# Patient Record
Sex: FEMALE | Race: WHITE | URBAN - METROPOLITAN AREA
[De-identification: names, ages, dates, MRNs, and addresses within clinical notes are randomized per-mention and may not be internally consistent; named-entity substitution may affect disease eponyms.]

---

## 2018-07-23 ENCOUNTER — OUTPATIENT (OUTPATIENT)
Dept: OUTPATIENT SERVICES | Facility: HOSPITAL | Age: 58
LOS: 1 days | Discharge: HOME | End: 2018-07-23

## 2018-07-23 VITALS
SYSTOLIC BLOOD PRESSURE: 140 MMHG | HEIGHT: 66 IN | DIASTOLIC BLOOD PRESSURE: 74 MMHG | RESPIRATION RATE: 17 BRPM | OXYGEN SATURATION: 97 % | TEMPERATURE: 97 F | HEART RATE: 64 BPM | WEIGHT: 242.07 LBS

## 2018-07-23 DIAGNOSIS — Z98.84 BARIATRIC SURGERY STATUS: Chronic | ICD-10-CM

## 2018-07-23 DIAGNOSIS — K43.2 INCISIONAL HERNIA WITHOUT OBSTRUCTION OR GANGRENE: ICD-10-CM

## 2018-07-23 DIAGNOSIS — Z01.818 ENCOUNTER FOR OTHER PREPROCEDURAL EXAMINATION: ICD-10-CM

## 2018-07-23 DIAGNOSIS — L02.212 CUTANEOUS ABSCESS OF BACK [ANY PART, EXCEPT BUTTOCK]: Chronic | ICD-10-CM

## 2018-07-23 DIAGNOSIS — Z90.49 ACQUIRED ABSENCE OF OTHER SPECIFIED PARTS OF DIGESTIVE TRACT: Chronic | ICD-10-CM

## 2018-07-23 DIAGNOSIS — Z90.89 ACQUIRED ABSENCE OF OTHER ORGANS: Chronic | ICD-10-CM

## 2018-07-23 LAB
ALBUMIN SERPL ELPH-MCNC: 4.4 G/DL — SIGNIFICANT CHANGE UP (ref 3.5–5.2)
ALP SERPL-CCNC: 115 U/L — SIGNIFICANT CHANGE UP (ref 30–115)
ALT FLD-CCNC: 11 U/L — SIGNIFICANT CHANGE UP (ref 0–41)
ANION GAP SERPL CALC-SCNC: 15 MMOL/L — HIGH (ref 7–14)
APPEARANCE UR: CLEAR — SIGNIFICANT CHANGE UP
APTT BLD: 33.5 SEC — SIGNIFICANT CHANGE UP (ref 27–39.2)
AST SERPL-CCNC: 11 U/L — SIGNIFICANT CHANGE UP (ref 0–41)
BASOPHILS # BLD AUTO: 0.07 K/UL — SIGNIFICANT CHANGE UP (ref 0–0.2)
BASOPHILS NFR BLD AUTO: 0.9 % — SIGNIFICANT CHANGE UP (ref 0–1)
BILIRUB SERPL-MCNC: <0.2 MG/DL — SIGNIFICANT CHANGE UP (ref 0.2–1.2)
BILIRUB UR-MCNC: NEGATIVE — SIGNIFICANT CHANGE UP
BUN SERPL-MCNC: 16 MG/DL — SIGNIFICANT CHANGE UP (ref 10–20)
CALCIUM SERPL-MCNC: 9.3 MG/DL — SIGNIFICANT CHANGE UP (ref 8.5–10.1)
CHLORIDE SERPL-SCNC: 103 MMOL/L — SIGNIFICANT CHANGE UP (ref 98–110)
CO2 SERPL-SCNC: 24 MMOL/L — SIGNIFICANT CHANGE UP (ref 17–32)
COLOR SPEC: YELLOW — SIGNIFICANT CHANGE UP
CREAT SERPL-MCNC: 0.7 MG/DL — SIGNIFICANT CHANGE UP (ref 0.7–1.5)
DIFF PNL FLD: NEGATIVE — SIGNIFICANT CHANGE UP
EOSINOPHIL # BLD AUTO: 0.48 K/UL — SIGNIFICANT CHANGE UP (ref 0–0.7)
EOSINOPHIL NFR BLD AUTO: 6.3 % — SIGNIFICANT CHANGE UP (ref 0–8)
GLUCOSE SERPL-MCNC: 100 MG/DL — HIGH (ref 70–99)
GLUCOSE UR QL: NEGATIVE — SIGNIFICANT CHANGE UP
HCT VFR BLD CALC: 38.9 % — SIGNIFICANT CHANGE UP (ref 37–47)
HGB BLD-MCNC: 12.5 G/DL — SIGNIFICANT CHANGE UP (ref 12–16)
IMM GRANULOCYTES NFR BLD AUTO: 1.6 % — HIGH (ref 0.1–0.3)
INR BLD: 1 RATIO — SIGNIFICANT CHANGE UP (ref 0.65–1.3)
KETONES UR-MCNC: NEGATIVE — SIGNIFICANT CHANGE UP
LEUKOCYTE ESTERASE UR-ACNC: NEGATIVE — SIGNIFICANT CHANGE UP
LYMPHOCYTES # BLD AUTO: 1.77 K/UL — SIGNIFICANT CHANGE UP (ref 1.2–3.4)
LYMPHOCYTES # BLD AUTO: 23.2 % — SIGNIFICANT CHANGE UP (ref 20.5–51.1)
MCHC RBC-ENTMCNC: 29.8 PG — SIGNIFICANT CHANGE UP (ref 27–31)
MCHC RBC-ENTMCNC: 32.1 G/DL — SIGNIFICANT CHANGE UP (ref 32–37)
MCV RBC AUTO: 92.8 FL — SIGNIFICANT CHANGE UP (ref 81–99)
MONOCYTES # BLD AUTO: 0.61 K/UL — HIGH (ref 0.1–0.6)
MONOCYTES NFR BLD AUTO: 8 % — SIGNIFICANT CHANGE UP (ref 1.7–9.3)
NEUTROPHILS # BLD AUTO: 4.58 K/UL — SIGNIFICANT CHANGE UP (ref 1.4–6.5)
NEUTROPHILS NFR BLD AUTO: 60 % — SIGNIFICANT CHANGE UP (ref 42.2–75.2)
NITRITE UR-MCNC: NEGATIVE — SIGNIFICANT CHANGE UP
NRBC # BLD: 0 /100 WBCS — SIGNIFICANT CHANGE UP (ref 0–0)
PH UR: 6.5 — SIGNIFICANT CHANGE UP (ref 5–8)
PLATELET # BLD AUTO: 211 K/UL — SIGNIFICANT CHANGE UP (ref 130–400)
POTASSIUM SERPL-MCNC: 4.8 MMOL/L — SIGNIFICANT CHANGE UP (ref 3.5–5)
POTASSIUM SERPL-SCNC: 4.8 MMOL/L — SIGNIFICANT CHANGE UP (ref 3.5–5)
PROT SERPL-MCNC: 6.9 G/DL — SIGNIFICANT CHANGE UP (ref 6–8)
PROT UR-MCNC: NEGATIVE — SIGNIFICANT CHANGE UP
PROTHROM AB SERPL-ACNC: 10.8 SEC — SIGNIFICANT CHANGE UP (ref 9.95–12.87)
RBC # BLD: 4.19 M/UL — LOW (ref 4.2–5.4)
RBC # FLD: 12.7 % — SIGNIFICANT CHANGE UP (ref 11.5–14.5)
SODIUM SERPL-SCNC: 142 MMOL/L — SIGNIFICANT CHANGE UP (ref 135–146)
SP GR SPEC: 1.01 — SIGNIFICANT CHANGE UP (ref 1.01–1.03)
UROBILINOGEN FLD QL: 0.2 — SIGNIFICANT CHANGE UP (ref 0.2–0.2)
WBC # BLD: 7.63 K/UL — SIGNIFICANT CHANGE UP (ref 4.8–10.8)
WBC # FLD AUTO: 7.63 K/UL — SIGNIFICANT CHANGE UP (ref 4.8–10.8)

## 2018-07-23 NOTE — H&P PST ADULT - PMH
Anxiety    Asthma  LAST ATTACK MANY YRS AGO  Back pain  LOW  Constipation    Depression    Gastroesophageal reflux disease without esophagitis    Heart murmur    Hypothyroidism    Mood disorder    Morbid obesity    Psoriasis

## 2018-07-23 NOTE — H&P PST ADULT - HISTORY OF PRESENT ILLNESS
PATIENT DENIES CHEST PAIN, SHORTNESS OF BREATH, PALPITATIONS, COUGHING, FEVER, DYSURIA.  CAN WALK UP 1 FLIGHTS OF STEPS WITHOUT SOB, BUR REPORTS  HORTON.

## 2018-07-23 NOTE — H&P PST ADULT - REASON FOR ADMISSION
57 Y/O FEMALE HERE FOR PRE-ADMISSION SURGICAL TESTING. PATIENT REPORTS SHE HAS HAD A UMBILICAL HERNIA FOR MANY YEARS, WHICH IS PAINFUL NOW.  NOW FOR SCHEDULED PROCEDURE.

## 2018-07-23 NOTE — H&P PST ADULT - NS PRO FEM REPRO HEALTH SCREEN
Urinary Tract Infection         What is a urinary tract infection?   A urinary tract infection (UTI) is an infection in the urinary tract. The urinary tract includes the:   kidneys   ureters (the tubes draining urine from the kidneys to the bladder)   bladder   urethra (the tube that drains urine from the bladder).   Any or all of these parts of the urinary tract can get infected.   How does it occur?   Urinary tract infection is usually caused by bacteria. Normally the urinary tract does not have any bacteria or other organisms in it. Bacteria that cause UTI often spread from the rectum or vagina to the urethra and then to the bladder or kidneys. Urinary tract infection is more common in women than men because the urethra is shorter in women. This makes it easier for bacteria to move up to the bladder. Sometimes bacteria spread from another part of the body through the bloodstream to the urinary tract.   Some of the things that can lead to an infection are:   a blockage in the urinary tract, such as a kidney stone   sexual activity   getting older, when it may get harder to empty and flush out the bladder completely.   Women are more likely to have an infection if they:   are newly sexually active or have a new sex partner   are past menopause   are pregnant   have a history of diabetes, a problem with the immune system, sickle-cell anemia, stroke, kidney stones, or any illness that makes it hard to empty the bladder completely.   What are the symptoms?   The symptoms of UTI may include:   urinating more often   feeling an urgent need to urinate   pain or discomfort (burning) when you urinate   urine that smells bad   pain in the lower pelvis, stomach, lower back, or side   urine that looks cloudy or reddish   fever or chills   sweats   nausea and vomiting   leaking of urine   change in amount of urine, either more or less   pain during sex.   How is it diagnosed?   Your healthcare provider will ask  about your symptoms and medical history. Your provider will examine you. The exam may include a pelvic exam. Your provider will check for tenderness of the bladder or kidney. A sample of your urine may be tested for bacteria and pus. If you are having fever and are feeling very ill, you may have a blood test to look for signs of more serious infection.   If you keep having infections or symptoms after treatment, your provider may suggest these tests:   An intravenous pyelogram (IVP). An IVP is a special type of X-ray of the kidneys, ureters, and bladder.   An ultrasound scan to look at the urinary tract.   A cystoscopy. This is an exam of the inside of the urethra and bladder with a small lighted instrument. It is usually done by a specialist called a urologist.   How is it treated?   UTIs are usually treated with antibiotics. Your provider can also prescribe a medicine called Pyridium to relieve burning and discomfort. (Pyridium turns your urine a dark orange color.)   If the infection is causing fever, pain, or vomiting or you have a severe kidney infection, you may need to stay at the hospital for treatment.   How long will the effects last?   With antibiotic treatment, the symptoms of a bacterial infection stop in 1 to 3 days. Take all of the antibiotic your healthcare provider prescribes, even after the symptoms go away. If you stop taking your medicine before the scheduled end of treatment, the infection may come back   Without treatment, the infection can last a long time. If it is not treated, the infection can permanently damage the bladder and kidneys, or it may spread to the blood. If the infection spreads to the blood, it can be fatal.   How can I take care of myself?   Follow your healthcare provider's treatment. Take all of the antibiotic that your healthcare provider prescribes, even when you feel better. Do not take medicine left over from previous prescriptions.   Drink more fluids, especially  water, to help flush bacteria from your system.   If you have a fever:   Take aspirin or acetaminophen to control the fever. Check with your healthcare provider before you give any medicine that contains aspirin or salicylates to a child or teen. This includes medicines like baby aspirin, some cold medicines, and Pepto Bismol. Children and teens who take aspirin are at risk for a serious illness called Reye's syndrome.   Keep a daily record of your temperature.   A hot water bottle or an electric heating pad on a low setting can help relieve cramps or lower abdominal or back pain. Keep a cloth between your skin and the hot water bottle or heating pad so that you don't burn your skin.   Soaking in a tub for 20 to 30 minutes may help relieve any back or abdominal pain.   Follow your healthcare provider's directions for a follow-up urine test. Your provider may want to test your urine soon after you finish taking the antibiotic.   Call your healthcare provider right away if:   You keep having symptoms after taking an antibiotic for 2 days.   Your symptoms get worse.   You have a fever of 101.5? F (38.6? C) or higher.   You have new vomiting.   You have new pain in your side, back, or belly.   You have any symptoms that worry you.   How can I help prevent urinary tract infection?   You can help prevent UTIs if you:   Drink lots of fluids every day.   Don't wait to go to the bathroom when you feel the need to urinate.   Empty your bladder completely when you urinate.   Use good hygiene when you use the toilet. For example, wipe from front to back to keep rectal bacteria from getting into the vagina and urethra.   Avoid using irritating cosmetics or chemicals in the area of the vagina and urethra (such as strong soaps, feminine hygiene sprays or douches, or scented napkins or panty liners).   Practice safe sex. Always use latex or polyurethane condoms.   Urinate soon after sex.   Keep your genital area clean.   Wear  underwear that is all cotton or has a cotton crotch. Pantyhose should also have a cotton crotch. Cotton allows better air circulation than nylon. Change underwear and pantyhose every day.     Published by [employer].  This content is reviewed periodically and is subject to change as new health information becomes available. The information is intended to inform and educate and is not a replacement for medical evaluation, advice, diagnosis or treatment by a healthcare professional.   Developed by Asuncion Kumar RN, [place], and [facility].   ? 2010 [facility] and/or its affiliates. All Rights Reserved.   Copyright   [employer] 2011           mammogram

## 2018-07-23 NOTE — H&P PST ADULT - FAMILY HISTORY
Father  Still living? Unknown  CAD (coronary artery disease), Age at diagnosis: Age Unknown     Mother  Still living? No  CAD (coronary artery disease), Age at diagnosis: Age Unknown

## 2018-07-23 NOTE — H&P PST ADULT - MEDICATION HERBAL REMEDIES, PROFILE
LIMITED ROM/PAIN ON MOVEMENT/SPASMS/B/L SCM spasms trachea midline/PAIN ON MOVEMENT/SPASMS/B/L SCM and trapezius spasms; no VPT. No step offs, no deformities./LIMITED ROM no

## 2018-07-23 NOTE — H&P PST ADULT - PSH
Abscess of back  EVACUATION OF ABSCESS @ L5-S1 1997  History of appendectomy  1974  History of Jaclyn-en-Y gastric bypass  WITH CHOLECYSTECTOMY 2006  S/P tonsillectomy  1967

## 2018-07-30 ENCOUNTER — OUTPATIENT (OUTPATIENT)
Dept: OUTPATIENT SERVICES | Facility: HOSPITAL | Age: 58
LOS: 1 days | Discharge: HOME | End: 2018-07-30

## 2018-07-30 ENCOUNTER — RESULT REVIEW (OUTPATIENT)
Age: 58
End: 2018-07-30

## 2018-07-30 VITALS — DIASTOLIC BLOOD PRESSURE: 70 MMHG | RESPIRATION RATE: 18 BRPM | HEART RATE: 66 BPM | SYSTOLIC BLOOD PRESSURE: 167 MMHG

## 2018-07-30 VITALS
RESPIRATION RATE: 18 BRPM | OXYGEN SATURATION: 97 % | SYSTOLIC BLOOD PRESSURE: 143 MMHG | TEMPERATURE: 97 F | DIASTOLIC BLOOD PRESSURE: 73 MMHG | HEIGHT: 66 IN | WEIGHT: 229.94 LBS | HEART RATE: 56 BPM

## 2018-07-30 DIAGNOSIS — Z90.49 ACQUIRED ABSENCE OF OTHER SPECIFIED PARTS OF DIGESTIVE TRACT: Chronic | ICD-10-CM

## 2018-07-30 DIAGNOSIS — Z98.84 BARIATRIC SURGERY STATUS: Chronic | ICD-10-CM

## 2018-07-30 DIAGNOSIS — L02.212 CUTANEOUS ABSCESS OF BACK [ANY PART, EXCEPT BUTTOCK]: Chronic | ICD-10-CM

## 2018-07-30 DIAGNOSIS — Z90.89 ACQUIRED ABSENCE OF OTHER ORGANS: Chronic | ICD-10-CM

## 2018-07-30 RX ORDER — LAMOTRIGINE 25 MG/1
25 TABLET, ORALLY DISINTEGRATING ORAL
Qty: 0 | Refills: 0 | COMMUNITY

## 2018-07-30 RX ORDER — KETOROLAC TROMETHAMINE 30 MG/ML
30 SYRINGE (ML) INJECTION ONCE
Qty: 0 | Refills: 0 | Status: DISCONTINUED | OUTPATIENT
Start: 2018-07-30 | End: 2018-07-30

## 2018-07-30 RX ORDER — HYDROMORPHONE HYDROCHLORIDE 2 MG/ML
1 INJECTION INTRAMUSCULAR; INTRAVENOUS; SUBCUTANEOUS
Qty: 0 | Refills: 0 | Status: DISCONTINUED | OUTPATIENT
Start: 2018-07-30 | End: 2018-07-30

## 2018-07-30 RX ORDER — ONDANSETRON 8 MG/1
4 TABLET, FILM COATED ORAL ONCE
Qty: 0 | Refills: 0 | Status: DISCONTINUED | OUTPATIENT
Start: 2018-07-30 | End: 2018-08-14

## 2018-07-30 RX ORDER — SERTRALINE 25 MG/1
200 TABLET, FILM COATED ORAL
Qty: 0 | Refills: 0 | COMMUNITY

## 2018-07-30 RX ORDER — FOLIC ACID 0.8 MG
1 TABLET ORAL
Qty: 0 | Refills: 0 | COMMUNITY

## 2018-07-30 RX ORDER — SODIUM CHLORIDE 9 MG/ML
1000 INJECTION, SOLUTION INTRAVENOUS
Qty: 0 | Refills: 0 | Status: DISCONTINUED | OUTPATIENT
Start: 2018-07-30 | End: 2018-08-14

## 2018-07-30 RX ORDER — CARISOPRODOL 250 MG
1 TABLET ORAL
Qty: 0 | Refills: 0 | COMMUNITY

## 2018-07-30 RX ORDER — MONTELUKAST 4 MG/1
1 TABLET, CHEWABLE ORAL
Qty: 0 | Refills: 0 | COMMUNITY

## 2018-07-30 RX ORDER — OXYCODONE AND ACETAMINOPHEN 5; 325 MG/1; MG/1
1 TABLET ORAL
Qty: 25 | Refills: 0
Start: 2018-07-30 | End: 2018-08-03

## 2018-07-30 RX ORDER — DOCUSATE SODIUM 100 MG
1 CAPSULE ORAL
Qty: 30 | Refills: 0
Start: 2018-07-30 | End: 2018-08-13

## 2018-07-30 RX ORDER — FLUTICASONE PROPIONATE AND SALMETEROL 50; 250 UG/1; UG/1
1 POWDER ORAL; RESPIRATORY (INHALATION)
Qty: 0 | Refills: 0 | COMMUNITY

## 2018-07-30 RX ORDER — MORPHINE SULFATE 50 MG/1
4 CAPSULE, EXTENDED RELEASE ORAL ONCE
Qty: 0 | Refills: 0 | Status: DISCONTINUED | OUTPATIENT
Start: 2018-07-30 | End: 2018-07-30

## 2018-07-30 RX ORDER — OXYCODONE AND ACETAMINOPHEN 5; 325 MG/1; MG/1
1 TABLET ORAL EVERY 4 HOURS
Qty: 0 | Refills: 0 | Status: DISCONTINUED | OUTPATIENT
Start: 2018-07-30 | End: 2018-07-30

## 2018-07-30 RX ORDER — ALBUTEROL 90 UG/1
2 AEROSOL, METERED ORAL
Qty: 0 | Refills: 0 | COMMUNITY

## 2018-07-30 RX ORDER — OXYCODONE AND ACETAMINOPHEN 5; 325 MG/1; MG/1
2 TABLET ORAL EVERY 6 HOURS
Qty: 0 | Refills: 0 | Status: DISCONTINUED | OUTPATIENT
Start: 2018-07-30 | End: 2018-07-30

## 2018-07-30 RX ORDER — GLUCOSAMINE/CHONDROITIN/C/MANG 500-400 MG
3 CAPSULE ORAL
Qty: 0 | Refills: 0 | COMMUNITY

## 2018-07-30 RX ORDER — LEVOTHYROXINE SODIUM 125 MCG
1 TABLET ORAL
Qty: 0 | Refills: 0 | COMMUNITY

## 2018-07-30 RX ORDER — METHADONE HYDROCHLORIDE 40 MG/1
3 TABLET ORAL
Qty: 0 | Refills: 0 | COMMUNITY

## 2018-07-30 RX ADMIN — Medication 30 MILLIGRAM(S): at 16:30

## 2018-07-30 RX ADMIN — HYDROMORPHONE HYDROCHLORIDE 1 MILLIGRAM(S): 2 INJECTION INTRAMUSCULAR; INTRAVENOUS; SUBCUTANEOUS at 16:30

## 2018-07-30 RX ADMIN — HYDROMORPHONE HYDROCHLORIDE 1 MILLIGRAM(S): 2 INJECTION INTRAMUSCULAR; INTRAVENOUS; SUBCUTANEOUS at 16:41

## 2018-07-30 RX ADMIN — Medication 30 MILLIGRAM(S): at 16:41

## 2018-07-30 RX ADMIN — HYDROMORPHONE HYDROCHLORIDE 1 MILLIGRAM(S): 2 INJECTION INTRAMUSCULAR; INTRAVENOUS; SUBCUTANEOUS at 16:50

## 2018-07-30 NOTE — BRIEF OPERATIVE NOTE - PROCEDURE
<<-----Click on this checkbox to enter Procedure Open repair of ventral hernia with mesh  07/30/2018    Active  JONA

## 2018-07-30 NOTE — ASU DISCHARGE PLAN (ADULT/PEDIATRIC). - SPECIAL INSTRUCTIONS
must wear abdominal binder at all times except when showering. must wear abdominal binder at all times except when showering. Please record SHERRY drain output when emptying.

## 2018-08-01 LAB — SURGICAL PATHOLOGY STUDY: SIGNIFICANT CHANGE UP

## 2018-08-06 DIAGNOSIS — F41.8 OTHER SPECIFIED ANXIETY DISORDERS: ICD-10-CM

## 2018-08-06 DIAGNOSIS — F17.210 NICOTINE DEPENDENCE, CIGARETTES, UNCOMPLICATED: ICD-10-CM

## 2018-08-06 DIAGNOSIS — J45.909 UNSPECIFIED ASTHMA, UNCOMPLICATED: ICD-10-CM

## 2018-08-06 DIAGNOSIS — E03.9 HYPOTHYROIDISM, UNSPECIFIED: ICD-10-CM

## 2018-08-06 DIAGNOSIS — E66.01 MORBID (SEVERE) OBESITY DUE TO EXCESS CALORIES: ICD-10-CM

## 2018-08-06 DIAGNOSIS — K43.0 INCISIONAL HERNIA WITH OBSTRUCTION, WITHOUT GANGRENE: ICD-10-CM

## 2019-10-30 ENCOUNTER — OUTPATIENT (OUTPATIENT)
Dept: OUTPATIENT SERVICES | Facility: HOSPITAL | Age: 59
LOS: 1 days | Discharge: HOME | End: 2019-10-30

## 2019-10-30 DIAGNOSIS — L02.212 CUTANEOUS ABSCESS OF BACK [ANY PART, EXCEPT BUTTOCK]: Chronic | ICD-10-CM

## 2019-10-30 DIAGNOSIS — Z90.89 ACQUIRED ABSENCE OF OTHER ORGANS: Chronic | ICD-10-CM

## 2019-10-30 DIAGNOSIS — Z90.49 ACQUIRED ABSENCE OF OTHER SPECIFIED PARTS OF DIGESTIVE TRACT: Chronic | ICD-10-CM

## 2019-10-30 DIAGNOSIS — Z98.84 BARIATRIC SURGERY STATUS: Chronic | ICD-10-CM

## 2019-10-31 DIAGNOSIS — E87.5 HYPERKALEMIA: ICD-10-CM

## 2019-10-31 PROBLEM — K59.00 CONSTIPATION, UNSPECIFIED: Chronic | Status: ACTIVE | Noted: 2018-07-23

## 2019-10-31 PROBLEM — E03.9 HYPOTHYROIDISM, UNSPECIFIED: Chronic | Status: ACTIVE | Noted: 2018-07-23

## 2019-10-31 PROBLEM — F39 UNSPECIFIED MOOD [AFFECTIVE] DISORDER: Chronic | Status: ACTIVE | Noted: 2018-07-23

## 2019-10-31 PROBLEM — F32.9 MAJOR DEPRESSIVE DISORDER, SINGLE EPISODE, UNSPECIFIED: Chronic | Status: ACTIVE | Noted: 2018-07-23

## 2019-10-31 PROBLEM — L40.9 PSORIASIS, UNSPECIFIED: Chronic | Status: ACTIVE | Noted: 2018-07-23

## 2019-10-31 PROBLEM — J45.909 UNSPECIFIED ASTHMA, UNCOMPLICATED: Chronic | Status: ACTIVE | Noted: 2018-07-23

## 2019-10-31 PROBLEM — K21.9 GASTRO-ESOPHAGEAL REFLUX DISEASE WITHOUT ESOPHAGITIS: Chronic | Status: ACTIVE | Noted: 2018-07-23

## 2019-10-31 PROBLEM — M54.9 DORSALGIA, UNSPECIFIED: Chronic | Status: ACTIVE | Noted: 2018-07-23

## 2019-10-31 PROBLEM — E66.01 MORBID (SEVERE) OBESITY DUE TO EXCESS CALORIES: Chronic | Status: ACTIVE | Noted: 2018-07-23

## 2019-10-31 PROBLEM — F41.9 ANXIETY DISORDER, UNSPECIFIED: Chronic | Status: ACTIVE | Noted: 2018-07-23

## 2019-10-31 PROBLEM — R01.1 CARDIAC MURMUR, UNSPECIFIED: Chronic | Status: ACTIVE | Noted: 2018-07-23

## 2019-11-08 ENCOUNTER — OUTPATIENT (OUTPATIENT)
Dept: OUTPATIENT SERVICES | Facility: HOSPITAL | Age: 59
LOS: 1 days | Discharge: HOME | End: 2019-11-08

## 2019-11-08 DIAGNOSIS — L02.212 CUTANEOUS ABSCESS OF BACK [ANY PART, EXCEPT BUTTOCK]: Chronic | ICD-10-CM

## 2019-11-08 DIAGNOSIS — I10 ESSENTIAL (PRIMARY) HYPERTENSION: ICD-10-CM

## 2019-11-08 DIAGNOSIS — Z98.84 BARIATRIC SURGERY STATUS: Chronic | ICD-10-CM

## 2019-11-08 DIAGNOSIS — Z90.49 ACQUIRED ABSENCE OF OTHER SPECIFIED PARTS OF DIGESTIVE TRACT: Chronic | ICD-10-CM

## 2019-11-08 DIAGNOSIS — Z90.89 ACQUIRED ABSENCE OF OTHER ORGANS: Chronic | ICD-10-CM

## 2019-12-11 ENCOUNTER — OUTPATIENT (OUTPATIENT)
Dept: OUTPATIENT SERVICES | Facility: HOSPITAL | Age: 59
LOS: 1 days | Discharge: HOME | End: 2019-12-11

## 2019-12-11 DIAGNOSIS — Z90.89 ACQUIRED ABSENCE OF OTHER ORGANS: Chronic | ICD-10-CM

## 2019-12-11 DIAGNOSIS — L02.212 CUTANEOUS ABSCESS OF BACK [ANY PART, EXCEPT BUTTOCK]: Chronic | ICD-10-CM

## 2019-12-11 DIAGNOSIS — Z90.49 ACQUIRED ABSENCE OF OTHER SPECIFIED PARTS OF DIGESTIVE TRACT: Chronic | ICD-10-CM

## 2019-12-11 DIAGNOSIS — Z98.84 BARIATRIC SURGERY STATUS: Chronic | ICD-10-CM

## 2019-12-13 DIAGNOSIS — E87.5 HYPERKALEMIA: ICD-10-CM

## 2020-09-24 NOTE — ASU DISCHARGE PLAN (ADULT/PEDIATRIC). - DIET
other (specify diet and fluid)
  [facility]  Neurology  [address] [place] [address], [facility] [address]  [place] [address]  Phone: [phone]  Fax:   Follow Up Time: 1-3 Days

## 2020-11-02 PROBLEM — Z00.00 ENCOUNTER FOR PREVENTIVE HEALTH EXAMINATION: Status: ACTIVE | Noted: 2020-11-02

## 2020-11-23 ENCOUNTER — TRANSCRIPTION ENCOUNTER (OUTPATIENT)
Age: 60
End: 2020-11-23

## 2020-11-23 ENCOUNTER — OUTPATIENT (OUTPATIENT)
Dept: OUTPATIENT SERVICES | Facility: HOSPITAL | Age: 60
LOS: 1 days | Discharge: HOME | End: 2020-11-23
Payer: COMMERCIAL

## 2020-11-23 DIAGNOSIS — R06.02 SHORTNESS OF BREATH: ICD-10-CM

## 2020-11-23 DIAGNOSIS — Z90.89 ACQUIRED ABSENCE OF OTHER ORGANS: Chronic | ICD-10-CM

## 2020-11-23 DIAGNOSIS — Z90.49 ACQUIRED ABSENCE OF OTHER SPECIFIED PARTS OF DIGESTIVE TRACT: Chronic | ICD-10-CM

## 2020-11-23 DIAGNOSIS — Z98.84 BARIATRIC SURGERY STATUS: Chronic | ICD-10-CM

## 2020-11-23 DIAGNOSIS — L02.212 CUTANEOUS ABSCESS OF BACK [ANY PART, EXCEPT BUTTOCK]: Chronic | ICD-10-CM

## 2020-11-23 PROCEDURE — 75574 CT ANGIO HRT W/3D IMAGE: CPT | Mod: 26

## 2021-01-04 ENCOUNTER — APPOINTMENT (OUTPATIENT)
Dept: VASCULAR SURGERY | Facility: CLINIC | Age: 61
End: 2021-01-04

## 2021-01-09 ENCOUNTER — OUTPATIENT (OUTPATIENT)
Dept: OUTPATIENT SERVICES | Facility: HOSPITAL | Age: 61
LOS: 1 days | Discharge: HOME | End: 2021-01-09

## 2021-01-09 DIAGNOSIS — L02.212 CUTANEOUS ABSCESS OF BACK [ANY PART, EXCEPT BUTTOCK]: Chronic | ICD-10-CM

## 2021-01-09 DIAGNOSIS — Z90.49 ACQUIRED ABSENCE OF OTHER SPECIFIED PARTS OF DIGESTIVE TRACT: Chronic | ICD-10-CM

## 2021-01-09 DIAGNOSIS — Z90.89 ACQUIRED ABSENCE OF OTHER ORGANS: Chronic | ICD-10-CM

## 2021-01-09 DIAGNOSIS — Z11.59 ENCOUNTER FOR SCREENING FOR OTHER VIRAL DISEASES: ICD-10-CM

## 2021-01-09 DIAGNOSIS — Z98.84 BARIATRIC SURGERY STATUS: Chronic | ICD-10-CM

## 2021-01-17 ENCOUNTER — OUTPATIENT (OUTPATIENT)
Dept: OUTPATIENT SERVICES | Facility: HOSPITAL | Age: 61
LOS: 1 days | Discharge: HOME | End: 2021-01-17

## 2021-01-17 DIAGNOSIS — Z90.49 ACQUIRED ABSENCE OF OTHER SPECIFIED PARTS OF DIGESTIVE TRACT: Chronic | ICD-10-CM

## 2021-01-17 DIAGNOSIS — Z90.89 ACQUIRED ABSENCE OF OTHER ORGANS: Chronic | ICD-10-CM

## 2021-01-17 DIAGNOSIS — Z98.84 BARIATRIC SURGERY STATUS: Chronic | ICD-10-CM

## 2021-01-17 DIAGNOSIS — Z11.59 ENCOUNTER FOR SCREENING FOR OTHER VIRAL DISEASES: ICD-10-CM

## 2021-01-17 DIAGNOSIS — L02.212 CUTANEOUS ABSCESS OF BACK [ANY PART, EXCEPT BUTTOCK]: Chronic | ICD-10-CM

## 2021-02-11 ENCOUNTER — APPOINTMENT (OUTPATIENT)
Dept: VASCULAR SURGERY | Facility: CLINIC | Age: 61
End: 2021-02-11

## 2021-03-09 DIAGNOSIS — F32.9 MAJOR DEPRESSIVE DISORDER, SINGLE EPISODE, UNSPECIFIED: ICD-10-CM

## 2021-03-09 DIAGNOSIS — Z87.891 PERSONAL HISTORY OF NICOTINE DEPENDENCE: ICD-10-CM

## 2021-03-09 DIAGNOSIS — Z87.39 PERSONAL HISTORY OF OTHER DISEASES OF THE MUSCULOSKELETAL SYSTEM AND CONNECTIVE TISSUE: ICD-10-CM

## 2021-03-10 ENCOUNTER — APPOINTMENT (OUTPATIENT)
Dept: PULMONOLOGY | Facility: CLINIC | Age: 61
End: 2021-03-10

## 2021-03-24 ENCOUNTER — APPOINTMENT (OUTPATIENT)
Dept: CARDIOLOGY | Facility: CLINIC | Age: 61
End: 2021-03-24
Payer: COMMERCIAL

## 2021-03-24 VITALS
WEIGHT: 229 LBS | TEMPERATURE: 98 F | HEIGHT: 66 IN | SYSTOLIC BLOOD PRESSURE: 130 MMHG | HEART RATE: 74 BPM | OXYGEN SATURATION: 91 % | BODY MASS INDEX: 36.8 KG/M2 | DIASTOLIC BLOOD PRESSURE: 80 MMHG

## 2021-03-24 DIAGNOSIS — I87.2 VENOUS INSUFFICIENCY (CHRONIC) (PERIPHERAL): ICD-10-CM

## 2021-03-24 PROCEDURE — 99204 OFFICE O/P NEW MOD 45 MIN: CPT

## 2021-03-24 PROCEDURE — 99072 ADDL SUPL MATRL&STAF TM PHE: CPT

## 2021-03-24 PROCEDURE — 93000 ELECTROCARDIOGRAM COMPLETE: CPT

## 2021-03-24 RX ORDER — BUMETANIDE 2 MG/1
TABLET ORAL
Refills: 0 | Status: DISCONTINUED | COMMUNITY
End: 2021-03-24

## 2021-03-24 RX ORDER — CARIPRAZINE 6 MG/1
CAPSULE, GELATIN COATED ORAL
Refills: 0 | Status: DISCONTINUED | COMMUNITY
End: 2021-03-24

## 2021-03-24 RX ORDER — CHROMIUM 200 MCG
TABLET ORAL
Refills: 0 | Status: DISCONTINUED | COMMUNITY
End: 2021-03-24

## 2021-03-24 RX ORDER — CARISOPRODOL 250 MG/1
250 TABLET ORAL
Refills: 0 | Status: DISCONTINUED | COMMUNITY
End: 2021-03-24

## 2021-03-24 RX ORDER — LAMOTRIGINE 25 MG/1
TABLET ORAL
Refills: 0 | Status: DISCONTINUED | COMMUNITY
End: 2021-03-24

## 2021-03-24 RX ORDER — ISOSORBIDE DINITRATE 30 MG/1
30 TABLET ORAL
Refills: 0 | Status: DISCONTINUED | COMMUNITY
End: 2021-03-24

## 2021-03-24 RX ORDER — SERTRALINE 25 MG/1
TABLET, FILM COATED ORAL
Refills: 0 | Status: DISCONTINUED | COMMUNITY
End: 2021-03-24

## 2021-03-24 RX ORDER — FLUTICASONE PROPIONATE AND SALMETEROL 50; 250 UG/1; UG/1
250-50 POWDER RESPIRATORY (INHALATION)
Refills: 0 | Status: DISCONTINUED | COMMUNITY
End: 2021-03-24

## 2021-03-24 RX ORDER — METHADONE HYDROCHLORIDE 10 MG/ML
CONCENTRATE ORAL
Refills: 0 | Status: DISCONTINUED | COMMUNITY
End: 2021-03-24

## 2021-03-24 RX ORDER — MONTELUKAST 10 MG/1
10 TABLET, FILM COATED ORAL
Refills: 0 | Status: DISCONTINUED | COMMUNITY
End: 2021-03-24

## 2021-03-24 RX ORDER — ALBUTEROL SULFATE 90 UG/1
AEROSOL, METERED RESPIRATORY (INHALATION)
Refills: 0 | Status: DISCONTINUED | COMMUNITY
End: 2021-03-24

## 2021-03-24 RX ORDER — LEVOTHYROXINE SODIUM 0.17 MG/1
TABLET ORAL
Refills: 0 | Status: DISCONTINUED | COMMUNITY
End: 2021-03-24

## 2021-04-06 ENCOUNTER — APPOINTMENT (OUTPATIENT)
Dept: OBGYN | Facility: CLINIC | Age: 61
End: 2021-04-06

## 2021-04-07 ENCOUNTER — APPOINTMENT (OUTPATIENT)
Dept: PULMONOLOGY | Facility: CLINIC | Age: 61
End: 2021-04-07
Payer: SELF-PAY

## 2021-04-07 VITALS
BODY MASS INDEX: 37.28 KG/M2 | OXYGEN SATURATION: 98 % | WEIGHT: 232 LBS | DIASTOLIC BLOOD PRESSURE: 76 MMHG | HEIGHT: 66 IN | RESPIRATION RATE: 14 BRPM | SYSTOLIC BLOOD PRESSURE: 126 MMHG | HEART RATE: 104 BPM

## 2021-04-07 PROCEDURE — 99072 ADDL SUPL MATRL&STAF TM PHE: CPT

## 2021-04-07 PROCEDURE — 94010 BREATHING CAPACITY TEST: CPT

## 2021-04-07 PROCEDURE — 94727 GAS DIL/WSHOT DETER LNG VOL: CPT

## 2021-04-07 PROCEDURE — 94729 DIFFUSING CAPACITY: CPT

## 2021-04-07 PROCEDURE — 99213 OFFICE O/P EST LOW 20 MIN: CPT | Mod: 25

## 2021-04-07 NOTE — HISTORY OF PRESENT ILLNESS
[TextBox_4] : s/p PFTs.  Mild reduction in diffusion capacity.  Chest CT with contrast from a few months ago reviewed.  Ex tobacco quit 3 years ago.  Compliant with inhaler.  Reports typical symptoms of KASSI.  No sleep study,

## 2021-04-07 NOTE — DISCUSSION/SUMMARY
[FreeTextEntry1] : SOB on exertion s/p chest CT with contrast and PFTs.  Evaluated by cardio.  Ex heavy tobacco use.  Keep inhaler.  Weight loss.  Home sleep study.

## 2021-04-09 PROBLEM — I87.2 CHRONIC VENOUS INSUFFICIENCY: Status: ACTIVE | Noted: 2021-03-24

## 2021-04-09 NOTE — REASON FOR VISIT
[Consultation] : a consultation regarding [FreeTextEntry2] : edema [FreeTextEntry1] : 61 y/o F with h/o Asthma, h/o tobacco abuse, depression KASSI presents for evaluation of chronic edema

## 2021-04-09 NOTE — ASSESSMENT
[FreeTextEntry1] : 61 y/o F with h/o Asthma, h/o tobacco abuse, depression KASSI presents for evaluation of chronic edema  \par h/o rheumatological w/u in the past \par \par Plan:\par - Venous duplex with reflux eval \par - Compression stockings \par - f/u in few weeks if no improvement with compression and elevation

## 2021-04-09 NOTE — PHYSICAL EXAM
[General Appearance - Well Developed] : well developed [Normal Conjunctiva] : the conjunctiva exhibited no abnormalities [Normal Oral Mucosa] : normal oral mucosa [Heart Sounds] : normal S1 and S2 [2+] : left 2+ [1+] : left 1+ [Bowel Sounds] : normal bowel sounds [Abnormal Walk] : normal gait [Nail Clubbing] : no clubbing of the fingernails [FreeTextEntry1] : venous stesis  [Oriented To Time, Place, And Person] : oriented to person, place, and time

## 2021-04-14 ENCOUNTER — APPOINTMENT (OUTPATIENT)
Dept: CARDIOLOGY | Facility: CLINIC | Age: 61
End: 2021-04-14

## 2021-05-27 ENCOUNTER — APPOINTMENT (OUTPATIENT)
Dept: CARDIOLOGY | Facility: CLINIC | Age: 61
End: 2021-05-27
Payer: COMMERCIAL

## 2021-05-27 PROCEDURE — 99072 ADDL SUPL MATRL&STAF TM PHE: CPT

## 2021-05-27 PROCEDURE — 93970 EXTREMITY STUDY: CPT

## 2021-08-04 ENCOUNTER — APPOINTMENT (OUTPATIENT)
Dept: OBGYN | Facility: CLINIC | Age: 61
End: 2021-08-04

## 2021-08-05 ENCOUNTER — OUTPATIENT (OUTPATIENT)
Dept: OUTPATIENT SERVICES | Facility: HOSPITAL | Age: 61
LOS: 1 days | Discharge: HOME | End: 2021-08-05
Payer: COMMERCIAL

## 2021-08-05 DIAGNOSIS — Z90.49 ACQUIRED ABSENCE OF OTHER SPECIFIED PARTS OF DIGESTIVE TRACT: Chronic | ICD-10-CM

## 2021-08-05 DIAGNOSIS — L02.212 CUTANEOUS ABSCESS OF BACK [ANY PART, EXCEPT BUTTOCK]: Chronic | ICD-10-CM

## 2021-08-05 DIAGNOSIS — Z90.89 ACQUIRED ABSENCE OF OTHER ORGANS: Chronic | ICD-10-CM

## 2021-08-05 DIAGNOSIS — Z98.84 BARIATRIC SURGERY STATUS: Chronic | ICD-10-CM

## 2021-08-05 PROCEDURE — 95806 SLEEP STUDY UNATT&RESP EFFT: CPT | Mod: 26

## 2021-08-06 DIAGNOSIS — G47.33 OBSTRUCTIVE SLEEP APNEA (ADULT) (PEDIATRIC): ICD-10-CM

## 2021-09-03 ENCOUNTER — APPOINTMENT (OUTPATIENT)
Dept: CARDIOLOGY | Facility: CLINIC | Age: 61
End: 2021-09-03

## 2021-09-08 ENCOUNTER — APPOINTMENT (OUTPATIENT)
Age: 61
End: 2021-09-08
Payer: COMMERCIAL

## 2021-09-08 VITALS
WEIGHT: 208 LBS | BODY MASS INDEX: 33.43 KG/M2 | HEIGHT: 66 IN | SYSTOLIC BLOOD PRESSURE: 124 MMHG | RESPIRATION RATE: 14 BRPM | HEART RATE: 76 BPM | OXYGEN SATURATION: 97 % | DIASTOLIC BLOOD PRESSURE: 78 MMHG

## 2021-09-08 DIAGNOSIS — J45.909 UNSPECIFIED ASTHMA, UNCOMPLICATED: ICD-10-CM

## 2021-09-08 DIAGNOSIS — R06.02 SHORTNESS OF BREATH: ICD-10-CM

## 2021-09-08 DIAGNOSIS — G47.33 OBSTRUCTIVE SLEEP APNEA (ADULT) (PEDIATRIC): ICD-10-CM

## 2021-09-08 PROCEDURE — 99213 OFFICE O/P EST LOW 20 MIN: CPT

## 2021-09-23 ENCOUNTER — EMERGENCY (EMERGENCY)
Facility: HOSPITAL | Age: 61
LOS: 0 days | Discharge: HOME | End: 2021-09-23
Attending: STUDENT IN AN ORGANIZED HEALTH CARE EDUCATION/TRAINING PROGRAM | Admitting: STUDENT IN AN ORGANIZED HEALTH CARE EDUCATION/TRAINING PROGRAM
Payer: COMMERCIAL

## 2021-09-23 VITALS
WEIGHT: 164.91 LBS | TEMPERATURE: 98 F | RESPIRATION RATE: 18 BRPM | DIASTOLIC BLOOD PRESSURE: 79 MMHG | HEART RATE: 55 BPM | OXYGEN SATURATION: 98 % | HEIGHT: 66 IN | SYSTOLIC BLOOD PRESSURE: 123 MMHG

## 2021-09-23 DIAGNOSIS — S81.812A LACERATION WITHOUT FOREIGN BODY, LEFT LOWER LEG, INITIAL ENCOUNTER: ICD-10-CM

## 2021-09-23 DIAGNOSIS — Z88.0 ALLERGY STATUS TO PENICILLIN: ICD-10-CM

## 2021-09-23 DIAGNOSIS — Z90.89 ACQUIRED ABSENCE OF OTHER ORGANS: Chronic | ICD-10-CM

## 2021-09-23 DIAGNOSIS — Z87.09 PERSONAL HISTORY OF OTHER DISEASES OF THE RESPIRATORY SYSTEM: ICD-10-CM
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
## 2021-09-23 DIAGNOSIS — L02.212 CUTANEOUS ABSCESS OF BACK [ANY PART, EXCEPT BUTTOCK]: Chronic | ICD-10-CM

## 2021-09-23 DIAGNOSIS — Z90.49 ACQUIRED ABSENCE OF OTHER SPECIFIED PARTS OF DIGESTIVE TRACT: Chronic | ICD-10-CM

## 2021-09-23 DIAGNOSIS — Z79.82 LONG TERM (CURRENT) USE OF ASPIRIN: ICD-10-CM

## 2021-09-23 DIAGNOSIS — Z98.84 BARIATRIC SURGERY STATUS: Chronic | ICD-10-CM

## 2021-09-23 PROCEDURE — 12002 RPR S/N/AX/GEN/TRNK2.6-7.5CM: CPT

## 2021-09-23 PROCEDURE — 99284 EMERGENCY DEPT VISIT MOD MDM: CPT | Mod: 25

## 2021-09-23 PROCEDURE — 73590 X-RAY EXAM OF LOWER LEG: CPT | Mod: 26,LT

## 2021-09-23 PROCEDURE — 72170 X-RAY EXAM OF PELVIS: CPT | Mod: 26

## 2021-09-23 RX ORDER — IBUPROFEN 200 MG
600 TABLET ORAL ONCE
Refills: 0 | Status: COMPLETED | OUTPATIENT
Start: 2021-09-23 | End: 2021-09-23

## 2021-09-23 RX ADMIN — Medication 600 MILLIGRAM(S): at 16:27

## 2021-09-23 NOTE — ED PROVIDER NOTE - PHYSICAL EXAMINATION
VITALS: Reviewed  CONSTITUTIONAL: well developed, well nourished, in no acute distress, speaking in full sentences, nontoxic appearing  SKIN: warm, dry, no rash  HEAD: normocephalic, atraumatic  EYES: PERRL, EOMI, no conjunctival erythema, sclera clear  ENT: patent airway, moist mucous membranes  NECK: supple, no masses  CV:  regular rate, regular rhythm, 2+ radial pulses bilaterally  RESP: no wheezes, no rales, no rhonchi, normal work of breathing  ABD: soft, nontender, nondistended, no rebound, no guarding  MSK: normal ROM, no cyanosis, no edema--L anterior 5 cm U shaped laceration, not contaminated, no active bleeding  NEURO: alert, oriented x3  PSYCH: cooperative, appropriate

## 2021-09-23 NOTE — ED PROVIDER NOTE - NS ED ROS FT
Review of Systems:  CONSTITUTIONAL - No fever  SKIN - No rash  RESPIRATORY - No shortness of breath, No cough  CARDIAC -No chest pain, No palpitations  GI - No abdominal pain, No nausea, No vomiting  All other systems negative, unless specified in HPI

## 2021-09-23 NOTE — ED ADULT NURSE NOTE - NSICDXPASTSURGICALHX_GEN_ALL_CORE_FT
PAST SURGICAL HISTORY:  Abscess of back EVACUATION OF ABSCESS @ L5-S1 1997    History of appendectomy 1974    History of Jaclyn-en-Y gastric bypass WITH CHOLECYSTECTOMY 2006    S/P tonsillectomy 1967

## 2021-09-23 NOTE — ED PROVIDER NOTE - PATIENT PORTAL LINK FT
You can access the FollowMyHealth Patient Portal offered by [facility] by registering at the following website: [email or web address]. By joining [employer]’s FollowMyHealth portal, you will also be able to view your health information using other applications (apps) compatible with our system.

## 2021-09-23 NOTE — ED PROVIDER NOTE - NSFOLLOWUPINSTRUCTIONS_ED_ALL_ED_FT
Suture removal in 14 days.    Fall Prevention    WHAT YOU NEED TO KNOW:    What is fall prevention? Fall prevention includes ways to make your home and other areas safer. It also includes ways you can move more carefully to prevent a fall.    What increases my risk for falls?     Lack of vitamin D    Not getting enough sleep each night    Trouble walking or keeping your balance, or foot problems    Health conditions that cause changes in your blood pressure, vision, or muscle strength and coordination    Medicines that make you dizzy, weak, or sleepy    Problems seeing clearly    Shoes that have high heels or are not supportive    Tripping hazards, such as items left on the floor, no handrails on the stairs, or broken steps    How can I help protect myself from falls?     Stand or sit up slowly. This may help you keep your balance and prevent falls. If you need to get up during the night, sit up first. Be sure you are fully awake before you stand. Turn on the light before you start walking. Go slowly in case you are still sleepy. Make sure you will not trip over any pets sleeping in the bedroom.    Use assistive devices as directed. Your healthcare provider may suggest that you use a cane or walker to help you keep your balance. You may need to have grab bars put in your bathroom near the toilet or in the shower.    Wear shoes that fit well and have soles that . Wear shoes both inside and outside. Use slippers with good . Do not wear shoes with high heels.    Wear a personal alarm. This is a device that allows you to call 911 if you fall and need help. Ask your healthcare provider for more information.    Stay active. Exercise can help strengthen your muscles and improve your balance. Your healthcare provider may recommend water aerobics or walking. He or she may also recommend physical therapy to improve your coordination. Never start an exercise program without talking to your healthcare provider first.     Manage medical conditions. Keep all appointments with your healthcare providers. Visit your eye doctor as directed.    How can I make my home safer?     Add items to prevent falls in the bathroom. Put nonslip strips on your bath or shower floor to prevent you from slipping. Use a bath mat if you do not have carpet in the bathroom. This will prevent you from falling when you step out of the bath or shower. Use a shower seat so you do not need to stand while you shower. Sit on the toilet or a chair in your bathroom to dry yourself and put on clothing. This will prevent you from losing your balance from drying or dressing yourself while you are standing.     Keep paths clear. Remove books, shoes, and other objects from walkways and stairs. Place cords for telephones and lamps out of the way so that you do not need to walk over them. Tape them down if you cannot move them. Remove small rugs. If you cannot remove a rug, secure it with double-sided tape. This will prevent you from tripping.     Install bright lights in your home. Use night lights to help light paths to the bathroom or kitchen. Always turn on the light before you start walking.    Keep items you use often on shelves within reach. Do not use a step stool to help you reach an item.    Paint or place reflective tape on the edges of your stairs. This will help you see the stairs better.    Call 911 or have someone else call if:     You have fallen and are unconscious.    You have fallen and cannot move part of your body.    Contact your healthcare provider if:     You have fallen and have pain or a headache.    You have questions or concerns about your condition or care.    CARE AGREEMENT:    You have the right to help plan your care. Learn about your health condition and how it may be treated. Discuss treatment options with your healthcare providers to decide what care you want to receive. You always have the right to refuse treatment.       © Copyright [employer] 2019 All illustrations and images included in CareNotes are the copyrighted property of [employer] or [employer].    Laceration    A laceration is a cut that goes through all of the layers of the skin and into the tissue that is right under the skin. Some lacerations heal on their own. Others need to be closed with skin adhesive strips, skin glue, stitches (sutures), or staples. Proper laceration care minimizes the risk of infection and helps the laceration to heal better.  If non-absorbable stitches or staples have been placed, they must be taken out within the time frame instructed by your healthcare provider.    SEEK IMMEDIATE MEDICAL CARE IF YOU HAVE ANY OF THE FOLLOWING SYMPTOMS: swelling around the wound, worsening pain, drainage from the wound, red streaking going away from your wound, inability to move finger or toe near the laceration, or discoloration of skin near the laceration.

## 2021-09-23 NOTE — ED ADULT TRIAGE NOTE - CHIEF COMPLAINT QUOTE
Patient had mechanical fall from standing denies head trauma LOC and blood thinners but presents with laceration left thigh

## 2021-09-23 NOTE — ED PROVIDER NOTE - OBJECTIVE STATEMENT
61Y F with PMH of sarcoidosis presents with CC of fall. Patient reports that she was sitting on the cough, fell off it because was at the edge, hit the L anterior shin on a table causing a laceration. Patient reports that she is UTD with tetanus. No head trauma, LOC, or neck injury. Denies chest pain, SOB, abdominal pain. No other extremity injuries.

## 2021-09-23 NOTE — ED PROVIDER NOTE - ATTENDING CONTRIBUTION TO CARE
60 yo f hx sarcoidosis  pt was sitting on couch and fell off the cough. pt hit L anterior shin on a table causing a laceration. tdap utd.  no head/neck injury. no cp nor sob. no pain to pelvis.      vss  gen- NAD, aaox3  card-rrr  lungs-ctab, no wheezing or rhonchi  abd-sntnd, no guarding or rebound  neuro- full str/sensation, cn ii-xii grossly intact, normal coordination and gait  L shin- 5 cm U shaped laceration, not grossly contaminated, no bleeding  LLE- no bony tenderness, no knee pain/swelling, no ankle pain/swelling    will repair lac, pelvis film, tibfib  will provide supportive care, serial exam and ED observation period

## 2021-09-23 NOTE — ED ADULT NURSE NOTE - NSICDXPASTMEDICALHX_GEN_ALL_CORE_FT
PAST MEDICAL HISTORY:  Anxiety     Asthma LAST ATTACK MANY YRS AGO    Back pain LOW    Constipation     Depression     Gastroesophageal reflux disease without esophagitis     Heart murmur     Hypothyroidism     Mood disorder     Morbid obesity     Psoriasis

## 2022-02-15 ENCOUNTER — APPOINTMENT (OUTPATIENT)
Age: 62
End: 2022-02-15

## 2022-02-22 NOTE — PRE-ANESTHESIA EVALUATION ADULT - NSANTHOSAYNRD_GEN_A_CORE
Discussed with patient that Dr English reviewed his LAB for recommendation: In view of his known coronary calcification, I would like to see his LDL below 70.  I would recommend atorvastatin 20 mg/day if he is agreeable.  We have discussed this before.  He may not be ready to start a statin but that is the recommendation/SMR. Reinforced that Coronary artery calcium score 2/26/18 -total score 380, , left circumflex 19, RCA 61. Advised to consider statin. Advised to call back to office with any questions-direct contact number provided.      ----- Message from Sina English MD sent at 2/21/2022  7:31 PM CST -----  Recommendation: In view of his known coronary calcification, I would like to see his LDL below 70.  I would recommend atorvastatin 20 mg/day if he is agreeable.  We have discussed this before.  He may not be ready to start a statin but that is the recommendation/SMR      
No. KASSI screening performed.  STOP BANG Legend: 0-2 = LOW Risk; 3-4 = INTERMEDIATE Risk; 5-8 = HIGH Risk

## 2023-04-27 ENCOUNTER — INPATIENT (INPATIENT)
Facility: HOSPITAL | Age: 63
LOS: 5 days | Discharge: HOME | DRG: 249 | End: 2023-05-03
Attending: STUDENT IN AN ORGANIZED HEALTH CARE EDUCATION/TRAINING PROGRAM | Admitting: INTERNAL MEDICINE
Payer: COMMERCIAL

## 2023-04-27 VITALS
HEART RATE: 98 BPM | OXYGEN SATURATION: 100 % | TEMPERATURE: 98 F | HEIGHT: 66 IN | WEIGHT: 190.04 LBS | SYSTOLIC BLOOD PRESSURE: 140 MMHG | RESPIRATION RATE: 18 BRPM | DIASTOLIC BLOOD PRESSURE: 80 MMHG

## 2023-04-27 DIAGNOSIS — Z98.84 BARIATRIC SURGERY STATUS: Chronic | ICD-10-CM

## 2023-04-27 DIAGNOSIS — K92.0 HEMATEMESIS: ICD-10-CM

## 2023-04-27 DIAGNOSIS — L02.212 CUTANEOUS ABSCESS OF BACK [ANY PART, EXCEPT BUTTOCK]: Chronic | ICD-10-CM

## 2023-04-27 DIAGNOSIS — Z90.49 ACQUIRED ABSENCE OF OTHER SPECIFIED PARTS OF DIGESTIVE TRACT: Chronic | ICD-10-CM

## 2023-04-27 DIAGNOSIS — Z90.89 ACQUIRED ABSENCE OF OTHER ORGANS: Chronic | ICD-10-CM

## 2023-04-27 LAB
ALBUMIN SERPL ELPH-MCNC: 4.1 G/DL — SIGNIFICANT CHANGE UP (ref 3.5–5.2)
ALP SERPL-CCNC: 123 U/L — HIGH (ref 30–115)
ALT FLD-CCNC: 11 U/L — SIGNIFICANT CHANGE UP (ref 0–41)
ANION GAP SERPL CALC-SCNC: 17 MMOL/L — HIGH (ref 7–14)
APPEARANCE UR: CLEAR — SIGNIFICANT CHANGE UP
AST SERPL-CCNC: 15 U/L — SIGNIFICANT CHANGE UP (ref 0–41)
BASOPHILS # BLD AUTO: 0.11 K/UL — SIGNIFICANT CHANGE UP (ref 0–0.2)
BASOPHILS NFR BLD AUTO: 1.3 % — HIGH (ref 0–1)
BILIRUB SERPL-MCNC: <0.2 MG/DL — SIGNIFICANT CHANGE UP (ref 0.2–1.2)
BILIRUB UR-MCNC: NEGATIVE — SIGNIFICANT CHANGE UP
BUN SERPL-MCNC: 9 MG/DL — LOW (ref 10–20)
CALCIUM SERPL-MCNC: 9 MG/DL — SIGNIFICANT CHANGE UP (ref 8.4–10.5)
CHLORIDE SERPL-SCNC: 104 MMOL/L — SIGNIFICANT CHANGE UP (ref 98–110)
CO2 SERPL-SCNC: 23 MMOL/L — SIGNIFICANT CHANGE UP (ref 17–32)
COLOR SPEC: YELLOW — SIGNIFICANT CHANGE UP
CREAT SERPL-MCNC: 0.5 MG/DL — LOW (ref 0.7–1.5)
DIFF PNL FLD: NEGATIVE — SIGNIFICANT CHANGE UP
EGFR: 107 ML/MIN/1.73M2 — SIGNIFICANT CHANGE UP
EOSINOPHIL # BLD AUTO: 0.52 K/UL — SIGNIFICANT CHANGE UP (ref 0–0.7)
EOSINOPHIL NFR BLD AUTO: 6.2 % — SIGNIFICANT CHANGE UP (ref 0–8)
GLUCOSE SERPL-MCNC: 66 MG/DL — LOW (ref 70–99)
GLUCOSE UR QL: NEGATIVE — SIGNIFICANT CHANGE UP
HCT VFR BLD CALC: 40.4 % — SIGNIFICANT CHANGE UP (ref 37–47)
HCT VFR BLD CALC: 44.2 % — SIGNIFICANT CHANGE UP (ref 37–47)
HGB BLD-MCNC: 13.2 G/DL — SIGNIFICANT CHANGE UP (ref 12–16)
HGB BLD-MCNC: 14.7 G/DL — SIGNIFICANT CHANGE UP (ref 12–16)
IMM GRANULOCYTES NFR BLD AUTO: 0.5 % — HIGH (ref 0.1–0.3)
KETONES UR-MCNC: NEGATIVE — SIGNIFICANT CHANGE UP
LACTATE SERPL-SCNC: 3 MMOL/L — HIGH (ref 0.7–2)
LEUKOCYTE ESTERASE UR-ACNC: NEGATIVE — SIGNIFICANT CHANGE UP
LIDOCAIN IGE QN: 37 U/L — SIGNIFICANT CHANGE UP (ref 7–60)
LYMPHOCYTES # BLD AUTO: 2.15 K/UL — SIGNIFICANT CHANGE UP (ref 1.2–3.4)
LYMPHOCYTES # BLD AUTO: 25.4 % — SIGNIFICANT CHANGE UP (ref 20.5–51.1)
MCHC RBC-ENTMCNC: 30.3 PG — SIGNIFICANT CHANGE UP (ref 27–31)
MCHC RBC-ENTMCNC: 31 PG — SIGNIFICANT CHANGE UP (ref 27–31)
MCHC RBC-ENTMCNC: 32.7 G/DL — SIGNIFICANT CHANGE UP (ref 32–37)
MCHC RBC-ENTMCNC: 33.3 G/DL — SIGNIFICANT CHANGE UP (ref 32–37)
MCV RBC AUTO: 92.7 FL — SIGNIFICANT CHANGE UP (ref 81–99)
MCV RBC AUTO: 93.2 FL — SIGNIFICANT CHANGE UP (ref 81–99)
MONOCYTES # BLD AUTO: 0.6 K/UL — SIGNIFICANT CHANGE UP (ref 0.1–0.6)
MONOCYTES NFR BLD AUTO: 7.1 % — SIGNIFICANT CHANGE UP (ref 1.7–9.3)
NEUTROPHILS # BLD AUTO: 5.03 K/UL — SIGNIFICANT CHANGE UP (ref 1.4–6.5)
NEUTROPHILS NFR BLD AUTO: 59.5 % — SIGNIFICANT CHANGE UP (ref 42.2–75.2)
NITRITE UR-MCNC: NEGATIVE — SIGNIFICANT CHANGE UP
NRBC # BLD: 0 /100 WBCS — SIGNIFICANT CHANGE UP (ref 0–0)
NRBC # BLD: 0 /100 WBCS — SIGNIFICANT CHANGE UP (ref 0–0)
PH UR: 7 — SIGNIFICANT CHANGE UP (ref 5–8)
PLATELET # BLD AUTO: 182 K/UL — SIGNIFICANT CHANGE UP (ref 130–400)
PLATELET # BLD AUTO: 225 K/UL — SIGNIFICANT CHANGE UP (ref 130–400)
PMV BLD: 10.4 FL — SIGNIFICANT CHANGE UP (ref 7.4–10.4)
PMV BLD: 9.8 FL — SIGNIFICANT CHANGE UP (ref 7.4–10.4)
POTASSIUM SERPL-MCNC: 3.3 MMOL/L — LOW (ref 3.5–5)
POTASSIUM SERPL-SCNC: 3.3 MMOL/L — LOW (ref 3.5–5)
PROT SERPL-MCNC: 6.4 G/DL — SIGNIFICANT CHANGE UP (ref 6–8)
PROT UR-MCNC: NEGATIVE — SIGNIFICANT CHANGE UP
RBC # BLD: 4.36 M/UL — SIGNIFICANT CHANGE UP (ref 4.2–5.4)
RBC # BLD: 4.74 M/UL — SIGNIFICANT CHANGE UP (ref 4.2–5.4)
RBC # FLD: 12.5 % — SIGNIFICANT CHANGE UP (ref 11.5–14.5)
RBC # FLD: 12.6 % — SIGNIFICANT CHANGE UP (ref 11.5–14.5)
SARS-COV-2 RNA SPEC QL NAA+PROBE: SIGNIFICANT CHANGE UP
SODIUM SERPL-SCNC: 144 MMOL/L — SIGNIFICANT CHANGE UP (ref 135–146)
SP GR SPEC: 1.01 — SIGNIFICANT CHANGE UP (ref 1.01–1.03)
TROPONIN T SERPL-MCNC: <0.01 NG/ML — SIGNIFICANT CHANGE UP
UROBILINOGEN FLD QL: SIGNIFICANT CHANGE UP
WBC # BLD: 7.35 K/UL — SIGNIFICANT CHANGE UP (ref 4.8–10.8)
WBC # BLD: 8.45 K/UL — SIGNIFICANT CHANGE UP (ref 4.8–10.8)
WBC # FLD AUTO: 7.35 K/UL — SIGNIFICANT CHANGE UP (ref 4.8–10.8)
WBC # FLD AUTO: 8.45 K/UL — SIGNIFICANT CHANGE UP (ref 4.8–10.8)

## 2023-04-27 PROCEDURE — 82525 ASSAY OF COPPER: CPT

## 2023-04-27 PROCEDURE — 84425 ASSAY OF VITAMIN B-1: CPT

## 2023-04-27 PROCEDURE — 84630 ASSAY OF ZINC: CPT

## 2023-04-27 PROCEDURE — 87046 STOOL CULTR AEROBIC BACT EA: CPT

## 2023-04-27 PROCEDURE — 80053 COMPREHEN METABOLIC PANEL: CPT

## 2023-04-27 PROCEDURE — 88305 TISSUE EXAM BY PATHOLOGIST: CPT

## 2023-04-27 PROCEDURE — 84439 ASSAY OF FREE THYROXINE: CPT

## 2023-04-27 PROCEDURE — 84999 UNLISTED CHEMISTRY PROCEDURE: CPT

## 2023-04-27 PROCEDURE — 85025 COMPLETE CBC W/AUTO DIFF WBC: CPT

## 2023-04-27 PROCEDURE — 84443 ASSAY THYROID STIM HORMONE: CPT

## 2023-04-27 PROCEDURE — 82607 VITAMIN B-12: CPT

## 2023-04-27 PROCEDURE — 83735 ASSAY OF MAGNESIUM: CPT

## 2023-04-27 PROCEDURE — 84597 ASSAY OF VITAMIN K: CPT

## 2023-04-27 PROCEDURE — 82962 GLUCOSE BLOOD TEST: CPT

## 2023-04-27 PROCEDURE — 88312 SPECIAL STAINS GROUP 1: CPT

## 2023-04-27 PROCEDURE — 74177 CT ABD & PELVIS W/CONTRAST: CPT | Mod: 26,MA

## 2023-04-27 PROCEDURE — 84302 ASSAY OF SWEAT SODIUM: CPT

## 2023-04-27 PROCEDURE — 83993 ASSAY FOR CALPROTECTIN FECAL: CPT

## 2023-04-27 PROCEDURE — 86901 BLOOD TYPING SEROLOGIC RH(D): CPT

## 2023-04-27 PROCEDURE — 87045 FECES CULTURE AEROBIC BACT: CPT

## 2023-04-27 PROCEDURE — 99223 1ST HOSP IP/OBS HIGH 75: CPT

## 2023-04-27 PROCEDURE — 86803 HEPATITIS C AB TEST: CPT

## 2023-04-27 PROCEDURE — 87507 IADNA-DNA/RNA PROBE TQ 12-25: CPT

## 2023-04-27 PROCEDURE — 85730 THROMBOPLASTIN TIME PARTIAL: CPT

## 2023-04-27 PROCEDURE — 99285 EMERGENCY DEPT VISIT HI MDM: CPT

## 2023-04-27 PROCEDURE — 86900 BLOOD TYPING SEROLOGIC ABO: CPT

## 2023-04-27 PROCEDURE — 86850 RBC ANTIBODY SCREEN: CPT

## 2023-04-27 PROCEDURE — 85027 COMPLETE CBC AUTOMATED: CPT

## 2023-04-27 PROCEDURE — 84590 ASSAY OF VITAMIN A: CPT

## 2023-04-27 PROCEDURE — 82180 ASSAY OF ASCORBIC ACID: CPT

## 2023-04-27 PROCEDURE — 84446 ASSAY OF VITAMIN E: CPT

## 2023-04-27 PROCEDURE — 94640 AIRWAY INHALATION TREATMENT: CPT

## 2023-04-27 PROCEDURE — 82746 ASSAY OF FOLIC ACID SERUM: CPT

## 2023-04-27 PROCEDURE — C9113: CPT

## 2023-04-27 PROCEDURE — 83986 ASSAY PH BODY FLUID NOS: CPT

## 2023-04-27 PROCEDURE — 36415 COLL VENOUS BLD VENIPUNCTURE: CPT

## 2023-04-27 PROCEDURE — 84255 ASSAY OF SELENIUM: CPT

## 2023-04-27 PROCEDURE — 85610 PROTHROMBIN TIME: CPT

## 2023-04-27 PROCEDURE — 87077 CULTURE AEROBIC IDENTIFY: CPT

## 2023-04-27 PROCEDURE — 83605 ASSAY OF LACTIC ACID: CPT

## 2023-04-27 PROCEDURE — 87493 C DIFF AMPLIFIED PROBE: CPT

## 2023-04-27 RX ORDER — SODIUM CHLORIDE 9 MG/ML
1000 INJECTION, SOLUTION INTRAVENOUS ONCE
Refills: 0 | Status: COMPLETED | OUTPATIENT
Start: 2023-04-27 | End: 2023-04-27

## 2023-04-27 RX ORDER — FLUTICASONE PROPIONATE 0.5 MG/G
1 CREAM TOPICAL
Qty: 0 | Refills: 0 | DISCHARGE

## 2023-04-27 RX ORDER — ALBUTEROL 90 UG/1
2 AEROSOL, METERED ORAL EVERY 6 HOURS
Refills: 0 | Status: DISCONTINUED | OUTPATIENT
Start: 2023-04-27 | End: 2023-05-03

## 2023-04-27 RX ORDER — METHADONE HYDROCHLORIDE 40 MG/1
10 TABLET ORAL ONCE
Refills: 0 | Status: DISCONTINUED | OUTPATIENT
Start: 2023-04-27 | End: 2023-04-27

## 2023-04-27 RX ORDER — PANTOPRAZOLE SODIUM 20 MG/1
8 TABLET, DELAYED RELEASE ORAL
Qty: 80 | Refills: 0 | Status: DISCONTINUED | OUTPATIENT
Start: 2023-04-27 | End: 2023-04-28

## 2023-04-27 RX ORDER — DIATRIZOATE MEGLUMINE 180 MG/ML
30 INJECTION, SOLUTION INTRAVESICAL ONCE
Refills: 0 | Status: COMPLETED | OUTPATIENT
Start: 2023-04-27 | End: 2023-04-27

## 2023-04-27 RX ORDER — POTASSIUM CHLORIDE 20 MEQ
20 PACKET (EA) ORAL EVERY 4 HOURS
Refills: 0 | Status: COMPLETED | OUTPATIENT
Start: 2023-04-27 | End: 2023-04-27

## 2023-04-27 RX ORDER — L.ACIDOPH/B.ANIMALIS/B.LONGUM 15B CELL
1 CAPSULE ORAL
Qty: 0 | Refills: 0 | DISCHARGE

## 2023-04-27 RX ORDER — MORPHINE SULFATE 50 MG/1
1 CAPSULE, EXTENDED RELEASE ORAL
Qty: 0 | Refills: 0 | DISCHARGE

## 2023-04-27 RX ORDER — ASPIRIN/CALCIUM CARB/MAGNESIUM 324 MG
1 TABLET ORAL
Qty: 0 | Refills: 0 | DISCHARGE

## 2023-04-27 RX ORDER — FOLIC ACID 0.8 MG
1 TABLET ORAL DAILY
Refills: 0 | Status: DISCONTINUED | OUTPATIENT
Start: 2023-04-27 | End: 2023-05-03

## 2023-04-27 RX ORDER — PANTOPRAZOLE SODIUM 20 MG/1
80 TABLET, DELAYED RELEASE ORAL ONCE
Refills: 0 | Status: COMPLETED | OUTPATIENT
Start: 2023-04-27 | End: 2023-04-27

## 2023-04-27 RX ORDER — METHOCARBAMOL 500 MG/1
200 TABLET, FILM COATED ORAL EVERY 8 HOURS
Refills: 0 | Status: DISCONTINUED | OUTPATIENT
Start: 2023-04-27 | End: 2023-04-27

## 2023-04-27 RX ORDER — GABAPENTIN 400 MG/1
1 CAPSULE ORAL
Refills: 0 | DISCHARGE

## 2023-04-27 RX ORDER — DOCUSATE SODIUM 100 MG
1 CAPSULE ORAL
Qty: 0 | Refills: 0 | DISCHARGE

## 2023-04-27 RX ORDER — SODIUM CHLORIDE 9 MG/ML
1000 INJECTION, SOLUTION INTRAVENOUS
Refills: 0 | Status: DISCONTINUED | OUTPATIENT
Start: 2023-04-27 | End: 2023-04-28

## 2023-04-27 RX ORDER — LEVOTHYROXINE SODIUM 125 MCG
88 TABLET ORAL DAILY
Refills: 0 | Status: DISCONTINUED | OUTPATIENT
Start: 2023-04-27 | End: 2023-05-03

## 2023-04-27 RX ORDER — MONTELUKAST 4 MG/1
10 TABLET, CHEWABLE ORAL DAILY
Refills: 0 | Status: DISCONTINUED | OUTPATIENT
Start: 2023-04-27 | End: 2023-05-03

## 2023-04-27 RX ORDER — SERTRALINE 25 MG/1
200 TABLET, FILM COATED ORAL DAILY
Refills: 0 | Status: DISCONTINUED | OUTPATIENT
Start: 2023-04-27 | End: 2023-04-28

## 2023-04-27 RX ORDER — CARIPRAZINE 1.5 MG/1
1 CAPSULE, GELATIN COATED ORAL
Refills: 0 | DISCHARGE

## 2023-04-27 RX ORDER — METHOCARBAMOL 500 MG/1
250 TABLET, FILM COATED ORAL EVERY 8 HOURS
Refills: 0 | Status: DISCONTINUED | OUTPATIENT
Start: 2023-04-27 | End: 2023-05-01

## 2023-04-27 RX ORDER — IPRATROPIUM BROMIDE 0.2 MG/ML
2 SOLUTION, NON-ORAL INHALATION
Qty: 0 | Refills: 0 | DISCHARGE

## 2023-04-27 RX ORDER — LAMOTRIGINE 25 MG/1
25 TABLET, ORALLY DISINTEGRATING ORAL EVERY 12 HOURS
Refills: 0 | Status: DISCONTINUED | OUTPATIENT
Start: 2023-04-27 | End: 2023-05-03

## 2023-04-27 RX ORDER — GABAPENTIN 400 MG/1
300 CAPSULE ORAL EVERY 8 HOURS
Refills: 0 | Status: DISCONTINUED | OUTPATIENT
Start: 2023-04-27 | End: 2023-05-03

## 2023-04-27 RX ORDER — ONDANSETRON 8 MG/1
4 TABLET, FILM COATED ORAL ONCE
Refills: 0 | Status: COMPLETED | OUTPATIENT
Start: 2023-04-27 | End: 2023-04-27

## 2023-04-27 RX ORDER — OXYCODONE HYDROCHLORIDE 5 MG/1
1 TABLET ORAL
Qty: 0 | Refills: 0 | DISCHARGE

## 2023-04-27 RX ORDER — METHADONE HYDROCHLORIDE 40 MG/1
20 TABLET ORAL ONCE
Refills: 0 | Status: DISCONTINUED | OUTPATIENT
Start: 2023-04-27 | End: 2023-04-27

## 2023-04-27 RX ADMIN — GABAPENTIN 300 MILLIGRAM(S): 400 CAPSULE ORAL at 22:17

## 2023-04-27 RX ADMIN — ONDANSETRON 4 MILLIGRAM(S): 8 TABLET, FILM COATED ORAL at 13:21

## 2023-04-27 RX ADMIN — PANTOPRAZOLE SODIUM 80 MILLIGRAM(S): 20 TABLET, DELAYED RELEASE ORAL at 13:21

## 2023-04-27 RX ADMIN — SODIUM CHLORIDE 1000 MILLILITER(S): 9 INJECTION, SOLUTION INTRAVENOUS at 13:21

## 2023-04-27 RX ADMIN — METHADONE HYDROCHLORIDE 20 MILLIGRAM(S): 40 TABLET ORAL at 23:42

## 2023-04-27 RX ADMIN — METHOCARBAMOL 250 MILLIGRAM(S): 500 TABLET, FILM COATED ORAL at 23:42

## 2023-04-27 RX ADMIN — METHADONE HYDROCHLORIDE 10 MILLIGRAM(S): 40 TABLET ORAL at 22:17

## 2023-04-27 RX ADMIN — DIATRIZOATE MEGLUMINE 30 MILLILITER(S): 180 INJECTION, SOLUTION INTRAVESICAL at 13:21

## 2023-04-27 RX ADMIN — PANTOPRAZOLE SODIUM 10 MG/HR: 20 TABLET, DELAYED RELEASE ORAL at 19:02

## 2023-04-27 RX ADMIN — Medication 50 MILLIEQUIVALENT(S): at 22:17

## 2023-04-27 RX ADMIN — SODIUM CHLORIDE 1000 MILLILITER(S): 9 INJECTION, SOLUTION INTRAVENOUS at 17:29

## 2023-04-27 RX ADMIN — Medication 50 MILLIEQUIVALENT(S): at 17:29

## 2023-04-27 NOTE — ED PROVIDER NOTE - CONSIDERATION OF ADMISSION OBSERVATION
Consideration of Admission/Observation Patient with abdominal pain, vomiting, difficulty tolerating p.o., will admit for further work-up

## 2023-04-27 NOTE — ED PROVIDER NOTE - CARE PLAN
Principal Discharge DX:	Unable to eat  Secondary Diagnosis:	Hematemesis  Secondary Diagnosis:	Excessive weight loss  Secondary Diagnosis:	History of Jaclyn-en-Y gastric bypass   1

## 2023-04-27 NOTE — PATIENT PROFILE ADULT - FUNCTIONAL ASSESSMENT - DAILY ACTIVITY 6.
Safer Sex: Care Instructions  Your Care Instructions  Safer sex is a way to reduce your risk of getting an infection spread through sex. It can also help prevent pregnancy. Most infections that are spread through sex, also called sexually transmitted infections or STIs, can be cured. But some can decrease your chances of getting pregnant if they are not treated early. Others, such as herpes, have no cure. And some, such as HIV, can be deadly. Several products can help you practice safer sex and reduce your chance of STIs. One of the best is a condom. There are condoms for men and for women. The female condom is a tube of soft plastic with a closed end that is placed deep into the vagina. You can use a special rubber sheet (dental dam) for protection during oral sex. Latex gloves can keep your hands from touching blood, semen, or other body fluids that can carry infections. Remember that birth control methods such as diaphragms, IUDs, foams, and birth control pills do not stop you from getting STIs. Follow-up care is a key part of your treatment and safety. Be sure to make and go to all appointments, and call your doctor if you are having problems. It's also a good idea to know your test results and keep a list of the medicines you take. How can you care for yourself at home? · Think about getting shots to prevent hepatitis A and hepatitis B. These two diseases can be spread through sex. You also can get hepatitis A if you eat infected food. · Use condoms or female condoms each time and every time you have sex. · Learn the right way to use a male condom:  ? Condoms come in several sizes. Make sure you use the right size. A condom that is too small can break easily. A condom that is too big can slip off during sex. Use a new condom each time you have sex. ? Be careful not to poke a hole in the condom when you open the wrapper. ? Squeeze the tip of the condom to keep out air.   ? Pull down the loose skin (foreskin) from the head of an uncircumcised penis. ? While squeezing the tip of the condom, unroll it all the way down to the base of the firm penis. ? Never use petroleum jelly (such as Vaseline), grease, hand lotion, baby oil, or anything with oil in it. These products can make holes in the condom. ? After sex, hold the condom on your penis as you remove your penis from your partner. This will keep semen from spilling out of the condom. · Learn to use a female condom:  ? You can put in a female condom up to 8 hours before sex. ? Squeeze the smaller ring at the closed end and insert it deep into the vagina. The larger ring at the open end should stay outside the vagina. ? During sex, make sure the penis goes into the condom. ? After the penis is removed, close the open end of the condom by twisting it. Remove the condom. · Do not use a female condom and male condom at the same time. · Do not have sex with anyone who has symptoms of an STI, such as sores on the genitals or mouth. The herpes virus that causes cold sores can spread to and from the penis and vagina. · Do not drink a lot of alcohol or use drugs before sex. This can cause you to let down your guard and not practice safer sex. · Having one sex partner (who does not have STIs and does not have sex with anyone else) is a sure way to avoid STIs. · Talk to your partner before you have sex. Find out if he or she has or is at risk for any STI. Keep in mind that a person may be able to spread an STI even if he or she does not have symptoms. You and your partner may want to get an HIV test. You should get tested again 6 months later. Where can you learn more? Go to http://raymond-milla.info/. Enter S135 in the search box to learn more about \"Safer Sex: Care Instructions. \"  Current as of: November 27, 2017  Content Version: 11.8  © 8893-8700 [employer].  Care instructions adapted under license by [employer] (which disclaims liability or warranty for this information). If you have questions about a medical condition or this instruction, always ask your healthcare professional. [address] any warranty or liability for your use of this information. 4 = No assist / stand by assistance

## 2023-04-27 NOTE — H&P ADULT - ASSESSMENT
62-year-old, F with the aforementioned history, presents to the ED for abdominal pain.     #Abdominal Pain   #Unintentional weight loss:   - She reports that her pain is 6-8/10, intermittent, usually lasts 2-3 hours.   - The pain is not related to any food specifically but can be aggravated by liquid or solid food intake. Mildly relieved by PPIs.   - The patient reports having associated N/V/D (Today she reports being constipated, but she usually has 2-3 loose BM/day) along with 2 episodes of fevers in the past month and 20lbs of unintentional weight loss in 2-3 weeks.    - She also reports 3 episodes of hematemesis ( approx 30cc of bright blood). She reports dark stools but she is also on supplemental iron.   - No joint pain, no skin manifestations (despite psoriatic lesions on the right leg)  - History of Appendectomy, cholecystectomy and gastric bypass ( in 2006)  - LFTs within normal limit  - CT scan on 4/27/2023:   1.  No CT evidence of an acute abdominopelvic pathology.  2.  Post gastric bypass with oral contrast within the excluded stomach and the proximal duodenum, likely related to retrograde flow. However, a gastric fistula/staple dehiscence cannot be excluded. An outpatient upper GI series can be obtained if clinically warranted.  - Trend Hb, keep an active TS, Start Protonix 8mg/hr ( given hematemesis and hx of gastric bypass --> Ulcer at anastomosis site is always a possibility)  - GI consult, may probably need EGD given Epigastric abdominal pain with red flags including: Unintentional weight loss and Age.   - Obtain stool studies to r/o Infectious vs Inflammatory causes vs others    #Asthma:     #HTN:     #Bipolar disorder    #Chronic back pain:   - 2/2 Herniated discs s/p intrathecal pump insertion complicated by abscess s/p drainage without laminectomy ?   - C/w Methadone 30mg po q8hrs ( The patient receives her methadone from a pain doctor on [place])    #Folate deficiency:   #B12 deficiency:     #Misc:   - Diet: NPO except meds   - DVT proph: Bilateral SCDs, switch to Lovenox if Hb stable and no signs of bleeding  - GI proph: Pantoprazole gtt for now   62-year-old, F with the aforementioned history, presents to the ED for abdominal pain.     #Abdominal Pain   #Unintentional weight loss:   - She reports that her pain is 6-8/10, intermittent, usually lasts 2-3 hours.   - The pain is not related to any food specifically but can be aggravated by liquid or solid food intake. Mildly relieved by PPIs.   - The patient reports having associated N/V/D (Today she reports being constipated, but she usually has 2-3 loose BM/day) along with 2 episodes of fevers in the past month and 20lbs of unintentional weight loss in 2-3 weeks.    - She also reports 3 episodes of hematemesis ( approx 30cc of bright blood). She reports dark stools but she is also on supplemental iron.   - No joint pain, no skin manifestations (despite psoriatic lesions on the right leg)  - History of Appendectomy, cholecystectomy and gastric bypass ( in 2006)  - LFTs within normal limit  - CT scan on 4/27/2023:   1.  No CT evidence of an acute abdominopelvic pathology.  2.  Post gastric bypass with oral contrast within the excluded stomach and the proximal duodenum, likely related to retrograde flow. However, a gastric fistula/staple dehiscence cannot be excluded. An outpatient upper GI series can be obtained if clinically warranted.  - Trend Hb, keep an active TS, Start Protonix 8mg/hr ( given hematemesis and hx of gastric bypass --> Ulcer at anastomosis site is always a possibility)  - GI consult, may probably need EGD given Epigastric abdominal pain with red flags including: Unintentional weight loss and Age.   - Obtain stool studies to r/o Infectious vs Inflammatory causes vs others    #Asthma:   - C/w Albuterol prn  - C/w Advair diskus 50/250mcg 1 puff q12hrs  - C/w Montelukast 10mg po once daily    #HTN:   - On bumex 0.5mg po 2 tabs q12hrs, unknown why, prescribed by cardiologist, no HF but only JOLENE. Hold for now    #Bipolar disorder:   - C/w Lamotrigine 25mg po q12hrs  - C/w Zolofot 200mg po once daily    #Hypothyroidism:   - C/w Levothyroxine 88mcg po once daily   - Follow up TSH    #Chronic back pain:   - 2/2 Herniated discs s/p intrathecal pump insertion complicated by abscess s/p drainage without laminectomy ?   - C/w Methadone 10mg po q8hrs ( The patient receives her methadone from a pain doctor on [place])    #Folate deficiency:   #B12 deficiency:   - C/w Folic Acid 1mg po once daily  - C/w Vitamin B12 IM once weekly    #Misc:   - Diet: NPO except meds   - DVT proph: Bilateral SCDs, switch to Lovenox if Hb stable and no signs of bleeding  - GI proph: Pantoprazole gtt for now   62-year-old, F with the aforementioned history, presents to the ED for abdominal pain.     #Abdominal Pain   #Unintentional weight loss:   - She reports that her pain is 6-8/10, intermittent, usually lasts 2-3 hours.   - The pain is not related to any food specifically but can be aggravated by liquid or solid food intake. Mildly relieved by PPIs.   - The patient reports having associated N/V/D (Today she reports being constipated, but she usually has 2-3 loose BM/day) along with 2 episodes of fevers in the past month and 20lbs of unintentional weight loss in 2-3 weeks.    - She also reports 3 episodes of hematemesis ( approx 30cc of bright blood). She reports dark stools but she is also on supplemental iron.   - No joint pain, no skin manifestations (despite psoriatic lesions on the right leg)  - History of Appendectomy, cholecystectomy and gastric bypass ( in 2006)  - LFTs within normal limit  - CT scan on 4/27/2023:   1.  No CT evidence of an acute abdominopelvic pathology.  2.  Post gastric bypass with oral contrast within the excluded stomach and the proximal duodenum, likely related to retrograde flow. However, a gastric fistula/staple dehiscence cannot be excluded. An outpatient upper GI series can be obtained if clinically warranted.  - Trend Hb, keep an active TS, Start Protonix 8mg/hr ( given hematemesis and hx of gastric bypass --> Ulcer at anastomosis site is always a possibility)  - GI consult, may probably need EGD given Epigastric abdominal pain with red flags including: Unintentional weight loss and Age.   - Obtain stool studies to r/o Infectious vs Inflammatory causes vs others    #Asthma:   - C/w Albuterol prn  - C/w Advair diskus 50/250mcg 1 puff q12hrs  - C/w Montelukast 10mg po once daily    #HTN:   - On bumex 0.5mg po 2 tabs q12hrs, unknown why, prescribed by cardiologist, no HF but only JOLENE. Hold for now    #Bipolar disorder:   - C/w Lamotrigine 25mg po q12hrs  - C/w Zolofot 200mg po once daily  - C/w Vraylar 1.5mg po once daily    #Hypothyroidism:   - C/w Levothyroxine 88mcg po once daily   - Follow up TSH    #Chronic back pain:   - 2/2 Herniated discs s/p intrathecal pump insertion complicated by abscess s/p drainage without laminectomy ?   - C/w Methadone 10mg po q8hrs ( The patient receives her methadone from a pain doctor on [place])  - Gabapentin 300mg po q8hrs    #Folate deficiency:   #B12 deficiency:   - C/w Folic Acid 1mg po once daily  - C/w Vitamin B12 IM once weekly    #Misc:   - Diet: NPO except meds   - DVT proph: Bilateral SCDs, switch to Lovenox if Hb stable and no signs of bleeding  - GI proph: Pantoprazole gtt for now

## 2023-04-27 NOTE — ED PROVIDER NOTE - NS ED ROS FT
Constitutional: Reports fever this morning  Cardiac:  No chest pain, SOB  Respiratory:  No cough, or hemoptysis.  GI:  Reports nausea, vomiting, diarrhea, and abdominal pain.  :  No dysuria, frequency, or urgency.  Neuro:  No LOC, dizziness, paralysis, or N/T.

## 2023-04-27 NOTE — ED ADULT NURSE NOTE - OBJECTIVE STATEMENT
pt c/o lower mid abdominal pain, n/v/d x 1 month. reports 20lb weight loss over last two weeks. was referred to gi by pmd but hasn't has appt yet.

## 2023-04-27 NOTE — H&P ADULT - TIME BILLING
62-year-old, F with PMHx: HTN, Asthma, Bipolar disorder, Psoriasis, B12 deficiency, Folic Acid deficiency, Chronic back pain (2/2 multiple herniated discs with Intrathecal pump complicated by an abscess, on chronic methadone), presents to the ED for abdominal pain. History goes back to 1 month ago when the patient started having epigastric and per-umbilical pain associated with N/V/D. She saw her PMD,  Dr. Frank Graf who diagnosed her with Gastroenteritis and prescribed her doxycycline for 10 days ( which moderately improved her sx but did not reverse it completely). She reports that her pain is 6-8/10, intermittent, usually lasts 2-3 hours. The pain is not related to any food specifically but can be aggravated by liquid or solid food intake. Mildly relieved by PPIs.      Agree  with assessment  except for changes below.     IMPRESSION  Suspected GI  Associated with Bleed Abdominal Pain   Non Septic   Hx of Gastritis   Patient reports 3 episodes of hematemesis ( approx. 30cc of bright blood). She reports dark stools but C/w  supplemental Iron          Hypokalemia    Note incomplete 62-year-old, F with PMHx: HTN, Asthma, Bipolar disorder, Psoriasis, B12 deficiency, Folic Acid deficiency, Chronic back pain (2/2 multiple herniated discs with Intrathecal pump complicated by an abscess, on chronic methadone), presents to the ED for abdominal pain. History goes back to 1 month ago when the patient started having epigastric and per-umbilical pain associated with N/V/D. She saw her PMD,  Dr. Frank Graf who diagnosed her with Gastroenteritis and prescribed her doxycycline for 10 days ( which moderately improved her sx but did not reverse it completely). She reports that her pain is 6-8/10, intermittent, usually lasts 2-3 hours. The pain is not related to any food specifically but can be aggravated by liquid or solid food intake. Mildly relieved by PPIs.      Agree  with assessment  except for changes below.     IMPRESSION  Suspected GI  Associated with Bleed Abdominal Pain N/VD  Hemodynamically Stable Hgb 14   Non Septic   Hx of Gastritis   Patient reports 3 episodes of hematemesis ( approx. 30cc of bright blood). She reports dark stools but t she is also on supplemental iron.   Hold  Supplemental Iron   Check cbc,bmp, Maintain active t/s ,IV  PPI,    Clear liquid diet for tonight   Monitor CBC q6   Transfuse to keep hemoglobin > 7  F/u GI for possible EGD/colonoscopy  Correct electrolytes (Target Na = 135-145 | Mg = 1.7-2.2 | K = 3.5-5)  Hypokalemia    Hx Asthma:   - C/w Albuterol prn  - C/w Advair diskus 50/250mcg 1 puff q12hrs  - C/w Montelukast 10mg po once daily    Hx HTN  Denies Hx HF : Appears Euvolemic Clinically   - On bumex 0.5mg po 2 tabs q12hrs, unknown why, prescribed by cardiologist, no HF but only JOLENE.   Hold for now given  Hypokalemia     Hx Bipolar disorder:   - C/w Lamotrigine 25mg po q12hrs  - C/w Zolofot 200mg po once daily  - C/w Vraylar 1.5mg po once daily    Hx Hypothyroidism:   - C/w Levothyroxine 88mcg po once daily   - Follow up TSH    Hx Chronic back pain:   - 2/2 Herniated discs s/p intrathecal pump insertion complicated by abscess s/p drainage without laminectomy ?   - C/w Methadone 10mg po q8hrs ( The patient receives her methadone from a pain doctor on [place])  - Gabapentin 300mg po q8hrs    Hx Folate deficiency:   Hx B12 deficiency:   - C/w Folic Acid 1mg po once daily  - C/w Vitamin B12 IM once weekly 62-year-old, F with PMHx: HTN, Asthma, Bipolar disorder, Psoriasis, B12 deficiency, Folic Acid deficiency, Chronic back pain (2/2 multiple herniated discs with Intrathecal pump complicated by an abscess, on chronic methadone), presents to the ED for abdominal pain. History goes back to 1 month ago when the patient started having epigastric and per-umbilical pain associated with N/V/D. She saw her PMD,  Dr. Frank Graf who diagnosed her with Gastroenteritis and prescribed her doxycycline for 10 days ( which moderately improved her sx but did not reverse it completely). She reports that her pain is 6-8/10, intermittent, usually lasts 2-3 hours. The pain is not related to any food specifically but can be aggravated by liquid or solid food intake. Mildly relieved by PPIs.      Agree  with assessment  except for changes below.     VITAL SIGNS: AFebrile, vital signs stable  CONSTITUTIONAL: Well-developed; well-nourished; in no acute distress.  SKIN: Skin exam is warm and dry, no acute rash.  HEAD: Normocephalic; atraumatic.  EYES: Pupils  reactive to light, Extraocular movements intact; conjunctiva and sclera clear.  ENT: No nasal discharge; airway clear. Moist mucus membranes.  NECK: Supple; non tender. No rigidity  CARD: Rregular rate and rhythm. Normal S1, S2; no murmurs, gallops, or rubs.  RESP: CT  auscultation bilaterally. No wheezes, rales or rhonchi.  ABD: Abdomen soft; non-tender; non-distended  EXT: Normal ROM. No clubbing, cyanosis or edema.  Varicose Veins   NEURO: Alert and oriented x 3. No focal deficits.  PSYCH: cooperative, appropriate.     IMPRESSION  Suspected GI  Associated with Bleed Abdominal Pain N/VD  Hemodynamically Stable Hgb 14   Non Septic   Hx of Gastritis   Patient reports 3 episodes of hematemesis ( approx. 30cc of bright blood). She reports dark stools but t she is also on supplemental iron.   Hold  Supplemental Iron   Check cbc,bmp, Maintain active t/s ,IV  PPI,    Clear liquid diet for tonight   Monitor CBC q6   Transfuse to keep hemoglobin > 7  F/u GI for possible EGD/colonoscopy  Correct electrolytes (Target Na = 135-145 | Mg = 1.7-2.2 | K = 3.5-5)  Hypokalemia    Hx Asthma:   - C/w Albuterol prn  - C/w Advair diskus 50/250mcg 1 puff q12hrs  - C/w Montelukast 10mg po once daily    Hx HTN  Denies Hx HF : Appears Euvolemic Clinically   Hx Chronic Venous Stasis   - On bumex 0.5mg po 2 tabs q12hrs, unknown why, prescribed by cardiologist, no HF but only JOLENE.   Hold for now given  Hypokalemia     Hx Bipolar disorder:   - C/w Lamotrigine 25mg po q12hrs  - C/w Zolofot 200mg po once daily  - C/w Vraylar 1.5mg po once daily    Hx Hypothyroidism:   - C/w Levothyroxine 88mcg po once daily   - Follow up TSH    Hx Chronic back pain:   - 2/2 Herniated discs s/p intrathecal pump insertion complicated by abscess s/p drainage without laminectomy ?   - C/w Methadone 10mg po q8hrs ( The patient receives her methadone from a pain doctor on [place])  - Gabapentin 300mg po q8hrs    Hx Folate deficiency:   Hx B12 deficiency:   - C/w Folic Acid 1mg po once daily  - C/w Vitamin B12 IM once weekly    Seen on 04/27/23

## 2023-04-27 NOTE — ED PROVIDER NOTE - OBJECTIVE STATEMENT
61 yo female w/ PMH of sarcoidosis, hypothyroidism, anxiety, GERD, asthma, s/p gastric bypass, s/p appendectomy and cholecystectomy presents for abdominal pain x 3 weeks. No inciting event or trauma, no alleviating/provoking factors, epigastric pain.  Associated N/V and diarrhea, has been unable to eat and "lost 20 lbs in 2 weeks." Yesterday, had bloody bright red emesis.  States normally has darker stools since takes iron tablets.  Had temp of 101 this morning.  No URI sxs, chest pain, SOB, light-headedness, urinary sxs.

## 2023-04-27 NOTE — ED PROVIDER NOTE - PHYSICAL EXAMINATION
GENERAL: NAD   SKIN: warm, dry  CARD: S1, S2 normal; no murmurs, gallops, or rubs. Regular rate and rhythm.   RESP: LCTAB; No wheezes, rales, rhonchi, or stridor.  ABD: Epigastric TTP. Soft and nondistended  Rectal exam: Chaperoned by CARLY Wihte. Brown stool. No blood noted on JOSUE.   BACK: no CVA tenderness  NEURO: Alert, oriented, grossly unremarkable  PSYCH: Cooperative, appropriate.

## 2023-04-27 NOTE — ED PROVIDER NOTE - ATTENDING CONTRIBUTION TO CARE
62-year-old female PMH chronic back pain, constipation, hypothyroidism, GERD, asthma, history of gastric bypass  years ago presenting for evaluation of upper abdominal pain associated with multiple episodes of nausea, vomiting and diarrhea.  Symptoms present for the past 3 weeks.  Patient states that yesterday she had a few episodes of bloody emesis..  Denies any dizziness or lightheadedness, no chest pain or shortness of breath, no fever or chills, no change in exercise tolerance.  She is unable to make an appointment in a timely matter with a gastroenterologist; bloody emesis prompted ED visit.  Well-appearing female resting in stretcher, does not appears to be in any acute distress, PERRL, pink conjunctiva, MMM, speaking full sentences, lungs clear to auscultation bilaterally, speaking full sentences, RRR, well-perfused extremities, abdomen is soft, mostly epigastric TTP without rebound or guarding, no pitting leg edema or unilateral calf swelling, patient is awake and alert, pleasant and cooperative.  Plan: Labs, hydration, meds for symptomatic relief, CT abdomen pelvis, likely admission for further work-up.

## 2023-04-27 NOTE — ED PROVIDER NOTE - CLINICAL SUMMARY MEDICAL DECISION MAKING FREE TEXT BOX
Patient signed to me follow-up CT scan and reevaluation.  Patient is 8-year-old female with history of gastric bypass surgery presented with abdominal pain for 3 weeks associated with nausea and diarrhea inability keep p.o. down and associated 20 pound unintentional weight loss last 2 weeks.  Here labs demonstrated mild hypokalemia with a anion gap 17 with normal bicarb.  CT demonstrates oral contrast within the excluded stomach and proximal duodenum which could be retrograde flow versus Gastric Fistula\Staple Dehiscence Cannot Be Excluded.  Patient Still Symptomatic Admitted for further Evaluation.

## 2023-04-27 NOTE — H&P ADULT - HISTORY OF PRESENT ILLNESS
62-year-old, F with PMHx: HTN, Asthma, Bipolar disorder, Psoriasis, B12 deficiency, Folic Acid deficiency, Chronic back pain (2/2 multiple herniated discs with Intrathecal pump complicated by an abscess, on chronic methadone), presents to the ED for abdominal pain. History goes back to 1 month ago when the patient started having epigastric and per-umbilical pain associated with N/V/D. She saw her PMD,  Dr. Frank Graf who diagnosed her with Gastroenteritis and prescribed her doxycycline for 10 days ( which moderately improved her sx but did not reverse it completely). She reports that her pain is 6-8/10, intermittent, usually lasts 2-3 hours. The pain is not related to any food specifically but can be aggravated by liquid or solid food intake. Mildly relieved by PPIs. The patient reports having associated N/V/D (Today she reports being constipated, but she usually has 2-3 loose BM/day) along with 2 episodes of fevers in the past month and 20lbs of unintentional weight loss in 2-3 weeks.  She also reports 3 episodes of hematemesis ( approx 30cc of bright blood). She reports dark stools but she is also on supplemental iron. No joint pain, no skin manifestations (despite psoriatic lesions on the right leg)

## 2023-04-27 NOTE — H&P ADULT - NSHPLABSRESULTS_GEN_ALL_CORE
WBC Count: 8.45 K/uL  RBC Count: 4.74 M/uL  Hemoglobin: 14.7 g/dL  Hematocrit: 44.2 %  Mean Cell Volume: 93.2 fL  Mean Cell Hemoglobin: 31.0 pg  Mean Cell Hemoglobin Conc: 33.3 g/dL  Red Cell Distrib Width: 12.6 %  Platelet Count - Automated: 225 K/uL  MPV: 10.4 fL  Auto Neutrophil #: 5.03 K/uL  Auto Lymphocyte #: 2.15 K/uL  Auto Monocyte #: 0.60 K/uL  Auto Eosinophil #: 0.52 K/uL  Auto Basophil #: 0.11 K/uL  Auto Neutrophil %: 59.5: Differential percentages must be correlated with absolute numbers for   clinical significance. %  Auto Lymphocyte %: 25.4 %  Auto Monocyte %: 7.1 %  Auto Eosinophil %: 6.2 %  Auto Basophil %: 1.3 %  Auto Immature Granulocyte %: 0.5: (Includes meta, myelo and promyelocytes). Mild elevations in immature   granulocytes may be seen with many inflammatory processes and pregnancy;   clinical correlation suggested. %  Nucleated RBC: 0 /100 WBCsSodium, Serum: 144 mmol/L  Potassium, Serum: 3.3 mmol/L  Chloride, Serum: 104 mmol/L  Carbon Dioxide, Serum: 23 mmol/L  Anion Gap, Serum: 17 mmol/L  Blood Urea Nitrogen, Serum: 9 mg/dL  Creatinine, Serum: 0.5 mg/dL  Glucose, Serum: 66 mg/dL  Calcium, Total Serum: 9.0 mg/dL  Protein Total, Serum: 6.4 g/dL  Albumin, Serum: 4.1 g/dL  Bilirubin Total, Serum: <0.2 mg/dL  Alkaline Phosphatase, Serum: 123 U/L  Aspartate Aminotransferase (AST/SGOT): 15 U/L  Alanine Aminotransferase (ALT/SGPT): 11 U/L  Lactate, Blood: 3.0: Troponin T, Serum: <0.01 ng/mL< from: CT Abdomen and Pelvis w/ Oral Cont and w/ IV Cont (04.27.23 @ 16:07) >    IMPRESSION:  1.  No CT evidence of an acute abdominopelvic pathology.  2.  Post gastric bypass with oral contrast within the excluded stomach   and the proximal duodenum, likely related to retrograde flow. However, a   gastric gastric fistula/staple dehiscence cannot be excluded. An   outpatient upper GI series can be obtained if clinically warranted.    < end of copied text > WBC Count: 8.45 K/uL  RBC Count: 4.74 M/uL  Hemoglobin: 14.7 g/dL  Hematocrit: 44.2 %  Mean Cell Volume: 93.2 fL  Mean Cell Hemoglobin: 31.0 pg  Mean Cell Hemoglobin Conc: 33.3 g/dL  Red Cell Distrib Width: 12.6 %  Platelet Count - Automated: 225 K/uL  MPV: 10.4 fL  Auto Neutrophil #: 5.03 K/uL  Auto Lymphocyte #: 2.15 K/uL  Auto Monocyte #: 0.60 K/uL  Auto Eosinophil #: 0.52 K/uL  Auto Basophil #: 0.11 K/uL  Auto Neutrophil %: 59.5: Differential percentages must be correlated with absolute numbers for   clinical significance. %  Auto Lymphocyte %: 25.4 %  Auto Monocyte %: 7.1 %  Auto Eosinophil %: 6.2 %  Auto Basophil %: 1.3 %  Auto Immature Granulocyte %: 0.5: (Includes meta, myelo and promyelocytes). Mild elevations in immature   granulocytes may be seen with many inflammatory processes and pregnancy;   clinical correlation suggested. %  Nucleated RBC: 0 /100 WBCsSodium, Serum: 144 mmol/L  Potassium, Serum: 3.3 mmol/L  Chloride, Serum: 104 mmol/L  Carbon Dioxide, Serum: 23 mmol/L  Anion Gap, Serum: 17 mmol/L  Blood Urea Nitrogen, Serum: 9 mg/dL  Creatinine, Serum: 0.5 mg/dL  Glucose, Serum: 66 mg/dL  Calcium, Total Serum: 9.0 mg/dL  Protein Total, Serum: 6.4 g/dL  Albumin, Serum: 4.1 g/dL  Bilirubin Total, Serum: <0.2 mg/dL  Alkaline Phosphatase, Serum: 123 U/L  Aspartate Aminotransferase (AST/SGOT): 15 U/L  Alanine Aminotransferase (ALT/SGPT): 11 U/L    Lactate, Blood: 3.0:     Troponin T, Serum: <0.01 ng/mL    < from: CT Abdomen and Pelvis w/ Oral Cont and w/ IV Cont (04.27.23 @ 16:07) >    IMPRESSION:  1.  No CT evidence of an acute abdominopelvic pathology.  2.  Post gastric bypass with oral contrast within the excluded stomach and the proximal duodenum, likely related to retrograde flow. However, a gastric fistula/staple dehiscence cannot be excluded. An outpatient upper GI series can be obtained if clinically warranted.    < end of copied text >

## 2023-04-27 NOTE — H&P ADULT - NSHPPHYSICALEXAM_GEN_ALL_CORE
General:  Lungs:   Cardiac:   Abdomen:   LE: General: NAD  Lungs: GBAE  Cardiac: RRR, normal s1s2  Abdomen: Hyperactive BS, soft, nontender ( mild epigastric discomfort)  LE: Bilateral JOLENE +1 pitting with Right leg psoriatic lesion.

## 2023-04-28 LAB
ALBUMIN SERPL ELPH-MCNC: 3.6 G/DL — SIGNIFICANT CHANGE UP (ref 3.5–5.2)
ALP SERPL-CCNC: 114 U/L — SIGNIFICANT CHANGE UP (ref 30–115)
ALT FLD-CCNC: 12 U/L — SIGNIFICANT CHANGE UP (ref 0–41)
ANION GAP SERPL CALC-SCNC: 9 MMOL/L — SIGNIFICANT CHANGE UP (ref 7–14)
APTT BLD: 31.4 SEC — SIGNIFICANT CHANGE UP (ref 27–39.2)
AST SERPL-CCNC: 15 U/L — SIGNIFICANT CHANGE UP (ref 0–41)
BASOPHILS # BLD AUTO: 0.07 K/UL — SIGNIFICANT CHANGE UP (ref 0–0.2)
BASOPHILS NFR BLD AUTO: 1 % — SIGNIFICANT CHANGE UP (ref 0–1)
BILIRUB SERPL-MCNC: 0.4 MG/DL — SIGNIFICANT CHANGE UP (ref 0.2–1.2)
BLD GP AB SCN SERPL QL: SIGNIFICANT CHANGE UP
BLD GP AB SCN SERPL QL: SIGNIFICANT CHANGE UP
BUN SERPL-MCNC: 8 MG/DL — LOW (ref 10–20)
CALCIUM SERPL-MCNC: 8.6 MG/DL — SIGNIFICANT CHANGE UP (ref 8.4–10.5)
CHLORIDE SERPL-SCNC: 98 MMOL/L — SIGNIFICANT CHANGE UP (ref 98–110)
CO2 SERPL-SCNC: 30 MMOL/L — SIGNIFICANT CHANGE UP (ref 17–32)
CREAT SERPL-MCNC: 0.8 MG/DL — SIGNIFICANT CHANGE UP (ref 0.7–1.5)
EGFR: 84 ML/MIN/1.73M2 — SIGNIFICANT CHANGE UP
EOSINOPHIL # BLD AUTO: 0.59 K/UL — SIGNIFICANT CHANGE UP (ref 0–0.7)
EOSINOPHIL NFR BLD AUTO: 8.7 % — HIGH (ref 0–8)
GLUCOSE SERPL-MCNC: 75 MG/DL — SIGNIFICANT CHANGE UP (ref 70–99)
HCT VFR BLD CALC: 40.8 % — SIGNIFICANT CHANGE UP (ref 37–47)
HCV AB S/CO SERPL IA: 0.03 COI — SIGNIFICANT CHANGE UP
HCV AB SERPL-IMP: SIGNIFICANT CHANGE UP
HGB BLD-MCNC: 13.4 G/DL — SIGNIFICANT CHANGE UP (ref 12–16)
IMM GRANULOCYTES NFR BLD AUTO: 0.6 % — HIGH (ref 0.1–0.3)
INR BLD: 0.99 RATIO — SIGNIFICANT CHANGE UP (ref 0.65–1.3)
LACTATE SERPL-SCNC: 1.6 MMOL/L — SIGNIFICANT CHANGE UP (ref 0.7–2)
LACTATE SERPL-SCNC: 1.8 MMOL/L — SIGNIFICANT CHANGE UP (ref 0.7–2)
LYMPHOCYTES # BLD AUTO: 2.6 K/UL — SIGNIFICANT CHANGE UP (ref 1.2–3.4)
LYMPHOCYTES # BLD AUTO: 38.2 % — SIGNIFICANT CHANGE UP (ref 20.5–51.1)
MAGNESIUM SERPL-MCNC: 1.8 MG/DL — SIGNIFICANT CHANGE UP (ref 1.8–2.4)
MCHC RBC-ENTMCNC: 30.9 PG — SIGNIFICANT CHANGE UP (ref 27–31)
MCHC RBC-ENTMCNC: 32.8 G/DL — SIGNIFICANT CHANGE UP (ref 32–37)
MCV RBC AUTO: 94 FL — SIGNIFICANT CHANGE UP (ref 81–99)
MONOCYTES # BLD AUTO: 0.47 K/UL — SIGNIFICANT CHANGE UP (ref 0.1–0.6)
MONOCYTES NFR BLD AUTO: 6.9 % — SIGNIFICANT CHANGE UP (ref 1.7–9.3)
NEUTROPHILS # BLD AUTO: 3.04 K/UL — SIGNIFICANT CHANGE UP (ref 1.4–6.5)
NEUTROPHILS NFR BLD AUTO: 44.6 % — SIGNIFICANT CHANGE UP (ref 42.2–75.2)
NRBC # BLD: 0 /100 WBCS — SIGNIFICANT CHANGE UP (ref 0–0)
PLATELET # BLD AUTO: 173 K/UL — SIGNIFICANT CHANGE UP (ref 130–400)
PMV BLD: 9.4 FL — SIGNIFICANT CHANGE UP (ref 7.4–10.4)
POTASSIUM SERPL-MCNC: 4.4 MMOL/L — SIGNIFICANT CHANGE UP (ref 3.5–5)
POTASSIUM SERPL-SCNC: 4.4 MMOL/L — SIGNIFICANT CHANGE UP (ref 3.5–5)
PROT SERPL-MCNC: 5.6 G/DL — LOW (ref 6–8)
PROTHROM AB SERPL-ACNC: 11.3 SEC — SIGNIFICANT CHANGE UP (ref 9.95–12.87)
RBC # BLD: 4.34 M/UL — SIGNIFICANT CHANGE UP (ref 4.2–5.4)
RBC # FLD: 12.7 % — SIGNIFICANT CHANGE UP (ref 11.5–14.5)
SODIUM SERPL-SCNC: 137 MMOL/L — SIGNIFICANT CHANGE UP (ref 135–146)
TSH SERPL-MCNC: 4.25 UIU/ML — HIGH (ref 0.27–4.2)
WBC # BLD: 6.81 K/UL — SIGNIFICANT CHANGE UP (ref 4.8–10.8)
WBC # FLD AUTO: 6.81 K/UL — SIGNIFICANT CHANGE UP (ref 4.8–10.8)

## 2023-04-28 PROCEDURE — 99233 SBSQ HOSP IP/OBS HIGH 50: CPT

## 2023-04-28 PROCEDURE — 99222 1ST HOSP IP/OBS MODERATE 55: CPT

## 2023-04-28 RX ORDER — METHADONE HYDROCHLORIDE 40 MG/1
10 TABLET ORAL EVERY 8 HOURS
Refills: 0 | Status: DISCONTINUED | OUTPATIENT
Start: 2023-04-28 | End: 2023-04-28

## 2023-04-28 RX ORDER — SUCRALFATE 1 G
1 TABLET ORAL EVERY 6 HOURS
Refills: 0 | Status: DISCONTINUED | OUTPATIENT
Start: 2023-04-28 | End: 2023-05-01

## 2023-04-28 RX ORDER — ALPRAZOLAM 0.25 MG
0.5 TABLET ORAL ONCE
Refills: 0 | Status: DISCONTINUED | OUTPATIENT
Start: 2023-04-28 | End: 2023-04-28

## 2023-04-28 RX ORDER — METHADONE HYDROCHLORIDE 40 MG/1
30 TABLET ORAL EVERY 8 HOURS
Refills: 0 | Status: DISCONTINUED | OUTPATIENT
Start: 2023-04-28 | End: 2023-05-03

## 2023-04-28 RX ORDER — SERTRALINE 25 MG/1
200 TABLET, FILM COATED ORAL DAILY
Refills: 0 | Status: DISCONTINUED | OUTPATIENT
Start: 2023-04-28 | End: 2023-05-03

## 2023-04-28 RX ORDER — PANTOPRAZOLE SODIUM 20 MG/1
40 TABLET, DELAYED RELEASE ORAL
Refills: 0 | Status: DISCONTINUED | OUTPATIENT
Start: 2023-04-28 | End: 2023-05-01

## 2023-04-28 RX ORDER — SUCRALFATE 1 G
1 TABLET ORAL
Refills: 0 | Status: DISCONTINUED | OUTPATIENT
Start: 2023-04-28 | End: 2023-04-28

## 2023-04-28 RX ORDER — BUMETANIDE 0.25 MG/ML
0.5 INJECTION INTRAMUSCULAR; INTRAVENOUS EVERY 12 HOURS
Refills: 0 | Status: DISCONTINUED | OUTPATIENT
Start: 2023-04-28 | End: 2023-05-01

## 2023-04-28 RX ADMIN — GABAPENTIN 300 MILLIGRAM(S): 400 CAPSULE ORAL at 05:17

## 2023-04-28 RX ADMIN — GABAPENTIN 300 MILLIGRAM(S): 400 CAPSULE ORAL at 22:05

## 2023-04-28 RX ADMIN — MONTELUKAST 10 MILLIGRAM(S): 4 TABLET, CHEWABLE ORAL at 12:14

## 2023-04-28 RX ADMIN — LAMOTRIGINE 25 MILLIGRAM(S): 25 TABLET, ORALLY DISINTEGRATING ORAL at 05:16

## 2023-04-28 RX ADMIN — LAMOTRIGINE 25 MILLIGRAM(S): 25 TABLET, ORALLY DISINTEGRATING ORAL at 17:29

## 2023-04-28 RX ADMIN — METHADONE HYDROCHLORIDE 30 MILLIGRAM(S): 40 TABLET ORAL at 22:04

## 2023-04-28 RX ADMIN — Medication 1 GRAM(S): at 15:10

## 2023-04-28 RX ADMIN — METHOCARBAMOL 250 MILLIGRAM(S): 500 TABLET, FILM COATED ORAL at 14:09

## 2023-04-28 RX ADMIN — METHADONE HYDROCHLORIDE 30 MILLIGRAM(S): 40 TABLET ORAL at 15:09

## 2023-04-28 RX ADMIN — Medication 1 GRAM(S): at 17:29

## 2023-04-28 RX ADMIN — SODIUM CHLORIDE 75 MILLILITER(S): 9 INJECTION, SOLUTION INTRAVENOUS at 05:14

## 2023-04-28 RX ADMIN — METHOCARBAMOL 250 MILLIGRAM(S): 500 TABLET, FILM COATED ORAL at 23:02

## 2023-04-28 RX ADMIN — Medication 1 GRAM(S): at 23:02

## 2023-04-28 RX ADMIN — ALBUTEROL 2 PUFF(S): 90 AEROSOL, METERED ORAL at 17:27

## 2023-04-28 RX ADMIN — SERTRALINE 200 MILLIGRAM(S): 25 TABLET, FILM COATED ORAL at 14:29

## 2023-04-28 RX ADMIN — PANTOPRAZOLE SODIUM 40 MILLIGRAM(S): 20 TABLET, DELAYED RELEASE ORAL at 17:29

## 2023-04-28 RX ADMIN — Medication 88 MICROGRAM(S): at 05:17

## 2023-04-28 RX ADMIN — GABAPENTIN 300 MILLIGRAM(S): 400 CAPSULE ORAL at 14:07

## 2023-04-28 RX ADMIN — Medication 1 MILLIGRAM(S): at 12:14

## 2023-04-28 RX ADMIN — BUMETANIDE 0.5 MILLIGRAM(S): 0.25 INJECTION INTRAMUSCULAR; INTRAVENOUS at 22:40

## 2023-04-28 RX ADMIN — Medication 0.5 MILLIGRAM(S): at 23:40

## 2023-04-28 RX ADMIN — PANTOPRAZOLE SODIUM 10 MG/HR: 20 TABLET, DELAYED RELEASE ORAL at 05:15

## 2023-04-28 RX ADMIN — Medication 12.5 MILLIGRAM(S): at 12:14

## 2023-04-28 NOTE — CONSULT NOTE ADULT - ASSESSMENT
62-year-old Female with a pmhx of RYGB in 2006, HTN, Asthma, Bipolar disorder, Psoriasis, B12 and Folic Acid deficiency, Chronic back pain 2/2 multiple herniated discs currently managed with methadone and soma presented with progressive non-radiating epigastric abdominal pain 8/10, nausea, with NBNB vomiting and hemoptysis x 3 on prior to admission on 4/26. She states she produces 20-30 cc of blood each time with stable Hb.      #Gastro-gastric fistula of the RYGB   #Solid food dysphagia - Intermittent episodes  - Pt's last episodes of hemoptysis were on 4/26   - Hb stable at 13.4-14 g/dL; unknown baseline   - takes ASA 81mg   - CTAP: Oral contrast seen within the excluded stomach and also the proximal duodenum indicating a gastro-gastric fistula   - Lactate 3     Recommendations   - give IVF until lactate normalizes   - monitor cbc bid | Target Hb >8  - transfuse as needed to keep Hb    - continue IV PPI 40mg bid   - continue sucralfate qid   - obtain fat and water soluble vitamin and calcium, zinc, selenium, copper levels as pt has been non-compliant with annual nutritional labs required of RYGB pts   - if pt's abd pain resolves and able to tolerate po, can advance diet and recommend an op EGD   - if Hb drops again or if pt's symptoms continue to persist, can consider an EGD IP   - needs a colonoscopy as OP for CRC screening

## 2023-04-28 NOTE — PROGRESS NOTE ADULT - SUBJECTIVE AND OBJECTIVE BOX
Patient is a 62y old  Female who presents with a chief complaint of Abdominal pain (2023 17:08)    INTERVAL HPI/OVERNIGHT EVENTS: Patient was examined and seen at bedside. This morning pt is resting comfortably in bed and reports no new issues or overnight events. Some nausea but no vomiting today. Last time vomited yesterday (bile, no blood). Tolerating clears. Mild epigastric discomfort.  ROS: Denies CP, SOB, AP, new weakness  All other systems reviewed and are within normal limits.  InitialHPI:  62-year-old, F with PMHx: HTN, Asthma, Bipolar disorder, Psoriasis, B12 deficiency, Folic Acid deficiency, Chronic back pain (2/2 multiple herniated discs with Intrathecal pump complicated by an abscess, on chronic methadone), presents to the ED for abdominal pain. History goes back to 1 month ago when the patient started having epigastric and cammy-umbilical pain associated with N/V/D. She saw her PMD,  Dr. Frank Graf who diagnosed her with Gastroenteritis and prescribed her doxycycline for 10 days ( which moderately improved her sx but did not reverse it completely). She reports that her pain is 6-8/10, intermittent, usually lasts 2-3 hours. The pain is not related to any food specifically but can be aggravated by liquid or solid food intake. Mildly relieved by PPIs. The patient reports having associated N/V/D (Today she reports being constipated, but she usually has 2-3 loose BM/day) along with 2 episodes of fevers in the past month and 20lbs of unintentional weight loss in 2-3 weeks.  She also reports 3 episodes of hematemesis ( approx 30cc of bright blood). She reports dark stools but she is also on supplemental iron. No joint pain, no skin manifestations (despite psoriatic lesions on the right leg) (2023 17:08) No h/o c-scopy or EGD.    PAST MEDICAL & SURGICAL HISTORY:  Asthma  LAST ATTACK MANY YRS AGO      Gastroesophageal reflux disease without esophagitis      Hypothyroidism      Heart murmur      Anxiety      Depression      Mood disorder      Psoriasis      Constipation      Back pain  LOW      Morbid obesity      S/P tonsillectomy  1967      History of appendectomy  1974      Abscess of back  EVACUATION OF ABSCESS @ L5-S1       History of Jaclyn-en-Y gastric bypass  WITH CHOLECYSTECTOMY           General: NAD, AAO3  HEENT:  EOMI, no LAD  CV: S1 S2  Resp: decreased breath sounds at bases  GI: ND/S +BS, +epigastric discomfort  MS: no clubbing/cyanosis/edema, + pulses b/l  Neuro: nonfocal, +reflexes thruout    MEDICATIONS  (STANDING):  folic acid 1 milliGRAM(s) Oral daily  gabapentin 300 milliGRAM(s) Oral every 8 hours  lactated ringers. 1000 milliLiter(s) (75 mL/Hr) IV Continuous <Continuous>  lamoTRIgine 25 milliGRAM(s) Oral every 12 hours  levothyroxine 88 MICROGram(s) Oral daily  methadone    Tablet 30 milliGRAM(s) Oral every 8 hours  montelukast 10 milliGRAM(s) Oral daily  pantoprazole  Injectable 40 milliGRAM(s) IV Push two times a day  sertraline 200 milliGRAM(s) Oral daily  sucralfate 1 Gram(s) Oral every 6 hours    MEDICATIONS  (PRN):  albuterol    90 MICROgram(s) HFA Inhaler 2 Puff(s) Inhalation every 6 hours PRN Shortness of Breath and/or Wheezing  methocarbamol 250 milliGRAM(s) Oral every 8 hours PRN back pain  promethazine 12.5 milliGRAM(s) Oral every 6 hours PRN nausea    Vital Signs Last 24 Hrs  T(C): 36.2 (2023 04:20), Max: 36.5 (2023 17:00)  T(F): 97.2 (2023 04:20), Max: 97.7 (2023 17:00)  HR: 64 (2023 04:20) (61 - 65)  BP: 128/66 (2023 04:20) (99/59 - 146/88)  BP(mean): 85 (2023 20:37) (74 - 111)  RR: 18 (2023 04:20) (17 - 18)  SpO2: 98% (2023 04:20) (96% - 98%)    Parameters below as of 2023 04:20  Patient On (Oxygen Delivery Method): room air      CAPILLARY BLOOD GLUCOSE      POCT Blood Glucose.: 83 mg/dL (2023 21:28)                          13.4   6.81  )-----------( 173      ( 2023 05:43 )             40.8         137  |  98  |  8<L>  ----------------------------<  75  4.4   |  30  |  0.8    Ca    8.6      2023 05:43  Mg     1.8         TPro  5.6<L>  /  Alb  3.6  /  TBili  0.4  /  DBili  x   /  AST  15  /  ALT  12  /  AlkPhos  114      LIVER FUNCTIONS - ( 2023 05:43 )  Alb: 3.6 g/dL / Pro: 5.6 g/dL / ALK PHOS: 114 U/L / ALT: 12 U/L / AST: 15 U/L / GGT: x           CARDIAC MARKERS ( 2023 12:50 )  x     / <0.01 ng/mL / x     / x     / x          PT/INR - ( 2023 20:00 )   PT: 11.30 sec;   INR: 0.99 ratio         PTT - ( 2023 20:00 )  PTT:31.4 sec  Urinalysis Basic - ( 2023 15:00 )    Color: Yellow / Appearance: Clear / S.013 / pH: x  Gluc: x / Ketone: Negative  / Bili: Negative / Urobili: <2 mg/dL   Blood: x / Protein: Negative / Nitrite: Negative   Leuk Esterase: Negative / RBC: x / WBC x   Sq Epi: x / Non Sq Epi: x / Bacteria: x              Chart, Consultant(s) Notes Reviewed:  [x ] YES  [ ] NO  Care Discussed with Consultants/Other Providers/ Housestaff [ x] YES  [ ] NO  Radiology, labs, old available records personally reviewed.

## 2023-04-28 NOTE — CONSULT NOTE ADULT - ATTENDING COMMENTS
Patient with small amounts of hematemesis. No Melena. Outpatient EGD. Inpatient work up if has recurrrent bleeding.

## 2023-04-28 NOTE — PROGRESS NOTE ADULT - ASSESSMENT
62-year-old, F with the aforementioned history, presents to the ED for abdominal pain.     #Epigastric Pain   #?Unintentional weight loss:   #Hematemesis  - She reports that her pain is 6-8/10, intermittent, usually lasts 2-3 hours.   - The pain is not related to any food specifically but can be aggravated by liquid or solid food intake. Mildly relieved by PPIs.   - The patient reports having associated N/V/D (Today she reports being constipated, but she usually has 2-3 loose BM/day) along with 2 episodes of fevers in the past month and 20lbs of unintentional weight loss in 2-3 weeks.    - She also reports 3 episodes of hematemesis ( approx 30cc of bright blood). She reports dark stools but she is also on supplemental iron.   - No joint pain, no skin manifestations (despite psoriatic lesions on the right leg)  - History of Appendectomy, cholecystectomy and gastric bypass ( in 2006)  - LFTs within normal limit  - CT scan on 4/27/2023:   1.  No CT evidence of an acute abdominopelvic pathology.  2.  Post gastric bypass with oral contrast within the excluded stomach and the proximal duodenum, likely related to retrograde flow. However, a gastric fistula/staple dehiscence cannot be excluded. An outpatient upper GI series can be obtained if clinically warranted.  - Trend Hb, keep an active TS,   - Start Protonix 40mg IV Q12 and Carafate Q6 ( given hematemesis and hx of gastric bypass --> Ulcer at anastomosis site is always a possibility)  - GI eval pending  - if no procedures planned, advance diet at tolerated  - would benefit from outpt c-scopy (father w/ colon ca in his 50s)    #Asthma:   - C/w Albuterol prn  - C/w Advair diskus 50/250mcg 1 puff q12hrs  - C/w Montelukast 10mg po once daily    #HTN:   - On bumex 0.5mg po 2 tabs q12hrs for LE edema (hold for now and may give PRN)    #Bipolar disorder:   - C/w Lamotrigine 25mg po q12hrs  - C/w Zolofot 200mg po once daily  - C/w Vraylar 1.5mg po once daily    #Hypothyroidism:   - C/w Levothyroxine 88mcg po once daily   - Follow up TSH    #Chronic back pain:   - 2/2 Herniated discs s/p intrathecal pump insertion complicated by abscess s/p drainage without laminectomy ?   - C/w Methadone 10mg po q8hrs ( The patient receives her methadone from a pain doctor on [address])  - Gabapentin 300mg po q8hrs    #Folate deficiency:   #B12 deficiency:   - C/w Folic Acid 1mg po once daily  - C/w Vitamin B12 IM once weekly    #Nutrition/Fluids/Electrolytes   - replete K<4 and Mg <2  - ensure regular BMs  - consider Miralax BID, Senna    #DVT Px  SCDs  Heparin or Lovenox if H/H stable    #Progress Note Handoff  Pending: Clinical improvement and stability__x__ GI_  Pt/Family discussion: Pt informed and agrees with the current plan  Disposition: Home___    My note supersedes the residents note should a discrepancy arise.    Chart and notes personally reviewed.  Care Discussed with Consultants/Other Providers/ Housestaff [ x] YES [ ] NO   Radiology, labs, old records personally reviewed.    discussed w/ housestaff, nursing, case management    Attestation Statements:    Attestation Statements:  Risk Statement (NON-critical care).     On this date of service, level of risk to patient is considered: High.     Due to: hematemesis, wt loss, abdominal pain    Time-based billing (NON-critical care).     50 minutes spent on total encounter. The necessity of the time spent during the encounter on this date of service was due to:     time spent on review of labs, imaging studies, old records, obtaining history, personally examining patient, counselling and communicating with patient/ family, entering orders for medications/tests/etc, discussions with other health care providers, documentation in electronic health records, independent interpretation of labs, imaging/procedure results and care coordination.

## 2023-04-28 NOTE — CONSULT NOTE ADULT - SUBJECTIVE AND OBJECTIVE BOX
Gastroenterology Consultation:    Patient is a 62y old  Female who presents with a chief complaint of Abdominal pain (28 Apr 2023 13:32)        Admitted on: 04-27-23      HPI:    62-year-old Female with a pmhx of RYGB in 2006, HTN, Asthma, Bipolar disorder, Psoriasis, B12 and Folic Acid deficiency, Chronic back pain 2/2 multiple herniated discs currently managed with methadone and soma presented with progressive non-radiating epigastric abdominal pain 8/10, nausea, with NBNB vomiting and hemoptysis x 3 on prior to admission on 4/26. She states she produces 20-30 cc of blood each time with stable Hb. She was prescribed doxycycline by her PMD for possible gastroenteritis which she states has improved her symptoms moderately, but still unable to tolerate po due to the pain and nausea and subsequently has lost 20 lbs in the last 3 weeks. She states she had 2 fevers of 100.5/100.8 in the last 1 month. She has dark stools regularly due to iron use, but otherwise no other overt episodes of bleeding. She had her RYGB in 2006 in [place] and has not followed up since. She still has nausea and unable to tolerate po. She has been having progressive dysphagia as well with solids > liquids for some time now and she feels like the food gets stuck substernally. She has never had an EGD even prior to her RYGB and only had a flex sig about 35 years ago for unclear reasons that were unremarkable. Her father passed from CRC.        Prior EGD: none    Prior Colonoscopy: flex sig about 35 years ago for unclear reasons that were unremarkable.       PAST MEDICAL & SURGICAL HISTORY:  Asthma    LAST ATTACK MANY YRS AGO      Gastroesophageal reflux disease without esophagitis      Hypothyroidism      Heart murmur      Anxiety      Depression      Mood disorder      Psoriasis      Constipation      Back pain  LOW      Morbid obesity      S/P tonsillectomy  1967      History of appendectomy  1974      Abscess of back  EVACUATION OF ABSCESS @ L5-S1 1997      History of Jaclyn-en-Y gastric bypass  WITH CHOLECYSTECTOMY 2006            FAMILY HISTORY:  CAD (coronary artery disease) (Father, Mother)        Social History:  Tobacco: quit smoking 6 years ago was on 1 ppd; now smokes an occasional cig q 1 wk   Alcohol: drinks bourbon 2 glasses 3 x a week   Drugs: none   Worked as an RN      Home Medications:  Advair Diskus 250 mcg-50 mcg inhalation powder: 1 puff(s) inhaled 2 times a day (30 Jul 2018 12:47)  Bumex 0.5 mg oral tablet: 2 orally 2 times a day (27 Apr 2023 19:21)  Fiber Tabs 625 mg oral tablet: 1 tab(s) orally once a day (30 Jul 2018 12:47)  folic acid 1 mg oral tablet: 1 tab(s) orally once a day (30 Jul 2018 12:47)  gabapentin 300 mg oral capsule: 1 orally 3 times a day (27 Apr 2023 19:31)  Glucosamine Chondroitin oral capsule: 3 cap(s) orally once a day (30 Jul 2018 12:47)  lamoTRIgine: 25 milligram(s) orally 2 times a day (30 Jul 2018 12:47)  levothyroxine 88 mcg (0.088 mg) oral tablet: 1 tab(s) orally once a day (30 Jul 2018 12:47)  methadone 10 mg oral tablet: 3 tab(s) orally every 8 hours (30 Jul 2018 12:47)  montelukast 10 mg oral tablet: 1 tab(s) orally once a day (30 Jul 2018 12:47)  NexIUM 40 mg oral delayed release capsule: 1 cap(s) orally once a day (30 Jul 2018 12:47)  sertraline: 200 milligram(s) orally once a day (30 Jul 2018 12:47)  Soma 350 mg oral tablet: 1 tab(s) orally 3 times a day (30 Jul 2018 12:47)  Ventolin HFA 90 mcg/inh inhalation aerosol: 2 puff(s) inhaled 4 times a day, As Needed (30 Jul 2018 12:47)  Vitamin B-12 1000 mcg/mL injectable solution: 1000 unit(s) injectable once a week (30 Jul 2018 12:47)  Vitamin C 1000 mg oral tablet: 2 tab(s) orally once a day (30 Jul 2018 12:47)  Vitamin D3 2000 intl units oral tablet: 3 tab(s) orally once a day (30 Jul 2018 12:47)  Vraylar 1.5 mg oral capsule: 1 orally once a day (27 Apr 2023 19:32)        MEDICATIONS  (STANDING):  folic acid 1 milliGRAM(s) Oral daily  gabapentin 300 milliGRAM(s) Oral every 8 hours  lamoTRIgine 25 milliGRAM(s) Oral every 12 hours  levothyroxine 88 MICROGram(s) Oral daily  methadone    Tablet 30 milliGRAM(s) Oral every 8 hours  montelukast 10 milliGRAM(s) Oral daily  pantoprazole  Injectable 40 milliGRAM(s) IV Push two times a day  sertraline 200 milliGRAM(s) Oral daily  sucralfate 1 Gram(s) Oral every 6 hours    MEDICATIONS  (PRN):  albuterol    90 MICROgram(s) HFA Inhaler 2 Puff(s) Inhalation every 6 hours PRN Shortness of Breath and/or Wheezing  methocarbamol 250 milliGRAM(s) Oral every 8 hours PRN back pain  promethazine 12.5 milliGRAM(s) Oral every 6 hours PRN nausea      Allergies  penicillin (Anaphylaxis)      Review of Systems:   Constitutional:  No Fever, No Chills  ENT/Mouth:  No Hearing Changes,  No Difficulty Swallowing  Eyes:  No Eye Pain, No Vision Changes  Cardiovascular:  No Chest Pain, No Palpitations  Respiratory:  No Cough, No Dyspnea  Gastrointestinal:  As described in HPI          Physical Examination:  T(C): 36.4 (04-28-23 @ 14:40), Max: 36.5 (04-27-23 @ 17:00)  HR: 59 (04-28-23 @ 14:40) (59 - 65)  BP: 139/63 (04-28-23 @ 14:40) (99/59 - 146/88)  RR: 18 (04-28-23 @ 14:40) (17 - 18)  SpO2: 98% (04-28-23 @ 14:40) (96% - 98%)  Height (cm): 167.6 (04-28-23 @ 13:32)  Weight (kg): 86.2 (04-28-23 @ 13:32)        GENERAL: AAOx3, no acute distress.  HEAD:  Atraumatic, Normocephalic  EYES: conjunctiva and sclera clear  CHEST/LUNG: Clear to auscultation bilaterally; No wheeze, rhonchi, or rales  HEART: Regular rate and rhythm; normal S1, S2, No murmurs.  ABDOMEN: Soft, nontender, nondistended; Bowel sounds present  SKIN: Intact, no jaundice        Data:                        13.4   6.81  )-----------( 173      ( 28 Apr 2023 05:43 )             40.8     Hgb Trend:  13.4  04-28-23 @ 05:43  13.2  04-27-23 @ 20:00  14.7  04-27-23 @ 12:50      04-28    137  |  98  |  8<L>  ----------------------------<  75  4.4   |  30  |  0.8    Ca    8.6      28 Apr 2023 05:43  Mg     1.8     04-28    TPro  5.6<L>  /  Alb  3.6  /  TBili  0.4  /  DBili  x   /  AST  15  /  ALT  12  /  AlkPhos  114  04-28    Liver panel trend:  TBili 0.4   /   AST 15   /   ALT 12   /   AlkP 114   /   Tptn 5.6   /   Alb 3.6    /   DBili --      04-28  TBili <0.2   /   AST 15   /   ALT 11   /   AlkP 123   /   Tptn 6.4   /   Alb 4.1    /   DBili --      04-27      PT/INR - ( 27 Apr 2023 20:00 )   PT: 11.30 sec;   INR: 0.99 ratio         PTT - ( 27 Apr 2023 20:00 )  PTT:31.4 sec        Radiology:  CT Abdomen and Pelvis w/ Oral Cont and w/ IV Cont:   ACC: [id number] EXAM:  CT ABDOMEN AND PELVIS OC IC   ORDERED BY: VALENTINA DUONG     PROCEDURE DATE:  04/27/2023          INTERPRETATION:  CLINICAL STATEMENT: Epigastric pain    TECHNIQUE: Contiguous axial CT images were obtained from the lower chest  to the pubic symphysis following administration of per cc Omnipaque 350   intravenous contrast.  Oral contrast was administered.  Reformatted   images in the coronal and sagittal planes were acquired.    COMPARISON CT: None.    OTHER STUDIES USED FOR CORRELATION: None.      FINDINGS:    LOWER CHEST: Coronary artery calcifications.    HEPATOBILIARY: Post cholecystectomy..    SPLEEN: Unremarkable.    PANCREAS: Unremarkable.    ADRENAL GLANDS: Unremarkable.    KIDNEYS: Symmetric renal enhancement bilaterally. No hydronephrosis.    ABDOMINOPELVIC NODES: No lymphadenopathy.    PELVIC ORGANS: Unremarkable.    PERITONEUM/MESENTERY/BOWEL: No bowel obstruction, ascites or   pneumoperitoneum. Post gastric bypass. Oral contrast seen within the   excluded stomach and also the proximal duodenum. Moderate proximal   colonic stool burden.    BONES/SOFT TISSUES: Degenerative changes of the spine, hips and pubic   symphysis noted.    OTHER: Scattered atherosclerotic vascular calcifications.      IMPRESSION:  1.  No CT evidence of an acute abdominopelvic pathology.  2.  Post gastric bypass with oral contrast within the excluded stomach   and the proximal duodenum, likely related to retrograde flow. However, a   gastric gastric fistula/staple dehiscence cannot be excluded. An   outpatient upper GI series can be obtained if clinically warranted.    --- End of Report ---            TROY KAY MD; Attending Radiologist  This document has been electronically signed. Apr 27 2023  4:23PM (04-27-23 @ 16:07)

## 2023-04-29 LAB
CULTURE RESULTS: SIGNIFICANT CHANGE UP
GI PCR PANEL: SIGNIFICANT CHANGE UP
SPECIMEN SOURCE: SIGNIFICANT CHANGE UP

## 2023-04-29 PROCEDURE — 99232 SBSQ HOSP IP/OBS MODERATE 35: CPT

## 2023-04-29 RX ORDER — CELECOXIB 200 MG/1
200 CAPSULE ORAL DAILY
Refills: 0 | Status: DISCONTINUED | OUTPATIENT
Start: 2023-04-29 | End: 2023-05-03

## 2023-04-29 RX ORDER — LIDOCAINE 4 G/100G
1 CREAM TOPICAL DAILY
Refills: 0 | Status: DISCONTINUED | OUTPATIENT
Start: 2023-04-29 | End: 2023-05-03

## 2023-04-29 RX ORDER — TRAMADOL HYDROCHLORIDE 50 MG/1
25 TABLET ORAL ONCE
Refills: 0 | Status: DISCONTINUED | OUTPATIENT
Start: 2023-04-29 | End: 2023-04-29

## 2023-04-29 RX ORDER — BUDESONIDE AND FORMOTEROL FUMARATE DIHYDRATE 160; 4.5 UG/1; UG/1
2 AEROSOL RESPIRATORY (INHALATION)
Refills: 0 | Status: DISCONTINUED | OUTPATIENT
Start: 2023-04-29 | End: 2023-05-03

## 2023-04-29 RX ORDER — LOPERAMIDE HCL 2 MG
2 TABLET ORAL ONCE
Refills: 0 | Status: COMPLETED | OUTPATIENT
Start: 2023-04-29 | End: 2023-04-29

## 2023-04-29 RX ORDER — LIDOCAINE 4 G/100G
1 CREAM TOPICAL ONCE
Refills: 0 | Status: COMPLETED | OUTPATIENT
Start: 2023-04-29 | End: 2023-04-29

## 2023-04-29 RX ORDER — CARIPRAZINE 1.5 MG/1
1.5 CAPSULE, GELATIN COATED ORAL DAILY
Refills: 0 | Status: COMPLETED | OUTPATIENT
Start: 2023-04-29 | End: 2023-04-30

## 2023-04-29 RX ORDER — SODIUM CHLORIDE 9 MG/ML
500 INJECTION, SOLUTION INTRAVENOUS ONCE
Refills: 0 | Status: DISCONTINUED | OUTPATIENT
Start: 2023-04-29 | End: 2023-04-29

## 2023-04-29 RX ADMIN — CARIPRAZINE 1.5 MILLIGRAM(S): 1.5 CAPSULE, GELATIN COATED ORAL at 17:08

## 2023-04-29 RX ADMIN — Medication 1 GRAM(S): at 11:07

## 2023-04-29 RX ADMIN — METHADONE HYDROCHLORIDE 30 MILLIGRAM(S): 40 TABLET ORAL at 13:20

## 2023-04-29 RX ADMIN — LIDOCAINE 1 PATCH: 4 CREAM TOPICAL at 15:29

## 2023-04-29 RX ADMIN — Medication 1 GRAM(S): at 05:32

## 2023-04-29 RX ADMIN — BUMETANIDE 0.5 MILLIGRAM(S): 0.25 INJECTION INTRAMUSCULAR; INTRAVENOUS at 05:33

## 2023-04-29 RX ADMIN — LIDOCAINE 1 PATCH: 4 CREAM TOPICAL at 20:16

## 2023-04-29 RX ADMIN — SERTRALINE 200 MILLIGRAM(S): 25 TABLET, FILM COATED ORAL at 11:07

## 2023-04-29 RX ADMIN — GABAPENTIN 300 MILLIGRAM(S): 400 CAPSULE ORAL at 05:32

## 2023-04-29 RX ADMIN — Medication 1 GRAM(S): at 17:10

## 2023-04-29 RX ADMIN — PANTOPRAZOLE SODIUM 40 MILLIGRAM(S): 20 TABLET, DELAYED RELEASE ORAL at 05:32

## 2023-04-29 RX ADMIN — ALBUTEROL 2 PUFF(S): 90 AEROSOL, METERED ORAL at 05:37

## 2023-04-29 RX ADMIN — CELECOXIB 200 MILLIGRAM(S): 200 CAPSULE ORAL at 15:28

## 2023-04-29 RX ADMIN — GABAPENTIN 300 MILLIGRAM(S): 400 CAPSULE ORAL at 21:11

## 2023-04-29 RX ADMIN — METHADONE HYDROCHLORIDE 30 MILLIGRAM(S): 40 TABLET ORAL at 05:37

## 2023-04-29 RX ADMIN — PANTOPRAZOLE SODIUM 40 MILLIGRAM(S): 20 TABLET, DELAYED RELEASE ORAL at 17:12

## 2023-04-29 RX ADMIN — Medication 2 MILLIGRAM(S): at 11:08

## 2023-04-29 RX ADMIN — Medication 1 GRAM(S): at 23:25

## 2023-04-29 RX ADMIN — Medication 88 MICROGRAM(S): at 05:32

## 2023-04-29 RX ADMIN — LIDOCAINE 1 PATCH: 4 CREAM TOPICAL at 16:46

## 2023-04-29 RX ADMIN — GABAPENTIN 300 MILLIGRAM(S): 400 CAPSULE ORAL at 13:20

## 2023-04-29 RX ADMIN — LAMOTRIGINE 25 MILLIGRAM(S): 25 TABLET, ORALLY DISINTEGRATING ORAL at 05:32

## 2023-04-29 RX ADMIN — LAMOTRIGINE 25 MILLIGRAM(S): 25 TABLET, ORALLY DISINTEGRATING ORAL at 17:10

## 2023-04-29 RX ADMIN — BUDESONIDE AND FORMOTEROL FUMARATE DIHYDRATE 2 PUFF(S): 160; 4.5 AEROSOL RESPIRATORY (INHALATION) at 21:11

## 2023-04-29 RX ADMIN — BUMETANIDE 0.5 MILLIGRAM(S): 0.25 INJECTION INTRAMUSCULAR; INTRAVENOUS at 17:10

## 2023-04-29 RX ADMIN — METHADONE HYDROCHLORIDE 30 MILLIGRAM(S): 40 TABLET ORAL at 21:11

## 2023-04-29 RX ADMIN — Medication 1 MILLIGRAM(S): at 11:07

## 2023-04-29 RX ADMIN — MONTELUKAST 10 MILLIGRAM(S): 4 TABLET, CHEWABLE ORAL at 11:07

## 2023-04-29 NOTE — PROGRESS NOTE ADULT - SUBJECTIVE AND OBJECTIVE BOX
JULIET NAVARRETE 62y Female  MRN#: [id number]     Hospital Day: 2d    CC:  Hematemesis    HOSPITAL COURSE:   62F. PMHx: HTN, Asthma, Bipolar disorder, Psoriasis, B12 deficiency, Folic Acid deficiency, Chronic back pain (2/2 multiple herniated discs with Intrathecal pump complicated by an abscess, on chronic methadone),   Presented  c/o intermittent 2-3 hour episodes of epigastric/ cammy-umbilical abdominal pain x1m; associated with 2 episodes of fevers, N/V/D w/ 3 episodes of hematemasis (~30ccs of blood) and 20lbs of unintentional weight loss in 2-3 wks, dark stools iso iron supplements. She saw her PMD, Dr. Frank Graf who diagnosed her with Gastroenteritis and prescribed her doxycycline for 10 days (which moderately improved her sx but did not reverse it completely). The pain can be aggravated by liquid or solid food intake - no specific foods. No joint pain, no skin manifestations (despite psoriatic lesions on the right leg).     SUBJECTIVE     Overnight events  None    Subjective complaints                                               ----------------------------------------------------------  OBJECTIVE  PAST MEDICAL & SURGICAL HISTORY  Asthma  LAST ATTACK MANY YRS AGO    Gastroesophageal reflux disease without esophagitis    Hypothyroidism    Heart murmur    Anxiety    Depression    Mood disorder    Psoriasis    Constipation    Back pain  LOW    Morbid obesity    S/P tonsillectomy  1967    History of appendectomy  1974    Abscess of back  EVACUATION OF ABSCESS @ L5-S1     History of Jaclyn-en-Y gastric bypass  WITH CHOLECYSTECTOMY 2006----------------------------------------------------------  ALLERGIES:  penicillin (Anaphylaxis)                                            ------------------------------------------------------------    HOME MEDICATIONS  Home Medications:  Advair Diskus 250 mcg-50 mcg inhalation powder: 1 puff(s) inhaled 2 times a day (2018 12:47)  Bumex 0.5 mg oral tablet: 2 orally 2 times a day (2023 19:21)  Fiber Tabs 625 mg oral tablet: 1 tab(s) orally once a day (2018 12:47)  folic acid 1 mg oral tablet: 1 tab(s) orally once a day (2018 12:47)  gabapentin 300 mg oral capsule: 1 orally 3 times a day (2023 19:31)  Glucosamine Chondroitin oral capsule: 3 cap(s) orally once a day (2018 12:47)  lamoTRIgine: 25 milligram(s) orally 2 times a day (2018 12:47)  levothyroxine 88 mcg (0.088 mg) oral tablet: 1 tab(s) orally once a day (2018 12:47)  methadone 10 mg oral tablet: 3 tab(s) orally every 8 hours (2018 12:47)  montelukast 10 mg oral tablet: 1 tab(s) orally once a day (2018 12:47)  NexIUM 40 mg oral delayed release capsule: 1 cap(s) orally once a day (2018 12:47)  sertraline: 200 milligram(s) orally once a day (2018 12:47)  Soma 350 mg oral tablet: 1 tab(s) orally 3 times a day (2018 12:47)  Ventolin HFA 90 mcg/inh inhalation aerosol: 2 puff(s) inhaled 4 times a day, As Needed (2018 12:47)  Vitamin B-12 1000 mcg/mL injectable solution: 1000 unit(s) injectable once a week (2018 12:47)  Vitamin C 1000 mg oral tablet: 2 tab(s) orally once a day (2018 12:47)  Vitamin D3 2000 intl units oral tablet: 3 tab(s) orally once a day (2018 12:47)  Vraylar 1.5 mg oral capsule: 1 orally once a day (2023 19:32)                           MEDICATIONS:  STANDING MEDICATIONS  buMETAnide 0.5 milliGRAM(s) Oral every 12 hours  folic acid 1 milliGRAM(s) Oral daily  gabapentin 300 milliGRAM(s) Oral every 8 hours  lamoTRIgine 25 milliGRAM(s) Oral every 12 hours  levothyroxine 88 MICROGram(s) Oral daily  methadone    Tablet 30 milliGRAM(s) Oral every 8 hours  montelukast 10 milliGRAM(s) Oral daily  pantoprazole  Injectable 40 milliGRAM(s) IV Push two times a day  sertraline 200 milliGRAM(s) Oral daily  sucralfate 1 Gram(s) Oral every 6 hours    PRN MEDICATIONS  albuterol    90 MICROgram(s) HFA Inhaler 2 Puff(s) Inhalation every 6 hours PRN  methocarbamol 250 milliGRAM(s) Oral every 8 hours PRN  promethazine 12.5 milliGRAM(s) Oral every 6 hours PRN                                            ------------------------------------------------------------  VITAL SIGNS: Last 24 Hours  T(C): 36.1 (2023 04:50), Max: 36.4 (2023 14:40)  T(F): 97 (2023 04:50), Max: 97.5 (2023 14:40)  HR: 60 (2023 04:50) (54 - 60)  BP: 128/67 (2023 04:50) (128/67 - 139/63)  BP(mean): 94 (2023 21:39) (90 - 94)  RR: 18 (2023 04:50) (18 - 18)  SpO2: 98% (2023 04:50) (98% - 98%)                                             --------------------------------------------------------------  LABS:                        13.4   6.81  )-----------( 173      ( 2023 05:43 )             40.8     04-28    137  |  98  |  8<L>  ----------------------------<  75  4.4   |  30  |  0.8    Ca    8.6      2023 05:43  Mg     1.8         TPro  5.6<L>  /  Alb  3.6  /  TBili  0.4  /  DBili  x   /  AST  15  /  ALT  12  /  AlkPhos  114      PT/INR - ( 2023 20:00 )   PT: 11.30 sec;   INR: 0.99 ratio         PTT - ( 2023 20:00 )  PTT:31.4 sec  Urinalysis Basic - ( 2023 15:00 )    Color: Yellow / Appearance: Clear / S.013 / pH: x  Gluc: x / Ketone: Negative  / Bili: Negative / Urobili: <2 mg/dL   Blood: x / Protein: Negative / Nitrite: Negative   Leuk Esterase: Negative / RBC: x / WBC x   Sq Epi: x / Non Sq Epi: x / Bacteria: x                CARDIAC MARKERS ( 2023 12:50 )  x     / <0.01 ng/mL / x     / x     / x                                                  -------------------------------------------------------------  RADIOLOGY:                                            --------------------------------------------------------------    PHYSICAL EXAM:                                             --------------------------------------------------------------                 JULIET NAVARRETE 62y Female  MRN#: [id number]     Hospital Day: 2d    CC:  Hematemesis    HOSPITAL COURSE:   62F. PMHx: HTN, Asthma, Bipolar disorder, Psoriasis, B12 deficiency, Folic Acid deficiency, Chronic back pain (2/2 multiple herniated discs with Intrathecal pump complicated by an abscess, on chronic methadone),   Presented  c/o intermittent 2-3 hour episodes of epigastric/ cammy-umbilical abdominal pain x1m; associated with 2 episodes of fevers, N/V/D w/ 3 episodes of hematemasis (~30ccs of blood) and 20lbs of unintentional weight loss in 2-3 wks, dark stools iso iron supplements. She saw her PMD, Dr. Frank Graf who diagnosed her with Gastroenteritis and prescribed her doxycycline for 10 days (which moderately improved her sx but did not reverse it completely). The pain can be aggravated by liquid or solid food intake - no specific foods. No joint pain, no skin manifestations (despite psoriatic lesions on the right leg).     SUBJECTIVE     Overnight events  None    Subjective complaints   Pt evaluated at bedside. Still burping/hiccuping which causes some dry heaves, but denies pain or further episodes of hematemesis. Also having small episodes of diarrhea- watery ~6 overnight. Also wet the bed but endorsed receiving xanax instead of home vraylar and thought may be related to increased lethargy. Endorsed trying to eat solid food brought in by her  and hiccups/dry heaving was worse after that.                                             ----------------------------------------------------------  OBJECTIVE  PAST MEDICAL & SURGICAL HISTORY  Asthma  LAST ATTACK MANY YRS AGO    Gastroesophageal reflux disease without esophagitis    Hypothyroidism    Heart murmur    Anxiety    Depression    Mood disorder    Psoriasis    Constipation    Back pain  LOW    Morbid obesity    S/P tonsillectomy  1967    History of appendectomy  1974    Abscess of back  EVACUATION OF ABSCESS @ L5-S1     History of Jaclyn-en-Y gastric bypass  WITH CHOLECYSTECTOMY                                               -----------------------------------------------------------  ALLERGIES:  penicillin (Anaphylaxis)                                            ------------------------------------------------------------    HOME MEDICATIONS  Home Medications:  Advair Diskus 250 mcg-50 mcg inhalation powder: 1 puff(s) inhaled 2 times a day (2018 12:47)  Bumex 0.5 mg oral tablet: 2 orally 2 times a day (2023 19:21)  Fiber Tabs 625 mg oral tablet: 1 tab(s) orally once a day (2018 12:47)  folic acid 1 mg oral tablet: 1 tab(s) orally once a day (:47)  gabapentin 300 mg oral capsule: 1 orally 3 times a day (2023 19:31)  Glucosamine Chondroitin oral capsule: 3 cap(s) orally once a day (2018 12:47)  lamoTRIgine: 25 milligram(s) orally 2 times a day (2018 12:47)  levothyroxine 88 mcg (0.088 mg) oral tablet: 1 tab(s) orally once a day (2018 12:47)  methadone 10 mg oral tablet: 3 tab(s) orally every 8 hours (2018 12:47)  montelukast 10 mg oral tablet: 1 tab(s) orally once a day (2018 12:47)  NexIUM 40 mg oral delayed release capsule: 1 cap(s) orally once a day (2018 12:47)  sertraline: 200 milligram(s) orally once a day (2018 12:47)  Soma 350 mg oral tablet: 1 tab(s) orally 3 times a day (2018 12:47)  Ventolin HFA 90 mcg/inh inhalation aerosol: 2 puff(s) inhaled 4 times a day, As Needed (2018 12:47)  Vitamin B-12 1000 mcg/mL injectable solution: 1000 unit(s) injectable once a week (2018 12:47)  Vitamin C 1000 mg oral tablet: 2 tab(s) orally once a day (2018 12:47)  Vitamin D3 2000 intl units oral tablet: 3 tab(s) orally once a day (2018 12:47)  Vraylar 1.5 mg oral capsule: 1 orally once a day (2023 19:32)                           MEDICATIONS:  STANDING MEDICATIONS  buMETAnide 0.5 milliGRAM(s) Oral every 12 hours  folic acid 1 milliGRAM(s) Oral daily  gabapentin 300 milliGRAM(s) Oral every 8 hours  lamoTRIgine 25 milliGRAM(s) Oral every 12 hours  levothyroxine 88 MICROGram(s) Oral daily  methadone    Tablet 30 milliGRAM(s) Oral every 8 hours  montelukast 10 milliGRAM(s) Oral daily  pantoprazole  Injectable 40 milliGRAM(s) IV Push two times a day  sertraline 200 milliGRAM(s) Oral daily  sucralfate 1 Gram(s) Oral every 6 hours    PRN MEDICATIONS  albuterol    90 MICROgram(s) HFA Inhaler 2 Puff(s) Inhalation every 6 hours PRN  methocarbamol 250 milliGRAM(s) Oral every 8 hours PRN  promethazine 12.5 milliGRAM(s) Oral every 6 hours PRN                                            ------------------------------------------------------------  VITAL SIGNS: Last 24 Hours  T(C): 36.1 (2023 04:50), Max: 36.4 (2023 14:40)  T(F): 97 (2023 04:50), Max: 97.5 (2023 14:40)  HR: 60 (2023 04:50) (54 - 60)  BP: 128/67 (2023 04:50) (128/67 - 139/63)  BP(mean): 94 (2023 21:39) (90 - 94)  RR: 18 (2023 04:50) (18 - 18)  SpO2: 98% (2023 04:50) (98% - 98%)                                             --------------------------------------------------------------  LABS:                        13.4   6.81  )-----------( 173      ( 2023 05:43 )             40.8         137  |  98  |  8<L>  ----------------------------<  75  4.4   |  30  |  0.8    Ca    8.6      2023 05:43  Mg     1.8         TPro  5.6<L>  /  Alb  3.6  /  TBili  0.4  /  DBili  x   /  AST  15  /  ALT  12  /  AlkPhos  114      PT/INR - ( 2023 20:00 )   PT: 11.30 sec;   INR: 0.99 ratio         PTT - ( 2023 20:00 )  PTT:31.4 sec  Urinalysis Basic - ( 2023 15:00 )    Color: Yellow / Appearance: Clear / S.013 / pH: x  Gluc: x / Ketone: Negative  / Bili: Negative / Urobili: <2 mg/dL   Blood: x / Protein: Negative / Nitrite: Negative   Leuk Esterase: Negative / RBC: x / WBC x   Sq Epi: x / Non Sq Epi: x / Bacteria: x                CARDIAC MARKERS ( 2023 12:50 )  x     / <0.01 ng/mL / x     / x     / x                                                  -------------------------------------------------------------  RADIOLOGY:                                            --------------------------------------------------------------    PHYSICAL EXAM:  GEN: NAD  NEURO: Alert & Orientedx4, no gross focal deficit  HEENT: nontraumatic, normocephalic, neck supple  CARD: S1, S2 audible, no S3, regular rate and rhythm, no murmur  PULM: B/L breath sounds, no wheezing, crackles, or rales  ABD: Soft, nondistended, mild epigastric tenderness without guarding  EXTR: No clubbing, cyanosis, edema.   SKIN: Warm and intact                                           --------------------------------------------------------------

## 2023-04-29 NOTE — PROGRESS NOTE ADULT - ATTENDING COMMENTS
62-year-old, F with the aforementioned history, presents to the ED for abdominal pain.     ROS: loose stools, dizziness upon standing, epigastric pain with dry heaves. No vomiting.     Vital Signs Last 24 Hrs  T(C): 36.1 (29 Apr 2023 04:50), Max: 36.4 (28 Apr 2023 14:40)  T(F): 97 (29 Apr 2023 04:50), Max: 97.5 (28 Apr 2023 14:40)  HR: 60 (29 Apr 2023 04:50) (54 - 60)  BP: 128/67 (29 Apr 2023 04:50) (128/67 - 139/63)  BP(mean): 94 (28 Apr 2023 21:39) (90 - 94)  RR: 18 (29 Apr 2023 04:50) (18 - 18)  SpO2: 98% (29 Apr 2023 04:50) (98% - 98%)    Parameters below as of 29 Apr 2023 04:50  Patient On (Oxygen Delivery Method): room air    A/P    #Epigastric Pain   #?Unintentional weight loss:   #Hematemesis  #Diarrhea/loose stools (6 episodes overnight and 3 today)  - She reports that her pain is 6-8/10, intermittent, usually lasts 2-3 hours.   - The pain is not related to any food specifically but can be aggravated by liquid or solid food intake. Mildly relieved by PPIs.   - The patient reports having associated N/V/D (Today she reports being constipated, but she usually has 2-3 loose BM/day) along with 2 episodes of fevers in the past month and 20lbs of unintentional weight loss in 2-3 weeks.    - She also reports 3 episodes of hematemesis ( approx 30cc of bright blood). She reports dark stools but she is also on supplemental iron.   - No joint pain, no skin manifestations (despite psoriatic lesions on the right leg)  - History of Appendectomy, cholecystectomy and gastric bypass ( in 2006)  - LFTs within normal limit  - CT scan on 4/27/2023:   1.  No CT evidence of an acute abdominopelvic pathology.  2.  Post gastric bypass with oral contrast within the excluded stomach and the proximal duodenum, likely related to retrograde flow. However, a gastric fistula/staple dehiscence cannot be excluded. An outpatient upper GI series can be obtained if clinically warranted.  - Trend Hb, keep an active TS,   - C/w Protonix 40mg IV Q12 and Carafate Q6 ( given hematemesis and hx of gastric bypass --> Ulcer at anastomosis site is always a possibility)  - Send stool sample for GI PCR; DC laxatives if any. Low suspicion for C diff, will consider testing if no other source found. Can start Imodium prn if infectious etiology ruled out.   - GI follow up as pt has persistent symptoms. May need EGD as inpatient.    - Advance diet at tolerated: Pt would like to try some home cooked food today and does not want to try hospital food. IF she able to tolerate, then advance diet to soft later today or tomorrow.   - would benefit from outpt c-scopy (father w/ colon ca in his 50s)    #Asthma:   - C/w Albuterol prn  - C/w Advair diskus 50/250mcg 1 puff q12hrs  - C/w Montelukast 10mg po once daily    #HTN:   - On bumex 0.5mg po 2 tabs q12hrs for LE edema (hold for now and may give PRN)    #Bipolar disorder:   - C/w Lamotrigine 25mg po q12hrs  - C/w Zolofot 200mg po once daily  - C/w Vraylar 1.5mg po once daily    #Hypothyroidism:   - C/w Levothyroxine 88mcg po once daily   -  TSH 4.25; will check FT4    #Chronic back pain:   - 2/2 Herniated discs s/p intrathecal pump insertion complicated by abscess s/p drainage without laminectomy ?   - C/w Methadone 10mg po q8hrs ( The patient receives her methadone from a pain doctor on [place])  - Gabapentin 300mg po q8hrs    #Folate deficiency:   #B12 deficiency:   - C/w Folic Acid 1mg po once daily  - C/w Vitamin B12 IM once weekly    #Nutrition/Fluids/Electrolytes   - replete K<4 and Mg <2  - consider Miralax BID, Senna    #DVT Px  SCDs  Heparin or Lovenox if H/H stable    #Progress Note Handoff  Pending: Clinical improvement and stability__x__ GI follow up; Anticipate dc for tomorrow if symptoms better.   Pt/Family discussion: Pt informed and agrees with the current plan  Disposition: Home___

## 2023-04-29 NOTE — PROGRESS NOTE ADULT - ASSESSMENT
62F. PMHx: HTN, Asthma, Bipolar disorder, Psoriasis, B12 deficiency, Folic Acid deficiency, Chronic back pain (2/2 multiple herniated discs with Intrathecal pump complicated by an abscess, on chronic methadone),   Presented 4/27 c/o intermittent 2-3 hour episodes of epigastric/ cammy-umbilical abdominal pain x1m; associated with 2 episodes of fevers, N/V/D w/ 3 episodes of hematemasis (~30ccs of blood) and 20lbs of unintentional weight loss in 2-3 wks, dark stools iso iron supplements. She saw her PMD, Dr. Frank Graf who diagnosed her with Gastroenteritis and prescribed her doxycycline for 10 days (which moderately improved her sx but did not reverse it completely).     #Gastro-gastric fistula of the RYGB   #Solid food dysphagia - Intermittent episodes    *History of Appendectomy, cholecystectomy and gastric bypass (in 2006)  - LFTs within normal limit; Lactate 3 -> (4/27) WNL  - CT scan on 4/27/2023: (-) acute path; Post gastric bypass; evidence of retrograde flow. However, a gastric fistula/staple dehiscence cannot be excluded. An outpatient upper GI series can be obtained if clinically warranted.  - S/p Protonix 8mg/hr ( given hematemesis and hx of gastric bypass --> Ulcer at anastomosis site is always a possibility)  *****  - GI consult, OP vs InPt EGD pending Hgb stability; if Hb drops again or if pt's symptoms continue to persist, can consider an EGD IP   - Obtain stool studies to r/o Infectious vs Inflammatory causes vs others  - obtain fat and water soluble vitamin and calcium, zinc, selenium, copper levels as pt has been non-compliant with annual nutritional labs required of RYGB pts   - monitor cbc bid | Target Hb >8  - takes ASA 81mg    - Protonix 80mg IV BID  - continue sucralfate qid   *****  - if pt's abd pain resolves and able to tolerate po, can advance diet and recommend an op EGD   - needs a colonoscopy as OP for CRC screening     #Asthma:   - C/w Albuterol prn  - C/w Advair diskus 50/250mcg 1 puff q12hrs  - C/w Montelukast 10mg po once daily    #HTN:   - On bumex 0.5mg po 2 tabs q12hrs, unknown why, prescribed by cardiologist, no HF but only JOLENE. Hold for now    #Bipolar disorder:   - C/w Lamotrigine 25mg po q12hrs  - C/w Zolofot 200mg po once daily  - C/w Vraylar 1.5mg po once daily    #Hypothyroidism:   - C/w Levothyroxine 88mcg po once daily   - Follow up TSH    #Chronic back pain:   - 2/2 Herniated discs s/p intrathecal pump insertion complicated by abscess s/p drainage without laminectomy ?   - C/w Methadone 10mg po q8hrs ( The patient receives her methadone from a pain doctor on [place])  - Gabapentin 300mg po q8hrs    #Folate deficiency:   #B12 deficiency:   - C/w Folic Acid 1mg po once daily  - C/w Vitamin B12 IM once weekly    #Misc:   - Diet: Clears  - DVT proph: Bilateral SCDs, switch to Lovenox if Hb stable and no signs of bleeding  - GI proph: Pantoprazole gtt for now   62F. PMHx: HTN, Asthma, Bipolar disorder, Psoriasis, B12 deficiency, Folic Acid deficiency, Chronic back pain (2/2 multiple herniated discs with Intrathecal pump complicated by an abscess, on chronic methadone),   Presented 4/27 c/o intermittent 2-3 hour episodes of epigastric/ cammy-umbilical abdominal pain x1m; associated with 2 episodes of fevers, N/V/D w/ 3 episodes of hematemasis (~30ccs of blood) and 20lbs of unintentional weight loss in 2-3 wks, dark stools iso iron supplements. She saw her PMD, Dr. Frank Graf who diagnosed her with Gastroenteritis and prescribed her doxycycline for 10 days (which moderately improved her sx but did not reverse it completely).     #Abdominal pain possibly 2/ Gastro-gastric fistula of the RYGB   #Solid food dysphagia - Intermittent episodes    *History of gastric bypass (in 2006), Appendectomy, cholecystectomy  - LFTs within normal limit; Lactate 3 -> (4/27) WNL  - CTAP (4/27): (-) acute path; Post gastric bypass; evidence of retrograde flow. However, a gastric fistula/staple dehiscence cannot be excluded. An outpatient upper GI series can be obtained if clinically warranted.  - S/p Protonix 8mg/hr ( given hematemesis and hx of gastric bypass --> Ulcer at anastomosis site is always a possibility)  *****  - GI consult, OP vs InPt EGD pending Hgb stability; if Hb drops again or if pt's symptoms continue to persist, can consider an EGD IP   - F/u stool studies (pending collection)  - F/u Vit A, B1, B12, K, Alpha tocopherol, C; zinc, selenium, copper levels   - monitor cbc bid | Target Hb >8  - takes ASA 81mg    - C/w Protonix 80mg IV BID  - C/w sucralfate qid   *****  - if pt's abd pain resolves and able to tolerate po, can advance diet and recommend an op EGD   - needs a colonoscopy as OP for CRC screening     #Asthma:   - C/w Albuterol prn  - C/w Advair diskus 50/250mcg 1 puff q12hrs  - C/w Montelukast 10mg po once daily    #HTN:   - On bumex 0.5mg po 2 tabs q12hrs, unknown why, prescribed by cardiologist, no HF but only JOLENE. Hold for now    #Bipolar disorder:   - C/w Lamotrigine 25mg po q12hrs  - C/w Zolofot 200mg po once daily  - C/w Vraylar 1.5mg po once daily    #Hypothyroidism:   - C/w Levothyroxine 88mcg po once daily   - Follow up TSH    #Chronic back pain:   - 2/2 Herniated discs s/p intrathecal pump insertion complicated by abscess s/p drainage without laminectomy ?   - C/w Methadone 10mg po q8hrs ( The patient receives her methadone from a pain doctor on [place])  - Gabapentin 300mg po q8hrs    #Folate deficiency:   #B12 deficiency:   - C/w Folic Acid 1mg po once daily  - C/w Vitamin B12 IM once weekly    #Misc:   - Diet: Clears  - DVT proph: Bilateral SCDs, switch to Lovenox if Hb stable and no signs of bleeding  - GI proph: Pantoprazole gtt for now

## 2023-04-30 LAB
ALBUMIN SERPL ELPH-MCNC: 3.9 G/DL — SIGNIFICANT CHANGE UP (ref 3.5–5.2)
ALP SERPL-CCNC: 120 U/L — HIGH (ref 30–115)
ALT FLD-CCNC: 10 U/L — SIGNIFICANT CHANGE UP (ref 0–41)
ANION GAP SERPL CALC-SCNC: 11 MMOL/L — SIGNIFICANT CHANGE UP (ref 7–14)
AST SERPL-CCNC: 14 U/L — SIGNIFICANT CHANGE UP (ref 0–41)
BASOPHILS # BLD AUTO: 0.06 K/UL — SIGNIFICANT CHANGE UP (ref 0–0.2)
BASOPHILS NFR BLD AUTO: 0.9 % — SIGNIFICANT CHANGE UP (ref 0–1)
BILIRUB SERPL-MCNC: 0.4 MG/DL — SIGNIFICANT CHANGE UP (ref 0.2–1.2)
BUN SERPL-MCNC: 11 MG/DL — SIGNIFICANT CHANGE UP (ref 10–20)
C DIFF BY PCR RESULT: SIGNIFICANT CHANGE UP
CALCIUM SERPL-MCNC: 9.1 MG/DL — SIGNIFICANT CHANGE UP (ref 8.4–10.5)
CHLORIDE SERPL-SCNC: 100 MMOL/L — SIGNIFICANT CHANGE UP (ref 98–110)
CO2 SERPL-SCNC: 29 MMOL/L — SIGNIFICANT CHANGE UP (ref 17–32)
CREAT SERPL-MCNC: 0.7 MG/DL — SIGNIFICANT CHANGE UP (ref 0.7–1.5)
EGFR: 99 ML/MIN/1.73M2 — SIGNIFICANT CHANGE UP
EOSINOPHIL # BLD AUTO: 0.49 K/UL — SIGNIFICANT CHANGE UP (ref 0–0.7)
EOSINOPHIL NFR BLD AUTO: 7.7 % — SIGNIFICANT CHANGE UP (ref 0–8)
FOLATE SERPL-MCNC: >20 NG/ML — SIGNIFICANT CHANGE UP
GLUCOSE SERPL-MCNC: 96 MG/DL — SIGNIFICANT CHANGE UP (ref 70–99)
HCT VFR BLD CALC: 41.3 % — SIGNIFICANT CHANGE UP (ref 37–47)
HGB BLD-MCNC: 13.5 G/DL — SIGNIFICANT CHANGE UP (ref 12–16)
IMM GRANULOCYTES NFR BLD AUTO: 0.6 % — HIGH (ref 0.1–0.3)
LYMPHOCYTES # BLD AUTO: 1.86 K/UL — SIGNIFICANT CHANGE UP (ref 1.2–3.4)
LYMPHOCYTES # BLD AUTO: 29.2 % — SIGNIFICANT CHANGE UP (ref 20.5–51.1)
MAGNESIUM SERPL-MCNC: 2 MG/DL — SIGNIFICANT CHANGE UP (ref 1.8–2.4)
MCHC RBC-ENTMCNC: 30.8 PG — SIGNIFICANT CHANGE UP (ref 27–31)
MCHC RBC-ENTMCNC: 32.7 G/DL — SIGNIFICANT CHANGE UP (ref 32–37)
MCV RBC AUTO: 94.1 FL — SIGNIFICANT CHANGE UP (ref 81–99)
MONOCYTES # BLD AUTO: 0.54 K/UL — SIGNIFICANT CHANGE UP (ref 0.1–0.6)
MONOCYTES NFR BLD AUTO: 8.5 % — SIGNIFICANT CHANGE UP (ref 1.7–9.3)
NEUTROPHILS # BLD AUTO: 3.37 K/UL — SIGNIFICANT CHANGE UP (ref 1.4–6.5)
NEUTROPHILS NFR BLD AUTO: 53.1 % — SIGNIFICANT CHANGE UP (ref 42.2–75.2)
NRBC # BLD: 0 /100 WBCS — SIGNIFICANT CHANGE UP (ref 0–0)
OSMOLALITY STL: 299 MOSM/KG — SIGNIFICANT CHANGE UP
PH STL: 7.2 — SIGNIFICANT CHANGE UP (ref 5.6–14)
PLATELET # BLD AUTO: 166 K/UL — SIGNIFICANT CHANGE UP (ref 130–400)
PMV BLD: 9.7 FL — SIGNIFICANT CHANGE UP (ref 7.4–10.4)
POTASSIUM SERPL-MCNC: 4.1 MMOL/L — SIGNIFICANT CHANGE UP (ref 3.5–5)
POTASSIUM SERPL-SCNC: 4.1 MMOL/L — SIGNIFICANT CHANGE UP (ref 3.5–5)
PROT SERPL-MCNC: 6.1 G/DL — SIGNIFICANT CHANGE UP (ref 6–8)
RBC # BLD: 4.39 M/UL — SIGNIFICANT CHANGE UP (ref 4.2–5.4)
RBC # FLD: 12 % — SIGNIFICANT CHANGE UP (ref 11.5–14.5)
SODIUM SERPL-SCNC: 140 MMOL/L — SIGNIFICANT CHANGE UP (ref 135–146)
SODIUM STL-SCNC: 118 MMOL/L — SIGNIFICANT CHANGE UP
VIT B12 SERPL-MCNC: 1365 PG/ML — HIGH (ref 232–1245)
WBC # BLD: 6.36 K/UL — SIGNIFICANT CHANGE UP (ref 4.8–10.8)
WBC # FLD AUTO: 6.36 K/UL — SIGNIFICANT CHANGE UP (ref 4.8–10.8)

## 2023-04-30 PROCEDURE — 99232 SBSQ HOSP IP/OBS MODERATE 35: CPT

## 2023-04-30 RX ORDER — LIDOCAINE 4 G/100G
1 CREAM TOPICAL DAILY
Refills: 0 | Status: DISCONTINUED | OUTPATIENT
Start: 2023-04-30 | End: 2023-05-03

## 2023-04-30 RX ORDER — ACETAMINOPHEN 500 MG
650 TABLET ORAL ONCE
Refills: 0 | Status: COMPLETED | OUTPATIENT
Start: 2023-04-30 | End: 2023-04-30

## 2023-04-30 RX ADMIN — LIDOCAINE 1 PATCH: 4 CREAM TOPICAL at 22:17

## 2023-04-30 RX ADMIN — Medication 12.5 MILLIGRAM(S): at 21:39

## 2023-04-30 RX ADMIN — PANTOPRAZOLE SODIUM 40 MILLIGRAM(S): 20 TABLET, DELAYED RELEASE ORAL at 05:06

## 2023-04-30 RX ADMIN — LIDOCAINE 1 PATCH: 4 CREAM TOPICAL at 04:37

## 2023-04-30 RX ADMIN — GABAPENTIN 300 MILLIGRAM(S): 400 CAPSULE ORAL at 05:05

## 2023-04-30 RX ADMIN — LAMOTRIGINE 25 MILLIGRAM(S): 25 TABLET, ORALLY DISINTEGRATING ORAL at 17:15

## 2023-04-30 RX ADMIN — GABAPENTIN 300 MILLIGRAM(S): 400 CAPSULE ORAL at 13:19

## 2023-04-30 RX ADMIN — Medication 1 GRAM(S): at 23:20

## 2023-04-30 RX ADMIN — Medication 650 MILLIGRAM(S): at 05:06

## 2023-04-30 RX ADMIN — Medication 1 GRAM(S): at 05:05

## 2023-04-30 RX ADMIN — CELECOXIB 200 MILLIGRAM(S): 200 CAPSULE ORAL at 00:57

## 2023-04-30 RX ADMIN — Medication 88 MICROGRAM(S): at 05:05

## 2023-04-30 RX ADMIN — LIDOCAINE 1 PATCH: 4 CREAM TOPICAL at 17:27

## 2023-04-30 RX ADMIN — Medication 1 MILLIGRAM(S): at 11:12

## 2023-04-30 RX ADMIN — MONTELUKAST 10 MILLIGRAM(S): 4 TABLET, CHEWABLE ORAL at 11:13

## 2023-04-30 RX ADMIN — Medication 1 GRAM(S): at 11:13

## 2023-04-30 RX ADMIN — METHADONE HYDROCHLORIDE 30 MILLIGRAM(S): 40 TABLET ORAL at 13:19

## 2023-04-30 RX ADMIN — SERTRALINE 200 MILLIGRAM(S): 25 TABLET, FILM COATED ORAL at 11:13

## 2023-04-30 RX ADMIN — METHADONE HYDROCHLORIDE 30 MILLIGRAM(S): 40 TABLET ORAL at 05:06

## 2023-04-30 RX ADMIN — CARIPRAZINE 1.5 MILLIGRAM(S): 1.5 CAPSULE, GELATIN COATED ORAL at 17:18

## 2023-04-30 RX ADMIN — BUMETANIDE 0.5 MILLIGRAM(S): 0.25 INJECTION INTRAMUSCULAR; INTRAVENOUS at 17:18

## 2023-04-30 RX ADMIN — LIDOCAINE 1 PATCH: 4 CREAM TOPICAL at 11:13

## 2023-04-30 RX ADMIN — Medication 1 GRAM(S): at 17:14

## 2023-04-30 RX ADMIN — LAMOTRIGINE 25 MILLIGRAM(S): 25 TABLET, ORALLY DISINTEGRATING ORAL at 05:05

## 2023-04-30 RX ADMIN — METHADONE HYDROCHLORIDE 30 MILLIGRAM(S): 40 TABLET ORAL at 21:14

## 2023-04-30 RX ADMIN — PANTOPRAZOLE SODIUM 40 MILLIGRAM(S): 20 TABLET, DELAYED RELEASE ORAL at 17:17

## 2023-04-30 RX ADMIN — LIDOCAINE 1 PATCH: 4 CREAM TOPICAL at 13:19

## 2023-04-30 RX ADMIN — BUDESONIDE AND FORMOTEROL FUMARATE DIHYDRATE 2 PUFF(S): 160; 4.5 AEROSOL RESPIRATORY (INHALATION) at 11:20

## 2023-04-30 RX ADMIN — Medication 12.5 MILLIGRAM(S): at 06:47

## 2023-04-30 RX ADMIN — BUMETANIDE 0.5 MILLIGRAM(S): 0.25 INJECTION INTRAMUSCULAR; INTRAVENOUS at 05:05

## 2023-04-30 RX ADMIN — GABAPENTIN 300 MILLIGRAM(S): 400 CAPSULE ORAL at 21:14

## 2023-04-30 NOTE — DIETITIAN INITIAL EVALUATION ADULT - PERTINENT LABORATORY DATA
04-30    140  |  100  |  11  ----------------------------<  96  4.1   |  29  |  0.7    Ca    9.1      30 Apr 2023 06:38  Mg     2.0     04-30    TPro  6.1  /  Alb  3.9  /  TBili  0.4  /  DBili  x   /  AST  14  /  ALT  10  /  AlkPhos  120<H>  04-30  POCT Blood Glucose.: 120 mg/dL (04-30-23 @ 18:44)

## 2023-04-30 NOTE — DIETITIAN INITIAL EVALUATION ADULT - ORAL INTAKE PTA/DIET HISTORY
The patient reports following a regular diet at home; for the past three weeks prior to admission the patient consumed two meals followed by episodes of emesis. Took Vitamin B12, Vitamin C and Vitamin D supplements The patient reports following a regular diet at home; for the past three weeks prior to admission the patient consumed two meals followed by episodes of emesis. Therefore, they were not able to keep food down. Took Vitamin B12, Vitamin C and Vitamin D supplements

## 2023-04-30 NOTE — DIETITIAN INITIAL EVALUATION ADULT - NSFNSGIIOFT_GEN_A_CORE
Dx: 61y/o female with h/o HTN, asthma, bipolar disorder, psoriasis, hypothyroidism, gastric bypass, B12 deficiency, folic acid deficiency, chronic back pain (2/2 multiple herniated discs with intrathecal pump complicated by an abscess, on chronic methadone), presented with intermittent 2-3 hour episodes of epigastric/ cammy-umbilical abdominal pain x1m; associated with 2 episodes of fevers, N/V/D with 3 episodes of hematemasis (~30mLs of blood) and 20lbs of unintentional weight loss in 2-3 wks, dark stools iso iron supplements. Hospital course is complicated by abdominal pain possibly secondary to gastro-gastric fistula of the RYGB and  solid food dysphagia (intermittent episodes).    Dx: 63y/o female with h/o HTN, asthma, bipolar disorder, psoriasis, hypothyroidism, gastric bypass, B12 deficiency, folic acid deficiency, chronic back pain (2/2 multiple herniated discs with intrathecal pump complicated by an abscess, on chronic methadone), presented with intermittent 2-3 hour episodes of epigastric/ cammy-umbilical abdominal pain x1m; associated with 2 episodes of fevers, N/V/D with 3 episodes of hematemasis (~30mLs of blood) and 20lbs of unintentional weight loss in 2-3 wks, dark stools iso iron supplements. Hospital course is complicated by abdominal pain possibly secondary to gastro-gastric fistula of the RYGB and  solid food dysphagia (intermittent episodes). C-Diff negative.

## 2023-04-30 NOTE — DIETITIAN NUTRITION RISK NOTIFICATION - TREATMENT: THE FOLLOWING DIET HAS BEEN RECOMMENDED
Diet, Clear Liquid:   Prosource Gelatein Plus     Qty per Day:  3  Supplement Feeding Modality:  Oral  Ensure Clear Cans or Servings Per Day:  1       Frequency:  Three Times a day (04-30-23 @ 20:14) [Pending Verification By Attending]  Diet, Clear Liquid (04-30-23 @ 11:26) [Active]  Diet, NPO after Midnight:      NPO Start Date: 30-Apr-2023,   NPO Start Time: 23:59  Except Medications (04-30-23 @ 11:26) [Active]

## 2023-04-30 NOTE — PROGRESS NOTE ADULT - SUBJECTIVE AND OBJECTIVE BOX
JULIET NAVARRETE  62y  Female  Patient is a 62y old  Female who presents with a chief complaint of Abdominal pain (29 Apr 2023 07:11)      INTERVAL HPI/OVERNIGHT EVENTS:      Vital Signs Last 24 Hrs  T(C): 35.8 (30 Apr 2023 04:58), Max: 35.9 (29 Apr 2023 13:03)  T(F): 96.4 (30 Apr 2023 04:58), Max: 96.6 (29 Apr 2023 13:03)  HR: 56 (30 Apr 2023 04:58) (52 - 56)  BP: 122/63 (30 Apr 2023 04:58) (118/64 - 130/72)  RR: 18 (30 Apr 2023 04:58) (18 - 18)    Parameters below as of 29 Apr 2023 13:03  Patient On (Oxygen Delivery Method): room air      PHYSICAL EXAM:  GEN: NAD  NEURO: Alert & Orientedx4, no gross focal deficit  HEENT: nontraumatic, normocephalic, neck supple  CARD: S1, S2 audible, no S3, regular rate and rhythm, no murmur  PULM: B/L breath sounds, no wheezing, crackles, or rales  ABD: Soft, nondistended, mild epigastric tenderness without guarding  EXTR: No clubbing, cyanosis, edema.   SKIN: Warm and intact    Consultant(s) Notes Reviewed:  [x ] YES  [ ] NO    Discussed with Consultants/Other Providers [ x] YES     LABS                          13.5   6.36  )-----------( 166      ( 30 Apr 2023 06:38 )             41.3     04-30    140  |  100  |  11  ----------------------------<  96  4.1   |  29  |  0.7    Ca    9.1      30 Apr 2023 06:38  Mg     2.0     04-30    TPro  6.1  /  Alb  3.9  /  TBili  0.4  /  DBili  x   /  AST  14  /  ALT  10  /  AlkPhos  120<H>  04-30      Lactate Trend  04-28 @ 05:43 Lactate:1.8   04-27 @ 20:00 Lactate:1.6   04-27 @ 12:50 Lactate:3.0            JULIET NAVARRETE  62y  Female  Patient is a 62y old  Female who presents with a chief complaint of Abdominal pain (29 Apr 2023 07:11)      INTERVAL HPI/OVERNIGHT EVENTS:  Had 7 episodes of loose stools overnight with some red blood streaks. She also had 2 episodes of vomiting, non bilious. She had 2 episodes of loose stools this AM. Complaining of b/l knee pain.     Vital Signs Last 24 Hrs  T(C): 35.8 (30 Apr 2023 04:58), Max: 35.9 (29 Apr 2023 13:03)  T(F): 96.4 (30 Apr 2023 04:58), Max: 96.6 (29 Apr 2023 13:03)  HR: 56 (30 Apr 2023 04:58) (52 - 56)  BP: 122/63 (30 Apr 2023 04:58) (118/64 - 130/72)  RR: 18 (30 Apr 2023 04:58) (18 - 18)    Parameters below as of 29 Apr 2023 13:03  Patient On (Oxygen Delivery Method): room air      PHYSICAL EXAM:  GEN: NAD  NEURO: Alert & Orientedx4, no gross focal deficit  HEENT: nontraumatic, normocephalic, neck supple  CARD: S1, S2 audible, no S3, regular rate and rhythm, no murmur  PULM: B/L breath sounds, no wheezing, crackles, or rales  ABD: Soft, nondistended, mild epigastric tenderness without guarding  EXTR: No clubbing, cyanosis, edema. b/l knee swelling, crepitus  SKIN: Warm and intact    Consultant(s) Notes Reviewed:  [x ] YES  [ ] NO    Discussed with Consultants/Other Providers [ x] YES     LABS                          13.5   6.36  )-----------( 166      ( 30 Apr 2023 06:38 )             41.3     04-30    140  |  100  |  11  ----------------------------<  96  4.1   |  29  |  0.7    Ca    9.1      30 Apr 2023 06:38  Mg     2.0     04-30    TPro  6.1  /  Alb  3.9  /  TBili  0.4  /  DBili  x   /  AST  14  /  ALT  10  /  AlkPhos  120<H>  04-30      Lactate Trend  04-28 @ 05:43 Lactate:1.8   04-27 @ 20:00 Lactate:1.6   04-27 @ 12:50 Lactate:3.0

## 2023-04-30 NOTE — DIETITIAN INITIAL EVALUATION ADULT - NS FNS DIET ORDER
Diet, Clear Liquid (04-30-23 @ 11:26)  Diet, NPO after Midnight:      NPO Start Date: 30-Apr-2023,   NPO Start Time: 23:59  Except Medications (04-30-23 @ 11:26)

## 2023-04-30 NOTE — DIETITIAN INITIAL EVALUATION ADULT - NAME AND PHONE
Intervention: 1.Meals and Snacks 2.Medical Food Supplement   Monitor/Evaluate: Diet order, energy intake, nutrition focused physical findings

## 2023-04-30 NOTE — PROGRESS NOTE ADULT - ASSESSMENT
62-year-old, F with the aforementioned history, presents to the ED for abdominal pain.     #Epigastric Pain   #?Unintentional weight loss:   #Hematemesis  #Diarrhea/loose stools (6 episodes overnight and 3 today)  - She reports that her pain is 6-8/10, intermittent, usually lasts 2-3 hours.   - The pain is not related to any food specifically but can be aggravated by liquid or solid food intake. Mildly relieved by PPIs.   - The patient reports having associated N/V/D (Today she reports being constipated, but she usually has 2-3 loose BM/day) along with 2 episodes of fevers in the past month and 20lbs of unintentional weight loss in 2-3 weeks.    - She also reports 3 episodes of hematemesis ( approx 30cc of bright blood). She reports dark stools but she is also on supplemental iron.   - No joint pain, no skin manifestations (despite psoriatic lesions on the right leg)  - History of Appendectomy, cholecystectomy and gastric bypass ( in 2006)  - LFTs within normal limit  - CT scan on 4/27/2023:   1.  No CT evidence of an acute abdominopelvic pathology.  2.  Post gastric bypass with oral contrast within the excluded stomach and the proximal duodenum, likely related to retrograde flow. However, a gastric fistula/staple dehiscence cannot be excluded. An outpatient upper GI series can be obtained if clinically warranted.  - Trend Hb, keep an active TS,   - C/w Protonix 40mg IV Q12 and Carafate Q6 ( given hematemesis and hx of gastric bypass --> Ulcer at anastomosis site is always a possibility)  - Send stool sample for GI PCR; DC laxatives if any. Low suspicion for C diff, will consider testing if no other source found. Can start Imodium prn if infectious etiology ruled out.   - GI follow up as pt has persistent symptoms. May need EGD as inpatient.    - Advance diet at tolerated: Pt would like to try some home cooked food today and does not want to try hospital food. IF she able to tolerate, then advance diet to soft later today or tomorrow.   - would benefit from outpt c-scopy (father w/ colon ca in his 50s)    #Asthma:   - C/w Albuterol prn  - C/w Advair diskus 50/250mcg 1 puff q12hrs  - C/w Montelukast 10mg po once daily    #HTN:   - On bumex 0.5mg po 2 tabs q12hrs for LE edema (hold for now and may give PRN)    #Bipolar disorder:   - C/w Lamotrigine 25mg po q12hrs  - C/w Zolofot 200mg po once daily  - C/w Vraylar 1.5mg po once daily    #Hypothyroidism:   - C/w Levothyroxine 88mcg po once daily   -  TSH 4.25; will check FT4    #Chronic back pain:   - 2/2 Herniated discs s/p intrathecal pump insertion complicated by abscess s/p drainage without laminectomy ?   - C/w Methadone 10mg po q8hrs ( The patient receives her methadone from a pain doctor on [address])  - Gabapentin 300mg po q8hrs    #Folate deficiency:   #B12 deficiency:   - C/w Folic Acid 1mg po once daily  - C/w Vitamin B12 IM once weekly    #Nutrition/Fluids/Electrolytes   - replete K<4 and Mg <2  - consider Miralax BID, Senna    #DVT Px  SCDs  Heparin or Lovenox if H/H stable    #Progress Note Handoff  Pending: Clinical improvement and stability__x__ GI follow up; Anticipate dc for tomorrow if symptoms better.   Pt/Family discussion: Pt informed and agrees with the current plan  Disposition: Home___ .     62-year-old, F with the aforementioned history, presents to the ED for abdominal pain.     #Epigastric Pain   #?Unintentional weight loss  #Hematemesis  #Diarrhea/loose stools  - She reports that her pain is 6-8/10, intermittent, usually lasts 2-3 hours.   - The pain is not related to any food specifically but can be aggravated by liquid or solid food intake. Mildly relieved by PPIs.   - The patient reports having associated N/V/D (Today she reports being constipated, but she usually has 2-3 loose BM/day) along with 2 episodes of fevers in the past month and 20lbs of unintentional weight loss in 2-3 weeks.    - She also reports 3 episodes of hematemesis ( approx 30cc of bright blood). She reports dark stools but she is also on supplemental iron, now brown colored since not being on iron.   - No skin manifestations (despite psoriatic lesions on the right leg); Has chronic knee pain, likely osteoarthritic.   - History of Appendectomy, cholecystectomy and gastric bypass ( in 2006)  - LFTs within normal limit  - CT scan on 4/27/2023:   1.  No CT evidence of an acute abdominopelvic pathology.  2.  Post gastric bypass with oral contrast within the excluded stomach and the proximal duodenum, likely related to retrograde flow. However, a gastric fistula/staple dehiscence cannot be excluded. An outpatient upper GI series can be obtained if clinically warranted.  - Trend Hb, keep an active TS,   - C/w Protonix 40mg IV Q12 and Carafate Q6 ( given hematemesis and hx of gastric bypass --> Ulcer at anastomosis site is always a possibility)  - GI PCR neg, send C diff today with isolation precautions. if C diff neg, can start Imodium prn.   - GI follow up requested as pt has persistent symptoms: Spoke with GI, keep NPO after MN, plan for EGD tomorrow if schedule allows.   - would benefit from outpt c-jeromy (father w/ colon ca in his 50s)  - Vit b12 and folate level WNL. F/u rest of the fat and water soluble vitamin and calcium, zinc, selenium, copper levels as apart of annual nutritional labs required of RYGB pts     #Asthma:   - C/w Albuterol prn  - C/w Advair diskus 50/250mcg 1 puff q12hrs  - C/w Montelukast 10mg po once daily    #HTN:   - On bumex 0.5mg po 2 tabs q12hrs for LE edema (hold for now and may give PRN)    #Bipolar disorder:   - C/w Lamotrigine 25mg po q12hrs  - C/w Zolofot 200mg po once daily  - C/w Vraylar 1.5mg po once daily    #Hypothyroidism:   - C/w Levothyroxine 88mcg po once daily   -  TSH 4.25; will check FT4    #Chronic back pain:   - 2/2 Herniated discs s/p intrathecal pump insertion complicated by abscess s/p drainage without laminectomy ?   - C/w Methadone 10mg po q8hrs ( The patient receives her methadone from a pain doctor on [address])  - Gabapentin 300mg po q8hrs    #Folate deficiency:   #B12 deficiency:   - C/w Folic Acid 1mg po once daily  - C/w Vitamin B12 IM once weekly    #Nutrition/Fluids/Electrolytes   - replete K<4 and Mg <2  - consider Miralax BID, Senna    #DVT Px  SCDs  Heparin or Lovenox if H/H stable    #Progress Note Handoff  Pending: Clinical improvement and stability__x__ GI follow up with possible EGD tomorrow; C diff testing  Pt/Family discussion: Pt informed and agrees with the current plan  Disposition: Home___ .

## 2023-04-30 NOTE — DIETITIAN INITIAL EVALUATION ADULT - OTHER CALCULATIONS
Estimated Calorie Needs:  Estimated Calorie Needs: [id number] x AF 1-1.0=[id number]/day -Due to obesity  Estimated Protein Needs: 77-89grams/day (1.3-1.5grams/kg of IBW-59kg) -Due to obesity  Estimated Fluid Needs: 1453-1744mL/day (1mL/kcal)

## 2023-04-30 NOTE — DIETITIAN INITIAL EVALUATION ADULT - PERTINENT MEDS FT
MEDICATIONS  (STANDING):  budesonide 160 MICROgram(s)/formoterol 4.5 MICROgram(s) Inhaler 2 Puff(s) Inhalation two times a day  buMETAnide 0.5 milliGRAM(s) Oral every 12 hours  folic acid 1 milliGRAM(s) Oral daily  gabapentin 300 milliGRAM(s) Oral every 8 hours  lamoTRIgine 25 milliGRAM(s) Oral every 12 hours  levothyroxine 88 MICROGram(s) Oral daily  lidocaine   4% Patch 1 Patch Transdermal daily  lidocaine   4% Patch 1 Patch Transdermal daily  methadone    Tablet 30 milliGRAM(s) Oral every 8 hours  montelukast 10 milliGRAM(s) Oral daily  pantoprazole  Injectable 40 milliGRAM(s) IV Push two times a day  sertraline 200 milliGRAM(s) Oral daily  sucralfate 1 Gram(s) Oral every 6 hours    MEDICATIONS  (PRN):  albuterol    90 MICROgram(s) HFA Inhaler 2 Puff(s) Inhalation every 6 hours PRN Shortness of Breath and/or Wheezing  celecoxib 200 milliGRAM(s) Oral daily PRN Mild Pain (1 - 3)  methocarbamol 250 milliGRAM(s) Oral every 8 hours PRN back pain  promethazine 12.5 milliGRAM(s) Oral every 6 hours PRN nausea

## 2023-05-01 ENCOUNTER — TRANSCRIPTION ENCOUNTER (OUTPATIENT)
Age: 63
End: 2023-05-01

## 2023-05-01 ENCOUNTER — RESULT REVIEW (OUTPATIENT)
Age: 63
End: 2023-05-01

## 2023-05-01 LAB
ALBUMIN SERPL ELPH-MCNC: 4.2 G/DL — SIGNIFICANT CHANGE UP (ref 3.5–5.2)
ALP SERPL-CCNC: 120 U/L — HIGH (ref 30–115)
ALT FLD-CCNC: 11 U/L — SIGNIFICANT CHANGE UP (ref 0–41)
ANION GAP SERPL CALC-SCNC: 11 MMOL/L — SIGNIFICANT CHANGE UP (ref 7–14)
APTT BLD: 30.6 SEC — SIGNIFICANT CHANGE UP (ref 27–39.2)
AST SERPL-CCNC: 14 U/L — SIGNIFICANT CHANGE UP (ref 0–41)
BASOPHILS # BLD AUTO: 0.06 K/UL — SIGNIFICANT CHANGE UP (ref 0–0.2)
BASOPHILS NFR BLD AUTO: 1.1 % — HIGH (ref 0–1)
BILIRUB SERPL-MCNC: 0.6 MG/DL — SIGNIFICANT CHANGE UP (ref 0.2–1.2)
BLD GP AB SCN SERPL QL: SIGNIFICANT CHANGE UP
BUN SERPL-MCNC: 9 MG/DL — LOW (ref 10–20)
CALCIUM SERPL-MCNC: 9 MG/DL — SIGNIFICANT CHANGE UP (ref 8.4–10.5)
CHLORIDE SERPL-SCNC: 100 MMOL/L — SIGNIFICANT CHANGE UP (ref 98–110)
CO2 SERPL-SCNC: 31 MMOL/L — SIGNIFICANT CHANGE UP (ref 17–32)
CREAT SERPL-MCNC: 0.8 MG/DL — SIGNIFICANT CHANGE UP (ref 0.7–1.5)
CULTURE RESULTS: SIGNIFICANT CHANGE UP
EGFR: 84 ML/MIN/1.73M2 — SIGNIFICANT CHANGE UP
EOSINOPHIL # BLD AUTO: 0.46 K/UL — SIGNIFICANT CHANGE UP (ref 0–0.7)
EOSINOPHIL NFR BLD AUTO: 8.3 % — HIGH (ref 0–8)
GLUCOSE SERPL-MCNC: 88 MG/DL — SIGNIFICANT CHANGE UP (ref 70–99)
HCT VFR BLD CALC: 43.8 % — SIGNIFICANT CHANGE UP (ref 37–47)
HGB BLD-MCNC: 14.1 G/DL — SIGNIFICANT CHANGE UP (ref 12–16)
IMM GRANULOCYTES NFR BLD AUTO: 0.4 % — HIGH (ref 0.1–0.3)
INR BLD: 0.91 RATIO — SIGNIFICANT CHANGE UP (ref 0.65–1.3)
LYMPHOCYTES # BLD AUTO: 1.38 K/UL — SIGNIFICANT CHANGE UP (ref 1.2–3.4)
LYMPHOCYTES # BLD AUTO: 24.8 % — SIGNIFICANT CHANGE UP (ref 20.5–51.1)
MAGNESIUM SERPL-MCNC: 2.1 MG/DL — SIGNIFICANT CHANGE UP (ref 1.8–2.4)
MCHC RBC-ENTMCNC: 30.3 PG — SIGNIFICANT CHANGE UP (ref 27–31)
MCHC RBC-ENTMCNC: 32.2 G/DL — SIGNIFICANT CHANGE UP (ref 32–37)
MCV RBC AUTO: 94.2 FL — SIGNIFICANT CHANGE UP (ref 81–99)
MONOCYTES # BLD AUTO: 0.5 K/UL — SIGNIFICANT CHANGE UP (ref 0.1–0.6)
MONOCYTES NFR BLD AUTO: 9 % — SIGNIFICANT CHANGE UP (ref 1.7–9.3)
NEUTROPHILS # BLD AUTO: 3.14 K/UL — SIGNIFICANT CHANGE UP (ref 1.4–6.5)
NEUTROPHILS NFR BLD AUTO: 56.4 % — SIGNIFICANT CHANGE UP (ref 42.2–75.2)
NRBC # BLD: 0 /100 WBCS — SIGNIFICANT CHANGE UP (ref 0–0)
PLATELET # BLD AUTO: 181 K/UL — SIGNIFICANT CHANGE UP (ref 130–400)
PMV BLD: 10.2 FL — SIGNIFICANT CHANGE UP (ref 7.4–10.4)
POTASSIUM SERPL-MCNC: 4.1 MMOL/L — SIGNIFICANT CHANGE UP (ref 3.5–5)
POTASSIUM SERPL-SCNC: 4.1 MMOL/L — SIGNIFICANT CHANGE UP (ref 3.5–5)
POTASSIUM STL-SCNC: 16.9 MMOL/L — SIGNIFICANT CHANGE UP
PROT SERPL-MCNC: 6.5 G/DL — SIGNIFICANT CHANGE UP (ref 6–8)
PROTHROM AB SERPL-ACNC: 10.3 SEC — SIGNIFICANT CHANGE UP (ref 9.95–12.87)
RBC # BLD: 4.65 M/UL — SIGNIFICANT CHANGE UP (ref 4.2–5.4)
RBC # FLD: 12 % — SIGNIFICANT CHANGE UP (ref 11.5–14.5)
SODIUM SERPL-SCNC: 142 MMOL/L — SIGNIFICANT CHANGE UP (ref 135–146)
SPECIMEN SOURCE: SIGNIFICANT CHANGE UP
WBC # BLD: 5.56 K/UL — SIGNIFICANT CHANGE UP (ref 4.8–10.8)
WBC # FLD AUTO: 5.56 K/UL — SIGNIFICANT CHANGE UP (ref 4.8–10.8)

## 2023-05-01 PROCEDURE — 43239 EGD BIOPSY SINGLE/MULTIPLE: CPT

## 2023-05-01 PROCEDURE — 88312 SPECIAL STAINS GROUP 1: CPT | Mod: 26

## 2023-05-01 PROCEDURE — 99232 SBSQ HOSP IP/OBS MODERATE 35: CPT | Mod: 25

## 2023-05-01 PROCEDURE — 99232 SBSQ HOSP IP/OBS MODERATE 35: CPT

## 2023-05-01 PROCEDURE — 88305 TISSUE EXAM BY PATHOLOGIST: CPT | Mod: 26

## 2023-05-01 RX ORDER — CARIPRAZINE 1.5 MG/1
1.5 CAPSULE, GELATIN COATED ORAL DAILY
Refills: 0 | Status: DISCONTINUED | OUTPATIENT
Start: 2023-05-01 | End: 2023-05-03

## 2023-05-01 RX ORDER — SODIUM CHLORIDE 9 MG/ML
1000 INJECTION, SOLUTION INTRAVENOUS
Refills: 0 | Status: DISCONTINUED | OUTPATIENT
Start: 2023-05-01 | End: 2023-05-02

## 2023-05-01 RX ORDER — METHOCARBAMOL 500 MG/1
250 TABLET, FILM COATED ORAL EVERY 8 HOURS
Refills: 0 | Status: DISCONTINUED | OUTPATIENT
Start: 2023-05-01 | End: 2023-05-03

## 2023-05-01 RX ORDER — PANTOPRAZOLE SODIUM 20 MG/1
40 TABLET, DELAYED RELEASE ORAL
Refills: 0 | Status: DISCONTINUED | OUTPATIENT
Start: 2023-05-01 | End: 2023-05-01

## 2023-05-01 RX ORDER — SOD SULF/SODIUM/NAHCO3/KCL/PEG
4000 SOLUTION, RECONSTITUTED, ORAL ORAL ONCE
Refills: 0 | Status: COMPLETED | OUTPATIENT
Start: 2023-05-01 | End: 2023-05-01

## 2023-05-01 RX ORDER — PANTOPRAZOLE SODIUM 20 MG/1
40 TABLET, DELAYED RELEASE ORAL
Refills: 0 | Status: DISCONTINUED | OUTPATIENT
Start: 2023-05-01 | End: 2023-05-03

## 2023-05-01 RX ADMIN — Medication 1 GRAM(S): at 13:35

## 2023-05-01 RX ADMIN — BUMETANIDE 0.5 MILLIGRAM(S): 0.25 INJECTION INTRAMUSCULAR; INTRAVENOUS at 05:30

## 2023-05-01 RX ADMIN — METHADONE HYDROCHLORIDE 30 MILLIGRAM(S): 40 TABLET ORAL at 13:39

## 2023-05-01 RX ADMIN — Medication 20 MILLIGRAM(S): at 22:27

## 2023-05-01 RX ADMIN — LAMOTRIGINE 25 MILLIGRAM(S): 25 TABLET, ORALLY DISINTEGRATING ORAL at 17:01

## 2023-05-01 RX ADMIN — PANTOPRAZOLE SODIUM 40 MILLIGRAM(S): 20 TABLET, DELAYED RELEASE ORAL at 05:30

## 2023-05-01 RX ADMIN — SERTRALINE 200 MILLIGRAM(S): 25 TABLET, FILM COATED ORAL at 13:36

## 2023-05-01 RX ADMIN — Medication 1 MILLIGRAM(S): at 13:33

## 2023-05-01 RX ADMIN — LIDOCAINE 1 PATCH: 4 CREAM TOPICAL at 13:33

## 2023-05-01 RX ADMIN — LIDOCAINE 1 PATCH: 4 CREAM TOPICAL at 19:26

## 2023-05-01 RX ADMIN — Medication 1 GRAM(S): at 05:29

## 2023-05-01 RX ADMIN — SODIUM CHLORIDE 75 MILLILITER(S): 9 INJECTION, SOLUTION INTRAVENOUS at 13:41

## 2023-05-01 RX ADMIN — METHADONE HYDROCHLORIDE 30 MILLIGRAM(S): 40 TABLET ORAL at 22:27

## 2023-05-01 RX ADMIN — BUDESONIDE AND FORMOTEROL FUMARATE DIHYDRATE 2 PUFF(S): 160; 4.5 AEROSOL RESPIRATORY (INHALATION) at 22:28

## 2023-05-01 RX ADMIN — LAMOTRIGINE 25 MILLIGRAM(S): 25 TABLET, ORALLY DISINTEGRATING ORAL at 05:29

## 2023-05-01 RX ADMIN — MONTELUKAST 10 MILLIGRAM(S): 4 TABLET, CHEWABLE ORAL at 13:36

## 2023-05-01 RX ADMIN — METHOCARBAMOL 250 MILLIGRAM(S): 500 TABLET, FILM COATED ORAL at 22:28

## 2023-05-01 RX ADMIN — Medication 88 MICROGRAM(S): at 05:29

## 2023-05-01 RX ADMIN — GABAPENTIN 300 MILLIGRAM(S): 400 CAPSULE ORAL at 22:27

## 2023-05-01 RX ADMIN — CARIPRAZINE 1.5 MILLIGRAM(S): 1.5 CAPSULE, GELATIN COATED ORAL at 19:33

## 2023-05-01 RX ADMIN — LIDOCAINE 1 PATCH: 4 CREAM TOPICAL at 02:11

## 2023-05-01 RX ADMIN — METHOCARBAMOL 250 MILLIGRAM(S): 500 TABLET, FILM COATED ORAL at 13:35

## 2023-05-01 RX ADMIN — LIDOCAINE 1 PATCH: 4 CREAM TOPICAL at 13:34

## 2023-05-01 RX ADMIN — METHADONE HYDROCHLORIDE 30 MILLIGRAM(S): 40 TABLET ORAL at 05:29

## 2023-05-01 RX ADMIN — METHOCARBAMOL 250 MILLIGRAM(S): 500 TABLET, FILM COATED ORAL at 08:46

## 2023-05-01 RX ADMIN — GABAPENTIN 300 MILLIGRAM(S): 400 CAPSULE ORAL at 13:33

## 2023-05-01 RX ADMIN — GABAPENTIN 300 MILLIGRAM(S): 400 CAPSULE ORAL at 05:29

## 2023-05-01 RX ADMIN — Medication 4000 MILLILITER(S): at 14:35

## 2023-05-01 NOTE — PROGRESS NOTE ADULT - SUBJECTIVE AND OBJECTIVE BOX
JULIET NAVARRETE 62y Female  MRN#: [id number]   Hospital Day: 4d    HPI:  62-year-old, F with PMHx: HTN, Asthma, Bipolar disorder, Psoriasis, B12 deficiency, Folic Acid deficiency, Chronic back pain (2/2 multiple herniated discs with Intrathecal pump complicated by an abscess, on chronic methadone), presents to the ED for abdominal pain. History goes back to 1 month ago when the patient started having epigastric and per-umbilical pain associated with N/V/D. She saw her PMD,  Dr. Frank Graf who diagnosed her with Gastroenteritis and prescribed her doxycycline for 10 days ( which moderately improved her sx but did not reverse it completely). She reports that her pain is 6-8/10, intermittent, usually lasts 2-3 hours. The pain is not related to any food specifically but can be aggravated by liquid or solid food intake. Mildly relieved by PPIs. The patient reports having associated N/V/D (Today she reports being constipated, but she usually has 2-3 loose BM/day) along with 2 episodes of fevers in the past month and 20lbs of unintentional weight loss in 2-3 weeks.  She also reports 3 episodes of hematemesis ( approx 30cc of bright blood). She reports dark stools but she is also on supplemental iron. No joint pain, no skin manifestations (despite psoriatic lesions on the right leg) (27 Apr 2023 17:08)        OBJECTIVE  PAST MEDICAL & SURGICAL HISTORY  Asthma  LAST ATTACK MANY YRS AGO    Gastroesophageal reflux disease without esophagitis    Hypothyroidism    Heart murmur    Anxiety    Depression    Mood disorder    Psoriasis    Constipation    Back pain  LOW    Morbid obesity    S/P tonsillectomy  1967    History of appendectomy  1974    Abscess of back  EVACUATION OF ABSCESS @ L5-S1 1997    History of Jaclyn-en-Y gastric bypass  WITH CHOLECYSTECTOMY 2006      ALLERGIES:  penicillin (Anaphylaxis)    MEDICATIONS:  STANDING MEDICATIONS  bisacodyl 20 milliGRAM(s) Oral once  budesonide 160 MICROgram(s)/formoterol 4.5 MICROgram(s) Inhaler 2 Puff(s) Inhalation two times a day  folic acid 1 milliGRAM(s) Oral daily  gabapentin 300 milliGRAM(s) Oral every 8 hours  lactated ringers. 1000 milliLiter(s) IV Continuous <Continuous>  lamoTRIgine 25 milliGRAM(s) Oral every 12 hours  levothyroxine 88 MICROGram(s) Oral daily  lidocaine   4% Patch 1 Patch Transdermal daily  lidocaine   4% Patch 1 Patch Transdermal daily  methadone    Tablet 30 milliGRAM(s) Oral every 8 hours  methocarbamol 250 milliGRAM(s) Oral every 8 hours  montelukast 10 milliGRAM(s) Oral daily  pantoprazole    Tablet 40 milliGRAM(s) Oral two times a day  sertraline 200 milliGRAM(s) Oral daily  sucralfate 1 Gram(s) Oral every 6 hours    PRN MEDICATIONS  albuterol    90 MICROgram(s) HFA Inhaler 2 Puff(s) Inhalation every 6 hours PRN  celecoxib 200 milliGRAM(s) Oral daily PRN  promethazine 12.5 milliGRAM(s) Oral every 6 hours PRN      VITAL SIGNS: Last 24 Hours  T(C): 36.2 (01 May 2023 11:28), Max: 36.9 (30 Apr 2023 16:30)  T(F): 97.4 (01 May 2023 08:58), Max: 98.4 (30 Apr 2023 16:30)  HR: 60 (01 May 2023 14:34) (52 - 73)  BP: 121/75 (01 May 2023 14:34) (90/52 - 142/79)  BP(mean): 101 (01 May 2023 11:28) (101 - 101)  RR: 18 (01 May 2023 14:34) (17 - 29)  SpO2: 96% (01 May 2023 12:28) (96% - 99%)    LABS:                        14.1   5.56  )-----------( 181      ( 01 May 2023 08:41 )             43.8     05-01    142  |  100  |  9<L>  ----------------------------<  88  4.1   |  31  |  0.8    Ca    9.0      01 May 2023 08:41  Mg     2.1     05-01    TPro  6.5  /  Alb  4.2  /  TBili  0.6  /  DBili  x   /  AST  14  /  ALT  11  /  AlkPhos  120<H>  05-01              Culture - Stool (collected 29 Apr 2023 11:43)  Source: .Stool Feces  Preliminary Report (30 Apr 2023 19:07):    No enteric pathogens to date: Final culture pending              PHYSICAL EXAM:  GENERAL: NAD, well-developed.  HEAD:  Atraumatic, Normocephalic.  EYES: conjunctiva and sclera clear  CHEST/LUNG: GBAE. No wheezing or crackles   HEART: regular rate and rhythm; S1/S2.  ABDOMEN: Soft, Nontender, Nondistended  EXTREMITIES: No edema.   PSYCH: AAOx3.  NEUROLOGY: non-focal; moves all extremities

## 2023-05-01 NOTE — PROGRESS NOTE ADULT - SUBJECTIVE AND OBJECTIVE BOX
JULIET NAVARRETE  [facility] 023 A ([facility]      Patient was evaluated and examined  by bedside, c/o abdominal pain, mild nausea, diarrhea, last vomiting episode was yesterday night, c/o abdominal flatulence      REVIEW OF SYSTEMS:  please see pertinent positives mentioned above, all other 12 ROS negative      T(C): , Max: 36.9 (04-30-23 @ 16:30)  HR: 60 (05-01-23 @ 14:34)  BP: 121/75 (05-01-23 @ 14:34)  RR: 18 (05-01-23 @ 14:34)  SpO2: 96% (05-01-23 @ 12:28)  CAPILLARY BLOOD GLUCOSE      POCT Blood Glucose.: 120 mg/dL (30 Apr 2023 18:44)      PHYSICAL EXAM:  General: NAD, AAOX3, patient is laying comfortably in bed  HEENT: AT, NC, Supple, NO JVD, NO CB  Lungs: CTA B/L, no wheezing, no rhonchi  CVS: normal S1, S2, RRR, NO M/G/R  Abdomen: soft, bowel sounds present, tender in epigastric and periumbilical area during palpation , non-distended  Extremities: no edema, no clubbing, no cyanosis, positive peripheral pulses b/l  Neuro: no acute focal neurological deficits  Skin: no rash, no ecchymosis      LAB  CBC  Date: 05-01-23 @ 08:41  Mean cell Jzxhkctcqh22.3  Mean cell Hemoglobin Conc32.2  Mean cell Volum 94.2  Platelet count-Automate 181  RBC Count 4.65  Red Cell Distrib Width12.0  WBC Count5.56  % Albumin, Urine--  Hematocrit 43.8  Hemoglobin 14.1  CBC  Date: 04-30-23 @ 06:38  Mean cell Fpcpdkndfk84.8  Mean cell Hemoglobin Conc32.7  Mean cell Volum 94.1  Platelet count-Automate 166  RBC Count 4.39  Red Cell Distrib Width12.0  WBC Count6.36  % Albumin, Urine--  Hematocrit 41.3  Hemoglobin 13.5  CBC  Date: 04-28-23 @ 05:43  Mean cell Hcsmbzbhlz02.9  Mean cell Hemoglobin Conc32.8  Mean cell Volum 94.0  Platelet count-Automate 173  RBC Count 4.34  Red Cell Distrib Width12.7  WBC Count6.81  % Albumin, Urine--  Hematocrit 40.8  Hemoglobin 13.4  CBC  Date: 04-27-23 @ 20:00  Mean cell Djsraxwtvl90.3  Mean cell Hemoglobin Conc32.7  Mean cell Volum 92.7  Platelet count-Automate 182  RBC Count 4.36  Red Cell Distrib Width12.5  WBC Count7.35  % Albumin, Urine--  Hematocrit 40.4  Hemoglobin 13.2  CBC  Date: 04-27-23 @ 12:50  Mean cell Yijxvlcbwv70.0  Mean cell Hemoglobin Conc33.3  Mean cell Volum 93.2  Platelet count-Automate 225  RBC Count 4.74  Red Cell Distrib Width12.6  WBC Count8.45  % Albumin, Urine--  Hematocrit 44.2  Hemoglobin 14.7    [facility]  05-01-23 @ 08:41  Blood Gas Arterial-Calcium,Ionized--  Blood Urea Nitrogen, Serum 9 mg/dL<L> [10 - 20]  Carbon Dioxide, Serum31 mmol/L [17 - 32]  Chloride, Fjuny549 mmol/L [98 - 110]  Creatinie, Serum0.8 mg/dL [0.7 - 1.5]  Glucose, Serum88 mg/dL [70 - 99]  Potassium, Serum4.1 mmol/L [3.5 - 5.0]  Sodium, Serum 142 mmol/L [135 - 146]  [facility]  04-30-23 @ 06:38  Blood Gas Arterial-Calcium,Ionized--  Blood Urea Nitrogen, Serum 11 mg/dL [10 - 20]  Carbon Dioxide, Serum29 mmol/L [17 - 32]  Chloride, Nutym615 mmol/L [98 - 110]  Creatinie, Serum0.7 mg/dL [0.7 - 1.5]  Glucose, Serum96 mg/dL [70 - 99]  Potassium, Serum4.1 mmol/L [3.5 - 5.0]  Sodium, Serum 140 mmol/L [135 - 146]  [facility]  04-28-23 @ 05:43  Blood Gas Arterial-Calcium,Ionized--  Blood Urea Nitrogen, Serum 8 mg/dL<L> [10 - 20]  Carbon Dioxide, Serum30 mmol/L [17 - 32]  Chloride, Serum98 mmol/L [98 - 110]  Creatinie, Serum0.8 mg/dL [0.7 - 1.5]  Glucose, Serum75 mg/dL [70 - 99]  Potassium, Serum4.4 mmol/L [3.5 - 5.0]  Sodium, Serum 137 mmol/L [135 - 146]  [facility]  04-27-23 @ 12:50  Blood Gas Arterial-Calcium,Ionized--  Blood Urea Nitrogen, Serum 9 mg/dL<L> [10 - 20]  Carbon Dioxide, Serum23 mmol/L [17 - 32]  Chloride, Jmscl463 mmol/L [98 - 110]  Creatinie, Serum0.5 mg/dL<L> [0.7 - 1.5]  Glucose, Serum66 mg/dL<L> [70 - 99]  Potassium, Serum3.3 mmol/L<L> [3.5 - 5.0]  Sodium, Serum 144 mmol/L [135 - 146]      Microbiology:    Culture - Stool (collected 04-29-23 @ 11:43)  Source: .Stool Feces  Preliminary Report (04-30-23 @ 19:07):    No enteric pathogens to date: Final culture pending    Culture - Urine (collected 04-27-23 @ 15:00)  Source: Clean Catch Clean Catch (Midstream)  Final Report (04-29-23 @ 16:16):    10,000 - 49,000 CFU/mL Streptococcus agalactiae (Group B) isolated    Group B streptococci are susceptible to ampicillin,    penicillin and cefazolin, but may be resistant to    erythromycin and clindamycin.    Recommendations for intrapartum prophylaxis for Group B    streptococci are penicillin or ampicillin.      Medications:  albuterol    90 MICROgram(s) HFA Inhaler 2 Puff(s) Inhalation every 6 hours PRN  bisacodyl 20 milliGRAM(s) Oral once  budesonide 160 MICROgram(s)/formoterol 4.5 MICROgram(s) Inhaler 2 Puff(s) Inhalation two times a day  celecoxib 200 milliGRAM(s) Oral daily PRN  folic acid 1 milliGRAM(s) Oral daily  gabapentin 300 milliGRAM(s) Oral every 8 hours  lactated ringers. 1000 milliLiter(s) IV Continuous <Continuous>  lamoTRIgine 25 milliGRAM(s) Oral every 12 hours  levothyroxine 88 MICROGram(s) Oral daily  lidocaine   4% Patch 1 Patch Transdermal daily  lidocaine   4% Patch 1 Patch Transdermal daily  methadone    Tablet 30 milliGRAM(s) Oral every 8 hours  methocarbamol 250 milliGRAM(s) Oral every 8 hours  montelukast 10 milliGRAM(s) Oral daily  pantoprazole    Tablet 40 milliGRAM(s) Oral two times a day  promethazine 12.5 milliGRAM(s) Oral every 6 hours PRN  sertraline 200 milliGRAM(s) Oral daily  sucralfate 1 Gram(s) Oral every 6 hours        Assessment and Plan:  Patient is a 63 y/o  Female  with pmh of Appendectomy, cholecystectomy and gastric bypass ( in 2006) , presents to the ED for abdominal pain/ nausea/vomiting/non-bloody diarrhea. weight loss.     #Epigastric Pain due to Gastritis   #Diarrhea/loose stools   CT scan on 4/27/2023: 1.  No CT evidence of an acute abdominopelvic pathology.  2.  Post gastric bypass with oral contrast within the excluded stomach and the proximal duodenum, likely related to retrograde flow. However, a gastric fistula/staple dehiscence cannot be excluded. An outpatient upper GI series can be obtained if clinically warranted.  - GI PCR neg, C diff neg   - post dx. EGD 5/1/23- non erosive gastritis, altered anatomy post gastric bypass  - plan for Colonoscopy in 5/2/23, NPO post midnight.      #Asthma: -stable resumed on home meds tx.     #HTN: - hold home bumex due to diarrhea, to avoid dehydration      #Bipolar disorder: - C/w Lamotrigine 25mg po q12hrs/- C/w Zolofot 200mg po once daily/- C/w Vraylar 1.5mg po once daily    #Hypothyroidism: - C/w Levothyroxine 88mcg po once daily       #Chronic back pain:   - 2/2 Herniated discs s/p intrathecal pump insertion complicated by abscess s/p drainage without laminectomy ?   - C/w Methadone 10mg po q8hrs ( The patient receives her methadone from a pain doctor on [place])  - Gabapentin 300mg po q8hrs    #Folate deficiency/ #B12 deficiency: repeated levels normal       DVT Px SCDs    #Progress Note Handoff: Pending bowel prep today, npo post midnight, dx. colonoscopy in am   Family discussion: medical plan of tx. d/w pt. by bedside   Disposition: Home__once medically stable    Total time spent to complete patient's bedside assessment, review medical chart, discuss medical plan of care with covering medical team was more than 35 minutes with >50% of time spent face to face with patient, discussion with patient/family and/or coordination of care

## 2023-05-01 NOTE — PROGRESS NOTE ADULT - ASSESSMENT
Patient is a 61 y/o  Female  with pmh of Appendectomy, cholecystectomy and gastric bypass ( in 2006) , presents to the ED for abdominal pain/ nausea/vomiting/non-bloody diarrhea. weight loss.     #Epigastric Pain due to Gastritis   #Diarrhea/loose stools  patient still having 9-10 BMs every day ( on 4/30 they were tinged with blood sometimes)   CT scan on 4/27/2023: 1.  No CT evidence of an acute abdominopelvic pathology.  2.  Post gastric bypass with oral contrast within the excluded stomach and the proximal duodenum, likely related to retrograde flow. However, a gastric fistula/staple dehiscence cannot be excluded.  - GI PCR neg, C diff neg. stool culture -ve  - GI following: EGD 5/1/23- non erosive gastritis, altered anatomy post gastric bypass --> f/u biopsies   - plan for Colonoscopy in 5/2/23,  golytely and dulcolax. clear liq diet then npo after midnight  - continue gentle hydration 75 cc LR  cw protonix 40 oral daily for 14 days ( as per GI)  stop sucralfate    #Asthma: -stable resume home symbicort,albuterol and montelukast     #HTN: - hold home bumex due to diarrhea, to avoid dehydration  no echo  EKG normal      #Bipolar disorder: - C/w Lamotrigine 25mg po q12hrs/- C/w Zolofot 200mg po once daily/- C/w Vraylar 1.5mg po once daily      #Hypothyroidism: - C/w Levothyroxine 88mcg po once daily   TSH 4.25      #Chronic back pain:   - 2/2 Herniated discs s/p intrathecal pump insertion complicated by abscess s/p drainage without laminectomy ?   - C/w Methadone 30 mg po q8hrs ( The patient receives her methadone from a pain doctor on [place])  - Gabapentin 300mg po q8hrs  methocarbamol 250 q8  celecoxib 200 dailyprn    #Folate deficiency/ #B12 deficiency: repeated levels normal   cw home folic acid 1 daily      #MISC  - DVT PPx: low risk. scds.  - GI PPx: protonix 40 oral daily for 14 days   - Diet: dash.   - Activity: IAT  - Dispo: pending colonoscopy tmrw    labs every other day

## 2023-05-02 ENCOUNTER — TRANSCRIPTION ENCOUNTER (OUTPATIENT)
Age: 63
End: 2023-05-02

## 2023-05-02 ENCOUNTER — RESULT REVIEW (OUTPATIENT)
Age: 63
End: 2023-05-02

## 2023-05-02 LAB
ALBUMIN SERPL ELPH-MCNC: 3.7 G/DL — SIGNIFICANT CHANGE UP (ref 3.5–5.2)
ALP SERPL-CCNC: 104 U/L — SIGNIFICANT CHANGE UP (ref 30–115)
ALT FLD-CCNC: 9 U/L — SIGNIFICANT CHANGE UP (ref 0–41)
ANION GAP SERPL CALC-SCNC: 10 MMOL/L — SIGNIFICANT CHANGE UP (ref 7–14)
AST SERPL-CCNC: 12 U/L — SIGNIFICANT CHANGE UP (ref 0–41)
BASOPHILS # BLD AUTO: 0.07 K/UL — SIGNIFICANT CHANGE UP (ref 0–0.2)
BASOPHILS NFR BLD AUTO: 1.4 % — HIGH (ref 0–1)
BILIRUB SERPL-MCNC: 0.3 MG/DL — SIGNIFICANT CHANGE UP (ref 0.2–1.2)
BUN SERPL-MCNC: 7 MG/DL — LOW (ref 10–20)
CALCIUM SERPL-MCNC: 8.6 MG/DL — SIGNIFICANT CHANGE UP (ref 8.4–10.5)
CHLORIDE SERPL-SCNC: 102 MMOL/L — SIGNIFICANT CHANGE UP (ref 98–110)
CO2 SERPL-SCNC: 29 MMOL/L — SIGNIFICANT CHANGE UP (ref 17–32)
CREAT SERPL-MCNC: 0.7 MG/DL — SIGNIFICANT CHANGE UP (ref 0.7–1.5)
EGFR: 99 ML/MIN/1.73M2 — SIGNIFICANT CHANGE UP
EOSINOPHIL # BLD AUTO: 0.49 K/UL — SIGNIFICANT CHANGE UP (ref 0–0.7)
EOSINOPHIL NFR BLD AUTO: 9.5 % — HIGH (ref 0–8)
GLUCOSE SERPL-MCNC: 101 MG/DL — HIGH (ref 70–99)
HCT VFR BLD CALC: 39.1 % — SIGNIFICANT CHANGE UP (ref 37–47)
HGB BLD-MCNC: 12.6 G/DL — SIGNIFICANT CHANGE UP (ref 12–16)
IMM GRANULOCYTES NFR BLD AUTO: 0.6 % — HIGH (ref 0.1–0.3)
LYMPHOCYTES # BLD AUTO: 1.29 K/UL — SIGNIFICANT CHANGE UP (ref 1.2–3.4)
LYMPHOCYTES # BLD AUTO: 24.9 % — SIGNIFICANT CHANGE UP (ref 20.5–51.1)
MAGNESIUM SERPL-MCNC: 2 MG/DL — SIGNIFICANT CHANGE UP (ref 1.8–2.4)
MCHC RBC-ENTMCNC: 31 PG — SIGNIFICANT CHANGE UP (ref 27–31)
MCHC RBC-ENTMCNC: 32.2 G/DL — SIGNIFICANT CHANGE UP (ref 32–37)
MCV RBC AUTO: 96.3 FL — SIGNIFICANT CHANGE UP (ref 81–99)
MONOCYTES # BLD AUTO: 0.42 K/UL — SIGNIFICANT CHANGE UP (ref 0.1–0.6)
MONOCYTES NFR BLD AUTO: 8.1 % — SIGNIFICANT CHANGE UP (ref 1.7–9.3)
NEUTROPHILS # BLD AUTO: 2.88 K/UL — SIGNIFICANT CHANGE UP (ref 1.4–6.5)
NEUTROPHILS NFR BLD AUTO: 55.5 % — SIGNIFICANT CHANGE UP (ref 42.2–75.2)
NRBC # BLD: 0 /100 WBCS — SIGNIFICANT CHANGE UP (ref 0–0)
PLATELET # BLD AUTO: 165 K/UL — SIGNIFICANT CHANGE UP (ref 130–400)
PMV BLD: 9.7 FL — SIGNIFICANT CHANGE UP (ref 7.4–10.4)
POTASSIUM SERPL-MCNC: 3.9 MMOL/L — SIGNIFICANT CHANGE UP (ref 3.5–5)
POTASSIUM SERPL-SCNC: 3.9 MMOL/L — SIGNIFICANT CHANGE UP (ref 3.5–5)
PROT SERPL-MCNC: 5.7 G/DL — LOW (ref 6–8)
RBC # BLD: 4.06 M/UL — LOW (ref 4.2–5.4)
RBC # FLD: 12.1 % — SIGNIFICANT CHANGE UP (ref 11.5–14.5)
SODIUM SERPL-SCNC: 141 MMOL/L — SIGNIFICANT CHANGE UP (ref 135–146)
SURGICAL PATHOLOGY STUDY: SIGNIFICANT CHANGE UP
T4 FREE SERPL-MCNC: 1.1 NG/DL — SIGNIFICANT CHANGE UP (ref 0.9–1.8)
WBC # BLD: 5.18 K/UL — SIGNIFICANT CHANGE UP (ref 4.8–10.8)
WBC # FLD AUTO: 5.18 K/UL — SIGNIFICANT CHANGE UP (ref 4.8–10.8)
ZINC SERPL-MCNC: 96 UG/DL — SIGNIFICANT CHANGE UP (ref 44–115)

## 2023-05-02 PROCEDURE — 99232 SBSQ HOSP IP/OBS MODERATE 35: CPT

## 2023-05-02 PROCEDURE — 45380 COLONOSCOPY AND BIOPSY: CPT

## 2023-05-02 RX ORDER — SODIUM CHLORIDE 9 MG/ML
1000 INJECTION, SOLUTION INTRAVENOUS
Refills: 0 | Status: DISCONTINUED | OUTPATIENT
Start: 2023-05-02 | End: 2023-05-03

## 2023-05-02 RX ADMIN — SODIUM CHLORIDE 75 MILLILITER(S): 9 INJECTION, SOLUTION INTRAVENOUS at 01:28

## 2023-05-02 RX ADMIN — SERTRALINE 200 MILLIGRAM(S): 25 TABLET, FILM COATED ORAL at 11:21

## 2023-05-02 RX ADMIN — GABAPENTIN 300 MILLIGRAM(S): 400 CAPSULE ORAL at 13:10

## 2023-05-02 RX ADMIN — LIDOCAINE 1 PATCH: 4 CREAM TOPICAL at 11:21

## 2023-05-02 RX ADMIN — LIDOCAINE 1 PATCH: 4 CREAM TOPICAL at 01:27

## 2023-05-02 RX ADMIN — PANTOPRAZOLE SODIUM 40 MILLIGRAM(S): 20 TABLET, DELAYED RELEASE ORAL at 06:20

## 2023-05-02 RX ADMIN — Medication 1 MILLIGRAM(S): at 11:20

## 2023-05-02 RX ADMIN — METHOCARBAMOL 250 MILLIGRAM(S): 500 TABLET, FILM COATED ORAL at 06:19

## 2023-05-02 RX ADMIN — MONTELUKAST 10 MILLIGRAM(S): 4 TABLET, CHEWABLE ORAL at 11:20

## 2023-05-02 RX ADMIN — METHADONE HYDROCHLORIDE 30 MILLIGRAM(S): 40 TABLET ORAL at 13:10

## 2023-05-02 RX ADMIN — GABAPENTIN 300 MILLIGRAM(S): 400 CAPSULE ORAL at 06:18

## 2023-05-02 RX ADMIN — METHADONE HYDROCHLORIDE 30 MILLIGRAM(S): 40 TABLET ORAL at 21:41

## 2023-05-02 RX ADMIN — LAMOTRIGINE 25 MILLIGRAM(S): 25 TABLET, ORALLY DISINTEGRATING ORAL at 06:18

## 2023-05-02 RX ADMIN — GABAPENTIN 300 MILLIGRAM(S): 400 CAPSULE ORAL at 21:41

## 2023-05-02 RX ADMIN — Medication 88 MICROGRAM(S): at 06:18

## 2023-05-02 RX ADMIN — LAMOTRIGINE 25 MILLIGRAM(S): 25 TABLET, ORALLY DISINTEGRATING ORAL at 17:12

## 2023-05-02 RX ADMIN — CARIPRAZINE 1.5 MILLIGRAM(S): 1.5 CAPSULE, GELATIN COATED ORAL at 11:20

## 2023-05-02 RX ADMIN — METHADONE HYDROCHLORIDE 30 MILLIGRAM(S): 40 TABLET ORAL at 06:19

## 2023-05-02 RX ADMIN — METHOCARBAMOL 250 MILLIGRAM(S): 500 TABLET, FILM COATED ORAL at 21:41

## 2023-05-02 NOTE — PROGRESS NOTE ADULT - NUTRITIONAL ASSESSMENT
This patient has been assessed with a concern for Malnutrition and has been determined to have a diagnosis/diagnoses of Severe protein-calorie malnutrition.    This patient is being managed with:   Diet NPO after Midnight-     NPO Start Date: 01-May-2023   NPO Start Time: 23:59  Except Medications  Entered: May  1 2023  1:03PM    Diet Clear Liquid-  Prosource Gelatein Plus     Qty per Day:  3  Supplement Feeding Modality:  Oral  Ensure Clear Cans or Servings Per Day:  1       Frequency:  Three Times a day  Entered: Apr 30 2023  8:14PM  

## 2023-05-02 NOTE — PROGRESS NOTE ADULT - SUBJECTIVE AND OBJECTIVE BOX
JULIET NAVARRETE 62y Female  MRN#: [id number]   Hospital Day: 5d    HPI:  62-year-old, F with PMHx: HTN, Asthma, Bipolar disorder, Psoriasis, B12 deficiency, Folic Acid deficiency, Chronic back pain (2/2 multiple herniated discs with Intrathecal pump complicated by an abscess, on chronic methadone), presents to the ED for abdominal pain. History goes back to 1 month ago when the patient started having epigastric and per-umbilical pain associated with N/V/D. She saw her PMD,  Dr. Frank Graf who diagnosed her with Gastroenteritis and prescribed her doxycycline for 10 days ( which moderately improved her sx but did not reverse it completely). She reports that her pain is 6-8/10, intermittent, usually lasts 2-3 hours. The pain is not related to any food specifically but can be aggravated by liquid or solid food intake. Mildly relieved by PPIs. The patient reports having associated N/V/D (Today she reports being constipated, but she usually has 2-3 loose BM/day) along with 2 episodes of fevers in the past month and 20lbs of unintentional weight loss in 2-3 weeks.  She also reports 3 episodes of hematemesis ( approx 30cc of bright blood). She reports dark stools but she is also on supplemental iron. No joint pain, no skin manifestations (despite psoriatic lesions on the right leg) (27 Apr 2023 17:08)        OBJECTIVE  PAST MEDICAL & SURGICAL HISTORY  Asthma  LAST ATTACK MANY YRS AGO    Gastroesophageal reflux disease without esophagitis    Hypothyroidism    Heart murmur    Anxiety    Depression    Mood disorder    Psoriasis    Constipation    Back pain  LOW    Morbid obesity    S/P tonsillectomy  1967    History of appendectomy  1974    Abscess of back  EVACUATION OF ABSCESS @ L5-S1 1997    History of Jaclyn-en-Y gastric bypass  WITH CHOLECYSTECTOMY 2006      ALLERGIES:  penicillin (Anaphylaxis)    MEDICATIONS:  STANDING MEDICATIONS  budesonide 160 MICROgram(s)/formoterol 4.5 MICROgram(s) Inhaler 2 Puff(s) Inhalation two times a day  cariprazine 1.5 milliGRAM(s) Oral daily  folic acid 1 milliGRAM(s) Oral daily  gabapentin 300 milliGRAM(s) Oral every 8 hours  lactated ringers. 1000 milliLiter(s) IV Continuous <Continuous>  lamoTRIgine 25 milliGRAM(s) Oral every 12 hours  levothyroxine 88 MICROGram(s) Oral daily  lidocaine   4% Patch 1 Patch Transdermal daily  lidocaine   4% Patch 1 Patch Transdermal daily  methadone    Tablet 30 milliGRAM(s) Oral every 8 hours  methocarbamol 250 milliGRAM(s) Oral every 8 hours  montelukast 10 milliGRAM(s) Oral daily  pantoprazole    Tablet 40 milliGRAM(s) Oral before breakfast  sertraline 200 milliGRAM(s) Oral daily    PRN MEDICATIONS  albuterol    90 MICROgram(s) HFA Inhaler 2 Puff(s) Inhalation every 6 hours PRN  celecoxib 200 milliGRAM(s) Oral daily PRN  promethazine 12.5 milliGRAM(s) Oral every 6 hours PRN      VITAL SIGNS: Last 24 Hours  T(C): 36.3 (02 May 2023 04:38), Max: 36.7 (01 May 2023 20:30)  T(F): 97.3 (02 May 2023 04:38), Max: 98 (01 May 2023 20:30)  HR: 77 (02 May 2023 04:38) (52 - 77)  BP: 117/61 (02 May 2023 04:38) (90/52 - 121/75)  BP(mean): 101 (01 May 2023 11:28) (101 - 101)  RR: 19 (02 May 2023 04:38) (18 - 29)  SpO2: 96% (02 May 2023 04:38) (96% - 99%)    LABS:                        12.6   5.18  )-----------( 165      ( 02 May 2023 08:06 )             39.1     05-01    142  |  100  |  9<L>  ----------------------------<  88  4.1   |  31  |  0.8    Ca    9.0      01 May 2023 08:41  Mg     2.1     05-01    TPro  6.5  /  Alb  4.2  /  TBili  0.6  /  DBili  x   /  AST  14  /  ALT  11  /  AlkPhos  120<H>  05-01    PT/INR - ( 01 May 2023 20:54 )   PT: 10.30 sec;   INR: 0.91 ratio         PTT - ( 01 May 2023 20:54 )  PTT:30.6 sec          Culture - Stool (collected 29 Apr 2023 11:43)  Source: .Stool Feces  Final Report (01 May 2023 16:32):    No enteric pathogens isolated.    (Stool culture examined for Salmonella,    Shigella, Campylobacter, Aeromonas, Plesiomonas,    Vibrio, E.coli O157 and Yersinia)              PHYSICAL EXAM:  GENERAL: NAD, well-developed.  HEAD:  Atraumatic, Normocephalic.  EYES: conjunctiva and sclera clear  CHEST/LUNG: GBAE. No wheezing or crackles   HEART: regular rate and rhythm; S1/S2.  ABDOMEN: Soft, Nontender, Nondistended  EXTREMITIES: No edema.   PSYCH: AAOx3.  NEUROLOGY: non-focal; moves all extremities

## 2023-05-02 NOTE — PROGRESS NOTE ADULT - ASSESSMENT
Patient is a 63 y/o  Female  with pmh of Appendectomy, cholecystectomy and gastric bypass ( in 2006) , presents to the ED for abdominal pain/ nausea/vomiting/non-bloody diarrhea. weight loss.     #Epigastric Pain due to Gastritis   #Diarrhea/loose stools  patient still having 9-10 BMs every day ( on 4/30 they were tinged with blood sometimes)   CT scan on 4/27/2023: 1.  No CT evidence of an acute abdominopelvic pathology.  2.  Post gastric bypass with oral contrast within the excluded stomach and the proximal duodenum, likely related to retrograde flow. However, a gastric fistula/staple dehiscence cannot be excluded.  - GI PCR neg, C diff neg. stool culture -ve  - GI following: EGD 5/1/23- non erosive gastritis, altered anatomy post gastric bypass --> f/u biopsies   - plan for Colonoscopy in 5/2/23  - stop IVFs   cw protonix 40 oral daily for 14 days (5/1 - 5/14) ( as per GI)  stop sucralfate    #Asthma: -stable resume home symbicort, albuterol and montelukast     #HTN: - hold home bumex due to diarrhea, to avoid dehydration  no echo  EKG normal      #Bipolar disorder: - C/w Lamotrigine 25mg po q12hrs/- C/w Zolofot 200mg po once daily/- C/w Vraylar 1.5mg po once daily      #Hypothyroidism: - C/w Levothyroxine 88mcg po once daily   TSH 4.25      #Chronic back pain:   - 2/2 Herniated discs s/p intrathecal pump insertion complicated by abscess s/p drainage without laminectomy ?   - C/w Methadone 30 mg po q8hrs ( The patient receives her methadone from a pain doctor on [place])  - Gabapentin 300mg po q8hrs  methocarbamol 250 q8  celecoxib 200 dailyprn    #Folate deficiency/ #B12 deficiency: repeated levels normal   cw home folic acid 1 daily      #MISC  - DVT PPx: low risk. scds.  - GI PPx: protonix 40 oral daily for 14 days   - Diet: dash.   - Activity: IAT  - Dispo: pending colonoscopy     labs every other day   Patient is a 63 y/o  Female  with pmh of Appendectomy, cholecystectomy and gastric bypass ( in 2006) , presents to the ED for abdominal pain/ nausea/vomiting/non-bloody diarrhea. weight loss.     #Epigastric Pain due to Gastritis   #Diarrhea/loose stools  patient still having 9-10 BMs every day ( on 4/30 they were tinged with blood sometimes)   CT scan on 4/27/2023: 1.  No CT evidence of an acute abdominopelvic pathology.  2.  Post gastric bypass with oral contrast within the excluded stomach and the proximal duodenum, likely related to retrograde flow. However, a gastric fistula/staple dehiscence cannot be excluded.  - GI PCR neg, C diff neg. stool culture -ve  - GI following: EGD 5/1/23- non erosive gastritis, altered anatomy post gastric bypass --> f/u biopsies   - plan for Colonoscopy in 5/2/23  - stop IVFs   cw protonix 40 oral daily for 14 days (5/1 - 5/14) ( as per GI)  stop sucralfate    #Asthma: -stable resume home symbicort, albuterol and montelukast     #HTN: - hold home bumex due to diarrhea, to avoid dehydration  no echo  EKG normal      #Bipolar disorder: - C/w Lamotrigine 25mg po q12hrs/- C/w Zolofot 200mg po once daily/- C/w Vraylar 1.5mg po once daily      #Hypothyroidism: - C/w Levothyroxine 88mcg po once daily   TSH 4.25, free T4 nl      #Chronic back pain:   - 2/2 Herniated discs s/p intrathecal pump insertion complicated by abscess s/p drainage without laminectomy ?   - C/w Methadone 30 mg po q8hrs ( The patient receives her methadone from a pain doctor on [place])  - Gabapentin 300mg po q8hrs  methocarbamol 250 q8  celecoxib 200 dailyprn    #Folate deficiency/ #B12 deficiency: repeated levels normal   cw home folic acid 1 daily      #MISC  - DVT PPx: low risk. scds.  - GI PPx: protonix 40 oral daily for 14 days   - Diet: dash.   - Activity: IAT  - Dispo: pending colonoscopy     labs every other day

## 2023-05-02 NOTE — PROGRESS NOTE ADULT - ATTENDING COMMENTS
Patient is a 61 y/o  Female  with pmh of Appendectomy, cholecystectomy and gastric bypass ( in 2006) , presents to the ED for abdominal pain/ nausea/vomiting/non-bloody diarrhea. weight loss.     #Epigastric Pain due to Gastritis   #Diarrhea/loose stools   CT scan on 4/27/2023: 1.  No CT evidence of an acute abdominopelvic pathology.  2.  Post gastric bypass with oral contrast within the excluded stomach and the proximal duodenum, likely related to retrograde flow. However, a gastric fistula/staple dehiscence cannot be excluded. An outpatient upper GI series can be obtained if clinically warranted.  - GI PCR neg, C diff neg   - post dx. EGD 5/1/23- non erosive gastritis, altered anatomy post gastric bypass  - plan for Colonoscopy in 5/2/23, f/up results       #Asthma: -stable resumed on home meds tx.     #HTN: - hold home bumex due to diarrhea, to avoid dehydration      #Bipolar disorder: - C/w Lamotrigine 25mg po q12hrs/- C/w Zolofot 200mg po once daily/- C/w Vraylar 1.5mg po once daily    #Hypothyroidism: - C/w Levothyroxine 88mcg po once daily       #Chronic back pain:   - 2/2 Herniated discs s/p intrathecal pump insertion complicated by abscess s/p drainage without laminectomy ?   - C/w Methadone 10mg po q8hrs ( The patient receives her methadone from a pain doctor on [place])  - Gabapentin 300mg po q8hrs    #Folate deficiency/ #B12 deficiency: repeated levels normal       DVT Px SCDs    #Progress Note Handoff: f/up dx. colonoscopy today  Family discussion: medical plan of tx. d/w pt. by bedside   Disposition: Home__once medically stable    Total time spent to complete patient's bedside assessment, review medical chart, discuss medical plan of care with covering medical team was more than 35 minutes with >50% of time spent face to face with patient, discussion with patient/family and/or coordination of care

## 2023-05-02 NOTE — PRE-ANESTHESIA EVALUATION ADULT - TEMPERATURE IN CELSIUS (DEGREES C)
36.3
36.2
Electrodesiccation And Curettage Text: The wound bed was treated with electrodesiccation and curettage after the biopsy was performed.
Home

## 2023-05-02 NOTE — CHART NOTE - NSCHARTNOTEFT_GEN_A_CORE
EGD findings:   Irregularity in the area at and just proximal to the squamo-columnar junction. (Biopsy).  	Erythema in the stomach compatible with non-erosive gastritis. (Biopsy).  	-Altered anatomy (Jaclyn-en-Y gastric bypass). No evidence of active bleeding noted. .  Plan:	  Clear liquid diet  Await pathology results  Colonoscopy tomorrow  NPO after midnight  Bowel prep today evening
Registered Dietitian Follow-Up  Patient Profile Reviewed                           Yes [x]   No []  Nutrition History Previously Obtained        Yes [x]  No []      Pertinent Medical Interventions:   63 y/o  Female  with pmh of Appendectomy, cholecystectomy and gastric bypass ( in 2006) , presents to the ED for abdominal pain/ nausea/vomiting/non-bloody diarrhea. weight loss.   #Epigastric Pain due to Gastritis   #Diarrhea/loose stools  patient still having 9-10 BMs every day ( on 4/30 they were tinged with blood sometimes)  - GI PCR neg, C diff neg. stool culture -ve  - GI following: EGD 5/1/23- non erosive gastritis, altered anatomy post gastric bypass  pending colonoscopy today 5/2    Nutrition Interval History:   NPO on admission 4/27  advanced clear liquid diet 4/28, documentation 50-75% trays per flow sheets   today 5/2 NPO for procedure  Nutrient Intake: Patient meeting <50% of estimated energy needs in-house x6days during admission     Diet order:   Diet, NPO after Midnight:      NPO Start Date: 01-May-2023,   NPO Start Time: 23:59  Except Medications (05-01-23 @ 13:03) [Active]  Diet, Clear Liquid:   Prosource Gelatein Plus     Qty per Day:  3  Supplement Feeding Modality:  Oral  Ensure Clear Cans or Servings Per Day:  1       Frequency:  Three Times a day (04-30-23 @ 20:14) [Active]    Anthropometrics:  Height (cm): 167.6 (05-01-23 @ 11:32)  Weight (kg): 86 (05-01-23 @ 11:28)  BMI (kg/m2): 30.6 (05-01-23 @ 11:32)  IBW: 59kg  UBW 95kg  4/9/23  9.52% weight loss x 3 weeks     MEDICATIONS  (STANDING):  budesonide 160 MICROgram(s)/formoterol 4.5 MICROgram(s) Inhaler 2 Puff(s) Inhalation two times a day  cariprazine 1.5 milliGRAM(s) Oral daily  folic acid 1 milliGRAM(s) Oral daily  gabapentin 300 milliGRAM(s) Oral every 8 hours  lactated ringers. 1000 milliLiter(s) (75 mL/Hr) IV Continuous <Continuous>  lamoTRIgine 25 milliGRAM(s) Oral every 12 hours  levothyroxine 88 MICROGram(s) Oral daily  lidocaine   4% Patch 1 Patch Transdermal daily  lidocaine   4% Patch 1 Patch Transdermal daily  methadone    Tablet 30 milliGRAM(s) Oral every 8 hours  methocarbamol 250 milliGRAM(s) Oral every 8 hours  montelukast 10 milliGRAM(s) Oral daily  pantoprazole    Tablet 40 milliGRAM(s) Oral before breakfast  sertraline 200 milliGRAM(s) Oral daily    MEDICATIONS  (PRN):  albuterol    90 MICROgram(s) HFA Inhaler 2 Puff(s) Inhalation every 6 hours PRN Shortness of Breath and/or Wheezing  celecoxib 200 milliGRAM(s) Oral daily PRN Mild Pain (1 - 3)  promethazine 12.5 milliGRAM(s) Oral every 6 hours PRN nausea    Pertinent Labs: 05-02 @ 08:06: Na 141, BUN 7<L>, Cr 0.7, <H>, K+ 3.9, Phos --, Mg 2.0, Alk Phos 104, ALT/SGPT 9, AST/SGOT 12, HbA1c --    Physical Findings:  - Cognition: A&Ox4   - GI function: + diarrhea  - Tubes: n/a  - Oral/Mouth cavity: solid food dysphagia (intermittent episodes)  - Skin:  no pressure injuries indicated in flow sheets   - Edema: no edema noted in flow sheets      Nutrition Requirements: with consideration for age, weight, BMI, SPCM  Weight Used: ABW 86.2kg and IBW 59kg     Estimated Energy Needs    Continue []  Adjust [x] 3601-3752 kcal/day (MSJ x 1.2-1.3)  Estimated Protein Needs    Continue []  Adjust [x]  g/day ( 1.5-2.0g/kg of IBW )  Estimated Fluid Needs        Continue []  Adjust [x] 1mL/kcal    [x] Previous Nutrition Diagnosis:            [x] Ongoing          [] Resolved  #1 Malnutrition  Goal/Expected Outcome: obtain route for nutrition/hydration within 3-5 days   Nutrition Intervention: Meals and Snacks, Medical Food Supplement, Vitamin Supplement, Nutrition Related Medication, Coordination of Care  Indicator/Monitoring:  Monitor diet order, energy intake, food and nutrient intake, body composition, weight    Recommendations:  -after colonoscopy reinitiate clear liquids --> advance to full liquids --> with goal of low fiber/residue diet  * if unable to advance diet within 3days consider consulting Nutrition Support Team for alternative means of nutrition/hydration (PN), patient with severe malnutrition   * Consult SLP services to determine safest/least restrictive diet patient noted with intermittent dysphagia?  -Ensure Clear TID (240kcal, 8gm protein, 8oz per carton) with clear liquid diet   -Prosource Gelatein Plus TID (160kcal, 20gm protein, 4oz per carton) with clear liquid diet   -consider probiotic saccharomyces boulardii 2x/daily, add Banatrol 2x/daily for diarrhea x4-5days when advanced past clear liquid diet   -should be on complete MV with minerals secondary to gastric bypass history     [place] Guo, #[id number] or via TEAMS  Patient is at HIGH Risk, follow up x 3-5days
colonoscopy findings:   Impressions:  	External hemorrhoids.  	The colon was otherwise unremarkable (Biopsy).  	-Dark brown pigmentation was noted throughout the colon consistent with melanosis coli. .  Plan:	  Regular diet  Await pathology results  avoid constipation   high fiber diet   Repeat colonoscopy 3 years given family h/o colon cancer
PACU ANESTHESIA ADMISSION NOTE      Procedure:   Post op diagnosis:      ____  Intubated  TV:______       Rate: ______      FiO2: ______    __x__  Patent Airway    ___x_  Full return of protective reflexes    ___x_  Full recovery from anesthesia / back to baseline     Vitals:   T:98           R:  18                BP: 104/60                 Sat: 99                  P: 56      Mental Status:  x____ Awake  x _____ Alert   _____ Drowsy   _____ Sedated    Nausea/Vomiting:  ____ NO  ______Yes,   See Post - Op Orders          Pain Scale (0-10):  _____    Treatment: ____ None    ____ See Post - Op/PCA Orders    Post - Operative Fluids:   ____ Oral   ____ See Post - Op Orders    Plan: Discharge:   ____Home   x    _____Floor     _____Critical Care    _____  Other:_________________    Comments:

## 2023-05-03 ENCOUNTER — TRANSCRIPTION ENCOUNTER (OUTPATIENT)
Age: 63
End: 2023-05-03

## 2023-05-03 VITALS
HEART RATE: 59 BPM | DIASTOLIC BLOOD PRESSURE: 85 MMHG | RESPIRATION RATE: 18 BRPM | SYSTOLIC BLOOD PRESSURE: 132 MMHG | TEMPERATURE: 98 F

## 2023-05-03 LAB
ALBUMIN SERPL ELPH-MCNC: 3.4 G/DL — LOW (ref 3.5–5.2)
ALP SERPL-CCNC: 100 U/L — SIGNIFICANT CHANGE UP (ref 30–115)
ALT FLD-CCNC: 8 U/L — SIGNIFICANT CHANGE UP (ref 0–41)
ANION GAP SERPL CALC-SCNC: 9 MMOL/L — SIGNIFICANT CHANGE UP (ref 7–14)
AST SERPL-CCNC: 10 U/L — SIGNIFICANT CHANGE UP (ref 0–41)
BASOPHILS # BLD AUTO: 0.05 K/UL — SIGNIFICANT CHANGE UP (ref 0–0.2)
BASOPHILS NFR BLD AUTO: 0.7 % — SIGNIFICANT CHANGE UP (ref 0–1)
BILIRUB SERPL-MCNC: 0.3 MG/DL — SIGNIFICANT CHANGE UP (ref 0.2–1.2)
BUN SERPL-MCNC: 7 MG/DL — LOW (ref 10–20)
CALCIUM SERPL-MCNC: 8.6 MG/DL — SIGNIFICANT CHANGE UP (ref 8.4–10.5)
CHLORIDE SERPL-SCNC: 104 MMOL/L — SIGNIFICANT CHANGE UP (ref 98–110)
CO2 SERPL-SCNC: 27 MMOL/L — SIGNIFICANT CHANGE UP (ref 17–32)
COPPER SERPL-MCNC: 96 UG/DL — SIGNIFICANT CHANGE UP (ref 80–158)
CREAT SERPL-MCNC: 0.6 MG/DL — LOW (ref 0.7–1.5)
EGFR: 103 ML/MIN/1.73M2 — SIGNIFICANT CHANGE UP
EOSINOPHIL # BLD AUTO: 0.38 K/UL — SIGNIFICANT CHANGE UP (ref 0–0.7)
EOSINOPHIL NFR BLD AUTO: 5.7 % — SIGNIFICANT CHANGE UP (ref 0–8)
GLUCOSE SERPL-MCNC: 77 MG/DL — SIGNIFICANT CHANGE UP (ref 70–99)
HCT VFR BLD CALC: 37.4 % — SIGNIFICANT CHANGE UP (ref 37–47)
HGB BLD-MCNC: 12.1 G/DL — SIGNIFICANT CHANGE UP (ref 12–16)
IMM GRANULOCYTES NFR BLD AUTO: 0.4 % — HIGH (ref 0.1–0.3)
LYMPHOCYTES # BLD AUTO: 1.23 K/UL — SIGNIFICANT CHANGE UP (ref 1.2–3.4)
LYMPHOCYTES # BLD AUTO: 18.3 % — LOW (ref 20.5–51.1)
MAGNESIUM SERPL-MCNC: 2.1 MG/DL — SIGNIFICANT CHANGE UP (ref 1.8–2.4)
MCHC RBC-ENTMCNC: 30.9 PG — SIGNIFICANT CHANGE UP (ref 27–31)
MCHC RBC-ENTMCNC: 32.4 G/DL — SIGNIFICANT CHANGE UP (ref 32–37)
MCV RBC AUTO: 95.4 FL — SIGNIFICANT CHANGE UP (ref 81–99)
MENADIONE SERPL-MCNC: <0.1 NG/ML — LOW (ref 0.1–2.2)
MONOCYTES # BLD AUTO: 0.67 K/UL — HIGH (ref 0.1–0.6)
MONOCYTES NFR BLD AUTO: 10 % — HIGH (ref 1.7–9.3)
NEUTROPHILS # BLD AUTO: 4.36 K/UL — SIGNIFICANT CHANGE UP (ref 1.4–6.5)
NEUTROPHILS NFR BLD AUTO: 64.9 % — SIGNIFICANT CHANGE UP (ref 42.2–75.2)
NRBC # BLD: 0 /100 WBCS — SIGNIFICANT CHANGE UP (ref 0–0)
PLATELET # BLD AUTO: 175 K/UL — SIGNIFICANT CHANGE UP (ref 130–400)
PMV BLD: 10 FL — SIGNIFICANT CHANGE UP (ref 7.4–10.4)
POTASSIUM SERPL-MCNC: 4.4 MMOL/L — SIGNIFICANT CHANGE UP (ref 3.5–5)
POTASSIUM SERPL-SCNC: 4.4 MMOL/L — SIGNIFICANT CHANGE UP (ref 3.5–5)
PROT SERPL-MCNC: 5.3 G/DL — LOW (ref 6–8)
RBC # BLD: 3.92 M/UL — LOW (ref 4.2–5.4)
RBC # FLD: 12.1 % — SIGNIFICANT CHANGE UP (ref 11.5–14.5)
SELENIUM SERPL-MCNC: 107 UG/L — SIGNIFICANT CHANGE UP (ref 93–198)
SODIUM SERPL-SCNC: 140 MMOL/L — SIGNIFICANT CHANGE UP (ref 135–146)
VIT A SERPL-MCNC: 69.6 UG/DL — HIGH (ref 22–69.5)
WBC # BLD: 6.72 K/UL — SIGNIFICANT CHANGE UP (ref 4.8–10.8)
WBC # FLD AUTO: 6.72 K/UL — SIGNIFICANT CHANGE UP (ref 4.8–10.8)

## 2023-05-03 PROCEDURE — 99239 HOSP IP/OBS DSCHRG MGMT >30: CPT

## 2023-05-03 RX ORDER — ASCORBIC ACID 60 MG
2 TABLET,CHEWABLE ORAL
Qty: 0 | Refills: 0 | DISCHARGE

## 2023-05-03 RX ORDER — ESOMEPRAZOLE MAGNESIUM 40 MG/1
1 CAPSULE, DELAYED RELEASE ORAL
Qty: 0 | Refills: 0 | DISCHARGE

## 2023-05-03 RX ORDER — PANTOPRAZOLE SODIUM 20 MG/1
1 TABLET, DELAYED RELEASE ORAL
Qty: 60 | Refills: 0
Start: 2023-05-03 | End: 2023-07-01

## 2023-05-03 RX ORDER — CHOLECALCIFEROL (VITAMIN D3) 125 MCG
3 CAPSULE ORAL
Qty: 0 | Refills: 0 | DISCHARGE

## 2023-05-03 RX ORDER — PREGABALIN 225 MG/1
1000 CAPSULE ORAL
Qty: 0 | Refills: 0 | DISCHARGE

## 2023-05-03 RX ORDER — BUMETANIDE 0.25 MG/ML
1 INJECTION INTRAMUSCULAR; INTRAVENOUS
Qty: 30 | Refills: 0
Start: 2023-05-03 | End: 2023-06-01

## 2023-05-03 RX ORDER — CALCIUM POLYCARBOPHIL 625 MG/1
1 TABLET, FILM COATED ORAL
Qty: 0 | Refills: 0 | DISCHARGE

## 2023-05-03 RX ORDER — BUMETANIDE 1 MG/1
1 TABLET ORAL
Qty: 30 | Refills: 0 | DISCHARGE
Start: 2023-05-03 | End: 2023-06-01

## 2023-05-03 RX ORDER — BUMETANIDE 0.25 MG/ML
2 INJECTION INTRAMUSCULAR; INTRAVENOUS
Refills: 0 | DISCHARGE

## 2023-05-03 RX ADMIN — MONTELUKAST 10 MILLIGRAM(S): 4 TABLET, CHEWABLE ORAL at 11:32

## 2023-05-03 RX ADMIN — LIDOCAINE 1 PATCH: 4 CREAM TOPICAL at 11:31

## 2023-05-03 RX ADMIN — LAMOTRIGINE 25 MILLIGRAM(S): 25 TABLET, ORALLY DISINTEGRATING ORAL at 05:45

## 2023-05-03 RX ADMIN — METHADONE HYDROCHLORIDE 30 MILLIGRAM(S): 40 TABLET ORAL at 05:44

## 2023-05-03 RX ADMIN — Medication 1 MILLIGRAM(S): at 11:31

## 2023-05-03 RX ADMIN — SERTRALINE 200 MILLIGRAM(S): 25 TABLET, FILM COATED ORAL at 11:32

## 2023-05-03 RX ADMIN — CELECOXIB 200 MILLIGRAM(S): 200 CAPSULE ORAL at 06:41

## 2023-05-03 RX ADMIN — GABAPENTIN 300 MILLIGRAM(S): 400 CAPSULE ORAL at 13:26

## 2023-05-03 RX ADMIN — BUDESONIDE AND FORMOTEROL FUMARATE DIHYDRATE 2 PUFF(S): 160; 4.5 AEROSOL RESPIRATORY (INHALATION) at 09:57

## 2023-05-03 RX ADMIN — METHOCARBAMOL 250 MILLIGRAM(S): 500 TABLET, FILM COATED ORAL at 13:26

## 2023-05-03 RX ADMIN — METHOCARBAMOL 250 MILLIGRAM(S): 500 TABLET, FILM COATED ORAL at 05:45

## 2023-05-03 RX ADMIN — METHADONE HYDROCHLORIDE 30 MILLIGRAM(S): 40 TABLET ORAL at 13:25

## 2023-05-03 RX ADMIN — Medication 88 MICROGRAM(S): at 05:45

## 2023-05-03 RX ADMIN — GABAPENTIN 300 MILLIGRAM(S): 400 CAPSULE ORAL at 05:44

## 2023-05-03 RX ADMIN — LIDOCAINE 1 PATCH: 4 CREAM TOPICAL at 11:32

## 2023-05-03 RX ADMIN — PANTOPRAZOLE SODIUM 40 MILLIGRAM(S): 20 TABLET, DELAYED RELEASE ORAL at 05:45

## 2023-05-03 RX ADMIN — CELECOXIB 200 MILLIGRAM(S): 200 CAPSULE ORAL at 07:15

## 2023-05-03 NOTE — DISCHARGE NOTE NURSING/CASE MANAGEMENT/SOCIAL WORK - NSDCPEFALRISK_GEN_ALL_CORE
For information on Fall & Injury Prevention, visit: [email or web address] OR  [email or web address] OR  https://www.cdc.gov/steadi/patient.html

## 2023-05-03 NOTE — DISCHARGE NOTE PROVIDER - CARE PROVIDER_API CALL
Desmond Grayson)  Gastroenterology; Internal Medicine  [address] [place] [address]  Phone: [phone]  Fax: [phone]  Follow Up Time:

## 2023-05-03 NOTE — DISCHARGE NOTE PROVIDER - ATTENDING DISCHARGE PHYSICAL EXAMINATION:
GENERAL: NAD, well-developed  EYES: conjunctiva and sclera clear  CHEST/LUNG: symmetrical chest rise, no accessory muscle use  HEART: regular rate and rhythm; S1/S2  ABDOMEN: Soft, Nontender, Nondistended  EXTREMITIES: No edema.   PSYCH: AAOx3.  NEUROLOGY: non-focal; moves all extremities

## 2023-05-03 NOTE — DISCHARGE NOTE PROVIDER - NSDCMRMEDTOKEN_GEN_ALL_CORE_FT
Advair Diskus 250 mcg-50 mcg inhalation powder: 1 puff(s) inhaled 2 times a day  Bumex 0.5 mg oral tablet: 2 orally 2 times a day  folic acid 1 mg oral tablet: 1 tab(s) orally once a day  gabapentin 300 mg oral capsule: 1 orally 3 times a day  Glucosamine Chondroitin oral capsule: 3 cap(s) orally once a day  lamoTRIgine: 25 milligram(s) orally 2 times a day  levothyroxine 88 mcg (0.088 mg) oral tablet: 1 tab(s) orally once a day  methadone 10 mg oral tablet: 3 tab(s) orally every 8 hours  montelukast 10 mg oral tablet: 1 tab(s) orally once a day  sertraline: 200 milligram(s) orally once a day  Soma 350 mg oral tablet: 1 tab(s) orally 3 times a day  Ventolin HFA 90 mcg/inh inhalation aerosol: 2 puff(s) inhaled 4 times a day, As Needed  Vraylar 1.5 mg oral capsule: 1 orally once a day   Advair Diskus 250 mcg-50 mcg inhalation powder: 1 puff(s) inhaled 2 times a day  folic acid 1 mg oral tablet: 1 tab(s) orally once a day  gabapentin 300 mg oral capsule: 1 orally 3 times a day  Glucosamine Chondroitin oral capsule: 3 cap(s) orally once a day  lamoTRIgine: 25 milligram(s) orally 2 times a day  levothyroxine 88 mcg (0.088 mg) oral tablet: 1 tab(s) orally once a day  methadone 10 mg oral tablet: 3 tab(s) orally every 8 hours  montelukast 10 mg oral tablet: 1 tab(s) orally once a day  pantoprazole 40 mg oral delayed release tablet: 1 tab(s) orally once a day (before a meal)  sertraline: 200 milligram(s) orally once a day  Soma 350 mg oral tablet: 1 tab(s) orally 3 times a day  Ventolin HFA 90 mcg/inh inhalation aerosol: 2 puff(s) inhaled 4 times a day, As Needed  Vraylar 1.5 mg oral capsule: 1 orally once a day

## 2023-05-03 NOTE — DISCHARGE NOTE PROVIDER - HOSPITAL COURSE
Patient is a 61 y/o  Female  with pmh of Appendectomy, cholecystectomy and gastric bypass ( in 2006) , presents to the ED for abdominal pain/ nausea/vomiting/non-bloody diarrhea. weight loss.     patient was having 9-10 BMs every day ( on 4/30 they were tinged with blood sometimes)   CT scan on 4/27/2023: 1.  No CT evidence of an acute abdominopelvic pathology.  2.  Post gastric bypass with oral contrast within the excluded stomach and the proximal duodenum, likely related to retrograde flow. However, a gastric fistula/staple dehiscence cannot be excluded.  - GI PCR neg, C diff neg. stool culture -ve  - GI following: EGD 5/1/23- non erosive gastritis, altered anatomy post gastric bypass --> biopsies -ve  - Colonoscopy in 5/2/23 showed external hemorrhoids and melanosis coli --> cw regular diet, avoid excessive laxatives, high fiber diet, and repeat colonoscopy in 3 years given family history of colon cancer   patient started on protonix 40 oral daily for 14 days (5/1 - 5/14) ( as per GI)      #HTN:was controlled off her home bumex to avoid dehydration, will keep on bumex PRN on discharge  no echo  EKG normal    #Bipolar disorder: - C/w Lamotrigine 25mg po q12hrs/- C/w Zolofot 200mg po once daily/- C/w Vraylar 1.5mg po once daily    #Hypothyroidism: - C/w Levothyroxine 88mcg po once daily   TSH 4.25, free T4 nl      #Chronic back pain:   - 2/2 Herniated discs s/p intrathecal pump insertion complicated by abscess s/p drainage without laminectomy ?   - C/w Methadone 30 mg po q8hrs ( The patient receives her methadone from a pain doctor on [address])  - Gabapentin 300mg po q8hrs  methocarbamol 250 q8  celecoxib 200 daily prn    #Folate deficiency/ #B12 deficiency: repeated levels normal   cw home folic acid 1 daily    patient is hemodynamically stable and ready for discharge with outpatient GI follow up Patient is a 61 y/o  Female  with pmh of Appendectomy, cholecystectomy and gastric bypass ( in 2006) , presents to the ED for abdominal pain/ nausea/vomiting/non-bloody diarrhea. weight loss.     patient was having 9-10 BMs every day ( on 4/30 they were tinged with blood sometimes)   CT scan on 4/27/2023: 1.  No CT evidence of an acute abdominopelvic pathology.  2.  Post gastric bypass with oral contrast within the excluded stomach and the proximal duodenum, likely related to retrograde flow. However, a gastric fistula/staple dehiscence cannot be excluded.  - GI PCR neg, C diff neg. stool culture -ve  - GI following: EGD 5/1/23- non erosive gastritis, altered anatomy post gastric bypass --> biopsies -ve  - Colonoscopy in 5/2/23 showed external hemorrhoids and melanosis coli --> cw regular diet, avoid excessive laxatives, high fiber diet, and repeat colonoscopy in 3 years given family history of colon cancer   patient started on protonix 40 oral daily for 14 days (5/1 - 5/14) ( as per GI)      #HTN:  - was controlled off her home bumex to avoid dehydration, will remove on discharge bumex until she follows up with Cardiology  - no echo  -EKG normal    #Bipolar disorder:   - C/w Lamotrigine 25mg po q12hrs/- C/w Zolofot 200mg po once daily/- C/w Vraylar 1.5mg po once daily    #Hypothyroidism:   - C/w Levothyroxine 88mcg po once daily   - TSH 4.25, free T4 nl      #Chronic back pain:   - 2/2 Herniated discs s/p intrathecal pump insertion complicated by abscess s/p drainage without laminectomy ?   - C/w Methadone 30 mg po q8hrs ( The patient receives her methadone from a pain doctor on [address])  - Gabapentin 300mg po q8hrs  - methocarbamol 250 q8  - celecoxib 200 daily prn    #Folate deficiency/ #B12 deficiency:   -repeated levels normal   -cw home folic acid 1 daily    patient is hemodynamically stable and ready for discharge with outpatient GI follow up

## 2023-05-03 NOTE — DISCHARGE NOTE NURSING/CASE MANAGEMENT/SOCIAL WORK - PATIENT PORTAL LINK FT
You can access the FollowMyHealth Patient Portal offered by [facility] by registering at the following website: [email or web address]. By joining [facility]’s FollowMyHealth portal, you will also be able to view your health information using other applications (apps) compatible with our system.

## 2023-05-03 NOTE — DISCHARGE NOTE PROVIDER - DETAILS OF MALNUTRITION DIAGNOSIS/DIAGNOSES
This patient has been assessed with a concern for Malnutrition and was treated during this hospitalization for the following Nutrition diagnosis/diagnoses:     -  04/30/2023: Severe protein-calorie malnutrition

## 2023-05-03 NOTE — DISCHARGE NOTE PROVIDER - NSDCCPCAREPLAN_GEN_ALL_CORE_FT
PRINCIPAL DISCHARGE DIAGNOSIS  Diagnosis: Severe diarrhea  Assessment and Plan of Treatment: you were admitted for diarrhea workup and management:  CT scan on 4/27/2023: 1.  No CT evidence of an acute abdominopelvic pathology.  2.  Post gastric bypass with oral contrast within the excluded stomach and the proximal duodenum, likely related to retrograde flow. However, a gastric fistula/staple dehiscence cannot be excluded.  - GI PCR neg, C diff neg. stool culture -ve  - GI following: EGD 5/1/23- non erosive gastritis, altered anatomy post gastric bypass --> biopsies -ve  - Colonoscopy in 5/2/23 showed external hemorrhoids and melanosis coli --> cw regular diet, avoid excessive laxatives, high fiber diet, and repeat colonoscopy in 3 years given family history of colon cancer   started on protonix 40 oral daily for 14 days (5/1 - 5/14) ( as per GI)  please follow up on colonoscopy biopsy results      SECONDARY DISCHARGE DIAGNOSES  Diagnosis: Excessive weight loss  Assessment and Plan of Treatment:     Diagnosis: History of Jaclyn-en-Y gastric bypass  Assessment and Plan of Treatment:     Diagnosis: Hematemesis  Assessment and Plan of Treatment:

## 2023-05-04 LAB
SURGICAL PATHOLOGY STUDY: SIGNIFICANT CHANGE UP
VIT B1 SERPL-MCNC: 115.3 NMOL/L — SIGNIFICANT CHANGE UP (ref 66.5–200)
VIT C SERPL-MCNC: 0.5 MG/DL — SIGNIFICANT CHANGE UP (ref 0.4–2)

## 2023-05-05 LAB
A-TOCOPHEROL VIT E SERPL-MCNC: 10.9 MG/L — SIGNIFICANT CHANGE UP (ref 9–29)
BETA+GAMMA TOCOPHEROL SERPL-MCNC: 0.9 MG/L — SIGNIFICANT CHANGE UP (ref 0.5–4.9)
CALPROTECTIN STL-MCNT: 24 UG/G — SIGNIFICANT CHANGE UP (ref 0–120)

## 2023-05-08 DIAGNOSIS — F11.20 OPIOID DEPENDENCE, UNCOMPLICATED: ICD-10-CM

## 2023-05-08 DIAGNOSIS — E66.01 MORBID (SEVERE) OBESITY DUE TO EXCESS CALORIES: ICD-10-CM

## 2023-05-08 DIAGNOSIS — R63.4 ABNORMAL WEIGHT LOSS: ICD-10-CM

## 2023-05-08 DIAGNOSIS — K21.9 GASTRO-ESOPHAGEAL REFLUX DISEASE WITHOUT ESOPHAGITIS: ICD-10-CM

## 2023-05-08 DIAGNOSIS — Z88.0 ALLERGY STATUS TO PENICILLIN: ICD-10-CM

## 2023-05-08 DIAGNOSIS — E53.8 DEFICIENCY OF OTHER SPECIFIED B GROUP VITAMINS: ICD-10-CM

## 2023-05-08 DIAGNOSIS — I10 ESSENTIAL (PRIMARY) HYPERTENSION: ICD-10-CM

## 2023-05-08 DIAGNOSIS — Z79.51 LONG TERM (CURRENT) USE OF INHALED STEROIDS: ICD-10-CM

## 2023-05-08 DIAGNOSIS — Z90.49 ACQUIRED ABSENCE OF OTHER SPECIFIED PARTS OF DIGESTIVE TRACT: ICD-10-CM

## 2023-05-08 DIAGNOSIS — R19.7 DIARRHEA, UNSPECIFIED: ICD-10-CM

## 2023-05-08 DIAGNOSIS — I87.8 OTHER SPECIFIED DISORDERS OF VEINS: ICD-10-CM

## 2023-05-08 DIAGNOSIS — F41.9 ANXIETY DISORDER, UNSPECIFIED: ICD-10-CM

## 2023-05-08 DIAGNOSIS — K29.71 GASTRITIS, UNSPECIFIED, WITH BLEEDING: ICD-10-CM

## 2023-05-08 DIAGNOSIS — K63.89 OTHER SPECIFIED DISEASES OF INTESTINE: ICD-10-CM

## 2023-05-08 DIAGNOSIS — G89.29 OTHER CHRONIC PAIN: ICD-10-CM

## 2023-05-08 DIAGNOSIS — E03.9 HYPOTHYROIDISM, UNSPECIFIED: ICD-10-CM

## 2023-05-08 DIAGNOSIS — Z98.84 BARIATRIC SURGERY STATUS: ICD-10-CM

## 2023-05-08 DIAGNOSIS — F17.210 NICOTINE DEPENDENCE, CIGARETTES, UNCOMPLICATED: ICD-10-CM

## 2023-05-08 DIAGNOSIS — Z79.890 HORMONE REPLACEMENT THERAPY: ICD-10-CM

## 2023-05-08 DIAGNOSIS — Z79.82 LONG TERM (CURRENT) USE OF ASPIRIN: ICD-10-CM

## 2023-05-08 DIAGNOSIS — R13.19 OTHER DYSPHAGIA: ICD-10-CM

## 2023-05-08 DIAGNOSIS — J45.909 UNSPECIFIED ASTHMA, UNCOMPLICATED: ICD-10-CM

## 2023-05-08 DIAGNOSIS — F31.9 BIPOLAR DISORDER, UNSPECIFIED: ICD-10-CM

## 2023-05-08 DIAGNOSIS — E43 UNSPECIFIED SEVERE PROTEIN-CALORIE MALNUTRITION: ICD-10-CM

## 2023-05-08 DIAGNOSIS — M54.50 LOW BACK PAIN, UNSPECIFIED: ICD-10-CM

## 2023-05-08 DIAGNOSIS — K31.6 FISTULA OF STOMACH AND DUODENUM: ICD-10-CM

## 2023-05-08 DIAGNOSIS — E87.6 HYPOKALEMIA: ICD-10-CM

## 2023-05-08 DIAGNOSIS — K64.4 RESIDUAL HEMORRHOIDAL SKIN TAGS: ICD-10-CM

## 2023-05-11 NOTE — BRIEF OPERATIVE NOTE - ESTIMATED BLOOD LOSS
5

## 2023-10-01 NOTE — ED PROCEDURE NOTE - CPROC ED WOUND CLOSURE1
subcutaneous suture
Yes
Birth Control Pills Pregnancy And Lactation Text: This medication should be avoided if pregnant and for the first 30 days post-partum.

## 2024-11-07 ENCOUNTER — INPATIENT (INPATIENT)
Facility: HOSPITAL | Age: 64
LOS: 28 days | Discharge: HOME HEALTH | DRG: 190 | End: 2024-12-06
Attending: INTERNAL MEDICINE | Admitting: STUDENT IN AN ORGANIZED HEALTH CARE EDUCATION/TRAINING PROGRAM
Payer: COMMERCIAL

## 2024-11-07 VITALS
HEIGHT: 66 IN | RESPIRATION RATE: 18 BRPM | SYSTOLIC BLOOD PRESSURE: 137 MMHG | WEIGHT: 222.01 LBS | HEART RATE: 84 BPM | DIASTOLIC BLOOD PRESSURE: 82 MMHG | OXYGEN SATURATION: 98 % | TEMPERATURE: 98 F

## 2024-11-07 DIAGNOSIS — E87.6 HYPOKALEMIA: ICD-10-CM

## 2024-11-07 DIAGNOSIS — E16.1 OTHER HYPOGLYCEMIA: ICD-10-CM

## 2024-11-07 DIAGNOSIS — L02.212 CUTANEOUS ABSCESS OF BACK [ANY PART, EXCEPT BUTTOCK]: Chronic | ICD-10-CM

## 2024-11-07 DIAGNOSIS — E66.9 OBESITY, UNSPECIFIED: ICD-10-CM

## 2024-11-07 DIAGNOSIS — F11.20 OPIOID DEPENDENCE, UNCOMPLICATED: ICD-10-CM

## 2024-11-07 DIAGNOSIS — E86.0 DEHYDRATION: ICD-10-CM

## 2024-11-07 DIAGNOSIS — R16.0 HEPATOMEGALY, NOT ELSEWHERE CLASSIFIED: ICD-10-CM

## 2024-11-07 DIAGNOSIS — I10 ESSENTIAL (PRIMARY) HYPERTENSION: ICD-10-CM

## 2024-11-07 DIAGNOSIS — Z98.84 BARIATRIC SURGERY STATUS: Chronic | ICD-10-CM

## 2024-11-07 DIAGNOSIS — J96.01 ACUTE RESPIRATORY FAILURE WITH HYPOXIA: ICD-10-CM

## 2024-11-07 DIAGNOSIS — J45.909 UNSPECIFIED ASTHMA, UNCOMPLICATED: ICD-10-CM

## 2024-11-07 DIAGNOSIS — R94.31 ABNORMAL ELECTROCARDIOGRAM [ECG] [EKG]: ICD-10-CM

## 2024-11-07 DIAGNOSIS — K29.70 GASTRITIS, UNSPECIFIED, WITHOUT BLEEDING: ICD-10-CM

## 2024-11-07 DIAGNOSIS — R21 RASH AND OTHER NONSPECIFIC SKIN ERUPTION: ICD-10-CM

## 2024-11-07 DIAGNOSIS — J45.901 UNSPECIFIED ASTHMA WITH (ACUTE) EXACERBATION: ICD-10-CM

## 2024-11-07 DIAGNOSIS — E87.20 ACIDOSIS, UNSPECIFIED: ICD-10-CM

## 2024-11-07 DIAGNOSIS — K76.0 FATTY (CHANGE OF) LIVER, NOT ELSEWHERE CLASSIFIED: ICD-10-CM

## 2024-11-07 DIAGNOSIS — Z98.84 BARIATRIC SURGERY STATUS: ICD-10-CM

## 2024-11-07 DIAGNOSIS — F32.A DEPRESSION, UNSPECIFIED: ICD-10-CM

## 2024-11-07 DIAGNOSIS — T40.2X5A ADVERSE EFFECT OF OTHER OPIOIDS, INITIAL ENCOUNTER: ICD-10-CM

## 2024-11-07 DIAGNOSIS — R55 SYNCOPE AND COLLAPSE: ICD-10-CM

## 2024-11-07 DIAGNOSIS — E11.9 TYPE 2 DIABETES MELLITUS WITHOUT COMPLICATIONS: ICD-10-CM

## 2024-11-07 DIAGNOSIS — T43.225A ADVERSE EFFECT OF SELECTIVE SEROTONIN REUPTAKE INHIBITORS, INITIAL ENCOUNTER: ICD-10-CM

## 2024-11-07 DIAGNOSIS — Z90.49 ACQUIRED ABSENCE OF OTHER SPECIFIED PARTS OF DIGESTIVE TRACT: ICD-10-CM

## 2024-11-07 DIAGNOSIS — R07.9 CHEST PAIN, UNSPECIFIED: ICD-10-CM

## 2024-11-07 DIAGNOSIS — Z87.891 PERSONAL HISTORY OF NICOTINE DEPENDENCE: ICD-10-CM

## 2024-11-07 DIAGNOSIS — T40.3X5A ADVERSE EFFECT OF METHADONE, INITIAL ENCOUNTER: ICD-10-CM

## 2024-11-07 DIAGNOSIS — K59.03 DRUG INDUCED CONSTIPATION: ICD-10-CM

## 2024-11-07 DIAGNOSIS — F10.10 ALCOHOL ABUSE, UNCOMPLICATED: ICD-10-CM

## 2024-11-07 DIAGNOSIS — Z79.890 HORMONE REPLACEMENT THERAPY: ICD-10-CM

## 2024-11-07 DIAGNOSIS — G89.29 OTHER CHRONIC PAIN: ICD-10-CM

## 2024-11-07 DIAGNOSIS — Z90.49 ACQUIRED ABSENCE OF OTHER SPECIFIED PARTS OF DIGESTIVE TRACT: Chronic | ICD-10-CM

## 2024-11-07 DIAGNOSIS — Z90.89 ACQUIRED ABSENCE OF OTHER ORGANS: Chronic | ICD-10-CM

## 2024-11-07 DIAGNOSIS — J44.1 CHRONIC OBSTRUCTIVE PULMONARY DISEASE WITH (ACUTE) EXACERBATION: ICD-10-CM

## 2024-11-07 DIAGNOSIS — Z88.0 ALLERGY STATUS TO PENICILLIN: ICD-10-CM

## 2024-11-07 DIAGNOSIS — E03.9 HYPOTHYROIDISM, UNSPECIFIED: ICD-10-CM

## 2024-11-07 PROCEDURE — 99285 EMERGENCY DEPT VISIT HI MDM: CPT

## 2024-11-07 RX ORDER — IOHEXOL 300 MG/ML
30 INJECTION, SOLUTION INTRAVENOUS ONCE
Refills: 0 | Status: COMPLETED | OUTPATIENT
Start: 2024-11-07 | End: 2024-11-08

## 2024-11-07 RX ORDER — SODIUM CHLORIDE 9 MG/ML
1000 INJECTION, SOLUTION INTRAMUSCULAR; INTRAVENOUS; SUBCUTANEOUS ONCE
Refills: 0 | Status: DISCONTINUED | OUTPATIENT
Start: 2024-11-07 | End: 2024-11-07

## 2024-11-07 NOTE — ED ADULT TRIAGE NOTE - PAIN RATING/NUMBER SCALE (0-10): ACTIVITY
0 (no pain/absence of nonverbal indicators of pain) Cyclophosphamide Counseling:  I discussed with the patient the risks of cyclophosphamide including but not limited to hair loss, hormonal abnormalities, decreased fertility, abdominal pain, diarrhea, nausea and vomiting, bone marrow suppression and infection. The patient understands that monitoring is required while taking this medication.

## 2024-11-07 NOTE — ED ADULT TRIAGE NOTE - CHIEF COMPLAINT QUOTE
Pt presents to the ED c/o of near syncope at home. Pt was in the shower when she started to feel dizzy and almost passed out. Pt never fell as  caught her. Pt alert in triage

## 2024-11-08 ENCOUNTER — RESULT REVIEW (OUTPATIENT)
Age: 64
End: 2024-11-08

## 2024-11-08 DIAGNOSIS — R07.9 CHEST PAIN, UNSPECIFIED: ICD-10-CM

## 2024-11-08 LAB
ALBUMIN SERPL ELPH-MCNC: 3.8 G/DL — SIGNIFICANT CHANGE UP (ref 3.5–5.2)
ALBUMIN SERPL ELPH-MCNC: 3.8 G/DL — SIGNIFICANT CHANGE UP (ref 3.5–5.2)
ALP SERPL-CCNC: 194 U/L — HIGH (ref 30–115)
ALP SERPL-CCNC: 196 U/L — HIGH (ref 30–115)
ALT FLD-CCNC: 36 U/L — SIGNIFICANT CHANGE UP (ref 0–41)
ALT FLD-CCNC: 36 U/L — SIGNIFICANT CHANGE UP (ref 0–41)
ANION GAP SERPL CALC-SCNC: 16 MMOL/L — HIGH (ref 7–14)
ANION GAP SERPL CALC-SCNC: 18 MMOL/L — HIGH (ref 7–14)
AST SERPL-CCNC: 58 U/L — HIGH (ref 0–41)
AST SERPL-CCNC: 77 U/L — HIGH (ref 0–41)
BASE EXCESS BLDV CALC-SCNC: 7.8 MMOL/L — HIGH (ref -2–3)
BASOPHILS # BLD AUTO: 0.09 K/UL — SIGNIFICANT CHANGE UP (ref 0–0.2)
BASOPHILS NFR BLD AUTO: 1.1 % — HIGH (ref 0–1)
BILIRUB DIRECT SERPL-MCNC: 0.4 MG/DL — HIGH (ref 0–0.3)
BILIRUB INDIRECT FLD-MCNC: 0.6 MG/DL — SIGNIFICANT CHANGE UP (ref 0.2–1.2)
BILIRUB SERPL-MCNC: 1 MG/DL — SIGNIFICANT CHANGE UP (ref 0.2–1.2)
BILIRUB SERPL-MCNC: 1 MG/DL — SIGNIFICANT CHANGE UP (ref 0.2–1.2)
BUN SERPL-MCNC: 7 MG/DL — LOW (ref 10–20)
BUN SERPL-MCNC: 7 MG/DL — LOW (ref 10–20)
CA-I SERPL-SCNC: 1.15 MMOL/L — SIGNIFICANT CHANGE UP (ref 1.15–1.33)
CALCIUM SERPL-MCNC: 8.8 MG/DL — SIGNIFICANT CHANGE UP (ref 8.4–10.4)
CALCIUM SERPL-MCNC: 8.9 MG/DL — SIGNIFICANT CHANGE UP (ref 8.4–10.5)
CHLORIDE SERPL-SCNC: 91 MMOL/L — LOW (ref 98–110)
CHLORIDE SERPL-SCNC: 95 MMOL/L — LOW (ref 98–110)
CO2 SERPL-SCNC: 23 MMOL/L — SIGNIFICANT CHANGE UP (ref 17–32)
CO2 SERPL-SCNC: 27 MMOL/L — SIGNIFICANT CHANGE UP (ref 17–32)
CREAT SERPL-MCNC: 1 MG/DL — SIGNIFICANT CHANGE UP (ref 0.7–1.5)
CREAT SERPL-MCNC: 1 MG/DL — SIGNIFICANT CHANGE UP (ref 0.7–1.5)
EGFR: 64 ML/MIN/1.73M2 — SIGNIFICANT CHANGE UP
EGFR: 64 ML/MIN/1.73M2 — SIGNIFICANT CHANGE UP
EOSINOPHIL # BLD AUTO: 0.3 K/UL — SIGNIFICANT CHANGE UP (ref 0–0.7)
EOSINOPHIL NFR BLD AUTO: 3.5 % — SIGNIFICANT CHANGE UP (ref 0–8)
GAS PNL BLDV: 131 MMOL/L — LOW (ref 136–145)
GAS PNL BLDV: SIGNIFICANT CHANGE UP
GAS PNL BLDV: SIGNIFICANT CHANGE UP
GLUCOSE SERPL-MCNC: 114 MG/DL — HIGH (ref 70–99)
GLUCOSE SERPL-MCNC: 122 MG/DL — HIGH (ref 70–99)
HCO3 BLDV-SCNC: 34 MMOL/L — HIGH (ref 22–29)
HCT VFR BLD CALC: 46.6 % — SIGNIFICANT CHANGE UP (ref 37–47)
HGB BLD-MCNC: 16 G/DL — SIGNIFICANT CHANGE UP (ref 12–16)
IMM GRANULOCYTES NFR BLD AUTO: 1.3 % — HIGH (ref 0.1–0.3)
LACTATE BLDV-MCNC: 3.8 MMOL/L — HIGH (ref 0.5–2)
LACTATE SERPL-SCNC: 2.4 MMOL/L — HIGH (ref 0.7–2)
LACTATE SERPL-SCNC: 2.6 MMOL/L — HIGH (ref 0.7–2)
LYMPHOCYTES # BLD AUTO: 1.24 K/UL — SIGNIFICANT CHANGE UP (ref 1.2–3.4)
LYMPHOCYTES # BLD AUTO: 14.6 % — LOW (ref 20.5–51.1)
MAGNESIUM SERPL-MCNC: 2.7 MG/DL — HIGH (ref 1.8–2.4)
MCHC RBC-ENTMCNC: 31.3 PG — HIGH (ref 27–31)
MCHC RBC-ENTMCNC: 34.3 G/DL — SIGNIFICANT CHANGE UP (ref 32–37)
MCV RBC AUTO: 91 FL — SIGNIFICANT CHANGE UP (ref 81–99)
MONOCYTES # BLD AUTO: 0.65 K/UL — HIGH (ref 0.1–0.6)
MONOCYTES NFR BLD AUTO: 7.7 % — SIGNIFICANT CHANGE UP (ref 1.7–9.3)
NEUTROPHILS # BLD AUTO: 6.1 K/UL — SIGNIFICANT CHANGE UP (ref 1.4–6.5)
NEUTROPHILS NFR BLD AUTO: 71.8 % — SIGNIFICANT CHANGE UP (ref 42.2–75.2)
NRBC # BLD: 0 /100 WBCS — SIGNIFICANT CHANGE UP (ref 0–0)
NT-PROBNP SERPL-SCNC: 162 PG/ML — SIGNIFICANT CHANGE UP (ref 0–300)
PCO2 BLDV: 54 MMHG — HIGH (ref 39–42)
PH BLDV: 7.41 — SIGNIFICANT CHANGE UP (ref 7.32–7.43)
PHOSPHATE SERPL-MCNC: 3.2 MG/DL — SIGNIFICANT CHANGE UP (ref 2.1–4.9)
PLATELET # BLD AUTO: 183 K/UL — SIGNIFICANT CHANGE UP (ref 130–400)
PMV BLD: 10.7 FL — HIGH (ref 7.4–10.4)
PO2 BLDV: 17 MMHG — LOW (ref 25–45)
POTASSIUM BLDV-SCNC: 3.1 MMOL/L — LOW (ref 3.5–5.1)
POTASSIUM SERPL-MCNC: 2.9 MMOL/L — LOW (ref 3.5–5)
POTASSIUM SERPL-MCNC: 5.2 MMOL/L — HIGH (ref 3.5–5)
POTASSIUM SERPL-SCNC: 2.9 MMOL/L — LOW (ref 3.5–5)
POTASSIUM SERPL-SCNC: 5.2 MMOL/L — HIGH (ref 3.5–5)
PROT SERPL-MCNC: 6.5 G/DL — SIGNIFICANT CHANGE UP (ref 6–8)
PROT SERPL-MCNC: 6.9 G/DL — SIGNIFICANT CHANGE UP (ref 6–8)
RBC # BLD: 5.12 M/UL — SIGNIFICANT CHANGE UP (ref 4.2–5.4)
RBC # FLD: 12.5 % — SIGNIFICANT CHANGE UP (ref 11.5–14.5)
SAO2 % BLDV: 19 % — LOW (ref 67–88)
SODIUM SERPL-SCNC: 134 MMOL/L — LOW (ref 135–146)
SODIUM SERPL-SCNC: 136 MMOL/L — SIGNIFICANT CHANGE UP (ref 135–146)
TROPONIN T, HIGH SENSITIVITY RESULT: 14 NG/L — HIGH (ref 6–13)
TROPONIN T, HIGH SENSITIVITY RESULT: 14 NG/L — HIGH (ref 6–13)
WBC # BLD: 8.49 K/UL — SIGNIFICANT CHANGE UP (ref 4.8–10.8)
WBC # FLD AUTO: 8.49 K/UL — SIGNIFICANT CHANGE UP (ref 4.8–10.8)

## 2024-11-08 PROCEDURE — 97110 THERAPEUTIC EXERCISES: CPT | Mod: GP

## 2024-11-08 PROCEDURE — 85025 COMPLETE CBC W/AUTO DIFF WBC: CPT

## 2024-11-08 PROCEDURE — 86901 BLOOD TYPING SEROLOGIC RH(D): CPT

## 2024-11-08 PROCEDURE — 74177 CT ABD & PELVIS W/CONTRAST: CPT | Mod: 26,MC

## 2024-11-08 PROCEDURE — 74018 RADEX ABDOMEN 1 VIEW: CPT | Mod: 26

## 2024-11-08 PROCEDURE — 76705 ECHO EXAM OF ABDOMEN: CPT

## 2024-11-08 PROCEDURE — 94640 AIRWAY INHALATION TREATMENT: CPT

## 2024-11-08 PROCEDURE — 82607 VITAMIN B-12: CPT

## 2024-11-08 PROCEDURE — 84206 ASSAY OF PROINSULIN: CPT

## 2024-11-08 PROCEDURE — 80053 COMPREHEN METABOLIC PANEL: CPT

## 2024-11-08 PROCEDURE — 86900 BLOOD TYPING SEROLOGIC ABO: CPT

## 2024-11-08 PROCEDURE — 84443 ASSAY THYROID STIM HORMONE: CPT

## 2024-11-08 PROCEDURE — 36415 COLL VENOUS BLD VENIPUNCTURE: CPT

## 2024-11-08 PROCEDURE — 99222 1ST HOSP IP/OBS MODERATE 55: CPT

## 2024-11-08 PROCEDURE — 85610 PROTHROMBIN TIME: CPT

## 2024-11-08 PROCEDURE — 71045 X-RAY EXAM CHEST 1 VIEW: CPT

## 2024-11-08 PROCEDURE — 83605 ASSAY OF LACTIC ACID: CPT

## 2024-11-08 PROCEDURE — 82962 GLUCOSE BLOOD TEST: CPT

## 2024-11-08 PROCEDURE — 80048 BASIC METABOLIC PNL TOTAL CA: CPT

## 2024-11-08 PROCEDURE — 88312 SPECIAL STAINS GROUP 1: CPT

## 2024-11-08 PROCEDURE — 0241U: CPT

## 2024-11-08 PROCEDURE — 83036 HEMOGLOBIN GLYCOSYLATED A1C: CPT

## 2024-11-08 PROCEDURE — 85027 COMPLETE CBC AUTOMATED: CPT

## 2024-11-08 PROCEDURE — 82010 KETONE BODYS QUAN: CPT

## 2024-11-08 PROCEDURE — 84436 ASSAY OF TOTAL THYROXINE: CPT

## 2024-11-08 PROCEDURE — 81001 URINALYSIS AUTO W/SCOPE: CPT

## 2024-11-08 PROCEDURE — 74174 CTA ABD&PLVS W/CONTRAST: CPT | Mod: MC

## 2024-11-08 PROCEDURE — 93010 ELECTROCARDIOGRAM REPORT: CPT

## 2024-11-08 PROCEDURE — 84681 ASSAY OF C-PEPTIDE: CPT

## 2024-11-08 PROCEDURE — 97162 PT EVAL MOD COMPLEX 30 MIN: CPT | Mod: GP

## 2024-11-08 PROCEDURE — 95819 EEG AWAKE AND ASLEEP: CPT

## 2024-11-08 PROCEDURE — 80076 HEPATIC FUNCTION PANEL: CPT

## 2024-11-08 PROCEDURE — 82803 BLOOD GASES ANY COMBINATION: CPT

## 2024-11-08 PROCEDURE — 93005 ELECTROCARDIOGRAM TRACING: CPT

## 2024-11-08 PROCEDURE — 83735 ASSAY OF MAGNESIUM: CPT

## 2024-11-08 PROCEDURE — 93306 TTE W/DOPPLER COMPLETE: CPT | Mod: 26

## 2024-11-08 PROCEDURE — 93306 TTE W/DOPPLER COMPLETE: CPT

## 2024-11-08 PROCEDURE — 88305 TISSUE EXAM BY PATHOLOGIST: CPT

## 2024-11-08 PROCEDURE — 82140 ASSAY OF AMMONIA: CPT

## 2024-11-08 PROCEDURE — 85730 THROMBOPLASTIN TIME PARTIAL: CPT

## 2024-11-08 PROCEDURE — 84100 ASSAY OF PHOSPHORUS: CPT

## 2024-11-08 PROCEDURE — 71045 X-RAY EXAM CHEST 1 VIEW: CPT | Mod: 26

## 2024-11-08 PROCEDURE — 80061 LIPID PANEL: CPT

## 2024-11-08 PROCEDURE — 74018 RADEX ABDOMEN 1 VIEW: CPT

## 2024-11-08 PROCEDURE — 97530 THERAPEUTIC ACTIVITIES: CPT | Mod: GP

## 2024-11-08 PROCEDURE — 70450 CT HEAD/BRAIN W/O DYE: CPT | Mod: MC

## 2024-11-08 PROCEDURE — 97116 GAIT TRAINING THERAPY: CPT | Mod: GP

## 2024-11-08 PROCEDURE — 87040 BLOOD CULTURE FOR BACTERIA: CPT

## 2024-11-08 PROCEDURE — 93970 EXTREMITY STUDY: CPT

## 2024-11-08 PROCEDURE — 86850 RBC ANTIBODY SCREEN: CPT

## 2024-11-08 PROCEDURE — 93307 TTE W/O DOPPLER COMPLETE: CPT

## 2024-11-08 PROCEDURE — 71275 CT ANGIOGRAPHY CHEST: CPT | Mod: 26,MC

## 2024-11-08 PROCEDURE — 83880 ASSAY OF NATRIURETIC PEPTIDE: CPT

## 2024-11-08 RX ORDER — POLYETHYLENE GLYCOL 3350 17 G/17G
17 POWDER, FOR SOLUTION ORAL
Refills: 0 | Status: DISCONTINUED | OUTPATIENT
Start: 2024-11-08 | End: 2024-12-06

## 2024-11-08 RX ORDER — CYCLOBENZAPRINE HCL 10 MG
5 TABLET ORAL THREE TIMES A DAY
Refills: 0 | Status: DISCONTINUED | OUTPATIENT
Start: 2024-11-08 | End: 2024-11-13

## 2024-11-08 RX ORDER — GLUCOSAMINE SULFATE DIPOT CHLR 500 MG
1 CAPSULE ORAL DAILY
Refills: 0 | Status: DISCONTINUED | OUTPATIENT
Start: 2024-11-08 | End: 2024-12-06

## 2024-11-08 RX ORDER — ONDANSETRON HYDROCHLORIDE 4 MG/1
4 TABLET, FILM COATED ORAL EVERY 8 HOURS
Refills: 0 | Status: DISCONTINUED | OUTPATIENT
Start: 2024-11-08 | End: 2024-11-10

## 2024-11-08 RX ORDER — PANTOPRAZOLE SODIUM 40 MG/1
40 TABLET, DELAYED RELEASE ORAL
Refills: 0 | Status: DISCONTINUED | OUTPATIENT
Start: 2024-11-08 | End: 2024-11-17

## 2024-11-08 RX ORDER — METHADONE HYDROCHLORIDE 5 MG/1
30 TABLET ORAL EVERY 8 HOURS
Refills: 0 | Status: DISCONTINUED | OUTPATIENT
Start: 2024-11-08 | End: 2024-11-15

## 2024-11-08 RX ORDER — ACETAMINOPHEN 500MG 500 MG/1
650 TABLET, COATED ORAL EVERY 6 HOURS
Refills: 0 | Status: DISCONTINUED | OUTPATIENT
Start: 2024-11-08 | End: 2024-11-14

## 2024-11-08 RX ORDER — HYDROMORPHONE HYDROCHLORIDE 2 MG/1
0.5 TABLET ORAL EVERY 6 HOURS
Refills: 0 | Status: DISCONTINUED | OUTPATIENT
Start: 2024-11-08 | End: 2024-11-09

## 2024-11-08 RX ORDER — LAMOTRIGINE 50 MG/1
1 TABLET, EXTENDED RELEASE ORAL
Refills: 0 | DISCHARGE

## 2024-11-08 RX ORDER — SERTRALINE HYDROCHLORIDE 100 MG/1
200 TABLET, FILM COATED ORAL DAILY
Refills: 0 | Status: DISCONTINUED | OUTPATIENT
Start: 2024-11-08 | End: 2024-11-08

## 2024-11-08 RX ORDER — MAGNESIUM, ALUMINUM HYDROXIDE 200-225/5
30 SUSPENSION, ORAL (FINAL DOSE FORM) ORAL EVERY 4 HOURS
Refills: 0 | Status: DISCONTINUED | OUTPATIENT
Start: 2024-11-08 | End: 2024-11-21

## 2024-11-08 RX ORDER — BUMETANIDE 1 MG/1
1 TABLET ORAL DAILY
Refills: 0 | Status: DISCONTINUED | OUTPATIENT
Start: 2024-11-08 | End: 2024-12-06

## 2024-11-08 RX ORDER — SERTRALINE HYDROCHLORIDE 100 MG/1
200 TABLET, FILM COATED ORAL DAILY
Refills: 0 | Status: DISCONTINUED | OUTPATIENT
Start: 2024-11-08 | End: 2024-12-06

## 2024-11-08 RX ORDER — LEVOTHYROXINE SODIUM 150 MCG
88 TABLET ORAL DAILY
Refills: 0 | Status: DISCONTINUED | OUTPATIENT
Start: 2024-11-08 | End: 2024-11-12

## 2024-11-08 RX ORDER — ONDANSETRON HYDROCHLORIDE 4 MG/1
4 TABLET, FILM COATED ORAL ONCE
Refills: 0 | Status: COMPLETED | OUTPATIENT
Start: 2024-11-08 | End: 2024-11-08

## 2024-11-08 RX ORDER — 0.9 % SODIUM CHLORIDE 0.9 %
1000 INTRAVENOUS SOLUTION INTRAVENOUS
Refills: 0 | Status: DISCONTINUED | OUTPATIENT
Start: 2024-11-08 | End: 2024-11-08

## 2024-11-08 RX ORDER — SENNOSIDES 8.6 MG
2 TABLET ORAL AT BEDTIME
Refills: 0 | Status: DISCONTINUED | OUTPATIENT
Start: 2024-11-08 | End: 2024-12-06

## 2024-11-08 RX ORDER — LAMOTRIGINE 50 MG/1
150 TABLET, EXTENDED RELEASE ORAL DAILY
Refills: 0 | Status: DISCONTINUED | OUTPATIENT
Start: 2024-11-08 | End: 2024-12-06

## 2024-11-08 RX ORDER — HEPARIN SODIUM,PORCINE 1000/ML
5000 VIAL (ML) INJECTION EVERY 8 HOURS
Refills: 0 | Status: DISCONTINUED | OUTPATIENT
Start: 2024-11-08 | End: 2024-12-06

## 2024-11-08 RX ORDER — KETOROLAC TROMETHAMINE 30 MG/ML
15 INJECTION INTRAMUSCULAR; INTRAVENOUS ONCE
Refills: 0 | Status: DISCONTINUED | OUTPATIENT
Start: 2024-11-08 | End: 2024-11-08

## 2024-11-08 RX ORDER — 0.9 % SODIUM CHLORIDE 0.9 %
1000 INTRAVENOUS SOLUTION INTRAVENOUS
Refills: 0 | Status: DISCONTINUED | OUTPATIENT
Start: 2024-11-08 | End: 2024-11-18

## 2024-11-08 RX ORDER — ACETAMINOPHEN, DIPHENHYDRAMINE HCL, PHENYLEPHRINE HCL 325; 25; 5 MG/1; MG/1; MG/1
3 TABLET ORAL AT BEDTIME
Refills: 0 | Status: DISCONTINUED | OUTPATIENT
Start: 2024-11-08 | End: 2024-12-06

## 2024-11-08 RX ADMIN — METHADONE HYDROCHLORIDE 30 MILLIGRAM(S): 5 TABLET ORAL at 13:12

## 2024-11-08 RX ADMIN — IOHEXOL 30 MILLILITER(S): 300 INJECTION, SOLUTION INTRAVENOUS at 00:23

## 2024-11-08 RX ADMIN — Medication 4 MILLIGRAM(S): at 04:48

## 2024-11-08 RX ADMIN — ONDANSETRON HYDROCHLORIDE 4 MILLIGRAM(S): 4 TABLET, FILM COATED ORAL at 03:00

## 2024-11-08 RX ADMIN — Medication 4 MILLIGRAM(S): at 08:06

## 2024-11-08 RX ADMIN — KETOROLAC TROMETHAMINE 15 MILLIGRAM(S): 30 INJECTION INTRAMUSCULAR; INTRAVENOUS at 03:00

## 2024-11-08 RX ADMIN — KETOROLAC TROMETHAMINE 15 MILLIGRAM(S): 30 INJECTION INTRAMUSCULAR; INTRAVENOUS at 13:27

## 2024-11-08 RX ADMIN — SERTRALINE HYDROCHLORIDE 200 MILLIGRAM(S): 100 TABLET, FILM COATED ORAL at 13:13

## 2024-11-08 RX ADMIN — Medication 1 MILLIGRAM(S): at 13:12

## 2024-11-08 RX ADMIN — Medication 4 MILLIGRAM(S): at 13:27

## 2024-11-08 RX ADMIN — Medication 4 MILLIGRAM(S): at 09:07

## 2024-11-08 RX ADMIN — Medication 25 GRAM(S): at 10:55

## 2024-11-08 RX ADMIN — Medication 2 TABLET(S): at 21:53

## 2024-11-08 RX ADMIN — HYDROMORPHONE HYDROCHLORIDE 0.5 MILLIGRAM(S): 2 TABLET ORAL at 17:58

## 2024-11-08 RX ADMIN — Medication 75 MILLILITER(S): at 13:59

## 2024-11-08 RX ADMIN — HYDROMORPHONE HYDROCHLORIDE 0.5 MILLIGRAM(S): 2 TABLET ORAL at 11:35

## 2024-11-08 RX ADMIN — HYDROMORPHONE HYDROCHLORIDE 0.5 MILLIGRAM(S): 2 TABLET ORAL at 13:26

## 2024-11-08 RX ADMIN — LAMOTRIGINE 150 MILLIGRAM(S): 50 TABLET, EXTENDED RELEASE ORAL at 11:35

## 2024-11-08 RX ADMIN — METHADONE HYDROCHLORIDE 30 MILLIGRAM(S): 5 TABLET ORAL at 21:53

## 2024-11-08 RX ADMIN — ONDANSETRON HYDROCHLORIDE 4 MILLIGRAM(S): 4 TABLET, FILM COATED ORAL at 20:46

## 2024-11-08 RX ADMIN — Medication 25 GRAM(S): at 00:23

## 2024-11-08 NOTE — H&P ADULT - NSHPLABSRESULTS_GEN_ALL_CORE
16.0   8.49  )-----------( 183      ( 08 Nov 2024 00:30 )             46.6     11-08    136  |  95[L]  |  7[L]  ----------------------------<  114[H]  5.2[H]   |  23  |  1.0    Ca    8.9      08 Nov 2024 00:30    TPro  6.9  /  Alb  3.8  /  TBili  1.0  /  DBili  x   /  AST  77[H]  /  ALT  36  /  AlkPhos  194[H]  11-08      Urinalysis Basic - ( 08 Nov 2024 00:30 )    Color: x / Appearance: x / SG: x / pH: x  Gluc: 114 mg/dL / Ketone: x  / Bili: x / Urobili: x   Blood: x / Protein: x / Nitrite: x   Leuk Esterase: x / RBC: x / WBC x   Sq Epi: x / Non Sq Epi: x / Bacteria: x            RADIOLOGY, EKG & ADDITIONAL TESTS: Reviewed.

## 2024-11-08 NOTE — ED ADULT NURSE REASSESSMENT NOTE - NS ED NURSE REASSESS COMMENT FT1
This RN assessed pt, pt reports moderate pain 7/10, pt given morphine, pt is ax4, on room air, vitals are stable, pt is connected to the monitor, bed in low position, side rails are raised, call light within reach, breath sounds are non labored, skin is dry & intact, pt is aware of current POC.

## 2024-11-08 NOTE — ED PROVIDER NOTE - CARE PLAN
Principal Discharge DX:	SOB (shortness of breath) on exertion  Secondary Diagnosis:	Intractable abdominal pain  Secondary Diagnosis:	Hepatomegaly  Secondary Diagnosis:	Syncope  Secondary Diagnosis:	Chest pain   1

## 2024-11-08 NOTE — ED PROVIDER NOTE - OBJECTIVE STATEMENT
64-year-old female, former smoker with past medical history of asthma, hypothyroidism, anxiety, depression, MVP, Jaclyn-en-Y, appendectomy, cholecystectomy, presents to the ED with worsening shortness of breath worse with exertion over the past month.  Also reports syncopal episode last night after taking a shower.  She was sitting and did not fall and did not hit her head.  She also reports intermittent chest pain, nausea, vomiting, decreased p.o. intake.  Follows with Dr. Negrete and had a cath within the past 5 to 10 years, but never had a stress test.  Patient drinks a couple glasses of scotch nightly.

## 2024-11-08 NOTE — H&P ADULT - NSHPPHYSICALEXAM_GEN_ALL_CORE
GENERAL: NAD, well-groomed, well-developed  HEAD:  Atraumatic, Normocephalic  EYES: EOMI, PERRLA, conjunctiva and sclera clear  ENMT: No tonsillar erythema, exudates, or enlargement; Moist mucous membranes  NECK: Supple, No JVD, Normal thyroid  HEART: Regular rate and rhythm; No murmurs, rubs, or gallops  RESPIRATORY: CTA B/L, No W/R/R  ABDOMEN: Soft, Nontender, Nondistended; Bowel sounds present  NEUROLOGY: A&Ox3, nonfocal, moving all extremities  EXTREMITIES:  2+ Peripheral Pulses, No clubbing, cyanosis, or edema  SKIN: warm, dry, normal color, no rash or abnormal lesions GENERAL: NAD, well-groomed, well-developed  HEAD:  Atraumatic, Normocephalic  EYES: EOMI, PERRLA, conjunctiva and sclera clear  ENMT: No tonsillar erythema, exudates, or enlargement; Moist mucous membranes  NECK: Supple, No JVD, Normal thyroid  HEART: Regular rate and rhythm; No murmurs, rubs, or gallops  RESPIRATORY: CTA B/L, No W/R/R  ABDOMEN: Soft, Nondistended; Bowel sounds present; Generalized pain in all quadrants   NEUROLOGY: A&Ox3, nonfocal, moving all extremities  EXTREMITIES:  2+ Peripheral Pulses, No clubbing, cyanosis,; edema 1+  SKIN: warm, dry, normal color, no rash or abnormal lesions GENERAL: NAD, well-groomed, well-developed  HEAD:  Atraumatic, Normocephalic  EYES: EOMI, PERRLA, conjunctiva and sclera clear  ENMT: No tonsillar erythema, exudates, or enlargement; Moist mucous membranes  NECK: Supple, No JVD, Normal thyroid  HEART: Regular rate and rhythm; No murmurs, rubs, or gallops  RESPIRATORY: CTA B/L, No W/R/R  ABDOMEN: Soft, +ve mild distention, mild tenderness at lower abd,Bowel sounds present;  NEUROLOGY: A&Ox3, nonfocal, moving all extremities  EXTREMITIES:  2+ Peripheral Pulses, No clubbing, cyanosis,; no LE edema  SKIN: warm, dry, normal color, no rash or abnormal lesions

## 2024-11-08 NOTE — H&P ADULT - ATTENDING COMMENTS
64 oy F w asthma, hypothyroidism, anxiety, depression, MVP, Jaclyn-en-Y gastric bypass, appendectomy, cholecystectomy and Chronic back pain on methadone, presents to the ED following a syncopal episode,  also c/o dyspnea on exertion and abd pain.     #syncope  #acute on chronic abd pain  #h/o Jaclyn en Y bypass   #lactic acidosis   #HORTON  #alcohol abuse  #hepatic steatosis, transaminitis   #on chronic methadone     PLANs    - syncope ( for few sec while taking shower, preceded by dizziness, no post ictal phase)  - suspected vasovagal, check orthostatic BP  - abd exam w only mild Lower tenderness, normal CT scan, last BM 5 days, pending KUB, check Rgiht upper Q US  - lactate noted, repeat after IVF  - asking for high dose morphine, give Dilaudid 0.5mg pain team eval  - no hypoxia, check ambulatory O2 and TTE  -  check Utox, serum alcohol, CIWA monitoring  - home meds: methadone, Bumex, trileptal, synthroid, zoloft   - dvt ppx w heparin sq

## 2024-11-08 NOTE — ED PROVIDER NOTE - PHYSICAL EXAMINATION
GENERAL: Uncomfortable but nontoxic appearing   SKIN: warm, dry, no rashes   HEAD: Normocephalic; atraumatic.  EYES: 2mm pupils, PERRLA, EOMI, no conjunctival erythema  ENT: No nasal discharge; airway clear. MMM   NECK: Supple; non tender.  CARD: Regular rate and rhythm. S1, S2 normal; no murmurs, gallops, or rubs.   RESP: slight L sided wheeze; No rales, rhonchi, or stridor. Takes some pauses to breath mid-sentence   ABD: firm, diffusely tender with guarding, hepatomegaly   EXT: Normal ROM.  2+ BLE edema   NEURO: A/ox3, grossly unremarkable  PSYCH: Cooperative, appropriate.

## 2024-11-08 NOTE — ED ADULT NURSE REASSESSMENT NOTE - NS ED NURSE REASSESS COMMENT FT1
pt is resting, pt just returned from echo, pt reports decrease pain, pts vitals are stable, pt is connected to the monitor, no other concerns are noted.

## 2024-11-08 NOTE — H&P ADULT - HISTORY OF PRESENT ILLNESS
64-year-old female, former smoker with past medical history of asthma, hypothyroidism, anxiety, depression, MVP, Jaclyn-en-Y gastric bypass, appendectomy, cholecystectomy and Chronic back pain on methadone, presents to the ED following a syncopal episode last night. She also has complaints of worsening abdominal pain for 5 days.     Pt was sitting down while  is helping her dry after a shower when she felt dizzy and lost consciousness for a brief moment. No truama. No  incontinence or shaking movements. No chest pain or sudden SOB. However, pt endorses having reduced effort tolerance and shortness of breath for the past 2-3 months. She reportedly uses nebs/albuterol on almost a daily basis. She follows with Dr. Negrete (cardiology). Per pt, cath was done 5-10 yrs back which was normal. She is due for a stress test. Takes bumex daily for LE swelling.       She also reports of nausea, vomiting, decreased p.o. intake and worsening abdominal pain for the past 5 days. Pain in localized to right lower quadrant but radiates all over the abd region. Not passed stool in the past 5 days, says didn't what much.    Patient drinks a couple glasses of scotch nightly.    ED vitals - HR -  84/min; BP-  137/82mmhg; RR -  18/min; SPO2 -  98%; Afebrile  ECG - nsr, irbbb, qtc 603  labs - AST/ ALT 77/36, ALP - 194; Lactate 3.0  CT abdomen - hepatomegaly, no other acute findings     Pt admitted for Syncope and Abdominal pain w/u.

## 2024-11-08 NOTE — H&P ADULT - ASSESSMENT
INCOMPLETE NOTE 64-year-old female, former smoker with past medical history of asthma, hypothyroidism, anxiety, depression, MVP, Jaclyn-en-Y gastric bypass, appendectomy, cholecystectomy and Chronic back pain on methadone, presents to the ED following a syncopal episode last night. She also has complaints of worsening abdominal pain for 5 days.       #Syncope likely vasovagal vs orthostatic 2/2 dehydration  - ECG with prolonged qtc, no obvious ischemia, trops x2 negative  - s/p 1L IVF in ED   - Obtain Orthostatics   - Tele monitoring   - Rpt ECG       #Abdominal pain - generalized - unclear etiology  #Nausea, Vomiting, Decreased appetite   #h/o Cholecystectomy/ appendectomy/ ? h/o colon ca  #h/o Jaclyn en Y bypass   - elevated AST/ALT 77/36, ALP - 194; Lactate 3.0  - CT abdomen - Hepatomegaly   - EGD/ Colonoscopy (May '23) - Non erosive gastritis  - will obtain RUQ sonography   - Utox, alcohol lvl,   - rpt lactate at 11AM   - c/w pantop (home meds )      #Shortness of breath - progressive - likely asthma related   #h/o Asthma   #h/o MVP (follows Dr. Negrete)  #h/o chronic bilateral LE swelling   #HTN  - CTA chest - no PE   - Satting well at [facility], Lung and heart PE - normal  - Duonebs prn   - obtain TTE    #Hypothyroidism,   - c/w home meds - 88mcg thyroxine     #anxiety, depression,       #Chronic back pain   - on methadone 30mg   - pain medicine consulted       #Alcohol use   - 2-3 drinks per fday  - CIWA monitoring   - c/w b12 and folate       VTE ppx - heparin SC  GI ppx - pantoprazole (home meds)  Activity as tolerated  DASH/DM diet   Full Code      64-year-old female, former smoker with past medical history of asthma, hypothyroidism, anxiety, depression, MVP, Jaclyn-en-Y gastric bypass, appendectomy, cholecystectomy and Chronic back pain on methadone, presents to the ED following a syncopal episode last night. She also has complaints of worsening abdominal pain for 5 days.       #Syncope likely vasovagal vs orthostatic 2/2 dehydration  - ECG with prolonged qtc, no obvious ischemia, trops x2 negative  - s/p 1L IVF in ED, IVF @75ml/hr maintainence   - Obtain Orthostatics   - Tele monitoring   - Rpt ECG       #Abdominal pain - generalized - unclear etiology  #Nausea, Vomiting, Decreased appetite   #h/o Cholecystectomy/ appendectomy/ ? h/o colon ca  #h/o Jaclyn en Y bypass   - elevated AST/ALT 77/36, ALP - 194; Lactate 3.0  - CT abdomen - Hepatomegaly   - EGD/ Colonoscopy (May '23) - Non erosive gastritis  - will obtain RUQ sonography   - Utox, alcohol lvl,   - rpt lactate at 11AM   - c/w pantop (home meds )      #Shortness of breath - progressive - likely asthma related   #h/o Asthma   #h/o MVP (follows Dr. Negrete)  #h/o chronic bilateral LE swelling   #HTN  - CTA chest - no PE   - Satting well at [facility], Lung and heart PE - normal  - Duonebs prn   - obtain TTE    #Hypothyroidism,   - c/w home meds - 88mcg thyroxine   - f/u AM TSH    #anxiety, depression,  - c/w lamotrigine and zoloft        #Chronic back pain   - on methadone 30mg   - pain medicine consulted   - dilaudid 0.5 qb prn x 1 day      #Alcohol use   - 2-3 drinks per fday  - CIWA monitoring   - c/w b12 and folate       VTE ppx - heparin SC  GI ppx - pantoprazole (home meds)  Activity as tolerated  DASH/DM diet   Full Code

## 2024-11-08 NOTE — ED ADULT NURSE REASSESSMENT NOTE - NS ED NURSE REASSESS COMMENT FT1
Received pt from ongoing nurse, pt AAOX4, pt was given pain meds for mid abd pain and verbalized relief, no acute distress noted, will continue to monitor. Received pt from ongoing nurse, pt AAOX4, pt was given pain meds for mid abd pain and verbalized no relief, no acute distress noted, will continue to monitor. Stable

## 2024-11-08 NOTE — ED ADULT NURSE REASSESSMENT NOTE - NS ED NURSE REASSESS COMMENT FT1
pt is resting, bed is in low position, side rails are raised, call light is within reach, breath sounds are non labored, pt appears comfortable, no other concerns are noted.

## 2024-11-08 NOTE — ED PROVIDER NOTE - CLINICAL SUMMARY MEDICAL DECISION MAKING FREE TEXT BOX
64-year-old female, former smoker with past medical history of asthma, hypothyroidism, anxiety, depression, MVP, Jaclyn-en-Y, appendectomy, cholecystectomy, presents to the ED with worsening shortness of breath worse with exertion over the past month associated with LE edema, syncopal episode prior to arrival. also with nausea and vomiting. exam noted for tachypnea with minimal exertion, +LE edema, diffuse tenderness on abdomen. ekg independently interpreted by me Dr. Jacobsen showing NSR, no stemi, prolonged QTc. Labs unremarkable, trop 14-->14. CT imaging with hepatomegaly. admitted for further work up.

## 2024-11-09 LAB
A1C WITH ESTIMATED AVERAGE GLUCOSE RESULT: 7 % — HIGH (ref 4–5.6)
ALBUMIN SERPL ELPH-MCNC: 3.4 G/DL — LOW (ref 3.5–5.2)
ALP SERPL-CCNC: 170 U/L — HIGH (ref 30–115)
ALT FLD-CCNC: 31 U/L — SIGNIFICANT CHANGE UP (ref 0–41)
ANION GAP SERPL CALC-SCNC: 12 MMOL/L — SIGNIFICANT CHANGE UP (ref 7–14)
AST SERPL-CCNC: 48 U/L — HIGH (ref 0–41)
BASOPHILS # BLD AUTO: 0 K/UL — SIGNIFICANT CHANGE UP (ref 0–0.2)
BASOPHILS NFR BLD AUTO: 0 % — SIGNIFICANT CHANGE UP (ref 0–1)
BILIRUB SERPL-MCNC: 0.8 MG/DL — SIGNIFICANT CHANGE UP (ref 0.2–1.2)
BUN SERPL-MCNC: 6 MG/DL — LOW (ref 10–20)
CALCIUM SERPL-MCNC: 8.6 MG/DL — SIGNIFICANT CHANGE UP (ref 8.4–10.4)
CHLORIDE SERPL-SCNC: 93 MMOL/L — LOW (ref 98–110)
CHOLEST SERPL-MCNC: 198 MG/DL — SIGNIFICANT CHANGE UP
CO2 SERPL-SCNC: 31 MMOL/L — SIGNIFICANT CHANGE UP (ref 17–32)
CREAT SERPL-MCNC: 1 MG/DL — SIGNIFICANT CHANGE UP (ref 0.7–1.5)
EGFR: 64 ML/MIN/1.73M2 — SIGNIFICANT CHANGE UP
EOSINOPHIL # BLD AUTO: 0.25 K/UL — SIGNIFICANT CHANGE UP (ref 0–0.7)
EOSINOPHIL NFR BLD AUTO: 4.3 % — SIGNIFICANT CHANGE UP (ref 0–8)
ESTIMATED AVERAGE GLUCOSE: 154 MG/DL — HIGH (ref 68–114)
GIANT PLATELETS BLD QL SMEAR: PRESENT — SIGNIFICANT CHANGE UP
GLUCOSE SERPL-MCNC: 107 MG/DL — HIGH (ref 70–99)
HCT VFR BLD CALC: 40.6 % — SIGNIFICANT CHANGE UP (ref 37–47)
HDLC SERPL-MCNC: 23 MG/DL — LOW
HGB BLD-MCNC: 13.1 G/DL — SIGNIFICANT CHANGE UP (ref 12–16)
LIPID PNL WITH DIRECT LDL SERPL: 96 MG/DL — SIGNIFICANT CHANGE UP
LYMPHOCYTES # BLD AUTO: 0.87 K/UL — LOW (ref 1.2–3.4)
LYMPHOCYTES # BLD AUTO: 14.8 % — LOW (ref 20.5–51.1)
MAGNESIUM SERPL-MCNC: 2.4 MG/DL — SIGNIFICANT CHANGE UP (ref 1.8–2.4)
MANUAL SMEAR VERIFICATION: SIGNIFICANT CHANGE UP
MCHC RBC-ENTMCNC: 30.3 PG — SIGNIFICANT CHANGE UP (ref 27–31)
MCHC RBC-ENTMCNC: 32.3 G/DL — SIGNIFICANT CHANGE UP (ref 32–37)
MCV RBC AUTO: 94 FL — SIGNIFICANT CHANGE UP (ref 81–99)
MONOCYTES # BLD AUTO: 0.51 K/UL — SIGNIFICANT CHANGE UP (ref 0.1–0.6)
MONOCYTES NFR BLD AUTO: 8.7 % — SIGNIFICANT CHANGE UP (ref 1.7–9.3)
NEUTROPHILS # BLD AUTO: 4.18 K/UL — SIGNIFICANT CHANGE UP (ref 1.4–6.5)
NEUTROPHILS NFR BLD AUTO: 71.3 % — SIGNIFICANT CHANGE UP (ref 42.2–75.2)
NON HDL CHOLESTEROL: 175 MG/DL — HIGH
PHOSPHATE SERPL-MCNC: 3.7 MG/DL — SIGNIFICANT CHANGE UP (ref 2.1–4.9)
PLAT MORPH BLD: ABNORMAL
PLATELET # BLD AUTO: 139 K/UL — SIGNIFICANT CHANGE UP (ref 130–400)
PMV BLD: 10.4 FL — SIGNIFICANT CHANGE UP (ref 7.4–10.4)
POLYCHROMASIA BLD QL SMEAR: SLIGHT — SIGNIFICANT CHANGE UP
POTASSIUM SERPL-MCNC: 3.6 MMOL/L — SIGNIFICANT CHANGE UP (ref 3.5–5)
POTASSIUM SERPL-SCNC: 3.6 MMOL/L — SIGNIFICANT CHANGE UP (ref 3.5–5)
PROT SERPL-MCNC: 5.8 G/DL — LOW (ref 6–8)
RBC # BLD: 4.32 M/UL — SIGNIFICANT CHANGE UP (ref 4.2–5.4)
RBC # FLD: 12.9 % — SIGNIFICANT CHANGE UP (ref 11.5–14.5)
RBC BLD AUTO: ABNORMAL
SODIUM SERPL-SCNC: 136 MMOL/L — SIGNIFICANT CHANGE UP (ref 135–146)
TRIGL SERPL-MCNC: 393 MG/DL — HIGH
TSH SERPL-MCNC: 9.88 UIU/ML — HIGH (ref 0.27–4.2)
VARIANT LYMPHS # BLD: 0.9 % — SIGNIFICANT CHANGE UP (ref 0–5)
WBC # BLD: 5.86 K/UL — SIGNIFICANT CHANGE UP (ref 4.8–10.8)
WBC # FLD AUTO: 5.86 K/UL — SIGNIFICANT CHANGE UP (ref 4.8–10.8)

## 2024-11-09 PROCEDURE — 76705 ECHO EXAM OF ABDOMEN: CPT | Mod: 26

## 2024-11-09 PROCEDURE — 99233 SBSQ HOSP IP/OBS HIGH 50: CPT

## 2024-11-09 RX ORDER — PANTOPRAZOLE SODIUM 40 MG/1
40 TABLET, DELAYED RELEASE ORAL ONCE
Refills: 0 | Status: COMPLETED | OUTPATIENT
Start: 2024-11-09 | End: 2024-11-09

## 2024-11-09 RX ORDER — INFLUENZA VIRUS VACCINE 15; 15; 15; 15 UG/.5ML; UG/.5ML; UG/.5ML; UG/.5ML
0.5 SUSPENSION INTRAMUSCULAR ONCE
Refills: 0 | Status: COMPLETED | OUTPATIENT
Start: 2024-11-09 | End: 2024-11-09

## 2024-11-09 RX ORDER — HYDROMORPHONE HYDROCHLORIDE 2 MG/1
0.5 TABLET ORAL EVERY 6 HOURS
Refills: 0 | Status: DISCONTINUED | OUTPATIENT
Start: 2024-11-09 | End: 2024-11-09

## 2024-11-09 RX ORDER — LACTULOSE 10 G/15ML
20 SOLUTION ORAL
Refills: 0 | Status: COMPLETED | OUTPATIENT
Start: 2024-11-09 | End: 2024-11-09

## 2024-11-09 RX ADMIN — Medication 88 MICROGRAM(S): at 06:12

## 2024-11-09 RX ADMIN — HYDROMORPHONE HYDROCHLORIDE 0.5 MILLIGRAM(S): 2 TABLET ORAL at 23:30

## 2024-11-09 RX ADMIN — METHADONE HYDROCHLORIDE 30 MILLIGRAM(S): 5 TABLET ORAL at 21:22

## 2024-11-09 RX ADMIN — Medication 1 MILLIGRAM(S): at 11:49

## 2024-11-09 RX ADMIN — LAMOTRIGINE 150 MILLIGRAM(S): 50 TABLET, EXTENDED RELEASE ORAL at 11:49

## 2024-11-09 RX ADMIN — PANTOPRAZOLE SODIUM 40 MILLIGRAM(S): 40 TABLET, DELAYED RELEASE ORAL at 12:46

## 2024-11-09 RX ADMIN — METHADONE HYDROCHLORIDE 30 MILLIGRAM(S): 5 TABLET ORAL at 13:02

## 2024-11-09 RX ADMIN — Medication 2 TABLET(S): at 21:20

## 2024-11-09 RX ADMIN — HYDROMORPHONE HYDROCHLORIDE 0.5 MILLIGRAM(S): 2 TABLET ORAL at 15:26

## 2024-11-09 RX ADMIN — LACTULOSE 20 GRAM(S): 10 SOLUTION ORAL at 15:27

## 2024-11-09 RX ADMIN — BUMETANIDE 1 MILLIGRAM(S): 1 TABLET ORAL at 06:21

## 2024-11-09 RX ADMIN — HYDROMORPHONE HYDROCHLORIDE 0.5 MILLIGRAM(S): 2 TABLET ORAL at 13:30

## 2024-11-09 RX ADMIN — HYDROMORPHONE HYDROCHLORIDE 0.5 MILLIGRAM(S): 2 TABLET ORAL at 06:45

## 2024-11-09 RX ADMIN — HYDROMORPHONE HYDROCHLORIDE 0.5 MILLIGRAM(S): 2 TABLET ORAL at 01:00

## 2024-11-09 RX ADMIN — METHADONE HYDROCHLORIDE 30 MILLIGRAM(S): 5 TABLET ORAL at 06:12

## 2024-11-09 RX ADMIN — SERTRALINE HYDROCHLORIDE 200 MILLIGRAM(S): 100 TABLET, FILM COATED ORAL at 11:48

## 2024-11-09 RX ADMIN — LACTULOSE 20 GRAM(S): 10 SOLUTION ORAL at 17:25

## 2024-11-09 RX ADMIN — PANTOPRAZOLE SODIUM 40 MILLIGRAM(S): 40 TABLET, DELAYED RELEASE ORAL at 06:12

## 2024-11-09 RX ADMIN — HYDROMORPHONE HYDROCHLORIDE 0.5 MILLIGRAM(S): 2 TABLET ORAL at 00:22

## 2024-11-09 RX ADMIN — POLYETHYLENE GLYCOL 3350 17 GRAM(S): 17 POWDER, FOR SOLUTION ORAL at 06:19

## 2024-11-09 RX ADMIN — HYDROMORPHONE HYDROCHLORIDE 0.5 MILLIGRAM(S): 2 TABLET ORAL at 23:13

## 2024-11-09 RX ADMIN — HYDROMORPHONE HYDROCHLORIDE 0.5 MILLIGRAM(S): 2 TABLET ORAL at 06:13

## 2024-11-09 RX ADMIN — POLYETHYLENE GLYCOL 3350 17 GRAM(S): 17 POWDER, FOR SOLUTION ORAL at 17:25

## 2024-11-09 NOTE — PROGRESS NOTE ADULT - SUBJECTIVE AND OBJECTIVE BOX
24H events:    Today is 1d of hospitalization. This morning patient was seen and examined at bedside, resting comfortably in bed. No acute or major events overnight.      PAST MEDICAL & SURGICAL HISTORY  Asthma  LAST ATTACK MANY YRS AGO    Gastroesophageal reflux disease without esophagitis    Hypothyroidism    Heart murmur    Anxiety    Depression    Mood disorder    Psoriasis    Constipation    Back pain  LOW    Morbid obesity    S/P tonsillectomy  1967    History of appendectomy  1974    Abscess of back  EVACUATION OF ABSCESS @ L5-S1 1997    History of Jaclyn-en-Y gastric bypass  WITH CHOLECYSTECTOMY 2006      SOCIAL HISTORY:  Social History:      ALLERGIES:  penicillin (Anaphylaxis)    MEDICATIONS:  STANDING MEDICATIONS  buMETAnide 1 milliGRAM(s) Oral daily  folic acid 1 milliGRAM(s) Oral daily  heparin   Injectable 5000 Unit(s) SubCutaneous every 8 hours  lactated ringers. 1000 milliLiter(s) IV Continuous <Continuous>  lamoTRIgine 150 milliGRAM(s) Oral daily  levothyroxine 88 MICROGram(s) Oral daily  methadone    Tablet 30 milliGRAM(s) Oral every 8 hours  pantoprazole    Tablet 40 milliGRAM(s) Oral before breakfast  polyethylene glycol 3350 17 Gram(s) Oral two times a day  senna 2 Tablet(s) Oral at bedtime  sertraline 200 milliGRAM(s) Oral daily    PRN MEDICATIONS  acetaminophen     Tablet .. 650 milliGRAM(s) Oral every 6 hours PRN  aluminum hydroxide/magnesium hydroxide/simethicone Suspension 30 milliLiter(s) Oral every 4 hours PRN  cyclobenzaprine 5 milliGRAM(s) Oral three times a day PRN  melatonin 3 milliGRAM(s) Oral at bedtime PRN  ondansetron Injectable 4 milliGRAM(s) IV Push every 8 hours PRN    VITALS:   T(F): 98.1  HR: 75  BP: 113/76  RR: 18  SpO2: 95%    PHYSICAL EXAM:  GENERAL:   ( x) NAD, lying in bed comfortably     (  ) obtunded     (  ) lethargic     (  ) somnolent      NECK:  (x) Supple     (  ) neck stiffness     (  ) nuchal rigidity     (  )  no JVD     (  ) JVD present ( -- cm)    HEART:  Rate -->     (x) normal rate     (  ) bradycardic     (  ) tachycardic  Rhythm -->     (x) regular     (  ) regularly irregular     (  ) irregularly irregular  Murmurs -->     (x) normal s1s2     (  ) systolic murmur     (  ) diastolic murmur     (  ) continuous murmur      (  ) S3 present     (  ) S4 present    LUNGS:   ( x)Unlabored respirations     (  ) tachypnea  ( x) B/L air entry     (  ) decreased breath sounds in:  (location     )    ( x) no adventitious sound     (  ) crackles     (  ) wheezing      (  ) rhonchi      (specify location:       )  (  ) chest wall tenderness (specify location:       )    ABDOMEN:   ( x) Soft     (  ) tense   |   (  ) nondistended     (  ) distended   |   (  ) +BS     (  ) hypoactive bowel sounds     (  ) hyperactive bowel sounds  ( x) nontender     (  ) RUQ tenderness     (  ) RLQ tenderness     (  ) LLQ tenderness     (  ) epigastric tenderness     (  ) diffuse tenderness  (  ) Splenomegaly      (  ) Hepatomegaly      (  ) Jaundice     (  ) ecchymosis     EXTREMITIES:  ( x) Normal     (  ) Rash     (  ) ecchymosis     (  ) varicose veins      (  ) pitting edema     (  ) non-pitting edema   (  ) ulceration     (  ) gangrene:     (location:     )    NERVOUS SYSTEM:    ( x) A&Ox3     (  ) confused     (  ) lethargic  CN II-XII:     ( x) Intact     (  ) deficits found     (Specify:     )   Upper extremities:     (  ) no sensorimotor deficits     (  ) weakness     (  ) loss of proprioception/vibration     (  ) loss of touch/temperature (specify:    )  Lower extremities:     (  ) no sensorimotor deficits     (  ) weakness     (  ) loss of proprioception/vibration     (  ) loss of touch/temperature (specify:    )    SKIN:   (  ) No rashes or lesions     (  ) maculopapular rash     (  ) pustules     (  ) vesicles     (  ) ulcer     (  ) ecchymosis     (specify location:     )      LABS:                        13.1   5.86  )-----------( 139      ( 09 Nov 2024 06:43 )             40.6     11-09    136  |  93[L]  |  6[L]  ----------------------------<  107[H]  3.6   |  31  |  1.0    Ca    8.6      09 Nov 2024 06:43  Phos  3.7     11-09  Mg     2.4     11-09    TPro  5.8[L]  /  Alb  3.4[L]  /  TBili  0.8  /  DBili  x   /  AST  48[H]  /  ALT  31  /  AlkPhos  170[H]  11-09      Urinalysis Basic - ( 09 Nov 2024 06:43 )    Color: x / Appearance: x / SG: x / pH: x  Gluc: 107 mg/dL / Ketone: x  / Bili: x / Urobili: x   Blood: x / Protein: x / Nitrite: x   Leuk Esterase: x / RBC: x / WBC x   Sq Epi: x / Non Sq Epi: x / Bacteria: x        Lactate, Blood: 2.6 mmol/L *H* (11-08-24 @ 11:19)  Lactate, Blood: 2.4 mmol/L *H* (11-08-24 @ 11:19)

## 2024-11-09 NOTE — PATIENT PROFILE ADULT - FALL HARM RISK - HARM RISK INTERVENTIONS
Assistance with ambulation/Assistance OOB with selected safe patient handling equipment/Communicate Risk of Fall with Harm to all staff/Monitor for mental status changes/Monitor gait and stability/Reinforce activity limits and safety measures with patient and family/Tailored Fall Risk Interventions/Toileting schedule using arm’s reach rule for commode and bathroom/Use of alarms - bed, chair and/or voice tab/Visual Cue: Yellow wristband and red socks/Bed in lowest position, wheels locked, appropriate side rails in place/Call bell, personal items and telephone in reach/Instruct patient to call for assistance before getting out of bed or chair/Non-slip footwear when patient is out of bed/[place] to call system/Physically safe environment - no spills, clutter or unnecessary equipment/Purposeful Proactive Rounding/Room/bathroom lighting operational, light cord in reach

## 2024-11-09 NOTE — PROGRESS NOTE ADULT - ATTENDING COMMENTS
#Syncope  unclear etiology, possible orthostatic vs. vasovagal  ct chest abd unrevealing, fatty infiltration liver  +elevated lactate, in setting of albuterol  lr 75 cc/hr, trend lactate  check rvp  orthostatics  PT  tte unrevealing    #Progress Note Handoff  Pending (specify): orthostatics, trend lactate, rvp  Family discussion:  Disposition: home vs. snf

## 2024-11-09 NOTE — PROGRESS NOTE ADULT - ASSESSMENT
64-year-old female, former smoker with past medical history of asthma, hypothyroidism, anxiety, depression, MVP, Jaclyn-en-Y gastric bypass, appendectomy, cholecystectomy and Chronic back pain on methadone, presents to the ED following a syncopal episode last night. She also has complaints of worsening abdominal pain for 5 days.     #Syncope likely vasovagal vs orthostatic 2/2 dehydration  - ECG with prolonged qtc, no obvious ischemia, trops x2 negative  - s/p 1L IVF in ED, IVF @75ml/hr maintainence   - Obtain Orthostatics   - Tele monitoring   - Rpt ECG NSR    #Abdominal pain - generalized - unclear etiology  #Nausea, Vomiting, Decreased appetite   #h/o Cholecystectomy/ appendectomy/ ? h/o colon ca  #h/o Jaclyn en Y bypass   - elevated AST/ALT 77/36, ALP - 194; Lactate 3.0  - CT abdomen - Hepatomegaly   - EGD/ Colonoscopy (May '23) - Non erosive gastritis  - will obtain RUQ sonography   - Utox, alcohol lvl,   - Repeat lactate 2.4, 2.6  - c/w pantop (home meds )    #Shortness of breath - progressive - likely asthma related   #h/o Asthma   #h/o MVP (follows Dr. Negrete)  #h/o chronic bilateral LE swelling   #HTN  - CTA chest - no PE   - Satting well at [facility], Lung and heart PE - normal  - Duonebs prn   - obtain TTE --> echo 60-65% EF    #Hypothyroidism,   - c/w home meds - 88mcg thyroxine   - f/u AM TSH    #anxiety, depression,  - c/w lamotrigine and zoloft      #Chronic back pain   - on methadone 30mg   - pain medicine consulted   - dilaudid 0.5 qb prn x 1 day    #Alcohol use   - 2-3 drinks per fday  - CIWA monitoring   - c/w b12 and folate     VTE ppx - heparin SC  GI ppx - pantoprazole (home meds)  Activity as tolerated  DASH/DM diet   Full Code

## 2024-11-10 LAB
ANION GAP SERPL CALC-SCNC: 13 MMOL/L — SIGNIFICANT CHANGE UP (ref 7–14)
BUN SERPL-MCNC: 7 MG/DL — LOW (ref 10–20)
CALCIUM SERPL-MCNC: 8.4 MG/DL — SIGNIFICANT CHANGE UP (ref 8.4–10.5)
CHLORIDE SERPL-SCNC: 96 MMOL/L — LOW (ref 98–110)
CO2 SERPL-SCNC: 28 MMOL/L — SIGNIFICANT CHANGE UP (ref 17–32)
CREAT SERPL-MCNC: 1.1 MG/DL — SIGNIFICANT CHANGE UP (ref 0.7–1.5)
EGFR: 57 ML/MIN/1.73M2 — LOW
GLUCOSE SERPL-MCNC: 100 MG/DL — HIGH (ref 70–99)
HCT VFR BLD CALC: 42.8 % — SIGNIFICANT CHANGE UP (ref 37–47)
HGB BLD-MCNC: 13.6 G/DL — SIGNIFICANT CHANGE UP (ref 12–16)
LACTATE SERPL-SCNC: 2.4 MMOL/L — HIGH (ref 0.7–2)
MAGNESIUM SERPL-MCNC: 2.2 MG/DL — SIGNIFICANT CHANGE UP (ref 1.8–2.4)
MCHC RBC-ENTMCNC: 30.3 PG — SIGNIFICANT CHANGE UP (ref 27–31)
MCHC RBC-ENTMCNC: 31.8 G/DL — LOW (ref 32–37)
MCV RBC AUTO: 95.3 FL — SIGNIFICANT CHANGE UP (ref 81–99)
NRBC # BLD: 0 /100 WBCS — SIGNIFICANT CHANGE UP (ref 0–0)
PLATELET # BLD AUTO: 139 K/UL — SIGNIFICANT CHANGE UP (ref 130–400)
PMV BLD: 10.1 FL — SIGNIFICANT CHANGE UP (ref 7.4–10.4)
POTASSIUM SERPL-MCNC: 2.9 MMOL/L — LOW (ref 3.5–5)
POTASSIUM SERPL-SCNC: 2.9 MMOL/L — LOW (ref 3.5–5)
RBC # BLD: 4.49 M/UL — SIGNIFICANT CHANGE UP (ref 4.2–5.4)
RBC # FLD: 13 % — SIGNIFICANT CHANGE UP (ref 11.5–14.5)
SODIUM SERPL-SCNC: 137 MMOL/L — SIGNIFICANT CHANGE UP (ref 135–146)
WBC # BLD: 6.1 K/UL — SIGNIFICANT CHANGE UP (ref 4.8–10.8)
WBC # FLD AUTO: 6.1 K/UL — SIGNIFICANT CHANGE UP (ref 4.8–10.8)

## 2024-11-10 PROCEDURE — 99232 SBSQ HOSP IP/OBS MODERATE 35: CPT

## 2024-11-10 PROCEDURE — 93010 ELECTROCARDIOGRAM REPORT: CPT

## 2024-11-10 RX ORDER — HYDROMORPHONE HYDROCHLORIDE 2 MG/1
0.5 TABLET ORAL ONCE
Refills: 0 | Status: DISCONTINUED | OUTPATIENT
Start: 2024-11-10 | End: 2024-11-10

## 2024-11-10 RX ORDER — METOCLOPRAMIDE HYDROCHLORIDE 10 MG/1
5 TABLET ORAL ONCE
Refills: 0 | Status: COMPLETED | OUTPATIENT
Start: 2024-11-10 | End: 2024-11-10

## 2024-11-10 RX ORDER — SCOPOLAMINE 1 MG/3D
1 PATCH, EXTENDED RELEASE TRANSDERMAL ONCE
Refills: 0 | Status: COMPLETED | OUTPATIENT
Start: 2024-11-10 | End: 2024-11-17

## 2024-11-10 RX ORDER — HYDROMORPHONE HYDROCHLORIDE 2 MG/1
0.2 TABLET ORAL ONCE
Refills: 0 | Status: DISCONTINUED | OUTPATIENT
Start: 2024-11-10 | End: 2024-11-10

## 2024-11-10 RX ORDER — POTASSIUM CHLORIDE 600 MG/1
40 TABLET, EXTENDED RELEASE ORAL EVERY 4 HOURS
Refills: 0 | Status: COMPLETED | OUTPATIENT
Start: 2024-11-10 | End: 2024-11-10

## 2024-11-10 RX ADMIN — LAMOTRIGINE 150 MILLIGRAM(S): 50 TABLET, EXTENDED RELEASE ORAL at 11:36

## 2024-11-10 RX ADMIN — Medication 88 MICROGRAM(S): at 06:15

## 2024-11-10 RX ADMIN — HYDROMORPHONE HYDROCHLORIDE 0.2 MILLIGRAM(S): 2 TABLET ORAL at 00:00

## 2024-11-10 RX ADMIN — POTASSIUM CHLORIDE 40 MILLIEQUIVALENT(S): 600 TABLET, EXTENDED RELEASE ORAL at 14:43

## 2024-11-10 RX ADMIN — METHADONE HYDROCHLORIDE 30 MILLIGRAM(S): 5 TABLET ORAL at 22:08

## 2024-11-10 RX ADMIN — Medication 1 MILLIGRAM(S): at 11:36

## 2024-11-10 RX ADMIN — SERTRALINE HYDROCHLORIDE 200 MILLIGRAM(S): 100 TABLET, FILM COATED ORAL at 11:36

## 2024-11-10 RX ADMIN — Medication 2 TABLET(S): at 22:08

## 2024-11-10 RX ADMIN — METOCLOPRAMIDE HYDROCHLORIDE 5 MILLIGRAM(S): 10 TABLET ORAL at 22:07

## 2024-11-10 RX ADMIN — METHADONE HYDROCHLORIDE 30 MILLIGRAM(S): 5 TABLET ORAL at 14:44

## 2024-11-10 RX ADMIN — PANTOPRAZOLE SODIUM 40 MILLIGRAM(S): 40 TABLET, DELAYED RELEASE ORAL at 06:14

## 2024-11-10 RX ADMIN — POTASSIUM CHLORIDE 40 MILLIEQUIVALENT(S): 600 TABLET, EXTENDED RELEASE ORAL at 11:35

## 2024-11-10 RX ADMIN — HYDROMORPHONE HYDROCHLORIDE 0.5 MILLIGRAM(S): 2 TABLET ORAL at 20:45

## 2024-11-10 RX ADMIN — HYDROMORPHONE HYDROCHLORIDE 0.2 MILLIGRAM(S): 2 TABLET ORAL at 12:59

## 2024-11-10 RX ADMIN — BUMETANIDE 1 MILLIGRAM(S): 1 TABLET ORAL at 06:15

## 2024-11-10 RX ADMIN — HYDROMORPHONE HYDROCHLORIDE 0.5 MILLIGRAM(S): 2 TABLET ORAL at 20:25

## 2024-11-10 RX ADMIN — ONDANSETRON HYDROCHLORIDE 4 MILLIGRAM(S): 4 TABLET, FILM COATED ORAL at 09:00

## 2024-11-10 RX ADMIN — POLYETHYLENE GLYCOL 3350 17 GRAM(S): 17 POWDER, FOR SOLUTION ORAL at 14:43

## 2024-11-10 RX ADMIN — POLYETHYLENE GLYCOL 3350 17 GRAM(S): 17 POWDER, FOR SOLUTION ORAL at 06:15

## 2024-11-10 RX ADMIN — METHADONE HYDROCHLORIDE 30 MILLIGRAM(S): 5 TABLET ORAL at 06:14

## 2024-11-10 RX ADMIN — HYDROMORPHONE HYDROCHLORIDE 0.5 MILLIGRAM(S): 2 TABLET ORAL at 07:07

## 2024-11-10 NOTE — PROGRESS NOTE ADULT - SUBJECTIVE AND OBJECTIVE BOX
SUBJECTIVE/OVERNIGHT EVENTS  Today is hospital day 2d. No acute or major events overnight.    MEDICATIONS  STANDING MEDICATIONS  buMETAnide 1 milliGRAM(s) Oral daily  folic acid 1 milliGRAM(s) Oral daily  heparin   Injectable 5000 Unit(s) SubCutaneous every 8 hours  influenza   Vaccine 0.5 milliLiter(s) IntraMuscular once  lactated ringers. 1000 milliLiter(s) IV Continuous <Continuous>  lamoTRIgine 150 milliGRAM(s) Oral daily  levothyroxine 88 MICROGram(s) Oral daily  methadone    Tablet 30 milliGRAM(s) Oral every 8 hours  pantoprazole    Tablet 40 milliGRAM(s) Oral before breakfast  polyethylene glycol 3350 17 Gram(s) Oral two times a day  senna 2 Tablet(s) Oral at bedtime  sertraline 200 milliGRAM(s) Oral daily    PRN MEDICATIONS  acetaminophen     Tablet .. 650 milliGRAM(s) Oral every 6 hours PRN  aluminum hydroxide/magnesium hydroxide/simethicone Suspension 30 milliLiter(s) Oral every 4 hours PRN  cyclobenzaprine 5 milliGRAM(s) Oral three times a day PRN  HYDROmorphone  Injectable 0.5 milliGRAM(s) IV Push every 6 hours PRN  melatonin 3 milliGRAM(s) Oral at bedtime PRN  ondansetron Injectable 4 milliGRAM(s) IV Push every 8 hours PRN    VITALS  T(F): 98 (11-09-24 @ 13:00), Max: 98.1 (11-09-24 @ 09:04)  HR: 75 (11-09-24 @ 20:00) (63 - 80)  BP: 128/75 (11-09-24 @ 20:00) (113/76 - 128/75)  RR: 18 (11-09-24 @ 20:00) (18 - 18)  SpO2: 92% (11-09-24 @ 13:00) (92% - 97%)      LABS             13.1   5.86  )-----------( 139      ( 11-09-24 @ 06:43 )             40.6     136  |  93  |  6   -------------------------<  107   11-09-24 @ 06:43  3.6  |  31  |  1.0    Ca      8.6     11-09-24 @ 06:43  Phos   3.7     11-09-24 @ 06:43  Mg     2.4     11-09-24 @ 06:43    TPro  5.8  /  Alb  3.4  /  TBili  0.8  /  DBili  x   /  AST  48  /  ALT  31  /  AlkPhos  170  /  GGT  x     11-09-24 @ 06:43      Troponin T, High Sensitivity Result: 14 ng/L (11-08-24 @ 04:30)  Troponin T, High Sensitivity Result: 14 ng/L (11-08-24 @ 00:30)  Pro-Brain Natriuretic Peptide: 162 pg/mL (11-08-24 @ 00:30)    Urinalysis Basic - ( 09 Nov 2024 06:43 )    Color: x / Appearance: x / SG: x / pH: x  Gluc: 107 mg/dL / Ketone: x  / Bili: x / Urobili: x   Blood: x / Protein: x / Nitrite: x   Leuk Esterase: x / RBC: x / WBC x   Sq Epi: x / Non Sq Epi: x / Bacteria: x

## 2024-11-10 NOTE — PROGRESS NOTE ADULT - ASSESSMENT
64-year-old female, former smoker with past medical history of asthma, hypothyroidism, anxiety, depression, MVP, Jaclyn-en-Y gastric bypass, appendectomy, cholecystectomy and Chronic back pain on methadone, presents to the ED following a syncopal episode last night. She also has complaints of worsening abdominal pain for 5 days.     #Syncope likely vasovagal vs orthostatic 2/2 dehydration  - ECG with prolonged qtc, no obvious ischemia, trops x2 negative  - s/p 1L IVF in ED, IVF @75ml/hr maintainence   - Obtain Orthostatics   - Tele monitoring   - Rpt ECG NSR    #Abdominal pain - generalized - unclear etiology  #Nausea, Vomiting, Decreased appetite   #h/o Cholecystectomy/ appendectomy/ ? h/o colon ca  #h/o Jaclyn en Y bypass   - elevated AST/ALT 77/36, ALP - 194; Lactate 3.0  - CT abdomen - Hepatomegaly   - EGD/ Colonoscopy (May '23) - Non erosive gastritis  - will obtain RUQ sonography   - Utox, alcohol lvl,   - Repeat lactate 2.4, 2.6  - c/w pantop (home meds )    #Shortness of breath - progressive - likely asthma related   #h/o Asthma   #h/o MVP (follows Dr. Negrete)  #h/o chronic bilateral LE swelling   #HTN  - CTA chest - no PE   - Satting well at [facility], Lung and heart PE - normal  - Duonebs prn   - obtain TTE --> echo 60-65% EF    #Hypothyroidism,   - c/w home meds - 88mcg thyroxine   - TSH 9.88    #anxiety, depression,  - c/w lamotrigine and zoloft      #Chronic back pain   - on methadone 30mg   - pain medicine consulted   - dilaudid 0.5 qb prn x 1 day    #Alcohol use   - 2-3 drinks per fday  - CIWA monitoring   - c/w b12 and folate     VTE ppx - heparin SC  GI ppx - pantoprazole (home meds)  Activity as tolerated  DASH/DM diet   Full Code

## 2024-11-10 NOTE — PROGRESS NOTE ADULT - SUBJECTIVE AND OBJECTIVE BOX
NAVARRETEJULIET  64y  Female      Patient is a 64y old  Female who presents with a chief complaint of Abdominal pain (10 Nov 2024 02:44)      INTERVAL HPI/OVERNIGHT EVENTS:  She is still with mod abdominal pain, she had bowel movement yesterday, still with SOB.   Vital Signs Last 24 Hrs  T(C): 36.3 (10 Nov 2024 04:00), Max: 36.7 (09 Nov 2024 13:00)  T(F): 97.4 (10 Nov 2024 04:00), Max: 98 (09 Nov 2024 13:00)  HR: 77 (10 Nov 2024 04:00) (75 - 80)  BP: 112/79 (10 Nov 2024 04:00) (112/79 - 128/75)  BP(mean): 90 (10 Nov 2024 04:00) (90 - 90)  RR: 18 (10 Nov 2024 04:00) (18 - 18)  SpO2: 95% (10 Nov 2024 04:00) (92% - 95%)    Parameters below as of 10 Nov 2024 04:00  Patient On (Oxygen Delivery Method): nasal cannula  O2 Flow (L/min): 2        11-09-24 @ 07:01  -  11-10-24 @ 07:00  --------------------------------------------------------  IN: 640 mL / OUT: 0 mL / NET: 640 mL            Consultant(s) Notes Reviewed:  [x ] YES  [ ] NO          MEDICATIONS  (STANDING):  buMETAnide 1 milliGRAM(s) Oral daily  folic acid 1 milliGRAM(s) Oral daily  heparin   Injectable 5000 Unit(s) SubCutaneous every 8 hours  influenza   Vaccine 0.5 milliLiter(s) IntraMuscular once  lactated ringers. 1000 milliLiter(s) (75 mL/Hr) IV Continuous <Continuous>  lamoTRIgine 150 milliGRAM(s) Oral daily  levothyroxine 88 MICROGram(s) Oral daily  methadone    Tablet 30 milliGRAM(s) Oral every 8 hours  pantoprazole    Tablet 40 milliGRAM(s) Oral before breakfast  polyethylene glycol 3350 17 Gram(s) Oral two times a day  potassium chloride   Powder 40 milliEquivalent(s) Oral every 4 hours  senna 2 Tablet(s) Oral at bedtime  sertraline 200 milliGRAM(s) Oral daily    MEDICATIONS  (PRN):  acetaminophen     Tablet .. 650 milliGRAM(s) Oral every 6 hours PRN Temp greater or equal to 38C (100.4F), Mild Pain (1 - 3)  aluminum hydroxide/magnesium hydroxide/simethicone Suspension 30 milliLiter(s) Oral every 4 hours PRN Dyspepsia  cyclobenzaprine 5 milliGRAM(s) Oral three times a day PRN Muscle Spasm  melatonin 3 milliGRAM(s) Oral at bedtime PRN Insomnia      LABS                          13.6   6.10  )-----------( 139      ( 10 Nov 2024 06:15 )             42.8     11-10    137  |  96[L]  |  7[L]  ----------------------------<  100[H]  2.9[L]   |  28  |  1.1    Ca    8.4      10 Nov 2024 06:15  Phos  3.7     11-09  Mg     2.2     11-10    TPro  5.8[L]  /  Alb  3.4[L]  /  TBili  0.8  /  DBili  x   /  AST  48[H]  /  ALT  31  /  AlkPhos  170[H]  11-09      Urinalysis Basic - ( 10 Nov 2024 06:15 )    Color: x / Appearance: x / SG: x / pH: x  Gluc: 100 mg/dL / Ketone: x  / Bili: x / Urobili: x   Blood: x / Protein: x / Nitrite: x   Leuk Esterase: x / RBC: x / WBC x   Sq Epi: x / Non Sq Epi: x / Bacteria: x        Lactate Trend  11-10 @ 06:15 Lactate:2.4   11-08 @ 11:19 Lactate:2.6         CAPILLARY BLOOD GLUCOSE            RADIOLOGY & ADDITIONAL TESTS:    Imaging Personally Reviewed:  [ ] YES  [ ] NO    HEALTH ISSUES - PROBLEM Dx:          PHYSICAL EXAM:  GENERAL: NAD, well-developed.  HEAD:  Atraumatic, Normocephalic.  EYES: EOMI, PERRLA, conjunctiva and sclera clear.  NECK: Supple, No JVD.  CHEST/LUNG: Clear to auscultation bilaterally; No wheeze.  HEART: Regular rate and rhythm; S1 S2.   ABDOMEN: Soft, Nontender, Nondistended; Bowel sounds present.  EXTREMITIES:  2+ Peripheral Pulses, trac leg edema, chronic mild dermatitis.   PSYCH: AAOx3.  NEUROLOGY: non-focal.  SKIN: No rashes or lesions.

## 2024-11-10 NOTE — PROGRESS NOTE ADULT - ASSESSMENT
64-year-old female, former smoker with past medical history of asthma, hypothyroidism, anxiety, depression, MVP, Jaclyn-en-Y gastric bypass, appendectomy, cholecystectomy and Chronic back pain on methadone, presents to the ED following a syncopal episode last night. She also has complaints of worsening abdominal pain for 5 days.     A/P:   Syncope" vasovagal vs arrhythmia:   Prolonged QTc: patient on Methadone and high dose Sertraline.   Patient was sitting for a shower, she passed out briefly,  caught her, no head trauma or fall. No dizziness or palpitation.   EKG showed Normal Sinus Rhythm, prolonged QTc 600  Echo showed normal LVEF 65%  On telemetry no events.   CT chest negative for PE.   Continue tele, monitor Mg and K, replace hypokalemia.   For QTc, monitor EKG, Zofran held, may need to reduce Sertraline dose if no improvement.     Abdominal pain: generalized, mostly upper:   Fatty Liver disease:   Obesity s/p Gastric Bypass Jaclyn en Y:   Patient with moderate pain, mostly upper,   Lipase 37, LFTs mildly elevated, lactate 3>2.4  CT abdomen showed hepatomegaly, fatty liver infiltrate, no acute pathology.   Abdomen US showed    EGD/ Colonoscopy (May '23) - Non erosive gastritis  patient is asking to keep Dilaudid along with her po pain meds. (drug seeking??)  No Zofran for nausea, as QTc is very prolonged, can give Reglan 5mg (not ordered) only if very needed.   Check urine tox, continue bowel regimen,   elevated lactate possibly from alcohol, ischemic etiology is less likely.     Progressive Dyspnea:   Asthma:   Patient reports worsening SOB for 1-2 months,   no wheezing, no volume overload, Pro-, CHF or Asthma are unlikely.   CT chest angio negative for PE, no lung disease (reports history of sarcoidosis).   Possibly from weight and deconditioning, Check O2 with ambulation.     Leg edema:   stable, very mild, continue Bumex    Hypothyroidism,   Continue Synthroid 88 mcg po daily.     Anxiety, depression,  Continue Zoloft, may need to lower the dose as above.      Chronic back pain   Home meds: methadone 30mg TID and Soma (carisoprodol 350mg TID), I-stop check , reference #[id number].       Alcohol use   - 2-3 drinks per fday  - CIWA monitoring   - c/w b12 and folate     VTE ppx - heparin SC  GI ppx - pantoprazole (home meds)  Activity as tolerated  DASH/DM diet   Full Code

## 2024-11-11 ENCOUNTER — TRANSCRIPTION ENCOUNTER (OUTPATIENT)
Age: 64
End: 2024-11-11

## 2024-11-11 LAB
ANION GAP SERPL CALC-SCNC: 14 MMOL/L — SIGNIFICANT CHANGE UP (ref 7–14)
BUN SERPL-MCNC: 11 MG/DL — SIGNIFICANT CHANGE UP (ref 10–20)
CALCIUM SERPL-MCNC: 8.5 MG/DL — SIGNIFICANT CHANGE UP (ref 8.4–10.5)
CHLORIDE SERPL-SCNC: 97 MMOL/L — LOW (ref 98–110)
CO2 SERPL-SCNC: 28 MMOL/L — SIGNIFICANT CHANGE UP (ref 17–32)
CREAT SERPL-MCNC: 1.1 MG/DL — SIGNIFICANT CHANGE UP (ref 0.7–1.5)
EGFR: 57 ML/MIN/1.73M2 — LOW
GLUCOSE SERPL-MCNC: 119 MG/DL — HIGH (ref 70–99)
HCT VFR BLD CALC: 41.1 % — SIGNIFICANT CHANGE UP (ref 37–47)
HGB BLD-MCNC: 12.8 G/DL — SIGNIFICANT CHANGE UP (ref 12–16)
MAGNESIUM SERPL-MCNC: 2.1 MG/DL — SIGNIFICANT CHANGE UP (ref 1.8–2.4)
MCHC RBC-ENTMCNC: 30.1 PG — SIGNIFICANT CHANGE UP (ref 27–31)
MCHC RBC-ENTMCNC: 31.1 G/DL — LOW (ref 32–37)
MCV RBC AUTO: 96.7 FL — SIGNIFICANT CHANGE UP (ref 81–99)
NRBC # BLD: 0 /100 WBCS — SIGNIFICANT CHANGE UP (ref 0–0)
PLATELET # BLD AUTO: 137 K/UL — SIGNIFICANT CHANGE UP (ref 130–400)
PMV BLD: 11 FL — HIGH (ref 7.4–10.4)
POTASSIUM SERPL-MCNC: 3.5 MMOL/L — SIGNIFICANT CHANGE UP (ref 3.5–5)
POTASSIUM SERPL-SCNC: 3.5 MMOL/L — SIGNIFICANT CHANGE UP (ref 3.5–5)
RBC # BLD: 4.25 M/UL — SIGNIFICANT CHANGE UP (ref 4.2–5.4)
RBC # FLD: 13.2 % — SIGNIFICANT CHANGE UP (ref 11.5–14.5)
SODIUM SERPL-SCNC: 139 MMOL/L — SIGNIFICANT CHANGE UP (ref 135–146)
T4 AB SER-ACNC: 4.2 UG/DL — LOW (ref 4.6–12)
WBC # BLD: 6.08 K/UL — SIGNIFICANT CHANGE UP (ref 4.8–10.8)
WBC # FLD AUTO: 6.08 K/UL — SIGNIFICANT CHANGE UP (ref 4.8–10.8)

## 2024-11-11 PROCEDURE — 99232 SBSQ HOSP IP/OBS MODERATE 35: CPT

## 2024-11-11 RX ORDER — HYDROMORPHONE HYDROCHLORIDE 2 MG/1
0.5 TABLET ORAL ONCE
Refills: 0 | Status: DISCONTINUED | OUTPATIENT
Start: 2024-11-11 | End: 2024-11-11

## 2024-11-11 RX ORDER — METOCLOPRAMIDE HYDROCHLORIDE 10 MG/1
5 TABLET ORAL ONCE
Refills: 0 | Status: COMPLETED | OUTPATIENT
Start: 2024-11-11 | End: 2024-11-11

## 2024-11-11 RX ORDER — BUMETANIDE 1 MG/1
1 TABLET ORAL
Qty: 0 | Refills: 0 | DISCHARGE
Start: 2024-11-11

## 2024-11-11 RX ADMIN — Medication 2 MILLIGRAM(S): at 17:25

## 2024-11-11 RX ADMIN — LAMOTRIGINE 150 MILLIGRAM(S): 50 TABLET, EXTENDED RELEASE ORAL at 11:09

## 2024-11-11 RX ADMIN — Medication 2 MILLIGRAM(S): at 11:55

## 2024-11-11 RX ADMIN — HYDROMORPHONE HYDROCHLORIDE 0.5 MILLIGRAM(S): 2 TABLET ORAL at 04:45

## 2024-11-11 RX ADMIN — SERTRALINE HYDROCHLORIDE 200 MILLIGRAM(S): 100 TABLET, FILM COATED ORAL at 11:10

## 2024-11-11 RX ADMIN — POLYETHYLENE GLYCOL 3350 17 GRAM(S): 17 POWDER, FOR SOLUTION ORAL at 05:36

## 2024-11-11 RX ADMIN — HYDROMORPHONE HYDROCHLORIDE 0.5 MILLIGRAM(S): 2 TABLET ORAL at 05:41

## 2024-11-11 RX ADMIN — METOCLOPRAMIDE HYDROCHLORIDE 5 MILLIGRAM(S): 10 TABLET ORAL at 21:01

## 2024-11-11 RX ADMIN — Medication 1 MILLIGRAM(S): at 11:10

## 2024-11-11 RX ADMIN — BUMETANIDE 1 MILLIGRAM(S): 1 TABLET ORAL at 05:38

## 2024-11-11 RX ADMIN — Medication 2 MILLIGRAM(S): at 11:25

## 2024-11-11 RX ADMIN — METHADONE HYDROCHLORIDE 30 MILLIGRAM(S): 5 TABLET ORAL at 14:38

## 2024-11-11 RX ADMIN — PANTOPRAZOLE SODIUM 40 MILLIGRAM(S): 40 TABLET, DELAYED RELEASE ORAL at 05:40

## 2024-11-11 RX ADMIN — POLYETHYLENE GLYCOL 3350 17 GRAM(S): 17 POWDER, FOR SOLUTION ORAL at 17:05

## 2024-11-11 RX ADMIN — Medication 88 MICROGRAM(S): at 05:37

## 2024-11-11 RX ADMIN — Medication 2 TABLET(S): at 21:01

## 2024-11-11 RX ADMIN — METHADONE HYDROCHLORIDE 30 MILLIGRAM(S): 5 TABLET ORAL at 21:01

## 2024-11-11 RX ADMIN — Medication 2 MILLIGRAM(S): at 17:55

## 2024-11-11 RX ADMIN — METHADONE HYDROCHLORIDE 30 MILLIGRAM(S): 5 TABLET ORAL at 05:37

## 2024-11-11 NOTE — DIETITIAN INITIAL EVALUATION ADULT - PERTINENT MEDS FT
MEDICATIONS  (STANDING):  buMETAnide 1 milliGRAM(s) Oral daily  folic acid 1 milliGRAM(s) Oral daily  heparin   Injectable 5000 Unit(s) SubCutaneous every 8 hours  influenza   Vaccine 0.5 milliLiter(s) IntraMuscular once  lactated ringers. 1000 milliLiter(s) (75 mL/Hr) IV Continuous <Continuous>  lamoTRIgine 150 milliGRAM(s) Oral daily  levothyroxine 88 MICROGram(s) Oral daily  methadone    Tablet 30 milliGRAM(s) Oral every 8 hours  pantoprazole    Tablet 40 milliGRAM(s) Oral before breakfast  polyethylene glycol 3350 17 Gram(s) Oral two times a day  scopolamine 1 mG/72 Hr(s) Patch 1 Patch Transdermal once  senna 2 Tablet(s) Oral at bedtime  sertraline 200 milliGRAM(s) Oral daily    MEDICATIONS  (PRN):  acetaminophen     Tablet .. 650 milliGRAM(s) Oral every 6 hours PRN Temp greater or equal to 38C (100.4F), Mild Pain (1 - 3)  aluminum hydroxide/magnesium hydroxide/simethicone Suspension 30 milliLiter(s) Oral every 4 hours PRN Dyspepsia  cyclobenzaprine 5 milliGRAM(s) Oral three times a day PRN Muscle Spasm  melatonin 3 milliGRAM(s) Oral at bedtime PRN Insomnia  morphine  - Injectable 2 milliGRAM(s) IV Push every 6 hours PRN Severe Pain (7 - 10)

## 2024-11-11 NOTE — DISCHARGE NOTE PROVIDER - PROVIDER TOKENS
PROVIDER:[TOKEN:[[id number]],FOLLOWUP:[2 weeks]] PROVIDER:[TOKEN:[[id number]],FOLLOWUP:[2 weeks]],PROVIDER:[TOKEN:[[id number]],FOLLOWUP:[1 week]] PROVIDER:[TOKEN:[[id number]],FOLLOWUP:[2 weeks]],PROVIDER:[TOKEN:[[id number]],FOLLOWUP:[1 week]],PROVIDER:[TOKEN:[[id number]],FOLLOWUP:[2 weeks]] PROVIDER:[TOKEN:[[id number]],FOLLOWUP:[2 weeks]],PROVIDER:[TOKEN:[[id number]],FOLLOWUP:[1 week]],PROVIDER:[TOKEN:[[id number]],FOLLOWUP:[2 weeks]],PROVIDER:[TOKEN:[[id number]],FOLLOWUP:[2 weeks]],PROVIDER:[TOKEN:[[id number]],FOLLOWUP:[2 weeks]]

## 2024-11-11 NOTE — DISCHARGE NOTE PROVIDER - NSDCMRMEDTOKEN_GEN_ALL_CORE_FT
bumetanide 1 mg oral tablet: 1 tab(s) orally once a day  folic acid 1 mg oral tablet: 1 tab(s) orally once a day  lamoTRIgine 150 mg oral tablet: 1 tab(s) orally once a day  levothyroxine 88 mcg (0.088 mg) oral tablet: 1 tab(s) orally once a day  methadone 10 mg oral tablet: 3 tab(s) orally every 8 hours  pantoprazole 40 mg oral delayed release tablet: 1 tab(s) orally once a day (before a meal)  sertraline: 200 milligram(s) orally once a day  Soma 350 mg oral tablet: 1 tab(s) orally 3 times a day   albuterol 90 mcg/inh inhalation aerosol: 2 puff(s) inhaled every 6 hours As needed Shortness of Breath and/or Wheezing  bumetanide 1 mg oral tablet: 1 tab(s) orally once a day  fluticasone-salmeterol: 1 application 2 times a day  folic acid 1 mg oral tablet: 1 tab(s) orally once a day  ipratropium-albuterol 0.5 mg-2.5 mg/3 mL inhalation solution: 3 milliliter(s) inhaled every 6 hours  lamoTRIgine 150 mg oral tablet: 1 tab(s) orally once a day  levothyroxine 88 mcg (0.088 mg) oral tablet: 1 tab(s) orally once a day  methadone 10 mg oral tablet: 3 tab(s) orally every 8 hours  pantoprazole 40 mg oral delayed release tablet: 1 tab(s) orally once a day (before a meal)  sertraline: 200 milligram(s) orally once a day  Soma 350 mg oral tablet: 1 tab(s) orally 3 times a day   albuterol 90 mcg/inh inhalation aerosol: 2 puff(s) inhaled every 6 hours As needed Shortness of Breath and/or Wheezing  bumetanide 1 mg oral tablet: 1 tab(s) orally once a day  fluticasone-salmeterol 250 mcg-50 mcg inhalation powder: 1 inhaled as needed for  bronchospasm  folic acid 1 mg oral tablet: 1 tab(s) orally once a day  ipratropium-albuterol 0.5 mg-2.5 mg/3 mL inhalation solution: 3 milliliter(s) inhaled every 6 hours  lactulose 10 g/15 mL oral syrup: 15 milliliter(s) orally once a day as needed for  constipation PRN  lamoTRIgine 150 mg oral tablet: 1 tab(s) orally once a day  levothyroxine 88 mcg (0.088 mg) oral tablet: 1 tab(s) orally once a day  methadone 10 mg oral tablet: 3 tab(s) orally every 8 hours  pantoprazole 40 mg oral delayed release tablet: 1 tab(s) orally once a day (before a meal)  sertraline: 200 milligram(s) orally once a day  Soma 350 mg oral tablet: 1 tab(s) orally 3 times a day   albuterol 90 mcg/inh inhalation aerosol: 2 puff(s) inhaled every 6 hours As needed Shortness of Breath and/or Wheezing  ALPRAZolam 0.5 mg oral tablet: 1 tab(s) orally 2 times a day as needed for  anxiety MDD: 02  bumetanide 1 mg oral tablet: 1 tab(s) orally once a day  fluticasone-salmeterol 250 mcg-50 mcg inhalation powder: 1 powder inhaled 2 times a day as needed for  bronchospasm  folic acid 1 mg oral tablet: 1 tab(s) orally once a day  ipratropium-albuterol 0.5 mg-2.5 mg/3 mL inhalation solution: 3 milliliter(s) inhaled every 6 hours  lactulose 10 g/15 mL oral syrup: 15 milliliter(s) orally once a day as needed for  constipation PRN  lamoTRIgine 150 mg oral tablet: 1 tab(s) orally once a day  levothyroxine 88 mcg (0.088 mg) oral tablet: 1 tab(s) orally once a day  methadone 10 mg oral tablet: 3 tab(s) orally every 8 hours  pantoprazole 40 mg oral delayed release tablet: 1 tab(s) orally once a day (before a meal)  sertraline: 200 milligram(s) orally once a day  Soma 350 mg oral tablet: 1 tab(s) orally 3 times a day   albuterol 90 mcg/inh inhalation aerosol: 2 puff(s) inhaled every 6 hours As needed Shortness of Breath and/or Wheezing  ALPRAZolam 0.5 mg oral tablet: 1 tab(s) orally 2 times a day as needed for  anxiety MDD: 02  bumetanide 1 mg oral tablet: 1 tab(s) orally once a day  fluticasone-salmeterol 250 mcg-50 mcg inhalation powder: 1 powder inhaled 2 times a day as needed for  bronchospasm  folic acid 1 mg oral tablet: 1 tab(s) orally once a day  ipratropium-albuterol 0.5 mg-2.5 mg/3 mL inhalation solution: 3 milliliter(s) inhaled every 6 hours  lactulose 10 g/15 mL oral syrup: 15 milliliter(s) orally once a day as needed for  constipation PRN  lamoTRIgine 150 mg oral tablet: 1 tab(s) orally once a day  levothyroxine 100 mcg (0.1 mg) oral tablet: 1 tab(s) orally once a day  methadone 10 mg oral tablet: 3 tab(s) orally every 8 hours  pantoprazole 40 mg oral delayed release tablet: 1 tab(s) orally once a day (before a meal)  sertraline: 200 milligram(s) orally once a day  Soma 350 mg oral tablet: 1 tab(s) orally 3 times a day

## 2024-11-11 NOTE — CHART NOTE - NSCHARTNOTEFT_GEN_A_CORE
The Drug Utilization Report below displays all of the controlled substance prescriptions, if any, that your patient has filled in the last twelve months. The information displayed on this report is compiled from pharmacy submissions to the Department, and accurately reflects the information as submitted by the pharmacies.    This report was requested by: Kaleb Gonsales | Reference #: [id number]    You have not added a PALMA number. Keeping your PALMA number(s) up to date on the My PALMA # tab will enable the separation of your prescriptions from others in the search results.    Practitioner Count: 1  Pharmacy Count: 1  Current Opioid Prescriptions: 0  Current Benzodiazepine Prescriptions: 0  Current Stimulant Prescriptions: 0      Patient Demographic Information (PDI)       PDI	First Name	Last Name	Birth Date	Gender	Street Address	City	State	Zip Code  A	Elina Motta	1960	Female	[address]	[address]  B	Elina Motta	1960	Female	[address] [facility]	[address]    Prescription Information      PDI Filter:    PDI	Current Rx	Drug Type	Rx Written	Rx Dispensed	Drug	Quantity	Days Supply	Prescriber Name	Prescriber PALMA #	Payment Method  A	N		05/24/2024	05/25/2024	carisoprodol 350 mg tablet	90	30	Amol Craig MD	[id number]	Insurance  Dispenser [facility]  A	N	O	05/24/2024	05/25/2024	methadone hcl 10 mg tablet	270	30	Amol Craig MD	[id number]	[place]  Disp[facility]er [facility]  A	N	O	04/26/2024	04/27/2024	methadone hcl 10 mg tablet	270	30	Amol Craig MD	[id number]	[place]  [facility]  A	N		04/26/2024	04/27/2024	carisoprodol 350 mg tablet	90	30	Amol Craig MD	[id number]	Insurance  Dispenser [facility]  A	N		03/29/2024	03/29/2024	carisoprodol 350 mg tablet	90	30	Amol Craig MD	[id number]	Insurance  Dispenser [facility]  A	N	O	03/29/2024	03/29/2024	methadone hcl 10 mg tablet	270	30	Amol Craig MD	[id number]	[place]  [facility]  A	N	O	02/28/2024	02/29/2024	methadone hcl 10 mg tablet	270	30	Amol Craig MD	[id number]	[place]  [facility]  A	N		02/28/2024	02/29/2024	carisoprodol 350 mg tablet	90	30	Amol Craig MD	[id number]	Insurance  Dispenser [facility]  A	N	O	01/31/2024	01/31/2024	methadone hcl 10 mg tablet	270	30	Amol Craig MD	[id number]	[place]  [facility]  A	N		01/31/2024	01/31/2024	carisoprodol 350 mg tablet	90	30	Amol Craig MD	[id number]	Insurance  Dispenser [facility]  A	N		12/28/2023	12/29/2023	carisoprodol 350 mg tablet	90	30	Amol Craig MD	[id number]	Insurance  Dispenser [facility]  A	N	O	12/28/2023	12/29/2023	methadone hcl 10 mg tablet	270	30	Amol Craig MD	[id number]	[place]  [facility]  B	N	O	10/25/2024	10/26/2024	methadone hcl 10 mg tablet	63	7	Amol Craig MD	[id number]	[place]  [facility]  B	N		10/25/2024	10/26/2024	carisoprodol 350 mg tablet	21	7	Amol Craig MD	[id number]	Insurance  Dispenser [facility]  B	N		09/27/2024	09/27/2024	carisoprodol 350 mg tablet	90	30	Amol Craig MD	[id number]	Insurance  Dispenser [facility]  B	N	O	09/27/2024	09/27/2024	methadone hcl 10 mg tablet	270	30	Amol Craig MD	[id number]	[place]  [facility]  B	N	O	08/29/2024	08/30/2024	methadone hcl 10 mg tablet	270	30	Amol Craig MD	[id number]	[place]  [facility]  B	N		08/29/2024	08/30/2024	carisoprodol 350 mg tablet	90	30	Amol Craig MD	[id number]	Insurance  Dispenser [facility]  B	N	O	08/22/2024	08/23/2024	methadone hcl 10 mg tablet	63	7	mAol Craig MD	[id number]	[place]  [facility]  B	N		08/22/2024	08/23/2024	carisoprodol 350 mg tablet	21	7	Amol Craig MD	[id number]	Insurance  Dispenser [facility]  B	N		07/24/2024	07/25/2024	carisoprodol 350 mg tablet	90	30	Amol Craig MD	[id number]	Insurance  Dispenser [facility]  B	N	O	07/24/2024	07/25/2024	methadone hcl 10 mg tablet	270	30	Amol Craig MD	[id number]	[place]  [facility]  B	N	O	06/26/2024	06/26/2024	methadone hcl 10 mg tablet	270	30	Amol Craig MD	[id number]	[place]  [facility]  B	N		06/26/2024	06/26/2024	carisoprodol 350 mg tablet	90	30	Amol Craig MD	[id number]	Insurance  Dispenser [facility]  B	N	O	06/24/2024	06/24/2024	methadone hcl 10 mg tablet	27	3	Amol Craig MD	[id number]	Insurance  Dispenser [facility]

## 2024-11-11 NOTE — DIETITIAN INITIAL EVALUATION ADULT - OTHER INFO
Patient is a 64y old  Female who presents with a chief complaint of Abdominal pain.   monitor Mg and K, replace hypokalemia.   abdominal pain: generalized  Fatty Liver disease:   Obesity s/p Gastric Bypass Jaclyn en Y:   patient is s/p cholecystectomy.   Asthma: Patient reports worsening SOB for 1-2 months,   Leg edema:   stable, very mild, continue Bumex

## 2024-11-11 NOTE — DISCHARGE NOTE PROVIDER - NPI NUMBER (FOR SYSADMIN USE ONLY) :
[[id number]] [[id number]],[[id number] [[id number]],[[id number]],[[id number]] [[id number]],[[id number]],[[id number]],[[id number]],[[id number]]

## 2024-11-11 NOTE — DISCHARGE NOTE PROVIDER - NSDCCPCAREPLAN_GEN_ALL_CORE_FT
PRINCIPAL DISCHARGE DIAGNOSIS  Diagnosis: SOB (shortness of breath) on exertion  Assessment and Plan of Treatment: You came into the hospital for a loss of consciousness. You were admitted for telemetry monitoring, no arrythmias seen that could explain the loss of consciousness. Your qtc interval was elevated, in 600s, likely from sertraline and methadone, but it improved now to 400s. You also had abdominal pain, but the CT did not show any acute patholoogies - only mild enlarged liver from alcohol use.   Continue taking all your medications as prescribed and return if any worsening or new symptoms      SECONDARY DISCHARGE DIAGNOSES  Diagnosis: Intractable abdominal pain  Assessment and Plan of Treatment:     Diagnosis: Hepatomegaly  Assessment and Plan of Treatment:     Diagnosis: Syncope  Assessment and Plan of Treatment:     Diagnosis: Chest pain  Assessment and Plan of Treatment:      PRINCIPAL DISCHARGE DIAGNOSIS  Diagnosis: SOB (shortness of breath) on exertion  Assessment and Plan of Treatment: You came into the hospital for a loss of consciousness. You were admitted for telemetry monitoring, no arrythmias seen that could explain the loss of consciousness. Your qtc interval was elevated, in 600s, likely from sertraline and methadone, but it improved now to 400s. You also had abdominal pain, but the CT did not show any acute patholoogies - only mild enlarged liver from alcohol use.   Continue taking all your medications as prescribed and return if any worsening or new symptoms  Please follow up with pulmonlogist and primary care doctor      SECONDARY DISCHARGE DIAGNOSES  Diagnosis: Intractable abdominal pain  Assessment and Plan of Treatment:     Diagnosis: Hepatomegaly  Assessment and Plan of Treatment:     Diagnosis: Syncope  Assessment and Plan of Treatment:     Diagnosis: Chest pain  Assessment and Plan of Treatment:

## 2024-11-11 NOTE — DISCHARGE NOTE PROVIDER - NSDCFUADDAPPT_GEN_ALL_CORE_FT
APPTS ARE READY TO BE MADE: [ x] YES      1: pulm map     APPTS ARE READY TO BE MADE: [x ] YES    Best Family or Patient Contact (if needed):    Additional Information about above appointments (if needed):    1: Brayan Marrero for PFT  2: Wes Husain for LFT  3:     Other comments or requests:

## 2024-11-11 NOTE — DISCHARGE NOTE PROVIDER - NSDCACTIVITY_GEN_ALL_CORE
Patient given discharge instructions and prescription and verbalized understanding. Vital signs stable. Resp even and unlabored. Skin warm, dry and intact. Patient is alert and oriented. Patient doesn't have any questions at this time.    Activity as tolerated

## 2024-11-11 NOTE — DIETITIAN INITIAL EVALUATION ADULT - OTHER CALCULATIONS
Energy needs calculated with consideration for acuity of illness, weight status, skin integrity, & age   MS 1587 x 1.1-1.2 (liver disease) = 8880-8379 kcal/day   Increased fluids related persistent emesis

## 2024-11-11 NOTE — DISCHARGE NOTE PROVIDER - CARE PROVIDERS DIRECT ADDRESSES
,[email or web address]

## 2024-11-11 NOTE — DISCHARGE NOTE PROVIDER - REASON FOR ADMISSION
Abdominal pain Mirvaso Counseling: Mirvaso is a topical medication which can decrease superficial blood flow where applied. Side effects are uncommon and include stinging, redness and allergic reactions.

## 2024-11-11 NOTE — DIETITIAN INITIAL EVALUATION ADULT - ADD RECOMMEND
High Risk     Interventions: Meals, nutritional supplements, coordination of care  Monitoring/Evaluation: Weights, nutritionally relevant labs, PO intake, nutrition-focused physical findings.  Recommendations:   1. Continue current diet order  2. Add nutritional supplement 3x/day  3. Encourage PO intake, hydration, & assist PRN

## 2024-11-11 NOTE — PROGRESS NOTE ADULT - SUBJECTIVE AND OBJECTIVE BOX
UJLIET NAVARRETE  64y  Female      Patient is a 64y old  Female who presents with a chief complaint of Abdominal pain (11 Nov 2024 09:58)      INTERVAL HPI/OVERNIGHT EVENTS:  She is still complaining of abdominal pain, asking for more IV Dilaudid.   Vital Signs Last 24 Hrs  T(C): 36.9 (11 Nov 2024 05:03), Max: 36.9 (11 Nov 2024 05:03)  T(F): 98.4 (11 Nov 2024 05:03), Max: 98.4 (11 Nov 2024 05:03)  HR: 71 (11 Nov 2024 05:03) (71 - 91)  BP: 106/63 (11 Nov 2024 05:03) (106/63 - 150/89)  BP(mean): 78 (11 Nov 2024 05:03) (78 - 78)  RR: 18 (11 Nov 2024 05:03) (18 - 18)  SpO2: 98% (11 Nov 2024 05:03) (98% - 98%)    Parameters below as of 11 Nov 2024 05:03  Patient On (Oxygen Delivery Method): nasal cannula  O2 Flow (L/min): 2        11-10-24 @ 07:01  -  11-11-24 @ 07:00  --------------------------------------------------------  IN: 880 mL / OUT: 0 mL / NET: 880 mL    11-11-24 @ 07:01  -  11-11-24 @ 13:06  --------------------------------------------------------  IN: 0 mL / OUT: 900 mL / NET: -900 mL            Consultant(s) Notes Reviewed:  [x ] YES  [ ] NO          MEDICATIONS  (STANDING):  buMETAnide 1 milliGRAM(s) Oral daily  folic acid 1 milliGRAM(s) Oral daily  heparin   Injectable 5000 Unit(s) SubCutaneous every 8 hours  influenza   Vaccine 0.5 milliLiter(s) IntraMuscular once  lactated ringers. 1000 milliLiter(s) (75 mL/Hr) IV Continuous <Continuous>  lamoTRIgine 150 milliGRAM(s) Oral daily  levothyroxine 88 MICROGram(s) Oral daily  methadone    Tablet 30 milliGRAM(s) Oral every 8 hours  pantoprazole    Tablet 40 milliGRAM(s) Oral before breakfast  polyethylene glycol 3350 17 Gram(s) Oral two times a day  scopolamine 1 mG/72 Hr(s) Patch 1 Patch Transdermal once  senna 2 Tablet(s) Oral at bedtime  sertraline 200 milliGRAM(s) Oral daily    MEDICATIONS  (PRN):  acetaminophen     Tablet .. 650 milliGRAM(s) Oral every 6 hours PRN Temp greater or equal to 38C (100.4F), Mild Pain (1 - 3)  aluminum hydroxide/magnesium hydroxide/simethicone Suspension 30 milliLiter(s) Oral every 4 hours PRN Dyspepsia  cyclobenzaprine 5 milliGRAM(s) Oral three times a day PRN Muscle Spasm  melatonin 3 milliGRAM(s) Oral at bedtime PRN Insomnia  morphine  - Injectable 2 milliGRAM(s) IV Push every 6 hours PRN Severe Pain (7 - 10)      LABS                          12.8   6.08  )-----------( 137      ( 11 Nov 2024 06:12 )             41.1     11-11    139  |  97[L]  |  11  ----------------------------<  119[H]  3.5   |  28  |  1.1    Ca    8.5      11 Nov 2024 06:12  Mg     2.1     11-11        Urinalysis Basic - ( 11 Nov 2024 06:12 )    Color: x / Appearance: x / SG: x / pH: x  Gluc: 119 mg/dL / Ketone: x  / Bili: x / Urobili: x   Blood: x / Protein: x / Nitrite: x   Leuk Esterase: x / RBC: x / WBC x   Sq Epi: x / Non Sq Epi: x / Bacteria: x        Lactate Trend  11-10 @ 06:15 Lactate:2.4   11-08 @ 11:19 Lactate:2.6         CAPILLARY BLOOD GLUCOSE            RADIOLOGY & ADDITIONAL TESTS:    Imaging Personally Reviewed:  [ ] YES  [ ] NO    HEALTH ISSUES - PROBLEM Dx:          PHYSICAL EXAM:  GENERAL: NAD, well-developed.  HEAD:  Atraumatic, Normocephalic.  EYES: EOMI, PERRLA, conjunctiva and sclera clear.  NECK: Supple, No JVD.  CHEST/LUNG: Clear to auscultation bilaterally; No wheeze.  HEART: Regular rate and rhythm; S1 S2.   ABDOMEN: Soft, Nontender, Nondistended; Bowel sounds present.  EXTREMITIES:  2+ Peripheral Pulses, trac leg edema, chronic mild dermatitis.   PSYCH: AAOx3.  NEUROLOGY: non-focal.  SKIN: No rashes or lesions.

## 2024-11-11 NOTE — PROGRESS NOTE ADULT - ASSESSMENT
64-year-old female, former smoker with past medical history of asthma, hypothyroidism, anxiety, depression, MVP, Jaclyn-en-Y gastric bypass, appendectomy, cholecystectomy and Chronic back pain on methadone, presents to the ED following a syncopal episode last night. She also has complaints of worsening abdominal pain for 5 days.     A/P:   Syncope" vasovagal vs arrhythmia:   Prolonged QTc: patient on Methadone and high dose Sertraline.   Patient was sitting for a shower, she passed out briefly,  caught her, no head trauma or fall. No dizziness or palpitation.   EKG showed Normal Sinus Rhythm, prolonged QTc 600  Echo showed normal LVEF 65%  On telemetry no events.   CT chest negative for PE.   Continue tele, monitor Mg and K, replace hypokalemia.   For QTc, monitor EKG, Zofran held, may need to reduce Sertraline dose if no improvement.     Abdominal pain: generalized, mostly upper:   Fatty Liver disease:   Obesity s/p Gastric Bypass Jaclyn en Y:   Patient with moderate pain, mostly upper,   Lipase 37, LFTs mildly elevated, lactate 3>2.4  CT abdomen showed hepatomegaly, fatty liver infiltrate, no acute pathology.   Abdomen US showed  similar finding. patient is s/p cholecystectomy.   EGD/ Colonoscopy (May '23) - Non erosive gastritis  Patient is asking to keep Dilaudid along with her po pain meds. (drug seeking??), Consult GI as patient is still pain without any clear etiology, morphine prn for now. (Patient is not happy with my concern about Dilaudid and want to switch to anther provider).   No Zofran for nausea, as QTc is very prolonged, can give Reglan 5mg (not ordered) only if very needed.   Check urine tox, continue bowel regimen,   elevated lactate possibly from alcohol, ischemic etiology is less likely.     Progressive Dyspnea:   Asthma:   Patient reports worsening SOB for 1-2 months,   no wheezing, no volume overload, Pro-, CHF or Asthma are unlikely.   CT chest angio negative for PE, no lung disease (reports history of sarcoidosis).   Possibly from weight and deconditioning, Check O2 with ambulation.     Leg edema:   stable, very mild, continue Bumex    Hypothyroidism,   Continue Synthroid 88 mcg po daily.     Anxiety, depression,  Continue Zoloft, may need to lower the dose as above.      Chronic back pain   Home meds: methadone 30mg TID and Soma (carisoprodol 350mg TID), I-stop check , reference #[id number].     Alcohol use   - 2-3 drinks per fday  - CIWA monitoring   c/w b12 and folate     DVT prophylaxis: heparin SC  GI ppx - pantoprazole (home meds)  #Progress Note Handoff:  Pending (specify):  Consult: GI, improving abdominal pain, prepare for discharge in 24 hrs, discontinue tele.   Family discussion:  Disposition: Home in 24hrs.

## 2024-11-11 NOTE — DISCHARGE NOTE PROVIDER - HOSPITAL COURSE
64-year-old female with a history of asthma, hypothyroidism, anxiety, depression, MVP, Jaclyn-en-Y gastric bypass, appendectomy, cholecystectomy, and chronic back pain (on methadone) presented to the ED after a syncopal episode. She lost consciousness briefly while sitting after a shower. She also reported 5 days of worsening abdominal pain, nausea, vomiting, decreased oral intake, and constipation. She reports regular alcohol use (a couple of glasses of scotch nightly) and worsening shortness of breath for the past 2-3 months, requiring near-daily albuterol nebulizer treatments.    Hospital Course: The syncopal episode was likely vasovagal, though a prolonged QTc (603 on admission EKG) raised concern for arrhythmia. This was likely secondary to methadone and sertraline. Telemetry showed no arrhythmias. Echocardiogram showed normal LVEF (65%). CT chest was negative for PE. Abdominal pain workup, including CT abdomen and abdominal ultrasound, revealed hepatomegaly and fatty liver infiltration, but no acute pathology. Lipase was normal. LFTs were mildly elevated. Elevated lactate (3.0) was attributed to alcohol use. Her dyspnea was attributed to deconditioning and obesity, with no evidence of CHF or acute exacerbation of asthma. Leg edema was stable and mild. 64-year-old female with a history of asthma, hypothyroidism, anxiety, depression, MVP, Jaclyn-en-Y gastric bypass, appendectomy, cholecystectomy, and chronic back pain (on methadone) presented to the ED after a syncopal episode. She lost consciousness briefly while sitting after a shower. She also reported 5 days of worsening abdominal pain, nausea, vomiting, decreased oral intake, and constipation. She reports regular alcohol use (a couple of glasses of scotch nightly) and worsening shortness of breath for the past 2-3 months, requiring near-daily albuterol nebulizer treatments.    Hospital Course: The syncopal episode was likely vasovagal, though a prolonged QTc (603 on admission EKG) raised concern for arrhythmia. This was likely secondary to methadone and sertraline. Telemetry showed no arrhythmias. Echocardiogram showed normal LVEF (65%). CT chest was negative for PE. Abdominal pain workup, including CT abdomen and abdominal ultrasound, revealed hepatomegaly and fatty liver infiltration, but no acute pathology. Lipase was normal. LFTs were mildly elevated. Elevated lactate (3.0) was attributed to alcohol use. Her dyspnea was attributed to deconditioning and obesity, with no evidence of CHF or acute exacerbation of asthma. Leg edema was stable and mild.      #Abdominal pain - unclear etiology  #Nausea, Vomiting, Decreased appetite   #h/o Cholecystectomy/ appendectomy  #h/o Jaclyn en Y bypass   - LFTs stable, lactate 3 --> 2.4-->3.0  - CT abdomen - Hepatomegaly   - EGD/ Colonoscopy (May '23) - Non erosive gastritis  - c/w pantoprazole   - right lower abdominal discomfort, possible constipation (small bowel movement on 11/10)  - cw miralax senna  - a1c 7.0, no previous diagnosis of DM, possible gastroparesis, start SSI, f/u FS   - EGD- nonerosive gastritis in stomach  - CTa abdomen pending read  - pain control per pain management  - s/p injection, did not improve symtpoms  - bariatric surgery recs appreciated. dc on metformin 500mg bid. no immediate interventions    #Shortness of breath   #h/o Asthma   #h/o MVP (follows Dr. Negrete)  #h/o chronic bilateral LE swelling   #HTN  - CTA chest - no PE  - TTE --> echo 60-65% EF  - duplex neg   - requiring 7L NC 11/18 am. b/l wheezing on exam  - duo nebs  - c/w prednisone 40 daily for 10 days.   - albuterol prn   - added symbicort  - peak flow    #Syncope likely vasovagal vs orthostatic 2/2 dehydration  - ECG with prolonged qtc, no obvious ischemia, trops x2 negative  - no events on tele  - tele discontinued    #Hypothyroidism,   - home meds - 88mcg thyroxine   - TSH 9.88  - increased to 100 mcg synthroid     #anxiety, depression,  - c/w lamotrigine and zoloft      #Chronic back pain   - on methadone 30mg   -  po morphine 6mg q4h PRN as per pain management    #Alcohol use   - 2-3 drinks per day  - CIWA monitoring   - c/w b12 and folate   - started thiamine      64-year-old female with a history of asthma, hypothyroidism, anxiety, depression, MVP, Jaclyn-en-Y gastric bypass, appendectomy, cholecystectomy, and chronic back pain (on methadone) presented to the ED after a syncopal episode. She lost consciousness briefly while sitting after a shower. She also reported 5 days of worsening abdominal pain, nausea, vomiting, decreased oral intake, and constipation. She reports regular alcohol use (a couple of glasses of scotch nightly) and worsening shortness of breath for the past 2-3 months, requiring near-daily albuterol nebulizer treatments.    Hospital Course: The syncopal episode was likely vasovagal, though a prolonged QTc (603 on admission EKG) raised concern for arrhythmia. This was likely secondary to methadone and sertraline. Telemetry showed no arrhythmias. Echocardiogram showed normal LVEF (65%). CT chest was negative for PE. Abdominal pain workup, including CT abdomen and abdominal ultrasound, revealed hepatomegaly and fatty liver infiltration, but no acute pathology. Lipase was normal. LFTs were mildly elevated. Elevated lactate (3.0) was attributed to alcohol use. Her dyspnea was attributed to deconditioning and obesity, with no evidence of CHF or acute exacerbation of asthma. Leg edema was stable and mild.      #Abdominal pain - unclear etiology  #Nausea, Vomiting, Decreased appetite   #h/o Cholecystectomy/ appendectomy  #h/o Ajclyn en Y bypass   - LFTs stable, lactate 3 --> 2.4-->3.0  - CT abdomen - Hepatomegaly   - EGD/ Colonoscopy (May '23) - Non erosive gastritis  - c/w pantoprazole   - right lower abdominal discomfort, possible constipation (small bowel movement on 11/10)  - cw miralax senna  - a1c 7.0, no previous diagnosis of DM, possible gastroparesis, start SSI, f/u FS   - EGD- nonerosive gastritis in stomach  - CTa abdomen pending read  - pain control per pain management. c/w po morphine 6mg q4h prn. f/u pain management about dc pain regimen  - s/p injection, did not improve symtpoms  - bariatric surgery recs appreciated. dc on metformin 500mg bid. no immediate interventions    #Shortness of breath   #h/o Asthma   #h/o MVP (follows Dr. Negrete)  #h/o chronic bilateral LE swelling   #HTN  - CTA chest - no PE  - TTE --> echo 60-65% EF  - duplex neg   - requiring 7L NC 11/18 am. b/l wheezing on exam  - duo nebs  - c/w prednisone 40 daily for 3 more days. then 20mg for 3 days  - c/w albuterol prn   - c/w advair and symbicort  - peak flow  - TTE with bubbles 11/19: suspected pulmonary shunt. per pulm f/u OP    #Syncope likely vasovagal vs orthostatic 2/2 dehydration  - ECG with prolonged qtc, no obvious ischemia, trops x2 negative  - no events on tele  - tele discontinued    #Hypothyroidism,   - home meds - 88mcg thyroxine   - TSH 9.88  - increased to 100 mcg synthroid     #anxiety, depression,  - c/w lamotrigine and zoloft      #Chronic back pain   - on methadone 30mg   -  po morphine 6mg q4h PRN as per pain management    #Alcohol use   - 2-3 drinks per day  - CIWA monitoring   - c/w b12 and folate   - started thiamine      64-year-old female with a history of asthma, hypothyroidism, anxiety, depression, MVP, Jaclyn-en-Y gastric bypass, appendectomy, cholecystectomy, and chronic back pain (on methadone) presented to the ED after a syncopal episode. She lost consciousness briefly while sitting after a shower. She also reported 5 days of worsening abdominal pain, nausea, vomiting, decreased oral intake, and constipation. She reports regular alcohol use (a couple of glasses of scotch nightly) and worsening shortness of breath for the past 2-3 months, requiring near-daily albuterol nebulizer treatments.    Hospital Course: The syncopal episode was likely vasovagal, though a prolonged QTc (603 on admission EKG) raised concern for arrhythmia. This was likely secondary to methadone and sertraline. Telemetry showed no arrhythmias. Echocardiogram showed normal LVEF (65%). CT chest was negative for PE. Abdominal pain workup, including CT abdomen and abdominal ultrasound, revealed hepatomegaly and fatty liver infiltration, but no acute pathology. Lipase was normal. LFTs were mildly elevated. Elevated lactate (3.0) was attributed to alcohol use. Her dyspnea was attributed to deconditioning and obesity, with no evidence of CHF or acute exacerbation of asthma. Leg edema was stable and mild.      #Abdominal pain - unclear etiology  #Nausea, Vomiting, Decreased appetite   #h/o Cholecystectomy/ appendectomy  #h/o Jaclyn en Y bypass   - LFTs stable, lactate 3 --> 2.4-->3.0  - CT abdomen - Hepatomegaly   - EGD/ Colonoscopy (May '23) - Non erosive gastritis  - c/w pantoprazole   - right lower abdominal discomfort, possible constipation (small bowel movement on 11/10)  - cw miralax senna  - a1c 7.0, no previous diagnosis of DM, possible gastroparesis, start SSI, f/u FS   - EGD- nonerosive gastritis in stomach  - CTa abdomen pending read  - pain control per pain management. c/w po morphine 6mg q4h prn. f/u pain management about dc pain regimen  - s/p injection, did not improve symtpoms  - bariatric surgery recs appreciated. dc on metformin 500mg bid. no immediate interventions    #Shortness of breath   #h/o Asthma   #h/o MVP (follows Dr. Negrete)  #h/o chronic bilateral LE swelling   #HTN  - CTA chest - no PE  - TTE --> echo 60-65% EF  - duplex neg   - requiring 7L NC 11/18 am. b/l wheezing on exam  - duo nebs  - c/w prednisone 40 daily for 3 more days. then 20mg for 3 days  - c/w albuterol prn   - c/w advair and symbicort  - peak flow  - TTE with bubbles 11/19: suspected pulmonary shunt. per pulm f/u OP    #Syncope likely vasovagal vs orthostatic 2/2 dehydration  - ECG with prolonged qtc, no obvious ischemia, trops x2 negative  - no events on tele  - tele discontinued    #Hypothyroidism,   - home meds - 88mcg thyroxine   - TSH 9.88  - increased to 100 mcg synthroid     #anxiety, depression,  - c/w lamotrigine and zoloft      #Chronic back pain   - on methadone 30mg   -  po morphine 6mg q4h PRN as per pain management    #Alcohol use   - 2-3 drinks per day  - CIWA monitoring   - c/w b12 and folate   - started thiamine     Discussion of discharge plan of care, including discharge diagnosis, medication reconciliation, and follow-ups, was conducted with Dr. Bunn on 11/25/2024 and discharge was approved.     64-year-old female with a history of asthma, hypothyroidism, anxiety, depression, MVP, Jaclyn-en-Y gastric bypass, appendectomy, cholecystectomy, and chronic back pain (on methadone) presented to the ED after a syncopal episode. She lost consciousness briefly while sitting after a shower. She also reported 5 days of worsening abdominal pain, nausea, vomiting, decreased oral intake, and constipation. She reports regular alcohol use (a couple of glasses of scotch nightly) and worsening shortness of breath for the past 2-3 months, requiring near-daily albuterol nebulizer treatments.    Hospital Course: The syncopal episode was likely vasovagal, though a prolonged QTc (603 on admission EKG) raised concern for arrhythmia. This was likely secondary to methadone and sertraline. Telemetry showed no arrhythmias. Echocardiogram showed normal LVEF (65%). CT chest was negative for PE. Abdominal pain workup, including CT abdomen and abdominal ultrasound, revealed hepatomegaly and fatty liver infiltration, but no acute pathology. Lipase was normal. LFTs were mildly elevated. Elevated lactate (3.0) was attributed to alcohol use. Her dyspnea was attributed to deconditioning and obesity, with no evidence of CHF or acute exacerbation of asthma. Leg edema was stable and mild.    #Abdominal Pain    #H/o Jaclyn en Y bypass   - unclear etiology.  >> resolved   - CT abdomen - Hepatomegaly   - EGD/ Colonoscopy (May '23) - Non erosive gastritis  - c/w pantoprazole   - EGD- nonerosive gastritis in stomach  - CTA abdomen 11/13: No acute abdominal or pelvic pathology. Atherosclerotic calcifications of the aorta and its branches. No evidence   of celiac axis or SMA abnormality.  - Avoid IV Morphine and Dilaudid    - Bariatric surgery recs appreciated.     # Acute Hypoxic Respiratory Failure likely due to   # Asthma/ COPD exacerbation.  vs ACOS   # H/o MVP (follows Dr. Negrete)  # HTN  - CTA chest - no PE  - TTE --> echo 60-65% EF  - TTE with bubbles 11/19: suspected pulmonary shunt.   - currently on O2 2L NC > titrate off as tolerated.  - Necessitating home oxygen, 94% on 2L but destats to 89 on RA  - c/w albuterol prn   - c/w advair and symbicort  - per pulm f/u OP for PFT   - c/w bumetanide 1mg oral.   - Single dose of BUMEX 1mg IV     # Hypoglycemia - RESOLVED  Per endocrine:   -Symptoms of lightheadedness could be consistent with postprandial hyperinsulinemic hypoglycemia  -avoid large meals with simple sugars, start with small meals with complex carbs/fiber and proteins throughout the day  -If point-of-care glucose is noted to be below 55 mg/dL, and patient is symptomatic recommend to obtain serum glucose C-peptide insulin proinsulin and beta hydroxybutyrate before treating  -Per RD Conuslt, A diabetes nutrition education was provided to the patient via an explanation and a diabetic nutrition handout derived from the nutrition care manual. I recommended to the patient that after they are discharged, schedule to see the outpatient certified diabetes specialist for comprehensive diabetes nutritional counseling.    #DM  -A1c noted to be 7 this visit  Per endocrine  -agree low insulin sliding scale for now, if sugars go above 150 mg/dl persistently  -on DC can monitor off medication and just dietary interventions for now  #Syncope likely vasovagal vs dehydration  - ECG with prolonged qtc, no obvious ischemia, trops x2 negative  - No events on tele  - Neg Orthostatics .     #Rash  -RLQ panus fold   -c/w nystatin powder BID   -Started on Topical Clotrimazole 1% BID    #Hypothyroidism,   - home meds - 88mcg thyroxine   - TSH 9.88  - 100 mcg synthroid     #Anxiety, Depression,  - c/w lamotrigine and zoloft      #Chronic back pain   - on methadone 30mg   - po morphine 6mg q4h PRN as per pain management    #Alcohol use disorder  # Suspected Thiamine deficiency   - 2-3 drinks per day  - c/w b12 and folate   - started thiamine     Patient to follow up with outpatient pulmnologist, cardiology and hepatology.      Discussion of discharge plan of care, including discharge diagnosis, medication reconciliation, and follow-ups, was conducted with Dr. Bunn on 11/25/2024 and discharge was approved.     64-year-old female with a history of asthma, hypothyroidism, anxiety, depression, MVP, Jaclyn-en-Y gastric bypass, appendectomy, cholecystectomy, and chronic back pain (on methadone) presented to the ED after a syncopal episode. She lost consciousness briefly while sitting after a shower. She also reported 5 days of worsening abdominal pain, nausea, vomiting, decreased oral intake, and constipation. She reports regular alcohol use (a couple of glasses of scotch nightly) and worsening shortness of breath for the past 2-3 months, requiring near-daily albuterol nebulizer treatments.    Hospital Course: The syncopal episode was likely vasovagal, though a prolonged QTc (603 on admission EKG) raised concern for arrhythmia. This was likely secondary to methadone and sertraline. Telemetry showed no arrhythmias. Echocardiogram showed normal LVEF (65%). CT chest was negative for PE. Abdominal pain workup, including CT abdomen and abdominal ultrasound, revealed hepatomegaly and fatty liver infiltration, but no acute pathology. Lipase was normal. LFTs were mildly elevated. Elevated lactate (3.0) was attributed to alcohol use. Her dyspnea was attributed to deconditioning and obesity, with no evidence of CHF or acute exacerbation of asthma. Leg edema was stable and mild.    #Abdominal Pain  all workup neg , abdominal wall swelling and redness due to possible candidal infecetion , improved with topical treatmetn   #H/o Jaclyn en Y bypass   - unclear etiology.  >> resolved   - CT abdomen - Hepatomegaly   - EGD/ Colonoscopy (May '23) - Non erosive gastritis  - c/w pantoprazole   - EGD- nonerosive gastritis in stomach  - CTA abdomen 11/13: No acute abdominal or pelvic pathology. Atherosclerotic calcifications of the aorta and its branches. No evidence   of celiac axis or SMA abnormality.  - Avoid IV Morphine and Dilaudid    - Bariatric surgery recs appreciated.     # Acute Hypoxic Respiratory Failure likely due to   # Asthma/ COPD exacerbation.  vs ACOS   # H/o MVP (follows Dr. Negrete)  # HTN  - CTA chest - no PE  - TTE --> echo 60-65% EF  - TTE with bubbles 11/19: suspected pulmonary shunt.   - currently on O2 2L NC > titrate off as tolerated.  - Necessitating home oxygen, 94% on 2L but destats to 89 on RA  - c/w albuterol prn   - c/w advair and symbicort  - per pulm f/u OP for PFT   - c/w bumetanide 1mg oral.   - Single dose of BUMEX 1mg IV     # Hypoglycemia - RESOLVED  CAPILLARY BLOOD GLUCOSE  POCT Blood Glucose.: 95 mg/dL (03 Dec 2024 08:01)  POCT Blood Glucose.: 102 mg/dL (02 Dec 2024 21:40)  POCT Blood Glucose.: 91 mg/dL (02 Dec 2024 16:58)  POCT Blood Glucose.: 139 mg/dL (02 Dec 2024 11:34)    Per endocrine:   -Symptoms of lightheadedness could be consistent with postprandial hyperinsulinemic hypoglycemia  -avoid large meals with simple sugars, start with small meals with complex carbs/fiber and proteins throughout the day  -If point-of-care glucose is noted to be below 55 mg/dL, and patient is symptomatic recommend to obtain serum glucose C-peptide insulin proinsulin and beta hydroxybutyrate before treating  -Per RD Conuslt, A diabetes nutrition education was provided to the patient via an explanation and a diabetic nutrition handout derived from the nutrition care manual. I recommended to the patient that after they are discharged, schedule to see the outpatient certified diabetes specialist for comprehensive diabetes nutritional counseling.  fu results of proinsulin and c peptide as outpt if discharged today     -A1c noted to be 7 this visit  Per endocrine  -agree low insulin sliding scale for now, if sugars go above 150 mg/dl persistently  -on DC can monitor off medication and just dietary interventions for now    #Syncope likely vasovagal vs dehydration  - ECG with prolonged qtc, no obvious ischemia, trops x2 negative  - No events on tele  - Neg Orthostatics .     #Rash  -RLQ panus fold   -c/w nystatin powder BID   -Started on Topical Clotrimazole 1% BID    #Hypothyroidism,   - home meds - 88mcg thyroxine   - TSH 9.88  - 100 mcg synthroid     #Anxiety, Depression,  - c/w lamotrigine and zoloft      #Chronic back pain   - on methadone 30mg   - po morphine 6mg q4h PRN as per pain management    #Alcohol use disorder  # Suspected Thiamine deficiency   - 2-3 drinks per day  - c/w b12 and folate   - started thiamine     Patient to follow up with outpatient pulmnologist, cardiology and hepatology.      Discussion of discharge plan of care, including discharge diagnosis, medication reconciliation, and follow-ups, was conducted with Dr. Bunn on 11/25/2024 and discharge was approved.     64-year-old female with a history of asthma, hypothyroidism, anxiety, depression, MVP, Jaclyn-en-Y gastric bypass, appendectomy, cholecystectomy, and chronic back pain (on methadone) presented to the ED after a syncopal episode. She lost consciousness briefly while sitting after a shower. She also reported 5 days of worsening abdominal pain, nausea, vomiting, decreased oral intake, and constipation. She reports regular alcohol use (a couple of glasses of scotch nightly) and worsening shortness of breath for the past 2-3 months, requiring near-daily albuterol nebulizer treatments.    Hospital Course: The syncopal episode was likely vasovagal, though a prolonged QTc (603 on admission EKG) raised concern for arrhythmia. This was likely secondary to methadone and sertraline. Telemetry showed no arrhythmias. Echocardiogram showed normal LVEF (65%). CT chest was negative for PE. Abdominal pain workup, including CT abdomen and abdominal ultrasound, revealed hepatomegaly and fatty liver infiltration, but no acute pathology. Lipase was normal. LFTs were mildly elevated. Elevated lactate (3.0) was attributed to alcohol use. Her dyspnea was attributed to deconditioning and obesity, with no evidence of CHF or acute exacerbation of asthma. Leg edema was stable and mild.    #DM  -A1c noted to be 7 this visit  Per endocrine  -agree low insulin sliding scale for now, if sugars go above 150 mg/dl persistently  -on DC can monitor off medication and just dietary interventions for now    #Abdominal Pain    #H/o Jaclyn en Y bypass   - unclear etiology.  >> resolved   - CT abdomen - Hepatomegaly   - EGD/ Colonoscopy (May '23) - Non erosive gastritis  - c/w pantoprazole   - EGD- nonerosive gastritis in stomach  - CTA abdomen 11/13: No acute abdominal or pelvic pathology. Atherosclerotic calcifications of the aorta and its branches. No evidence   of celiac axis or SMA abnormality.  - Avoid IV Morphine and Dilaudid    - Bariatric surgery recs appreciated.     # Acute Hypoxic Respiratory Failure likely due to   # Asthma/ COPD exacerbation.  vs ACOS   # H/o MVP (follows Dr. Negrete)  # HTN  #Diastolic Dysfunction  - CTA chest - no PE  - TTE --> echo 60-65% EF Mild (grade 1) diastolic dysfunction  - TTE with bubbles 11/19: suspected pulmonary shunt.   - No clinical evidence of CHF   - currently on O2 2L NC > titrate off as tolerated.  - Necessitating home oxygen, 94% on 2L but destats to 89 on RA  - c/w albuterol prn   - c/w advair and symbicort  - per pulm f/u OP for PFT   - c/w bumetanide 1mg oral.     #Syncope likely vasovagal vs dehydration  - ECG with prolonged qtc, no obvious ischemia, trops x2 negative  - No events on tele  - Neg Orthostatics .     #Rash  -RLQ panus fold   -c/w nystatin powder BID   -cw Topical Clotrimazole 1% BID    #Hypothyroidism,   - home meds - 88mcg thyroxine   - TSH 9.88  - 100 mcg synthroid     #Anxiety, Depression,  - c/w lamotrigine and zoloft      #Chronic back pain   - on methadone 30mg   - po morphine 6mg q4h PRN as per pain management    #Alcohol use disorder  # Suspected Thiamine deficiency   - 2-3 drinks per day  - c/w b12 and folate   - started thiamine     #Abdominal Hernia  No signs of incarceration on exam  No pain on palpation, reducible  F/u OP surgery for recommendations      Discussion of discharge plan of care, including discharge diagnosis, medication reconciliation, and follow-ups, was conducted with Dr. Bunn on 12/5/2024 11:30AMand discharge was approved.

## 2024-11-11 NOTE — DIETITIAN INITIAL EVALUATION ADULT - PERSON TAUGHT/METHOD
Encouraged patient to try and drink the nutritional supplement if she cannot eat. Patient was concerned about kcal  content of the supplement. RD explained adequate energy is vital to sustain bodily function and prevent skin breakdown. Patient confirmed understand and agreement./verbal instruction/patient instructed

## 2024-11-11 NOTE — DIETITIAN INITIAL EVALUATION ADULT - ORAL INTAKE PTA/DIET HISTORY
PTA patient reports decreased  appetite for ~2-3 weeks with emesis after eating. Patient reports consuming 1.5 meals /day  because she work nights. Patient endorses no sugar diet. NKFA. Patient reports height of 167.6 cm. .2 kg, .7 kg, a 6% weigh loss in two weeks. Patient meet wt criteria for malnutrition.     Presently, patient reports continued decreased appetite, vomiting, & nausea after eating. Patient had an episode of emesis while RD was present. Patient c/o of persistent belching. Last BM 11/11.

## 2024-11-11 NOTE — DISCHARGE NOTE PROVIDER - CARE PROVIDER_API CALL
Frank Dubois .  Internal Medicine  [address] [place] [address]  Phone: [phone]  Fax: [phone]  Follow Up Time: 2 weeks   Frank Dubois .  Internal Medicine  [address] [place] [address]  Phone: [phone]  Fax: [phone]  Follow Up Time: 2 weeks    Cirilo Mcnair  Pulmonary Disease  [address] [place] [address]  Phone: [phone]  Fax: [phone]  Follow Up Time: 1 week   Frank Dubois .  Internal Medicine  [address] [place] [address]  Phone: [phone]  Fax: [phone]  Follow Up Time: 2 weeks    Cirilo Mcnair  Pulmonary Disease  [address] [place] [address], [facility] [address]  [place] [address]  Phone: [phone]  Fax: [phone]  Follow Up Time: 1 week    Wes Husain  Internal Medicine  [facility] [place] [address]  Phone: [phone]  Fax: [phone]  Follow Up Time: 2 weeks   Frank Dubois   Internal Medicine  [address] [place] [address]  Phone: [phone]  Fax: [phone]  Follow Up Time: 2 weeks    Cirilo Mcnair  Pulmonary Disease  [address] [facility], [address]  [place] [address]  Phone: [phone]  Fax: [phone]  Follow Up Time: 1 week    Wes Husain  Internal Medicine  [address] [place] [address]  Phone: [phone]  Fax: [phone]  Follow Up Time: 2 weeks    Emiliano Negrete  Interventional Cardiology  [address] [facility], [address]  [place] [address]  Phone: [phone]  Fax: [phone]  Follow Up Time: 2 weeks    Nae Szymanski  Surgery  [address] [facility] [place] [address]  Phone: [phone]  Fax: [phone]  Follow Up Time: 2 weeks

## 2024-11-12 LAB
ALBUMIN SERPL ELPH-MCNC: 3.6 G/DL — SIGNIFICANT CHANGE UP (ref 3.5–5.2)
ALP SERPL-CCNC: 146 U/L — HIGH (ref 30–115)
ALT FLD-CCNC: 24 U/L — SIGNIFICANT CHANGE UP (ref 0–41)
ANION GAP SERPL CALC-SCNC: 16 MMOL/L — HIGH (ref 7–14)
APPEARANCE UR: CLEAR — SIGNIFICANT CHANGE UP
AST SERPL-CCNC: 35 U/L — SIGNIFICANT CHANGE UP (ref 0–41)
BACTERIA # UR AUTO: NEGATIVE /HPF — SIGNIFICANT CHANGE UP
BILIRUB SERPL-MCNC: 0.5 MG/DL — SIGNIFICANT CHANGE UP (ref 0.2–1.2)
BILIRUB UR-MCNC: NEGATIVE — SIGNIFICANT CHANGE UP
BUN SERPL-MCNC: 14 MG/DL — SIGNIFICANT CHANGE UP (ref 10–20)
CALCIUM SERPL-MCNC: 8.3 MG/DL — LOW (ref 8.4–10.5)
CAST: 1 /LPF — SIGNIFICANT CHANGE UP (ref 0–4)
CHLORIDE SERPL-SCNC: 96 MMOL/L — LOW (ref 98–110)
CO2 SERPL-SCNC: 25 MMOL/L — SIGNIFICANT CHANGE UP (ref 17–32)
COD CRY URNS QL: PRESENT
COLOR SPEC: YELLOW — SIGNIFICANT CHANGE UP
CREAT SERPL-MCNC: 1 MG/DL — SIGNIFICANT CHANGE UP (ref 0.7–1.5)
DIFF PNL FLD: NEGATIVE — SIGNIFICANT CHANGE UP
EGFR: 64 ML/MIN/1.73M2 — SIGNIFICANT CHANGE UP
GLUCOSE BLDC GLUCOMTR-MCNC: 111 MG/DL — HIGH (ref 70–99)
GLUCOSE BLDC GLUCOMTR-MCNC: 115 MG/DL — HIGH (ref 70–99)
GLUCOSE BLDC GLUCOMTR-MCNC: 144 MG/DL — HIGH (ref 70–99)
GLUCOSE SERPL-MCNC: 152 MG/DL — HIGH (ref 70–99)
GLUCOSE UR QL: NEGATIVE MG/DL — SIGNIFICANT CHANGE UP
HCT VFR BLD CALC: 40.1 % — SIGNIFICANT CHANGE UP (ref 37–47)
HGB BLD-MCNC: 12.6 G/DL — SIGNIFICANT CHANGE UP (ref 12–16)
KETONES UR-MCNC: NEGATIVE MG/DL — SIGNIFICANT CHANGE UP
LACTATE SERPL-SCNC: 3 MMOL/L — HIGH (ref 0.7–2)
LEUKOCYTE ESTERASE UR-ACNC: ABNORMAL
MAGNESIUM SERPL-MCNC: 1.7 MG/DL — LOW (ref 1.8–2.4)
MCHC RBC-ENTMCNC: 30.2 PG — SIGNIFICANT CHANGE UP (ref 27–31)
MCHC RBC-ENTMCNC: 31.4 G/DL — LOW (ref 32–37)
MCV RBC AUTO: 96.2 FL — SIGNIFICANT CHANGE UP (ref 81–99)
NITRITE UR-MCNC: NEGATIVE — SIGNIFICANT CHANGE UP
NRBC # BLD: 0 /100 WBCS — SIGNIFICANT CHANGE UP (ref 0–0)
PH UR: 6 — SIGNIFICANT CHANGE UP (ref 5–8)
PLATELET # BLD AUTO: 142 K/UL — SIGNIFICANT CHANGE UP (ref 130–400)
PMV BLD: 10.7 FL — HIGH (ref 7.4–10.4)
POTASSIUM SERPL-MCNC: 3.2 MMOL/L — LOW (ref 3.5–5)
POTASSIUM SERPL-SCNC: 3.2 MMOL/L — LOW (ref 3.5–5)
PROT SERPL-MCNC: 5.7 G/DL — LOW (ref 6–8)
PROT UR-MCNC: NEGATIVE MG/DL — SIGNIFICANT CHANGE UP
RBC # BLD: 4.17 M/UL — LOW (ref 4.2–5.4)
RBC # FLD: 13.2 % — SIGNIFICANT CHANGE UP (ref 11.5–14.5)
RBC CASTS # UR COMP ASSIST: 24 /HPF — HIGH (ref 0–4)
SODIUM SERPL-SCNC: 137 MMOL/L — SIGNIFICANT CHANGE UP (ref 135–146)
SP GR SPEC: 1.01 — SIGNIFICANT CHANGE UP (ref 1–1.03)
SQUAMOUS # UR AUTO: 2 /HPF — SIGNIFICANT CHANGE UP (ref 0–5)
UROBILINOGEN FLD QL: 0.2 MG/DL — SIGNIFICANT CHANGE UP (ref 0.2–1)
WBC # BLD: 7.17 K/UL — SIGNIFICANT CHANGE UP (ref 4.8–10.8)
WBC # FLD AUTO: 7.17 K/UL — SIGNIFICANT CHANGE UP (ref 4.8–10.8)
WBC UR QL: 6 /HPF — HIGH (ref 0–5)

## 2024-11-12 PROCEDURE — 99233 SBSQ HOSP IP/OBS HIGH 50: CPT

## 2024-11-12 PROCEDURE — 93970 EXTREMITY STUDY: CPT | Mod: 26

## 2024-11-12 PROCEDURE — 99222 1ST HOSP IP/OBS MODERATE 55: CPT | Mod: 25

## 2024-11-12 PROCEDURE — 99223 1ST HOSP IP/OBS HIGH 75: CPT

## 2024-11-12 RX ORDER — GLUCAGON INJECTION, SOLUTION 0.5 MG/.1ML
1 INJECTION, SOLUTION SUBCUTANEOUS ONCE
Refills: 0 | Status: DISCONTINUED | OUTPATIENT
Start: 2024-11-12 | End: 2024-12-06

## 2024-11-12 RX ORDER — LANOLIN ALCOHOL/MO/W.PET/CERES
100 CREAM (GRAM) TOPICAL DAILY
Refills: 0 | Status: DISCONTINUED | OUTPATIENT
Start: 2024-11-12 | End: 2024-12-06

## 2024-11-12 RX ORDER — DICYCLOMINE HYDROCHLORIDE 10 MG/1
20 CAPSULE ORAL
Refills: 0 | Status: DISCONTINUED | OUTPATIENT
Start: 2024-11-12 | End: 2024-11-21

## 2024-11-12 RX ORDER — PANTOPRAZOLE SODIUM 40 MG/1
40 TABLET, DELAYED RELEASE ORAL
Refills: 0 | Status: DISCONTINUED | OUTPATIENT
Start: 2024-11-12 | End: 2024-12-06

## 2024-11-12 RX ORDER — 0.9 % SODIUM CHLORIDE 0.9 %
1000 INTRAVENOUS SOLUTION INTRAVENOUS
Refills: 0 | Status: DISCONTINUED | OUTPATIENT
Start: 2024-11-12 | End: 2024-12-06

## 2024-11-12 RX ORDER — LEVOTHYROXINE SODIUM 150 MCG
100 TABLET ORAL DAILY
Refills: 0 | Status: DISCONTINUED | OUTPATIENT
Start: 2024-11-13 | End: 2024-12-06

## 2024-11-12 RX ADMIN — METHADONE HYDROCHLORIDE 30 MILLIGRAM(S): 5 TABLET ORAL at 22:39

## 2024-11-12 RX ADMIN — Medication 2 TABLET(S): at 22:39

## 2024-11-12 RX ADMIN — SERTRALINE HYDROCHLORIDE 200 MILLIGRAM(S): 100 TABLET, FILM COATED ORAL at 11:38

## 2024-11-12 RX ADMIN — METHADONE HYDROCHLORIDE 30 MILLIGRAM(S): 5 TABLET ORAL at 05:50

## 2024-11-12 RX ADMIN — BUMETANIDE 1 MILLIGRAM(S): 1 TABLET ORAL at 05:51

## 2024-11-12 RX ADMIN — Medication 2 MILLIGRAM(S): at 12:06

## 2024-11-12 RX ADMIN — PANTOPRAZOLE SODIUM 40 MILLIGRAM(S): 40 TABLET, DELAYED RELEASE ORAL at 17:24

## 2024-11-12 RX ADMIN — Medication 2 MILLIGRAM(S): at 16:12

## 2024-11-12 RX ADMIN — PANTOPRAZOLE SODIUM 40 MILLIGRAM(S): 40 TABLET, DELAYED RELEASE ORAL at 05:51

## 2024-11-12 RX ADMIN — POLYETHYLENE GLYCOL 3350 17 GRAM(S): 17 POWDER, FOR SOLUTION ORAL at 05:50

## 2024-11-12 RX ADMIN — Medication 88 MICROGRAM(S): at 05:51

## 2024-11-12 RX ADMIN — Medication 2 MILLIGRAM(S): at 08:00

## 2024-11-12 RX ADMIN — Medication 2 MILLIGRAM(S): at 00:16

## 2024-11-12 RX ADMIN — Medication 2 MILLIGRAM(S): at 20:13

## 2024-11-12 RX ADMIN — Medication 100 MILLIGRAM(S): at 11:38

## 2024-11-12 RX ADMIN — POLYETHYLENE GLYCOL 3350 17 GRAM(S): 17 POWDER, FOR SOLUTION ORAL at 17:25

## 2024-11-12 RX ADMIN — Medication 1 MILLIGRAM(S): at 11:38

## 2024-11-12 RX ADMIN — LAMOTRIGINE 150 MILLIGRAM(S): 50 TABLET, EXTENDED RELEASE ORAL at 11:38

## 2024-11-12 RX ADMIN — Medication 2 MILLIGRAM(S): at 03:42

## 2024-11-12 RX ADMIN — METHADONE HYDROCHLORIDE 30 MILLIGRAM(S): 5 TABLET ORAL at 14:26

## 2024-11-12 NOTE — CONSULT NOTE ADULT - SUBJECTIVE AND OBJECTIVE BOX
Gastroenterology Consultation:    Patient is a 64y old  Female who presents with a chief complaint of Chest pain     (11 Nov 2024 16:39)        Admitted on: 11-08-24      HPI:  64-year-old female, former smoker with past medical history of asthma, hypothyroidism, anxiety, depression, MVP, Jaclyn-en-Y gastric bypass, appendectomy, cholecystectomy and Chronic back pain on methadone, presents to the ED following a syncopal episode last night. She also has complaints of worsening abdominal pain for 5 days.     Pt was sitting down while  is helping her dry after a shower when she felt dizzy and lost consciousness for a brief moment. No truama. No  incontinence or shaking movements. No chest pain or sudden SOB. However, pt endorses having reduced effort tolerance and shortness of breath for the past 2-3 months. She reportedly uses nebs/albuterol on almost a daily basis. She follows with Dr. Negrete (cardiology). Per pt, cath was done 5-10 yrs back which was normal. She is due for a stress test. Takes bumex daily for LE swelling.       She also reports of nausea, vomiting, decreased p.o. intake and worsening abdominal pain for the past 5 days. Pain in localized to right lower quadrant but radiates all over the abd region. Not passed stool in the past 5 days, says didn't what much.    Patient drinks a couple glasses of scotch nightly.    ED vitals - HR -  84/min; BP-  137/82mmhg; RR -  18/min; SPO2 -  98%; Afebrile  ECG - nsr, irbbb, qtc 603  labs - AST/ ALT 77/36, ALP - 194; Lactate 3.0  CT abdomen - hepatomegaly, no other acute findings     Pt admitted for Syncope and Abdominal pain w/u.  (08 Nov 2024 09:17)        Prior EGD:    Prior Colonoscopy:      PAST MEDICAL & SURGICAL HISTORY:  Asthma  LAST ATTACK MANY YRS AGO      Gastroesophageal reflux disease without esophagitis      Hypothyroidism      Heart murmur      Anxiety      Depression      Mood disorder      Psoriasis      Constipation      Back pain  LOW      Morbid obesity      S/P tonsillectomy  1967      History of appendectomy  1974      Abscess of back  EVACUATION OF ABSCESS @ L5-S1 1997      History of Jaclyn-en-Y gastric bypass  WITH CHOLECYSTECTOMY 2006            FAMILY HISTORY:  CAD (coronary artery disease) (Father, Mother)        Social History:  Tobacco:  Alcohol:  Drugs:    Home Medications:  bumetanide 1 mg oral tablet: 1 tab(s) orally once a day (11 Nov 2024 09:09)  folic acid 1 mg oral tablet: 1 tab(s) orally once a day (30 Jul 2018 12:47)  lamoTRIgine 150 mg oral tablet: 1 tab(s) orally once a day (08 Nov 2024 10:50)  levothyroxine 88 mcg (0.088 mg) oral tablet: 1 tab(s) orally once a day (30 Jul 2018 12:47)  methadone 10 mg oral tablet: 3 tab(s) orally every 8 hours (30 Jul 2018 12:47)  sertraline: 200 milligram(s) orally once a day (30 Jul 2018 12:47)  Soma 350 mg oral tablet: 1 tab(s) orally 3 times a day (30 Jul 2018 12:47)        MEDICATIONS  (STANDING):  buMETAnide 1 milliGRAM(s) Oral daily  folic acid 1 milliGRAM(s) Oral daily  heparin   Injectable 5000 Unit(s) SubCutaneous every 8 hours  influenza   Vaccine 0.5 milliLiter(s) IntraMuscular once  lactated ringers. 1000 milliLiter(s) (75 mL/Hr) IV Continuous <Continuous>  lamoTRIgine 150 milliGRAM(s) Oral daily  levothyroxine 88 MICROGram(s) Oral daily  methadone    Tablet 30 milliGRAM(s) Oral every 8 hours  pantoprazole    Tablet 40 milliGRAM(s) Oral before breakfast  polyethylene glycol 3350 17 Gram(s) Oral two times a day  scopolamine 1 mG/72 Hr(s) Patch 1 Patch Transdermal once  senna 2 Tablet(s) Oral at bedtime  sertraline 200 milliGRAM(s) Oral daily    MEDICATIONS  (PRN):  acetaminophen     Tablet .. 650 milliGRAM(s) Oral every 6 hours PRN Temp greater or equal to 38C (100.4F), Mild Pain (1 - 3)  aluminum hydroxide/magnesium hydroxide/simethicone Suspension 30 milliLiter(s) Oral every 4 hours PRN Dyspepsia  cyclobenzaprine 5 milliGRAM(s) Oral three times a day PRN Muscle Spasm  melatonin 3 milliGRAM(s) Oral at bedtime PRN Insomnia  morphine  - Injectable 2 milliGRAM(s) IV Push every 6 hours PRN Severe Pain (7 - 10)      Allergies  penicillin (Anaphylaxis)      Review of Systems:   Constitutional:  No Fever, No Chills  ENT/Mouth:  No Hearing Changes,  No Difficulty Swallowing  Eyes:  No Eye Pain, No Vision Changes  Cardiovascular:  No Chest Pain, No Palpitations  Respiratory:  No Cough, No Dyspnea  Gastrointestinal:  As described in HPI  Musculoskeletal:  No Joint Swelling, No Back Pain  Skin:  No Skin Lesions, No Jaundice  Neuro:  No Syncope, No Dizziness  Heme/Lymph:  No Bruising, No Bleeding.          Physical Examination:  T(C): 36.6 (11-12-24 @ 05:05), Max: 36.9 (11-11-24 @ 13:22)  HR: 77 (11-12-24 @ 05:05) (66 - 81)  BP: 100/69 (11-12-24 @ 05:05) (90/56 - 101/68)  RR: 18 (11-12-24 @ 05:05) (18 - 18)  SpO2: 96% (11-12-24 @ 05:05) (95% - 96%)  Height (cm): 167.6 (11-11-24 @ 17:32)    11-10-24 @ 07:01  -  11-11-24 @ 07:00  --------------------------------------------------------  IN: 880 mL / OUT: 0 mL / NET: 880 mL    11-11-24 @ 07:01  -  11-12-24 @ 07:00  --------------------------------------------------------  IN: 0 mL / OUT: 900 mL / NET: -900 mL          GENERAL: AAOx3, no acute distress.  HEAD:  Atraumatic, Normocephalic  EYES: conjunctiva and sclera clear  NECK: Supple, no JVD or thyromegaly  CHEST/LUNG: Clear to auscultation bilaterally; No wheeze, rhonchi, or rales  HEART: Regular rate and rhythm; normal S1, S2, No murmurs.  ABDOMEN: Soft, nontender, nondistended; Bowel sounds present  NEUROLOGY: No asterixis or tremor.   SKIN: Intact, no jaundice        Data:                        12.8   6.08  )-----------( 137      ( 11 Nov 2024 06:12 )             41.1     Hgb Trend:  12.8  11-11-24 @ 06:12  13.6  11-10-24 @ 06:15        11-11    139  |  97[L]  |  11  ----------------------------<  119[H]  3.5   |  28  |  1.1    Ca    8.5      11 Nov 2024 06:12  Mg     2.1     11-11      Liver panel trend:  TBili 0.8   /   AST 48   /   ALT 31   /   AlkP 170   /   Tptn 5.8   /   Alb 3.4    /   DBili --      11-09  TBili 1.0   /   AST 58   /   ALT 36   /   AlkP 196   /   Tptn 6.5   /   Alb 3.8    /   DBili 0.4      11-08  TBili 1.0   /   AST 77   /   ALT 36   /   AlkP 194   /   Tptn 6.9   /   Alb 3.8    /   DBili --      11-08              Radiology:       Gastroenterology Consultation:    Patient is a 64y old  Female who presents with a chief complaint of Chest pain     (11 Nov 2024 16:39)        Admitted on: 11-08-24      HPI:  64-year-old female, former smoker with past medical history of asthma, hypothyroidism, anxiety, depression, MVP, Jaclyn-en-Y gastric bypass, appendectomy, cholecystectomy and Chronic back pain on methadone, presents to the ED following a syncopal episode last night. She also has complaints of worsening abdominal pain for 5 days.     Pt was sitting down while  is helping her dry after a shower when she felt dizzy and lost consciousness for a brief moment. No truama. No  incontinence or shaking movements. No chest pain or sudden SOB. However, pt endorses having reduced effort tolerance and shortness of breath for the past 2-3 months. She reportedly uses nebs/albuterol on almost a daily basis. She follows with Dr. Negrete (cardiology). Per pt, cath was done 5-10 yrs back which was normal. She is due for a stress test. Takes bumex daily for LE swelling.       She also reports of nausea, vomiting, decreased p.o. intake and worsening abdominal pain for the past 5 days. Pain in localized to right lower quadrant but radiates all over the abd region. Not passed stool in the past 5 days, says didn't what much.       Pt admitted for Syncope and Abdominal pain w/u.  GI is called for evaluation         Prior EGD:      < from: EGD (05.01.23 @ 11:38) >    Impressions:    Irregularity in the area at and just proximal to the squamo-columnar junction.  (Biopsy).    Erythema in the stomach compatible with non-erosive gastritis. (Biopsy).    -Altered anatomy (Jaclyn-en-Y gastric bypass). No evidence of active bleeding  noted. .    < end of copied text >    Prior Colonoscopy:  < from: Colonoscopy (05.02.23 @ 15:17) >    Impressions:    External hemorrhoids.    The colon was otherwise unremarkable (Biopsy).    -Dark brown pigmentation was notedthroughout the colon consistent with  melanosis coli. .    < end of copied text >      PAST MEDICAL & SURGICAL HISTORY:  Asthma  LAST ATTACK MANY YRS AGO      Gastroesophageal reflux disease without esophagitis      Hypothyroidism      Heart murmur      Anxiety      Depression      Mood disorder      Psoriasis      Constipation      Back pain  LOW      Morbid obesity      S/P tonsillectomy  1967      History of appendectomy  1974      Abscess of back  EVACUATION OF ABSCESS @ L5-S1 1997      History of Jaclyn-en-Y gastric bypass  WITH CHOLECYSTECTOMY 2006            FAMILY HISTORY:  CAD (coronary artery disease) (Father, Mother)        Social History:  Tobacco: denies  Alcohol: denies  Drugs: denies     Home Medications:  bumetanide 1 mg oral tablet: 1 tab(s) orally once a day (11 Nov 2024 09:09)  folic acid 1 mg oral tablet: 1 tab(s) orally once a day (30 Jul 2018 12:47)  lamoTRIgine 150 mg oral tablet: 1 tab(s) orally once a day (08 Nov 2024 10:50)  levothyroxine 88 mcg (0.088 mg) oral tablet: 1 tab(s) orally once a day (30 Jul 2018 12:47)  methadone 10 mg oral tablet: 3 tab(s) orally every 8 hours (30 Jul 2018 12:47)  sertraline: 200 milligram(s) orally once a day (30 Jul 2018 12:47)  Soma 350 mg oral tablet: 1 tab(s) orally 3 times a day (30 Jul 2018 12:47)        MEDICATIONS  (STANDING):  buMETAnide 1 milliGRAM(s) Oral daily  folic acid 1 milliGRAM(s) Oral daily  heparin   Injectable 5000 Unit(s) SubCutaneous every 8 hours  influenza   Vaccine 0.5 milliLiter(s) IntraMuscular once  lactated ringers. 1000 milliLiter(s) (75 mL/Hr) IV Continuous <Continuous>  lamoTRIgine 150 milliGRAM(s) Oral daily  levothyroxine 88 MICROGram(s) Oral daily  methadone    Tablet 30 milliGRAM(s) Oral every 8 hours  pantoprazole    Tablet 40 milliGRAM(s) Oral before breakfast  polyethylene glycol 3350 17 Gram(s) Oral two times a day  scopolamine 1 mG/72 Hr(s) Patch 1 Patch Transdermal once  senna 2 Tablet(s) Oral at bedtime  sertraline 200 milliGRAM(s) Oral daily    MEDICATIONS  (PRN):  acetaminophen     Tablet .. 650 milliGRAM(s) Oral every 6 hours PRN Temp greater or equal to 38C (100.4F), Mild Pain (1 - 3)  aluminum hydroxide/magnesium hydroxide/simethicone Suspension 30 milliLiter(s) Oral every 4 hours PRN Dyspepsia  cyclobenzaprine 5 milliGRAM(s) Oral three times a day PRN Muscle Spasm  melatonin 3 milliGRAM(s) Oral at bedtime PRN Insomnia  morphine  - Injectable 2 milliGRAM(s) IV Push every 6 hours PRN Severe Pain (7 - 10)      Allergies  penicillin (Anaphylaxis)      Review of Systems:   Constitutional:  No Fever, No Chills  ENT/Mouth:  No Hearing Changes,  No Difficulty Swallowing  Eyes:  No Eye Pain, No Vision Changes  Cardiovascular:  No Chest Pain, No Palpitations  Respiratory:  No Cough, No Dyspnea  Gastrointestinal:  As described in HPI  Musculoskeletal:  No Joint Swelling, No Back Pain  Skin:  No Skin Lesions, No Jaundice  Neuro:  No Syncope, No Dizziness  Heme/Lymph:  No Bruising, No Bleeding.          Physical Examination:  T(C): 36.6 (11-12-24 @ 05:05), Max: 36.9 (11-11-24 @ 13:22)  HR: 77 (11-12-24 @ 05:05) (66 - 81)  BP: 100/69 (11-12-24 @ 05:05) (90/56 - 101/68)  RR: 18 (11-12-24 @ 05:05) (18 - 18)  SpO2: 96% (11-12-24 @ 05:05) (95% - 96%)  Height (cm): 167.6 (11-11-24 @ 17:32)    11-10-24 @ 07:01  -  11-11-24 @ 07:00  --------------------------------------------------------  IN: 880 mL / OUT: 0 mL / NET: 880 mL    11-11-24 @ 07:01  -  11-12-24 @ 07:00  --------------------------------------------------------  IN: 0 mL / OUT: 900 mL / NET: -900 mL          GENERAL: AAOx3, no acute distress.  HEAD:  Atraumatic, Normocephalic  EYES: conjunctiva and sclera clear  NECK: Supple, no JVD or thyromegaly  CHEST/LUNG: Clear to auscultation bilaterally; No wheeze, rhonchi, or rales  HEART: Regular rate and rhythm; normal S1, S2, No murmurs.  ABDOMEN: Soft, nontender, nondistended; Bowel sounds present  NEUROLOGY: No asterixis or tremor.   SKIN: Intact, no jaundice        Data:                        12.8   6.08  )-----------( 137      ( 11 Nov 2024 06:12 )             41.1     Hgb Trend:  12.8  11-11-24 @ 06:12  13.6  11-10-24 @ 06:15        11-11    139  |  97[L]  |  11  ----------------------------<  119[H]  3.5   |  28  |  1.1    Ca    8.5      11 Nov 2024 06:12  Mg     2.1     11-11      Liver panel trend:  TBili 0.8   /   AST 48   /   ALT 31   /   AlkP 170   /   Tptn 5.8   /   Alb 3.4    /   DBili --      11-09  TBili 1.0   /   AST 58   /   ALT 36   /   AlkP 196   /   Tptn 6.5   /   Alb 3.8    /   DBili 0.4      11-08  TBili 1.0   /   AST 77   /   ALT 36   /   AlkP 194   /   Tptn 6.9   /   Alb 3.8    /   DBili --      11-08              Radiology:    < from: CT Abdomen and Pelvis w/ Oral Cont and w/ IV Cont (11.08.24 @ 04:02) >    IMPRESSION:  1.  No evidence of acute pulmonary embolism.  2.  No evidence of acute thoracic or abdominopelvic pathology.  3.  Since 4/27/2023, increasing hepatomegaly with new diffuse severe   fatty infiltration of the liver.      < end of copied text >    < from: US Abdomen Upper Quadrant Right (11.09.24 @ 14:32) >    IMPRESSION:  Markedly enlarged fatty infiltrated liver limits sonographic sensitivity   for evaluation of lesions or other hepatic findings.    Surgically absent gallbladder.    < end of copied text >

## 2024-11-12 NOTE — PROGRESS NOTE ADULT - SUBJECTIVE AND OBJECTIVE BOX
SUBJECTIVE/OVERNIGHT EVENTS  Today is hospital day 4d. This morning patient was seen and examined at bedside, resting comfortably in bed. No acute or major events overnight.    Continues to complain of abdominal pain, required extra morphine dose overnight     MEDICATIONS  STANDING MEDICATIONS  buMETAnide 1 milliGRAM(s) Oral daily  dextrose 5%. 1000 milliLiter(s) IV Continuous <Continuous>  dextrose 5%. 1000 milliLiter(s) IV Continuous <Continuous>  dextrose 50% Injectable 25 Gram(s) IV Push once  dextrose 50% Injectable 25 Gram(s) IV Push once  dextrose 50% Injectable 12.5 Gram(s) IV Push once  folic acid 1 milliGRAM(s) Oral daily  glucagon  Injectable 1 milliGRAM(s) IntraMuscular once  heparin   Injectable 5000 Unit(s) SubCutaneous every 8 hours  influenza   Vaccine 0.5 milliLiter(s) IntraMuscular once  insulin lispro (ADMELOG) corrective regimen sliding scale   SubCutaneous three times a day before meals  lactated ringers. 1000 milliLiter(s) IV Continuous <Continuous>  lamoTRIgine 150 milliGRAM(s) Oral daily  methadone    Tablet 30 milliGRAM(s) Oral every 8 hours  pantoprazole    Tablet 40 milliGRAM(s) Oral before breakfast  polyethylene glycol 3350 17 Gram(s) Oral two times a day  scopolamine 1 mG/72 Hr(s) Patch 1 Patch Transdermal once  senna 2 Tablet(s) Oral at bedtime  sertraline 200 milliGRAM(s) Oral daily  thiamine 100 milliGRAM(s) Oral daily    PRN MEDICATIONS  acetaminophen     Tablet .. 650 milliGRAM(s) Oral every 6 hours PRN  aluminum hydroxide/magnesium hydroxide/simethicone Suspension 30 milliLiter(s) Oral every 4 hours PRN  cyclobenzaprine 5 milliGRAM(s) Oral three times a day PRN  dextrose Oral Gel 15 Gram(s) Oral once PRN  melatonin 3 milliGRAM(s) Oral at bedtime PRN  morphine  - Injectable 2 milliGRAM(s) IV Push every 4 hours PRN    VITALS  T(F): 97.9 (11-12-24 @ 05:05), Max: 98.4 (11-11-24 @ 13:22)  HR: 77 (11-12-24 @ 05:05) (66 - 81)  BP: 100/69 (11-12-24 @ 05:05) (90/56 - 101/68)  RR: 18 (11-12-24 @ 05:05) (18 - 18)  SpO2: 96% (11-12-24 @ 05:05) (95% - 96%)    PHYSICAL EXAM    GENERAL: NAD, lying in bed comfortably  HEAD:  Atraumatic, normocephalic  EYES: EOMI, PERRLA, conjunctiva and sclera clear  ENT: Moist mucous membranes  NECK: Supple, no JVD  HEART: Regular rate and rhythm, no murmurs, rubs, or gallops  LUNGS: Unlabored respirations.  Clear to auscultation bilaterally, no crackles, wheezing, or rhonchi  ABDOMEN: Soft, nondistended, tender in RLL  EXTREMITIES: 2+ peripheral pulses bilaterally. No clubbing, cyanosis, or edema  NERVOUS SYSTEM:  A&Ox3, no focal deficits   SKIN: No rashes or lesions    LABS             12.8   6.08  )-----------( 137      ( 11-11-24 @ 06:12 )             41.1     139  |  97  |  11  -------------------------<  119   11-11-24 @ 06:12  3.5  |  28  |  1.1    Ca      8.5     11-11-24 @ 06:12  Mg     2.1     11-11-24 @ 06:12          Urinalysis Basic - ( 11 Nov 2024 06:12 )    Color: x / Appearance: x / SG: x / pH: x  Gluc: 119 mg/dL / Ketone: x  / Bili: x / Urobili: x   Blood: x / Protein: x / Nitrite: x   Leuk Esterase: x / RBC: x / WBC x   Sq Epi: x / Non Sq Epi: x / Bacteria: x          IMAGING

## 2024-11-12 NOTE — CONSULT NOTE ADULT - ASSESSMENT
A/P: Patient is a 64F with PMH of former smoker with past medical history of asthma, hypothyroidism, anxiety, depression, MVP, Jaclyn-en-Y gastric bypass, appendectomy, cholecystectomy and Chronic back pain on methadone, presents to the ED following a syncopal episode last night. She also has complaints of worsening abdominal pain for 5 days.     PLAN  Patient stating she cannot tolerate PO foods, has been vomiting with PO intake. Pending EGD for further definitive management of Abdominal Pain as per GI.   #Abdominal Pain  #Chronic back pain  - Continue methadone 30mg TID NIMCO  - Please make Cyclobenzaprine 10mg TID NIMCO  - Acetaminophen 650mg q8h NIMCO  - Cont IV Morphine 2mg q4h PRN for severe breakthrough pain    #Opioid induced constipation  - Bowel regimen as per primary team  - Nausea regimen as per primary team

## 2024-11-12 NOTE — PROGRESS NOTE ADULT - SUBJECTIVE AND OBJECTIVE BOX
Patient is a 64y old  Female who presents with a chief complaint of Abdominal pain (2024 13:24)    HPI:  64-year-old female, former smoker with past medical history of asthma, hypothyroidism, anxiety, depression, MVP, Jaclyn-en-Y gastric bypass, appendectomy, cholecystectomy and Chronic back pain on methadone, presents to the ED following a syncopal episode last night. She also has complaints of worsening abdominal pain for 5 days.     Pt was sitting down while  is helping her dry after a shower when she felt dizzy and lost consciousness for a brief moment. No truama. No  incontinence or shaking movements. No chest pain or sudden SOB. However, pt endorses having reduced effort tolerance and shortness of breath for the past 2-3 months. She reportedly uses nebs/albuterol on almost a daily basis. She follows with Dr. Negrete (cardiology). Per pt, cath was done 5-10 yrs back which was normal. She is due for a stress test. Takes bumex daily for LE swelling.       She also reports of nausea, vomiting, decreased p.o. intake and worsening abdominal pain for the past 5 days. Pain in localized to right lower quadrant but radiates all over the abd region. Not passed stool in the past 5 days, says didn't what much.    Patient drinks a couple glasses of scotch nightly.    (2024 09:17)    PAST MEDICAL & SURGICAL HISTORY:  Asthma  LAST ATTACK MANY YRS AGO  Gastroesophageal reflux disease without esophagitis  Hypothyroidism  Heart murmur--MVP  Anxiety  Depression  Mood disorder  Psoriasis  Constipation  chronic  Back pain  Morbid obesity    patient seen and examined independently on morning rounds for the first time today, chart reviewed and discussed with the medicine resident and on interdisciplinary rounds:    hospital day #4    persistant abdominal pain predominantly over RLQ/RUQ- +nausea and vomiting after meals and unalbe to tolerate full diet  chronic opiate use for lower back pain x 27 years  denies any personal h/o diabetes but does have +family hx diabetes (mother and brother)      Vital Signs Last 24 Hrs  T(C): 36.9 (2024 13:34), Max: 36.9 (2024 13:34)  T(F): 98.5 (2024 13:34), Max: 98.5 (2024 13:34)  HR: 73 (2024 13:34) (66 - 77)  BP: 101/66 (2024 13:34) (90/56 - 101/68)  BP(mean): 79 (2024 05:05) (79 - 79)  RR: 18 (2024 14:23) (18 - 18)  SpO2: 96% (2024 14:23) (89% - 96%)    Parameters below as of 2024 14:23  Patient On (Oxygen Delivery Method): nasal cannula  O2 Flow (L/min): 2    PE:  GEN-NAD, AAOx3, on 3L o2 via nc   HEENT- NCAT, PERRLA, EOMI  NECK- supple no jvd, no lymphadenopathy  PULM- Clear to auscultation bilaterally, fair air entry  CVS- +s1/s2 RRR   GI- soft +ttp rlq/ruq--ND +bs, no rebound, no guarding  EXT- +LE edema and calf tenderness with mild erythema                        12.6   7.17  )-----------( 142      ( 2024 12:15 )             40.1     11-12    137  |  96[L]  |  14  ----------------------------<  152[H]  3.2[L]   |  25  |  1.0    Ca    8.3[L]      2024 12:15  Mg     1.7     -12    TPro  5.7[L]  /  Alb  3.6  /  TBili  0.5  /  DBili  x   /  AST  35  /  ALT  24  /  AlkPhos  146[H]  11-12        Urinalysis Basic - ( 2024 14:41 )    Color: Yellow / Appearance: Clear / S.011 / pH: x  Gluc: x / Ketone: Negative mg/dL  / Bili: Negative / Urobili: 0.2 mg/dL   Blood: x / Protein: Negative mg/dL / Nitrite: Negative   Leuk Esterase: Trace / RBC: x / WBC x   Sq Epi: x / Non Sq Epi: x / Bacteria: x          MEDICATIONS  (STANDING):  buMETAnide 1 milliGRAM(s) Oral daily  dextrose 5%. 1000 milliLiter(s) (100 mL/Hr) IV Continuous <Continuous>  dextrose 5%. 1000 milliLiter(s) (50 mL/Hr) IV Continuous <Continuous>  dextrose 50% Injectable 25 Gram(s) IV Push once  dextrose 50% Injectable 12.5 Gram(s) IV Push once  dextrose 50% Injectable 25 Gram(s) IV Push once  folic acid 1 milliGRAM(s) Oral daily  glucagon  Injectable 1 milliGRAM(s) IntraMuscular once  heparin   Injectable 5000 Unit(s) SubCutaneous every 8 hours  influenza   Vaccine 0.5 milliLiter(s) IntraMuscular once  insulin lispro (ADMELOG) corrective regimen sliding scale   SubCutaneous three times a day before meals  lactated ringers. 1000 milliLiter(s) (75 mL/Hr) IV Continuous <Continuous>  lamoTRIgine 150 milliGRAM(s) Oral daily  methadone    Tablet 30 milliGRAM(s) Oral every 8 hours  pantoprazole    Tablet 40 milliGRAM(s) Oral before breakfast  pantoprazole    Tablet 40 milliGRAM(s) Oral two times a day  polyethylene glycol 3350 17 Gram(s) Oral two times a day  scopolamine 1 mG/72 Hr(s) Patch 1 Patch Transdermal once  senna 2 Tablet(s) Oral at bedtime  sertraline 200 milliGRAM(s) Oral daily  thiamine 100 milliGRAM(s) Oral daily

## 2024-11-12 NOTE — PROGRESS NOTE ADULT - ASSESSMENT
64-year-old female, former smoker with past medical history of asthma, hypothyroidism, anxiety, depression, MVP, Jaclyn-en-Y gastric bypass, appendectomy, cholecystectomy and Chronic back pain on methadone, presents to the ED following a syncopal episode last night. She also has complaints of worsening abdominal pain for 5 days.     #Abdominal pain - unclear etiology  #Nausea, Vomiting, Decreased appetite   #h/o Cholecystectomy/ appendectomy  #h/o Jaclyn en Y bypass   - LFTs stable, lactate 3 --> 2.4, fu repeat   - CT abdomen - Hepatomegaly   - EGD/ Colonoscopy (May '23) - Non erosive gastritis  - c/w pantoprazole   - right lower abdominal discomfort, possible constipation (small bowel movement on 11/10)  - cw miralax senna  - a1c 7.0, no previous diagnosis of DM, possible gastroparesis, start SSI   - gi consult -> EGD tomorrow 11/13, npo after midnight   - iv morphine prn q4, avoid dilaudid   - send UA     #Shortness of breath   #h/o Asthma   #h/o MVP (follows Dr. Negrete)  #h/o chronic bilateral LE swelling   #HTN  - CTA chest - no PE   - Satting well at RA, Lung and heart PE - normal  - Duonebs prn   - TTE --> echo 60-65% EF  - ambulating pulse ox, fu duplex       #Syncope likely vasovagal vs orthostatic 2/2 dehydration  - ECG with prolonged qtc, no obvious ischemia, trops x2 negative  - no events on tele, dc tele     #Hypothyroidism,   - home meds - 88mcg thyroxine   - TSH 9.88  - increase to 100 mcg synthroid     #anxiety, depression,  - c/w lamotrigine and zoloft      #Chronic back pain   - on methadone 30mg   - pain medicine consulted     #Alcohol use   - 2-3 drinks per fday  - CIWA monitoring   - c/w b12 and folate   - start thiamine     VTE ppx - heparin SC  GI ppx - pantoprazole (home meds)  Activity as tolerated  DASH/DM diet   Full Code

## 2024-11-12 NOTE — PROGRESS NOTE ADULT - ASSESSMENT
a/p:  64-year-old female, former smoker with past medical history of asthma, hypothyroidism, anxiety, depression, MVP, Jaclyn-en-Y gastric bypass, appendectomy, cholecystectomy and Chronic back pain on methadone, presents to the ED following a syncopal episode. She also has complaints of worsening abdominal pain for 5 days.     #Syncope-- vasovagal vs arrhythmia  #prolonged QTc (likely due to high dose methadone and sertraline)  -initial QTc >600 which has improved----repeat EKG today  -continue telemetry  -CTchest negative for PE  -Echo shows normal LVEF 65% but was technically difficult study  -avoid zofran---->continue reglan prn for nausea  -if QTc remains elevated would reassess zoloft dose and consider decreasing    #Abdominal pain---multifactorial---->h/o bypass with jaclyn-en-y, hypothyroidism (TSH 9.88) and opiod induced constipation as well as fatty liver disease and chronic etoh use  #new onset DM---could also be component of gastroparesis  #hypertriglyceridemia  #h/o ETOH use---fatty liver--suspected thiamine and folate deficiency  -pain control   -Lipase 37, LFTs mildly elevated, lactate 3>2.4  -CT abdomen showed hepatomegaly, fatty liver infiltrate, no acute pathology.   -Abdomen US showed  similar finding. patient is s/p cholecystectomy.   -EGD/ Colonoscopy (May '23) - Non erosive gastritis  -etoh cessation/counseling  -supplement mvi and thiamine and folate  -appreciate GI consult---will plan for EGD in am as pain persisting and she is unable to tolerate oral intake  -monitor electrolytes and suppl  -npo after midnight for EGD tomorrow 11/13  -continue bowel regimen---lactulose prn  -hba1c 7.0 (patient denies personal h/o DM but does have brother and parents who have DM)---start ISS and monitor fs---nutritional/dietary eval---consider starting oral regimen and will need diabetic teaching/glucometer and supplies on dc as well as f/u with endo as outpatient with repeat hba1c in 3 months  -LA 2.6--->2.4----->repeat LA today  -ppi bid  -change morphine to q4hr for better pain control  -start fenofibrate in addition to statin for elevated TG level  -consider pain management (patient is on methadone 30 mg q8hr)    #Asthma/Leg edema  -continue bumex 1 mg oral daily  -check venous duplex to r/o dvt    #Hypothyroidism  -tsh elevated (10)---with normal low FT4  -will increase dose of synthroid to 100 mcg and need close f/u of thyroid function tests as outpatient   Continue Synthroid 88 mcg po daily.     #Anxiety, depression,  Continue Zoloft, may need to lower the dose as above.      #Chronic back pain   Home meds: methadone 30mg TID and Soma (carisoprodol 350mg TID), I-stop checked     DVT/GI ppx  guarded prognosis    FULL CODE    Total time spent to complete patient's bedside assessment, review medical chart, discuss medical plan of care with covering medical team was more than 50 minutes  with >50% of time spendt face to face with patient, discussion with patient/family and/or coordination of care    npo for egd tomorrow 11/13--monitor abdominal pain---repeat EKG and monitor QTc--continue tele

## 2024-11-12 NOTE — CONSULT NOTE ADULT - ASSESSMENT
# Abdominal pain  # H/O gastric bypass  - hemodynamically stable  - exam with mild tenderness  - US and CT reviewed as above  - prior egd and colonoscopy in 2022 with no significant pathology (as above)  - on chronic opioids, high TSH and A1c    recommendations:  - diet as tolerated  - bentyl  - frequent abdominal exam  - will plan for EGD tomorrow to assess, NPO after mid night  - PPI BID  - Limit opioids

## 2024-11-12 NOTE — CONSULT NOTE ADULT - SUBJECTIVE AND OBJECTIVE BOX
HPI: Patient is a 64F with PMH of former smoker with past medical history of asthma, hypothyroidism, anxiety, depression, MVP, Jaclyn-en-Y gastric bypass, appendectomy, cholecystectomy and Chronic back pain on methadone, presents to the ED following a syncopal episode last night. She also has complaints of worsening abdominal pain for 5 days.     Current Inpatient Medication Regimen:  acetaminophen     Tablet .. 650 milliGRAM(s) Oral every 6 hours PRN  aluminum hydroxide/magnesium hydroxide/simethicone Suspension 30 milliLiter(s) Oral every 4 hours PRN  buMETAnide 1 milliGRAM(s) Oral daily  cyclobenzaprine 5 milliGRAM(s) Oral three times a day PRN  dextrose 5%. 1000 milliLiter(s) IV Continuous <Continuous>  dextrose 5%. 1000 milliLiter(s) IV Continuous <Continuous>  dextrose 50% Injectable 25 Gram(s) IV Push once  dextrose 50% Injectable 25 Gram(s) IV Push once  dextrose 50% Injectable 12.5 Gram(s) IV Push once  dextrose Oral Gel 15 Gram(s) Oral once PRN  dicyclomine 20 milliGRAM(s) Oral four times a day before meals PRN  folic acid 1 milliGRAM(s) Oral daily  glucagon  Injectable 1 milliGRAM(s) IntraMuscular once  heparin   Injectable 5000 Unit(s) SubCutaneous every 8 hours  influenza   Vaccine 0.5 milliLiter(s) IntraMuscular once  insulin lispro (ADMELOG) corrective regimen sliding scale   SubCutaneous three times a day before meals  lactated ringers. 1000 milliLiter(s) IV Continuous <Continuous>  lamoTRIgine 150 milliGRAM(s) Oral daily  melatonin 3 milliGRAM(s) Oral at bedtime PRN  methadone    Tablet 30 milliGRAM(s) Oral every 8 hours  morphine  - Injectable 2 milliGRAM(s) IV Push every 4 hours PRN  pantoprazole    Tablet 40 milliGRAM(s) Oral two times a day  pantoprazole    Tablet 40 milliGRAM(s) Oral before breakfast  polyethylene glycol 3350 17 Gram(s) Oral two times a day  scopolamine 1 mG/72 Hr(s) Patch 1 Patch Transdermal once  senna 2 Tablet(s) Oral at bedtime  sertraline 200 milliGRAM(s) Oral daily  thiamine 100 milliGRAM(s) Oral daily      Home Analgesic Regimen:  Methadone 30mg TID   Soma 350mg TID     Allergies:  penicillin (Anaphylaxis)      Past Medical History:  Chest pain    No pertinent family history in first degree relatives    CAD (coronary artery disease) (Father, Mother)    Handoff    MEWS Score    Asthma    Gastroesophageal reflux disease without esophagitis    Hypothyroidism    Heart murmur    Anxiety    Depression    Mood disorder    Psoriasis    Constipation    Back pain    Morbid obesity    SOB (shortness of breath) on exertion    S/P tonsillectomy    History of appendectomy    Abscess of back    History of Jaclyn-en-Y gastric bypass    S O B    Intractable abdominal pain    Hepatomegaly    Syncope    Chest pain          Review of Systems:  General: no fevers or chills  Eyes: no diplopia or blurred vision  ENT: no rhinorrhea  CV: no chest pain  Resp: no cough or dyspnea  GI:  abdominal pain, nausea, vomiting  : no urinary incontinence or dysuria  Neuro: no focal weakness or numbness     Physical Exam:  T(C): 36.6 (11-12-24 @ 05:05), Max: 36.6 (11-11-24 @ 19:58)  HR: 77 (11-12-24 @ 05:05) (66 - 77)  BP: 100/69 (11-12-24 @ 05:05) (90/56 - 101/68)  RR: 18 (11-12-24 @ 05:05) (18 - 18)  SpO2: 96% (11-12-24 @ 05:05) (95% - 96%)  Gen: NAD  Eyes: no glasses or scleral icterus  Head: Normocephalic / Atraumatic  CV: no JVD  Lungs: nonlabored breathing  Abdomen: nondistended, soft  : no garcia catheter in place  Back: tenderness to palpation  Neuro: AOx3  Extremities: full ROM in upper/lower extremities  Psych: normal affect      Labs:                        12.8   6.08  )-----------( 137      ( 11 Nov 2024 06:12 )             41.1   11-11    139  |  97[L]  |  11  ----------------------------<  119[H]  3.5   |  28  |  1.1    Ca    8.5      11 Nov 2024 06:12  Mg     2.1     11-11          Imaging Studies:        ACC: [id number] EXAM:  US ABDOMEN RT UPR QUADRANT   ORDERED BY: KAPIL WHEATLEY     PROCEDURE DATE:  11/09/2024          INTERPRETATION:  CLINICAL INFORMATION: Elevated alkaline phosphatase.    COMPARISON: Correlation with prior day's CT    TECHNIQUE: Sonography of the right upper quadrant.    FINDINGS:  Liver: Enlarged fatty infiltrated liver. Limited sensitivity for   evaluation of masses or other lesions given degree of steatosis. Measures   25 cm in length.  Bile ducts: Normal caliber. Common bile duct measures 6 mm..  Gallbladder: Surgically absent.  Pancreas: Visualized portions are within normal limits.  Right kidney: Measures 12.7 cm in length. No hydronephrosis.  Ascites: None.  IVC: Visualized portions are within normal limits.    IMPRESSION:  Markedly enlarged fatty infiltrated liver limits sonographic sensitivity   for evaluation of lesions or other hepatic findings.    Surgically absent gallbladder.    --- End of Report ---            DELILAH BURCIAGA MD; Attending Radiologist  This document has been electronically signed. Nov 9 2024  2:52PM

## 2024-11-13 ENCOUNTER — RESULT REVIEW (OUTPATIENT)
Age: 64
End: 2024-11-13

## 2024-11-13 ENCOUNTER — TRANSCRIPTION ENCOUNTER (OUTPATIENT)
Age: 64
End: 2024-11-13

## 2024-11-13 LAB
ALBUMIN SERPL ELPH-MCNC: 3.6 G/DL — SIGNIFICANT CHANGE UP (ref 3.5–5.2)
ALP SERPL-CCNC: 145 U/L — HIGH (ref 30–115)
ALT FLD-CCNC: 23 U/L — SIGNIFICANT CHANGE UP (ref 0–41)
ANION GAP SERPL CALC-SCNC: 10 MMOL/L — SIGNIFICANT CHANGE UP (ref 7–14)
APTT BLD: 32.2 SEC — SIGNIFICANT CHANGE UP (ref 27–39.2)
AST SERPL-CCNC: 35 U/L — SIGNIFICANT CHANGE UP (ref 0–41)
BASOPHILS # BLD AUTO: 0.07 K/UL — SIGNIFICANT CHANGE UP (ref 0–0.2)
BASOPHILS NFR BLD AUTO: 1.2 % — HIGH (ref 0–1)
BILIRUB SERPL-MCNC: 0.6 MG/DL — SIGNIFICANT CHANGE UP (ref 0.2–1.2)
BUN SERPL-MCNC: 14 MG/DL — SIGNIFICANT CHANGE UP (ref 10–20)
CALCIUM SERPL-MCNC: 9 MG/DL — SIGNIFICANT CHANGE UP (ref 8.4–10.5)
CHLORIDE SERPL-SCNC: 98 MMOL/L — SIGNIFICANT CHANGE UP (ref 98–110)
CO2 SERPL-SCNC: 30 MMOL/L — SIGNIFICANT CHANGE UP (ref 17–32)
CREAT SERPL-MCNC: 1.1 MG/DL — SIGNIFICANT CHANGE UP (ref 0.7–1.5)
EGFR: 57 ML/MIN/1.73M2 — LOW
EOSINOPHIL # BLD AUTO: 0.35 K/UL — SIGNIFICANT CHANGE UP (ref 0–0.7)
EOSINOPHIL NFR BLD AUTO: 6 % — SIGNIFICANT CHANGE UP (ref 0–8)
GLUCOSE BLDC GLUCOMTR-MCNC: 106 MG/DL — HIGH (ref 70–99)
GLUCOSE BLDC GLUCOMTR-MCNC: 107 MG/DL — HIGH (ref 70–99)
GLUCOSE BLDC GLUCOMTR-MCNC: 109 MG/DL — HIGH (ref 70–99)
GLUCOSE BLDC GLUCOMTR-MCNC: 91 MG/DL — SIGNIFICANT CHANGE UP (ref 70–99)
GLUCOSE SERPL-MCNC: 91 MG/DL — SIGNIFICANT CHANGE UP (ref 70–99)
HCT VFR BLD CALC: 40.9 % — SIGNIFICANT CHANGE UP (ref 37–47)
HGB BLD-MCNC: 12.9 G/DL — SIGNIFICANT CHANGE UP (ref 12–16)
IMM GRANULOCYTES NFR BLD AUTO: 1.9 % — HIGH (ref 0.1–0.3)
INR BLD: 0.99 RATIO — SIGNIFICANT CHANGE UP (ref 0.65–1.3)
LYMPHOCYTES # BLD AUTO: 1.1 K/UL — LOW (ref 1.2–3.4)
LYMPHOCYTES # BLD AUTO: 18.8 % — LOW (ref 20.5–51.1)
MAGNESIUM SERPL-MCNC: 2 MG/DL — SIGNIFICANT CHANGE UP (ref 1.8–2.4)
MCHC RBC-ENTMCNC: 30.5 PG — SIGNIFICANT CHANGE UP (ref 27–31)
MCHC RBC-ENTMCNC: 31.5 G/DL — LOW (ref 32–37)
MCV RBC AUTO: 96.7 FL — SIGNIFICANT CHANGE UP (ref 81–99)
MONOCYTES # BLD AUTO: 0.53 K/UL — SIGNIFICANT CHANGE UP (ref 0.1–0.6)
MONOCYTES NFR BLD AUTO: 9.1 % — SIGNIFICANT CHANGE UP (ref 1.7–9.3)
NEUTROPHILS # BLD AUTO: 3.68 K/UL — SIGNIFICANT CHANGE UP (ref 1.4–6.5)
NEUTROPHILS NFR BLD AUTO: 63 % — SIGNIFICANT CHANGE UP (ref 42.2–75.2)
NRBC # BLD: 0 /100 WBCS — SIGNIFICANT CHANGE UP (ref 0–0)
PLATELET # BLD AUTO: 136 K/UL — SIGNIFICANT CHANGE UP (ref 130–400)
PMV BLD: 10.1 FL — SIGNIFICANT CHANGE UP (ref 7.4–10.4)
POTASSIUM SERPL-MCNC: 3.7 MMOL/L — SIGNIFICANT CHANGE UP (ref 3.5–5)
POTASSIUM SERPL-SCNC: 3.7 MMOL/L — SIGNIFICANT CHANGE UP (ref 3.5–5)
PROT SERPL-MCNC: 5.9 G/DL — LOW (ref 6–8)
PROTHROM AB SERPL-ACNC: 11.7 SEC — SIGNIFICANT CHANGE UP (ref 9.95–12.87)
RBC # BLD: 4.23 M/UL — SIGNIFICANT CHANGE UP (ref 4.2–5.4)
RBC # FLD: 13.4 % — SIGNIFICANT CHANGE UP (ref 11.5–14.5)
SODIUM SERPL-SCNC: 138 MMOL/L — SIGNIFICANT CHANGE UP (ref 135–146)
WBC # BLD: 5.84 K/UL — SIGNIFICANT CHANGE UP (ref 4.8–10.8)
WBC # FLD AUTO: 5.84 K/UL — SIGNIFICANT CHANGE UP (ref 4.8–10.8)

## 2024-11-13 PROCEDURE — 74174 CTA ABD&PLVS W/CONTRAST: CPT | Mod: 26

## 2024-11-13 PROCEDURE — 88305 TISSUE EXAM BY PATHOLOGIST: CPT | Mod: 26

## 2024-11-13 PROCEDURE — 99232 SBSQ HOSP IP/OBS MODERATE 35: CPT

## 2024-11-13 PROCEDURE — 43239 EGD BIOPSY SINGLE/MULTIPLE: CPT

## 2024-11-13 PROCEDURE — 88312 SPECIAL STAINS GROUP 1: CPT | Mod: 26

## 2024-11-13 RX ORDER — ONDANSETRON HYDROCHLORIDE 4 MG/1
4 TABLET, FILM COATED ORAL ONCE
Refills: 0 | Status: COMPLETED | OUTPATIENT
Start: 2024-11-13 | End: 2024-11-13

## 2024-11-13 RX ORDER — ALBUTEROL 90 MCG
2 AEROSOL (GRAM) INHALATION EVERY 6 HOURS
Refills: 0 | Status: DISCONTINUED | OUTPATIENT
Start: 2024-11-13 | End: 2024-12-06

## 2024-11-13 RX ORDER — CHLORHEXIDINE GLUCONATE 1.2 MG/ML
1 RINSE ORAL
Refills: 0 | Status: DISCONTINUED | OUTPATIENT
Start: 2024-11-13 | End: 2024-12-06

## 2024-11-13 RX ORDER — IPRATROPIUM BROMIDE AND ALBUTEROL SULFATE 2.5; .5 MG/3ML; MG/3ML
3 SOLUTION RESPIRATORY (INHALATION) EVERY 6 HOURS
Refills: 0 | Status: DISCONTINUED | OUTPATIENT
Start: 2024-11-13 | End: 2024-11-13

## 2024-11-13 RX ORDER — CYCLOBENZAPRINE HCL 10 MG
10 TABLET ORAL THREE TIMES A DAY
Refills: 0 | Status: DISCONTINUED | OUTPATIENT
Start: 2024-11-13 | End: 2024-11-15

## 2024-11-13 RX ADMIN — Medication 2 MILLIGRAM(S): at 13:35

## 2024-11-13 RX ADMIN — Medication 2 MILLIGRAM(S): at 00:56

## 2024-11-13 RX ADMIN — Medication 100 MICROGRAM(S): at 06:44

## 2024-11-13 RX ADMIN — POLYETHYLENE GLYCOL 3350 17 GRAM(S): 17 POWDER, FOR SOLUTION ORAL at 17:19

## 2024-11-13 RX ADMIN — Medication 2 MILLIGRAM(S): at 04:57

## 2024-11-13 RX ADMIN — Medication 2 MILLIGRAM(S): at 08:31

## 2024-11-13 RX ADMIN — METHADONE HYDROCHLORIDE 30 MILLIGRAM(S): 5 TABLET ORAL at 21:32

## 2024-11-13 RX ADMIN — BUMETANIDE 1 MILLIGRAM(S): 1 TABLET ORAL at 06:45

## 2024-11-13 RX ADMIN — METHADONE HYDROCHLORIDE 30 MILLIGRAM(S): 5 TABLET ORAL at 13:05

## 2024-11-13 RX ADMIN — Medication 2 MILLIGRAM(S): at 04:28

## 2024-11-13 RX ADMIN — Medication 2 MILLIGRAM(S): at 22:10

## 2024-11-13 RX ADMIN — LAMOTRIGINE 150 MILLIGRAM(S): 50 TABLET, EXTENDED RELEASE ORAL at 13:05

## 2024-11-13 RX ADMIN — PANTOPRAZOLE SODIUM 40 MILLIGRAM(S): 40 TABLET, DELAYED RELEASE ORAL at 17:20

## 2024-11-13 RX ADMIN — SERTRALINE HYDROCHLORIDE 200 MILLIGRAM(S): 100 TABLET, FILM COATED ORAL at 13:06

## 2024-11-13 RX ADMIN — CHLORHEXIDINE GLUCONATE 1 APPLICATION(S): 1.2 RINSE ORAL at 13:06

## 2024-11-13 RX ADMIN — Medication 2 MILLIGRAM(S): at 21:32

## 2024-11-13 RX ADMIN — Medication 2 MILLIGRAM(S): at 00:13

## 2024-11-13 RX ADMIN — ONDANSETRON HYDROCHLORIDE 4 MILLIGRAM(S): 4 TABLET, FILM COATED ORAL at 14:27

## 2024-11-13 RX ADMIN — Medication 2 TABLET(S): at 21:32

## 2024-11-13 RX ADMIN — PANTOPRAZOLE SODIUM 40 MILLIGRAM(S): 40 TABLET, DELAYED RELEASE ORAL at 06:45

## 2024-11-13 RX ADMIN — Medication 1 MILLIGRAM(S): at 13:06

## 2024-11-13 RX ADMIN — Medication 2 MILLIGRAM(S): at 13:05

## 2024-11-13 RX ADMIN — Medication 2 MILLIGRAM(S): at 17:24

## 2024-11-13 RX ADMIN — METHADONE HYDROCHLORIDE 30 MILLIGRAM(S): 5 TABLET ORAL at 06:45

## 2024-11-13 RX ADMIN — Medication 2 MILLIGRAM(S): at 17:54

## 2024-11-13 RX ADMIN — Medication 100 MILLIGRAM(S): at 13:06

## 2024-11-13 RX ADMIN — Medication 2 MILLIGRAM(S): at 09:01

## 2024-11-13 RX ADMIN — POLYETHYLENE GLYCOL 3350 17 GRAM(S): 17 POWDER, FOR SOLUTION ORAL at 06:44

## 2024-11-13 NOTE — PROGRESS NOTE ADULT - ASSESSMENT
64-year-old female, former smoker with past medical history of asthma, hypothyroidism, anxiety, depression, MVP, Jaclyn-en-Y gastric bypass, appendectomy, cholecystectomy and Chronic back pain on methadone, presents to the ED following a syncopal episode last night. She also has complaints of worsening abdominal pain for 5 days.     #Abdominal pain - unclear etiology  #Nausea, Vomiting, Decreased appetite   #h/o Cholecystectomy/ appendectomy  #h/o Jaclyn en Y bypass   - LFTs stable, lactate 3 --> 2.4-->3.0  - CT abdomen - Hepatomegaly   - EGD/ Colonoscopy (May '23) - Non erosive gastritis  - c/w pantoprazole   - right lower abdominal discomfort, possible constipation (small bowel movement on 11/10)  - cw miralax senna  - a1c 7.0, no previous diagnosis of DM, possible gastroparesis, start SSI, f/u FS   - Having EGD today  - iv morphine prn q4, avoid dilaudid     #Shortness of breath   #h/o Asthma   #h/o MVP (follows Dr. Negrete)  #h/o chronic bilateral LE swelling   #HTN  - CTA chest - no PE   - Satting well at RA, Lung and heart PE - normal  - albuterol prn   - TTE --> echo 60-65% EF  - duplex neg  - ambulating pulse ox      #Syncope likely vasovagal vs orthostatic 2/2 dehydration  - ECG with prolonged qtc, no obvious ischemia, trops x2 negative  - no events on tele  - tele discontinued    #Hypothyroidism,   - home meds - 88mcg thyroxine   - TSH 9.88  - increased to 100 mcg synthroid     #anxiety, depression,  - c/w lamotrigine and zoloft      #Chronic back pain   - on methadone 30mg   - pain medicine recs appreciated    #Alcohol use   - 2-3 drinks per fday  - CIWA monitoring   - c/w b12 and folate   - started thiamine     VTE ppx - heparin SC  GI ppx - pantoprazole (home meds)  Activity as tolerated  DASH/DM diet   Full Code    #Pending- EGD results

## 2024-11-13 NOTE — CHART NOTE - NSCHARTNOTEFT_GEN_A_CORE
PACU ANESTHESIA ADMISSION NOTE      Procedure:   Post op diagnosis:      ____  Intubated  TV:______       Rate: ______      FiO2: ______    __x__  Patent Airway    __x__  Full return of protective reflexes    __x__  Full recovery from anesthesia / back to baseline     Vitals:   T:  97.4         R: 18                 BP:  138/67                Sat: 100                  P: 60      Mental Status:  __x__ Awake   ___x__ Alert   _____ Drowsy   _____ Sedated    Nausea/Vomiting:  _x___ NO  ______Yes,   See Post - Op Orders          Pain Scale (0-10):  _____    Treatment: __x__ None    ____ See Post - Op/PCA Orders    Post - Operative Fluids:   ____ Oral   ___x_ See Post - Op Orders    Plan: Discharge:   ____Home       ___x__Floor     _____Critical Care    _____  Other:_________________    Comments:    Uneventful anesthesia. Patient transported to  spontaneously breathing and hemodynamically stable.

## 2024-11-13 NOTE — PROGRESS NOTE ADULT - SUBJECTIVE AND OBJECTIVE BOX
pt seen and examined.     My notes supersede resident's notes in case of discrepancy       ROS: no cp, no sob, no n/v, no fever    Vital Signs Last 24 Hrs  T(C): 36.4 (13 Nov 2024 10:30), Max: 36.9 (12 Nov 2024 13:34)  T(F): 97.5 (13 Nov 2024 10:05), Max: 98.5 (12 Nov 2024 13:34)  HR: 71 (13 Nov 2024 10:30) (67 - 75)  BP: 112/71 (13 Nov 2024 10:30) (101/66 - 115/77)  BP(mean): 89 (13 Nov 2024 10:30) (79 - 89)  RR: 18 (13 Nov 2024 10:30) (18 - 18)  SpO2: 94% (13 Nov 2024 10:30) (89% - 96%)    Parameters below as of 13 Nov 2024 10:30    O2 Flow (L/min): 3      physical exam  constitutional NAD, AAOX3, Respiratory  lungs CTA, CVS heart RRR, GI: abdomen Soft NT, ND, BS+, skin: intact  neuro exam no focal deficit     MEDICATIONS  (STANDING):  buMETAnide 1 milliGRAM(s) Oral daily  chlorhexidine 2% Cloths 1 Application(s) Topical <User Schedule>  dextrose 5%. 1000 milliLiter(s) (50 mL/Hr) IV Continuous <Continuous>  dextrose 5%. 1000 milliLiter(s) (100 mL/Hr) IV Continuous <Continuous>  dextrose 50% Injectable 25 Gram(s) IV Push once  dextrose 50% Injectable 25 Gram(s) IV Push once  dextrose 50% Injectable 12.5 Gram(s) IV Push once  folic acid 1 milliGRAM(s) Oral daily  glucagon  Injectable 1 milliGRAM(s) IntraMuscular once  heparin   Injectable 5000 Unit(s) SubCutaneous every 8 hours  influenza   Vaccine 0.5 milliLiter(s) IntraMuscular once  insulin lispro (ADMELOG) corrective regimen sliding scale   SubCutaneous three times a day before meals  lactated ringers. 1000 milliLiter(s) (75 mL/Hr) IV Continuous <Continuous>  lamoTRIgine 150 milliGRAM(s) Oral daily  levothyroxine 100 MICROGram(s) Oral daily  methadone    Tablet 30 milliGRAM(s) Oral every 8 hours  pantoprazole    Tablet 40 milliGRAM(s) Oral before breakfast  pantoprazole    Tablet 40 milliGRAM(s) Oral two times a day  polyethylene glycol 3350 17 Gram(s) Oral two times a day  scopolamine 1 mG/72 Hr(s) Patch 1 Patch Transdermal once  senna 2 Tablet(s) Oral at bedtime  sertraline 200 milliGRAM(s) Oral daily  thiamine 100 milliGRAM(s) Oral daily    MEDICATIONS  (PRN):  acetaminophen     Tablet .. 650 milliGRAM(s) Oral every 6 hours PRN Temp greater or equal to 38C (100.4F), Mild Pain (1 - 3)  albuterol    90 MICROgram(s) HFA Inhaler 2 Puff(s) Inhalation every 6 hours PRN Shortness of Breath and/or Wheezing  aluminum hydroxide/magnesium hydroxide/simethicone Suspension 30 milliLiter(s) Oral every 4 hours PRN Dyspepsia  cyclobenzaprine 10 milliGRAM(s) Oral three times a day PRN Muscle Spasm  dextrose Oral Gel 15 Gram(s) Oral once PRN Blood Glucose LESS THAN 70 milliGRAM(s)/deciliter  dicyclomine 20 milliGRAM(s) Oral four times a day before meals PRN abdominal pain  melatonin 3 milliGRAM(s) Oral at bedtime PRN Insomnia  morphine  - Injectable 2 milliGRAM(s) IV Push every 4 hours PRN Moderate Pain (4 - 6)                            12.9   5.84  )-----------( 136      ( 13 Nov 2024 01:36 )             40.9     11-13    138  |  98  |  14  ----------------------------<  91  3.7   |  30  |  1.1    Ca    9.0      13 Nov 2024 01:36  Mg     2.0     11-13    TPro  5.9[L]  /  Alb  3.6  /  TBili  0.6  /  DBili  x   /  AST  35  /  ALT  23  /  AlkPhos  145[H]  11-13    PT/INR - ( 13 Nov 2024 01:36 )   PT: 11.70 sec;   INR: 0.99 ratio       PTT - ( 13 Nov 2024 01:36 )  PTT:32.2 sec    a/p    64-year-old female, former smoker with past medical history of asthma, hypothyroidism, anxiety, depression, MVP, Jaclyn-en-Y gastric bypass, appendectomy, cholecystectomy and Chronic back pain on methadone, presents to the ED following a syncopal episode last night. She also has complaints of worsening abdominal pain for 5 days.     # syncope, possible vasovagal vs arrhythmia ( in setting of prolonged QTC found on admission )   QTC has imrpvoed now repeat QTC Calculation(Bazett) 460 ms  normal echo   ct nega for pe   tele no evens   check orthostatics    # abdomina lpain ,   ct abd negative  EGD today   etiology unknown at this time     < from: CT Abdomen and Pelvis w/ Oral Cont and w/ IV Cont (11.08.24 @ 04:02) >  LIVER: Diffuse severe fatty infiltration. Enlarged measuring up to 26 cm   in length.  BILE DUCTS: Normal caliber.  GALLBLADDER: Surgically absent  SPLEEN: Within normal limits.  PANCREAS: Within normal limits.  ADRENALS: Within normal limits.  KIDNEYS/URETERS: Within normal limits.    < end of copied text >  Lipase: 37: Hemolyzed. Interpret with caution U/L (11.08.24 @ 00:30)    # opioid dependency , on methadone and soma ( chronic back pain )     # DM , new dx   A1C with Estimated Average Glucose Result: 7.0: Method: Immunoassay(11.09.24 @ 06:43)  no need for treatment at this time, hgb A1c of 7 is acceptable  unless fingersticks are over 180     # hyperlipidemia , cont meds   # anxiety depression cont meds   # hypothyroidism tsh high, cont synthroid at 100 , fu as outpt for further adjustment     # chronic skin lesion with scaling, outpt derm fu     # leg edema, on bumex, the left ventricular diastolic function could not be assessed in the echo done this admission, pt to have a repeat study as outpt   doppler neg for dvt     # obesity , out pt fu   Height (cm): 167.6 (11-13-24 @ 10:30)  Weight (kg): 100.7 (11-13-24 @ 10:30)  BMI (kg/m2): 35.8 (11-13-24 @ 10:30)  BSA (m2): 2.09 (11-13-24 @ 10:30)    #Progress Note Handoff    Pending :  EGD today   Family discussion: dw pt   Disposition: home when stable   code status: full code                                pt seen and examined.     My notes supersede resident's notes in case of discrepancy       ROS: no cp, no sob, no n/v, no fever    Vital Signs Last 24 Hrs  T(C): 36.4 (13 Nov 2024 10:30), Max: 36.9 (12 Nov 2024 13:34)  T(F): 97.5 (13 Nov 2024 10:05), Max: 98.5 (12 Nov 2024 13:34)  HR: 71 (13 Nov 2024 10:30) (67 - 75)  BP: 112/71 (13 Nov 2024 10:30) (101/66 - 115/77)  BP(mean): 89 (13 Nov 2024 10:30) (79 - 89)  RR: 18 (13 Nov 2024 10:30) (18 - 18)  SpO2: 94% (13 Nov 2024 10:30) (89% - 96%)    Parameters below as of 13 Nov 2024 10:30    O2 Flow (L/min): 3      physical exam  constitutional NAD, AAOX3, Respiratory  lungs CTA, CVS heart RRR, GI: abdomen Soft NT, ND, BS+, skin: intact  neuro exam no focal deficit     MEDICATIONS  (STANDING):  buMETAnide 1 milliGRAM(s) Oral daily  chlorhexidine 2% Cloths 1 Application(s) Topical <User Schedule>  dextrose 5%. 1000 milliLiter(s) (50 mL/Hr) IV Continuous <Continuous>  dextrose 5%. 1000 milliLiter(s) (100 mL/Hr) IV Continuous <Continuous>  dextrose 50% Injectable 25 Gram(s) IV Push once  dextrose 50% Injectable 25 Gram(s) IV Push once  dextrose 50% Injectable 12.5 Gram(s) IV Push once  folic acid 1 milliGRAM(s) Oral daily  glucagon  Injectable 1 milliGRAM(s) IntraMuscular once  heparin   Injectable 5000 Unit(s) SubCutaneous every 8 hours  influenza   Vaccine 0.5 milliLiter(s) IntraMuscular once  insulin lispro (ADMELOG) corrective regimen sliding scale   SubCutaneous three times a day before meals  lactated ringers. 1000 milliLiter(s) (75 mL/Hr) IV Continuous <Continuous>  lamoTRIgine 150 milliGRAM(s) Oral daily  levothyroxine 100 MICROGram(s) Oral daily  methadone    Tablet 30 milliGRAM(s) Oral every 8 hours  pantoprazole    Tablet 40 milliGRAM(s) Oral before breakfast  pantoprazole    Tablet 40 milliGRAM(s) Oral two times a day  polyethylene glycol 3350 17 Gram(s) Oral two times a day  scopolamine 1 mG/72 Hr(s) Patch 1 Patch Transdermal once  senna 2 Tablet(s) Oral at bedtime  sertraline 200 milliGRAM(s) Oral daily  thiamine 100 milliGRAM(s) Oral daily    MEDICATIONS  (PRN):  acetaminophen     Tablet .. 650 milliGRAM(s) Oral every 6 hours PRN Temp greater or equal to 38C (100.4F), Mild Pain (1 - 3)  albuterol    90 MICROgram(s) HFA Inhaler 2 Puff(s) Inhalation every 6 hours PRN Shortness of Breath and/or Wheezing  aluminum hydroxide/magnesium hydroxide/simethicone Suspension 30 milliLiter(s) Oral every 4 hours PRN Dyspepsia  cyclobenzaprine 10 milliGRAM(s) Oral three times a day PRN Muscle Spasm  dextrose Oral Gel 15 Gram(s) Oral once PRN Blood Glucose LESS THAN 70 milliGRAM(s)/deciliter  dicyclomine 20 milliGRAM(s) Oral four times a day before meals PRN abdominal pain  melatonin 3 milliGRAM(s) Oral at bedtime PRN Insomnia  morphine  - Injectable 2 milliGRAM(s) IV Push every 4 hours PRN Moderate Pain (4 - 6)                            12.9   5.84  )-----------( 136      ( 13 Nov 2024 01:36 )             40.9     11-13    138  |  98  |  14  ----------------------------<  91  3.7   |  30  |  1.1    Ca    9.0      13 Nov 2024 01:36  Mg     2.0     11-13    TPro  5.9[L]  /  Alb  3.6  /  TBili  0.6  /  DBili  x   /  AST  35  /  ALT  23  /  AlkPhos  145[H]  11-13    PT/INR - ( 13 Nov 2024 01:36 )   PT: 11.70 sec;   INR: 0.99 ratio       PTT - ( 13 Nov 2024 01:36 )  PTT:32.2 sec    a/p    64-year-old female, former smoker with past medical history of asthma, hypothyroidism, anxiety, depression, MVP, Jaclyn-en-Y gastric bypass, appendectomy, cholecystectomy and Chronic back pain on methadone, presents to the ED following a syncopal episode last night. She also has complaints of worsening abdominal pain for 5 days.     # syncope, possible vasovagal vs arrhythmia ( in setting of prolonged QTC found on admission )   QTC has imrpvoed now repeat QTC Calculation(Bazett) 460 ms  normal echo   ct nega for pe   tele no evens   check orthostatics    # abdomina lpain ,   ct abd negative  EGD today   etiology unknown at this time     < from: CT Abdomen and Pelvis w/ Oral Cont and w/ IV Cont (11.08.24 @ 04:02) >  LIVER: Diffuse severe fatty infiltration. Enlarged measuring up to 26 cm   in length.  BILE DUCTS: Normal caliber.  GALLBLADDER: Surgically absent  SPLEEN: Within normal limits.  PANCREAS: Within normal limits.  ADRENALS: Within normal limits.  KIDNEYS/URETERS: Within normal limits.    < end of copied text >  Lipase: 37: Hemolyzed. Interpret with caution U/L (11.08.24 @ 00:30)    # opioid dependency , on methadone and soma ( chronic back pain )     # DM , new dx   A1C with Estimated Average Glucose Result: 7.0: Method: Immunoassay(11.09.24 @ 06:43)  no need for treatment at this time, hgb A1c of 7 is acceptable  unless fingersticks are over 180     # hyperlipidemia , cont meds   # anxiety depression cont meds   # hypothyroidism tsh high, cont synthroid at 100 , fu as outpt for further adjustment     # chronic skin lesion with scaling, outpt derm fu     # leg edema, on bumex, the left ventricular diastolic function could not be assessed in the echo done this admission, pt to have a repeat study as outpt   doppler neg for dvt     # obesity , out pt fu   Height (cm): 167.6 (11-13-24 @ 10:30)  Weight (kg): 100.7 (11-13-24 @ 10:30)  BMI (kg/m2): 35.8 (11-13-24 @ 10:30)  BSA (m2): 2.09 (11-13-24 @ 10:30)    #h/o ETOH use---fatty liver--suspected thiamine and folate deficiency, cont supplement     #Progress Note Handoff    Pending :  EGD today   Family discussion: dw pt   Disposition: home when stable   code status: full code

## 2024-11-13 NOTE — PROGRESS NOTE ADULT - SUBJECTIVE AND OBJECTIVE BOX
SUBJECTIVE/OVERNIGHT EVENTS  Today is hospital day 5d. This morning patient was seen and examined at bedside, resting comfortably in bed. No acute or major events overnight. Pt complaining of abdominal pain    MEDICATIONS  STANDING MEDICATIONS  buMETAnide 1 milliGRAM(s) Oral daily  chlorhexidine 2% Cloths 1 Application(s) Topical <User Schedule>  dextrose 5%. 1000 milliLiter(s) IV Continuous <Continuous>  dextrose 5%. 1000 milliLiter(s) IV Continuous <Continuous>  dextrose 50% Injectable 25 Gram(s) IV Push once  dextrose 50% Injectable 25 Gram(s) IV Push once  dextrose 50% Injectable 12.5 Gram(s) IV Push once  folic acid 1 milliGRAM(s) Oral daily  glucagon  Injectable 1 milliGRAM(s) IntraMuscular once  heparin   Injectable 5000 Unit(s) SubCutaneous every 8 hours  influenza   Vaccine 0.5 milliLiter(s) IntraMuscular once  insulin lispro (ADMELOG) corrective regimen sliding scale   SubCutaneous three times a day before meals  lactated ringers. 1000 milliLiter(s) IV Continuous <Continuous>  lamoTRIgine 150 milliGRAM(s) Oral daily  levothyroxine 100 MICROGram(s) Oral daily  methadone    Tablet 30 milliGRAM(s) Oral every 8 hours  pantoprazole    Tablet 40 milliGRAM(s) Oral before breakfast  pantoprazole    Tablet 40 milliGRAM(s) Oral two times a day  polyethylene glycol 3350 17 Gram(s) Oral two times a day  scopolamine 1 mG/72 Hr(s) Patch 1 Patch Transdermal once  senna 2 Tablet(s) Oral at bedtime  sertraline 200 milliGRAM(s) Oral daily  thiamine 100 milliGRAM(s) Oral daily    PRN MEDICATIONS  acetaminophen     Tablet .. 650 milliGRAM(s) Oral every 6 hours PRN  albuterol    90 MICROgram(s) HFA Inhaler 2 Puff(s) Inhalation every 6 hours PRN  aluminum hydroxide/magnesium hydroxide/simethicone Suspension 30 milliLiter(s) Oral every 4 hours PRN  cyclobenzaprine 10 milliGRAM(s) Oral three times a day PRN  dextrose Oral Gel 15 Gram(s) Oral once PRN  dicyclomine 20 milliGRAM(s) Oral four times a day before meals PRN  melatonin 3 milliGRAM(s) Oral at bedtime PRN  morphine  - Injectable 2 milliGRAM(s) IV Push every 4 hours PRN    VITALS  T(F): 97.5 (11-13-24 @ 10:05), Max: 98.5 (11-12-24 @ 13:34)  HR: 71 (11-13-24 @ 10:30) (67 - 75)  BP: 112/71 (11-13-24 @ 10:30) (101/66 - 115/77)  RR: 18 (11-13-24 @ 10:30) (18 - 18)  SpO2: 94% (11-13-24 @ 10:30) (89% - 96%)  POCT Blood Glucose.: 91 mg/dL (11-13-24 @ 08:07)  POCT Blood Glucose.: 111 mg/dL (11-12-24 @ 21:08)  POCT Blood Glucose.: 115 mg/dL (11-12-24 @ 16:19)    PHYSICAL EXAM  GENERAL  lying in bed appears to be in pain    HEAD  Atraumatic      NECK  Supple       HEART  Regular rate and rhythm no murmurs rubs or gallops    LUNGS  Clear to auscultation bilaterally, no wheezing rales or rhonchi    ABDOMEN  soft, diffuse tenderness, worse over LLQ, no rebound tenderness. nondistended, +BS    EXTREMITIES  no edema    NERVOUS SYSTEM  A&Ox3   SKIN  No rashes or lesions      LABS             12.9   5.84  )-----------( 136      ( 11-13-24 @ 01:36 )             40.9     138  |  98  |  14  -------------------------<  91   11-13-24 @ 01:36  3.7  |  30  |  1.1    Ca      9.0     11-13-24 @ 01:36  Mg     2.0     11-13-24 @ 01:36    TPro  5.9  /  Alb  3.6  /  TBili  0.6  /  DBili  x   /  AST  35  /  ALT  23  /  AlkPhos  145  /  GGT  x     11-13-24 @ 01:36    PT/INR - ( 11-13-24 @ 01:36 )   PT: 11.70 sec;   INR: 0.99 ratio  PTT - ( 11-13-24 @ 01:36 )  PTT:32.2 sec      Urinalysis Basic - ( 13 Nov 2024 01:36 )    Color: x / Appearance: x / SG: x / pH: x  Gluc: 91 mg/dL / Ketone: x  / Bili: x / Urobili: x   Blood: x / Protein: x / Nitrite: x   Leuk Esterase: x / RBC: x / WBC x   Sq Epi: x / Non Sq Epi: x / Bacteria: x          IMAGING  < from: [facility] Duplex Lower Ext Vein Scan, Bilat (11.12.24 @ 13:52) >  No evidence of deep venous thrombosis in either lower extremity.    < end of copied text >

## 2024-11-14 LAB
ANION GAP SERPL CALC-SCNC: 12 MMOL/L — SIGNIFICANT CHANGE UP (ref 7–14)
BASE EXCESS BLDA CALC-SCNC: 2.8 MMOL/L — SIGNIFICANT CHANGE UP (ref -2–3)
BUN SERPL-MCNC: 12 MG/DL — SIGNIFICANT CHANGE UP (ref 10–20)
CALCIUM SERPL-MCNC: 8.8 MG/DL — SIGNIFICANT CHANGE UP (ref 8.4–10.5)
CHLORIDE SERPL-SCNC: 96 MMOL/L — LOW (ref 98–110)
CO2 SERPL-SCNC: 30 MMOL/L — SIGNIFICANT CHANGE UP (ref 17–32)
CREAT SERPL-MCNC: 1.1 MG/DL — SIGNIFICANT CHANGE UP (ref 0.7–1.5)
EGFR: 57 ML/MIN/1.73M2 — LOW
GAS PNL BLDA: SIGNIFICANT CHANGE UP
GLUCOSE BLDC GLUCOMTR-MCNC: 106 MG/DL — HIGH (ref 70–99)
GLUCOSE BLDC GLUCOMTR-MCNC: 113 MG/DL — HIGH (ref 70–99)
GLUCOSE BLDC GLUCOMTR-MCNC: 150 MG/DL — HIGH (ref 70–99)
GLUCOSE BLDC GLUCOMTR-MCNC: 93 MG/DL — SIGNIFICANT CHANGE UP (ref 70–99)
GLUCOSE SERPL-MCNC: 80 MG/DL — SIGNIFICANT CHANGE UP (ref 70–99)
HCO3 BLDA-SCNC: 26 MMOL/L — SIGNIFICANT CHANGE UP (ref 21–28)
HOROWITZ INDEX BLDA+IHG-RTO: 21 — SIGNIFICANT CHANGE UP
PCO2 BLDA: 35 MMHG — SIGNIFICANT CHANGE UP (ref 32–45)
PH BLDA: 7.48 — HIGH (ref 7.35–7.45)
PO2 BLDA: 55 MMHG — LOW (ref 83–108)
POTASSIUM SERPL-MCNC: 4.1 MMOL/L — SIGNIFICANT CHANGE UP (ref 3.5–5)
POTASSIUM SERPL-SCNC: 4.1 MMOL/L — SIGNIFICANT CHANGE UP (ref 3.5–5)
SAO2 % BLDA: 90 % — LOW (ref 94–98)
SODIUM SERPL-SCNC: 138 MMOL/L — SIGNIFICANT CHANGE UP (ref 135–146)

## 2024-11-14 PROCEDURE — 64450 NJX AA&/STRD OTHER PN/BRANCH: CPT | Mod: RT

## 2024-11-14 PROCEDURE — 99232 SBSQ HOSP IP/OBS MODERATE 35: CPT | Mod: 25

## 2024-11-14 PROCEDURE — 99222 1ST HOSP IP/OBS MODERATE 55: CPT

## 2024-11-14 PROCEDURE — 71045 X-RAY EXAM CHEST 1 VIEW: CPT | Mod: 26

## 2024-11-14 PROCEDURE — 99233 SBSQ HOSP IP/OBS HIGH 50: CPT

## 2024-11-14 PROCEDURE — 99232 SBSQ HOSP IP/OBS MODERATE 35: CPT

## 2024-11-14 RX ORDER — ACETAMINOPHEN 500MG 500 MG/1
650 TABLET, COATED ORAL EVERY 8 HOURS
Refills: 0 | Status: DISCONTINUED | OUTPATIENT
Start: 2024-11-14 | End: 2024-12-06

## 2024-11-14 RX ORDER — METHYLPREDNISOLONE SOD SUCC 125 MG
80 VIAL (EA) INJECTION ONCE
Refills: 0 | Status: COMPLETED | OUTPATIENT
Start: 2024-11-14 | End: 2024-11-14

## 2024-11-14 RX ORDER — METOCLOPRAMIDE HYDROCHLORIDE 10 MG/1
5 TABLET ORAL
Refills: 0 | Status: DISCONTINUED | OUTPATIENT
Start: 2024-11-14 | End: 2024-12-06

## 2024-11-14 RX ORDER — METOCLOPRAMIDE HYDROCHLORIDE 10 MG/1
5 TABLET ORAL ONCE
Refills: 0 | Status: DISCONTINUED | OUTPATIENT
Start: 2024-11-14 | End: 2024-11-14

## 2024-11-14 RX ORDER — SUCRALFATE 1 G/1
1 TABLET ORAL
Refills: 0 | Status: DISCONTINUED | OUTPATIENT
Start: 2024-11-14 | End: 2024-12-06

## 2024-11-14 RX ORDER — PREDNISONE 20 MG/1
40 TABLET ORAL DAILY
Refills: 0 | Status: COMPLETED | OUTPATIENT
Start: 2024-11-14 | End: 2024-11-19

## 2024-11-14 RX ORDER — METOCLOPRAMIDE HYDROCHLORIDE 10 MG/1
5 TABLET ORAL ONCE
Refills: 0 | Status: COMPLETED | OUTPATIENT
Start: 2024-11-14 | End: 2024-11-14

## 2024-11-14 RX ORDER — IPRATROPIUM BROMIDE AND ALBUTEROL SULFATE 2.5; .5 MG/3ML; MG/3ML
3 SOLUTION RESPIRATORY (INHALATION) EVERY 6 HOURS
Refills: 0 | Status: DISCONTINUED | OUTPATIENT
Start: 2024-11-14 | End: 2024-11-21

## 2024-11-14 RX ADMIN — SERTRALINE HYDROCHLORIDE 200 MILLIGRAM(S): 100 TABLET, FILM COATED ORAL at 11:41

## 2024-11-14 RX ADMIN — METOCLOPRAMIDE HYDROCHLORIDE 5 MILLIGRAM(S): 10 TABLET ORAL at 10:56

## 2024-11-14 RX ADMIN — BUMETANIDE 1 MILLIGRAM(S): 1 TABLET ORAL at 05:49

## 2024-11-14 RX ADMIN — LAMOTRIGINE 150 MILLIGRAM(S): 50 TABLET, EXTENDED RELEASE ORAL at 11:41

## 2024-11-14 RX ADMIN — ACETAMINOPHEN 500MG 650 MILLIGRAM(S): 500 TABLET, COATED ORAL at 21:32

## 2024-11-14 RX ADMIN — PANTOPRAZOLE SODIUM 40 MILLIGRAM(S): 40 TABLET, DELAYED RELEASE ORAL at 05:49

## 2024-11-14 RX ADMIN — POLYETHYLENE GLYCOL 3350 17 GRAM(S): 17 POWDER, FOR SOLUTION ORAL at 17:30

## 2024-11-14 RX ADMIN — METHADONE HYDROCHLORIDE 30 MILLIGRAM(S): 5 TABLET ORAL at 14:26

## 2024-11-14 RX ADMIN — Medication 2 MILLIGRAM(S): at 21:32

## 2024-11-14 RX ADMIN — Medication 2 MILLIGRAM(S): at 15:31

## 2024-11-14 RX ADMIN — PANTOPRAZOLE SODIUM 40 MILLIGRAM(S): 40 TABLET, DELAYED RELEASE ORAL at 17:30

## 2024-11-14 RX ADMIN — Medication 2 MILLIGRAM(S): at 16:00

## 2024-11-14 RX ADMIN — Medication 80 MILLIGRAM(S): at 13:09

## 2024-11-14 RX ADMIN — Medication 2 MILLIGRAM(S): at 02:15

## 2024-11-14 RX ADMIN — CHLORHEXIDINE GLUCONATE 1 APPLICATION(S): 1.2 RINSE ORAL at 05:59

## 2024-11-14 RX ADMIN — Medication 2 MILLIGRAM(S): at 10:58

## 2024-11-14 RX ADMIN — METHADONE HYDROCHLORIDE 30 MILLIGRAM(S): 5 TABLET ORAL at 05:55

## 2024-11-14 RX ADMIN — Medication 2 TABLET(S): at 21:32

## 2024-11-14 RX ADMIN — METOCLOPRAMIDE HYDROCHLORIDE 5 MILLIGRAM(S): 10 TABLET ORAL at 21:31

## 2024-11-14 RX ADMIN — Medication 2 MILLIGRAM(S): at 20:11

## 2024-11-14 RX ADMIN — Medication 2 MILLIGRAM(S): at 05:48

## 2024-11-14 RX ADMIN — ACETAMINOPHEN 500MG 650 MILLIGRAM(S): 500 TABLET, COATED ORAL at 22:17

## 2024-11-14 RX ADMIN — Medication 100 MICROGRAM(S): at 05:49

## 2024-11-14 RX ADMIN — IPRATROPIUM BROMIDE AND ALBUTEROL SULFATE 3 MILLILITER(S): 2.5; .5 SOLUTION RESPIRATORY (INHALATION) at 19:34

## 2024-11-14 RX ADMIN — IPRATROPIUM BROMIDE AND ALBUTEROL SULFATE 3 MILLILITER(S): 2.5; .5 SOLUTION RESPIRATORY (INHALATION) at 13:35

## 2024-11-14 RX ADMIN — Medication 2 MILLIGRAM(S): at 01:32

## 2024-11-14 RX ADMIN — POLYETHYLENE GLYCOL 3350 17 GRAM(S): 17 POWDER, FOR SOLUTION ORAL at 05:48

## 2024-11-14 RX ADMIN — Medication 2 MILLIGRAM(S): at 11:30

## 2024-11-14 RX ADMIN — METHADONE HYDROCHLORIDE 30 MILLIGRAM(S): 5 TABLET ORAL at 21:32

## 2024-11-14 RX ADMIN — Medication 1 MILLIGRAM(S): at 11:40

## 2024-11-14 RX ADMIN — Medication 100 MILLIGRAM(S): at 11:40

## 2024-11-14 NOTE — PROGRESS NOTE ADULT - ASSESSMENT
# Abdominal pain: R/O Anterior cutaneous nerve entrapment syndrome  # H/O gastric bypass  - hemodynamically stable  - exam with mild tenderness  - US and CT reviewed as above  - prior egd and colonoscopy in 2022 with no significant pathology (as above)  - on chronic opioids, high TSH and A1c  - s/p EGD as above  - positive Carnett's sign of physical exam with max point of tenderness just above umbilicus along mid line scar     recommendations:  - diet as tolerated  - trial of gabapentin   - frequent abdominal exam  - PPI BID  - Limit opioids   - can consider local anesthetic or steroid injection by pain management    Follow up with our [facility] located at [address] [place] [address]. Phone Number: [phone]    Recall as needed   # Abdominal pain: R/O Anterior cutaneous nerve entrapment syndrome  # H/O gastric bypass  - hemodynamically stable  - exam with mild tenderness  - US and CT reviewed as above  - prior egd and colonoscopy in 2022 with no significant pathology    - on chronic opioids, high TSH and A1c  - s/p EGD 11/13 - refer to [facility] for details   - positive Carnett's sign of physical exam with max point of tenderness just above umbilicus along mid line scar     recommendations:  - diet as tolerated  - trial of gabapentin   - frequent abdominal exam  - PPI BID  - Limit opioids   - can consider local anesthetic or steroid injection by pain management    Follow up with our [facility] located at [address] [place] [address]. Phone Number: [phone]    Recall as needed

## 2024-11-14 NOTE — PHYSICAL THERAPY INITIAL EVALUATION ADULT - GAIT TRAINING, PT EVAL
Improve amb to 100' with supervision using RW by d/c. Negotiate 1 flight of stairs using 2 hands on HR and step to pattern and supervision by d/c.

## 2024-11-14 NOTE — PROCEDURE NOTE - NSPOSTCAREGUIDE_GEN_A_CORE
Can remove band-aid in 4 hours. Do not submerge abdomen in water for 24 hours (regular showering/washing allowed)

## 2024-11-14 NOTE — CONSULT NOTE ADULT - ATTENDING COMMENTS
64-year-old female with a remote history of an open Jaclyn-en-Y gastric bypass.  At the time of her surgery she did well with her weight loss but has gained a significant amount of weight back.  She had been doing well from the bypass standpoint but presented with about a week of abdominal pain mostly in her right flank and right lower quadrant.  She has a history of neck and back issues that have been managed with opiate medication and is currently on methadone.  The pain is constant but sometimes worse, it is not always worse with eating but sometimes is.  It is very tender to the touch in the right lower quadrant and just to the right of her umbilicus.  She sometimes does have nausea and vomiting after eating.  Otherwise her abdomen is soft and there is a large exploratory laparotomy scar from her open bypass.  She has had several imaging studies including 2 CT scans of her abdomen which appear to be normal with no evidence of internal hernia-no twisting of the mesenteric vessels and no bowel on 1 side or the other of the abdomen, it appears to be laying normally with normal passage of contrast.  She is having bowel movements and flatus.    She was recently diagnosed with diabetes with her A1c being 7 this admission and does not take any medication for diabetes.  Her other history involves drinking several glasses of liquor nightly, she has never had any withdrawal symptoms but this is something that she does chronically.    Surgery was called a week after she was admitted and had undergone an endoscopy which did not show ulceration but did show some gastritis.  After reviewing her history and her CT scans at this time there is no concern for an acute surgical condition.  The only abnormality on the CAT scan that appears to be correlating with her pain is her very large hepatomegaly, the liver can cause pain in this way with the stretching of the capsule and it is in the area of her pain on the right flank into the right lower quadrant as her liver is that large.  Pain management was also called and they attempted a block of one of her incisions but this did not help the pain in any way.    A/P 64-year-old female with a remote history of a Jaclyn-en-Y gastric bypass, chronic back and neck pain on methadone, nightly significant alcohol intake, hepatomegaly, new onset right sided abdominal pain  Pain is likely multifactorial and gastritis could be contributing to some of her pain and nausea symptoms however the right sided pain is most likely due to the hepatomegaly without any evidence of other reasons.  I had a long conversation with the patient and my main recommendation is to follow-up with us in the office to treat her diabetes with metformin as this can induce some weight loss and help to shrink the liver and following a trial of this if she does not gain significant results then we would move towards a GLP-1 type medication.  She should follow-up with myself and Dr. Bryant our medical weight loss specialist.  We also discussed surgical intervention if all else fails and the pain continues or worsens to run the bowel and assess for an internal hernia that might have been missed on the CT scan however with her symptoms being chronic and only on the right lower quadrant this is not normal for the symptomatology of an internal hernia, we discussed that with her open bypass the surgery would be a lot more difficult and would potentially need to be performed in an open fashion and so it is not as benign as surgery after a laparoscopic bypass.  She understands and will follow-up in the office.
I edited the note
Impression and plan above have been altered and are my own.

## 2024-11-14 NOTE — CONSULT NOTE ADULT - SUBJECTIVE AND OBJECTIVE BOX
HPI: Patient is a 64F with PMH of former smoker with past medical history of asthma, hypothyroidism, anxiety, depression, MVP, Jaclyn-en-Y gastric bypass, appendectomy, cholecystectomy and Chronic back pain on methadone, presents to the ED following a syncopal episode last night. She also has complaints of worsening abdominal pain.     Current Inpatient Medication Regimen:  acetaminophen     Tablet .. 650 milliGRAM(s) Oral every 6 hours PRN  albuterol    90 MICROgram(s) HFA Inhaler 2 Puff(s) Inhalation every 6 hours PRN  albuterol/ipratropium for Nebulization 3 milliLiter(s) Nebulizer every 6 hours  aluminum hydroxide/magnesium hydroxide/simethicone Suspension 30 milliLiter(s) Oral every 4 hours PRN  buMETAnide 1 milliGRAM(s) Oral daily  chlorhexidine 2% Cloths 1 Application(s) Topical <User Schedule>  cyclobenzaprine 10 milliGRAM(s) Oral three times a day PRN  dextrose 5%. 1000 milliLiter(s) IV Continuous <Continuous>  dextrose 5%. 1000 milliLiter(s) IV Continuous <Continuous>  dextrose 50% Injectable 25 Gram(s) IV Push once  dextrose 50% Injectable 12.5 Gram(s) IV Push once  dextrose 50% Injectable 25 Gram(s) IV Push once  dextrose Oral Gel 15 Gram(s) Oral once PRN  dicyclomine 20 milliGRAM(s) Oral four times a day before meals PRN  folic acid 1 milliGRAM(s) Oral daily  glucagon  Injectable 1 milliGRAM(s) IntraMuscular once  heparin   Injectable 5000 Unit(s) SubCutaneous every 8 hours  influenza   Vaccine 0.5 milliLiter(s) IntraMuscular once  insulin lispro (ADMELOG) corrective regimen sliding scale   SubCutaneous three times a day before meals  lactated ringers. 1000 milliLiter(s) IV Continuous <Continuous>  lamoTRIgine 150 milliGRAM(s) Oral daily  levothyroxine 100 MICROGram(s) Oral daily  melatonin 3 milliGRAM(s) Oral at bedtime PRN  methadone    Tablet 30 milliGRAM(s) Oral every 8 hours  morphine  - Injectable 2 milliGRAM(s) IV Push once  morphine  - Injectable 2 milliGRAM(s) IV Push every 4 hours PRN  pantoprazole    Tablet 40 milliGRAM(s) Oral before breakfast  pantoprazole    Tablet 40 milliGRAM(s) Oral two times a day  polyethylene glycol 3350 17 Gram(s) Oral two times a day  predniSONE   Tablet 40 milliGRAM(s) Oral daily  scopolamine 1 mG/72 Hr(s) Patch 1 Patch Transdermal once  senna 2 Tablet(s) Oral at bedtime  sertraline 200 milliGRAM(s) Oral daily  thiamine 100 milliGRAM(s) Oral daily      Home Analgesic Regimen:      Allergies:  penicillin (Anaphylaxis)      Past Medical History:  Chest pain    No pertinent family history in first degree relatives    CAD (coronary artery disease) (Father, Mother)    Asthma    Gastroesophageal reflux disease without esophagitis    Hypothyroidism    Heart murmur    Anxiety    Depression    Mood disorder    Psoriasis    Constipation    Back pain    Morbid obesity    SOB (shortness of breath) on exertion    S/P tonsillectomy    History of appendectomy    Abscess of back    History of Jaclyn-en-Y gastric bypass    S O B    Intractable abdominal pain    Hepatomegaly    Syncope    Chest pain        Review of Systems:  General: no fevers or chills  Eyes: no diplopia or blurred vision  ENT: no rhinorrhea  CV: no chest pain  Resp: no cough or dyspnea  GI: abdominal pain  : no urinary incontinence or dysuria  Neuro: no focal weakness or numbness    Physical Exam:  T(C): 36.8 (24 @ 11:39), Max: 36.8 (24 @ 11:39)  HR: 75 (24 @ 11:39) (69 - 75)  BP: 104/71 (24 @ 11:39) (98/64 - 110/68)  RR: 16 (24 @ 11:39) (16 - 18)  SpO2: 93% (24 @ 12:00) (93% - 97%)  Gen: NAD  Eyes: no glasses or scleral icterus  Head: Normocephalic / Atraumatic  CV: no JVD  Lungs: nonlabored breathing  Abdomen: nondistended, soft  : no garcia catheter in place  Back: tenderness to palpation  Neuro: AOx3  Extremities: full ROM in upper/lower extremities  Psych: normal affect      Labs:                        12.9   5.84  )-----------( 136      ( 2024 01:36 )             40.9         BMP  138 mmol/L [135 - 146] | 96 mmol/L[L] [98 - 110] | 12 mg/dL [10 - 20]  4.1 mmol/L [3.5 - 5.0] | 30 mmol/L [17 - 32] | 1.1 mg/dL [0.7 - 1.5]    80 mg/dL [70 - 99]        Imaging Studies:  EGD Report    Date: 2024 10:30 AM    Patient Name: JULIET NAVARRETE    MRN: [id number]    Account Number:    [id number]    Gender: Female     (age): 1960 (64)    Instrument(s):    Olympus: [id number] ([id number])    Attending/Fellow:    MD Guille Mesa MD        Procedure Room #:    [place] [address]            ASA Class:    P2 - 2024 12:06 PM Guille Oh MD    History of Present Illness:    H&P was previously reviewed.    Administered Medications:    As per Anesthesiology Record    Indications:    Abdominal pain: 789.00 - R10.9    Procedure:    The procedure, indications, preparation and potential complications were  explained to the patient, who indicated understanding and signed the  corresponding consent forms. MAC was administered by anesthesiologist.  Continuous pulse oximetry and blood pressure monitoring were used throughout the  procedure. Supplemental oxygen was used. Patient was placed in the left lateral  decubitus position. The endoscope was introduced through the mouth and advanced  under direct visualization until the second part of the duodenum was reached.  Patient tolerance to the procedure was good. The procedure was not difficult.  Blood loss was minimal.    Limitations/Complications:    There were no apparent limitations or complications    Findings:    Esophagus Mucosa Normal mucosa was noted in the whole esophagus.    Stomach Lumen A medium size hiatal hernia was seen. Retroflexion view in the  stomach confirmed the size and morphology of the hernia.    Mucosa Diffuse erythema of the mucosa was noted in the stomach. These findings  are compatible with non-erosive gastritis. Biopsies were obtained to evaluate  for H.Pylori infection.    Additional stomach findings - Altered anatomy (Jaclyn-en-Y gastric bypass). Blind  limb and efferent limb were examined with apparent normal mucosa..    Impressions:    Normal mucosa in the whole esophagus.    Hiatal Hernia.    Erythema in the stomach compatible with non-erosive gastritis.    - Altered anatomy (Jaclyn-en-Y gastric bypass). Blind limb and efferent limb  were examined with apparent normal mucosa. .    Plan:    Advance diet as tolerated    Follow up pathology results    Pain control    GERD diet    Follow-up visit in 2-4 weeks in the [facility]    Pathology:    Pathology was sent to lab, waiting for results    Attending Participation:    I was present and participated during the entire procedure, including non-lópez  portions.    Rocio Pop MD    Version 1, Electronically signed on 2024 1:35:21 PM by MD Guille Mesa MD

## 2024-11-14 NOTE — CONSULT NOTE ADULT - SUBJECTIVE AND OBJECTIVE BOX
GENERAL SURGERY CONSULT NOTE    Patient: JULIET NAVARRETE , 64y ([birth year])Female   MRN: [id number]  Location: [place] [address] A  Visit: 11-08-24 Inpatient  Date: 11-14-24 @ 15:34    HPI:  64-year-old female, former smoker with past medical history of asthma, hypothyroidism, anxiety, depression, MVP, Jaclyn-en-Y gastric bypass, appendectomy, cholecystectomy and Chronic back pain on methadone, presents to the ED following a syncopal episode last night. She also has complaints of worsening abdominal pain for 5 days.     Pt was sitting down while  is helping her dry after a shower when she felt dizzy and lost consciousness for a brief moment. No truama. No  incontinence or shaking movements. No chest pain or sudden SOB. However, pt endorses having reduced effort tolerance and shortness of breath for the past 2-3 months. She reportedly uses nebs/albuterol on almost a daily basis. She follows with Dr. Negrete (cardiology). Per pt, cath was done 5-10 yrs back which was normal. She is due for a stress test. Takes bumex daily for LE swelling.       She also reports of nausea, vomiting, decreased p.o. intake and worsening abdominal pain for the past 5 days. Pain in localized to right lower quadrant but radiates all over the abd region. Not passed stool in the past 5 days, says didn't what much.    Patient drinks a couple glasses of scotch nightly.    ED vitals - HR -  84/min; BP-  137/82mmhg; RR -  18/min; SPO2 -  98%; Afebrile  ECG - nsr, irbbb, qtc 603  labs - AST/ ALT 77/36, ALP - 194; Lactate 3.0  CT abdomen - hepatomegaly, no other acute findings     Pt admitted for Syncope and Abdominal pain w/u.  (08 Nov 2024 09:17)      ___________________    Bariatric Surgery:    The patient is a 64 year old female with PMH notable for Jaclyn en Y gastric bypass ~ 15 years ago, cholecystectomy, appendectomy, chronic back pain on methadone who presented on 11/8 with shortness of breath found to have asthma exacerbation, also endorsing abdominal pain worsening over 5 days duration. She says the pain is worse with palpation and also while she eats. Normally Bm every 2-3 days but hadn't passed stool in 5 days. Endorsed poor PO intake.   In ED patient was afebrile and hemodynamically stable. CTAP was unremarkable on admission. Admitted to medical service for further workup and treatment of her respiratory distress.   While here in the hospital patient underwent EGD for evaluation of her abdominal pain, per GI only noted to have nonerosive gastritis, no other upper GI abnormalities visualized. Patient went for CTA of the abdomen which was similarly unremarkable, though her CT scans do show very enlarged liver - patient says she drinks multiple glasses of scotch every night. Pain management on board, performed abdominal cutaneous nerve block this AM.   Because of her abdominal pain and history of RYGB, bariatric surgery consulted for evaluation.     PAST MEDICAL & SURGICAL HISTORY:  Asthma  LAST ATTACK MANY YRS AGO      Gastroesophageal reflux disease without esophagitis      Hypothyroidism      Heart murmur      Anxiety      Depression      Mood disorder      Psoriasis      Constipation      Back pain  LOW      Morbid obesity      S/P tonsillectomy  1967      History of appendectomy  1974      Abscess of back  EVACUATION OF ABSCESS @ L5-S1 1997      History of Jaclyn-en-Y gastric bypass  WITH CHOLECYSTECTOMY 2006          Home Medications:  bumetanide 1 mg oral tablet: 1 tab(s) orally once a day (11 Nov 2024 09:09)  folic acid 1 mg oral tablet: 1 tab(s) orally once a day (30 Jul 2018 12:47)  lamoTRIgine 150 mg oral tablet: 1 tab(s) orally once a day (08 Nov 2024 10:50)  levothyroxine 88 mcg (0.088 mg) oral tablet: 1 tab(s) orally once a day (30 Jul 2018 12:47)  methadone 10 mg oral tablet: 3 tab(s) orally every 8 hours (30 Jul 2018 12:47)  sertraline: 200 milligram(s) orally once a day (30 Jul 2018 12:47)  Soma 350 mg oral tablet: 1 tab(s) orally 3 times a day (30 Jul 2018 12:47)        VITALS:  T(F): 98.3 (11-14-24 @ 11:39), Max: 98.3 (11-14-24 @ 11:39)  HR: 75 (11-14-24 @ 11:39) (69 - 75)  BP: 104/71 (11-14-24 @ 11:39) (98/64 - 110/68)  RR: 16 (11-14-24 @ 11:39) (16 - 18)  SpO2: 93% (11-14-24 @ 12:00) (93% - 97%)    PHYSICAL EXAM:    General: well appearing, no acute distress  HEENT: pupils equal and reactive, EOM intact, mucous membranes moist, no scleral icterus  CV: RRR on radial exam  Pulm: breathing well on room air, no acute respiratory distress, mild bilateral wheezing  Abdomen: protuberant abdomen, nondistended. Tender to palpation worst in Right mid-abdomen, no rebound or guarding.  Ext: well perfused, no peripheral edema, no ROM or strength deficits  Neuro: alert and oriented x3, no focal sensory/motor deficits    MEDICATIONS  (STANDING):  acetaminophen     Tablet .. 650 milliGRAM(s) Oral every 8 hours  albuterol/ipratropium for Nebulization 3 milliLiter(s) Nebulizer every 6 hours  buMETAnide 1 milliGRAM(s) Oral daily  chlorhexidine 2% Cloths 1 Application(s) Topical <User Schedule>  dextrose 5%. 1000 milliLiter(s) (50 mL/Hr) IV Continuous <Continuous>  dextrose 5%. 1000 milliLiter(s) (100 mL/Hr) IV Continuous <Continuous>  dextrose 50% Injectable 25 Gram(s) IV Push once  dextrose 50% Injectable 12.5 Gram(s) IV Push once  dextrose 50% Injectable 25 Gram(s) IV Push once  folic acid 1 milliGRAM(s) Oral daily  glucagon  Injectable 1 milliGRAM(s) IntraMuscular once  heparin   Injectable 5000 Unit(s) SubCutaneous every 8 hours  influenza   Vaccine 0.5 milliLiter(s) IntraMuscular once  insulin lispro (ADMELOG) corrective regimen sliding scale   SubCutaneous three times a day before meals  lactated ringers. 1000 milliLiter(s) (75 mL/Hr) IV Continuous <Continuous>  lamoTRIgine 150 milliGRAM(s) Oral daily  levothyroxine 100 MICROGram(s) Oral daily  methadone    Tablet 30 milliGRAM(s) Oral every 8 hours  morphine  - Injectable 2 milliGRAM(s) IV Push once  pantoprazole    Tablet 40 milliGRAM(s) Oral two times a day  pantoprazole    Tablet 40 milliGRAM(s) Oral before breakfast  polyethylene glycol 3350 17 Gram(s) Oral two times a day  predniSONE   Tablet 40 milliGRAM(s) Oral daily  scopolamine 1 mG/72 Hr(s) Patch 1 Patch Transdermal once  senna 2 Tablet(s) Oral at bedtime  sertraline 200 milliGRAM(s) Oral daily  thiamine 100 milliGRAM(s) Oral daily    MEDICATIONS  (PRN):  albuterol    90 MICROgram(s) HFA Inhaler 2 Puff(s) Inhalation every 6 hours PRN Shortness of Breath and/or Wheezing  aluminum hydroxide/magnesium hydroxide/simethicone Suspension 30 milliLiter(s) Oral every 4 hours PRN Dyspepsia  cyclobenzaprine 10 milliGRAM(s) Oral three times a day PRN Muscle Spasm  dextrose Oral Gel 15 Gram(s) Oral once PRN Blood Glucose LESS THAN 70 milliGRAM(s)/deciliter  dicyclomine 20 milliGRAM(s) Oral four times a day before meals PRN abdominal pain  melatonin 3 milliGRAM(s) Oral at bedtime PRN Insomnia  morphine  - Injectable 2 milliGRAM(s) IV Push every 6 hours PRN Severe Pain (7 - 10)      LAB/STUDIES:                        12.9   5.84  )-----------( 136      ( 13 Nov 2024 01:36 )             40.9     11-14    138  |  96[L]  |  12  ----------------------------<  80  4.1   |  30  |  1.1    Ca    8.8      14 Nov 2024 05:12  Mg     2.0     11-13    TPro  5.9[L]  /  Alb  3.6  /  TBili  0.6  /  DBili  x   /  AST  35  /  ALT  23  /  AlkPhos  145[H]  11-13    PT/INR - ( 13 Nov 2024 01:36 )   PT: 11.70 sec;   INR: 0.99 ratio         PTT - ( 13 Nov 2024 01:36 )  PTT:32.2 sec  LIVER FUNCTIONS - ( 13 Nov 2024 01:36 )  Alb: 3.6 g/dL / Pro: 5.9 g/dL / ALK PHOS: 145 U/L / ALT: 23 U/L / AST: 35 U/L / GGT: x           Urinalysis Basic - ( 14 Nov 2024 05:12 )    Color: x / Appearance: x / SG: x / pH: x  Gluc: 80 mg/dL / Ketone: x  / Bili: x / Urobili: x   Blood: x / Protein: x / Nitrite: x   Leuk Esterase: x / RBC: x / WBC x   Sq Epi: x / Non Sq Epi: x / Bacteria: x              ABG - ( 14 Nov 2024 10:13 )  pH, Arterial: 7.48  pH, Blood: x     /  pCO2: 35    /  pO2: 55    / HCO3: 26    / Base Excess: 2.8   /  SaO2: 90.0                IMAGING:    < from: CT Angio Chest PE Protocol w/ IV Cont (11.08.24 @ 04:01) >  IMPRESSION:  1.  No evidence of acute pulmonary embolism.  2.  No evidence of acute thoracic or abdominopelvic pathology.  3.  Since 4/27/2023, increasing hepatomegaly with new diffuse severe   fatty infiltration of the liver.        --- End of Report ---      < end of copied text >  < from: CT Angio Abdomen and Pelvis w/ IV Cont (11.13.24 @ 17:07) >  IMPRESSION:      No acute abdominal or pelvic pathology.    Atherosclerotic calcifications of the aorta and its branches. No evidence   of celiac axis or SMA abnormality.    --- End of Report ---      < end of copied text >    ACCESS DEVICES:  [x ] Peripheral IV  [ ] Central Venous Line	[ ] R	[ ] L	[ ] IJ	[ ] Fem	[ ] SC	Placed:   [ ] Arterial Line		[ ] R	[ ] L	[ ] Fem	[ ] Rad	[ ] Ax	Placed:   [ ] PICC:					[ ] Mediport  [ ] Urinary Catheter, Date Placed:     ASSESSMENT:  64yF w/ PMHx notable for RYGB 15 years ago presenting with 5 days of abdominal pain as well as asthma exacerbation. CT abdomen and pelvis as well as CTA unremarkable other than hepatomegaly. EGD with non-erosive gastritis. Patient continued to have pain, bariatric surgery consulted for evaluation.  At this time, low suspicion of an acute surgical problem as cause of her pain. Differential diagnosis for her abdominal pain included hepatitis/cirrhosis 2/2 chronic alcohol use, acute exacerbation of her chronic pain, gastroparesis.     PLAN:  - Recommend multimodal pain management  - PPI BID   - recommend adding sucralfate for her gastric related abdominal pain  - recommend continuing Reglan for nausea/gastric dysmotility as possible cause of pain  - continue bowel regimen  - surgery will follow    Patient seen and examined with attending, Dr. Szymanski  CONSULT SPECTRA: [id number]

## 2024-11-14 NOTE — PHYSICAL THERAPY INITIAL EVALUATION ADULT - NSACTIVITYREC_GEN_A_PT
Reviewed ther ex's for B LE to perform throughout day in sitting: hip flex, knee flex/ext, ankle pumps, 10 reps each. Recommend OOB to chair daily.

## 2024-11-14 NOTE — CONSULT NOTE ADULT - ASSESSMENT
A/P: Patient is a 64F with PMH of former smoker with past medical history of asthma, hypothyroidism, anxiety, depression, MVP, Jaclyn-en-Y gastric bypass, appendectomy, cholecystectomy and Chronic back pain on methadone, presents to the ED following a syncopal episode last night. She also has complaints of worsening abdominal pain.     PLAN  Pain medicine services consulted to R/O Anterior cutaneous nerve entrapment syndrome. Right anterior cutaneous nerve block, local anesthetic administered, injected 1cc of 80mg methylprednisolone under direct ultrasound. During procedure patient still having s/s of gastritis.  Would recommend ordering Bismuth Subsalicylate for gastritis.  #Abdominal Pain  #Chronic back pain  - Continue methadone 30mg TID NIMCO  -  Cyclobenzaprine 10mg TID NIMCO  - Acetaminophen 650mg q8h NIMCO  - IV Morphine 2mg q6h PRN for severe breakthrough pain    #Opioid induced constipation  - Bowel regimen as per primary team  - Nausea regimen as per primary team

## 2024-11-14 NOTE — CONSULT NOTE ADULT - ASSESSMENT
IMPRESSION:        RECOMMENDATIONS:     IMPRESSION:    Acute hypoxemic respiratory failure  Asthma exacerbation  H/o KASSI      RECOMMENDATIONS:    CT shows some mild apical emphysema and scarring, with no PE  Consider echo with bubble study (Her saturation is not correcting with oxygen)  Complete steroid course  Start symbicort  Albuterol PRN  Check oxygen on ambulation; if she does not improve she may need oxygen on discharge  DVT PPX  Maintain oxygen 92-95%  F/u outpatient for PFT and sleep study   IMPRESSION:    Acute hypoxemic respiratory failure  Asthma exacerbation  H/o KASSI      RECOMMENDATIONS:    CT shows some mild apical emphysema and scarring, with no PE  Consider echo with bubble study (Her saturation is not correcting with oxygen)  Complete steroid course  Start symbicort  Albuterol PRN  Resume her home diuresis  Check LE doppler  Check oxygen on ambulation; if she does not improve she may need oxygen on discharge  DVT PPX  Maintain oxygen 92-95%  F/u outpatient for PFT and sleep study   IMPRESSION:    Acute hypoxemic respiratory failure ?  Asthma COPD  H/o KASSI/OHS  60 pack year smoking history       RECOMMENDATIONS:    CT chest with no lung pathology, no PE  Initial VBG with hypercapnia   ABG noted  Check O2 saturation on room air at rest and with ambulation   Incentive spirometry   Nebs every 4 hours PRN; Advair 250 BID   If remains hypoxic then consider echo with bubble study to rule out shunting  Finish 10 day PO prednisone course   LEs markedly edematous - duplex LEs   Resume patient's home diuretics and check BNP again   DVT PPX  Maintain oxygen 92-95%  Will need outpatient PFTs and sleep study

## 2024-11-14 NOTE — PROGRESS NOTE ADULT - SUBJECTIVE AND OBJECTIVE BOX
Gastroenterology progress note:     Patient is a 64y old  Female who presents with a chief complaint of Abdominal pain (14 Nov 2024 09:14)       Admitted on: 11-08-24    We are following the patient for: Abdominal pain       Interval History:    No acute events overnight.   s/p egd yesterday  endorses abdominal pain better after pain medications today       PAST MEDICAL & SURGICAL HISTORY:  Asthma  LAST ATTACK MANY YRS AGO      Gastroesophageal reflux disease without esophagitis      Hypothyroidism      Heart murmur      Anxiety      Depression      Mood disorder      Psoriasis      Constipation      Back pain  LOW      Morbid obesity      S/P tonsillectomy  1967      History of appendectomy  1974      Abscess of back  EVACUATION OF ABSCESS @ L5-S1 1997      History of Jaclyn-en-Y gastric bypass  WITH CHOLECYSTECTOMY 2006          MEDICATIONS  (STANDING):  albuterol/ipratropium for Nebulization 3 milliLiter(s) Nebulizer every 6 hours  buMETAnide 1 milliGRAM(s) Oral daily  chlorhexidine 2% Cloths 1 Application(s) Topical <User Schedule>  dextrose 5%. 1000 milliLiter(s) (50 mL/Hr) IV Continuous <Continuous>  dextrose 5%. 1000 milliLiter(s) (100 mL/Hr) IV Continuous <Continuous>  dextrose 50% Injectable 25 Gram(s) IV Push once  dextrose 50% Injectable 25 Gram(s) IV Push once  dextrose 50% Injectable 12.5 Gram(s) IV Push once  folic acid 1 milliGRAM(s) Oral daily  glucagon  Injectable 1 milliGRAM(s) IntraMuscular once  heparin   Injectable 5000 Unit(s) SubCutaneous every 8 hours  influenza   Vaccine 0.5 milliLiter(s) IntraMuscular once  insulin lispro (ADMELOG) corrective regimen sliding scale   SubCutaneous three times a day before meals  lactated ringers. 1000 milliLiter(s) (75 mL/Hr) IV Continuous <Continuous>  lamoTRIgine 150 milliGRAM(s) Oral daily  levothyroxine 100 MICROGram(s) Oral daily  methadone    Tablet 30 milliGRAM(s) Oral every 8 hours  morphine  - Injectable 2 milliGRAM(s) IV Push once  pantoprazole    Tablet 40 milliGRAM(s) Oral two times a day  pantoprazole    Tablet 40 milliGRAM(s) Oral before breakfast  polyethylene glycol 3350 17 Gram(s) Oral two times a day  predniSONE   Tablet 40 milliGRAM(s) Oral daily  scopolamine 1 mG/72 Hr(s) Patch 1 Patch Transdermal once  senna 2 Tablet(s) Oral at bedtime  sertraline 200 milliGRAM(s) Oral daily  thiamine 100 milliGRAM(s) Oral daily    MEDICATIONS  (PRN):  acetaminophen     Tablet .. 650 milliGRAM(s) Oral every 6 hours PRN Temp greater or equal to 38C (100.4F), Mild Pain (1 - 3)  albuterol    90 MICROgram(s) HFA Inhaler 2 Puff(s) Inhalation every 6 hours PRN Shortness of Breath and/or Wheezing  aluminum hydroxide/magnesium hydroxide/simethicone Suspension 30 milliLiter(s) Oral every 4 hours PRN Dyspepsia  cyclobenzaprine 10 milliGRAM(s) Oral three times a day PRN Muscle Spasm  dextrose Oral Gel 15 Gram(s) Oral once PRN Blood Glucose LESS THAN 70 milliGRAM(s)/deciliter  dicyclomine 20 milliGRAM(s) Oral four times a day before meals PRN abdominal pain  melatonin 3 milliGRAM(s) Oral at bedtime PRN Insomnia  morphine  - Injectable 2 milliGRAM(s) IV Push every 4 hours PRN Moderate Pain (4 - 6)      Allergies  penicillin (Anaphylaxis)      Review of Systems:   Cardiovascular:  No Chest Pain, No Palpitations  Respiratory:  No Cough, No Dyspnea  Gastrointestinal:  As described in HPI  Skin:  No Skin Lesions, No Jaundice  Neuro:  No Syncope, No Dizziness    Physical Examination:  T(C): 36.8 (11-14-24 @ 11:39), Max: 36.8 (11-14-24 @ 11:39)  HR: 75 (11-14-24 @ 11:39) (56 - 75)  BP: 104/71 (11-14-24 @ 11:39) (96/66 - 151/85)  RR: 16 (11-14-24 @ 11:39) (14 - 18)  SpO2: 97% (11-14-24 @ 04:53) (95% - 99%)        GENERAL: AAOx3, no acute distress.  HEAD:  Atraumatic, Normocephalic  EYES: conjunctiva and sclera clear  NECK: Supple, no JVD or thyromegaly  CHEST/LUNG: Clear to auscultation bilaterally; No wheeze, rhonchi, or rales  HEART: Regular rate and rhythm; normal S1, S2, No murmurs.  ABDOMEN: Soft, nontender, nondistended; Bowel sounds present  NEUROLOGY: No asterixis or tremor.   SKIN: Intact, no jaundice     Data:                        12.9   5.84  )-----------( 136      ( 13 Nov 2024 01:36 )             40.9     Hgb trend:  12.9  11-13-24 @ 01:36  12.6  11-12-24 @ 12:15        11-14    138  |  96[L]  |  12  ----------------------------<  80  4.1   |  30  |  1.1    Ca    8.8      14 Nov 2024 05:12  Mg     2.0     11-13    TPro  5.9[L]  /  Alb  3.6  /  TBili  0.6  /  DBili  x   /  AST  35  /  ALT  23  /  AlkPhos  145[H]  11-13    Liver panel trend:  TBili 0.6   /   AST 35   /   ALT 23   /   AlkP 145   /   Tptn 5.9   /   Alb 3.6    /   DBili --      11-13  TBili 0.5   /   AST 35   /   ALT 24   /   AlkP 146   /   Tptn 5.7   /   Alb 3.6    /   DBili --      11-12  TBili 0.8   /   AST 48   /   ALT 31   /   AlkP 170   /   Tptn 5.8   /   Alb 3.4    /   DBili --      11-09  TBili 1.0   /   AST 58   /   ALT 36   /   AlkP 196   /   Tptn 6.5   /   Alb 3.8    /   DBili 0.4      11-08  TBili 1.0   /   AST 77   /   ALT 36   /   AlkP 194   /   Tptn 6.9   /   Alb 3.8    /   DBili --      11-08      PT/INR - ( 13 Nov 2024 01:36 )   PT: 11.70 sec;   INR: 0.99 ratio         PTT - ( 13 Nov 2024 01:36 )  PTT:32.2 sec       Radiology:  CT Angio Abdomen and Pelvis w/ IV Cont:   ACC: [id number] EXAM:  CT ANGIO ABD PELV (W)AW IC   ORDERED BY: DARIAN PACHECO     PROCEDURE DATE:  11/13/2024          INTERPRETATION:  HISTORY: Evaluation of chronic mesenteric pain.    TECHNIQUE: Contiguous axial CT images were obtained of the abdomenand   pelvis following the administration of intravenous contrast. Sagittal,   coronal, and MIP reformats were performed.    CONTRAST/COMPLICATIONS:  IV Contrast: Omnipaque 350   100 cc administered   0 cc discarded  Oral Contrast: Was not administered.  Complications: None reported at time of study completion    COMPARISON: CT abdomen and pelvis 11/8/2024.      FINDINGS:    LOWER CHEST: Bibasilar subsegmental atelectasis. Coronary artery   calcifications.    HEPATOBILIARY: Diffuse hepatic steatosis. Hepatomegaly. Cholecystectomy.    SPLEEN: Unremarkable.    PANCREAS: Unremarkable.    ADRENAL GLANDS: Unremarkable.    KIDNEYS: Symmetric renal enhancement. No hydronephrosis.    PELVIC ORGANS: Unremarkable.    PERITONEUM/MESENTERY/BOWEL: Patientis status post Jaclyn-en-Y gastric   bypass surgery. No bowel obstruction, intraperitoneal free air, or   ascites. Status post appendectomy. Moderate amount of stool in the colon.    VASCULAR: Atherosclerotic vascular calcifications.    ABDOMINOPELVIC NODES: No enlarged abdominal or pelvic lymph nodes.    BONES/SOFT TISSUES: No acute osseous abnormality. Degenerative changes of   the spine. Postoperative changes of the anterior abdominal wall.      IMPRESSION:      No acute abdominal or pelvic pathology.    Atherosclerotic calcifications of the aorta and its branches. No evidence   of celiac axis or SMA abnormality.    --- End of Report ---          GRACIELA ANGUIANO MD; Resident Radiologist  This document has been electronically signed.  MARY MEJIAS MD; Attending Radiologist  This document has been electronically signed. Nov 14 2024  9:49AM (11-13-24 @ 17:07)

## 2024-11-14 NOTE — PROGRESS NOTE ADULT - ATTENDING COMMENTS
64-year-old female, former smoker with past medical history of asthma, hypothyroidism, anxiety, depression, MVP, Jaclyn-en-Y gastric bypass, appendectomy, cholecystectomy and Chronic back pain on methadone, presents to the ED following a syncopal episode last night. She also has complaints of worsening abdominal pain for 5 days.     # syncope, possible vasovagal vs arrhythmia ( in setting of prolonged QTC found on admission )   QTC has imrpvoed now repeat QTC Calculation(Bazett) 460 ms  normal echo   ct neg for pe   tele no evens   check orthostatics    # abdominal pain , surgery notes appreciated   ct abd negative < from: CT Angio Abdomen and Pelvis w/ IV Cont (11.13.24 @ 17:07) >    Atherosclerotic calcifications of the aorta and its branches. No evidence   of celiac axis or SMA abnormality.    < end of copied text >      EGD < from: EGD (11.13.24 @ 10:30) >    non-erosive gastritis.    < end of copied text >      etiology unknown at this time     < from: CT Abdomen and Pelvis w/ Oral Cont and w/ IV Cont (11.08.24 @ 04:02) >  LIVER: Diffuse severe fatty infiltration. Enlarged measuring up to 26 cm   in length.  BILE DUCTS: Normal caliber.  GALLBLADDER: Surgically absent  SPLEEN: Within normal limits.  PANCREAS: Within normal limits.  ADRENALS: Within normal limits.  KIDNEYS/URETERS: Within normal limits.    < end of copied text >  Lipase: 37: Hemolyzed. Interpret with caution U/L (11.08.24 @ 00:30)    # opioid dependency , on methadone and soma ( chronic back pain )     # DM , new dx   A1C with Estimated Average Glucose Result: 7.0: Method: Immunoassay(11.09.24 @ 06:43)  no need for treatment at this time, hgb A1c of 7 is acceptable  unless fingersticks are over 180     # hyperlipidemia , cont meds   # anxiety depression cont meds   # hypothyroidism tsh high, cont synthroid at 100 , fu as outpt for further adjustment     # chronic skin lesion with scaling, psoriasis, outpt derm fu     # leg edema, on bumex, the left ventricular diastolic function could not be assessed in the echo done this admission, pt to have a repeat study as outpt   doppler neg for dvt     # obesity , out pt fu   Height (cm): 167.6 (11-13-24 @ 10:30)  Weight (kg): 100.7 (11-13-24 @ 10:30)  BMI (kg/m2): 35.8 (11-13-24 @ 10:30)  BSA (m2): 2.09 (11-13-24 @ 10:30)    #h/o ETOH use---fatty liver--suspected thiamine and folate deficiency, cont supplement   # suspected immunodef due to chronic etoh abuse     #Progress Note Handoff    Pending :  GI and pain management fu   Family discussion: edis pt   Disposition: home when stable   code status: full code

## 2024-11-14 NOTE — PROGRESS NOTE ADULT - ASSESSMENT
64-year-old female, former smoker with past medical history of asthma, hypothyroidism, anxiety, depression, MVP, Jaclyn-en-Y gastric bypass, appendectomy, cholecystectomy and Chronic back pain on methadone, presents to the ED following a syncopal episode last night. She also has complaints of worsening abdominal pain for 5 days.     #Abdominal pain - unclear etiology  #Nausea, Vomiting, Decreased appetite   #h/o Cholecystectomy/ appendectomy  #h/o Jaclyn en Y bypass   - LFTs stable, lactate 3 --> 2.4-->3.0  - CT abdomen - Hepatomegaly   - EGD/ Colonoscopy (May '23) - Non erosive gastritis  - c/w pantoprazole   - right lower abdominal discomfort, possible constipation (small bowel movement on 11/10)  - cw miralax senna  - a1c 7.0, no previous diagnosis of DM, possible gastroparesis, start SSI, f/u FS   - EGD- nonerosive gastritis in stomach  - CTa abdomen pending read  - iv morphine prn q4, avoid Dilaudid     #Shortness of breath   #h/o Asthma   #h/o MVP (follows Dr. Negrete)  #h/o chronic bilateral LE swelling   #HTN  - CTA chest - no PE   - Satting well at RA, Lung and heart PE - normal  - duo nebs  - start prednisone 40 daily for 5 days. Today 1/5  - albuterol prn   - TTE --> echo 60-65% EF  - duplex neg  - ambulating pulse ox    #Syncope likely vasovagal vs orthostatic 2/2 dehydration  - ECG with prolonged qtc, no obvious ischemia, trops x2 negative  - no events on tele  - tele discontinued    #Hypothyroidism,   - home meds - 88mcg thyroxine   - TSH 9.88  - increased to 100 mcg synthroid     #anxiety, depression,  - c/w lamotrigine and zoloft      #Chronic back pain   - on methadone 30mg   - pain medicine recs appreciated    #Alcohol use   - 2-3 drinks per day  - CIWA monitoring   - c/w b12 and folate   - started thiamine     VTE ppx - heparin SC  GI ppx - pantoprazole (home meds)  Activity as tolerated  DASH/DM diet   Full Code    #Pending- CXR, CTa results, PT

## 2024-11-14 NOTE — PROGRESS NOTE ADULT - SUBJECTIVE AND OBJECTIVE BOX
SUBJECTIVE/OVERNIGHT EVENTS  Today is hospital day 6d. This morning patient was seen and examined at bedside, resting comfortably in bed. No acute or major events overnight. Pt still complaining of abdominal pain and shortness of breath worse on exertion.    MEDICATIONS  STANDING MEDICATIONS  albuterol/ipratropium for Nebulization 3 milliLiter(s) Nebulizer every 6 hours  buMETAnide 1 milliGRAM(s) Oral daily  chlorhexidine 2% Cloths 1 Application(s) Topical <User Schedule>  dextrose 5%. 1000 milliLiter(s) IV Continuous <Continuous>  dextrose 5%. 1000 milliLiter(s) IV Continuous <Continuous>  dextrose 50% Injectable 25 Gram(s) IV Push once  dextrose 50% Injectable 25 Gram(s) IV Push once  dextrose 50% Injectable 12.5 Gram(s) IV Push once  folic acid 1 milliGRAM(s) Oral daily  glucagon  Injectable 1 milliGRAM(s) IntraMuscular once  heparin   Injectable 5000 Unit(s) SubCutaneous every 8 hours  influenza   Vaccine 0.5 milliLiter(s) IntraMuscular once  insulin lispro (ADMELOG) corrective regimen sliding scale   SubCutaneous three times a day before meals  lactated ringers. 1000 milliLiter(s) IV Continuous <Continuous>  lamoTRIgine 150 milliGRAM(s) Oral daily  levothyroxine 100 MICROGram(s) Oral daily  methadone    Tablet 30 milliGRAM(s) Oral every 8 hours  morphine  - Injectable 2 milliGRAM(s) IV Push once  pantoprazole    Tablet 40 milliGRAM(s) Oral before breakfast  pantoprazole    Tablet 40 milliGRAM(s) Oral two times a day  polyethylene glycol 3350 17 Gram(s) Oral two times a day  predniSONE   Tablet 40 milliGRAM(s) Oral daily  scopolamine 1 mG/72 Hr(s) Patch 1 Patch Transdermal once  senna 2 Tablet(s) Oral at bedtime  sertraline 200 milliGRAM(s) Oral daily  thiamine 100 milliGRAM(s) Oral daily    PRN MEDICATIONS  acetaminophen     Tablet .. 650 milliGRAM(s) Oral every 6 hours PRN  albuterol    90 MICROgram(s) HFA Inhaler 2 Puff(s) Inhalation every 6 hours PRN  aluminum hydroxide/magnesium hydroxide/simethicone Suspension 30 milliLiter(s) Oral every 4 hours PRN  cyclobenzaprine 10 milliGRAM(s) Oral three times a day PRN  dextrose Oral Gel 15 Gram(s) Oral once PRN  dicyclomine 20 milliGRAM(s) Oral four times a day before meals PRN  melatonin 3 milliGRAM(s) Oral at bedtime PRN  morphine  - Injectable 2 milliGRAM(s) IV Push every 4 hours PRN    VITALS  T(F): 98 (11-14-24 @ 04:53), Max: 98 (11-13-24 @ 17:23)  HR: 69 (11-14-24 @ 04:53) (56 - 75)  BP: 108/71 (11-14-24 @ 04:53) (96/66 - 151/85)  RR: 18 (11-14-24 @ 04:53) (14 - 18)  SpO2: 97% (11-14-24 @ 04:53) (94% - 99%)  POCT Blood Glucose.: 113 mg/dL (11-14-24 @ 07:58)  POCT Blood Glucose.: 107 mg/dL (11-13-24 @ 20:54)  POCT Blood Glucose.: 106 mg/dL (11-13-24 @ 17:11)  POCT Blood Glucose.: 109 mg/dL (11-13-24 @ 12:55)    PHYSICAL EXAM  GENERAL  NAD, lying in bed comfortably        HEAD  Atraumatic    NECK  Supple       HEART  Regular rate and rhythm no murmurs rubs or gallops    LUNGS  Clear to auscultation bilaterally, no wheezing rales or rhonchi    ABDOMEN  soft, nontender, nondistended, +BS    EXTREMITIES  no edema    NERVOUS SYSTEM  A&Ox3    SKIN  No rashes or lesions      LABS             12.9   5.84  )-----------( 136      ( 11-13-24 @ 01:36 )             40.9     138  |  96  |  12  -------------------------<  80   11-14-24 @ 05:12  4.1  |  30  |  1.1    Ca      8.8     11-14-24 @ 05:12  Mg     2.0     11-13-24 @ 01:36    TPro  5.9  /  Alb  3.6  /  TBili  0.6  /  DBili  x   /  AST  35  /  ALT  23  /  AlkPhos  145  /  GGT  x     11-13-24 @ 01:36    PT/INR - ( 11-13-24 @ 01:36 )   PT: 11.70 sec;   INR: 0.99 ratio  PTT - ( 11-13-24 @ 01:36 )  PTT:32.2 sec      Urinalysis Basic - ( 14 Nov 2024 05:12 )    Color: x / Appearance: x / SG: x / pH: x  Gluc: 80 mg/dL / Ketone: x  / Bili: x / Urobili: x   Blood: x / Protein: x / Nitrite: x   Leuk Esterase: x / RBC: x / WBC x   Sq Epi: x / Non Sq Epi: x / Bacteria: x          IMAGING

## 2024-11-14 NOTE — PHYSICAL THERAPY INITIAL EVALUATION ADULT - PERTINENT HX OF CURRENT PROBLEM, REHAB EVAL
64-year-old female, former smoker with past medical history of asthma, hypothyroidism, anxiety, depression, MVP, Jaclyn-en-Y gastric bypass, appendectomy, cholecystectomy and Chronic back pain on methadone, presents to the ED following a syncopal episode last night. She also has complaints of worsening abdominal pain for 5 days. Pt was sitting down while  is helping her dry after a shower when she felt dizzy and lost consciousness for a brief moment. No truama. No  incontinence or shaking movements. No chest pain or sudden SOB. However, pt endorses having reduced effort tolerance and shortness of breath for the past 2-3 months. She reportedly uses nebs/albuterol on almost a daily basis. She follows with Dr. Negrete (cardiology). Per pt, cath was done 5-10 yrs back which was normal. She is due for a stress test. Takes bumex daily for LE swelling. She also reports of nausea, vomiting, decreased p.o. intake and worsening abdominal pain for the past 5 days. Pain in localized to right lower quadrant but radiates all over the abd region. Not passed stool in the past 5 days, says didn't what much. Patient drinks a couple glasses of scotch nightly.Pt admitted for Syncope and Abdominal pain w/u

## 2024-11-14 NOTE — PROCEDURE NOTE - ADDITIONAL PROCEDURE DETAILS
Technique:    The patient was placed in the supine position.  The skin overlying the right anterior abdominal wall at the level of the umbilicus was prepped and draped in a sterile fashion.  Ultrasound guidance using a linear probe was used to identify the rectus abdominis muscle, the anterior rectus sheath, and the target nerves emerging from the lateral border of the rectus abdominis muscle.  A 22g needle was advanced in-plane towards the target nerves under direct ultrasound visualization.  After negative aspiration for blood, 4cc of 2% Lidocaine and 1cc of 80 mg/ml Methylprednisolone was injected incrementally under direct ultrasound visualization, confirming appropriate spread of local anesthetic around the target nerves.   The needle was withdrawn and gentle pressure was applied to the injection site.  Complications: None.    Estimated blood loss: Minimal.    Post-procedure: The patient tolerated the procedure well. Patient reported a slight decrease in pain at the injection site.     Disposition: The patient remained in bed on floor in stable condition.

## 2024-11-14 NOTE — PHYSICAL THERAPY INITIAL EVALUATION ADULT - GENERAL OBSERVATIONS, REHAB EVAL
14:28-15:00. Pt encountered semifowler in bed in NAD, +2L O2 via NC, +redness and pitting edema noted B LEs, pt agreeable to PT and OOB activity. Dr. Brayan Marrero pulmonary team examined pt at b/s prior to OOB activity. SPO2 on 2L O2 via NC 95%, on RA ranged from 90-92% at rest, on RA upon standing decreased 83%, recuperated to 92% with 2L via NC, during amb 88%, recuperated to 96-97% titrated to 3L O2 via NC, maintained 95% on 2L post amb with rest, pt reports 7/10 pain in her R abdomen, Tammy SNOW notified. Tammy RN aware.

## 2024-11-14 NOTE — PHYSICAL THERAPY INITIAL EVALUATION ADULT - ADDITIONAL COMMENTS
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

## 2024-11-14 NOTE — CONSULT NOTE ADULT - SUBJECTIVE AND OBJECTIVE BOX
Patient is a 64y old  Female who presents with a chief complaint of Abdominal pain (14 Nov 2024 11:51)      HPI:  64-year-old female, former smoker with past medical history of asthma, hypothyroidism, anxiety, depression, MVP, Jaclyn-en-Y gastric bypass, appendectomy, cholecystectomy and Chronic back pain on methadone, presents to the ED following a syncopal episode last night. She also has complaints of worsening abdominal pain for 5 days.     Pt was sitting down while  is helping her dry after a shower when she felt dizzy and lost consciousness for a brief moment. No truama. No  incontinence or shaking movements. No chest pain or sudden SOB. However, pt endorses having reduced effort tolerance and shortness of breath for the past 2-3 months. She reportedly uses nebs/albuterol on almost a daily basis. She follows with Dr. Negrete (cardiology). Per pt, cath was done 5-10 yrs back which was normal. She is due for a stress test. Takes bumex daily for LE swelling.       She also reports of nausea, vomiting, decreased p.o. intake and worsening abdominal pain for the past 5 days. Pain in localized to right lower quadrant but radiates all over the abd region. Not passed stool in the past 5 days, says didn't what much.    Patient drinks a couple glasses of scotch nightly.    ED vitals - HR -  84/min; BP-  137/82mmhg; RR -  18/min; SPO2 -  98%; Afebrile  ECG - nsr, irbbb, qtc 603  labs - AST/ ALT 77/36, ALP - 194; Lactate 3.0  CT abdomen - hepatomegaly, no other acute findings     Pt admitted for Syncope and Abdominal pain w/u.  (08 Nov 2024 09:17)      PAST MEDICAL & SURGICAL HISTORY:  Asthma  LAST ATTACK MANY YRS AGO      Gastroesophageal reflux disease without esophagitis      Hypothyroidism      Heart murmur      Anxiety      Depression      Mood disorder      Psoriasis      Constipation      Back pain  LOW      Morbid obesity      S/P tonsillectomy  1967      History of appendectomy  1974      Abscess of back  EVACUATION OF ABSCESS @ L5-S1 1997      History of Jaclyn-en-Y gastric bypass  WITH CHOLECYSTECTOMY 2006          SOCIAL HX:   Smoking        FAMILY HISTORY:  CAD (coronary artery disease) (Father, Mother)    .  No cardiovascular or pulmonary family history     REVIEW OF SYSTEMS:    All ROS are negative exept per HPI       Allergies    penicillin (Anaphylaxis)    Intolerances          PHYSICAL EXAM  Vital Signs Last 24 Hrs  T(C): 36.8 (14 Nov 2024 11:39), Max: 36.8 (14 Nov 2024 11:39)  T(F): 98.3 (14 Nov 2024 11:39), Max: 98.3 (14 Nov 2024 11:39)  HR: 75 (14 Nov 2024 11:39) (69 - 75)  BP: 104/71 (14 Nov 2024 11:39) (98/64 - 110/68)  BP(mean): --  RR: 16 (14 Nov 2024 11:39) (16 - 18)  SpO2: 93% (14 Nov 2024 12:00) (93% - 97%)    Parameters below as of 14 Nov 2024 12:00  Patient On (Oxygen Delivery Method): nasal cannula  O2 Flow (L/min): 2      CONSTITUTIONAL:  Well nourished.  NAD    ENT:   Airway patent,     EYES:   Clear bilaterally,   pupils equal,     CARDIAC:   Normal rate,   regular rhythm.    no edema    RESPIRATORY:   No wheezing   Normal chest expansion  Not tachypneic,  No use of accessory muscles    GASTROINTESTINAL:  Abdomen soft, non-tender,   No guarding,     MUSCULOSKELETAL:   No clubbing, cyanosis    NEUROLOGICAL:   Alert and oriented   No motor deficits.    SKIN:   Skin normal color for race,           LABS:                          12.9   5.84  )-----------( 136      ( 13 Nov 2024 01:36 )             40.9                                               11-14    138  |  96[L]  |  12  ----------------------------<  80  4.1   |  30  |  1.1    Ca    8.8      14 Nov 2024 05:12  Mg     2.0     11-13    TPro  5.9[L]  /  Alb  3.6  /  TBili  0.6  /  DBili  x   /  AST  35  /  ALT  23  /  AlkPhos  145[H]  11-13      PT/INR - ( 13 Nov 2024 01:36 )   PT: 11.70 sec;   INR: 0.99 ratio         PTT - ( 13 Nov 2024 01:36 )  PTT:32.2 sec                                       Urinalysis Basic - ( 14 Nov 2024 05:12 )    Color: x / Appearance: x / SG: x / pH: x  Gluc: 80 mg/dL / Ketone: x  / Bili: x / Urobili: x   Blood: x / Protein: x / Nitrite: x   Leuk Esterase: x / RBC: x / WBC x   Sq Epi: x / Non Sq Epi: x / Bacteria: x                                                  LIVER FUNCTIONS - ( 13 Nov 2024 01:36 )  Alb: 3.6 g/dL / Pro: 5.9 g/dL / ALK PHOS: 145 U/L / ALT: 23 U/L / AST: 35 U/L / GGT: x                                                                                            ABG - ( 14 Nov 2024 10:13 )  pH, Arterial: 7.48  pH, Blood: x     /  pCO2: 35    /  pO2: 55    / HCO3: 26    / Base Excess: 2.8   /  SaO2: 90.0                MEDICATIONS  (STANDING):  albuterol/ipratropium for Nebulization 3 milliLiter(s) Nebulizer every 6 hours  buMETAnide 1 milliGRAM(s) Oral daily  chlorhexidine 2% Cloths 1 Application(s) Topical <User Schedule>  dextrose 5%. 1000 milliLiter(s) (50 mL/Hr) IV Continuous <Continuous>  dextrose 5%. 1000 milliLiter(s) (100 mL/Hr) IV Continuous <Continuous>  dextrose 50% Injectable 25 Gram(s) IV Push once  dextrose 50% Injectable 12.5 Gram(s) IV Push once  dextrose 50% Injectable 25 Gram(s) IV Push once  folic acid 1 milliGRAM(s) Oral daily  glucagon  Injectable 1 milliGRAM(s) IntraMuscular once  heparin   Injectable 5000 Unit(s) SubCutaneous every 8 hours  influenza   Vaccine 0.5 milliLiter(s) IntraMuscular once  insulin lispro (ADMELOG) corrective regimen sliding scale   SubCutaneous three times a day before meals  lactated ringers. 1000 milliLiter(s) (75 mL/Hr) IV Continuous <Continuous>  lamoTRIgine 150 milliGRAM(s) Oral daily  levothyroxine 100 MICROGram(s) Oral daily  methadone    Tablet 30 milliGRAM(s) Oral every 8 hours  morphine  - Injectable 2 milliGRAM(s) IV Push once  pantoprazole    Tablet 40 milliGRAM(s) Oral two times a day  pantoprazole    Tablet 40 milliGRAM(s) Oral before breakfast  polyethylene glycol 3350 17 Gram(s) Oral two times a day  predniSONE   Tablet 40 milliGRAM(s) Oral daily  scopolamine 1 mG/72 Hr(s) Patch 1 Patch Transdermal once  senna 2 Tablet(s) Oral at bedtime  sertraline 200 milliGRAM(s) Oral daily  thiamine 100 milliGRAM(s) Oral daily    MEDICATIONS  (PRN):  acetaminophen     Tablet .. 650 milliGRAM(s) Oral every 6 hours PRN Temp greater or equal to 38C (100.4F), Mild Pain (1 - 3)  albuterol    90 MICROgram(s) HFA Inhaler 2 Puff(s) Inhalation every 6 hours PRN Shortness of Breath and/or Wheezing  aluminum hydroxide/magnesium hydroxide/simethicone Suspension 30 milliLiter(s) Oral every 4 hours PRN Dyspepsia  cyclobenzaprine 10 milliGRAM(s) Oral three times a day PRN Muscle Spasm  dextrose Oral Gel 15 Gram(s) Oral once PRN Blood Glucose LESS THAN 70 milliGRAM(s)/deciliter  dicyclomine 20 milliGRAM(s) Oral four times a day before meals PRN abdominal pain  melatonin 3 milliGRAM(s) Oral at bedtime PRN Insomnia  morphine  - Injectable 2 milliGRAM(s) IV Push every 4 hours PRN Moderate Pain (4 - 6)      X-Rays reviewed: no infiltrates

## 2024-11-15 LAB
ALBUMIN SERPL ELPH-MCNC: 3.6 G/DL — SIGNIFICANT CHANGE UP (ref 3.5–5.2)
ALP SERPL-CCNC: 133 U/L — HIGH (ref 30–115)
ALT FLD-CCNC: 19 U/L — SIGNIFICANT CHANGE UP (ref 0–41)
ANION GAP SERPL CALC-SCNC: 14 MMOL/L — SIGNIFICANT CHANGE UP (ref 7–14)
AST SERPL-CCNC: 30 U/L — SIGNIFICANT CHANGE UP (ref 0–41)
BILIRUB SERPL-MCNC: 0.8 MG/DL — SIGNIFICANT CHANGE UP (ref 0.2–1.2)
BUN SERPL-MCNC: 12 MG/DL — SIGNIFICANT CHANGE UP (ref 10–20)
CALCIUM SERPL-MCNC: 8.7 MG/DL — SIGNIFICANT CHANGE UP (ref 8.4–10.5)
CHLORIDE SERPL-SCNC: 99 MMOL/L — SIGNIFICANT CHANGE UP (ref 98–110)
CO2 SERPL-SCNC: 26 MMOL/L — SIGNIFICANT CHANGE UP (ref 17–32)
CREAT SERPL-MCNC: 1.1 MG/DL — SIGNIFICANT CHANGE UP (ref 0.7–1.5)
EGFR: 57 ML/MIN/1.73M2 — LOW
GLUCOSE BLDC GLUCOMTR-MCNC: 111 MG/DL — HIGH (ref 70–99)
GLUCOSE BLDC GLUCOMTR-MCNC: 123 MG/DL — HIGH (ref 70–99)
GLUCOSE BLDC GLUCOMTR-MCNC: 136 MG/DL — HIGH (ref 70–99)
GLUCOSE BLDC GLUCOMTR-MCNC: 188 MG/DL — HIGH (ref 70–99)
GLUCOSE SERPL-MCNC: 102 MG/DL — HIGH (ref 70–99)
HCT VFR BLD CALC: 39.8 % — SIGNIFICANT CHANGE UP (ref 37–47)
HGB BLD-MCNC: 12.7 G/DL — SIGNIFICANT CHANGE UP (ref 12–16)
MCHC RBC-ENTMCNC: 30.7 PG — SIGNIFICANT CHANGE UP (ref 27–31)
MCHC RBC-ENTMCNC: 31.9 G/DL — LOW (ref 32–37)
MCV RBC AUTO: 96.1 FL — SIGNIFICANT CHANGE UP (ref 81–99)
NRBC # BLD: 0 /100 WBCS — SIGNIFICANT CHANGE UP (ref 0–0)
PLATELET # BLD AUTO: 172 K/UL — SIGNIFICANT CHANGE UP (ref 130–400)
PMV BLD: 10.6 FL — HIGH (ref 7.4–10.4)
POTASSIUM SERPL-MCNC: 3.4 MMOL/L — LOW (ref 3.5–5)
POTASSIUM SERPL-SCNC: 3.4 MMOL/L — LOW (ref 3.5–5)
PROT SERPL-MCNC: 5.9 G/DL — LOW (ref 6–8)
RBC # BLD: 4.14 M/UL — LOW (ref 4.2–5.4)
RBC # FLD: 13.3 % — SIGNIFICANT CHANGE UP (ref 11.5–14.5)
SODIUM SERPL-SCNC: 139 MMOL/L — SIGNIFICANT CHANGE UP (ref 135–146)
SURGICAL PATHOLOGY STUDY: SIGNIFICANT CHANGE UP
WBC # BLD: 5.27 K/UL — SIGNIFICANT CHANGE UP (ref 4.8–10.8)
WBC # FLD AUTO: 5.27 K/UL — SIGNIFICANT CHANGE UP (ref 4.8–10.8)

## 2024-11-15 PROCEDURE — 99232 SBSQ HOSP IP/OBS MODERATE 35: CPT | Mod: GC

## 2024-11-15 PROCEDURE — 99233 SBSQ HOSP IP/OBS HIGH 50: CPT | Mod: 25

## 2024-11-15 PROCEDURE — 99497 ADVNCD CARE PLAN 30 MIN: CPT

## 2024-11-15 RX ORDER — KETOROLAC TROMETHAMINE 30 MG/ML
15 INJECTION INTRAMUSCULAR; INTRAVENOUS ONCE
Refills: 0 | Status: DISCONTINUED | OUTPATIENT
Start: 2024-11-15 | End: 2024-11-15

## 2024-11-15 RX ORDER — NALOXONE HCL 0.4 MG/ML
1 AMPUL (ML) INJECTION
Refills: 0 | Status: DISCONTINUED | OUTPATIENT
Start: 2024-11-15 | End: 2024-12-06

## 2024-11-15 RX ORDER — POTASSIUM CHLORIDE 600 MG/1
40 TABLET, EXTENDED RELEASE ORAL EVERY 4 HOURS
Refills: 0 | Status: COMPLETED | OUTPATIENT
Start: 2024-11-15 | End: 2024-11-15

## 2024-11-15 RX ORDER — CYCLOBENZAPRINE HCL 10 MG
10 TABLET ORAL THREE TIMES A DAY
Refills: 0 | Status: DISCONTINUED | OUTPATIENT
Start: 2024-11-15 | End: 2024-12-06

## 2024-11-15 RX ADMIN — Medication 100 MILLIGRAM(S): at 11:55

## 2024-11-15 RX ADMIN — SUCRALFATE 1 GRAM(S): 1 TABLET ORAL at 05:43

## 2024-11-15 RX ADMIN — Medication 2 MILLIGRAM(S): at 06:51

## 2024-11-15 RX ADMIN — POTASSIUM CHLORIDE 40 MILLIEQUIVALENT(S): 600 TABLET, EXTENDED RELEASE ORAL at 19:51

## 2024-11-15 RX ADMIN — Medication 2 MILLIGRAM(S): at 00:15

## 2024-11-15 RX ADMIN — Medication 1 MILLIGRAM(S): at 11:54

## 2024-11-15 RX ADMIN — METHADONE HYDROCHLORIDE 30 MILLIGRAM(S): 5 TABLET ORAL at 21:40

## 2024-11-15 RX ADMIN — IPRATROPIUM BROMIDE AND ALBUTEROL SULFATE 3 MILLILITER(S): 2.5; .5 SOLUTION RESPIRATORY (INHALATION) at 19:27

## 2024-11-15 RX ADMIN — LAMOTRIGINE 150 MILLIGRAM(S): 50 TABLET, EXTENDED RELEASE ORAL at 11:55

## 2024-11-15 RX ADMIN — ACETAMINOPHEN 500MG 650 MILLIGRAM(S): 500 TABLET, COATED ORAL at 13:01

## 2024-11-15 RX ADMIN — Medication 10 MILLIGRAM(S): at 13:02

## 2024-11-15 RX ADMIN — METHADONE HYDROCHLORIDE 30 MILLIGRAM(S): 5 TABLET ORAL at 13:02

## 2024-11-15 RX ADMIN — ACETAMINOPHEN 500MG 650 MILLIGRAM(S): 500 TABLET, COATED ORAL at 05:42

## 2024-11-15 RX ADMIN — ACETAMINOPHEN 500MG 650 MILLIGRAM(S): 500 TABLET, COATED ORAL at 13:31

## 2024-11-15 RX ADMIN — IPRATROPIUM BROMIDE AND ALBUTEROL SULFATE 3 MILLILITER(S): 2.5; .5 SOLUTION RESPIRATORY (INHALATION) at 09:04

## 2024-11-15 RX ADMIN — Medication 2 TABLET(S): at 21:41

## 2024-11-15 RX ADMIN — SUCRALFATE 1 GRAM(S): 1 TABLET ORAL at 17:05

## 2024-11-15 RX ADMIN — METOCLOPRAMIDE HYDROCHLORIDE 5 MILLIGRAM(S): 10 TABLET ORAL at 17:05

## 2024-11-15 RX ADMIN — POLYETHYLENE GLYCOL 3350 17 GRAM(S): 17 POWDER, FOR SOLUTION ORAL at 05:39

## 2024-11-15 RX ADMIN — Medication 2 MILLIGRAM(S): at 17:05

## 2024-11-15 RX ADMIN — Medication 2 MILLIGRAM(S): at 06:16

## 2024-11-15 RX ADMIN — PREDNISONE 40 MILLIGRAM(S): 20 TABLET ORAL at 05:43

## 2024-11-15 RX ADMIN — METOCLOPRAMIDE HYDROCHLORIDE 5 MILLIGRAM(S): 10 TABLET ORAL at 23:57

## 2024-11-15 RX ADMIN — Medication 100 MICROGRAM(S): at 05:42

## 2024-11-15 RX ADMIN — METOCLOPRAMIDE HYDROCHLORIDE 5 MILLIGRAM(S): 10 TABLET ORAL at 05:43

## 2024-11-15 RX ADMIN — PANTOPRAZOLE SODIUM 40 MILLIGRAM(S): 40 TABLET, DELAYED RELEASE ORAL at 05:43

## 2024-11-15 RX ADMIN — METHADONE HYDROCHLORIDE 30 MILLIGRAM(S): 5 TABLET ORAL at 05:42

## 2024-11-15 RX ADMIN — BUMETANIDE 1 MILLIGRAM(S): 1 TABLET ORAL at 05:42

## 2024-11-15 RX ADMIN — ACETAMINOPHEN 500MG 650 MILLIGRAM(S): 500 TABLET, COATED ORAL at 06:51

## 2024-11-15 RX ADMIN — Medication 1: at 11:55

## 2024-11-15 RX ADMIN — METOCLOPRAMIDE HYDROCHLORIDE 5 MILLIGRAM(S): 10 TABLET ORAL at 11:55

## 2024-11-15 RX ADMIN — CHLORHEXIDINE GLUCONATE 1 APPLICATION(S): 1.2 RINSE ORAL at 05:39

## 2024-11-15 RX ADMIN — Medication 2 MILLIGRAM(S): at 12:17

## 2024-11-15 RX ADMIN — Medication 2 MILLIGRAM(S): at 21:40

## 2024-11-15 RX ADMIN — Medication 10 MILLIGRAM(S): at 21:40

## 2024-11-15 RX ADMIN — POTASSIUM CHLORIDE 40 MILLIEQUIVALENT(S): 600 TABLET, EXTENDED RELEASE ORAL at 16:56

## 2024-11-15 RX ADMIN — Medication 2 MILLIGRAM(S): at 01:15

## 2024-11-15 RX ADMIN — SERTRALINE HYDROCHLORIDE 200 MILLIGRAM(S): 100 TABLET, FILM COATED ORAL at 11:54

## 2024-11-15 RX ADMIN — Medication 2 MILLIGRAM(S): at 12:33

## 2024-11-15 RX ADMIN — Medication 2 MILLIGRAM(S): at 22:10

## 2024-11-15 RX ADMIN — POLYETHYLENE GLYCOL 3350 17 GRAM(S): 17 POWDER, FOR SOLUTION ORAL at 17:05

## 2024-11-15 RX ADMIN — PANTOPRAZOLE SODIUM 40 MILLIGRAM(S): 40 TABLET, DELAYED RELEASE ORAL at 17:05

## 2024-11-15 RX ADMIN — Medication 2 MILLIGRAM(S): at 17:20

## 2024-11-15 NOTE — PROGRESS NOTE ADULT - ATTENDING COMMENTS
She continues to have constant pain and it continues to be on that right side and right flank.   I noted that on seeing her today she did not have a bariatric diet ordered and the amount of sugar and carbonation in her food could be contributing to her nausea and vomiting.  I would recommend continuing her on a bariatric regular diet while she is an inpatient.  We discussed alcohol cessation and the patient will abstain from drinking upon returning home as this could be contributing to her hepatomegaly.  She should also follow-up with a hepatologist.  She will follow-up with myself and Dr. Bryant in the office and we again recommend starting metformin 500 mg twice daily, not for sole control of her diabetes but also to help with some of her weight loss efforts and also shrink some of her liver to help with her hepatomegaly.

## 2024-11-15 NOTE — PROGRESS NOTE ADULT - ATTENDING COMMENTS
64-year-old female, former smoker with past medical history of asthma, hypothyroidism, anxiety, depression, MVP, Jaclyn-en-Y gastric bypass, appendectomy, cholecystectomy and Chronic back pain on methadone, presents to the ED following a syncopal episode last night. She also has complaints of worsening abdominal pain for 5 days.     # syncope, possible vasovagal vs arrhythmia ( in setting of prolonged QTC found on admission )   QTC has imrpvoed now repeat QTC Calculation(Bazett) 460 ms  normal echo   ct neg for pe   tele no evens   check orthostatics    # abdominal pain ,  pain management and surgery notes appreciated   cta neg     EGD non-erosive gastritis.  fu pain management     # opioid dependency , on methadone and soma ( chronic back pain )     # DM , new dx   A1C with Estimated Average Glucose Result: 7.0: Method: Immunoassay(11.09.24 @ 06:43)  no need for treatment at this time, hgb A1c of 7 is acceptable  unless fingersticks are over 180     # hyperlipidemia , cont meds   # anxiety depression cont meds   # hypothyroidism tsh high, cont synthroid at 100 , fu as outpt for further adjustment     # chronic skin lesion with scaling, psoriasis, outpt derm fu     # leg edema, on bumex, the left ventricular diastolic function could not be assessed in the echo done this admission, pt to have a repeat study as outpt   doppler neg for dvt     # obesity , out pt fu   Height (cm): 167.6 (11-13-24 @ 10:30)  Weight (kg): 100.7 (11-13-24 @ 10:30)  BMI (kg/m2): 35.8 (11-13-24 @ 10:30)  BSA (m2): 2.09 (11-13-24 @ 10:30)    #h/o ETOH use---fatty liver--suspected thiamine and folate deficiency, cont supplement   # suspected immunodef due to chronic etoh abuse     #Progress Note Handoff    Pending : pain management fu   Family discussion: edis pt   Disposition: home when stable   code status: full code 64-year-old female, former smoker with past medical history of asthma, hypothyroidism, anxiety, depression, MVP, Jaclyn-en-Y gastric bypass, appendectomy, cholecystectomy and Chronic back pain on methadone, presents to the ED following a syncopal episode last night. She also has complaints of worsening abdominal pain for 5 days.     # syncope, possible vasovagal vs arrhythmia ( in setting of prolonged QTC found on admission )   QTC has imrpvoed now repeat QTC Calculation(Bazett) 460 ms  normal echo   ct neg for pe   tele no evens   check orthostatics    # abdominal pain ,  pain management and surgery notes appreciated   cta neg     EGD non-erosive gastritis.  fu pain management     # opioid dependency , on methadone and soma ( chronic back pain )   on iv morphine , dw pain management they will adjust meds     # DM , new dx   A1C with Estimated Average Glucose Result: 7.0: Method: Immunoassay(11.09.24 @ 06:43)  no need for treatment at this time, hgb A1c of 7 is acceptable  unless fingersticks are over 180     # hyperlipidemia , cont meds   # anxiety depression cont meds   # hypothyroidism tsh high, cont synthroid at 100 , fu as outpt for further adjustment     # chronic skin lesion with scaling, psoriasis, outpt derm fu     # leg edema, on bumex, the left ventricular diastolic function could not be assessed in the echo done this admission, pt to have a repeat study as outpt   doppler neg for dvt     # obesity , out pt fu   Height (cm): 167.6 (11-13-24 @ 10:30)  Weight (kg): 100.7 (11-13-24 @ 10:30)  BMI (kg/m2): 35.8 (11-13-24 @ 10:30)  BSA (m2): 2.09 (11-13-24 @ 10:30)    #h/o ETOH use---fatty liver--suspected thiamine and folate deficiency, cont supplement   # suspected immunodef due to chronic etoh abuse     #Progress Note Handoff    Pending : pain management fu   Family discussion: dw pt   Disposition: home when stable   code status: full code

## 2024-11-15 NOTE — PROGRESS NOTE ADULT - ASSESSMENT
ASSESSMENT:  64y F w/ PMHx of 64yF w/ PMHx notable for RYGB 15 years ago presenting with 5 days of abdominal pain as well as asthma exacerbation. CT abdomen and pelvis as well as CTA unremarkable other than hepatomegaly. EGD with non-erosive gastritis. Patient continued to have pain, bariatric surgery consulted for evaluation.    PLAN:  - No bariatric intervention while inpatient  - Bariatric diet while inpatient  - Follow up with Nessa and Dr Bryant out patient in the office for further care after discharge  - Tylenol for pain, No Toradol  - Discharge on metformin 500 bid      BLUE TEAM SPECTRA: [id number]

## 2024-11-15 NOTE — GOALS OF CARE CONVERSATION - ADVANCED CARE PLANNING - CONVERSATION DETAILS
Discussed goals of care with patient. Pt will remain full code for now. Health care proxy is her  if needed

## 2024-11-15 NOTE — PROGRESS NOTE ADULT - SUBJECTIVE AND OBJECTIVE BOX
GENERAL SURGERY PROGRESS NOTE    Patient: JULIET NAVARRETE , 64y ([birth year])Female   MRN: [id number]  Location: [address]  Visit: 11-08-24 Inpatient  Date: 11-15-24 @ 23:45    Hospital Day #: 8    Events of past 24 hours:  No acute event overnight. No acute distress.     PAST MEDICAL & SURGICAL HISTORY:  Asthma  LAST ATTACK MANY YRS AGO  Gastroesophageal reflux disease without esophagitis  Hypothyroidism  Heart murmur  Anxiety  Depression  Mood disorder  Psoriasis  Constipation  Back pain  LOW  Morbid obesity  S/P tonsillectomy  1967  History of appendectomy  1974  Abscess of back  EVACUATION OF ABSCESS @ L5-S1 1997  History of Jaclyn-en-Y gastric bypass  WITH CHOLECYSTECTOMY 2006    Vitals:   T(F): 97.6 (11-15-24 @ 20:33), Max: 97.9 (11-15-24 @ 11:30)  HR: 68 (11-15-24 @ 20:33)  BP: 117/76 (11-15-24 @ 20:33)  RR: 18 (11-15-24 @ 20:33)  SpO2: 97% (11-15-24 @ 20:33)    Diet, Regular:   DASH/TLC Sodium & Cholesterol Restricted (DASH)  Diet, NPO after Midnight:      NPO Start Date: 12-Nov-2024,   NPO Start Time: 23:59  Diet, NPO after Midnight:      NPO Start Date: 12-Nov-2024,   NPO Start Time: 23:59    Fluids:     I & O's:    PHYSICAL EXAM:  General: NAD, calm  HEENT: NCAT, EOMI  Cardiac: RRR  Respiratory: On RA, b/l chest rise and fall, normal respiratory effort  Abdomen: Soft, non-distended, mild tenderness on the right side  Skin: Warm/dry, normal color, no jaundice    MEDICATIONS  (STANDING):  acetaminophen     Tablet .. 650 milliGRAM(s) Oral every 8 hours  albuterol/ipratropium for Nebulization 3 milliLiter(s) Nebulizer every 6 hours  buMETAnide 1 milliGRAM(s) Oral daily  chlorhexidine 2% Cloths 1 Application(s) Topical <User Schedule>  cyclobenzaprine 10 milliGRAM(s) Oral three times a day  dextrose 5%. 1000 milliLiter(s) (100 mL/Hr) IV Continuous <Continuous>  dextrose 5%. 1000 milliLiter(s) (50 mL/Hr) IV Continuous <Continuous>  dextrose 50% Injectable 25 Gram(s) IV Push once  dextrose 50% Injectable 25 Gram(s) IV Push once  dextrose 50% Injectable 12.5 Gram(s) IV Push once  folic acid 1 milliGRAM(s) Oral daily  glucagon  Injectable 1 milliGRAM(s) IntraMuscular once  heparin   Injectable 5000 Unit(s) SubCutaneous every 8 hours  influenza   Vaccine 0.5 milliLiter(s) IntraMuscular once  insulin lispro (ADMELOG) corrective regimen sliding scale   SubCutaneous three times a day before meals  lactated ringers. 1000 milliLiter(s) (75 mL/Hr) IV Continuous <Continuous>  lamoTRIgine 150 milliGRAM(s) Oral daily  levothyroxine 100 MICROGram(s) Oral daily  metoclopramide 5 milliGRAM(s) Oral four times a day  pantoprazole    Tablet 40 milliGRAM(s) Oral before breakfast  pantoprazole    Tablet 40 milliGRAM(s) Oral two times a day  polyethylene glycol 3350 17 Gram(s) Oral two times a day  predniSONE   Tablet 40 milliGRAM(s) Oral daily  scopolamine 1 mG/72 Hr(s) Patch 1 Patch Transdermal once  senna 2 Tablet(s) Oral at bedtime  sertraline 200 milliGRAM(s) Oral daily  sucralfate 1 Gram(s) Oral two times a day  thiamine 100 milliGRAM(s) Oral daily    MEDICATIONS  (PRN):  albuterol    90 MICROgram(s) HFA Inhaler 2 Puff(s) Inhalation every 6 hours PRN Shortness of Breath and/or Wheezing  aluminum hydroxide/magnesium hydroxide/simethicone Suspension 30 milliLiter(s) Oral every 4 hours PRN Dyspepsia  dextrose Oral Gel 15 Gram(s) Oral once PRN Blood Glucose LESS THAN 70 milliGRAM(s)/deciliter  dicyclomine 20 milliGRAM(s) Oral four times a day before meals PRN abdominal pain  melatonin 3 milliGRAM(s) Oral at bedtime PRN Insomnia  morphine  - Injectable 2 milliGRAM(s) IV Push every 4 hours PRN Severe Pain (7 - 10)  naloxone 1 mG/mL Injection for Intranasal Use 1 milliGRAM(s) IntraNasal every 5 minutes PRN signs of narcotic overdose    DVT PROPHYLAXIS: heparin   Injectable 5000 Unit(s) SubCutaneous every 8 hours    GI PROPHYLAXIS: pantoprazole    Tablet 40 milliGRAM(s) Oral two times a day  pantoprazole    Tablet 40 milliGRAM(s) Oral before breakfast    LAB/STUDIES:  Labs:  CAPILLARY BLOOD GLUCOSE      POCT Blood Glucose.: 123 mg/dL (15 Nov 2024 21:25)  POCT Blood Glucose.: 111 mg/dL (15 Nov 2024 17:15)  POCT Blood Glucose.: 188 mg/dL (15 Nov 2024 11:30)  POCT Blood Glucose.: 136 mg/dL (15 Nov 2024 07:48)                         12.7   5.27  )-----------( 172      ( 15 Nov 2024 06:12 )            39.8         11-15    139  |  99  |  12  ----------------------------<  102[H]  3.4[L]   |  26  |  1.1    Calcium: 8.7 mg/dL (11-15-24 @ 06:12)    LFTs:             5.9  | 0.8  | 30       ------------------[133     ( 15 Nov 2024 06:12 )  3.6  | x    | 19          Lipase:x      Amylase:x        Blood Gas Arterial, Lactate: 3.2 mmol/L (11-14-24 @ 10:13)    ABG - ( 14 Nov 2024 10:13 )  pH: 7.48  /  pCO2: 35    /  pO2: 55    / HCO3: 26    / Base Excess: 2.8   /  SaO2: 90.0      Urinalysis Basic - ( 15 Nov 2024 06:12 )    Color: x / Appearance: x / SG: x / pH: x  Gluc: 102 mg/dL / Ketone: x  / Bili: x / Urobili: x   Blood: x / Protein: x / Nitrite: x   Leuk Esterase: x / RBC: x / WBC x   Sq Epi: x / Non Sq Epi: x / Bacteria: x

## 2024-11-15 NOTE — PROGRESS NOTE ADULT - SUBJECTIVE AND OBJECTIVE BOX
SUBJECTIVE/OVERNIGHT EVENTS  Today is hospital day 7d. This morning patient was seen and examined at bedside, resting comfortably in bed. No acute or major events overnight. Pt unhappy with pain control    MEDICATIONS  STANDING MEDICATIONS  acetaminophen     Tablet .. 650 milliGRAM(s) Oral every 8 hours  albuterol/ipratropium for Nebulization 3 milliLiter(s) Nebulizer every 6 hours  buMETAnide 1 milliGRAM(s) Oral daily  chlorhexidine 2% Cloths 1 Application(s) Topical <User Schedule>  dextrose 5%. 1000 milliLiter(s) IV Continuous <Continuous>  dextrose 5%. 1000 milliLiter(s) IV Continuous <Continuous>  dextrose 50% Injectable 25 Gram(s) IV Push once  dextrose 50% Injectable 25 Gram(s) IV Push once  dextrose 50% Injectable 12.5 Gram(s) IV Push once  folic acid 1 milliGRAM(s) Oral daily  glucagon  Injectable 1 milliGRAM(s) IntraMuscular once  heparin   Injectable 5000 Unit(s) SubCutaneous every 8 hours  influenza   Vaccine 0.5 milliLiter(s) IntraMuscular once  insulin lispro (ADMELOG) corrective regimen sliding scale   SubCutaneous three times a day before meals  lactated ringers. 1000 milliLiter(s) IV Continuous <Continuous>  lamoTRIgine 150 milliGRAM(s) Oral daily  levothyroxine 100 MICROGram(s) Oral daily  methadone    Tablet 30 milliGRAM(s) Oral every 8 hours  metoclopramide 5 milliGRAM(s) Oral four times a day  pantoprazole    Tablet 40 milliGRAM(s) Oral before breakfast  pantoprazole    Tablet 40 milliGRAM(s) Oral two times a day  polyethylene glycol 3350 17 Gram(s) Oral two times a day  predniSONE   Tablet 40 milliGRAM(s) Oral daily  scopolamine 1 mG/72 Hr(s) Patch 1 Patch Transdermal once  senna 2 Tablet(s) Oral at bedtime  sertraline 200 milliGRAM(s) Oral daily  sucralfate 1 Gram(s) Oral two times a day  thiamine 100 milliGRAM(s) Oral daily    PRN MEDICATIONS  albuterol    90 MICROgram(s) HFA Inhaler 2 Puff(s) Inhalation every 6 hours PRN  aluminum hydroxide/magnesium hydroxide/simethicone Suspension 30 milliLiter(s) Oral every 4 hours PRN  cyclobenzaprine 10 milliGRAM(s) Oral three times a day PRN  dextrose Oral Gel 15 Gram(s) Oral once PRN  dicyclomine 20 milliGRAM(s) Oral four times a day before meals PRN  melatonin 3 milliGRAM(s) Oral at bedtime PRN  morphine  - Injectable 2 milliGRAM(s) IV Push every 6 hours PRN    VITALS  T(F): 96.9 (11-15-24 @ 05:00), Max: 97.7 (11-14-24 @ 20:11)  HR: 60 (11-15-24 @ 05:00) (60 - 73)  BP: 119/73 (11-15-24 @ 05:00) (105/65 - 119/73)  RR: 16 (11-15-24 @ 05:00) (16 - 18)  SpO2: 97% (11-15-24 @ 05:00) (92% - 97%)  POCT Blood Glucose.: 188 mg/dL (11-15-24 @ 11:30)  POCT Blood Glucose.: 136 mg/dL (11-15-24 @ 07:48)  POCT Blood Glucose.: 106 mg/dL (11-14-24 @ 20:46)  POCT Blood Glucose.: 93 mg/dL (11-14-24 @ 16:45)  POCT Blood Glucose.: 150 mg/dL (11-14-24 @ 12:13)    PHYSICAL EXAM  GENERAL  NAD, lying in bed upset    HEAD  Atraumatic      NECK  Supple     HEART  Regular rate and rhythm no murmurs rubs or gallops    LUNGS  b/l wheezing    ABDOMEN  soft, nontender, nondistended, +BS    EXTREMITIES  no edema    NERVOUS SYSTEM  A&Ox3     SKIN  No rashes or lesions        LABS             12.7   5.27  )-----------( 172      ( 11-15-24 @ 06:12 )             39.8     139  |  99  |  12  -------------------------<  102   11-15-24 @ 06:12  3.4  |  26  |  1.1    Ca      8.7     11-15-24 @ 06:12    TPro  5.9  /  Alb  3.6  /  TBili  0.8  /  DBili  x   /  AST  30  /  ALT  19  /  AlkPhos  133  /  GGT  x     11-15-24 @ 06:12        Urinalysis Basic - ( 15 Nov 2024 06:12 )    Color: x / Appearance: x / SG: x / pH: x  Gluc: 102 mg/dL / Ketone: x  / Bili: x / Urobili: x   Blood: x / Protein: x / Nitrite: x   Leuk Esterase: x / RBC: x / WBC x   Sq Epi: x / Non Sq Epi: x / Bacteria: x      ABG - ( 14 Nov 2024 10:13 )  pH, Arterial: 7.48  pH, Blood: x     /  pCO2: 35    /  pO2: 55    / HCO3: 26    / Base Excess: 2.8   /  SaO2: 90.0                IMAGING  < from: CT Angio Abdomen and Pelvis w/ IV Cont (11.13.24 @ 17:07) >  No acute abdominal or pelvic pathology.    Atherosclerotic calcifications of the aorta and its branches. No evidence   of celiac axis or SMA abnormality.    < end of copied text >  < from: Xray Chest 1 View- PORTABLE-Urgent (Xray Chest 1 View- PORTABLE-Urgent .) (11.14.24 @ 08:03) >  No radiographic evidence of acute cardiopulmonary disease.    < end of copied text >

## 2024-11-15 NOTE — PROGRESS NOTE ADULT - ASSESSMENT
64-year-old female, former smoker with past medical history of asthma, hypothyroidism, anxiety, depression, MVP, Jaclyn-en-Y gastric bypass, appendectomy, cholecystectomy and Chronic back pain on methadone, presents to the ED following a syncopal episode last night. She also has complaints of worsening abdominal pain for 5 days.     #Abdominal pain - unclear etiology  #Nausea, Vomiting, Decreased appetite   #h/o Cholecystectomy/ appendectomy  #h/o Jaclyn en Y bypass   - LFTs stable, lactate 3 --> 2.4-->3.0  - CT abdomen - Hepatomegaly   - EGD/ Colonoscopy (May '23) - Non erosive gastritis  - c/w pantoprazole   - right lower abdominal discomfort, possible constipation (small bowel movement on 11/10)  - cw miralax senna  - a1c 7.0, no previous diagnosis of DM, possible gastroparesis, start SSI, f/u FS   - EGD- nonerosive gastritis in stomach  - CTa abdomen pending read  - pain control per pain management  - had injection yesterday, did not improve symtpoms  - bariatric surgery recs appreciated    #Shortness of breath   #h/o Asthma   #h/o MVP (follows Dr. Negrete)  #h/o chronic bilateral LE swelling   #HTN  - CTA chest - no PE   - Satting well at [facility], Lung and heart PE - normal  - duo nebs  - start prednisone 40 daily for 5 days. Today 2/5  - albuterol prn   - TTE --> echo 60-65% EF  - duplex neg  - ambulating pulse ox    #Syncope likely vasovagal vs orthostatic 2/2 dehydration  - ECG with prolonged qtc, no obvious ischemia, trops x2 negative  - no events on tele  - tele discontinued    #Hypothyroidism,   - home meds - 88mcg thyroxine   - TSH 9.88  - increased to 100 mcg synthroid     #anxiety, depression,  - c/w lamotrigine and zoloft      #Chronic back pain   - on methadone 30mg   - pain medicine recs appreciated    #Alcohol use   - 2-3 drinks per day  - CIWA monitoring   - c/w b12 and folate   - started thiamine     VTE ppx - heparin SC  GI ppx - pantoprazole (home meds)  Activity as tolerated  DASH/DM diet   Full Code    #Pending- pain control   64-year-old female, former smoker with past medical history of asthma, hypothyroidism, anxiety, depression, MVP, Jaclyn-en-Y gastric bypass, appendectomy, cholecystectomy and Chronic back pain on methadone, presents to the ED following a syncopal episode last night. She also has complaints of worsening abdominal pain for 5 days.     #Abdominal pain - unclear etiology  #Nausea, Vomiting, Decreased appetite   #h/o Cholecystectomy/ appendectomy  #h/o Jaclyn en Y bypass   - LFTs stable, lactate 3 --> 2.4-->3.0  - CT abdomen - Hepatomegaly   - EGD/ Colonoscopy (May '23) - Non erosive gastritis  - c/w pantoprazole   - right lower abdominal discomfort, possible constipation (small bowel movement on 11/10)  - cw miralax senna  - a1c 7.0, no previous diagnosis of DM, possible gastroparesis, start SSI, f/u FS   - EGD- nonerosive gastritis in stomach  - CTa abdomen pending read  - pain control per pain management  - had injection yesterday, did not improve symtpoms  - bariatric surgery recs appreciated    #Shortness of breath   #h/o Asthma   #h/o MVP (follows Dr. Negrete)  #h/o chronic bilateral LE swelling   #HTN  - CTA chest - no PE   - Satting well at RA, Lung and heart PE - normal  - duo nebs  - start prednisone 40 daily for 10 days. Today 2/10  - albuterol prn   - TTE --> echo 60-65% EF  - duplex neg  - ambulating pulse ox    #Syncope likely vasovagal vs orthostatic 2/2 dehydration  - ECG with prolonged qtc, no obvious ischemia, trops x2 negative  - no events on tele  - tele discontinued    #Hypothyroidism,   - home meds - 88mcg thyroxine   - TSH 9.88  - increased to 100 mcg synthroid     #anxiety, depression,  - c/w lamotrigine and zoloft      #Chronic back pain   - on methadone 30mg   - pain medicine recs appreciated    #Alcohol use   - 2-3 drinks per day  - CIWA monitoring   - c/w b12 and folate   - started thiamine     VTE ppx - heparin SC  GI ppx - pantoprazole (home meds)  Activity as tolerated  DASH/DM diet   Full Code    #Pending- pain control   64-year-old female, former smoker with past medical history of asthma, hypothyroidism, anxiety, depression, MVP, Jaclyn-en-Y gastric bypass, appendectomy, cholecystectomy and Chronic back pain on methadone, presents to the ED following a syncopal episode last night. She also has complaints of worsening abdominal pain for 5 days.     #Abdominal pain - unclear etiology  #Nausea, Vomiting, Decreased appetite   #h/o Cholecystectomy/ appendectomy  #h/o Jaclyn en Y bypass   - LFTs stable, lactate 3 --> 2.4-->3.0  - CT abdomen - Hepatomegaly   - EGD/ Colonoscopy (May '23) - Non erosive gastritis  - c/w pantoprazole   - right lower abdominal discomfort, possible constipation (small bowel movement on 11/10)  - cw miralax senna  - a1c 7.0, no previous diagnosis of DM, possible gastroparesis, start SSI, f/u FS   - EGD- nonerosive gastritis in stomach  - CTa abdomen pending read  - pain control per pain management  - had injection yesterday, did not improve symtpoms  - bariatric surgery recs appreciated    #Shortness of breath   #h/o Asthma   #h/o MVP (follows Dr. Negrete)  #h/o chronic bilateral LE swelling   #HTN  - CTA chest - no PE   - Satting well at RA, Lung and heart PE - normal  - duo nebs  - start prednisone 40 daily for 10 days. Today 2/10  - albuterol prn   - TTE --> echo 60-65% EF  - duplex neg  - ambulating pulse ox    #Syncope likely vasovagal vs orthostatic 2/2 dehydration  - ECG with prolonged qtc, no obvious ischemia, trops x2 negative  - no events on tele  - tele discontinued    #Hypothyroidism,   - home meds - 88mcg thyroxine   - TSH 9.88  - increased to 100 mcg synthroid     #anxiety, depression,  - c/w lamotrigine and zoloft      #Chronic back pain   - on methadone 30mg   - pain medicine recs appreciated - if no further workup then can switch to po morphine 6mg q4h PRN as per pain management    #Alcohol use   - 2-3 drinks per day  - CIWA monitoring   - c/w b12 and folate   - started thiamine     VTE ppx - heparin SC  GI ppx - pantoprazole (home meds)  Activity as tolerated  DASH/DM diet   Full Code    #Pending- pain control

## 2024-11-16 LAB
ANION GAP SERPL CALC-SCNC: 12 MMOL/L — SIGNIFICANT CHANGE UP (ref 7–14)
BUN SERPL-MCNC: 13 MG/DL — SIGNIFICANT CHANGE UP (ref 10–20)
CALCIUM SERPL-MCNC: 8.4 MG/DL — SIGNIFICANT CHANGE UP (ref 8.4–10.5)
CHLORIDE SERPL-SCNC: 102 MMOL/L — SIGNIFICANT CHANGE UP (ref 98–110)
CO2 SERPL-SCNC: 27 MMOL/L — SIGNIFICANT CHANGE UP (ref 17–32)
CREAT SERPL-MCNC: 1 MG/DL — SIGNIFICANT CHANGE UP (ref 0.7–1.5)
EGFR: 64 ML/MIN/1.73M2 — SIGNIFICANT CHANGE UP
GLUCOSE BLDC GLUCOMTR-MCNC: 121 MG/DL — HIGH (ref 70–99)
GLUCOSE BLDC GLUCOMTR-MCNC: 154 MG/DL — HIGH (ref 70–99)
GLUCOSE BLDC GLUCOMTR-MCNC: 220 MG/DL — HIGH (ref 70–99)
GLUCOSE BLDC GLUCOMTR-MCNC: 75 MG/DL — SIGNIFICANT CHANGE UP (ref 70–99)
GLUCOSE SERPL-MCNC: 128 MG/DL — HIGH (ref 70–99)
NT-PROBNP SERPL-SCNC: 193 PG/ML — SIGNIFICANT CHANGE UP (ref 0–300)
POTASSIUM SERPL-MCNC: 3.6 MMOL/L — SIGNIFICANT CHANGE UP (ref 3.5–5)
POTASSIUM SERPL-SCNC: 3.6 MMOL/L — SIGNIFICANT CHANGE UP (ref 3.5–5)
SODIUM SERPL-SCNC: 141 MMOL/L — SIGNIFICANT CHANGE UP (ref 135–146)

## 2024-11-16 PROCEDURE — 99232 SBSQ HOSP IP/OBS MODERATE 35: CPT

## 2024-11-16 RX ORDER — METHADONE HYDROCHLORIDE 5 MG/1
30 TABLET ORAL EVERY 8 HOURS
Refills: 0 | Status: DISCONTINUED | OUTPATIENT
Start: 2024-11-16 | End: 2024-11-23

## 2024-11-16 RX ADMIN — LAMOTRIGINE 150 MILLIGRAM(S): 50 TABLET, EXTENDED RELEASE ORAL at 12:04

## 2024-11-16 RX ADMIN — Medication 10 MILLIGRAM(S): at 21:17

## 2024-11-16 RX ADMIN — BUMETANIDE 1 MILLIGRAM(S): 1 TABLET ORAL at 05:11

## 2024-11-16 RX ADMIN — Medication 10 MILLIGRAM(S): at 05:11

## 2024-11-16 RX ADMIN — SERTRALINE HYDROCHLORIDE 200 MILLIGRAM(S): 100 TABLET, FILM COATED ORAL at 12:04

## 2024-11-16 RX ADMIN — METHADONE HYDROCHLORIDE 30 MILLIGRAM(S): 5 TABLET ORAL at 20:13

## 2024-11-16 RX ADMIN — PREDNISONE 40 MILLIGRAM(S): 20 TABLET ORAL at 05:08

## 2024-11-16 RX ADMIN — PANTOPRAZOLE SODIUM 40 MILLIGRAM(S): 40 TABLET, DELAYED RELEASE ORAL at 08:09

## 2024-11-16 RX ADMIN — Medication 100 MILLIGRAM(S): at 11:59

## 2024-11-16 RX ADMIN — Medication 2 MILLIGRAM(S): at 16:15

## 2024-11-16 RX ADMIN — ACETAMINOPHEN 500MG 650 MILLIGRAM(S): 500 TABLET, COATED ORAL at 05:11

## 2024-11-16 RX ADMIN — Medication 2 MILLIGRAM(S): at 11:22

## 2024-11-16 RX ADMIN — DICYCLOMINE HYDROCHLORIDE 20 MILLIGRAM(S): 10 CAPSULE ORAL at 15:07

## 2024-11-16 RX ADMIN — Medication 2 MILLIGRAM(S): at 15:44

## 2024-11-16 RX ADMIN — ACETAMINOPHEN 500MG 650 MILLIGRAM(S): 500 TABLET, COATED ORAL at 21:17

## 2024-11-16 RX ADMIN — SUCRALFATE 1 GRAM(S): 1 TABLET ORAL at 05:12

## 2024-11-16 RX ADMIN — IPRATROPIUM BROMIDE AND ALBUTEROL SULFATE 3 MILLILITER(S): 2.5; .5 SOLUTION RESPIRATORY (INHALATION) at 19:33

## 2024-11-16 RX ADMIN — METOCLOPRAMIDE HYDROCHLORIDE 5 MILLIGRAM(S): 10 TABLET ORAL at 11:59

## 2024-11-16 RX ADMIN — Medication 1 MILLIGRAM(S): at 11:59

## 2024-11-16 RX ADMIN — Medication 100 MICROGRAM(S): at 05:12

## 2024-11-16 RX ADMIN — IPRATROPIUM BROMIDE AND ALBUTEROL SULFATE 3 MILLILITER(S): 2.5; .5 SOLUTION RESPIRATORY (INHALATION) at 08:21

## 2024-11-16 RX ADMIN — Medication 2 MILLIGRAM(S): at 06:26

## 2024-11-16 RX ADMIN — POLYETHYLENE GLYCOL 3350 17 GRAM(S): 17 POWDER, FOR SOLUTION ORAL at 17:44

## 2024-11-16 RX ADMIN — PANTOPRAZOLE SODIUM 40 MILLIGRAM(S): 40 TABLET, DELAYED RELEASE ORAL at 17:44

## 2024-11-16 RX ADMIN — Medication 2 MILLIGRAM(S): at 20:33

## 2024-11-16 RX ADMIN — METOCLOPRAMIDE HYDROCHLORIDE 5 MILLIGRAM(S): 10 TABLET ORAL at 17:44

## 2024-11-16 RX ADMIN — Medication 2: at 12:06

## 2024-11-16 RX ADMIN — METOCLOPRAMIDE HYDROCHLORIDE 5 MILLIGRAM(S): 10 TABLET ORAL at 05:09

## 2024-11-16 RX ADMIN — Medication 2 MILLIGRAM(S): at 21:03

## 2024-11-16 RX ADMIN — Medication 10 MILLIGRAM(S): at 15:07

## 2024-11-16 RX ADMIN — Medication 2 MILLIGRAM(S): at 02:24

## 2024-11-16 RX ADMIN — CHLORHEXIDINE GLUCONATE 1 APPLICATION(S): 1.2 RINSE ORAL at 06:55

## 2024-11-16 RX ADMIN — SUCRALFATE 1 GRAM(S): 1 TABLET ORAL at 17:44

## 2024-11-16 RX ADMIN — Medication 2 TABLET(S): at 21:17

## 2024-11-16 RX ADMIN — Medication 2 MILLIGRAM(S): at 11:52

## 2024-11-16 RX ADMIN — POLYETHYLENE GLYCOL 3350 17 GRAM(S): 17 POWDER, FOR SOLUTION ORAL at 05:12

## 2024-11-16 NOTE — PROGRESS NOTE ADULT - SUBJECTIVE AND OBJECTIVE BOX
{\rtf1\dbavgk40965\ansi\ufakmyu2733\ftnbj\uc1\deff0  {\fonttbl{\f0 \fnil Segoe UI;}{\f1 \fnil \fcharset0 Segoe UI;}{\f2 \fnil Times New All;}}  {\colortbl ;\pcn270\xwbac342\mbmv551 ;\red0\green0\blue0 ;\red0\green0\blue0 ;}  {\stylesheet{\f0\fs20 Normal;}{\cs1 Default Paragraph Font;}{\cs2\f0\fs16 Line Number;}{\cs3\f2\fs24 Hyperlink;}}  {\*\revtbl{Unknown;}}  \yiikie11505\zymopv18058\avbun2525\dbdvr3394\mxtsi8375\yolqb6874\gesmhrg657\qzxjpsw775\nogrowautofit\silzff014\formshade\nofeaturethrottle1\dntblnsbdb\fet4\aendnotes\aftnnrlc\pgbrdrhead\pgbrdrfoot  \sectd\xtynxp44000\ztcswa40372\guttersxn0\sdgbcusc2955\rngujclk0679\fkuglrsp6342\iqqzkibq5516\kganltk454\sntrbwd312\sbkpage\pgncont\pgndec  \plain\plain\f0\fs24\pard\plain\f0\fs24\plain\f0\fs20\cbyo7683\hich\f0\dbch\f0\loch\f0\fs20 pt seen and examined. \par  \par  My notes supersede resident's notes in case of discrepancy  \par   \par  ROS: no cp, no sob, no n/v, no fever\par  \par  Vital Signs Last 24 Hrs\par  T(C): 36.4 (16 Nov 2024 05:33), Max: 36.6 (15 Nov 2024 11:30)\par  T(F): 97.6 (16 Nov 2024 05:33), Max: 97.9 (15 Nov 2024 11:30)\par  HR: 75 (16 Nov 2024 11:20) (58 - 75)\par  BP: 107/61 (16 Nov 2024 11:20) (107/61 - 117/76)\par  BP(mean): --\par  RR: 18 (16 Nov 2024 05:33) (18 - 18)\par  SpO2: 96% (16 Nov 2024 05:33) (92% - 97%)\par  \par  physical exam\par  constitutional NAD, AAOX3, Respiratory  lungs CTA, CVS heart RRR, GI: abdomen Soft NT, ND, BS+, skin: intact\par  neuro exam no focal deficit \par  \par  MEDICATIONS  (STANDING):\par  acetaminophen     Tablet .. 650 milliGRAM(s) Oral every 8 hours\par  albuterol/ipratropium for Nebulization 3 milliLiter(s) Nebulizer every 6 hours\par  buMETAnide 1 milliGRAM(s) Oral daily\par  chlorhexidine 2% Cloths 1 Application(s) Topical <User Schedule>\par  cyclobenzaprine 10 milliGRAM(s) Oral three times a day\par  dextrose 5%. 1000 milliLiter(s) (50 mL/Hr) IV Continuous <Continuous>\par  dextrose 5%. 1000 milliLiter(s) (100 mL/Hr) IV Continuous <Continuous>\par  dextrose 50% Injectable 25 Gram(s) IV Push once\par  dextrose 50% Injectable 12.5 Gram(s) IV Push once\par  dextrose 50% Injectable 25 Gram(s) IV Push once\par  folic acid 1 milliGRAM(s) Oral daily\par  glucagon  Injectable 1 milliGRAM(s) IntraMuscular once\par  heparin   Injectable 5000 Unit(s) SubCutaneous every 8 hours\par  influenza   Vaccine 0.5 milliLiter(s) IntraMuscular once\par  insulin lispro (ADMELOG) corrective regimen sliding scale   SubCutaneous three times a day before meals\par  lactated ringers. 1000 milliLiter(s) (75 mL/Hr) IV Continuous <Continuous>\par  lamoTRIgine 150 milliGRAM(s) Oral daily\par  levothyroxine 100 MICROGram(s) Oral daily\par  metoclopramide 5 milliGRAM(s) Oral four times a day\par  pantoprazole    Tablet 40 milliGRAM(s) Oral before breakfast\par  pantoprazole    Tablet 40 milliGRAM(s) Oral two times a day\par  polyethylene glycol 3350 17 Gram(s) Oral two times a day\par  predniSONE   Tablet 40 milliGRAM(s) Oral daily\par  scopolamine 1 mG/72 Hr(s) Patch 1 Patch Transdermal once\par  senna 2 Tablet(s) Oral at bedtime\par  sertraline 200 milliGRAM(s) Oral daily\par  sucralfate 1 Gram(s) Oral two times a day\par  thiamine 100 milliGRAM(s) Oral daily\par  \par  MEDICATIONS  (PRN):\par  albuterol    90 MICROgram(s) HFA Inhaler 2 Puff(s) Inhalation every 6 hours PRN Shortness of Breath and/or Wheezing\par  aluminum hydroxide/magnesium hydroxide/simethicone Suspension 30 milliLiter(s) Oral every 4 hours PRN Dyspepsia\par  dextrose Oral Gel 15 Gram(s) Oral once PRN Blood Glucose LESS THAN 70 milliGRAM(s)/deciliter\par  dicyclomine 20 milliGRAM(s) Oral four times a day before meals PRN abdominal pain\par  melatonin 3 milliGRAM(s) Oral at bedtime PRN Insomnia\par  morphine  - Injectable 2 milliGRAM(s) IV Push every 4 hours PRN Severe Pain (7 - 10)\par  naloxone 1 mG/mL Injection for Intranasal Use 1 milliGRAM(s) IntraNasal every 5 minutes PRN signs of narcotic overdose\par           \par             12.7 \par  5.27  )-----------( 172      ( 15 Nov 2024 06:12 )\par             39.8 \par  \par  11-16\par  \par  141  |  102  |  13\par  ----------------------------<  128[H]\par  3.6   |  27  |  1.0\par  \par  Ca    8.4      16 Nov 2024 06:14\par  \par  TPro  5.9[L]  /  Alb  3.6  /  TBili  0.8  /  DBili  x   /  AST  30  /  ALT  19  /  AlkPhos  133[H]  11-15\par  \par  a/p\par  \ql\plain\f0\fs24{\*\jessicamkstart [id number]}{\*\maríad [id number]}\plain\f0\fs20\syki5226\hich\f0\dbch\f0\loch\f0\fs20 64-year-old female, former smoker with past medical history of asthma, hypothyroidism, anxiety, depression, MVP, Jaclyn-en-Y gastric   bypass, appendectomy, cholecystectomy and Chronic back pain on methadone, presents to the ED following a syncopal episode last night. She also has complaints of worsening abdominal pain for 5 days. \par  \par  # syncope, possible vasovagal vs arrhythmia ( in setting of prolonged QTC found on admission ) \par  QTC has imrpvoed now repeat QTC Calculation(Bazett) 460 ms\par  normal echo \par  ct neg for pe \par  tele no evens \par  check orthostatics\par  \par  # abdominal pain ,\par  pain management and surgery notes appreciated \par  cta neg \par  \par  EGD non-erosive gastritis.\par  fu pain management \par  \par  # opioid dependency , on methadone and soma ( chronic back pain ) \par  on iv morphine , dw pain management they will adjust meds \par  \par  # DM , new dx \par  A1C with Estimated Average Glucose Result: 7.0: Method: Immunoassay(11.09.24 @ 06:43)\par  no need for treatment at this time, hgb A1c of 7 is acceptable  unless fingersticks are over 180 \par  \par  # hyperlipidemia , cont meds \par  # anxiety depression cont meds \par  # hypothyroidism tsh high, cont synthroid at 100 , fu as outpt for further adjustment \par  \par  # chronic skin lesion with scaling, psoriasis, outpt derm fu \par  \par  # leg edema, on bumex, the left ventricular diastolic function could not be assessed in the echo done this admission, pt to have a repeat study as outpt \par  doppler neg for dvt \par  \par  # obesity , out pt fu \par  Height (cm): 167.6 (11-13-24 @ 10:30)\par  Weight (kg): 100.7 (11-13-24 @ 10:30)\par  BMI (kg/m2): 35.8 (11-13-24 @ 10:30)\par  BSA (m2): 2.09 (11-13-24 @ 10:30)\par  \par  #h/o ETOH use---fatty liver--suspected thiamine and folate deficiency, cont supplement \par  # suspected immunodef due to chronic etoh abuse \par  \par  #Progress Note Handoff\par  \par  Pending : pain management fu \par  Family discussion: dw pt \par  Disposition: home when stable \par  code status: full code. \plain\f1\fs20\[id number]\hich\f1\dbch\f1\loch\f1\cf2\fs20\strike\plain\f1\fs20\pbsa9210\hich\f1\dbch\f1\loch\f1\cf2\fs20\plain\f0\fs20\dmyj5859\hich\f0\dbch\f0\loch\f0\fs20\par  \pard\plain\f0\fs24\plain\f0\fs20\oscz8482\hich\f0\dbch\f0\loch\f0\fs20\par  \par  \par  \par  \par  \par  \par  \par  \par  \par  \par  \par  \par  \par  \par  \ql\plain\f0\fs24\plain\f0\fs20\pksa7987\hich\f0\dbch\f0\loch\f0\fs20\par  }

## 2024-11-17 LAB
ALBUMIN SERPL ELPH-MCNC: 3.5 G/DL — SIGNIFICANT CHANGE UP (ref 3.5–5.2)
ALP SERPL-CCNC: 120 U/L — HIGH (ref 30–115)
ALT FLD-CCNC: 22 U/L — SIGNIFICANT CHANGE UP (ref 0–41)
ANION GAP SERPL CALC-SCNC: 13 MMOL/L — SIGNIFICANT CHANGE UP (ref 7–14)
AST SERPL-CCNC: 37 U/L — SIGNIFICANT CHANGE UP (ref 0–41)
BILIRUB SERPL-MCNC: 0.4 MG/DL — SIGNIFICANT CHANGE UP (ref 0.2–1.2)
BUN SERPL-MCNC: 14 MG/DL — SIGNIFICANT CHANGE UP (ref 10–20)
CALCIUM SERPL-MCNC: 8.7 MG/DL — SIGNIFICANT CHANGE UP (ref 8.4–10.5)
CHLORIDE SERPL-SCNC: 99 MMOL/L — SIGNIFICANT CHANGE UP (ref 98–110)
CO2 SERPL-SCNC: 26 MMOL/L — SIGNIFICANT CHANGE UP (ref 17–32)
CREAT SERPL-MCNC: 1 MG/DL — SIGNIFICANT CHANGE UP (ref 0.7–1.5)
EGFR: 64 ML/MIN/1.73M2 — SIGNIFICANT CHANGE UP
GLUCOSE BLDC GLUCOMTR-MCNC: 124 MG/DL — HIGH (ref 70–99)
GLUCOSE BLDC GLUCOMTR-MCNC: 129 MG/DL — HIGH (ref 70–99)
GLUCOSE BLDC GLUCOMTR-MCNC: 133 MG/DL — HIGH (ref 70–99)
GLUCOSE BLDC GLUCOMTR-MCNC: 91 MG/DL — SIGNIFICANT CHANGE UP (ref 70–99)
GLUCOSE SERPL-MCNC: 149 MG/DL — HIGH (ref 70–99)
HCT VFR BLD CALC: 38.1 % — SIGNIFICANT CHANGE UP (ref 37–47)
HGB BLD-MCNC: 11.6 G/DL — LOW (ref 12–16)
MCHC RBC-ENTMCNC: 30.1 PG — SIGNIFICANT CHANGE UP (ref 27–31)
MCHC RBC-ENTMCNC: 30.4 G/DL — LOW (ref 32–37)
MCV RBC AUTO: 98.7 FL — SIGNIFICANT CHANGE UP (ref 81–99)
NRBC # BLD: 0 /100 WBCS — SIGNIFICANT CHANGE UP (ref 0–0)
PLATELET # BLD AUTO: 183 K/UL — SIGNIFICANT CHANGE UP (ref 130–400)
PMV BLD: 10.5 FL — HIGH (ref 7.4–10.4)
POTASSIUM SERPL-MCNC: 3.8 MMOL/L — SIGNIFICANT CHANGE UP (ref 3.5–5)
POTASSIUM SERPL-SCNC: 3.8 MMOL/L — SIGNIFICANT CHANGE UP (ref 3.5–5)
PROT SERPL-MCNC: 5.6 G/DL — LOW (ref 6–8)
RBC # BLD: 3.86 M/UL — LOW (ref 4.2–5.4)
RBC # FLD: 13.7 % — SIGNIFICANT CHANGE UP (ref 11.5–14.5)
SODIUM SERPL-SCNC: 138 MMOL/L — SIGNIFICANT CHANGE UP (ref 135–146)
WBC # BLD: 6.28 K/UL — SIGNIFICANT CHANGE UP (ref 4.8–10.8)
WBC # FLD AUTO: 6.28 K/UL — SIGNIFICANT CHANGE UP (ref 4.8–10.8)

## 2024-11-17 PROCEDURE — 99232 SBSQ HOSP IP/OBS MODERATE 35: CPT

## 2024-11-17 RX ADMIN — Medication 6 MILLIGRAM(S): at 21:38

## 2024-11-17 RX ADMIN — LAMOTRIGINE 150 MILLIGRAM(S): 50 TABLET, EXTENDED RELEASE ORAL at 11:25

## 2024-11-17 RX ADMIN — Medication 2 MILLIGRAM(S): at 13:00

## 2024-11-17 RX ADMIN — Medication 10 MILLIGRAM(S): at 21:31

## 2024-11-17 RX ADMIN — Medication 2 MILLIGRAM(S): at 04:34

## 2024-11-17 RX ADMIN — PREDNISONE 40 MILLIGRAM(S): 20 TABLET ORAL at 05:21

## 2024-11-17 RX ADMIN — Medication 2 MILLIGRAM(S): at 09:20

## 2024-11-17 RX ADMIN — Medication 2 TABLET(S): at 21:29

## 2024-11-17 RX ADMIN — SUCRALFATE 1 GRAM(S): 1 TABLET ORAL at 05:20

## 2024-11-17 RX ADMIN — Medication 2 MILLIGRAM(S): at 01:01

## 2024-11-17 RX ADMIN — Medication 1 MILLIGRAM(S): at 11:26

## 2024-11-17 RX ADMIN — SCOPOLAMINE 1 PATCH: 1 PATCH, EXTENDED RELEASE TRANSDERMAL at 19:46

## 2024-11-17 RX ADMIN — POLYETHYLENE GLYCOL 3350 17 GRAM(S): 17 POWDER, FOR SOLUTION ORAL at 17:24

## 2024-11-17 RX ADMIN — SCOPOLAMINE 1 PATCH: 1 PATCH, EXTENDED RELEASE TRANSDERMAL at 13:00

## 2024-11-17 RX ADMIN — METOCLOPRAMIDE HYDROCHLORIDE 5 MILLIGRAM(S): 10 TABLET ORAL at 23:26

## 2024-11-17 RX ADMIN — METOCLOPRAMIDE HYDROCHLORIDE 5 MILLIGRAM(S): 10 TABLET ORAL at 05:21

## 2024-11-17 RX ADMIN — Medication 2 MILLIGRAM(S): at 05:04

## 2024-11-17 RX ADMIN — IPRATROPIUM BROMIDE AND ALBUTEROL SULFATE 3 MILLILITER(S): 2.5; .5 SOLUTION RESPIRATORY (INHALATION) at 20:11

## 2024-11-17 RX ADMIN — Medication 2 MILLIGRAM(S): at 00:33

## 2024-11-17 RX ADMIN — METOCLOPRAMIDE HYDROCHLORIDE 5 MILLIGRAM(S): 10 TABLET ORAL at 11:25

## 2024-11-17 RX ADMIN — PANTOPRAZOLE SODIUM 40 MILLIGRAM(S): 40 TABLET, DELAYED RELEASE ORAL at 05:20

## 2024-11-17 RX ADMIN — ACETAMINOPHEN 500MG 650 MILLIGRAM(S): 500 TABLET, COATED ORAL at 05:19

## 2024-11-17 RX ADMIN — IPRATROPIUM BROMIDE AND ALBUTEROL SULFATE 3 MILLILITER(S): 2.5; .5 SOLUTION RESPIRATORY (INHALATION) at 08:17

## 2024-11-17 RX ADMIN — POLYETHYLENE GLYCOL 3350 17 GRAM(S): 17 POWDER, FOR SOLUTION ORAL at 05:19

## 2024-11-17 RX ADMIN — Medication 100 MICROGRAM(S): at 05:21

## 2024-11-17 RX ADMIN — Medication 6 MILLIGRAM(S): at 17:31

## 2024-11-17 RX ADMIN — METOCLOPRAMIDE HYDROCHLORIDE 5 MILLIGRAM(S): 10 TABLET ORAL at 17:25

## 2024-11-17 RX ADMIN — Medication 10 MILLIGRAM(S): at 14:40

## 2024-11-17 RX ADMIN — SUCRALFATE 1 GRAM(S): 1 TABLET ORAL at 17:25

## 2024-11-17 RX ADMIN — PANTOPRAZOLE SODIUM 40 MILLIGRAM(S): 40 TABLET, DELAYED RELEASE ORAL at 17:25

## 2024-11-17 RX ADMIN — METHADONE HYDROCHLORIDE 30 MILLIGRAM(S): 5 TABLET ORAL at 14:40

## 2024-11-17 RX ADMIN — SERTRALINE HYDROCHLORIDE 200 MILLIGRAM(S): 100 TABLET, FILM COATED ORAL at 11:25

## 2024-11-17 RX ADMIN — BUMETANIDE 1 MILLIGRAM(S): 1 TABLET ORAL at 05:20

## 2024-11-17 RX ADMIN — Medication 2 MILLIGRAM(S): at 13:30

## 2024-11-17 RX ADMIN — METHADONE HYDROCHLORIDE 30 MILLIGRAM(S): 5 TABLET ORAL at 22:21

## 2024-11-17 RX ADMIN — METHADONE HYDROCHLORIDE 30 MILLIGRAM(S): 5 TABLET ORAL at 05:19

## 2024-11-17 RX ADMIN — Medication 100 MILLIGRAM(S): at 11:25

## 2024-11-17 RX ADMIN — CHLORHEXIDINE GLUCONATE 1 APPLICATION(S): 1.2 RINSE ORAL at 05:21

## 2024-11-17 RX ADMIN — Medication 10 MILLIGRAM(S): at 05:20

## 2024-11-17 RX ADMIN — Medication 2 MILLIGRAM(S): at 08:49

## 2024-11-17 NOTE — PROGRESS NOTE ADULT - SUBJECTIVE AND OBJECTIVE BOX
pt seen and examined.     My notes supersede resident's notes in case of discrepancy       ROS: no cp, no sob, no n/v, no fever    Vital Signs Last 24 Hrs  T(C): 36.4 (17 Nov 2024 04:22), Max: 37.2 (16 Nov 2024 20:41)  T(F): 97.5 (17 Nov 2024 04:22), Max: 98.9 (16 Nov 2024 20:41)  HR: 70 (17 Nov 2024 08:45) (60 - 75)  BP: 113/70 (17 Nov 2024 08:45) (95/54 - 129/79)  BP(mean): 86 (16 Nov 2024 13:24) (86 - 86)  RR: 18 (17 Nov 2024 08:45) (18 - 18)  SpO2: 96% (17 Nov 2024 04:22) (95% - 99%)    Parameters below as of 16 Nov 2024 20:41  Patient On (Oxygen Delivery Method): room air    physical exam  constitutional NAD, AAOX3, Respiratory  lungs CTA, CVS heart RRR, GI: abdomen Soft NT, ND, BS+, skin: intact  neuro exam no focal deficit     MEDICATIONS  (STANDING):  acetaminophen     Tablet .. 650 milliGRAM(s) Oral every 8 hours  albuterol/ipratropium for Nebulization 3 milliLiter(s) Nebulizer every 6 hours  buMETAnide 1 milliGRAM(s) Oral daily  chlorhexidine 2% Cloths 1 Application(s) Topical <User Schedule>  cyclobenzaprine 10 milliGRAM(s) Oral three times a day  dextrose 5%. 1000 milliLiter(s) (50 mL/Hr) IV Continuous <Continuous>  dextrose 5%. 1000 milliLiter(s) (100 mL/Hr) IV Continuous <Continuous>  dextrose 50% Injectable 25 Gram(s) IV Push once  dextrose 50% Injectable 12.5 Gram(s) IV Push once  dextrose 50% Injectable 25 Gram(s) IV Push once  folic acid 1 milliGRAM(s) Oral daily  glucagon  Injectable 1 milliGRAM(s) IntraMuscular once  heparin   Injectable 5000 Unit(s) SubCutaneous every 8 hours  influenza   Vaccine 0.5 milliLiter(s) IntraMuscular once  insulin lispro (ADMELOG) corrective regimen sliding scale   SubCutaneous three times a day before meals  lactated ringers. 1000 milliLiter(s) (75 mL/Hr) IV Continuous <Continuous>  lamoTRIgine 150 milliGRAM(s) Oral daily  levothyroxine 100 MICROGram(s) Oral daily  methadone    Tablet 30 milliGRAM(s) Oral every 8 hours  metoclopramide 5 milliGRAM(s) Oral four times a day  pantoprazole    Tablet 40 milliGRAM(s) Oral two times a day  polyethylene glycol 3350 17 Gram(s) Oral two times a day  predniSONE   Tablet 40 milliGRAM(s) Oral daily  scopolamine 1 mG/72 Hr(s) Patch 1 Patch Transdermal once  senna 2 Tablet(s) Oral at bedtime  sertraline 200 milliGRAM(s) Oral daily  sucralfate 1 Gram(s) Oral two times a day  thiamine 100 milliGRAM(s) Oral daily    MEDICATIONS  (PRN):  albuterol    90 MICROgram(s) HFA Inhaler 2 Puff(s) Inhalation every 6 hours PRN Shortness of Breath and/or Wheezing  aluminum hydroxide/magnesium hydroxide/simethicone Suspension 30 milliLiter(s) Oral every 4 hours PRN Dyspepsia  dextrose Oral Gel 15 Gram(s) Oral once PRN Blood Glucose LESS THAN 70 milliGRAM(s)/deciliter  dicyclomine 20 milliGRAM(s) Oral four times a day before meals PRN abdominal pain  melatonin 3 milliGRAM(s) Oral at bedtime PRN Insomnia  morphine  - Injectable 2 milliGRAM(s) IV Push every 4 hours PRN Severe Pain (7 - 10)  naloxone 1 mG/mL Injection for Intranasal Use 1 milliGRAM(s) IntraNasal every 5 minutes PRN signs of narcotic overdose                        11.6   6.28  )-----------( 183      ( 17 Nov 2024 06:38 )             38.1     11-17    138  |  99  |  14  ----------------------------<  149[H]  3.8   |  26  |  1.0    Ca    8.7      17 Nov 2024 06:38    TPro  5.6[L]  /  Alb  3.5  /  TBili  0.4  /  DBili  x   /  AST  37  /  ALT  22  /  AlkPhos  120[H]  11-17    a/p    64-year-old female, former smoker with past medical history of asthma, hypothyroidism, anxiety, depression, MVP, Jaclyn-en-Y gastric bypass, appendectomy, cholecystectomy and Chronic back pain on methadone, presents to the ED following a syncopal episode last night. She also has complaints of worsening abdominal pain for 5 days.     # syncope, possible vasovagal vs arrhythmia ( in setting of prolonged QTC found on admission )   QTC has imrpvoed now repeat QTC Calculation(Bazett) 460 ms  normal echo   ct neg for pe   tele no evens   check orthostatics    # abdominal pain ,  pain management and surgery notes appreciated   cta neg     EGD non-erosive gastritis.  fu pain management     # opioid dependency , on methadone and soma ( chronic back pain )   on iv morphine , dw pain management they will adjust meds     # shortness of breath  pulm notes appreciated   consider echo with bubble study to rule out shunting    # DM , new dx   A1C with Estimated Average Glucose Result: 7.0: Method: Immunoassay(11.09.24 @ 06:43)  no need for treatment at this time, hgb A1c of 7 is acceptable  unless fingersticks are over 180     # hyperlipidemia , cont meds   # anxiety depression cont meds   # hypothyroidism tsh high, cont synthroid at 100 , fu as outpt for further adjustment     # chronic skin lesion with scaling, psoriasis, outpt derm fu     # leg edema, on bumex, the left ventricular diastolic function could not be assessed in the echo done this admission, pt to have a repeat study as outpt   doppler neg for dvt     # obesity , out pt fu   Height (cm): 167.6 (11-13-24 @ 10:30)  Weight (kg): 100.7 (11-13-24 @ 10:30)  BMI (kg/m2): 35.8 (11-13-24 @ 10:30)  BSA (m2): 2.09 (11-13-24 @ 10:30)    #h/o ETOH use---fatty liver--suspected thiamine and folate deficiency, cont supplement   # suspected immunodef due to chronic etoh abuse     #Progress Note Handoff    Pending : echo, anticipate dc in am   Family discussion: edis pt   Disposition: home when stable   code status: full code

## 2024-11-18 LAB
ANION GAP SERPL CALC-SCNC: 14 MMOL/L — SIGNIFICANT CHANGE UP (ref 7–14)
BASE EXCESS BLDA CALC-SCNC: 1.7 MMOL/L — SIGNIFICANT CHANGE UP (ref -2–3)
BASOPHILS # BLD AUTO: 0.05 K/UL — SIGNIFICANT CHANGE UP (ref 0–0.2)
BASOPHILS NFR BLD AUTO: 0.8 % — SIGNIFICANT CHANGE UP (ref 0–1)
BUN SERPL-MCNC: 17 MG/DL — SIGNIFICANT CHANGE UP (ref 10–20)
CALCIUM SERPL-MCNC: 8.7 MG/DL — SIGNIFICANT CHANGE UP (ref 8.4–10.5)
CHLORIDE SERPL-SCNC: 98 MMOL/L — SIGNIFICANT CHANGE UP (ref 98–110)
CO2 SERPL-SCNC: 26 MMOL/L — SIGNIFICANT CHANGE UP (ref 17–32)
CREAT SERPL-MCNC: 1.1 MG/DL — SIGNIFICANT CHANGE UP (ref 0.7–1.5)
EGFR: 57 ML/MIN/1.73M2 — LOW
EOSINOPHIL # BLD AUTO: 0.03 K/UL — SIGNIFICANT CHANGE UP (ref 0–0.7)
EOSINOPHIL NFR BLD AUTO: 0.5 % — SIGNIFICANT CHANGE UP (ref 0–8)
GAS PNL BLDA: SIGNIFICANT CHANGE UP
GLUCOSE BLDC GLUCOMTR-MCNC: 102 MG/DL — HIGH (ref 70–99)
GLUCOSE BLDC GLUCOMTR-MCNC: 122 MG/DL — HIGH (ref 70–99)
GLUCOSE BLDC GLUCOMTR-MCNC: 175 MG/DL — HIGH (ref 70–99)
GLUCOSE BLDC GLUCOMTR-MCNC: 79 MG/DL — SIGNIFICANT CHANGE UP (ref 70–99)
GLUCOSE SERPL-MCNC: 166 MG/DL — HIGH (ref 70–99)
HCO3 BLDA-SCNC: 24 MMOL/L — SIGNIFICANT CHANGE UP (ref 21–28)
HCT VFR BLD CALC: 38 % — SIGNIFICANT CHANGE UP (ref 37–47)
HGB BLD-MCNC: 11.7 G/DL — LOW (ref 12–16)
IMM GRANULOCYTES NFR BLD AUTO: 1.5 % — HIGH (ref 0.1–0.3)
LYMPHOCYTES # BLD AUTO: 0.62 K/UL — LOW (ref 1.2–3.4)
LYMPHOCYTES # BLD AUTO: 9.5 % — LOW (ref 20.5–51.1)
MAGNESIUM SERPL-MCNC: 1.9 MG/DL — SIGNIFICANT CHANGE UP (ref 1.8–2.4)
MCHC RBC-ENTMCNC: 30.2 PG — SIGNIFICANT CHANGE UP (ref 27–31)
MCHC RBC-ENTMCNC: 30.8 G/DL — LOW (ref 32–37)
MCV RBC AUTO: 97.9 FL — SIGNIFICANT CHANGE UP (ref 81–99)
MONOCYTES # BLD AUTO: 0.3 K/UL — SIGNIFICANT CHANGE UP (ref 0.1–0.6)
MONOCYTES NFR BLD AUTO: 4.6 % — SIGNIFICANT CHANGE UP (ref 1.7–9.3)
NEUTROPHILS # BLD AUTO: 5.42 K/UL — SIGNIFICANT CHANGE UP (ref 1.4–6.5)
NEUTROPHILS NFR BLD AUTO: 83.1 % — HIGH (ref 42.2–75.2)
NRBC # BLD: 0 /100 WBCS — SIGNIFICANT CHANGE UP (ref 0–0)
PCO2 BLDA: 31 MMHG — LOW (ref 32–45)
PH BLDA: 7.5 — HIGH (ref 7.35–7.45)
PLATELET # BLD AUTO: 201 K/UL — SIGNIFICANT CHANGE UP (ref 130–400)
PMV BLD: 10.1 FL — SIGNIFICANT CHANGE UP (ref 7.4–10.4)
PO2 BLDA: 105 MMHG — SIGNIFICANT CHANGE UP (ref 83–108)
POTASSIUM SERPL-MCNC: 4.2 MMOL/L — SIGNIFICANT CHANGE UP (ref 3.5–5)
POTASSIUM SERPL-SCNC: 4.2 MMOL/L — SIGNIFICANT CHANGE UP (ref 3.5–5)
RBC # BLD: 3.88 M/UL — LOW (ref 4.2–5.4)
RBC # FLD: 13.7 % — SIGNIFICANT CHANGE UP (ref 11.5–14.5)
SAO2 % BLDA: 98.4 % — HIGH (ref 94–98)
SODIUM SERPL-SCNC: 138 MMOL/L — SIGNIFICANT CHANGE UP (ref 135–146)
WBC # BLD: 6.52 K/UL — SIGNIFICANT CHANGE UP (ref 4.8–10.8)
WBC # FLD AUTO: 6.52 K/UL — SIGNIFICANT CHANGE UP (ref 4.8–10.8)

## 2024-11-18 PROCEDURE — 71045 X-RAY EXAM CHEST 1 VIEW: CPT | Mod: 26

## 2024-11-18 PROCEDURE — 99232 SBSQ HOSP IP/OBS MODERATE 35: CPT

## 2024-11-18 RX ORDER — FLUTICASONE PROPIONATE AND SALMETEROL XINAFOATE 45; 21 UG/1; UG/1
1 AEROSOL, METERED RESPIRATORY (INHALATION)
Refills: 0 | Status: DISCONTINUED | OUTPATIENT
Start: 2024-11-18 | End: 2024-12-06

## 2024-11-18 RX ADMIN — METOCLOPRAMIDE HYDROCHLORIDE 5 MILLIGRAM(S): 10 TABLET ORAL at 17:08

## 2024-11-18 RX ADMIN — Medication 6 MILLIGRAM(S): at 10:41

## 2024-11-18 RX ADMIN — METHADONE HYDROCHLORIDE 30 MILLIGRAM(S): 5 TABLET ORAL at 14:05

## 2024-11-18 RX ADMIN — POLYETHYLENE GLYCOL 3350 17 GRAM(S): 17 POWDER, FOR SOLUTION ORAL at 05:25

## 2024-11-18 RX ADMIN — FLUTICASONE PROPIONATE AND SALMETEROL XINAFOATE 1 DOSE(S): 45; 21 AEROSOL, METERED RESPIRATORY (INHALATION) at 08:30

## 2024-11-18 RX ADMIN — FLUTICASONE PROPIONATE AND SALMETEROL XINAFOATE 1 DOSE(S): 45; 21 AEROSOL, METERED RESPIRATORY (INHALATION) at 20:10

## 2024-11-18 RX ADMIN — METOCLOPRAMIDE HYDROCHLORIDE 5 MILLIGRAM(S): 10 TABLET ORAL at 05:23

## 2024-11-18 RX ADMIN — Medication 2 TABLET(S): at 21:21

## 2024-11-18 RX ADMIN — Medication 6 MILLIGRAM(S): at 04:28

## 2024-11-18 RX ADMIN — POLYETHYLENE GLYCOL 3350 17 GRAM(S): 17 POWDER, FOR SOLUTION ORAL at 17:08

## 2024-11-18 RX ADMIN — Medication 100 MICROGRAM(S): at 05:24

## 2024-11-18 RX ADMIN — LAMOTRIGINE 150 MILLIGRAM(S): 50 TABLET, EXTENDED RELEASE ORAL at 12:37

## 2024-11-18 RX ADMIN — SUCRALFATE 1 GRAM(S): 1 TABLET ORAL at 17:08

## 2024-11-18 RX ADMIN — ACETAMINOPHEN 500MG 650 MILLIGRAM(S): 500 TABLET, COATED ORAL at 14:04

## 2024-11-18 RX ADMIN — PANTOPRAZOLE SODIUM 40 MILLIGRAM(S): 40 TABLET, DELAYED RELEASE ORAL at 05:25

## 2024-11-18 RX ADMIN — Medication 10 MILLIGRAM(S): at 21:19

## 2024-11-18 RX ADMIN — IPRATROPIUM BROMIDE AND ALBUTEROL SULFATE 3 MILLILITER(S): 2.5; .5 SOLUTION RESPIRATORY (INHALATION) at 14:20

## 2024-11-18 RX ADMIN — Medication 10 MILLIGRAM(S): at 14:08

## 2024-11-18 RX ADMIN — SUCRALFATE 1 GRAM(S): 1 TABLET ORAL at 05:25

## 2024-11-18 RX ADMIN — Medication 6 MILLIGRAM(S): at 03:58

## 2024-11-18 RX ADMIN — Medication 6 MILLIGRAM(S): at 11:11

## 2024-11-18 RX ADMIN — METHADONE HYDROCHLORIDE 30 MILLIGRAM(S): 5 TABLET ORAL at 05:22

## 2024-11-18 RX ADMIN — Medication 1 MILLIGRAM(S): at 12:37

## 2024-11-18 RX ADMIN — Medication 1: at 12:36

## 2024-11-18 RX ADMIN — Medication 10 MILLIGRAM(S): at 05:24

## 2024-11-18 RX ADMIN — PREDNISONE 40 MILLIGRAM(S): 20 TABLET ORAL at 05:24

## 2024-11-18 RX ADMIN — METOCLOPRAMIDE HYDROCHLORIDE 5 MILLIGRAM(S): 10 TABLET ORAL at 12:37

## 2024-11-18 RX ADMIN — ACETAMINOPHEN 500MG 650 MILLIGRAM(S): 500 TABLET, COATED ORAL at 14:48

## 2024-11-18 RX ADMIN — ACETAMINOPHEN 500MG 650 MILLIGRAM(S): 500 TABLET, COATED ORAL at 21:51

## 2024-11-18 RX ADMIN — PANTOPRAZOLE SODIUM 40 MILLIGRAM(S): 40 TABLET, DELAYED RELEASE ORAL at 17:08

## 2024-11-18 RX ADMIN — METHADONE HYDROCHLORIDE 30 MILLIGRAM(S): 5 TABLET ORAL at 21:21

## 2024-11-18 RX ADMIN — IPRATROPIUM BROMIDE AND ALBUTEROL SULFATE 3 MILLILITER(S): 2.5; .5 SOLUTION RESPIRATORY (INHALATION) at 07:52

## 2024-11-18 RX ADMIN — Medication 100 MILLIGRAM(S): at 12:37

## 2024-11-18 RX ADMIN — BUMETANIDE 1 MILLIGRAM(S): 1 TABLET ORAL at 05:23

## 2024-11-18 RX ADMIN — Medication 6 MILLIGRAM(S): at 20:39

## 2024-11-18 RX ADMIN — SCOPOLAMINE 1 PATCH: 1 PATCH, EXTENDED RELEASE TRANSDERMAL at 08:00

## 2024-11-18 RX ADMIN — ACETAMINOPHEN 500MG 650 MILLIGRAM(S): 500 TABLET, COATED ORAL at 21:21

## 2024-11-18 RX ADMIN — SERTRALINE HYDROCHLORIDE 200 MILLIGRAM(S): 100 TABLET, FILM COATED ORAL at 12:37

## 2024-11-18 RX ADMIN — Medication 6 MILLIGRAM(S): at 20:09

## 2024-11-18 RX ADMIN — CHLORHEXIDINE GLUCONATE 1 APPLICATION(S): 1.2 RINSE ORAL at 05:29

## 2024-11-18 NOTE — PROGRESS NOTE ADULT - SUBJECTIVE AND OBJECTIVE BOX
SUBJECTIVE/OVERNIGHT EVENTS  Today is hospital day 10d. This morning patient was seen and examined at bedside, resting comfortably in bed. No acute or major events overnight.    HOSPITAL COURSE       CODE STATUS:    FAMILY COMMUNICATION  Contact date:  Name of person contacted:  Relationship to patient:  Communication details:    MEDICATIONS  STANDING MEDICATIONS  acetaminophen     Tablet .. 650 milliGRAM(s) Oral every 8 hours  albuterol/ipratropium for Nebulization 3 milliLiter(s) Nebulizer every 6 hours  buMETAnide 1 milliGRAM(s) Oral daily  chlorhexidine 2% Cloths 1 Application(s) Topical <User Schedule>  cyclobenzaprine 10 milliGRAM(s) Oral three times a day  dextrose 5%. 1000 milliLiter(s) IV Continuous <Continuous>  dextrose 5%. 1000 milliLiter(s) IV Continuous <Continuous>  dextrose 50% Injectable 25 Gram(s) IV Push once  dextrose 50% Injectable 12.5 Gram(s) IV Push once  dextrose 50% Injectable 25 Gram(s) IV Push once  fluticasone propionate/ salmeterol 250-50 MICROgram(s) Diskus 1 Dose(s) Inhalation two times a day  folic acid 1 milliGRAM(s) Oral daily  glucagon  Injectable 1 milliGRAM(s) IntraMuscular once  heparin   Injectable 5000 Unit(s) SubCutaneous every 8 hours  influenza   Vaccine 0.5 milliLiter(s) IntraMuscular once  insulin lispro (ADMELOG) corrective regimen sliding scale   SubCutaneous three times a day before meals  lamoTRIgine 150 milliGRAM(s) Oral daily  levothyroxine 100 MICROGram(s) Oral daily  methadone    Tablet 30 milliGRAM(s) Oral every 8 hours  metoclopramide 5 milliGRAM(s) Oral four times a day  pantoprazole    Tablet 40 milliGRAM(s) Oral two times a day  polyethylene glycol 3350 17 Gram(s) Oral two times a day  predniSONE   Tablet 40 milliGRAM(s) Oral daily  senna 2 Tablet(s) Oral at bedtime  sertraline 200 milliGRAM(s) Oral daily  sucralfate 1 Gram(s) Oral two times a day  thiamine 100 milliGRAM(s) Oral daily    PRN MEDICATIONS  albuterol    90 MICROgram(s) HFA Inhaler 2 Puff(s) Inhalation every 6 hours PRN  aluminum hydroxide/magnesium hydroxide/simethicone Suspension 30 milliLiter(s) Oral every 4 hours PRN  dextrose Oral Gel 15 Gram(s) Oral once PRN  dicyclomine 20 milliGRAM(s) Oral four times a day before meals PRN  melatonin 3 milliGRAM(s) Oral at bedtime PRN  morphine   Solution 6 milliGRAM(s) Oral every 4 hours PRN  naloxone 1 mG/mL Injection for Intranasal Use 1 milliGRAM(s) IntraNasal every 5 minutes PRN    VITALS  T(F): 97.7 (11-18-24 @ 06:18), Max: 98.3 (11-17-24 @ 21:01)  HR: 81 (11-18-24 @ 06:18) (64 - 81)  BP: 108/69 (11-18-24 @ 06:18) (94/56 - 122/77)  RR: 18 (11-18-24 @ 05:00) (18 - 18)  SpO2: 96% (11-18-24 @ 05:00) (93% - 96%)  POCT Blood Glucose.: 122 mg/dL (11-18-24 @ 07:59)  POCT Blood Glucose.: 124 mg/dL (11-17-24 @ 21:29)  POCT Blood Glucose.: 91 mg/dL (11-17-24 @ 16:45)    PHYSICAL EXAM  GENERAL  (x  ) NAD, lying in bed comfortably     (  ) obtunded     (  ) lethargic     (  ) somnolent    HEAD  ( x ) Atraumatic     (  ) hematoma     (  ) laceration (specify location:       )     NECK  (  ) Supple     (  ) neck stiffness     (  ) nuchal rigidity     (  )  no JVD     (  ) JVD present ( -- cm)    HEART  Rate -->  ( x ) normal rate    (  ) bradycardic    (  ) tachycardic  Rhythm -->  (  ) regular    (  ) regularly irregular    (  ) irregularly irregular  Murmurs -->  (  ) normal s1/s2    (  ) systolic murmur    (  ) diastolic murmur    (  ) continuous murmur     (  ) S3 present    (  ) S4 present    LUNGS  (  )Unlabored respirations     (  ) tachypnea  (  ) B/L air entry     (  ) decreased breath sounds in:  (location     )    (  ) no adventitious sound     (  ) crackles     ( x ) wheezing      (  ) rhonchi      (specify location:       )  (  ) chest wall tenderness (specify location:       )    ABDOMEN  (x  ) Soft     (  ) tense   |   (  ) nondistended     (  ) distended   |   (  ) +BS     (  ) hypoactive bowel sounds     (  ) hyperactive bowel sounds  (  ) nontender     (  ) RUQ tenderness     (  ) RLQ tenderness     (  ) LLQ tenderness     (  ) epigastric tenderness     (  ) diffuse tenderness  (  ) Splenomegaly      (  ) Hepatomegaly      (  ) Jaundice     (  ) ecchymosis     EXTREMITIES  ( x ) Normal     (  ) Rash     (  ) ecchymosis     (  ) varicose veins      (  ) pitting edema     (  ) non-pitting edema   (  ) ulceration     (  ) gangrene:     (location:     )    NERVOUS SYSTEM  ( x ) A&Ox3     (  ) confused     (  ) lethargic  CN II-XII:     (  ) Intact     (  ) focal deficits  (Specify:     )   Upper extremities:     (  ) strength X/5     (  ) focal deficit (specify:    )  Lower extremities:     (  ) strength  X/5    (  ) focal deficit (specify:    )    SKIN  (x  ) No rashes or lesions     (  ) maculopapular rash     (  ) pustules     (  ) vesicles     (  ) ulcer     (  ) ecchymosis     (specify location:     )    (  ) Indwelling Hendrix Catheter   Date insterted:    Reason (  ) Critical illness     (  ) urinary retention    (  ) Accurate Ins/Outs Monitoring     (  ) CMO patient    (  ) Central Line  Date inserted:  Location: (  ) Right IJ   (  ) Left IJ   (  ) Right Fem   (  ) Left Fem    (  ) SPC  (  ) pigtail  (  ) PEG tube  (  ) colostomy  (  ) jejunostomy  (  ) U-Dall    LABS             11.6   6.28  )-----------( 183      ( 11-17-24 @ 06:38 )             38.1     138  |  99  |  14  -------------------------<  149   11-17-24 @ 06:38  3.8  |  26  |  1.0    Ca      8.7     11-17-24 @ 06:38    TPro  5.6  /  Alb  3.5  /  TBili  0.4  /  DBili  x   /  AST  37  /  ALT  22  /  AlkPhos  120  /  GGT  x     11-17-24 @ 06:38      Pro-Brain Natriuretic Peptide: 193 pg/mL (11-16-24 @ 06:14)    Urinalysis Basic - ( 17 Nov 2024 06:38 )    Color: x / Appearance: x / SG: x / pH: x  Gluc: 149 mg/dL / Ketone: x  / Bili: x / Urobili: x   Blood: x / Protein: x / Nitrite: x   Leuk Esterase: x / RBC: x / WBC x   Sq Epi: x / Non Sq Epi: x / Bacteria: x          IMAGING SUBJECTIVE/OVERNIGHT EVENTS  Today is hospital day 10d. This morning patient was seen and examined at bedside, resting comfortably in bed. No acute or major events overnight.    HOSPITAL COURSE       CODE STATUS: full code     FAMILY COMMUNICATION  Contact date:  Name of person contacted:  Relationship to patient:  Communication details:    MEDICATIONS  STANDING MEDICATIONS  acetaminophen     Tablet .. 650 milliGRAM(s) Oral every 8 hours  albuterol/ipratropium for Nebulization 3 milliLiter(s) Nebulizer every 6 hours  buMETAnide 1 milliGRAM(s) Oral daily  chlorhexidine 2% Cloths 1 Application(s) Topical <User Schedule>  cyclobenzaprine 10 milliGRAM(s) Oral three times a day  dextrose 5%. 1000 milliLiter(s) IV Continuous <Continuous>  dextrose 5%. 1000 milliLiter(s) IV Continuous <Continuous>  dextrose 50% Injectable 25 Gram(s) IV Push once  dextrose 50% Injectable 12.5 Gram(s) IV Push once  dextrose 50% Injectable 25 Gram(s) IV Push once  fluticasone propionate/ salmeterol 250-50 MICROgram(s) Diskus 1 Dose(s) Inhalation two times a day  folic acid 1 milliGRAM(s) Oral daily  glucagon  Injectable 1 milliGRAM(s) IntraMuscular once  heparin   Injectable 5000 Unit(s) SubCutaneous every 8 hours  influenza   Vaccine 0.5 milliLiter(s) IntraMuscular once  insulin lispro (ADMELOG) corrective regimen sliding scale   SubCutaneous three times a day before meals  lamoTRIgine 150 milliGRAM(s) Oral daily  levothyroxine 100 MICROGram(s) Oral daily  methadone    Tablet 30 milliGRAM(s) Oral every 8 hours  metoclopramide 5 milliGRAM(s) Oral four times a day  pantoprazole    Tablet 40 milliGRAM(s) Oral two times a day  polyethylene glycol 3350 17 Gram(s) Oral two times a day  predniSONE   Tablet 40 milliGRAM(s) Oral daily  senna 2 Tablet(s) Oral at bedtime  sertraline 200 milliGRAM(s) Oral daily  sucralfate 1 Gram(s) Oral two times a day  thiamine 100 milliGRAM(s) Oral daily    PRN MEDICATIONS  albuterol    90 MICROgram(s) HFA Inhaler 2 Puff(s) Inhalation every 6 hours PRN  aluminum hydroxide/magnesium hydroxide/simethicone Suspension 30 milliLiter(s) Oral every 4 hours PRN  dextrose Oral Gel 15 Gram(s) Oral once PRN  dicyclomine 20 milliGRAM(s) Oral four times a day before meals PRN  melatonin 3 milliGRAM(s) Oral at bedtime PRN  morphine   Solution 6 milliGRAM(s) Oral every 4 hours PRN  naloxone 1 mG/mL Injection for Intranasal Use 1 milliGRAM(s) IntraNasal every 5 minutes PRN    VITALS  T(F): 97.7 (11-18-24 @ 06:18), Max: 98.3 (11-17-24 @ 21:01)  HR: 81 (11-18-24 @ 06:18) (64 - 81)  BP: 108/69 (11-18-24 @ 06:18) (94/56 - 122/77)  RR: 18 (11-18-24 @ 05:00) (18 - 18)  SpO2: 96% (11-18-24 @ 05:00) (93% - 96%)  POCT Blood Glucose.: 122 mg/dL (11-18-24 @ 07:59)  POCT Blood Glucose.: 124 mg/dL (11-17-24 @ 21:29)  POCT Blood Glucose.: 91 mg/dL (11-17-24 @ 16:45)    PHYSICAL EXAM  GENERAL  (x  ) NAD, lying in bed comfortably     (  ) obtunded     (  ) lethargic     (  ) somnolent    HEAD  ( x ) Atraumatic     (  ) hematoma     (  ) laceration (specify location:       )     NECK  (  ) Supple     (  ) neck stiffness     (  ) nuchal rigidity     (  )  no JVD     (  ) JVD present ( -- cm)    HEART  Rate -->  ( x ) normal rate    (  ) bradycardic    (  ) tachycardic  Rhythm -->  (  ) regular    (  ) regularly irregular    (  ) irregularly irregular  Murmurs -->  (  ) normal s1/s2    (  ) systolic murmur    (  ) diastolic murmur    (  ) continuous murmur     (  ) S3 present    (  ) S4 present    LUNGS  (  )Unlabored respirations     (  ) tachypnea  (  ) B/L air entry     (  ) decreased breath sounds in:  (location     )    (  ) no adventitious sound     (  ) crackles     ( x ) wheezing      (  ) rhonchi      (specify location:       )  (  ) chest wall tenderness (specify location:       )    ABDOMEN  (x  ) Soft     (  ) tense   |   (  ) nondistended     (  ) distended   |   (  ) +BS     (  ) hypoactive bowel sounds     (  ) hyperactive bowel sounds  (  ) nontender     (  ) RUQ tenderness     (  ) RLQ tenderness     (  ) LLQ tenderness     (  ) epigastric tenderness     (  ) diffuse tenderness  (  ) Splenomegaly      (  ) Hepatomegaly      (  ) Jaundice     (  ) ecchymosis     EXTREMITIES  ( x ) Normal     (  ) Rash     (  ) ecchymosis     (  ) varicose veins      (  ) pitting edema     (  ) non-pitting edema   (  ) ulceration     (  ) gangrene:     (location:     )    NERVOUS SYSTEM  ( x ) A&Ox3     (  ) confused     (  ) lethargic  CN II-XII:     (  ) Intact     (  ) focal deficits  (Specify:     )   Upper extremities:     (  ) strength X/5     (  ) focal deficit (specify:    )  Lower extremities:     (  ) strength  X/5    (  ) focal deficit (specify:    )    SKIN  (x  ) No rashes or lesions     (  ) maculopapular rash     (  ) pustules     (  ) vesicles     (  ) ulcer     (  ) ecchymosis     (specify location:     )      LABS             11.6   6.28  )-----------( 183      ( 11-17-24 @ 06:38 )             38.1     138  |  99  |  14  -------------------------<  149   11-17-24 @ 06:38  3.8  |  26  |  1.0    Ca      8.7     11-17-24 @ 06:38    TPro  5.6  /  Alb  3.5  /  TBili  0.4  /  DBili  x   /  AST  37  /  ALT  22  /  AlkPhos  120  /  GGT  x     11-17-24 @ 06:38      Pro-Brain Natriuretic Peptide: 193 pg/mL (11-16-24 @ 06:14)    Urinalysis Basic - ( 17 Nov 2024 06:38 )    Color: x / Appearance: x / SG: x / pH: x  Gluc: 149 mg/dL / Ketone: x  / Bili: x / Urobili: x   Blood: x / Protein: x / Nitrite: x   Leuk Esterase: x / RBC: x / WBC x   Sq Epi: x / Non Sq Epi: x / Bacteria: x      IMAGING

## 2024-11-18 NOTE — PROGRESS NOTE ADULT - ASSESSMENT
64-year-old female, former smoker with past medical history of asthma, hypothyroidism, anxiety, depression, MVP, Jaclyn-en-Y gastric bypass, appendectomy, cholecystectomy and Chronic back pain on methadone, presents to the ED following a syncopal episode last night. She also has complaints of worsening abdominal pain for 5 days.       Plan    #Abdominal pain - unclear etiology  #Nausea, Vomiting, Decreased appetite   #h/o Cholecystectomy/ appendectomy  #h/o Jaclyn en Y bypass   - LFTs stable, lactate 3 --> 2.4-->3.0  - CT abdomen - Hepatomegaly   - EGD/ Colonoscopy (May '23) - Non erosive gastritis  - c/w pantoprazole   - right lower abdominal discomfort, possible constipation (small bowel movement on 11/10)  - cw miralax senna  - a1c 7.0, no previous diagnosis of DM, possible gastroparesis, start SSI, f/u FS   - EGD- nonerosive gastritis in stomach  - CTa abdomen pending read  - pain control per pain management  - s/p injection, did not improve symtpoms  - bariatric surgery recs appreciated. dc on metformin 500mg bid. no immediate interventions    #Shortness of breath   #h/o Asthma   #h/o MVP (follows Dr. Negrete)  #h/o chronic bilateral LE swelling   #HTN  - CTA chest - no PE  - TTE --> echo 60-65% EF  - duplex neg   - requiring 7L NC 11/18 am. b/l wheezing on exam  - duo nebs  - c/w prednisone 40 daily for 10 days.   - albuterol prn   - added symbicort  - peak flow    #Syncope likely vasovagal vs orthostatic 2/2 dehydration  - ECG with prolonged qtc, no obvious ischemia, trops x2 negative  - no events on tele  - tele discontinued    #Hypothyroidism,   - home meds - 88mcg thyroxine   - TSH 9.88  - increased to 100 mcg synthroid     #anxiety, depression,  - c/w lamotrigine and zoloft      #Chronic back pain   - on methadone 30mg   -  po morphine 6mg q4h PRN as per pain management    #Alcohol use   - 2-3 drinks per day  - CIWA monitoring   - c/w b12 and folate   - started thiamine     #MISC  VTE ppx - heparin SC  GI ppx - pantoprazole (home meds)  Activity as tolerated  DASH/DM diet   Full Code

## 2024-11-18 NOTE — PROGRESS NOTE ADULT - ATTENDING COMMENTS
64-year-old female, former smoker with past medical history of asthma, hypothyroidism, anxiety, depression, MVP, Jaclyn-en-Y gastric bypass, appendectomy, cholecystectomy and Chronic back pain on methadone, presents to the ED following a syncopal episode last night. She also has complaints of worsening abdominal pain for 5 days.   # new sob, repeat cxr, nebs     # syncope, possible due to narcotics vs vasovagal vs arrhythmia ( in setting of prolonged QTC found on admission )   QTC has imrpvoed now repeat QTC Calculation(Bazett) 460 ms  normal echo   ct neg for pe   tele no evens   check orthostatics    # abdominal pain ,  pain management and surgery notes appreciated   cta neg     EGD non-erosive gastritis.  fu pain management     # opioid dependency , on methadone and soma ( chronic back pain )   on iv morphine , dw pain management they will adjust meds     # shortness of breath  pulm notes appreciated   consider echo with bubble study to rule out shunting    # DM , new dx   A1C with Estimated Average Glucose Result: 7.0: Method: Immunoassay(11.09.24 @ 06:43)  no need for treatment at this time, hgb A1c of 7 is acceptable  unless fingersticks are over 180     # hyperlipidemia , cont meds   # anxiety depression cont meds   # hypothyroidism tsh high, cont synthroid at 100 , fu as outpt for further adjustment     # chronic skin lesion with scaling, psoriasis, outpt derm fu     # leg edema, on bumex, the left ventricular diastolic function could not be assessed in the echo done this admission, pt to have a repeat study as outpt   doppler neg for dvt     # obesity , out pt fu   Height (cm): 167.6 (11-13-24 @ 10:30)  Weight (kg): 100.7 (11-13-24 @ 10:30)  BMI (kg/m2): 35.8 (11-13-24 @ 10:30)  BSA (m2): 2.09 (11-13-24 @ 10:30)    #h/o ETOH use---fatty liver--suspected thiamine and folate deficiency, cont supplement   # suspected immunodef due to chronic etoh abuse     #Progress Note Handoff    Pending : echo, anticipate dc in am   Family discussion: dw pt   Disposition: home when stable   code status: full code

## 2024-11-19 ENCOUNTER — RESULT REVIEW (OUTPATIENT)
Age: 64
End: 2024-11-19

## 2024-11-19 LAB
ANION GAP SERPL CALC-SCNC: 16 MMOL/L — HIGH (ref 7–14)
BASOPHILS # BLD AUTO: 0.11 K/UL — SIGNIFICANT CHANGE UP (ref 0–0.2)
BASOPHILS NFR BLD AUTO: 1.2 % — HIGH (ref 0–1)
BUN SERPL-MCNC: 22 MG/DL — HIGH (ref 10–20)
CALCIUM SERPL-MCNC: 8.8 MG/DL — SIGNIFICANT CHANGE UP (ref 8.4–10.5)
CHLORIDE SERPL-SCNC: 103 MMOL/L — SIGNIFICANT CHANGE UP (ref 98–110)
CO2 SERPL-SCNC: 22 MMOL/L — SIGNIFICANT CHANGE UP (ref 17–32)
CREAT SERPL-MCNC: 1 MG/DL — SIGNIFICANT CHANGE UP (ref 0.7–1.5)
EGFR: 64 ML/MIN/1.73M2 — SIGNIFICANT CHANGE UP
EOSINOPHIL # BLD AUTO: 0.37 K/UL — SIGNIFICANT CHANGE UP (ref 0–0.7)
EOSINOPHIL NFR BLD AUTO: 4.1 % — SIGNIFICANT CHANGE UP (ref 0–8)
GLUCOSE BLDC GLUCOMTR-MCNC: 122 MG/DL — HIGH (ref 70–99)
GLUCOSE BLDC GLUCOMTR-MCNC: 144 MG/DL — HIGH (ref 70–99)
GLUCOSE BLDC GLUCOMTR-MCNC: 173 MG/DL — HIGH (ref 70–99)
GLUCOSE BLDC GLUCOMTR-MCNC: 305 MG/DL — HIGH (ref 70–99)
GLUCOSE SERPL-MCNC: 78 MG/DL — SIGNIFICANT CHANGE UP (ref 70–99)
HCT VFR BLD CALC: 37.6 % — SIGNIFICANT CHANGE UP (ref 37–47)
HGB BLD-MCNC: 11.7 G/DL — LOW (ref 12–16)
IMM GRANULOCYTES NFR BLD AUTO: 1.4 % — HIGH (ref 0.1–0.3)
LACTATE SERPL-SCNC: 2 MMOL/L — SIGNIFICANT CHANGE UP (ref 0.7–2)
LYMPHOCYTES # BLD AUTO: 1.42 K/UL — SIGNIFICANT CHANGE UP (ref 1.2–3.4)
LYMPHOCYTES # BLD AUTO: 15.7 % — LOW (ref 20.5–51.1)
MAGNESIUM SERPL-MCNC: 2.1 MG/DL — SIGNIFICANT CHANGE UP (ref 1.8–2.4)
MCHC RBC-ENTMCNC: 30.1 PG — SIGNIFICANT CHANGE UP (ref 27–31)
MCHC RBC-ENTMCNC: 31.1 G/DL — LOW (ref 32–37)
MCV RBC AUTO: 96.7 FL — SIGNIFICANT CHANGE UP (ref 81–99)
MONOCYTES # BLD AUTO: 0.66 K/UL — HIGH (ref 0.1–0.6)
MONOCYTES NFR BLD AUTO: 7.3 % — SIGNIFICANT CHANGE UP (ref 1.7–9.3)
NEUTROPHILS # BLD AUTO: 6.36 K/UL — SIGNIFICANT CHANGE UP (ref 1.4–6.5)
NEUTROPHILS NFR BLD AUTO: 70.3 % — SIGNIFICANT CHANGE UP (ref 42.2–75.2)
NRBC # BLD: 0 /100 WBCS — SIGNIFICANT CHANGE UP (ref 0–0)
PLATELET # BLD AUTO: 207 K/UL — SIGNIFICANT CHANGE UP (ref 130–400)
PMV BLD: 10.3 FL — SIGNIFICANT CHANGE UP (ref 7.4–10.4)
POTASSIUM SERPL-MCNC: 3.8 MMOL/L — SIGNIFICANT CHANGE UP (ref 3.5–5)
POTASSIUM SERPL-SCNC: 3.8 MMOL/L — SIGNIFICANT CHANGE UP (ref 3.5–5)
RBC # BLD: 3.89 M/UL — LOW (ref 4.2–5.4)
RBC # FLD: 14 % — SIGNIFICANT CHANGE UP (ref 11.5–14.5)
SODIUM SERPL-SCNC: 141 MMOL/L — SIGNIFICANT CHANGE UP (ref 135–146)
WBC # BLD: 9.05 K/UL — SIGNIFICANT CHANGE UP (ref 4.8–10.8)
WBC # FLD AUTO: 9.05 K/UL — SIGNIFICANT CHANGE UP (ref 4.8–10.8)

## 2024-11-19 PROCEDURE — 93010 ELECTROCARDIOGRAM REPORT: CPT

## 2024-11-19 PROCEDURE — 99232 SBSQ HOSP IP/OBS MODERATE 35: CPT

## 2024-11-19 PROCEDURE — 93306 TTE W/DOPPLER COMPLETE: CPT | Mod: 26

## 2024-11-19 RX ORDER — PREDNISONE 20 MG/1
20 TABLET ORAL DAILY
Refills: 0 | Status: COMPLETED | OUTPATIENT
Start: 2024-11-22 | End: 2024-11-24

## 2024-11-19 RX ORDER — PREDNISONE 20 MG/1
40 TABLET ORAL DAILY
Refills: 0 | Status: COMPLETED | OUTPATIENT
Start: 2024-11-19 | End: 2024-11-21

## 2024-11-19 RX ADMIN — METOCLOPRAMIDE HYDROCHLORIDE 5 MILLIGRAM(S): 10 TABLET ORAL at 23:38

## 2024-11-19 RX ADMIN — SUCRALFATE 1 GRAM(S): 1 TABLET ORAL at 17:32

## 2024-11-19 RX ADMIN — METOCLOPRAMIDE HYDROCHLORIDE 5 MILLIGRAM(S): 10 TABLET ORAL at 05:19

## 2024-11-19 RX ADMIN — Medication 6 MILLIGRAM(S): at 08:08

## 2024-11-19 RX ADMIN — METOCLOPRAMIDE HYDROCHLORIDE 5 MILLIGRAM(S): 10 TABLET ORAL at 17:32

## 2024-11-19 RX ADMIN — Medication 10 MILLIGRAM(S): at 21:07

## 2024-11-19 RX ADMIN — METHADONE HYDROCHLORIDE 30 MILLIGRAM(S): 5 TABLET ORAL at 13:17

## 2024-11-19 RX ADMIN — SERTRALINE HYDROCHLORIDE 200 MILLIGRAM(S): 100 TABLET, FILM COATED ORAL at 12:34

## 2024-11-19 RX ADMIN — Medication 10 MILLIGRAM(S): at 05:19

## 2024-11-19 RX ADMIN — Medication 1: at 12:33

## 2024-11-19 RX ADMIN — Medication 6 MILLIGRAM(S): at 00:22

## 2024-11-19 RX ADMIN — LAMOTRIGINE 150 MILLIGRAM(S): 50 TABLET, EXTENDED RELEASE ORAL at 12:34

## 2024-11-19 RX ADMIN — ACETAMINOPHEN 500MG 650 MILLIGRAM(S): 500 TABLET, COATED ORAL at 05:18

## 2024-11-19 RX ADMIN — PANTOPRAZOLE SODIUM 40 MILLIGRAM(S): 40 TABLET, DELAYED RELEASE ORAL at 17:32

## 2024-11-19 RX ADMIN — ACETAMINOPHEN 500MG 650 MILLIGRAM(S): 500 TABLET, COATED ORAL at 13:50

## 2024-11-19 RX ADMIN — Medication 100 MILLIGRAM(S): at 12:34

## 2024-11-19 RX ADMIN — Medication 6 MILLIGRAM(S): at 21:58

## 2024-11-19 RX ADMIN — IPRATROPIUM BROMIDE AND ALBUTEROL SULFATE 3 MILLILITER(S): 2.5; .5 SOLUTION RESPIRATORY (INHALATION) at 19:44

## 2024-11-19 RX ADMIN — POLYETHYLENE GLYCOL 3350 17 GRAM(S): 17 POWDER, FOR SOLUTION ORAL at 17:27

## 2024-11-19 RX ADMIN — Medication 10 MILLIGRAM(S): at 13:17

## 2024-11-19 RX ADMIN — Medication 2 TABLET(S): at 21:07

## 2024-11-19 RX ADMIN — Medication 6 MILLIGRAM(S): at 07:53

## 2024-11-19 RX ADMIN — METOCLOPRAMIDE HYDROCHLORIDE 5 MILLIGRAM(S): 10 TABLET ORAL at 00:22

## 2024-11-19 RX ADMIN — METHADONE HYDROCHLORIDE 30 MILLIGRAM(S): 5 TABLET ORAL at 05:19

## 2024-11-19 RX ADMIN — POLYETHYLENE GLYCOL 3350 17 GRAM(S): 17 POWDER, FOR SOLUTION ORAL at 05:22

## 2024-11-19 RX ADMIN — PREDNISONE 40 MILLIGRAM(S): 20 TABLET ORAL at 17:35

## 2024-11-19 RX ADMIN — Medication 10 MILLIGRAM(S): at 08:48

## 2024-11-19 RX ADMIN — Medication 1 MILLIGRAM(S): at 12:34

## 2024-11-19 RX ADMIN — METHADONE HYDROCHLORIDE 30 MILLIGRAM(S): 5 TABLET ORAL at 21:07

## 2024-11-19 RX ADMIN — FLUTICASONE PROPIONATE AND SALMETEROL XINAFOATE 1 DOSE(S): 45; 21 AEROSOL, METERED RESPIRATORY (INHALATION) at 08:52

## 2024-11-19 RX ADMIN — ACETAMINOPHEN 500MG 650 MILLIGRAM(S): 500 TABLET, COATED ORAL at 13:18

## 2024-11-19 RX ADMIN — SCOPOLAMINE 1 PATCH: 1 PATCH, EXTENDED RELEASE TRANSDERMAL at 07:34

## 2024-11-19 RX ADMIN — Medication 3 UNIT(S): at 21:50

## 2024-11-19 RX ADMIN — CHLORHEXIDINE GLUCONATE 1 APPLICATION(S): 1.2 RINSE ORAL at 05:22

## 2024-11-19 RX ADMIN — Medication 6 MILLIGRAM(S): at 00:52

## 2024-11-19 RX ADMIN — IPRATROPIUM BROMIDE AND ALBUTEROL SULFATE 3 MILLILITER(S): 2.5; .5 SOLUTION RESPIRATORY (INHALATION) at 07:34

## 2024-11-19 RX ADMIN — BUMETANIDE 1 MILLIGRAM(S): 1 TABLET ORAL at 05:18

## 2024-11-19 RX ADMIN — PREDNISONE 40 MILLIGRAM(S): 20 TABLET ORAL at 05:19

## 2024-11-19 RX ADMIN — PANTOPRAZOLE SODIUM 40 MILLIGRAM(S): 40 TABLET, DELAYED RELEASE ORAL at 05:18

## 2024-11-19 RX ADMIN — SUCRALFATE 1 GRAM(S): 1 TABLET ORAL at 05:18

## 2024-11-19 RX ADMIN — METOCLOPRAMIDE HYDROCHLORIDE 5 MILLIGRAM(S): 10 TABLET ORAL at 12:34

## 2024-11-19 RX ADMIN — Medication 100 MICROGRAM(S): at 05:18

## 2024-11-19 NOTE — PROGRESS NOTE ADULT - SUBJECTIVE AND OBJECTIVE BOX
SUBJECTIVE/OVERNIGHT EVENTS  Today is hospital day 11d. This morning patient was seen and examined at bedside, resting comfortably in bed. No acute or major events overnight.    HOSPITAL COURSE      CODE STATUS:    FAMILY COMMUNICATION  Contact date:  Name of person contacted:  Relationship to patient:  Communication details:    MEDICATIONS  STANDING MEDICATIONS  acetaminophen     Tablet .. 650 milliGRAM(s) Oral every 8 hours  albuterol/ipratropium for Nebulization 3 milliLiter(s) Nebulizer every 6 hours  buMETAnide 1 milliGRAM(s) Oral daily  chlorhexidine 2% Cloths 1 Application(s) Topical <User Schedule>  cyclobenzaprine 10 milliGRAM(s) Oral three times a day  dextrose 5%. 1000 milliLiter(s) IV Continuous <Continuous>  dextrose 5%. 1000 milliLiter(s) IV Continuous <Continuous>  dextrose 50% Injectable 25 Gram(s) IV Push once  dextrose 50% Injectable 12.5 Gram(s) IV Push once  dextrose 50% Injectable 25 Gram(s) IV Push once  fluticasone propionate/ salmeterol 250-50 MICROgram(s) Diskus 1 Dose(s) Inhalation two times a day  folic acid 1 milliGRAM(s) Oral daily  glucagon  Injectable 1 milliGRAM(s) IntraMuscular once  heparin   Injectable 5000 Unit(s) SubCutaneous every 8 hours  insulin lispro (ADMELOG) corrective regimen sliding scale   SubCutaneous three times a day before meals  lamoTRIgine 150 milliGRAM(s) Oral daily  levothyroxine 100 MICROGram(s) Oral daily  methadone    Tablet 30 milliGRAM(s) Oral every 8 hours  metoclopramide 5 milliGRAM(s) Oral four times a day  pantoprazole    Tablet 40 milliGRAM(s) Oral two times a day  polyethylene glycol 3350 17 Gram(s) Oral two times a day  predniSONE   Tablet 40 milliGRAM(s) Oral daily  senna 2 Tablet(s) Oral at bedtime  sertraline 200 milliGRAM(s) Oral daily  sucralfate 1 Gram(s) Oral two times a day  thiamine 100 milliGRAM(s) Oral daily    PRN MEDICATIONS  albuterol    90 MICROgram(s) HFA Inhaler 2 Puff(s) Inhalation every 6 hours PRN  aluminum hydroxide/magnesium hydroxide/simethicone Suspension 30 milliLiter(s) Oral every 4 hours PRN  dextrose Oral Gel 15 Gram(s) Oral once PRN  dicyclomine 20 milliGRAM(s) Oral four times a day before meals PRN  melatonin 3 milliGRAM(s) Oral at bedtime PRN  morphine   Solution 6 milliGRAM(s) Oral every 4 hours PRN  naloxone 1 mG/mL Injection for Intranasal Use 1 milliGRAM(s) IntraNasal every 5 minutes PRN    VITALS  T(F): 97.8 (11-19-24 @ 05:00), Max: 98 (11-18-24 @ 12:40)  HR: 51 (11-19-24 @ 05:00) (51 - 82)  BP: 112/76 (11-19-24 @ 05:00) (106/65 - 119/77)  RR: 18 (11-19-24 @ 05:00) (18 - 18)  SpO2: 95% (11-19-24 @ 05:00) (91% - 97%)  POCT Blood Glucose.: 122 mg/dL (11-19-24 @ 07:26)  POCT Blood Glucose.: 79 mg/dL (11-18-24 @ 21:21)  POCT Blood Glucose.: 102 mg/dL (11-18-24 @ 16:33)  POCT Blood Glucose.: 175 mg/dL (11-18-24 @ 12:26)    PHYSICAL EXAM  GENERAL  (x  ) NAD, lying in bed comfortably     (  ) obtunded     (  ) lethargic     (  ) somnolent    HEAD  ( x ) Atraumatic     (  ) hematoma     (  ) laceration (specify location:       )     NECK  (x  ) Supple     (  ) neck stiffness     (  ) nuchal rigidity     (  )  no JVD     (  ) JVD present ( -- cm)    HEART  Rate -->  ( x ) normal rate    (  ) bradycardic    (  ) tachycardic  Rhythm -->  (  ) regular    (  ) regularly irregular    (  ) irregularly irregular  Murmurs -->  (  ) normal s1/s2    (  ) systolic murmur    (  ) diastolic murmur    (  ) continuous murmur     (  ) S3 present    (  ) S4 present    LUNGS  ( x )Unlabored respirations     (  ) tachypnea  (  ) B/L air entry     (  ) decreased breath sounds in:  (location     )    (  ) no adventitious sound     (  ) crackles     (  ) wheezing      (  ) rhonchi      (specify location:       )  (  ) chest wall tenderness (specify location:       )    ABDOMEN  ( x ) Soft     (  ) tense   |   (  ) nondistended     (  ) distended   |   (  ) +BS     (  ) hypoactive bowel sounds     (  ) hyperactive bowel sounds  (  ) nontender     (  ) RUQ tenderness     (  ) RLQ tenderness     (  ) LLQ tenderness     (  ) epigastric tenderness     (  ) diffuse tenderness  (  ) Splenomegaly      (  ) Hepatomegaly      (  ) Jaundice     (  ) ecchymosis     EXTREMITIES  (x  ) Normal     (  ) Rash     (  ) ecchymosis     (  ) varicose veins      (  ) pitting edema     (  ) non-pitting edema   (  ) ulceration     (  ) gangrene:     (location:     )    NERVOUS SYSTEM  ( x ) A&Ox3     (  ) confused     (  ) lethargic  CN II-XII:     (  ) Intact     (  ) focal deficits  (Specify:     )   Upper extremities:     (  ) strength X/5     (  ) focal deficit (specify:    )  Lower extremities:     (  ) strength  X/5    (  ) focal deficit (specify:    )    SKIN  (x  ) No rashes or lesions     (  ) maculopapular rash     (  ) pustules     (  ) vesicles     (  ) ulcer     (  ) ecchymosis     (specify location:     )    (  ) Indwelling Hendrix Catheter   Date insterted:    Reason (  ) Critical illness     (  ) urinary retention    (  ) Accurate Ins/Outs Monitoring     (  ) CMO patient    (  ) Central Line  Date inserted:  Location: (  ) Right IJ   (  ) Left IJ   (  ) Right Fem   (  ) Left Fem    (  ) SPC  (  ) pigtail  (  ) PEG tube  (  ) colostomy  (  ) jejunostomy  (  ) U-Dall    LABS             11.7   9.05  )-----------( 207      ( 11-19-24 @ 05:53 )             37.6     141  |  103  |  22  -------------------------<  78   11-19-24 @ 05:53  3.8  |  22  |  1.0    Ca      8.8     11-19-24 @ 05:53  Mg     2.1     11-19-24 @ 05:53          Urinalysis Basic - ( 19 Nov 2024 05:53 )    Color: x / Appearance: x / SG: x / pH: x  Gluc: 78 mg/dL / Ketone: x  / Bili: x / Urobili: x   Blood: x / Protein: x / Nitrite: x   Leuk Esterase: x / RBC: x / WBC x   Sq Epi: x / Non Sq Epi: x / Bacteria: x      ABG - ( 18 Nov 2024 12:59 )  pH, Arterial: 7.50  pH, Blood: x     /  pCO2: 31    /  pO2: 105   / HCO3: 24    / Base Excess: 1.7   /  SaO2: 98.4                IMAGING

## 2024-11-19 NOTE — PROGRESS NOTE ADULT - ASSESSMENT
64-year-old female, former smoker with past medical history of asthma, hypothyroidism, anxiety, depression, MVP, Jaclyn-en-Y gastric bypass, appendectomy, cholecystectomy and Chronic back pain on methadone, presents to the ED following a syncopal episode last night. She also has complaints of worsening abdominal pain for 5 days.       Plan    #Abdominal pain - unclear etiology  #Nausea, Vomiting, Decreased appetite   #h/o Cholecystectomy/ appendectomy  #h/o Jaclyn en Y bypass   - LFTs stable, lactate 3 --> 2.4-->3.0  - CT abdomen - Hepatomegaly   - EGD/ Colonoscopy (May '23) - Non erosive gastritis  - c/w pantoprazole   - right lower abdominal discomfort, possible constipation (small bowel movement on 11/10)  - cw miralax senna  - a1c 7.0, no previous diagnosis of DM, possible gastroparesis, start SSI, f/u FS   - EGD- nonerosive gastritis in stomach  - CTa abdomen pending read  - pain control per pain management. c/w po morphine 6mg q4h prn  - s/p injection, did not improve symtpoms  - bariatric surgery recs appreciated. dc on metformin 500mg bid. no immediate interventions    #Shortness of breath   #h/o Asthma   #h/o MVP (follows Dr. Negrete)  #h/o chronic bilateral LE swelling   #HTN  - CTA chest - no PE  - TTE --> echo 60-65% EF  - duplex neg   - requiring 7L NC 11/18 am. b/l wheezing on exam  - duo nebs  - c/w prednisone 40 daily for 3 more days. then 20mg for 3 days  - c/w albuterol prn   - c/w advair and symbicort  - peak flow    #Syncope likely vasovagal vs orthostatic 2/2 dehydration  - ECG with prolonged qtc, no obvious ischemia, trops x2 negative  - no events on tele  - tele discontinued    #Hypothyroidism,   - home meds - 88mcg thyroxine   - TSH 9.88  - increased to 100 mcg synthroid     #anxiety, depression,  - c/w lamotrigine and zoloft      #Chronic back pain   - on methadone 30mg   -  po morphine 6mg q4h PRN as per pain management    #Alcohol use   - 2-3 drinks per day  - CIWA monitoring   - c/w b12 and folate   - started thiamine     #MISC  VTE ppx - heparin SC  GI ppx - pantoprazole (home meds)  Activity as tolerated  regular diet per pt requests  Full Code       64-year-old female, former smoker with past medical history of asthma, hypothyroidism, anxiety, depression, MVP, Jaclyn-en-Y gastric bypass, appendectomy, cholecystectomy and Chronic back pain on methadone, presents to the ED following a syncopal episode last night. She also has complaints of worsening abdominal pain for 5 days.       Plan    #Abdominal pain - unclear etiology  #Nausea, Vomiting, Decreased appetite   #h/o Cholecystectomy/ appendectomy  #h/o Jaclyn en Y bypass   - LFTs stable, lactate 3 --> 2.4-->3.0  - CT abdomen - Hepatomegaly   - EGD/ Colonoscopy (May '23) - Non erosive gastritis  - c/w pantoprazole   - right lower abdominal discomfort, possible constipation (small bowel movement on 11/10)  - cw miralax senna  - a1c 7.0, no previous diagnosis of DM, possible gastroparesis, start SSI, f/u FS   - EGD- nonerosive gastritis in stomach  - CTa abdomen pending read  - pain control per pain management. c/w po morphine 6mg q4h prn  - s/p injection, did not improve symtpoms  - bariatric surgery recs appreciated. dc on metformin 500mg bid. no immediate interventions    #Shortness of breath   #h/o Asthma   #h/o MVP (follows Dr. Negrete)  #h/o chronic bilateral LE swelling   #HTN  - CTA chest - no PE  - TTE --> echo 60-65% EF  - duplex neg   - requiring 7L NC 11/18 am. b/l wheezing on exam  - duo nebs  - c/w prednisone 40 daily for 3 more days. then 20mg for 3 days  - c/w albuterol prn   - c/w advair and symbicort  - peak flow  - f/u TTE with bubbles to r/o shunt per pulm    #Syncope likely vasovagal vs orthostatic 2/2 dehydration  - ECG with prolonged qtc, no obvious ischemia, trops x2 negative  - no events on tele  - tele discontinued    #Hypothyroidism,   - home meds - 88mcg thyroxine   - TSH 9.88  - increased to 100 mcg synthroid     #anxiety, depression,  - c/w lamotrigine and zoloft      #Chronic back pain   - on methadone 30mg   -  po morphine 6mg q4h PRN as per pain management    #Alcohol use   - 2-3 drinks per day  - CIWA monitoring   - c/w b12 and folate   - started thiamine     #MISC  VTE ppx - heparin SC  GI ppx - pantoprazole (home meds)  Activity as tolerated  regular diet per pt requests  Full Code

## 2024-11-19 NOTE — PROGRESS NOTE ADULT - SUBJECTIVE AND OBJECTIVE BOX
pt seen and examined.     My notes supersede resident's notes in case of discrepancy       ROS: no cp, no sob, no n/v, no fever    Vital Signs Last 24 Hrs  T(C): 36.6 (19 Nov 2024 05:00), Max: 36.7 (18 Nov 2024 12:40)  T(F): 97.8 (19 Nov 2024 05:00), Max: 98 (18 Nov 2024 12:40)  HR: 51 (19 Nov 2024 05:00) (51 - 82)  BP: 112/76 (19 Nov 2024 05:00) (106/65 - 119/77)  BP(mean): 84 (18 Nov 2024 23:45) (84 - 84)  RR: 18 (19 Nov 2024 05:00) (18 - 18)  SpO2: 95% (19 Nov 2024 05:00) (91% - 97%)    Parameters below as of 18 Nov 2024 23:45  Patient On (Oxygen Delivery Method): nasal cannula  O2 Flow (L/min): 2    physical exam  constitutional NAD, AAOX3, Respiratory  lungs CTA, CVS heart RRR, GI: abdomen Soft NT, ND, BS+, skin: intact  neuro exam no focal deficit     MEDICATIONS  (STANDING):  acetaminophen     Tablet .. 650 milliGRAM(s) Oral every 8 hours  albuterol/ipratropium for Nebulization 3 milliLiter(s) Nebulizer every 6 hours  buMETAnide 1 milliGRAM(s) Oral daily  chlorhexidine 2% Cloths 1 Application(s) Topical <User Schedule>  cyclobenzaprine 10 milliGRAM(s) Oral three times a day  dextrose 5%. 1000 milliLiter(s) (50 mL/Hr) IV Continuous <Continuous>  dextrose 5%. 1000 milliLiter(s) (100 mL/Hr) IV Continuous <Continuous>  dextrose 50% Injectable 25 Gram(s) IV Push once  dextrose 50% Injectable 12.5 Gram(s) IV Push once  dextrose 50% Injectable 25 Gram(s) IV Push once  fluticasone propionate/ salmeterol 250-50 MICROgram(s) Diskus 1 Dose(s) Inhalation two times a day  folic acid 1 milliGRAM(s) Oral daily  glucagon  Injectable 1 milliGRAM(s) IntraMuscular once  heparin   Injectable 5000 Unit(s) SubCutaneous every 8 hours  insulin lispro (ADMELOG) corrective regimen sliding scale   SubCutaneous three times a day before meals  lamoTRIgine 150 milliGRAM(s) Oral daily  levothyroxine 100 MICROGram(s) Oral daily  methadone    Tablet 30 milliGRAM(s) Oral every 8 hours  metoclopramide 5 milliGRAM(s) Oral four times a day  pantoprazole    Tablet 40 milliGRAM(s) Oral two times a day  polyethylene glycol 3350 17 Gram(s) Oral two times a day  predniSONE   Tablet 40 milliGRAM(s) Oral daily  senna 2 Tablet(s) Oral at bedtime  sertraline 200 milliGRAM(s) Oral daily  sucralfate 1 Gram(s) Oral two times a day  thiamine 100 milliGRAM(s) Oral daily    MEDICATIONS  (PRN):  albuterol    90 MICROgram(s) HFA Inhaler 2 Puff(s) Inhalation every 6 hours PRN Shortness of Breath and/or Wheezing  aluminum hydroxide/magnesium hydroxide/simethicone Suspension 30 milliLiter(s) Oral every 4 hours PRN Dyspepsia  dextrose Oral Gel 15 Gram(s) Oral once PRN Blood Glucose LESS THAN 70 milliGRAM(s)/deciliter  dicyclomine 20 milliGRAM(s) Oral four times a day before meals PRN abdominal pain  melatonin 3 milliGRAM(s) Oral at bedtime PRN Insomnia  morphine   Solution 6 milliGRAM(s) Oral every 4 hours PRN Severe Pain (7 - 10)  naloxone 1 mG/mL Injection for Intranasal Use 1 milliGRAM(s) IntraNasal every 5 minutes PRN signs of narcotic overdose                        11.7   9.05  )-----------( 207      ( 19 Nov 2024 05:53 )             37.6     11-19    141  |  103  |  22[H]  ----------------------------<  78  3.8   |  22  |  1.0    Ca    8.8      19 Nov 2024 05:53  Mg     2.1     11-19    a/p  64-year-old female, former smoker with past medical history of asthma, hypothyroidism, anxiety, depression, MVP, Jaclyn-en-Y gastric bypass, appendectomy, cholecystectomy and Chronic back pain on methadone, presents to the ED following a syncopal episode last night. She also has complaints of worsening abdominal pain for 5 days.     pt is a nurse in [employer]     # new sob, improved with nebs, possibly due to asthma repeat cxr, nebs   steroids ( prednisone) started, no wheezing today   fu bubble study     # fall ,  imbalance yesterday , she fell when trying to get back to bed   PT eval     # syncope,  possible due to narcotics vs vasovagal vs arrhythmia ( in setting of prolonged QTC found on admission )   QTC has imrpvoed now repeat QTC Calculation(Bazett) 460 ms  normal echo   ct neg for pe   tele no evens   check orthostatics    # abdominal pain ,  pain management and surgery notes appreciated   cta neg   pain is controlled with morphine po , but pt is already on methadone,   pain management if actively followin     EGD non-erosive gastritis.    # opioid dependency , on methadone and soma ( chronic back pain )   on iv morphine , dw pain management they will adjust meds     # shortness of breath  pulm notes appreciated   consider echo with bubble study to rule out shunting    # DM , new dx   A1C with Estimated Average Glucose Result: 7.0: Method: Immunoassay(11.09.24 @ 06:43)  no need for treatment at this time, hgb A1c of 7 is acceptable  unless fingersticks are over 180     # hyperlipidemia , cont meds   # anxiety depression cont meds   # hypothyroidism tsh high, cont synthroid at 100 , fu as outpt for further adjustment     # chronic skin lesion with scaling, psoriasis, outpt derm fu     # leg edema, on bumex, the left ventricular diastolic function could not be assessed in the echo done this admission, pt to have a repeat study as outpt   doppler neg for dvt     # obesity , out pt fu   Height (cm): 167.6 (11-13-24 @ 10:30)  Weight (kg): 100.7 (11-13-24 @ 10:30)  BMI (kg/m2): 35.8 (11-13-24 @ 10:30)  BSA (m2): 2.09 (11-13-24 @ 10:30)    #h/o ETOH use---fatty liver--suspected thiamine and folate deficiency, cont supplement   # suspected immunodef due to chronic etoh abuse     #Progress Note Handoff    Pending : bubble study, pain management followup, discharge planning, PT eval   Family discussion: dw pt   Disposition: home when stable   code status: full code                         ABG - ( 18 Nov 2024 12:59 )  pH, Arterial: 7.50  pH, Blood: x     /  pCO2: 31    /  pO2: 105   / HCO3: 24    / Base Excess: 1.7   /  SaO2: 98.4                       pt seen and examined.     My notes supersede resident's notes in case of discrepancy       ROS: no cp, no sob, no n/v, no fever    Vital Signs Last 24 Hrs  T(C): 36.6 (19 Nov 2024 05:00), Max: 36.7 (18 Nov 2024 12:40)  T(F): 97.8 (19 Nov 2024 05:00), Max: 98 (18 Nov 2024 12:40)  HR: 51 (19 Nov 2024 05:00) (51 - 82)  BP: 112/76 (19 Nov 2024 05:00) (106/65 - 119/77)  BP(mean): 84 (18 Nov 2024 23:45) (84 - 84)  RR: 18 (19 Nov 2024 05:00) (18 - 18)  SpO2: 95% (19 Nov 2024 05:00) (91% - 97%)    Parameters below as of 18 Nov 2024 23:45  Patient On (Oxygen Delivery Method): nasal cannula  O2 Flow (L/min): 2    physical exam  constitutional NAD, AAOX3, Respiratory  lungs CTA, CVS heart RRR, GI: abdomen Soft NT, ND, BS+, skin: intact  neuro exam no focal deficit     MEDICATIONS  (STANDING):  acetaminophen     Tablet .. 650 milliGRAM(s) Oral every 8 hours  albuterol/ipratropium for Nebulization 3 milliLiter(s) Nebulizer every 6 hours  buMETAnide 1 milliGRAM(s) Oral daily  chlorhexidine 2% Cloths 1 Application(s) Topical <User Schedule>  cyclobenzaprine 10 milliGRAM(s) Oral three times a day  dextrose 5%. 1000 milliLiter(s) (50 mL/Hr) IV Continuous <Continuous>  dextrose 5%. 1000 milliLiter(s) (100 mL/Hr) IV Continuous <Continuous>  dextrose 50% Injectable 25 Gram(s) IV Push once  dextrose 50% Injectable 12.5 Gram(s) IV Push once  dextrose 50% Injectable 25 Gram(s) IV Push once  fluticasone propionate/ salmeterol 250-50 MICROgram(s) Diskus 1 Dose(s) Inhalation two times a day  folic acid 1 milliGRAM(s) Oral daily  glucagon  Injectable 1 milliGRAM(s) IntraMuscular once  heparin   Injectable 5000 Unit(s) SubCutaneous every 8 hours  insulin lispro (ADMELOG) corrective regimen sliding scale   SubCutaneous three times a day before meals  lamoTRIgine 150 milliGRAM(s) Oral daily  levothyroxine 100 MICROGram(s) Oral daily  methadone    Tablet 30 milliGRAM(s) Oral every 8 hours  metoclopramide 5 milliGRAM(s) Oral four times a day  pantoprazole    Tablet 40 milliGRAM(s) Oral two times a day  polyethylene glycol 3350 17 Gram(s) Oral two times a day  predniSONE   Tablet 40 milliGRAM(s) Oral daily  senna 2 Tablet(s) Oral at bedtime  sertraline 200 milliGRAM(s) Oral daily  sucralfate 1 Gram(s) Oral two times a day  thiamine 100 milliGRAM(s) Oral daily    MEDICATIONS  (PRN):  albuterol    90 MICROgram(s) HFA Inhaler 2 Puff(s) Inhalation every 6 hours PRN Shortness of Breath and/or Wheezing  aluminum hydroxide/magnesium hydroxide/simethicone Suspension 30 milliLiter(s) Oral every 4 hours PRN Dyspepsia  dextrose Oral Gel 15 Gram(s) Oral once PRN Blood Glucose LESS THAN 70 milliGRAM(s)/deciliter  dicyclomine 20 milliGRAM(s) Oral four times a day before meals PRN abdominal pain  melatonin 3 milliGRAM(s) Oral at bedtime PRN Insomnia  morphine   Solution 6 milliGRAM(s) Oral every 4 hours PRN Severe Pain (7 - 10)  naloxone 1 mG/mL Injection for Intranasal Use 1 milliGRAM(s) IntraNasal every 5 minutes PRN signs of narcotic overdose                        11.7   9.05  )-----------( 207      ( 19 Nov 2024 05:53 )             37.6     11-19    141  |  103  |  22[H]  ----------------------------<  78  3.8   |  22  |  1.0    Ca    8.8      19 Nov 2024 05:53  Mg     2.1     11-19    a/p  64-year-old female, former smoker with past medical history of asthma, hypothyroidism, anxiety, depression, MVP, Jaclyn-en-Y gastric bypass, appendectomy, cholecystectomy and Chronic back pain on methadone, presents to the ED following a syncopal episode last night. She also has complaints of worsening abdominal pain for 5 days.     pt is a nurse in [employer]     # new sob, improved with nebs, possibly due to asthma repeat cxr, nebs   steroids ( prednisone) started, no wheezing today   fu bubble study     # fall ,  imbalance yesterday , she fell when trying to get back to bed   PT eval     # syncope,  relative bradycardia, repeat ecg   possible due to narcotics vs vasovagal vs arrhythmia ( in setting of prolonged QTC found on admission )   QTC has imrpvoed now repeat QTC Calculation(Bazett) 460 ms  normal echo   ct neg for pe   tele no evens   check orthostatics    # abdominal pain ,  pain management and surgery notes appreciated   cta neg   pain is controlled with morphine po , but pt is already on methadone,   pain management if actively followin     EGD non-erosive gastritis.    # opioid dependency , on methadone and soma ( chronic back pain )   on iv morphine , dw pain management they will adjust meds     # shortness of breath  pulm notes appreciated   consider echo with bubble study to rule out shunting    # DM , new dx   A1C with Estimated Average Glucose Result: 7.0: Method: Immunoassay(11.09.24 @ 06:43)  no need for treatment at this time, hgb A1c of 7 is acceptable  unless fingersticks are over 180     # hyperlipidemia , cont meds   # anxiety depression cont meds   # hypothyroidism tsh high, cont synthroid at 100 , fu as outpt for further adjustment     # chronic skin lesion with scaling, psoriasis, outpt derm fu     # leg edema, on bumex, the left ventricular diastolic function could not be assessed in the echo done this admission, pt to have a repeat study as outpt   doppler neg for dvt     # obesity , out pt fu   Height (cm): 167.6 (11-13-24 @ 10:30)  Weight (kg): 100.7 (11-13-24 @ 10:30)  BMI (kg/m2): 35.8 (11-13-24 @ 10:30)  BSA (m2): 2.09 (11-13-24 @ 10:30)    #h/o ETOH use---fatty liver--suspected thiamine and folate deficiency, cont supplement   # suspected immunodef due to chronic etoh abuse     #Progress Note Handoff    Pending : bubble study, pain management followup, discharge planning, PT eval , repeat ecg   Family discussion: dw pt   Disposition: home when stable   code status: full code                         ABG - ( 18 Nov 2024 12:59 )  pH, Arterial: 7.50  pH, Blood: x     /  pCO2: 31    /  pO2: 105   / HCO3: 24    / Base Excess: 1.7   /  SaO2: 98.4                       pt seen and examined.     My notes supersede resident's notes in case of discrepancy       ROS: no cp, no sob, no n/v, no fever    Vital Signs Last 24 Hrs  T(C): 36.6 (19 Nov 2024 05:00), Max: 36.7 (18 Nov 2024 12:40)  T(F): 97.8 (19 Nov 2024 05:00), Max: 98 (18 Nov 2024 12:40)  HR: 51 (19 Nov 2024 05:00) (51 - 82)  BP: 112/76 (19 Nov 2024 05:00) (106/65 - 119/77)  BP(mean): 84 (18 Nov 2024 23:45) (84 - 84)  RR: 18 (19 Nov 2024 05:00) (18 - 18)  SpO2: 95% (19 Nov 2024 05:00) (91% - 97%)    Parameters below as of 18 Nov 2024 23:45  Patient On (Oxygen Delivery Method): nasal cannula  O2 Flow (L/min): 2    physical exam  constitutional NAD, AAOX3, Respiratory  lungs CTA, CVS heart RRR, GI: abdomen Soft NT, ND, BS+, skin: intact  neuro exam no focal deficit     MEDICATIONS  (STANDING):  acetaminophen     Tablet .. 650 milliGRAM(s) Oral every 8 hours  albuterol/ipratropium for Nebulization 3 milliLiter(s) Nebulizer every 6 hours  buMETAnide 1 milliGRAM(s) Oral daily  chlorhexidine 2% Cloths 1 Application(s) Topical <User Schedule>  cyclobenzaprine 10 milliGRAM(s) Oral three times a day  dextrose 5%. 1000 milliLiter(s) (50 mL/Hr) IV Continuous <Continuous>  dextrose 5%. 1000 milliLiter(s) (100 mL/Hr) IV Continuous <Continuous>  dextrose 50% Injectable 25 Gram(s) IV Push once  dextrose 50% Injectable 12.5 Gram(s) IV Push once  dextrose 50% Injectable 25 Gram(s) IV Push once  fluticasone propionate/ salmeterol 250-50 MICROgram(s) Diskus 1 Dose(s) Inhalation two times a day  folic acid 1 milliGRAM(s) Oral daily  glucagon  Injectable 1 milliGRAM(s) IntraMuscular once  heparin   Injectable 5000 Unit(s) SubCutaneous every 8 hours  insulin lispro (ADMELOG) corrective regimen sliding scale   SubCutaneous three times a day before meals  lamoTRIgine 150 milliGRAM(s) Oral daily  levothyroxine 100 MICROGram(s) Oral daily  methadone    Tablet 30 milliGRAM(s) Oral every 8 hours  metoclopramide 5 milliGRAM(s) Oral four times a day  pantoprazole    Tablet 40 milliGRAM(s) Oral two times a day  polyethylene glycol 3350 17 Gram(s) Oral two times a day  predniSONE   Tablet 40 milliGRAM(s) Oral daily  senna 2 Tablet(s) Oral at bedtime  sertraline 200 milliGRAM(s) Oral daily  sucralfate 1 Gram(s) Oral two times a day  thiamine 100 milliGRAM(s) Oral daily    MEDICATIONS  (PRN):  albuterol    90 MICROgram(s) HFA Inhaler 2 Puff(s) Inhalation every 6 hours PRN Shortness of Breath and/or Wheezing  aluminum hydroxide/magnesium hydroxide/simethicone Suspension 30 milliLiter(s) Oral every 4 hours PRN Dyspepsia  dextrose Oral Gel 15 Gram(s) Oral once PRN Blood Glucose LESS THAN 70 milliGRAM(s)/deciliter  dicyclomine 20 milliGRAM(s) Oral four times a day before meals PRN abdominal pain  melatonin 3 milliGRAM(s) Oral at bedtime PRN Insomnia  morphine   Solution 6 milliGRAM(s) Oral every 4 hours PRN Severe Pain (7 - 10)  naloxone 1 mG/mL Injection for Intranasal Use 1 milliGRAM(s) IntraNasal every 5 minutes PRN signs of narcotic overdose                        11.7   9.05  )-----------( 207      ( 19 Nov 2024 05:53 )             37.6     11-19    141  |  103  |  22[H]  ----------------------------<  78  3.8   |  22  |  1.0    Ca    8.8      19 Nov 2024 05:53  Mg     2.1     11-19    a/p  64-year-old female, former smoker with past medical history of asthma, hypothyroidism, anxiety, depression, MVP, Jaclyn-en-Y gastric bypass, appendectomy, cholecystectomy and Chronic back pain on methadone, presents to the ED following a syncopal episode last night. She also has complaints of worsening abdominal pain for 5 days.     pt is a nurse in [employer]     # new sob, improved with nebs, possibly due to asthma repeat cxr, nebs   steroids ( prednisone) started, no wheezing today   fu bubble study     # fall ,  felt imbalance yesterday , she fell when trying to get back to bed   PT eval   orthostatic,   ECG   check orthostatic     # syncope,  relative bradycardia, repeat ecg   possible due to narcotics vs vasovagal vs arrhythmia ( in setting of prolonged QTC found on admission )   QTC has imrpvoed now repeat QTC Calculation(Bazett) 460 ms  normal echo   ct neg for pe   tele no evens   check orthostatics    # abdominal pain ,  pain management and surgery notes appreciated   cta neg   pain is controlled with morphine po , but pt is already on methadone,   pain management if actively following     EGD non-erosive gastritis.    # opioid dependency , on methadone and soma ( chronic back pain )   on iv morphine , dw pain management they will adjust meds     # shortness of breath  pulm notes appreciated   consider echo with bubble study to rule out shunting    # DM , new dx   A1C with Estimated Average Glucose Result: 7.0: Method: Immunoassay(11.09.24 @ 06:43)  no need for treatment at this time, hgb A1c of 7 is acceptable  unless fingersticks are over 180     # hyperlipidemia , cont meds   # anxiety depression cont meds   # hypothyroidism tsh high, cont synthroid at 100 , fu as outpt for further adjustment     # chronic skin lesion with scaling, psoriasis, outpt derm fu     # leg edema, on bumex, the left ventricular diastolic function could not be assessed in the echo done this admission, pt to have a repeat study as outpt   doppler neg for dvt     # obesity , out pt fu   Height (cm): 167.6 (11-13-24 @ 10:30)  Weight (kg): 100.7 (11-13-24 @ 10:30)  BMI (kg/m2): 35.8 (11-13-24 @ 10:30)  BSA (m2): 2.09 (11-13-24 @ 10:30)    #h/o ETOH use---fatty liver--suspected thiamine and folate deficiency, cont supplement   # suspected immunodef due to chronic etoh abuse     #Progress Note Handoff    Pending : bubble study, pain management followup, discharge planning, PT eval , repeat ecg   Family discussion: dw pt   Disposition: home when stable   code status: full code                         ABG - ( 18 Nov 2024 12:59 )  pH, Arterial: 7.50  pH, Blood: x     /  pCO2: 31    /  pO2: 105   / HCO3: 24    / Base Excess: 1.7   /  SaO2: 98.4

## 2024-11-20 ENCOUNTER — TRANSCRIPTION ENCOUNTER (OUTPATIENT)
Age: 64
End: 2024-11-20

## 2024-11-20 LAB
ANION GAP SERPL CALC-SCNC: 14 MMOL/L — SIGNIFICANT CHANGE UP (ref 7–14)
BASOPHILS # BLD AUTO: 0.09 K/UL — SIGNIFICANT CHANGE UP (ref 0–0.2)
BASOPHILS NFR BLD AUTO: 0.8 % — SIGNIFICANT CHANGE UP (ref 0–1)
BUN SERPL-MCNC: 22 MG/DL — HIGH (ref 10–20)
CALCIUM SERPL-MCNC: 9.3 MG/DL — SIGNIFICANT CHANGE UP (ref 8.4–10.5)
CHLORIDE SERPL-SCNC: 103 MMOL/L — SIGNIFICANT CHANGE UP (ref 98–110)
CO2 SERPL-SCNC: 27 MMOL/L — SIGNIFICANT CHANGE UP (ref 17–32)
CREAT SERPL-MCNC: 1.2 MG/DL — SIGNIFICANT CHANGE UP (ref 0.7–1.5)
EGFR: 51 ML/MIN/1.73M2 — LOW
EOSINOPHIL # BLD AUTO: 0.11 K/UL — SIGNIFICANT CHANGE UP (ref 0–0.7)
EOSINOPHIL NFR BLD AUTO: 1 % — SIGNIFICANT CHANGE UP (ref 0–8)
GLUCOSE BLDC GLUCOMTR-MCNC: 115 MG/DL — HIGH (ref 70–99)
GLUCOSE BLDC GLUCOMTR-MCNC: 116 MG/DL — HIGH (ref 70–99)
GLUCOSE BLDC GLUCOMTR-MCNC: 130 MG/DL — HIGH (ref 70–99)
GLUCOSE BLDC GLUCOMTR-MCNC: 94 MG/DL — SIGNIFICANT CHANGE UP (ref 70–99)
GLUCOSE SERPL-MCNC: 69 MG/DL — LOW (ref 70–99)
HCT VFR BLD CALC: 39.4 % — SIGNIFICANT CHANGE UP (ref 37–47)
HGB BLD-MCNC: 12 G/DL — SIGNIFICANT CHANGE UP (ref 12–16)
IMM GRANULOCYTES NFR BLD AUTO: 1.6 % — HIGH (ref 0.1–0.3)
LYMPHOCYTES # BLD AUTO: 1.48 K/UL — SIGNIFICANT CHANGE UP (ref 1.2–3.4)
LYMPHOCYTES # BLD AUTO: 13.8 % — LOW (ref 20.5–51.1)
MAGNESIUM SERPL-MCNC: 2.3 MG/DL — SIGNIFICANT CHANGE UP (ref 1.8–2.4)
MCHC RBC-ENTMCNC: 29.9 PG — SIGNIFICANT CHANGE UP (ref 27–31)
MCHC RBC-ENTMCNC: 30.5 G/DL — LOW (ref 32–37)
MCV RBC AUTO: 98 FL — SIGNIFICANT CHANGE UP (ref 81–99)
MONOCYTES # BLD AUTO: 0.69 K/UL — HIGH (ref 0.1–0.6)
MONOCYTES NFR BLD AUTO: 6.4 % — SIGNIFICANT CHANGE UP (ref 1.7–9.3)
NEUTROPHILS # BLD AUTO: 8.21 K/UL — HIGH (ref 1.4–6.5)
NEUTROPHILS NFR BLD AUTO: 76.4 % — HIGH (ref 42.2–75.2)
NRBC # BLD: 0 /100 WBCS — SIGNIFICANT CHANGE UP (ref 0–0)
PLATELET # BLD AUTO: 255 K/UL — SIGNIFICANT CHANGE UP (ref 130–400)
PMV BLD: 10.5 FL — HIGH (ref 7.4–10.4)
POTASSIUM SERPL-MCNC: 4.2 MMOL/L — SIGNIFICANT CHANGE UP (ref 3.5–5)
POTASSIUM SERPL-SCNC: 4.2 MMOL/L — SIGNIFICANT CHANGE UP (ref 3.5–5)
RBC # BLD: 4.02 M/UL — LOW (ref 4.2–5.4)
RBC # FLD: 14.1 % — SIGNIFICANT CHANGE UP (ref 11.5–14.5)
SODIUM SERPL-SCNC: 144 MMOL/L — SIGNIFICANT CHANGE UP (ref 135–146)
WBC # BLD: 10.75 K/UL — SIGNIFICANT CHANGE UP (ref 4.8–10.8)
WBC # FLD AUTO: 10.75 K/UL — SIGNIFICANT CHANGE UP (ref 4.8–10.8)

## 2024-11-20 PROCEDURE — 99232 SBSQ HOSP IP/OBS MODERATE 35: CPT

## 2024-11-20 RX ORDER — ALBUTEROL 90 MCG
2 AEROSOL (GRAM) INHALATION
Qty: 0 | Refills: 0 | DISCHARGE
Start: 2024-11-20

## 2024-11-20 RX ORDER — LANOLIN ALCOHOL/MO/W.PET/CERES
1 CREAM (GRAM) TOPICAL
Qty: 30 | Refills: 3
Start: 2024-11-20 | End: 2025-03-19

## 2024-11-20 RX ORDER — FLUTICASONE PROPIONATE AND SALMETEROL XINAFOATE 45; 21 UG/1; UG/1
1 AEROSOL, METERED RESPIRATORY (INHALATION)
Qty: 0 | Refills: 0 | DISCHARGE
Start: 2024-11-20

## 2024-11-20 RX ORDER — IPRATROPIUM BROMIDE AND ALBUTEROL SULFATE 2.5; .5 MG/3ML; MG/3ML
3 SOLUTION RESPIRATORY (INHALATION)
Qty: 0 | Refills: 0 | DISCHARGE
Start: 2024-11-20

## 2024-11-20 RX ORDER — PREDNISONE 20 MG/1
2 TABLET ORAL
Qty: 5 | Refills: 0
Start: 2024-11-20

## 2024-11-20 RX ADMIN — Medication 100 MICROGRAM(S): at 06:26

## 2024-11-20 RX ADMIN — ACETAMINOPHEN 500MG 650 MILLIGRAM(S): 500 TABLET, COATED ORAL at 21:45

## 2024-11-20 RX ADMIN — Medication 10 MILLIGRAM(S): at 21:46

## 2024-11-20 RX ADMIN — SCOPOLAMINE 1 PATCH: 1 PATCH, EXTENDED RELEASE TRANSDERMAL at 07:00

## 2024-11-20 RX ADMIN — SUCRALFATE 1 GRAM(S): 1 TABLET ORAL at 18:59

## 2024-11-20 RX ADMIN — METHADONE HYDROCHLORIDE 30 MILLIGRAM(S): 5 TABLET ORAL at 13:46

## 2024-11-20 RX ADMIN — Medication 1 MILLIGRAM(S): at 12:43

## 2024-11-20 RX ADMIN — IPRATROPIUM BROMIDE AND ALBUTEROL SULFATE 3 MILLILITER(S): 2.5; .5 SOLUTION RESPIRATORY (INHALATION) at 15:21

## 2024-11-20 RX ADMIN — PREDNISONE 40 MILLIGRAM(S): 20 TABLET ORAL at 06:26

## 2024-11-20 RX ADMIN — ACETAMINOPHEN 500MG 650 MILLIGRAM(S): 500 TABLET, COATED ORAL at 14:20

## 2024-11-20 RX ADMIN — BUMETANIDE 1 MILLIGRAM(S): 1 TABLET ORAL at 06:26

## 2024-11-20 RX ADMIN — Medication 6 MILLIGRAM(S): at 23:37

## 2024-11-20 RX ADMIN — SUCRALFATE 1 GRAM(S): 1 TABLET ORAL at 06:26

## 2024-11-20 RX ADMIN — METOCLOPRAMIDE HYDROCHLORIDE 5 MILLIGRAM(S): 10 TABLET ORAL at 06:27

## 2024-11-20 RX ADMIN — Medication 6 MILLIGRAM(S): at 22:37

## 2024-11-20 RX ADMIN — POLYETHYLENE GLYCOL 3350 17 GRAM(S): 17 POWDER, FOR SOLUTION ORAL at 18:59

## 2024-11-20 RX ADMIN — METHADONE HYDROCHLORIDE 30 MILLIGRAM(S): 5 TABLET ORAL at 21:46

## 2024-11-20 RX ADMIN — ACETAMINOPHEN 500MG 650 MILLIGRAM(S): 500 TABLET, COATED ORAL at 23:37

## 2024-11-20 RX ADMIN — METOCLOPRAMIDE HYDROCHLORIDE 5 MILLIGRAM(S): 10 TABLET ORAL at 12:44

## 2024-11-20 RX ADMIN — METOCLOPRAMIDE HYDROCHLORIDE 5 MILLIGRAM(S): 10 TABLET ORAL at 23:42

## 2024-11-20 RX ADMIN — Medication 10 MILLIGRAM(S): at 13:53

## 2024-11-20 RX ADMIN — CHLORHEXIDINE GLUCONATE 1 APPLICATION(S): 1.2 RINSE ORAL at 06:31

## 2024-11-20 RX ADMIN — IPRATROPIUM BROMIDE AND ALBUTEROL SULFATE 3 MILLILITER(S): 2.5; .5 SOLUTION RESPIRATORY (INHALATION) at 19:44

## 2024-11-20 RX ADMIN — ACETAMINOPHEN 500MG 650 MILLIGRAM(S): 500 TABLET, COATED ORAL at 06:27

## 2024-11-20 RX ADMIN — Medication 10 MILLIGRAM(S): at 06:27

## 2024-11-20 RX ADMIN — Medication 2 TABLET(S): at 21:46

## 2024-11-20 RX ADMIN — ACETAMINOPHEN 500MG 650 MILLIGRAM(S): 500 TABLET, COATED ORAL at 13:46

## 2024-11-20 RX ADMIN — LAMOTRIGINE 150 MILLIGRAM(S): 50 TABLET, EXTENDED RELEASE ORAL at 12:43

## 2024-11-20 RX ADMIN — SERTRALINE HYDROCHLORIDE 200 MILLIGRAM(S): 100 TABLET, FILM COATED ORAL at 12:43

## 2024-11-20 RX ADMIN — PANTOPRAZOLE SODIUM 40 MILLIGRAM(S): 40 TABLET, DELAYED RELEASE ORAL at 18:59

## 2024-11-20 RX ADMIN — POLYETHYLENE GLYCOL 3350 17 GRAM(S): 17 POWDER, FOR SOLUTION ORAL at 06:31

## 2024-11-20 RX ADMIN — SCOPOLAMINE 1 PATCH: 1 PATCH, EXTENDED RELEASE TRANSDERMAL at 13:28

## 2024-11-20 RX ADMIN — METHADONE HYDROCHLORIDE 30 MILLIGRAM(S): 5 TABLET ORAL at 06:25

## 2024-11-20 RX ADMIN — IPRATROPIUM BROMIDE AND ALBUTEROL SULFATE 3 MILLILITER(S): 2.5; .5 SOLUTION RESPIRATORY (INHALATION) at 08:09

## 2024-11-20 RX ADMIN — Medication 10 MILLIGRAM(S): at 13:46

## 2024-11-20 RX ADMIN — PANTOPRAZOLE SODIUM 40 MILLIGRAM(S): 40 TABLET, DELAYED RELEASE ORAL at 06:27

## 2024-11-20 RX ADMIN — Medication 100 MILLIGRAM(S): at 12:43

## 2024-11-20 RX ADMIN — METOCLOPRAMIDE HYDROCHLORIDE 5 MILLIGRAM(S): 10 TABLET ORAL at 18:59

## 2024-11-20 NOTE — ADVANCED PRACTICE NURSE CONSULT - RECOMMEDATIONS
Cleanse rash to right lower leg with soap and water.   Pat dry, apply Dermaphor twice a day and prn for soiling.   Recommend follow up with Dermatology for further recommendations    Maintain pressure injury prevention.   Keep skin clean.   Maintain incontinence care.   Monitor skin for changes and notify provider   Rest of care per primary team  Case discussed with primary RN

## 2024-11-20 NOTE — PROGRESS NOTE ADULT - SUBJECTIVE AND OBJECTIVE BOX
SUBJECTIVE/OVERNIGHT EVENTS  Today is hospital day 12d. This morning patient was seen and examined at bedside, resting comfortably in bed. No acute or major events overnight.    HOSPITAL COURSE      CODE STATUS:    FAMILY COMMUNICATION  Contact date:  Name of person contacted:  Relationship to patient:  Communication details:    MEDICATIONS  STANDING MEDICATIONS  acetaminophen     Tablet .. 650 milliGRAM(s) Oral every 8 hours  albuterol/ipratropium for Nebulization 3 milliLiter(s) Nebulizer every 6 hours  bisacodyl Suppository 10 milliGRAM(s) Rectal once  buMETAnide 1 milliGRAM(s) Oral daily  chlorhexidine 2% Cloths 1 Application(s) Topical <User Schedule>  cyclobenzaprine 10 milliGRAM(s) Oral three times a day  dextrose 5%. 1000 milliLiter(s) IV Continuous <Continuous>  dextrose 5%. 1000 milliLiter(s) IV Continuous <Continuous>  dextrose 50% Injectable 25 Gram(s) IV Push once  dextrose 50% Injectable 12.5 Gram(s) IV Push once  dextrose 50% Injectable 25 Gram(s) IV Push once  fluticasone propionate/ salmeterol 250-50 MICROgram(s) Diskus 1 Dose(s) Inhalation two times a day  folic acid 1 milliGRAM(s) Oral daily  glucagon  Injectable 1 milliGRAM(s) IntraMuscular once  heparin   Injectable 5000 Unit(s) SubCutaneous every 8 hours  insulin lispro (ADMELOG) corrective regimen sliding scale   SubCutaneous three times a day before meals  lamoTRIgine 150 milliGRAM(s) Oral daily  levothyroxine 100 MICROGram(s) Oral daily  methadone    Tablet 30 milliGRAM(s) Oral every 8 hours  metoclopramide 5 milliGRAM(s) Oral four times a day  pantoprazole    Tablet 40 milliGRAM(s) Oral two times a day  polyethylene glycol 3350 17 Gram(s) Oral two times a day  predniSONE   Tablet 40 milliGRAM(s) Oral daily  senna 2 Tablet(s) Oral at bedtime  sertraline 200 milliGRAM(s) Oral daily  sucralfate 1 Gram(s) Oral two times a day  thiamine 100 milliGRAM(s) Oral daily    PRN MEDICATIONS  albuterol    90 MICROgram(s) HFA Inhaler 2 Puff(s) Inhalation every 6 hours PRN  aluminum hydroxide/magnesium hydroxide/simethicone Suspension 30 milliLiter(s) Oral every 4 hours PRN  bisacodyl 5 milliGRAM(s) Oral every 12 hours PRN  dextrose Oral Gel 15 Gram(s) Oral once PRN  dicyclomine 20 milliGRAM(s) Oral four times a day before meals PRN  melatonin 3 milliGRAM(s) Oral at bedtime PRN  morphine   Solution 6 milliGRAM(s) Oral every 4 hours PRN  naloxone 1 mG/mL Injection for Intranasal Use 1 milliGRAM(s) IntraNasal every 5 minutes PRN    VITALS  T(F): 98.1 (11-20-24 @ 05:00), Max: 98.4 (11-19-24 @ 14:03)  HR: 77 (11-20-24 @ 05:00) (72 - 87)  BP: 111/68 (11-20-24 @ 05:00) (95/56 - 147/69)  RR: 18 (11-20-24 @ 05:00) (18 - 18)  SpO2: 93% (11-20-24 @ 05:00) (88% - 98%)  POCT Blood Glucose.: 94 mg/dL (11-20-24 @ 07:52)  POCT Blood Glucose.: 305 mg/dL (11-19-24 @ 21:05)  POCT Blood Glucose.: 144 mg/dL (11-19-24 @ 17:13)  POCT Blood Glucose.: 173 mg/dL (11-19-24 @ 12:19)    PHYSICAL EXAM  GENERAL  (x  ) NAD, lying in bed comfortably     (  ) obtunded     (  ) lethargic     (  ) somnolent    HEAD  ( x ) Atraumatic     (  ) hematoma     (  ) laceration (specify location:       )     NECK  (x  ) Supple     (  ) neck stiffness     (  ) nuchal rigidity     (  )  no JVD     (  ) JVD present ( -- cm)    HEART  Rate -->  ( x ) normal rate    (  ) bradycardic    (  ) tachycardic  Rhythm -->  (  x) regular    (  ) regularly irregular    (  ) irregularly irregular  Murmurs -->  (  ) normal s1/s2    (  ) systolic murmur    (  ) diastolic murmur    (  ) continuous murmur     (  ) S3 present    (  ) S4 present    LUNGS  ( x )Unlabored respirations     (  ) tachypnea  (  ) B/L air entry     (  ) decreased breath sounds in:  (location     )    (  ) no adventitious sound     (  ) crackles     (  ) wheezing      (  ) rhonchi      (specify location:       )  (  ) chest wall tenderness (specify location:       )    ABDOMEN  (x  ) Soft     (  ) tense   |   (  ) nondistended     (  ) distended   |   (  ) +BS     (  ) hypoactive bowel sounds     (  ) hyperactive bowel sounds  (  ) nontender     (  ) RUQ tenderness     (  ) RLQ tenderness     (  ) LLQ tenderness     (  ) epigastric tenderness     (  ) diffuse tenderness  (  ) Splenomegaly      (  ) Hepatomegaly      (  ) Jaundice     (  ) ecchymosis     EXTREMITIES  (  x) Normal     (  ) Rash     (  ) ecchymosis     (  ) varicose veins      (  ) pitting edema     (  ) non-pitting edema   (  ) ulceration     (  ) gangrene:     (location:     )    NERVOUS SYSTEM  (x  ) A&Ox3     (  ) confused     (  ) lethargic  CN II-XII:     (  ) Intact     (  ) focal deficits  (Specify:     )   Upper extremities:     (  ) strength X/5     (  ) focal deficit (specify:    )  Lower extremities:     (  ) strength  X/5    (  ) focal deficit (specify:    )    SKIN  (x  ) No rashes or lesions     (  ) maculopapular rash     (  ) pustules     (  ) vesicles     (  ) ulcer     (  ) ecchymosis     (specify location:     )    (  ) Indwelling Hendrix Catheter   Date insterted:    Reason (  ) Critical illness     (  ) urinary retention    (  ) Accurate Ins/Outs Monitoring     (  ) CMO patient    (  ) Central Line  Date inserted:  Location: (  ) Right IJ   (  ) Left IJ   (  ) Right Fem   (  ) Left Fem    (  ) SPC  (  ) pigtail  (  ) PEG tube  (  ) colostomy  (  ) jejunostomy  (  ) U-Dall    LABS             12.0   10.75 )-----------( 255      ( 11-20-24 @ 04:33 )             39.4     144  |  103  |  22  -------------------------<  69   11-20-24 @ 04:33  4.2  |  27  |  1.2    Ca      9.3     11-20-24 @ 04:33  Mg     2.3     11-20-24 @ 04:33          Urinalysis Basic - ( 20 Nov 2024 04:33 )    Color: x / Appearance: x / SG: x / pH: x  Gluc: 69 mg/dL / Ketone: x  / Bili: x / Urobili: x   Blood: x / Protein: x / Nitrite: x   Leuk Esterase: x / RBC: x / WBC x   Sq Epi: x / Non Sq Epi: x / Bacteria: x      ABG - ( 18 Nov 2024 12:59 )  pH, Arterial: 7.50  pH, Blood: x     /  pCO2: 31    /  pO2: 105   / HCO3: 24    / Base Excess: 1.7   /  SaO2: 98.4                IMAGING

## 2024-11-20 NOTE — PROGRESS NOTE ADULT - ASSESSMENT
64-year-old female, former smoker with past medical history of asthma, hypothyroidism, anxiety, depression, MVP, Jaclyn-en-Y gastric bypass, appendectomy, cholecystectomy and Chronic back pain on methadone, presents to the ED following a syncopal episode last night. She also has complaints of worsening abdominal pain for 5 days.       Plan    #Abdominal pain - unclear etiology  #Nausea, Vomiting, Decreased appetite   #h/o Cholecystectomy/ appendectomy  #h/o Jaclyn en Y bypass   - LFTs stable, lactate 3 --> 2.4-->3.0  - CT abdomen - Hepatomegaly   - EGD/ Colonoscopy (May '23) - Non erosive gastritis  - c/w pantoprazole   - right lower abdominal discomfort, possible constipation (small bowel movement on 11/10)  - cw miralax senna  - a1c 7.0, no previous diagnosis of DM, possible gastroparesis, start SSI, f/u FS   - EGD- nonerosive gastritis in stomach  - CTa abdomen pending read  - pain control per pain management. c/w po morphine 6mg q4h prn. f/u pain management about dc pain regimen  - s/p injection, did not improve symtpoms  - bariatric surgery recs appreciated. dc on metformin 500mg bid. no immediate interventions    #Shortness of breath   #h/o Asthma   #h/o MVP (follows Dr. Negrete)  #h/o chronic bilateral LE swelling   #HTN  - CTA chest - no PE  - TTE --> echo 60-65% EF  - duplex neg   - requiring 7L NC 11/18 am. b/l wheezing on exam  - duo nebs  - c/w prednisone 40 daily for 3 more days. then 20mg for 3 days  - c/w albuterol prn   - c/w advair and symbicort  - peak flow  - TTE with bubbles 11/19: suspected pulmonary shunt. recall pulm    #Syncope likely vasovagal vs orthostatic 2/2 dehydration  - ECG with prolonged qtc, no obvious ischemia, trops x2 negative  - no events on tele  - tele discontinued    #Hypothyroidism,   - home meds - 88mcg thyroxine   - TSH 9.88  - increased to 100 mcg synthroid     #anxiety, depression,  - c/w lamotrigine and zoloft      #Chronic back pain   - on methadone 30mg   -  po morphine 6mg q4h PRN as per pain management    #Alcohol use   - 2-3 drinks per day  - CIWA monitoring   - c/w b12 and folate   - started thiamine     #MISC  VTE ppx - heparin SC  GI ppx - pantoprazole (home meds)  Activity as tolerated  regular diet per pt requests  Full Code   64-year-old female, former smoker with past medical history of asthma, hypothyroidism, anxiety, depression, MVP, Jaclyn-en-Y gastric bypass, appendectomy, cholecystectomy and Chronic back pain on methadone, presents to the ED following a syncopal episode last night. She also has complaints of worsening abdominal pain for 5 days.       Plan    #Abdominal pain - unclear etiology  #Nausea, Vomiting, Decreased appetite   #h/o Cholecystectomy/ appendectomy  #h/o Jaclyn en Y bypass   - LFTs stable, lactate 3 --> 2.4-->3.0  - CT abdomen - Hepatomegaly   - EGD/ Colonoscopy (May '23) - Non erosive gastritis  - c/w pantoprazole   - right lower abdominal discomfort, possible constipation (small bowel movement on 11/10)  - cw miralax senna  - a1c 7.0, no previous diagnosis of DM, possible gastroparesis, start SSI, f/u FS   - EGD- nonerosive gastritis in stomach  - CTa abdomen pending read  - pain control per pain management. c/w po morphine 6mg q4h prn. f/u pain management about dc pain regimen  - s/p injection, did not improve symtpoms  - bariatric surgery recs appreciated. dc on metformin 500mg bid. no immediate interventions    #Shortness of breath   #h/o Asthma   #h/o MVP (follows Dr. Negrete)  #h/o chronic bilateral LE swelling   #HTN  - CTA chest - no PE  - TTE --> echo 60-65% EF  - duplex neg   - requiring 7L NC 11/18 am. b/l wheezing on exam  - duo nebs  - c/w prednisone 40 daily for 3 more days. then 20mg for 3 days  - c/w albuterol prn   - c/w advair and symbicort  - peak flow  - TTE with bubbles 11/19: suspected pulmonary shunt. per pulm f/u OP    #Syncope likely vasovagal vs orthostatic 2/2 dehydration  - ECG with prolonged qtc, no obvious ischemia, trops x2 negative  - no events on tele  - tele discontinued    #Hypothyroidism,   - home meds - 88mcg thyroxine   - TSH 9.88  - increased to 100 mcg synthroid     #anxiety, depression,  - c/w lamotrigine and zoloft      #Chronic back pain   - on methadone 30mg   -  po morphine 6mg q4h PRN as per pain management    #Alcohol use   - 2-3 drinks per day  - CIWA monitoring   - c/w b12 and folate   - started thiamine     #MISC  VTE ppx - heparin SC  GI ppx - pantoprazole (home meds)  Activity as tolerated  regular diet per pt requests  Full Code

## 2024-11-20 NOTE — ADVANCED PRACTICE NURSE CONSULT - ASSESSMENT
Patient received lying in bed. Alert and oriented. Consulted for rash to right lower leg.    Plaque to right lower leg, no exudate, no odor. Patient states has history of psoriasis and cannot remember name of medication she uses for it.

## 2024-11-20 NOTE — PROGRESS NOTE ADULT - SUBJECTIVE AND OBJECTIVE BOX
LANDON JULIET  64y  Female      Patient is a 64y old  Female who presents with a chief complaint of Abdominal pain.     INTERVAL HPI/OVERNIGHT EVENTS:      ******************************* REVIEW OF SYSTEMS:**********************************************  Feeling better.,   All other review of systems negative    *********************** VITALS ******************************************    T(F): 98.1 (11-20-24 @ 05:00)  HR: 77 (11-20-24 @ 05:00) (72 - 87)  BP: 111/68 (11-20-24 @ 05:00) (95/56 - 147/69)  RR: 18 (11-20-24 @ 05:00) (18 - 18)  SpO2: 93% (11-20-24 @ 05:00) (88% - 98%)            ******************************** PHYSICAL EXAM:**************************************************  GENERAL: NAD    PSYCH: no agitation, baseline mentation  HEENT:     NERVOUS SYSTEM:  Alert & Oriented X3,     PULMONARY: SHARIF, CTA    CARDIOVASCULAR: S1S2 RRR    GI: Soft, NT, ND; BS present.    EXTREMITIES:  2+ Peripheral Pulses, No clubbing, cyanosis, or edema    LYMPH: No lymphadenopathy noted    SKIN: No rashes or lesions      **************************** LABS *******************************************************                          12.0   10.75 )-----------( 255      ( 20 Nov 2024 04:33 )             39.4     11-20    144  |  103  |  22[H]  ----------------------------<  69[L]  4.2   |  27  |  1.2    Ca    9.3      20 Nov 2024 04:33  Mg     2.3     11-20      ABG - ( 18 Nov 2024 12:59 )  pH, Arterial: 7.50  pH, Blood: x     /  pCO2: 31    /  pO2: 105   / HCO3: 24    / Base Excess: 1.7   /  SaO2: 98.4              Urinalysis Basic - ( 20 Nov 2024 04:33 )    Color: x / Appearance: x / SG: x / pH: x  Gluc: 69 mg/dL / Ketone: x  / Bili: x / Urobili: x   Blood: x / Protein: x / Nitrite: x   Leuk Esterase: x / RBC: x / WBC x   Sq Epi: x / Non Sq Epi: x / Bacteria: x        Lactate Trend  11-19 @ 05:53 Lactate:2.0         CAPILLARY BLOOD GLUCOSE      POCT Blood Glucose.: 130 mg/dL (20 Nov 2024 11:20)          **************************Active Medications *******************************************  penicillin (Anaphylaxis)      acetaminophen     Tablet .. 650 milliGRAM(s) Oral every 8 hours  albuterol    90 MICROgram(s) HFA Inhaler 2 Puff(s) Inhalation every 6 hours PRN  albuterol/ipratropium for Nebulization 3 milliLiter(s) Nebulizer every 6 hours  aluminum hydroxide/magnesium hydroxide/simethicone Suspension 30 milliLiter(s) Oral every 4 hours PRN  bisacodyl 5 milliGRAM(s) Oral every 12 hours PRN  bisacodyl Suppository 10 milliGRAM(s) Rectal once  buMETAnide 1 milliGRAM(s) Oral daily  chlorhexidine 2% Cloths 1 Application(s) Topical <User Schedule>  cyclobenzaprine 10 milliGRAM(s) Oral three times a day  dextrose 5%. 1000 milliLiter(s) IV Continuous <Continuous>  dextrose 5%. 1000 milliLiter(s) IV Continuous <Continuous>  dextrose 50% Injectable 25 Gram(s) IV Push once  dextrose 50% Injectable 12.5 Gram(s) IV Push once  dextrose 50% Injectable 25 Gram(s) IV Push once  dextrose Oral Gel 15 Gram(s) Oral once PRN  dicyclomine 20 milliGRAM(s) Oral four times a day before meals PRN  fluticasone propionate/ salmeterol 250-50 MICROgram(s) Diskus 1 Dose(s) Inhalation two times a day  folic acid 1 milliGRAM(s) Oral daily  glucagon  Injectable 1 milliGRAM(s) IntraMuscular once  heparin   Injectable 5000 Unit(s) SubCutaneous every 8 hours  insulin lispro (ADMELOG) corrective regimen sliding scale   SubCutaneous three times a day before meals  lamoTRIgine 150 milliGRAM(s) Oral daily  levothyroxine 100 MICROGram(s) Oral daily  melatonin 3 milliGRAM(s) Oral at bedtime PRN  methadone    Tablet 30 milliGRAM(s) Oral every 8 hours  metoclopramide 5 milliGRAM(s) Oral four times a day  morphine   Solution 6 milliGRAM(s) Oral every 4 hours PRN  naloxone 1 mG/mL Injection for Intranasal Use 1 milliGRAM(s) IntraNasal every 5 minutes PRN  pantoprazole    Tablet 40 milliGRAM(s) Oral two times a day  polyethylene glycol 3350 17 Gram(s) Oral two times a day  predniSONE   Tablet 40 milliGRAM(s) Oral daily  senna 2 Tablet(s) Oral at bedtime  sertraline 200 milliGRAM(s) Oral daily  sucralfate 1 Gram(s) Oral two times a day  thiamine 100 milliGRAM(s) Oral daily      ***************************************************  RADIOLOGY & ADDITIONAL TESTS:    Imaging Personally Reviewed:  [ ] YES  [ ] NO    HEALTH ISSUES - PROBLEM Dx:

## 2024-11-20 NOTE — PROGRESS NOTE ADULT - ASSESSMENT
64-year-old female, former smoker with past medical history of asthma, hypothyroidism, anxiety, depression, MVP, Jaclyn-en-Y gastric bypass, appendectomy, cholecystectomy and Chronic back pain on methadone, presents to the ED following a syncopal episode last night. She also has complaints of worsening abdominal pain for 5 days.     # Abdominal pain with   #h/o Jaclyn en Y bypass   - unclear etiology.   - last Lactate 2.0   - CT abdomen - Hepatomegaly   - EGD/ Colonoscopy (May '23) - Non erosive gastritis  - c/w pantoprazole   - EGD- nonerosive gastritis in stomach  - CTa abdomen pending read  - pain control per pain management. c/w po morphine 6mg q4h prn.   f/u pain management about dc pain regimen  - bariatric surgery recs appreciated.     # Acute Hypoxic Respiratory Failure likely due to   # Asthma exacerbation.    #h/o MVP (follows Dr. Negrete)  #HTN  - CTA chest - no PE  - TTE --> echo 60-65% EF  - currently on O2 2L NC > titrate off as tolerated.   - duo nebs  - c/w prednisone 40 daily for 3 more days. then 20mg for 3 days  - c/w albuterol prn   - c/w advair and symbicort  - TTE with bubbles 11/19: suspected pulmonary shunt. per pulm f/u OP    #Syncope likely vasovagal vs dehydration  - ECG with prolonged qtc, no obvious ischemia, trops x2 negative  - no events on tele  - tele discontinued, Neg Orthostatics .     #Hypothyroidism,   - home meds - 88mcg thyroxine   - TSH 9.88  - increased to 100 mcg synthroid     #anxiety, depression,  - c/w lamotrigine and zoloft      #Chronic back pain   - on methadone 30mg   -  po morphine 6mg q4h PRN as per pain management    #Alcohol use disorder  # Suspected Thiamine deficiency   - 2-3 drinks per day  - c/w b12 and folate   - started thiamine     # Obesity >    #MISC  VTE ppx - heparin SC  GI ppx - pantoprazole (home meds)  Activity as tolerated  regular diet per pt requests  Full Code      Pending: AHRF/ PT/ pain control   Dispo: TBD   Plan d/w the patient at bedside.

## 2024-11-21 LAB
AMMONIA BLD-MCNC: 36 UMOL/L — SIGNIFICANT CHANGE UP (ref 11–55)
ANION GAP SERPL CALC-SCNC: 12 MMOL/L — SIGNIFICANT CHANGE UP (ref 7–14)
ANION GAP SERPL CALC-SCNC: 13 MMOL/L — SIGNIFICANT CHANGE UP (ref 7–14)
BASOPHILS # BLD AUTO: 0.09 K/UL — SIGNIFICANT CHANGE UP (ref 0–0.2)
BASOPHILS # BLD AUTO: 0.09 K/UL — SIGNIFICANT CHANGE UP (ref 0–0.2)
BASOPHILS NFR BLD AUTO: 1 % — SIGNIFICANT CHANGE UP (ref 0–1)
BASOPHILS NFR BLD AUTO: 1.1 % — HIGH (ref 0–1)
BUN SERPL-MCNC: 18 MG/DL — SIGNIFICANT CHANGE UP (ref 10–20)
BUN SERPL-MCNC: 19 MG/DL — SIGNIFICANT CHANGE UP (ref 10–20)
CALCIUM SERPL-MCNC: 8.6 MG/DL — SIGNIFICANT CHANGE UP (ref 8.4–10.5)
CALCIUM SERPL-MCNC: 8.8 MG/DL — SIGNIFICANT CHANGE UP (ref 8.4–10.5)
CHLORIDE SERPL-SCNC: 103 MMOL/L — SIGNIFICANT CHANGE UP (ref 98–110)
CHLORIDE SERPL-SCNC: 105 MMOL/L — SIGNIFICANT CHANGE UP (ref 98–110)
CO2 SERPL-SCNC: 26 MMOL/L — SIGNIFICANT CHANGE UP (ref 17–32)
CO2 SERPL-SCNC: 28 MMOL/L — SIGNIFICANT CHANGE UP (ref 17–32)
CREAT SERPL-MCNC: 1.2 MG/DL — SIGNIFICANT CHANGE UP (ref 0.7–1.5)
CREAT SERPL-MCNC: 1.3 MG/DL — SIGNIFICANT CHANGE UP (ref 0.7–1.5)
EGFR: 46 ML/MIN/1.73M2 — LOW
EGFR: 51 ML/MIN/1.73M2 — LOW
EOSINOPHIL # BLD AUTO: 0.3 K/UL — SIGNIFICANT CHANGE UP (ref 0–0.7)
EOSINOPHIL # BLD AUTO: 0.5 K/UL — SIGNIFICANT CHANGE UP (ref 0–0.7)
EOSINOPHIL NFR BLD AUTO: 3.3 % — SIGNIFICANT CHANGE UP (ref 0–8)
EOSINOPHIL NFR BLD AUTO: 6.3 % — SIGNIFICANT CHANGE UP (ref 0–8)
GLUCOSE BLDC GLUCOMTR-MCNC: 108 MG/DL — HIGH (ref 70–99)
GLUCOSE BLDC GLUCOMTR-MCNC: 129 MG/DL — HIGH (ref 70–99)
GLUCOSE BLDC GLUCOMTR-MCNC: 132 MG/DL — HIGH (ref 70–99)
GLUCOSE BLDC GLUCOMTR-MCNC: 170 MG/DL — HIGH (ref 70–99)
GLUCOSE BLDC GLUCOMTR-MCNC: 184 MG/DL — HIGH (ref 70–99)
GLUCOSE BLDC GLUCOMTR-MCNC: 80 MG/DL — SIGNIFICANT CHANGE UP (ref 70–99)
GLUCOSE SERPL-MCNC: 101 MG/DL — HIGH (ref 70–99)
GLUCOSE SERPL-MCNC: 77 MG/DL — SIGNIFICANT CHANGE UP (ref 70–99)
HCT VFR BLD CALC: 37.5 % — SIGNIFICANT CHANGE UP (ref 37–47)
HCT VFR BLD CALC: 37.8 % — SIGNIFICANT CHANGE UP (ref 37–47)
HGB BLD-MCNC: 11.5 G/DL — LOW (ref 12–16)
HGB BLD-MCNC: 11.8 G/DL — LOW (ref 12–16)
IMM GRANULOCYTES NFR BLD AUTO: 1.4 % — HIGH (ref 0.1–0.3)
IMM GRANULOCYTES NFR BLD AUTO: 1.6 % — HIGH (ref 0.1–0.3)
LYMPHOCYTES # BLD AUTO: 1.24 K/UL — SIGNIFICANT CHANGE UP (ref 1.2–3.4)
LYMPHOCYTES # BLD AUTO: 1.53 K/UL — SIGNIFICANT CHANGE UP (ref 1.2–3.4)
LYMPHOCYTES # BLD AUTO: 13.8 % — LOW (ref 20.5–51.1)
LYMPHOCYTES # BLD AUTO: 19.3 % — LOW (ref 20.5–51.1)
MAGNESIUM SERPL-MCNC: 2.2 MG/DL — SIGNIFICANT CHANGE UP (ref 1.8–2.4)
MAGNESIUM SERPL-MCNC: 2.3 MG/DL — SIGNIFICANT CHANGE UP (ref 1.8–2.4)
MCHC RBC-ENTMCNC: 30.2 PG — SIGNIFICANT CHANGE UP (ref 27–31)
MCHC RBC-ENTMCNC: 30.3 PG — SIGNIFICANT CHANGE UP (ref 27–31)
MCHC RBC-ENTMCNC: 30.7 G/DL — LOW (ref 32–37)
MCHC RBC-ENTMCNC: 31.2 G/DL — LOW (ref 32–37)
MCV RBC AUTO: 96.9 FL — SIGNIFICANT CHANGE UP (ref 81–99)
MCV RBC AUTO: 98.4 FL — SIGNIFICANT CHANGE UP (ref 81–99)
MONOCYTES # BLD AUTO: 0.57 K/UL — SIGNIFICANT CHANGE UP (ref 0.1–0.6)
MONOCYTES # BLD AUTO: 0.58 K/UL — SIGNIFICANT CHANGE UP (ref 0.1–0.6)
MONOCYTES NFR BLD AUTO: 6.4 % — SIGNIFICANT CHANGE UP (ref 1.7–9.3)
MONOCYTES NFR BLD AUTO: 7.3 % — SIGNIFICANT CHANGE UP (ref 1.7–9.3)
NEUTROPHILS # BLD AUTO: 5.1 K/UL — SIGNIFICANT CHANGE UP (ref 1.4–6.5)
NEUTROPHILS # BLD AUTO: 6.64 K/UL — HIGH (ref 1.4–6.5)
NEUTROPHILS NFR BLD AUTO: 64.4 % — SIGNIFICANT CHANGE UP (ref 42.2–75.2)
NEUTROPHILS NFR BLD AUTO: 74.1 % — SIGNIFICANT CHANGE UP (ref 42.2–75.2)
NRBC # BLD: 0 /100 WBCS — SIGNIFICANT CHANGE UP (ref 0–0)
NRBC # BLD: 0 /100 WBCS — SIGNIFICANT CHANGE UP (ref 0–0)
PLATELET # BLD AUTO: 193 K/UL — SIGNIFICANT CHANGE UP (ref 130–400)
PLATELET # BLD AUTO: 223 K/UL — SIGNIFICANT CHANGE UP (ref 130–400)
PMV BLD: 10.6 FL — HIGH (ref 7.4–10.4)
PMV BLD: 9.7 FL — SIGNIFICANT CHANGE UP (ref 7.4–10.4)
POTASSIUM SERPL-MCNC: 3.8 MMOL/L — SIGNIFICANT CHANGE UP (ref 3.5–5)
POTASSIUM SERPL-MCNC: 4 MMOL/L — SIGNIFICANT CHANGE UP (ref 3.5–5)
POTASSIUM SERPL-SCNC: 3.8 MMOL/L — SIGNIFICANT CHANGE UP (ref 3.5–5)
POTASSIUM SERPL-SCNC: 4 MMOL/L — SIGNIFICANT CHANGE UP (ref 3.5–5)
RBC # BLD: 3.81 M/UL — LOW (ref 4.2–5.4)
RBC # BLD: 3.9 M/UL — LOW (ref 4.2–5.4)
RBC # FLD: 14 % — SIGNIFICANT CHANGE UP (ref 11.5–14.5)
RBC # FLD: 14.2 % — SIGNIFICANT CHANGE UP (ref 11.5–14.5)
SODIUM SERPL-SCNC: 142 MMOL/L — SIGNIFICANT CHANGE UP (ref 135–146)
SODIUM SERPL-SCNC: 145 MMOL/L — SIGNIFICANT CHANGE UP (ref 135–146)
WBC # BLD: 7.93 K/UL — SIGNIFICANT CHANGE UP (ref 4.8–10.8)
WBC # BLD: 8.97 K/UL — SIGNIFICANT CHANGE UP (ref 4.8–10.8)
WBC # FLD AUTO: 7.93 K/UL — SIGNIFICANT CHANGE UP (ref 4.8–10.8)
WBC # FLD AUTO: 8.97 K/UL — SIGNIFICANT CHANGE UP (ref 4.8–10.8)

## 2024-11-21 PROCEDURE — 70450 CT HEAD/BRAIN W/O DYE: CPT | Mod: 26

## 2024-11-21 PROCEDURE — 99232 SBSQ HOSP IP/OBS MODERATE 35: CPT

## 2024-11-21 RX ORDER — LORAZEPAM 2 MG/1
0.5 TABLET ORAL ONCE
Refills: 0 | Status: DISCONTINUED | OUTPATIENT
Start: 2024-11-21 | End: 2024-11-21

## 2024-11-21 RX ADMIN — METHADONE HYDROCHLORIDE 30 MILLIGRAM(S): 5 TABLET ORAL at 21:36

## 2024-11-21 RX ADMIN — METOCLOPRAMIDE HYDROCHLORIDE 5 MILLIGRAM(S): 10 TABLET ORAL at 05:21

## 2024-11-21 RX ADMIN — Medication 2 TABLET(S): at 21:36

## 2024-11-21 RX ADMIN — ACETAMINOPHEN 500MG 650 MILLIGRAM(S): 500 TABLET, COATED ORAL at 21:36

## 2024-11-21 RX ADMIN — ACETAMINOPHEN 500MG 650 MILLIGRAM(S): 500 TABLET, COATED ORAL at 13:27

## 2024-11-21 RX ADMIN — Medication 5000 UNIT(S): at 13:31

## 2024-11-21 RX ADMIN — Medication 10 MILLIGRAM(S): at 13:37

## 2024-11-21 RX ADMIN — BUMETANIDE 1 MILLIGRAM(S): 1 TABLET ORAL at 05:21

## 2024-11-21 RX ADMIN — Medication 100 MICROGRAM(S): at 05:21

## 2024-11-21 RX ADMIN — Medication 1: at 11:35

## 2024-11-21 RX ADMIN — Medication 6 MILLIGRAM(S): at 20:05

## 2024-11-21 RX ADMIN — METHADONE HYDROCHLORIDE 30 MILLIGRAM(S): 5 TABLET ORAL at 13:27

## 2024-11-21 RX ADMIN — POLYETHYLENE GLYCOL 3350 17 GRAM(S): 17 POWDER, FOR SOLUTION ORAL at 17:34

## 2024-11-21 RX ADMIN — LORAZEPAM 0.5 MILLIGRAM(S): 2 TABLET ORAL at 17:34

## 2024-11-21 RX ADMIN — Medication 6 MILLIGRAM(S): at 23:45

## 2024-11-21 RX ADMIN — PANTOPRAZOLE SODIUM 40 MILLIGRAM(S): 40 TABLET, DELAYED RELEASE ORAL at 05:21

## 2024-11-21 RX ADMIN — ACETAMINOPHEN 500MG 650 MILLIGRAM(S): 500 TABLET, COATED ORAL at 22:17

## 2024-11-21 RX ADMIN — Medication 10 MILLIGRAM(S): at 21:36

## 2024-11-21 RX ADMIN — PREDNISONE 40 MILLIGRAM(S): 20 TABLET ORAL at 05:22

## 2024-11-21 RX ADMIN — METHADONE HYDROCHLORIDE 30 MILLIGRAM(S): 5 TABLET ORAL at 05:22

## 2024-11-21 RX ADMIN — SUCRALFATE 1 GRAM(S): 1 TABLET ORAL at 05:22

## 2024-11-21 RX ADMIN — PANTOPRAZOLE SODIUM 40 MILLIGRAM(S): 40 TABLET, DELAYED RELEASE ORAL at 17:35

## 2024-11-21 RX ADMIN — Medication 6 MILLIGRAM(S): at 10:23

## 2024-11-21 RX ADMIN — METOCLOPRAMIDE HYDROCHLORIDE 5 MILLIGRAM(S): 10 TABLET ORAL at 17:34

## 2024-11-21 RX ADMIN — Medication 6 MILLIGRAM(S): at 09:34

## 2024-11-21 RX ADMIN — IPRATROPIUM BROMIDE AND ALBUTEROL SULFATE 3 MILLILITER(S): 2.5; .5 SOLUTION RESPIRATORY (INHALATION) at 03:55

## 2024-11-21 RX ADMIN — Medication 1 MILLIGRAM(S): at 11:39

## 2024-11-21 RX ADMIN — IPRATROPIUM BROMIDE AND ALBUTEROL SULFATE 3 MILLILITER(S): 2.5; .5 SOLUTION RESPIRATORY (INHALATION) at 14:25

## 2024-11-21 RX ADMIN — SUCRALFATE 1 GRAM(S): 1 TABLET ORAL at 17:34

## 2024-11-21 RX ADMIN — Medication 6 MILLIGRAM(S): at 19:30

## 2024-11-21 RX ADMIN — SERTRALINE HYDROCHLORIDE 200 MILLIGRAM(S): 100 TABLET, FILM COATED ORAL at 11:39

## 2024-11-21 RX ADMIN — Medication 100 MILLIGRAM(S): at 11:40

## 2024-11-21 RX ADMIN — Medication 10 MILLIGRAM(S): at 05:21

## 2024-11-21 RX ADMIN — CHLORHEXIDINE GLUCONATE 1 APPLICATION(S): 1.2 RINSE ORAL at 05:22

## 2024-11-21 RX ADMIN — LAMOTRIGINE 150 MILLIGRAM(S): 50 TABLET, EXTENDED RELEASE ORAL at 11:40

## 2024-11-21 RX ADMIN — IPRATROPIUM BROMIDE AND ALBUTEROL SULFATE 3 MILLILITER(S): 2.5; .5 SOLUTION RESPIRATORY (INHALATION) at 07:34

## 2024-11-21 RX ADMIN — POLYETHYLENE GLYCOL 3350 17 GRAM(S): 17 POWDER, FOR SOLUTION ORAL at 05:21

## 2024-11-21 RX ADMIN — METOCLOPRAMIDE HYDROCHLORIDE 5 MILLIGRAM(S): 10 TABLET ORAL at 23:58

## 2024-11-21 RX ADMIN — METOCLOPRAMIDE HYDROCHLORIDE 5 MILLIGRAM(S): 10 TABLET ORAL at 11:40

## 2024-11-21 RX ADMIN — ACETAMINOPHEN 500MG 650 MILLIGRAM(S): 500 TABLET, COATED ORAL at 05:21

## 2024-11-21 NOTE — CHART NOTE - NSCHARTNOTEFT_GEN_A_CORE
Date: 11/21/2024    [facility] [address] [place] [address]  [phone]    Letter of Medical Necessity for:     Patient requires home oxygen due to asthma, duonebs have been tried and have been unsuccessful. Patient is otherwise in chronic stable state.     Patient is aware and agreeable to home oxygen, which will require concentrator and portable oxygen to patient's home as she will travel via ambulance.    Oxygen saturation room air at REST is 95%.    Oxygen saturation on room air AMBULATION decreases to 88%.    If any questions or concerns please call [phone].    Thank you for your understanding and cooperation. Date: 11/21/2024    [facility] [address] [place] [address]  [phone]    Letter of Medical Necessity for: Oxygen    Patient requires home oxygen due to asthma, duonebs have been tried and have been unsuccessful. Patient is otherwise in chronic stable state.     Patient is aware and agreeable to home oxygen, which will require concentrator and portable oxygen to patient's home as she will travel via ambulance.    Oxygen saturation at REST is 95% on 3LPM NC.    Oxygen saturation on room air AMBULATION decreases to 88%.    If any questions or concerns please call [phone].    Thank you for your understanding and cooperation. Date: 11/21/2024    [facility] [address] [place] [address]  [phone]    Letter of Medical Necessity for: Oxygen    Patient requires home oxygen due to asthma, duonebs have been tried and have been unsuccessful. Patient is otherwise in chronic stable state.     Patient is aware and agreeable to home oxygen, which will require concentrator and portable oxygen to patient's home as she will travel via ambulance.    Oxygen saturation at REST is 95% on RA    Oxygen saturation on room air AMBULATION decreases to 88%.    Improved to 95% with 3LPM NC.        If any questions or concerns please call [phone].    Thank you for your understanding and cooperation. Date: 11/21/2024    [facility] [address] [place] [address]  [phone]    Letter of Medical Necessity for: Oxygen    Patient requires home oxygen due to asthma, duonebs have been tried and have been unsuccessful. Patient is otherwise in chronic stable state.     Patient is aware and agreeable to home oxygen, which will require concentrator and portable oxygen to patient's home as she will travel via ambulance.    Oxygen saturation at REST is 95% on RA    Oxygen saturation on room air AMBULATION decreases to 88%.    Improved to 95% with 3LPM NC on ambulation.        If any questions or concerns please call [phone].    Thank you for your understanding and cooperation.

## 2024-11-21 NOTE — PROVIDER CONTACT NOTE (HYPOGLYCEMIA EVENT) - NS PROVIDER CONTACT BACKGROUND-HYPO
Age: 64y    Gender: Female    POCT Blood Glucose:  132 mg/dL (11-21-24 @ 21:34)  80 mg/dL (11-21-24 @ 20:58)  108 mg/dL (11-21-24 @ 17:14)  170 mg/dL (11-21-24 @ 13:33)  184 mg/dL (11-21-24 @ 10:55)  129 mg/dL (11-21-24 @ 08:00)      eMAR:  insulin lispro (ADMELOG) corrective regimen sliding scale   1 Unit(s) SubCutaneous (11-21-24 @ 11:35)    levothyroxine   100 MICROGram(s) Oral (11-21-24 @ 05:21)    predniSONE   Tablet   40 milliGRAM(s) Oral (11-21-24 @ 05:22)      Pt finger stick at 2058 is 80. Obey Lomeli made aware.   Give apple juice as per MD benton Barnhart.

## 2024-11-21 NOTE — PROGRESS NOTE ADULT - ASSESSMENT
64-year-old female, former smoker with past medical history of asthma, hypothyroidism, anxiety, depression, MVP, Jaclyn-en-Y gastric bypass, appendectomy, cholecystectomy and Chronic back pain on methadone, presents to the ED following a syncopal episode last night. She also has complaints of worsening abdominal pain for 5 days.     # Abdominal pain with   #h/o Jaclyn en Y bypass   - unclear etiology.   - last Lactate 2.0   - CT abdomen - Hepatomegaly   - EGD/ Colonoscopy (May '23) - Non erosive gastritis  - c/w pantoprazole   - EGD- nonerosive gastritis in stomach  - CTa abdomen pending read  - pain control per pain management. c/w po morphine 6mg q4h prn.   f/u pain management about dc pain regimen  - bariatric surgery recs appreciated.     # Acute Hypoxic Respiratory Failure likely due to   # Asthma exacerbation.    #h/o MVP (follows Dr. Negrete)  #HTN  - CTA chest - no PE  - TTE --> echo 60-65% EF  - currently on O2 2L NC > titrate off as tolerated.    Ambulating 88% on RA    Resting 93% RA   - duo nebs  - c/w prednisone 40 daily for 3 more days. then 20mg for 3 days  - c/w albuterol prn   - c/w advair and symbicort  - TTE with bubbles 11/19: suspected pulmonary shunt.      per pulm f/u OP    #Syncope likely vasovagal vs dehydration  - ECG with prolonged qtc, no obvious ischemia, trops x2 negative  - no events on tele  - tele discontinued, Neg Orthostatics .     #Hypothyroidism,   - home meds - 88mcg thyroxine   - TSH 9.88  - increased to 100 mcg synthroid     #anxiety, depression,  - c/w lamotrigine and zoloft      #Chronic back pain   - on methadone 30mg   -  po morphine 6mg q4h PRN as per pain management    #Alcohol use disorder  # Suspected Thiamine deficiency   - 2-3 drinks per day  - c/w b12 and folate   - started thiamine     # Obesity >    #MISC  VTE ppx - heparin SC  GI ppx - pantoprazole (home meds)  Activity as tolerated  regular diet per pt requests  Full Code      Pending: ?Home O2 /PT   Dispo: TBD   Plan d/w the patient at bedside.

## 2024-11-21 NOTE — PROGRESS NOTE ADULT - SUBJECTIVE AND OBJECTIVE BOX
JANNETTE NAVARRETEY  64y  Female      Patient is a 64y old  Female who presents with a chief complaint of Abdominal pain.       INTERVAL HPI/OVERNIGHT EVENTS:      ******************************* REVIEW OF SYSTEMS:**********************************************    All other review of systems negative    *********************** VITALS ******************************************    T(F): 98.1 (11-21-24 @ 04:35)  HR: 81 (11-21-24 @ 04:35) (67 - 81)  BP: 108/70 (11-21-24 @ 04:35) (106/65 - 131/72)  RR: 20 (11-21-24 @ 10:39) (18 - 20)  SpO2: 88% (11-21-24 @ 10:39) (88% - 95%)            ******************************** PHYSICAL EXAM:**************************************************  GENERAL: NAD    PSYCH: no agitation, baseline mentation  HEENT:     NERVOUS SYSTEM:  Alert & Oriented X3,     PULMONARY: SHARIF, CTA    CARDIOVASCULAR: S1S2 RRR    GI: Soft, NT, ND; BS present.    EXTREMITIES:  2+ Peripheral Pulses, No clubbing, cyanosis, or edema    LYMPH: No lymphadenopathy noted    SKIN: No rashes or lesions      **************************** LABS *******************************************************                          11.5   7.93  )-----------( 193      ( 21 Nov 2024 05:18 )             37.5     11-21    145  |  105  |  19  ----------------------------<  77  3.8   |  28  |  1.3    Ca    8.8      21 Nov 2024 05:18  Mg     2.3     11-21        Urinalysis Basic - ( 21 Nov 2024 05:18 )    Color: x / Appearance: x / SG: x / pH: x  Gluc: 77 mg/dL / Ketone: x  / Bili: x / Urobili: x   Blood: x / Protein: x / Nitrite: x   Leuk Esterase: x / RBC: x / WBC x   Sq Epi: x / Non Sq Epi: x / Bacteria: x        Lactate Trend  11-19 @ 05:53 Lactate:2.0         CAPILLARY BLOOD GLUCOSE      POCT Blood Glucose.: 184 mg/dL (21 Nov 2024 10:55)          **************************Active Medications *******************************************  penicillin (Anaphylaxis)      acetaminophen     Tablet .. 650 milliGRAM(s) Oral every 8 hours  albuterol    90 MICROgram(s) HFA Inhaler 2 Puff(s) Inhalation every 6 hours PRN  albuterol/ipratropium for Nebulization 3 milliLiter(s) Nebulizer every 6 hours  aluminum hydroxide/magnesium hydroxide/simethicone Suspension 30 milliLiter(s) Oral every 4 hours PRN  bisacodyl 5 milliGRAM(s) Oral every 12 hours PRN  buMETAnide 1 milliGRAM(s) Oral daily  chlorhexidine 2% Cloths 1 Application(s) Topical <User Schedule>  cyclobenzaprine 10 milliGRAM(s) Oral three times a day  dextrose 5%. 1000 milliLiter(s) IV Continuous <Continuous>  dextrose 5%. 1000 milliLiter(s) IV Continuous <Continuous>  dextrose 50% Injectable 25 Gram(s) IV Push once  dextrose 50% Injectable 12.5 Gram(s) IV Push once  dextrose 50% Injectable 25 Gram(s) IV Push once  dextrose Oral Gel 15 Gram(s) Oral once PRN  dicyclomine 20 milliGRAM(s) Oral four times a day before meals PRN  fluticasone propionate/ salmeterol 250-50 MICROgram(s) Diskus 1 Dose(s) Inhalation two times a day  folic acid 1 milliGRAM(s) Oral daily  glucagon  Injectable 1 milliGRAM(s) IntraMuscular once  heparin   Injectable 5000 Unit(s) SubCutaneous every 8 hours  insulin lispro (ADMELOG) corrective regimen sliding scale   SubCutaneous three times a day before meals  lamoTRIgine 150 milliGRAM(s) Oral daily  levothyroxine 100 MICROGram(s) Oral daily  melatonin 3 milliGRAM(s) Oral at bedtime PRN  methadone    Tablet 30 milliGRAM(s) Oral every 8 hours  metoclopramide 5 milliGRAM(s) Oral four times a day  morphine   Solution 6 milliGRAM(s) Oral every 4 hours PRN  naloxone 1 mG/mL Injection for Intranasal Use 1 milliGRAM(s) IntraNasal every 5 minutes PRN  pantoprazole    Tablet 40 milliGRAM(s) Oral two times a day  polyethylene glycol 3350 17 Gram(s) Oral two times a day  senna 2 Tablet(s) Oral at bedtime  sertraline 200 milliGRAM(s) Oral daily  sucralfate 1 Gram(s) Oral two times a day  thiamine 100 milliGRAM(s) Oral daily      ***************************************************  RADIOLOGY & ADDITIONAL TESTS:    Imaging Personally Reviewed:  [ ] YES  [ ] NO    HEALTH ISSUES - PROBLEM Dx:

## 2024-11-21 NOTE — PROGRESS NOTE ADULT - SUBJECTIVE AND OBJECTIVE BOX
SUBJECTIVE/OVERNIGHT EVENTS  Today is hospital day 13d. This morning patient was seen and examined at bedside, resting comfortably in bed. No acute or major events overnight.    HOSPITAL COURSE      CODE STATUS:    FAMILY COMMUNICATION  Contact date:  Name of person contacted:  Relationship to patient:  Communication details:    MEDICATIONS  STANDING MEDICATIONS  acetaminophen     Tablet .. 650 milliGRAM(s) Oral every 8 hours  albuterol/ipratropium for Nebulization 3 milliLiter(s) Nebulizer every 6 hours  buMETAnide 1 milliGRAM(s) Oral daily  chlorhexidine 2% Cloths 1 Application(s) Topical <User Schedule>  cyclobenzaprine 10 milliGRAM(s) Oral three times a day  dextrose 5%. 1000 milliLiter(s) IV Continuous <Continuous>  dextrose 5%. 1000 milliLiter(s) IV Continuous <Continuous>  dextrose 50% Injectable 25 Gram(s) IV Push once  dextrose 50% Injectable 12.5 Gram(s) IV Push once  dextrose 50% Injectable 25 Gram(s) IV Push once  fluticasone propionate/ salmeterol 250-50 MICROgram(s) Diskus 1 Dose(s) Inhalation two times a day  folic acid 1 milliGRAM(s) Oral daily  glucagon  Injectable 1 milliGRAM(s) IntraMuscular once  heparin   Injectable 5000 Unit(s) SubCutaneous every 8 hours  insulin lispro (ADMELOG) corrective regimen sliding scale   SubCutaneous three times a day before meals  lamoTRIgine 150 milliGRAM(s) Oral daily  levothyroxine 100 MICROGram(s) Oral daily  methadone    Tablet 30 milliGRAM(s) Oral every 8 hours  metoclopramide 5 milliGRAM(s) Oral four times a day  pantoprazole    Tablet 40 milliGRAM(s) Oral two times a day  polyethylene glycol 3350 17 Gram(s) Oral two times a day  senna 2 Tablet(s) Oral at bedtime  sertraline 200 milliGRAM(s) Oral daily  sucralfate 1 Gram(s) Oral two times a day  thiamine 100 milliGRAM(s) Oral daily    PRN MEDICATIONS  albuterol    90 MICROgram(s) HFA Inhaler 2 Puff(s) Inhalation every 6 hours PRN  aluminum hydroxide/magnesium hydroxide/simethicone Suspension 30 milliLiter(s) Oral every 4 hours PRN  bisacodyl 5 milliGRAM(s) Oral every 12 hours PRN  dextrose Oral Gel 15 Gram(s) Oral once PRN  dicyclomine 20 milliGRAM(s) Oral four times a day before meals PRN  melatonin 3 milliGRAM(s) Oral at bedtime PRN  morphine   Solution 6 milliGRAM(s) Oral every 4 hours PRN  naloxone 1 mG/mL Injection for Intranasal Use 1 milliGRAM(s) IntraNasal every 5 minutes PRN    VITALS  T(F): 98.1 (11-21-24 @ 04:35), Max: 98.3 (11-20-24 @ 20:03)  HR: 81 (11-21-24 @ 04:35) (67 - 81)  BP: 108/70 (11-21-24 @ 04:35) (106/65 - 131/72)  RR: 18 (11-21-24 @ 04:35) (18 - 19)  SpO2: 94% (11-21-24 @ 04:35) (90% - 95%)  POCT Blood Glucose.: 129 mg/dL (11-21-24 @ 08:00)  POCT Blood Glucose.: 115 mg/dL (11-20-24 @ 21:05)  POCT Blood Glucose.: 116 mg/dL (11-20-24 @ 17:15)  POCT Blood Glucose.: 130 mg/dL (11-20-24 @ 11:20)    PHYSICAL EXAM  GENERAL  (x  ) NAD, lying in bed comfortably     (  ) obtunded     (  ) lethargic     (  ) somnolent    HEAD  (x  ) Atraumatic     (  ) hematoma     (  ) laceration (specify location:       )     NECK  ( x ) Supple     (  ) neck stiffness     (  ) nuchal rigidity     (  )  no JVD     (  ) JVD present ( -- cm)    HEART  Rate -->  ( x ) normal rate    (  ) bradycardic    (  ) tachycardic  Rhythm -->  (  ) regular    (  ) regularly irregular    (  ) irregularly irregular  Murmurs -->  (  ) normal s1/s2    (  ) systolic murmur    (  ) diastolic murmur    (  ) continuous murmur     (  ) S3 present    (  ) S4 present    LUNGS  ( x )Unlabored respirations     (  ) tachypnea  (  ) B/L air entry     (  ) decreased breath sounds in:  (location     )    (  ) no adventitious sound     (  ) crackles     (  ) wheezing      (  ) rhonchi      (specify location:       )  (  ) chest wall tenderness (specify location:       )    ABDOMEN  (  x) Soft     (  ) tense   |   (  ) nondistended     (  ) distended   |   (  ) +BS     (  ) hypoactive bowel sounds     (  ) hyperactive bowel sounds  (  ) nontender     (  ) RUQ tenderness     (  ) RLQ tenderness     (  ) LLQ tenderness     (  ) epigastric tenderness     (  ) diffuse tenderness  (  ) Splenomegaly      (  ) Hepatomegaly      (  ) Jaundice     (  ) ecchymosis     EXTREMITIES  (  x) Normal     (  ) Rash     (  ) ecchymosis     (  ) varicose veins      (  ) pitting edema     (  ) non-pitting edema   (  ) ulceration     (  ) gangrene:     (location:     )    NERVOUS SYSTEM  ( x ) A&Ox3     (  ) confused     (  ) lethargic  CN II-XII:     (  ) Intact     (  ) focal deficits  (Specify:     )   Upper extremities:     (  ) strength X/5     (  ) focal deficit (specify:    )  Lower extremities:     (  ) strength  X/5    (  ) focal deficit (specify:    )    SKIN  ( x ) No rashes or lesions     (  ) maculopapular rash     (  ) pustules     (  ) vesicles     (  ) ulcer     (  ) ecchymosis     (specify location:     )    (  ) Indwelling Hendrix Catheter   Date insterted:    Reason (  ) Critical illness     (  ) urinary retention    (  ) Accurate Ins/Outs Monitoring     (  ) CMO patient    (  ) Central Line  Date inserted:  Location: (  ) Right IJ   (  ) Left IJ   (  ) Right Fem   (  ) Left Fem    (  ) SPC  (  ) pigtail  (  ) PEG tube  (  ) colostomy  (  ) jejunostomy  (  ) U-Dall    LABS             11.5   7.93  )-----------( 193      ( 11-21-24 @ 05:18 )             37.5     145  |  105  |  19  -------------------------<  77   11-21-24 @ 05:18  3.8  |  28  |  1.3    Ca      8.8     11-21-24 @ 05:18  Mg     2.3     11-21-24 @ 05:18          Urinalysis Basic - ( 21 Nov 2024 05:18 )    Color: x / Appearance: x / SG: x / pH: x  Gluc: 77 mg/dL / Ketone: x  / Bili: x / Urobili: x   Blood: x / Protein: x / Nitrite: x   Leuk Esterase: x / RBC: x / WBC x   Sq Epi: x / Non Sq Epi: x / Bacteria: x          IMAGING

## 2024-11-21 NOTE — CHART NOTE - NSCHARTNOTEFT_GEN_A_CORE
Registered Dietitian Follow-Up     Patient Profile Reviewed                           Yes [X]   No []     Nutrition History Previously Obtained        Yes [X]  No []       Pertinent Subjective Information: Pt alert but confused at time of RD visit. Breakfast tray observed >50% intake so far but patient still eating. Yesterday breakfast tray observed >75% intake. Pt reports she is tolerating meals well.     Pertinent Medical Interventions: 64-year-old female, former smoker with past medical history of asthma, hypothyroidism, anxiety, depression, MVP, Jaclyn-en-Y gastric bypass, appendectomy, cholecystectomy and Chronic back pain on methadone, presents to the ED following a syncopal episode last night. She also has complaints of worsening abdominal pain for 5 days.   #Abdominal pain - unclear etiology  #Nausea, Vomiting, Decreased appetite   #h/o Cholecystectomy/ appendectomy  #h/o Jaclyn en Y bypass      Diet order:   Diet, Regular (24 @ 07:30) [Active]  Diet, NPO after Midnight:      NPO Start Date: 2024,   NPO Start Time: 23:59 (24 @ 11:20) [Active]  Diet, NPO after Midnight:      NPO Start Date: 2024,   NPO Start Time: 23:59 (24 @ 10:36) [Active]    Anthropometrics:  Height (cm): 167.6 cm  Weight (kg): 100.7 kg  BMI: 35.9   IBW: 59.1 kg       MEDICATIONS  (STANDING):  acetaminophen     Tablet .. 650 milliGRAM(s) Oral every 8 hours  albuterol/ipratropium for Nebulization 3 milliLiter(s) Nebulizer every 6 hours  buMETAnide 1 milliGRAM(s) Oral daily  chlorhexidine 2% Cloths 1 Application(s) Topical <User Schedule>  cyclobenzaprine 10 milliGRAM(s) Oral three times a day  dextrose 5%. 1000 milliLiter(s) (50 mL/Hr) IV Continuous <Continuous>  dextrose 5%. 1000 milliLiter(s) (100 mL/Hr) IV Continuous <Continuous>  dextrose 50% Injectable 25 Gram(s) IV Push once  dextrose 50% Injectable 12.5 Gram(s) IV Push once  dextrose 50% Injectable 25 Gram(s) IV Push once  fluticasone propionate/ salmeterol 250-50 MICROgram(s) Diskus 1 Dose(s) Inhalation two times a day  folic acid 1 milliGRAM(s) Oral daily  glucagon  Injectable 1 milliGRAM(s) IntraMuscular once  heparin   Injectable 5000 Unit(s) SubCutaneous every 8 hours  insulin lispro (ADMELOG) corrective regimen sliding scale   SubCutaneous three times a day before meals  lamoTRIgine 150 milliGRAM(s) Oral daily  levothyroxine 100 MICROGram(s) Oral daily  methadone    Tablet 30 milliGRAM(s) Oral every 8 hours  metoclopramide 5 milliGRAM(s) Oral four times a day  pantoprazole    Tablet 40 milliGRAM(s) Oral two times a day  polyethylene glycol 3350 17 Gram(s) Oral two times a day  senna 2 Tablet(s) Oral at bedtime  sertraline 200 milliGRAM(s) Oral daily  sucralfate 1 Gram(s) Oral two times a day  thiamine 100 milliGRAM(s) Oral daily    MEDICATIONS  (PRN):  albuterol    90 MICROgram(s) HFA Inhaler 2 Puff(s) Inhalation every 6 hours PRN Shortness of Breath and/or Wheezing  aluminum hydroxide/magnesium hydroxide/simethicone Suspension 30 milliLiter(s) Oral every 4 hours PRN Dyspepsia  bisacodyl 5 milliGRAM(s) Oral every 12 hours PRN Constipation  dextrose Oral Gel 15 Gram(s) Oral once PRN Blood Glucose LESS THAN 70 milliGRAM(s)/deciliter  dicyclomine 20 milliGRAM(s) Oral four times a day before meals PRN abdominal pain  melatonin 3 milliGRAM(s) Oral at bedtime PRN Insomnia  morphine   Solution 6 milliGRAM(s) Oral every 4 hours PRN Severe Pain (7 - 10)  naloxone 1 mG/mL Injection for Intranasal Use 1 milliGRAM(s) IntraNasal every 5 minutes PRN signs of narcotic overdose    Pertinent Labs:  @ 05:18: Na 145, BUN 19, Cr 1.3, BG 77, K+ 3.8, Phos --, Mg 2.3, Alk Phos --, ALT/SGPT --, AST/SGOT --, HbA1c --    Finger Sticks:  POCT Blood Glucose.: 129 mg/dL ( @ 08:00)  POCT Blood Glucose.: 115 mg/dL ( @ 21:05)  POCT Blood Glucose.: 116 mg/dL ( @ 17:15)  POCT Blood Glucose.: 130 mg/dL ( @ 11:20)    Physical Findings:  - Appearance: per flowsheet, pt AAOx4 however on RD visit, pt gets confused in conversation and starts talking about something entirely not related. (ie: speaks about golf outing yesterday when asked about appetite)  - GI function: No n/v reported today. Last BM  per flowsheet  - Tubes: N/A  - Oral/Mouth cavity: Regular  - Skin: Macular Rash R shin per flowsheet  - Edema: 2+ R/L foot      Nutrition Requirements:   Weight Used: IBW 59.1 kg      Estimated Energy Needs    Continue []  Adjust [X]  -- in consideration with age, BMI, acuity of illness  Adjusted Energy Recommendations: 25-30 kcal/k - 1773 kcal/day        Estimated Protein Needs    Continue []  Adjust [X] -- same reason as above  Adjusted Protein Recommendations:  1-1.2 gm/k - 71 gm/day        Estimated Fluid Needs        Continue []  Adjust [X]  Adjusted Fluid Recommendations:   1 mL/day     Nutrient Intake: Flowsheet documentation , : %     [X] Previous Nutrition Diagnosis: Inadequate Protein Energy Intake            [] Ongoing          [X] Resolved     Nutrition Education: Encouraged PO intake      Goal/Expected Outcome: Pt to meet and tolerate >75% of estimated needs in 5-7 days.      Indicator/Monitoring: diet order, energy intake, weights, labs, GI s/s, BG, skin status, NFPE    Recommendation:   1. Continue with current diet order  2. Monitor need for addition of oral nutrition supplements if PO intake decreases  3. Assist with meal times prn and encourage PO    Pt at moderate risk    RD to remain available: Umberto Venegas x [phone] or via TEAMS

## 2024-11-21 NOTE — CHART NOTE - NSCHARTNOTEFT_GEN_A_CORE
Pt developed upper and lower extremity b/l tremors. Vitals: /70 HR 80 Tem 97 Sat 95% on RA.    Gave ativan 0.5mg ivp * 1 dose    f/u EEG, CTH    f/u CBC BMP Mag ammonia B12 TSH @ 4pm    if tremor persists can do propranolol

## 2024-11-21 NOTE — PROGRESS NOTE ADULT - ASSESSMENT
64-year-old female, former smoker with past medical history of asthma, hypothyroidism, anxiety, depression, MVP, Jaclyn-en-Y gastric bypass, appendectomy, cholecystectomy and Chronic back pain on methadone, presents to the ED following a syncopal episode last night. She also has complaints of worsening abdominal pain for 5 days.       Plan    #Abdominal pain - unclear etiology  #Nausea, Vomiting, Decreased appetite   #h/o Cholecystectomy/ appendectomy  #h/o Jaclyn en Y bypass   - LFTs stable, lactate 3 --> 2.4-->3.0  - CT abdomen - Hepatomegaly   - EGD/ Colonoscopy (May '23) - Non erosive gastritis  - c/w pantoprazole   - right lower abdominal discomfort, possible constipation (small bowel movement on 11/10)  - cw miralax senna  - a1c 7.0, no previous diagnosis of DM, possible gastroparesis, start SSI, f/u FS   - EGD- nonerosive gastritis in stomach  - CTa abdomen pending read  - pain control per pain management. c/w po morphine 6mg q4h prn. f/u pain management about dc pain regimen  - s/p injection, did not improve symtpoms  - bariatric surgery recs appreciated. dc on metformin 500mg bid. no immediate interventions    #Shortness of breath   #h/o Asthma   #h/o MVP (follows Dr. Negrete)  #h/o chronic bilateral LE swelling   #HTN  - CTA chest - no PE  - TTE --> echo 60-65% EF  - duplex neg   - requiring 7L NC 11/18 am. b/l wheezing on exam  - duo nebs  - c/w prednisone 40 daily for 3 more days. then 20mg for 3 days  - c/w albuterol prn   - c/w advair and symbicort  - peak flow  - TTE with bubbles 11/19: suspected pulmonary shunt. per pulm f/u OP    #Syncope likely vasovagal vs orthostatic 2/2 dehydration  - ECG with prolonged qtc, no obvious ischemia, trops x2 negative  - no events on tele  - tele discontinued    #Hypothyroidism,   - home meds - 88mcg thyroxine   - TSH 9.88  - increased to 100 mcg synthroid     #anxiety, depression,  - c/w lamotrigine and zoloft      #Chronic back pain   - on methadone 30mg   -  po morphine 6mg q4h PRN as per pain management.  - per management can dc on po morphine for 5 days and f/U op    #Alcohol use   - 2-3 drinks per day  - CIWA monitoring   - c/w b12 and folate   - c/w thiamine     #MISC  VTE ppx - heparin SC  GI ppx - pantoprazole (home meds)  Activity as tolerated  regular diet per pt requests  Full Code

## 2024-11-22 LAB
ANION GAP SERPL CALC-SCNC: 11 MMOL/L — SIGNIFICANT CHANGE UP (ref 7–14)
BASOPHILS # BLD AUTO: 0.07 K/UL — SIGNIFICANT CHANGE UP (ref 0–0.2)
BASOPHILS NFR BLD AUTO: 0.9 % — SIGNIFICANT CHANGE UP (ref 0–1)
BUN SERPL-MCNC: 17 MG/DL — SIGNIFICANT CHANGE UP (ref 10–20)
CALCIUM SERPL-MCNC: 8.7 MG/DL — SIGNIFICANT CHANGE UP (ref 8.4–10.5)
CHLORIDE SERPL-SCNC: 104 MMOL/L — SIGNIFICANT CHANGE UP (ref 98–110)
CO2 SERPL-SCNC: 28 MMOL/L — SIGNIFICANT CHANGE UP (ref 17–32)
CREAT SERPL-MCNC: 1.2 MG/DL — SIGNIFICANT CHANGE UP (ref 0.7–1.5)
EGFR: 51 ML/MIN/1.73M2 — LOW
EOSINOPHIL # BLD AUTO: 0.63 K/UL — SIGNIFICANT CHANGE UP (ref 0–0.7)
EOSINOPHIL NFR BLD AUTO: 7.8 % — SIGNIFICANT CHANGE UP (ref 0–8)
GLUCOSE BLDC GLUCOMTR-MCNC: 102 MG/DL — HIGH (ref 70–99)
GLUCOSE BLDC GLUCOMTR-MCNC: 130 MG/DL — HIGH (ref 70–99)
GLUCOSE BLDC GLUCOMTR-MCNC: 83 MG/DL — SIGNIFICANT CHANGE UP (ref 70–99)
GLUCOSE SERPL-MCNC: 77 MG/DL — SIGNIFICANT CHANGE UP (ref 70–99)
HCT VFR BLD CALC: 37.9 % — SIGNIFICANT CHANGE UP (ref 37–47)
HGB BLD-MCNC: 11.5 G/DL — LOW (ref 12–16)
IMM GRANULOCYTES NFR BLD AUTO: 2 % — HIGH (ref 0.1–0.3)
LYMPHOCYTES # BLD AUTO: 1.41 K/UL — SIGNIFICANT CHANGE UP (ref 1.2–3.4)
LYMPHOCYTES # BLD AUTO: 17.5 % — LOW (ref 20.5–51.1)
MAGNESIUM SERPL-MCNC: 2.3 MG/DL — SIGNIFICANT CHANGE UP (ref 1.8–2.4)
MCHC RBC-ENTMCNC: 30.2 PG — SIGNIFICANT CHANGE UP (ref 27–31)
MCHC RBC-ENTMCNC: 30.3 G/DL — LOW (ref 32–37)
MCV RBC AUTO: 99.5 FL — HIGH (ref 81–99)
MONOCYTES # BLD AUTO: 0.59 K/UL — SIGNIFICANT CHANGE UP (ref 0.1–0.6)
MONOCYTES NFR BLD AUTO: 7.3 % — SIGNIFICANT CHANGE UP (ref 1.7–9.3)
NEUTROPHILS # BLD AUTO: 5.22 K/UL — SIGNIFICANT CHANGE UP (ref 1.4–6.5)
NEUTROPHILS NFR BLD AUTO: 64.5 % — SIGNIFICANT CHANGE UP (ref 42.2–75.2)
NRBC # BLD: 0 /100 WBCS — SIGNIFICANT CHANGE UP (ref 0–0)
PLATELET # BLD AUTO: 227 K/UL — SIGNIFICANT CHANGE UP (ref 130–400)
PMV BLD: 10.2 FL — SIGNIFICANT CHANGE UP (ref 7.4–10.4)
POTASSIUM SERPL-MCNC: 3.9 MMOL/L — SIGNIFICANT CHANGE UP (ref 3.5–5)
POTASSIUM SERPL-SCNC: 3.9 MMOL/L — SIGNIFICANT CHANGE UP (ref 3.5–5)
RBC # BLD: 3.81 M/UL — LOW (ref 4.2–5.4)
RBC # FLD: 14.2 % — SIGNIFICANT CHANGE UP (ref 11.5–14.5)
SODIUM SERPL-SCNC: 143 MMOL/L — SIGNIFICANT CHANGE UP (ref 135–146)
TSH SERPL-MCNC: 5.27 UIU/ML — HIGH (ref 0.27–4.2)
VIT B12 SERPL-MCNC: 1174 PG/ML — SIGNIFICANT CHANGE UP (ref 232–1245)
WBC # BLD: 8.08 K/UL — SIGNIFICANT CHANGE UP (ref 4.8–10.8)
WBC # FLD AUTO: 8.08 K/UL — SIGNIFICANT CHANGE UP (ref 4.8–10.8)

## 2024-11-22 PROCEDURE — 99232 SBSQ HOSP IP/OBS MODERATE 35: CPT

## 2024-11-22 PROCEDURE — 95819 EEG AWAKE AND ASLEEP: CPT | Mod: 26

## 2024-11-22 RX ORDER — ALPRAZOLAM 0.5 MG
0.25 TABLET ORAL ONCE
Refills: 0 | Status: DISCONTINUED | OUTPATIENT
Start: 2024-11-22 | End: 2024-11-22

## 2024-11-22 RX ADMIN — Medication 10 MILLIGRAM(S): at 05:39

## 2024-11-22 RX ADMIN — POLYETHYLENE GLYCOL 3350 17 GRAM(S): 17 POWDER, FOR SOLUTION ORAL at 17:08

## 2024-11-22 RX ADMIN — CHLORHEXIDINE GLUCONATE 1 APPLICATION(S): 1.2 RINSE ORAL at 05:51

## 2024-11-22 RX ADMIN — Medication 6 MILLIGRAM(S): at 11:15

## 2024-11-22 RX ADMIN — METHADONE HYDROCHLORIDE 30 MILLIGRAM(S): 5 TABLET ORAL at 21:31

## 2024-11-22 RX ADMIN — SUCRALFATE 1 GRAM(S): 1 TABLET ORAL at 05:40

## 2024-11-22 RX ADMIN — Medication 10 MILLIGRAM(S): at 13:39

## 2024-11-22 RX ADMIN — ACETAMINOPHEN 500MG 650 MILLIGRAM(S): 500 TABLET, COATED ORAL at 13:38

## 2024-11-22 RX ADMIN — METOCLOPRAMIDE HYDROCHLORIDE 5 MILLIGRAM(S): 10 TABLET ORAL at 11:16

## 2024-11-22 RX ADMIN — PANTOPRAZOLE SODIUM 40 MILLIGRAM(S): 40 TABLET, DELAYED RELEASE ORAL at 05:39

## 2024-11-22 RX ADMIN — BUMETANIDE 1 MILLIGRAM(S): 1 TABLET ORAL at 05:39

## 2024-11-22 RX ADMIN — Medication 6 MILLIGRAM(S): at 12:15

## 2024-11-22 RX ADMIN — ACETAMINOPHEN 500MG 650 MILLIGRAM(S): 500 TABLET, COATED ORAL at 22:33

## 2024-11-22 RX ADMIN — Medication 2 TABLET(S): at 21:31

## 2024-11-22 RX ADMIN — PANTOPRAZOLE SODIUM 40 MILLIGRAM(S): 40 TABLET, DELAYED RELEASE ORAL at 17:06

## 2024-11-22 RX ADMIN — METHADONE HYDROCHLORIDE 30 MILLIGRAM(S): 5 TABLET ORAL at 13:38

## 2024-11-22 RX ADMIN — ACETAMINOPHEN 500MG 650 MILLIGRAM(S): 500 TABLET, COATED ORAL at 21:32

## 2024-11-22 RX ADMIN — SUCRALFATE 1 GRAM(S): 1 TABLET ORAL at 17:06

## 2024-11-22 RX ADMIN — Medication 1 MILLIGRAM(S): at 11:15

## 2024-11-22 RX ADMIN — Medication 100 MILLIGRAM(S): at 11:16

## 2024-11-22 RX ADMIN — SERTRALINE HYDROCHLORIDE 200 MILLIGRAM(S): 100 TABLET, FILM COATED ORAL at 11:15

## 2024-11-22 RX ADMIN — Medication 100 MICROGRAM(S): at 05:50

## 2024-11-22 RX ADMIN — LAMOTRIGINE 150 MILLIGRAM(S): 50 TABLET, EXTENDED RELEASE ORAL at 11:15

## 2024-11-22 RX ADMIN — Medication 10 MILLIGRAM(S): at 21:32

## 2024-11-22 RX ADMIN — Medication 6 MILLIGRAM(S): at 22:38

## 2024-11-22 RX ADMIN — METOCLOPRAMIDE HYDROCHLORIDE 5 MILLIGRAM(S): 10 TABLET ORAL at 05:50

## 2024-11-22 RX ADMIN — ACETAMINOPHEN 500MG 650 MILLIGRAM(S): 500 TABLET, COATED ORAL at 05:40

## 2024-11-22 RX ADMIN — Medication 0.25 MILLIGRAM(S): at 17:06

## 2024-11-22 RX ADMIN — METHADONE HYDROCHLORIDE 30 MILLIGRAM(S): 5 TABLET ORAL at 05:38

## 2024-11-22 RX ADMIN — Medication 6 MILLIGRAM(S): at 23:30

## 2024-11-22 RX ADMIN — Medication 6 MILLIGRAM(S): at 06:21

## 2024-11-22 RX ADMIN — Medication 6 MILLIGRAM(S): at 00:25

## 2024-11-22 RX ADMIN — Medication 6 MILLIGRAM(S): at 06:50

## 2024-11-22 RX ADMIN — PREDNISONE 20 MILLIGRAM(S): 20 TABLET ORAL at 05:40

## 2024-11-22 RX ADMIN — POLYETHYLENE GLYCOL 3350 17 GRAM(S): 17 POWDER, FOR SOLUTION ORAL at 05:38

## 2024-11-22 NOTE — PROGRESS NOTE ADULT - ASSESSMENT
64-year-old female, former smoker with past medical history of asthma, hypothyroidism, anxiety, depression, MVP, Jaclyn-en-Y gastric bypass, appendectomy, cholecystectomy and Chronic back pain on methadone, presents to the ED following a syncopal episode last night. She also has complaints of worsening abdominal pain for 5 days.       Plan    #Abdominal pain - unclear etiology  #Nausea, Vomiting, Decreased appetite   #h/o Cholecystectomy/ appendectomy  #h/o Jaclyn en Y bypass   - LFTs stable, lactate 3 --> 2.4-->3.0  - CT abdomen - Hepatomegaly   - EGD/ Colonoscopy (May '23) - Non erosive gastritis  - c/w pantoprazole   - right lower abdominal discomfort, possible constipation (small bowel movement on 11/10)  - cw miralax senna  - a1c 7.0, no previous diagnosis of DM, possible gastroparesis, start SSI, f/u FS   - EGD- nonerosive gastritis in stomach  - CTa abdomen pending read  - pain control per pain management. c/w po morphine 6mg q4h prn. f/u pain management about dc pain regimen  - s/p injection, did not improve symtpoms  - bariatric surgery recs appreciated. dc on metformin 500mg bid. no immediate interventions    #Shortness of breath   #h/o Asthma   #h/o MVP (follows Dr. Negrete)  #h/o chronic bilateral LE swelling   #HTN  - CTA chest - no PE  - TTE --> echo 60-65% EF  - duplex neg   - requiring 7L NC 11/18 am. b/l wheezing on exam  - duo nebs  - c/w prednisone 40 daily for 3 more days. then 20mg for 3 days  - c/w albuterol prn   - c/w advair and symbicort  - peak flow  - TTE with bubbles 11/19: suspected pulmonary shunt. per pulm f/u OP    #Tremors (essential?)  - developed tremors on 11/21  - CBC BMP B12 folate ammonia WNL  - EGG CTH WNL  - gave ativan 0.5mg ivp * 1 dose on 11/21    #Syncope likely vasovagal vs orthostatic 2/2 dehydration  - ECG with prolonged qtc, no obvious ischemia, trops x2 negative  - no events on tele  - tele discontinued    #Hypothyroidism,   - home meds - 88mcg thyroxine   - TSH 9.88  - increased to 100 mcg synthroid     #anxiety, depression,  - c/w lamotrigine and zoloft      #Chronic back pain   - on methadone 30mg   -  po morphine 6mg q4h PRN as per pain management.  - per management can dc on po morphine for 5 days and f/U op    #Alcohol use   - 2-3 drinks per day  - CIWA monitoring   - c/w b12 and folate   - c/w thiamine     #MISC  VTE ppx - heparin SC  GI ppx - pantoprazole (home meds)  Activity as tolerated  regular diet per pt requests  Full Code

## 2024-11-22 NOTE — PROGRESS NOTE ADULT - SUBJECTIVE AND OBJECTIVE BOX
JULIET NAVARRETE  64y  Female      Patient is a 64y old  Female who presents with a chief complaint of Abdominal pain.      INTERVAL HPI/OVERNIGHT EVENTS:      ******************************* REVIEW OF SYSTEMS:**********************************************    All other review of systems negative    *********************** VITALS ******************************************    T(F): 97.1 (11-22-24 @ 05:13)  HR: 65 (11-22-24 @ 05:13) (65 - 77)  BP: 111/70 (11-22-24 @ 05:13) (102/62 - 123/81)  RR: 18 (11-22-24 @ 05:13) (18 - 20)  SpO2: 95% (11-22-24 @ 05:13) (93% - 95%)            ******************************** PHYSICAL EXAM:**************************************************  GENERAL: NAD    PSYCH: no agitation, baseline mentation  HEENT:     NERVOUS SYSTEM:  Alert & Oriented X3,   PULMONARY: SHARIF, CTA    CARDIOVASCULAR: S1S2 RRR    GI: Soft, NT, ND; BS present.    EXTREMITIES:  2+ Peripheral Pulses, No clubbing, cyanosis, or edema    LYMPH: No lymphadenopathy noted    SKIN: No rashes or lesions      **************************** LABS *******************************************************                          11.5   8.08  )-----------( 227      ( 22 Nov 2024 05:29 )             37.9     11-22    143  |  104  |  17  ----------------------------<  77  3.9   |  28  |  1.2    Ca    8.7      22 Nov 2024 05:29  Mg     2.3     11-22        Urinalysis Basic - ( 22 Nov 2024 05:29 )    Color: x / Appearance: x / SG: x / pH: x  Gluc: 77 mg/dL / Ketone: x  / Bili: x / Urobili: x   Blood: x / Protein: x / Nitrite: x   Leuk Esterase: x / RBC: x / WBC x   Sq Epi: x / Non Sq Epi: x / Bacteria: x        Lactate Trend  11-19 @ 05:53 Lactate:2.0         CAPILLARY BLOOD GLUCOSE      POCT Blood Glucose.: 130 mg/dL (22 Nov 2024 10:54)          **************************Active Medications *******************************************  penicillin (Anaphylaxis)      acetaminophen     Tablet .. 650 milliGRAM(s) Oral every 8 hours  albuterol    90 MICROgram(s) HFA Inhaler 2 Puff(s) Inhalation every 6 hours PRN  bisacodyl 5 milliGRAM(s) Oral every 12 hours PRN  buMETAnide 1 milliGRAM(s) Oral daily  chlorhexidine 2% Cloths 1 Application(s) Topical <User Schedule>  cyclobenzaprine 10 milliGRAM(s) Oral three times a day  dextrose 5%. 1000 milliLiter(s) IV Continuous <Continuous>  dextrose 5%. 1000 milliLiter(s) IV Continuous <Continuous>  dextrose 50% Injectable 25 Gram(s) IV Push once  dextrose 50% Injectable 12.5 Gram(s) IV Push once  dextrose 50% Injectable 25 Gram(s) IV Push once  dextrose Oral Gel 15 Gram(s) Oral once PRN  fluticasone propionate/ salmeterol 250-50 MICROgram(s) Diskus 1 Dose(s) Inhalation two times a day  folic acid 1 milliGRAM(s) Oral daily  glucagon  Injectable 1 milliGRAM(s) IntraMuscular once  heparin   Injectable 5000 Unit(s) SubCutaneous every 8 hours  insulin lispro (ADMELOG) corrective regimen sliding scale   SubCutaneous three times a day before meals  lamoTRIgine 150 milliGRAM(s) Oral daily  levothyroxine 100 MICROGram(s) Oral daily  melatonin 3 milliGRAM(s) Oral at bedtime PRN  methadone    Tablet 30 milliGRAM(s) Oral every 8 hours  metoclopramide 5 milliGRAM(s) Oral four times a day  morphine   Solution 6 milliGRAM(s) Oral every 4 hours PRN  naloxone 1 mG/mL Injection for Intranasal Use 1 milliGRAM(s) IntraNasal every 5 minutes PRN  pantoprazole    Tablet 40 milliGRAM(s) Oral two times a day  polyethylene glycol 3350 17 Gram(s) Oral two times a day  predniSONE   Tablet 20 milliGRAM(s) Oral daily  senna 2 Tablet(s) Oral at bedtime  sertraline 200 milliGRAM(s) Oral daily  sucralfate 1 Gram(s) Oral two times a day  thiamine 100 milliGRAM(s) Oral daily      ***************************************************  RADIOLOGY & ADDITIONAL TESTS:    Imaging Personally Reviewed:  [ ] YES  [ ] NO    HEALTH ISSUES - PROBLEM Dx:

## 2024-11-22 NOTE — PROGRESS NOTE ADULT - ASSESSMENT
64-year-old female, former smoker with past medical history of asthma, hypothyroidism, anxiety, depression, MVP, Jaclyn-en-Y gastric bypass, appendectomy, cholecystectomy and Chronic back pain on methadone, presents to the ED following a syncopal episode last night. She also has complaints of worsening abdominal pain for 5 days.     # Abdominal pain with   #h/o Jaclyn en Y bypass   - unclear etiology.   - last Lactate 2.0   - CT abdomen - Hepatomegaly   - EGD/ Colonoscopy (May '23) - Non erosive gastritis  - c/w pantoprazole   - EGD- nonerosive gastritis in stomach  - CTa abdomen pending read  - pain control per pain management. c/w po morphine 6mg q4h prn.   f/u pain management about dc pain regimen  - bariatric surgery recs appreciated.     # Acute Hypoxic Respiratory Failure likely due to   # Asthma exacerbation.    #h/o MVP (follows Dr. Negrete)  #HTN  - CTA chest - no PE  - TTE --> echo 60-65% EF  - currently on O2 2L NC > titrate off as tolerated.    Ambulating 88% on RA    Resting 93% RA   - duo nebs  - c/w prednisone 40 daily for 3 more days. then 20mg for 3 days  - c/w albuterol prn   - c/w advair and symbicort  - TTE with bubbles 11/19: suspected pulmonary shunt.      per pulm f/u OP    #Syncope likely vasovagal vs dehydration  - ECG with prolonged qtc, no obvious ischemia, trops x2 negative  - no events on tele  - tele discontinued, Neg Orthostatics .     #Hypothyroidism,   - home meds - 88mcg thyroxine   - TSH 9.88  - increased to 100 mcg synthroid     #anxiety, depression,  - c/w lamotrigine and zoloft      #Chronic back pain   - on methadone 30mg   -  po morphine 6mg q4h PRN as per pain management    #Alcohol use disorder  # Suspected Thiamine deficiency   - 2-3 drinks per day  - c/w b12 and folate   - started thiamine     # Obesity >    #MISC  VTE ppx - heparin SC  GI ppx - pantoprazole (home meds)  Activity as tolerated  regular diet per pt requests  Full Code      Pending: Home O2 /   Dispo:  pt prefers going home.   Plan d/w the patient at bedside.

## 2024-11-22 NOTE — EEG REPORT - NS EEG TEXT BOX
Brief Clinical History:  JULIET NAVARRETE is a 64 year old Female; study performed to investigate for seizures or markers of epilepsy.   Diagnosis Code: R40.4 Transient alteration of awareness  CPT:  14898 (awake/drowsy)     Patient Medication:  No Data.    Acquisition Details:  Electroencephalography was acquired using a minimum of 21 channels on an [employer] system v 9.3.1 with electrode placement according to the standard International 10-20 system following ACNS (American Clinical Neurophysiology Society) guidelines.  Anterior temporal T1 and T2 electrodes were utilized whenever possible.   The [employer] automated spike & seizure detections were all reviewed in detail, in addition to the entire raw EEG.    Findings:  Background:  continuous.  Voltage:  Normal (20uV)  Organization:  Rudimentary  Posterior Dominant Rhythm:  7-8 Hz ,symmetrical  Variability:  Yes	  Reactivity:  Yes  Sleep:  Absent.  Focal abnormalities:  No persistent asymmetries of voltage or frequency.  Interictal Activity:  None  Location:    Focal Slowing:  None  Generalized Slowing:  Mild  Events:  1)	No electrographic seizures or significant clinical events.  Provocations:  1)	Hyperventilation: was not performed.  2)	Photic stimulation: was not performed.    Impression:  Borderline to mild generalized slowing    Clinical Correlation:  Findings consistent with diffuse electrocerebral dysfunction secondary to structural/metabolic/nonspecific etiology    Ramakrishna Boo MD  Attending Neurologist, Division of Epilepsy

## 2024-11-22 NOTE — PROGRESS NOTE ADULT - SUBJECTIVE AND OBJECTIVE BOX
SUBJECTIVE/OVERNIGHT EVENTS  Today is hospital day 14d. This morning patient was seen and examined at bedside, resting comfortably in bed. No acute or major events overnight.    HOSPITAL COURSE      CODE STATUS:    FAMILY COMMUNICATION  Contact date:  Name of person contacted:  Relationship to patient:  Communication details:    MEDICATIONS  STANDING MEDICATIONS  acetaminophen     Tablet .. 650 milliGRAM(s) Oral every 8 hours  buMETAnide 1 milliGRAM(s) Oral daily  chlorhexidine 2% Cloths 1 Application(s) Topical <User Schedule>  cyclobenzaprine 10 milliGRAM(s) Oral three times a day  dextrose 5%. 1000 milliLiter(s) IV Continuous <Continuous>  dextrose 5%. 1000 milliLiter(s) IV Continuous <Continuous>  dextrose 50% Injectable 25 Gram(s) IV Push once  dextrose 50% Injectable 12.5 Gram(s) IV Push once  dextrose 50% Injectable 25 Gram(s) IV Push once  fluticasone propionate/ salmeterol 250-50 MICROgram(s) Diskus 1 Dose(s) Inhalation two times a day  folic acid 1 milliGRAM(s) Oral daily  glucagon  Injectable 1 milliGRAM(s) IntraMuscular once  heparin   Injectable 5000 Unit(s) SubCutaneous every 8 hours  insulin lispro (ADMELOG) corrective regimen sliding scale   SubCutaneous three times a day before meals  lamoTRIgine 150 milliGRAM(s) Oral daily  levothyroxine 100 MICROGram(s) Oral daily  methadone    Tablet 30 milliGRAM(s) Oral every 8 hours  metoclopramide 5 milliGRAM(s) Oral four times a day  pantoprazole    Tablet 40 milliGRAM(s) Oral two times a day  polyethylene glycol 3350 17 Gram(s) Oral two times a day  predniSONE   Tablet 20 milliGRAM(s) Oral daily  senna 2 Tablet(s) Oral at bedtime  sertraline 200 milliGRAM(s) Oral daily  sucralfate 1 Gram(s) Oral two times a day  thiamine 100 milliGRAM(s) Oral daily    PRN MEDICATIONS  albuterol    90 MICROgram(s) HFA Inhaler 2 Puff(s) Inhalation every 6 hours PRN  bisacodyl 5 milliGRAM(s) Oral every 12 hours PRN  dextrose Oral Gel 15 Gram(s) Oral once PRN  melatonin 3 milliGRAM(s) Oral at bedtime PRN  morphine   Solution 6 milliGRAM(s) Oral every 4 hours PRN  naloxone 1 mG/mL Injection for Intranasal Use 1 milliGRAM(s) IntraNasal every 5 minutes PRN    VITALS  T(F): 97.1 (11-22-24 @ 05:13), Max: 98.1 (11-21-24 @ 20:23)  HR: 65 (11-22-24 @ 05:13) (65 - 77)  BP: 111/70 (11-22-24 @ 05:13) (102/62 - 123/81)  RR: 18 (11-22-24 @ 05:13) (18 - 20)  SpO2: 95% (11-22-24 @ 05:13) (88% - 95%)  POCT Blood Glucose.: 102 mg/dL (11-22-24 @ 07:31)  POCT Blood Glucose.: 132 mg/dL (11-21-24 @ 21:34)  POCT Blood Glucose.: 80 mg/dL (11-21-24 @ 20:58)  POCT Blood Glucose.: 108 mg/dL (11-21-24 @ 17:14)  POCT Blood Glucose.: 170 mg/dL (11-21-24 @ 13:33)  POCT Blood Glucose.: 184 mg/dL (11-21-24 @ 10:55)    PHYSICAL EXAM  GENERAL  ( x ) NAD, lying in bed comfortably     (  ) obtunded     (  ) lethargic     (  ) somnolent    HEAD  (x  ) Atraumatic     (  ) hematoma     (  ) laceration (specify location:       )     NECK  (x  ) Supple     (  ) neck stiffness     (  ) nuchal rigidity     (  )  no JVD     (  ) JVD present ( -- cm)    HEART  Rate -->  (  x) normal rate    (  ) bradycardic    (  ) tachycardic  Rhythm -->  (  ) regular    (  ) regularly irregular    (  ) irregularly irregular  Murmurs -->  (  ) normal s1/s2    (  ) systolic murmur    (  ) diastolic murmur    (  ) continuous murmur     (  ) S3 present    (  ) S4 present    LUNGS  (x  )Unlabored respirations     (  ) tachypnea  (  ) B/L air entry     (  ) decreased breath sounds in:  (location     )    (  ) no adventitious sound     (  ) crackles     (  ) wheezing      (  ) rhonchi      (specify location:       )  (  ) chest wall tenderness (specify location:       )    ABDOMEN  (x  ) Soft     (  ) tense   |   (  ) nondistended     (  ) distended   |   (  ) +BS     (  ) hypoactive bowel sounds     (  ) hyperactive bowel sounds  (  ) nontender     (  ) RUQ tenderness     (  ) RLQ tenderness     (  ) LLQ tenderness     (  ) epigastric tenderness     (  ) diffuse tenderness  (  ) Splenomegaly      (  ) Hepatomegaly      (  ) Jaundice     (  ) ecchymosis     EXTREMITIES  (x  ) Normal     (  ) Rash     (  ) ecchymosis     (  ) varicose veins      (  ) pitting edema     (  ) non-pitting edema   (  ) ulceration     (  ) gangrene:     (location:     )    NERVOUS SYSTEM  (x  ) A&Ox3     (  ) confused     (  ) lethargic  CN II-XII:     (  ) Intact     (  ) focal deficits  (Specify:     )   Upper extremities:     (  ) strength X/5     (  ) focal deficit (specify:    )  Lower extremities:     (  ) strength  X/5    (  ) focal deficit (specify:    )    SKIN  ( x ) No rashes or lesions     (  ) maculopapular rash     (  ) pustules     (  ) vesicles     (  ) ulcer     (  ) ecchymosis     (specify location:     )    (  ) Indwelling Hendrix Catheter   Date insterted:    Reason (  ) Critical illness     (  ) urinary retention    (  ) Accurate Ins/Outs Monitoring     (  ) CMO patient    (  ) Central Line  Date inserted:  Location: (  ) Right IJ   (  ) Left IJ   (  ) Right Fem   (  ) Left Fem    (  ) SPC  (  ) pigtail  (  ) PEG tube  (  ) colostomy  (  ) jejunostomy  (  ) U-Dall    LABS             11.8   8.97  )-----------( 223      ( 11-21-24 @ 17:46 )             37.8     142  |  103  |  18  -------------------------<  101   11-21-24 @ 17:46  4.0  |  26  |  1.2    Ca      8.6     11-21-24 @ 17:46  Mg     2.2     11-21-24 @ 17:46          Urinalysis Basic - ( 21 Nov 2024 17:46 )    Color: x / Appearance: x / SG: x / pH: x  Gluc: 101 mg/dL / Ketone: x  / Bili: x / Urobili: x   Blood: x / Protein: x / Nitrite: x   Leuk Esterase: x / RBC: x / WBC x   Sq Epi: x / Non Sq Epi: x / Bacteria: x          IMAGING

## 2024-11-23 LAB
ANION GAP SERPL CALC-SCNC: 11 MMOL/L — SIGNIFICANT CHANGE UP (ref 7–14)
BASOPHILS # BLD AUTO: 0.09 K/UL — SIGNIFICANT CHANGE UP (ref 0–0.2)
BASOPHILS NFR BLD AUTO: 1.3 % — HIGH (ref 0–1)
BUN SERPL-MCNC: 15 MG/DL — SIGNIFICANT CHANGE UP (ref 10–20)
CALCIUM SERPL-MCNC: 8.3 MG/DL — LOW (ref 8.4–10.5)
CHLORIDE SERPL-SCNC: 102 MMOL/L — SIGNIFICANT CHANGE UP (ref 98–110)
CO2 SERPL-SCNC: 29 MMOL/L — SIGNIFICANT CHANGE UP (ref 17–32)
CREAT SERPL-MCNC: 1.2 MG/DL — SIGNIFICANT CHANGE UP (ref 0.7–1.5)
EGFR: 51 ML/MIN/1.73M2 — LOW
EOSINOPHIL # BLD AUTO: 0.54 K/UL — SIGNIFICANT CHANGE UP (ref 0–0.7)
EOSINOPHIL NFR BLD AUTO: 8 % — SIGNIFICANT CHANGE UP (ref 0–8)
GLUCOSE BLDC GLUCOMTR-MCNC: 106 MG/DL — HIGH (ref 70–99)
GLUCOSE BLDC GLUCOMTR-MCNC: 119 MG/DL — HIGH (ref 70–99)
GLUCOSE BLDC GLUCOMTR-MCNC: 127 MG/DL — HIGH (ref 70–99)
GLUCOSE BLDC GLUCOMTR-MCNC: 83 MG/DL — SIGNIFICANT CHANGE UP (ref 70–99)
GLUCOSE SERPL-MCNC: 116 MG/DL — HIGH (ref 70–99)
HCT VFR BLD CALC: 37.8 % — SIGNIFICANT CHANGE UP (ref 37–47)
HGB BLD-MCNC: 11.4 G/DL — LOW (ref 12–16)
IMM GRANULOCYTES NFR BLD AUTO: 2.1 % — HIGH (ref 0.1–0.3)
LYMPHOCYTES # BLD AUTO: 1.23 K/UL — SIGNIFICANT CHANGE UP (ref 1.2–3.4)
LYMPHOCYTES # BLD AUTO: 18.3 % — LOW (ref 20.5–51.1)
MAGNESIUM SERPL-MCNC: 2.2 MG/DL — SIGNIFICANT CHANGE UP (ref 1.8–2.4)
MCHC RBC-ENTMCNC: 29.8 PG — SIGNIFICANT CHANGE UP (ref 27–31)
MCHC RBC-ENTMCNC: 30.2 G/DL — LOW (ref 32–37)
MCV RBC AUTO: 99 FL — SIGNIFICANT CHANGE UP (ref 81–99)
MONOCYTES # BLD AUTO: 0.5 K/UL — SIGNIFICANT CHANGE UP (ref 0.1–0.6)
MONOCYTES NFR BLD AUTO: 7.4 % — SIGNIFICANT CHANGE UP (ref 1.7–9.3)
NEUTROPHILS # BLD AUTO: 4.22 K/UL — SIGNIFICANT CHANGE UP (ref 1.4–6.5)
NEUTROPHILS NFR BLD AUTO: 62.9 % — SIGNIFICANT CHANGE UP (ref 42.2–75.2)
NRBC # BLD: 0 /100 WBCS — SIGNIFICANT CHANGE UP (ref 0–0)
PLATELET # BLD AUTO: 207 K/UL — SIGNIFICANT CHANGE UP (ref 130–400)
PMV BLD: 9.8 FL — SIGNIFICANT CHANGE UP (ref 7.4–10.4)
POTASSIUM SERPL-MCNC: 4.2 MMOL/L — SIGNIFICANT CHANGE UP (ref 3.5–5)
POTASSIUM SERPL-SCNC: 4.2 MMOL/L — SIGNIFICANT CHANGE UP (ref 3.5–5)
RBC # BLD: 3.82 M/UL — LOW (ref 4.2–5.4)
RBC # FLD: 14 % — SIGNIFICANT CHANGE UP (ref 11.5–14.5)
SODIUM SERPL-SCNC: 142 MMOL/L — SIGNIFICANT CHANGE UP (ref 135–146)
WBC # BLD: 6.72 K/UL — SIGNIFICANT CHANGE UP (ref 4.8–10.8)
WBC # FLD AUTO: 6.72 K/UL — SIGNIFICANT CHANGE UP (ref 4.8–10.8)

## 2024-11-23 PROCEDURE — 99232 SBSQ HOSP IP/OBS MODERATE 35: CPT

## 2024-11-23 RX ORDER — ALPRAZOLAM 0.5 MG
0.25 TABLET ORAL ONCE
Refills: 0 | Status: DISCONTINUED | OUTPATIENT
Start: 2024-11-23 | End: 2024-11-23

## 2024-11-23 RX ORDER — LACTULOSE 10 G/15ML
10 SOLUTION ORAL ONCE
Refills: 0 | Status: COMPLETED | OUTPATIENT
Start: 2024-11-23 | End: 2024-11-23

## 2024-11-23 RX ADMIN — SERTRALINE HYDROCHLORIDE 200 MILLIGRAM(S): 100 TABLET, FILM COATED ORAL at 11:16

## 2024-11-23 RX ADMIN — Medication 2 TABLET(S): at 22:05

## 2024-11-23 RX ADMIN — SUCRALFATE 1 GRAM(S): 1 TABLET ORAL at 17:29

## 2024-11-23 RX ADMIN — Medication 2 PUFF(S): at 22:06

## 2024-11-23 RX ADMIN — PREDNISONE 20 MILLIGRAM(S): 20 TABLET ORAL at 05:42

## 2024-11-23 RX ADMIN — LAMOTRIGINE 150 MILLIGRAM(S): 50 TABLET, EXTENDED RELEASE ORAL at 11:17

## 2024-11-23 RX ADMIN — METOCLOPRAMIDE HYDROCHLORIDE 5 MILLIGRAM(S): 10 TABLET ORAL at 11:16

## 2024-11-23 RX ADMIN — METHADONE HYDROCHLORIDE 30 MILLIGRAM(S): 5 TABLET ORAL at 22:05

## 2024-11-23 RX ADMIN — METOCLOPRAMIDE HYDROCHLORIDE 5 MILLIGRAM(S): 10 TABLET ORAL at 17:28

## 2024-11-23 RX ADMIN — POLYETHYLENE GLYCOL 3350 17 GRAM(S): 17 POWDER, FOR SOLUTION ORAL at 17:28

## 2024-11-23 RX ADMIN — ACETAMINOPHEN 500MG 650 MILLIGRAM(S): 500 TABLET, COATED ORAL at 13:25

## 2024-11-23 RX ADMIN — LACTULOSE 10 GRAM(S): 10 SOLUTION ORAL at 11:15

## 2024-11-23 RX ADMIN — FLUTICASONE PROPIONATE AND SALMETEROL XINAFOATE 1 DOSE(S): 45; 21 AEROSOL, METERED RESPIRATORY (INHALATION) at 08:41

## 2024-11-23 RX ADMIN — Medication 2 MILLIGRAM(S): at 11:15

## 2024-11-23 RX ADMIN — PANTOPRAZOLE SODIUM 40 MILLIGRAM(S): 40 TABLET, DELAYED RELEASE ORAL at 17:29

## 2024-11-23 RX ADMIN — Medication 100 MICROGRAM(S): at 05:42

## 2024-11-23 RX ADMIN — SUCRALFATE 1 GRAM(S): 1 TABLET ORAL at 05:42

## 2024-11-23 RX ADMIN — Medication 2 MILLIGRAM(S): at 11:30

## 2024-11-23 RX ADMIN — METHADONE HYDROCHLORIDE 30 MILLIGRAM(S): 5 TABLET ORAL at 13:26

## 2024-11-23 RX ADMIN — METHADONE HYDROCHLORIDE 30 MILLIGRAM(S): 5 TABLET ORAL at 05:42

## 2024-11-23 RX ADMIN — PANTOPRAZOLE SODIUM 40 MILLIGRAM(S): 40 TABLET, DELAYED RELEASE ORAL at 05:42

## 2024-11-23 RX ADMIN — ACETAMINOPHEN 500MG 650 MILLIGRAM(S): 500 TABLET, COATED ORAL at 06:43

## 2024-11-23 RX ADMIN — Medication 10 MILLIGRAM(S): at 05:42

## 2024-11-23 RX ADMIN — Medication 100 MILLIGRAM(S): at 11:16

## 2024-11-23 RX ADMIN — CHLORHEXIDINE GLUCONATE 1 APPLICATION(S): 1.2 RINSE ORAL at 05:42

## 2024-11-23 RX ADMIN — ACETAMINOPHEN 500MG 650 MILLIGRAM(S): 500 TABLET, COATED ORAL at 05:41

## 2024-11-23 RX ADMIN — Medication 10 MILLIGRAM(S): at 13:25

## 2024-11-23 RX ADMIN — Medication 10 MILLIGRAM(S): at 22:06

## 2024-11-23 RX ADMIN — Medication 0.25 MILLIGRAM(S): at 22:05

## 2024-11-23 RX ADMIN — ACETAMINOPHEN, DIPHENHYDRAMINE HCL, PHENYLEPHRINE HCL 3 MILLIGRAM(S): 325; 25; 5 TABLET ORAL at 22:10

## 2024-11-23 RX ADMIN — BUMETANIDE 1 MILLIGRAM(S): 1 TABLET ORAL at 05:42

## 2024-11-23 RX ADMIN — POLYETHYLENE GLYCOL 3350 17 GRAM(S): 17 POWDER, FOR SOLUTION ORAL at 05:41

## 2024-11-23 RX ADMIN — ACETAMINOPHEN 500MG 650 MILLIGRAM(S): 500 TABLET, COATED ORAL at 22:05

## 2024-11-23 RX ADMIN — Medication 1 MILLIGRAM(S): at 11:16

## 2024-11-23 NOTE — PROGRESS NOTE ADULT - SUBJECTIVE AND OBJECTIVE BOX
JULIET NAVARRETE  64y  Female      Patient is a 64y old  Female who presents with a chief complaint of Abdominal pain.    INTERVAL HPI/OVERNIGHT EVENTS:      ******************************* REVIEW OF SYSTEMS:**********************************************  no more abd pain, but c/o anxiety with tremors   All other review of systems negative    *********************** VITALS ******************************************    T(F): 97.8 (11-23-24 @ 12:00)  HR: 68 (11-23-24 @ 12:00) (68 - 70)  BP: 121/77 (11-23-24 @ 12:00) (118/72 - 132/84)  RR: 18 (11-23-24 @ 12:00) (18 - 18)  SpO2: 93% (11-23-24 @ 12:00) (93% - 96%)            ******************************** PHYSICAL EXAM:**************************************************  GENERAL: NAD    PSYCH: no agitation, baseline mentation  HEENT:     NERVOUS SYSTEM:  Alert & Oriented X3,     PULMONARY: SHARIF, CTA    CARDIOVASCULAR: S1S2 RRR    GI: Soft, NT, ND; BS present.    EXTREMITIES:  2+ Peripheral Pulses, No clubbing, cyanosis, or edema    LYMPH: No lymphadenopathy noted    SKIN: No rashes or lesions      **************************** LABS *******************************************************                          11.4   6.72  )-----------( 207      ( 23 Nov 2024 06:50 )             37.8     11-23    142  |  102  |  15  ----------------------------<  116[H]  4.2   |  29  |  1.2    Ca    8.3[L]      23 Nov 2024 06:50  Mg     2.2     11-23        Urinalysis Basic - ( 23 Nov 2024 06:50 )    Color: x / Appearance: x / SG: x / pH: x  Gluc: 116 mg/dL / Ketone: x  / Bili: x / Urobili: x   Blood: x / Protein: x / Nitrite: x   Leuk Esterase: x / RBC: x / WBC x   Sq Epi: x / Non Sq Epi: x / Bacteria: x        Lactate Trend  11-19 @ 05:53 Lactate:2.0         CAPILLARY BLOOD GLUCOSE      POCT Blood Glucose.: 106 mg/dL (23 Nov 2024 11:56)          **************************Active Medications *******************************************  penicillin (Anaphylaxis)      acetaminophen     Tablet .. 650 milliGRAM(s) Oral every 8 hours  albuterol    90 MICROgram(s) HFA Inhaler 2 Puff(s) Inhalation every 6 hours PRN  ALPRAZolam 0.25 milliGRAM(s) Oral once  bisacodyl 5 milliGRAM(s) Oral every 12 hours PRN  buMETAnide 1 milliGRAM(s) Oral daily  chlorhexidine 2% Cloths 1 Application(s) Topical <User Schedule>  cyclobenzaprine 10 milliGRAM(s) Oral three times a day  dextrose 5%. 1000 milliLiter(s) IV Continuous <Continuous>  dextrose 5%. 1000 milliLiter(s) IV Continuous <Continuous>  dextrose 50% Injectable 25 Gram(s) IV Push once  dextrose 50% Injectable 12.5 Gram(s) IV Push once  dextrose 50% Injectable 25 Gram(s) IV Push once  dextrose Oral Gel 15 Gram(s) Oral once PRN  fluticasone propionate/ salmeterol 250-50 MICROgram(s) Diskus 1 Dose(s) Inhalation two times a day  folic acid 1 milliGRAM(s) Oral daily  glucagon  Injectable 1 milliGRAM(s) IntraMuscular once  heparin   Injectable 5000 Unit(s) SubCutaneous every 8 hours  insulin lispro (ADMELOG) corrective regimen sliding scale   SubCutaneous three times a day before meals  lamoTRIgine 150 milliGRAM(s) Oral daily  levothyroxine 100 MICROGram(s) Oral daily  melatonin 3 milliGRAM(s) Oral at bedtime PRN  methadone    Tablet 30 milliGRAM(s) Oral every 8 hours  metoclopramide 5 milliGRAM(s) Oral four times a day  naloxone 1 mG/mL Injection for Intranasal Use 1 milliGRAM(s) IntraNasal every 5 minutes PRN  pantoprazole    Tablet 40 milliGRAM(s) Oral two times a day  polyethylene glycol 3350 17 Gram(s) Oral two times a day  predniSONE   Tablet 20 milliGRAM(s) Oral daily  senna 2 Tablet(s) Oral at bedtime  sertraline 200 milliGRAM(s) Oral daily  sucralfate 1 Gram(s) Oral two times a day  thiamine 100 milliGRAM(s) Oral daily      ***************************************************  RADIOLOGY & ADDITIONAL TESTS:    Imaging Personally Reviewed:  [ ] YES  [ ] NO    HEALTH ISSUES - PROBLEM Dx:

## 2024-11-23 NOTE — PROGRESS NOTE ADULT - ASSESSMENT
64-year-old female, former smoker with past medical history of asthma, hypothyroidism, anxiety, depression, MVP, Jaclyn-en-Y gastric bypass, appendectomy, cholecystectomy and Chronic back pain on methadone, presents to the ED following a syncopal episode last night. She also has complaints of worsening abdominal pain for 5 days.     # Abdominal pain with   #h/o Jaclyn en Y bypass   - unclear etiology.   - last Lactate 2.0   - CT abdomen - Hepatomegaly   - EGD/ Colonoscopy (May '23) - Non erosive gastritis  - c/w pantoprazole   - EGD- nonerosive gastritis in stomach  - CTa abdomen pending read  - pain control per pain management. stopped  po morphine.   - bariatric surgery recs appreciated.     # Acute Hypoxic Respiratory Failure likely due to   # Asthma exacerbation.    #h/o MVP (follows Dr. Negrete)  #HTN  - CTA chest - no PE  - TTE --> echo 60-65% EF  - currently on O2 2L NC > titrate off as tolerated.    Ambulating 88% on RA    Resting 93% RA   - duo nebs  - c/w prednisone 40 daily for 3 more days. then 20mg for 3 days  - c/w albuterol prn   - c/w advair and symbicort  - TTE with bubbles 11/19: suspected pulmonary shunt.      per pulm f/u OP    #Syncope likely vasovagal vs dehydration  - ECG with prolonged qtc, no obvious ischemia, trops x2 negative  - no events on tele  - tele discontinued, Neg Orthostatics .     #Hypothyroidism,   - home meds - 88mcg thyroxine   - TSH 9.88  - increased to 100 mcg synthroid     #anxiety, depression,  - c/w lamotrigine and zoloft      #Chronic back pain   - on methadone 30mg   -  po morphine 6mg q4h PRN as per pain management    #Alcohol use disorder  # Suspected Thiamine deficiency   - 2-3 drinks per day  - c/w b12 and folate   - started thiamine     # Obesity >    #MISC  VTE ppx - heparin SC  GI ppx - pantoprazole (home meds)  Activity as tolerated  regular diet per pt requests  Full Code      Pending: Home O2 /   Dispo:  pt prefers going home.   Plan d/w the patient at bedside.

## 2024-11-24 LAB
ANION GAP SERPL CALC-SCNC: 11 MMOL/L — SIGNIFICANT CHANGE UP (ref 7–14)
BASOPHILS # BLD AUTO: 0.09 K/UL — SIGNIFICANT CHANGE UP (ref 0–0.2)
BASOPHILS NFR BLD AUTO: 1.2 % — HIGH (ref 0–1)
BUN SERPL-MCNC: 15 MG/DL — SIGNIFICANT CHANGE UP (ref 10–20)
CALCIUM SERPL-MCNC: 8.7 MG/DL — SIGNIFICANT CHANGE UP (ref 8.4–10.5)
CHLORIDE SERPL-SCNC: 103 MMOL/L — SIGNIFICANT CHANGE UP (ref 98–110)
CO2 SERPL-SCNC: 28 MMOL/L — SIGNIFICANT CHANGE UP (ref 17–32)
CREAT SERPL-MCNC: 1.1 MG/DL — SIGNIFICANT CHANGE UP (ref 0.7–1.5)
EGFR: 57 ML/MIN/1.73M2 — LOW
EOSINOPHIL # BLD AUTO: 0.49 K/UL — SIGNIFICANT CHANGE UP (ref 0–0.7)
EOSINOPHIL NFR BLD AUTO: 6.7 % — SIGNIFICANT CHANGE UP (ref 0–8)
GLUCOSE BLDC GLUCOMTR-MCNC: 104 MG/DL — HIGH (ref 70–99)
GLUCOSE BLDC GLUCOMTR-MCNC: 105 MG/DL — HIGH (ref 70–99)
GLUCOSE BLDC GLUCOMTR-MCNC: 146 MG/DL — HIGH (ref 70–99)
GLUCOSE BLDC GLUCOMTR-MCNC: 175 MG/DL — HIGH (ref 70–99)
GLUCOSE SERPL-MCNC: 79 MG/DL — SIGNIFICANT CHANGE UP (ref 70–99)
HCT VFR BLD CALC: 39.1 % — SIGNIFICANT CHANGE UP (ref 37–47)
HGB BLD-MCNC: 11.8 G/DL — LOW (ref 12–16)
IMM GRANULOCYTES NFR BLD AUTO: 3.7 % — HIGH (ref 0.1–0.3)
LYMPHOCYTES # BLD AUTO: 1.39 K/UL — SIGNIFICANT CHANGE UP (ref 1.2–3.4)
LYMPHOCYTES # BLD AUTO: 19 % — LOW (ref 20.5–51.1)
MAGNESIUM SERPL-MCNC: 2.3 MG/DL — SIGNIFICANT CHANGE UP (ref 1.8–2.4)
MCHC RBC-ENTMCNC: 30.2 G/DL — LOW (ref 32–37)
MCHC RBC-ENTMCNC: 30.5 PG — SIGNIFICANT CHANGE UP (ref 27–31)
MCV RBC AUTO: 101 FL — HIGH (ref 81–99)
MONOCYTES # BLD AUTO: 0.57 K/UL — SIGNIFICANT CHANGE UP (ref 0.1–0.6)
MONOCYTES NFR BLD AUTO: 7.8 % — SIGNIFICANT CHANGE UP (ref 1.7–9.3)
NEUTROPHILS # BLD AUTO: 4.49 K/UL — SIGNIFICANT CHANGE UP (ref 1.4–6.5)
NEUTROPHILS NFR BLD AUTO: 61.6 % — SIGNIFICANT CHANGE UP (ref 42.2–75.2)
NRBC # BLD: 0 /100 WBCS — SIGNIFICANT CHANGE UP (ref 0–0)
PLATELET # BLD AUTO: 214 K/UL — SIGNIFICANT CHANGE UP (ref 130–400)
PMV BLD: 10.3 FL — SIGNIFICANT CHANGE UP (ref 7.4–10.4)
POTASSIUM SERPL-MCNC: 4.6 MMOL/L — SIGNIFICANT CHANGE UP (ref 3.5–5)
POTASSIUM SERPL-SCNC: 4.6 MMOL/L — SIGNIFICANT CHANGE UP (ref 3.5–5)
RBC # BLD: 3.87 M/UL — LOW (ref 4.2–5.4)
RBC # FLD: 14.1 % — SIGNIFICANT CHANGE UP (ref 11.5–14.5)
SODIUM SERPL-SCNC: 142 MMOL/L — SIGNIFICANT CHANGE UP (ref 135–146)
WBC # BLD: 7.3 K/UL — SIGNIFICANT CHANGE UP (ref 4.8–10.8)
WBC # FLD AUTO: 7.3 K/UL — SIGNIFICANT CHANGE UP (ref 4.8–10.8)

## 2024-11-24 PROCEDURE — 99232 SBSQ HOSP IP/OBS MODERATE 35: CPT

## 2024-11-24 RX ORDER — ALPRAZOLAM 0.5 MG
0.5 TABLET ORAL
Refills: 0 | Status: DISCONTINUED | OUTPATIENT
Start: 2024-11-24 | End: 2024-12-01

## 2024-11-24 RX ORDER — METHADONE HYDROCHLORIDE 5 MG/1
30 TABLET ORAL EVERY 8 HOURS
Refills: 0 | Status: DISCONTINUED | OUTPATIENT
Start: 2024-11-24 | End: 2024-12-01

## 2024-11-24 RX ORDER — ALPRAZOLAM 0.5 MG
0.5 TABLET ORAL ONCE
Refills: 0 | Status: DISCONTINUED | OUTPATIENT
Start: 2024-11-24 | End: 2024-11-24

## 2024-11-24 RX ADMIN — SERTRALINE HYDROCHLORIDE 200 MILLIGRAM(S): 100 TABLET, FILM COATED ORAL at 11:52

## 2024-11-24 RX ADMIN — SUCRALFATE 1 GRAM(S): 1 TABLET ORAL at 17:33

## 2024-11-24 RX ADMIN — Medication 2 MILLIGRAM(S): at 01:02

## 2024-11-24 RX ADMIN — Medication 1 MILLIGRAM(S): at 11:52

## 2024-11-24 RX ADMIN — ACETAMINOPHEN 500MG 650 MILLIGRAM(S): 500 TABLET, COATED ORAL at 13:19

## 2024-11-24 RX ADMIN — LAMOTRIGINE 150 MILLIGRAM(S): 50 TABLET, EXTENDED RELEASE ORAL at 11:53

## 2024-11-24 RX ADMIN — PREDNISONE 20 MILLIGRAM(S): 20 TABLET ORAL at 06:37

## 2024-11-24 RX ADMIN — Medication 0.5 MILLIGRAM(S): at 11:52

## 2024-11-24 RX ADMIN — Medication 10 MILLIGRAM(S): at 22:15

## 2024-11-24 RX ADMIN — METHADONE HYDROCHLORIDE 30 MILLIGRAM(S): 5 TABLET ORAL at 22:40

## 2024-11-24 RX ADMIN — ACETAMINOPHEN 500MG 650 MILLIGRAM(S): 500 TABLET, COATED ORAL at 22:14

## 2024-11-24 RX ADMIN — Medication 10 MILLIGRAM(S): at 13:19

## 2024-11-24 RX ADMIN — SUCRALFATE 1 GRAM(S): 1 TABLET ORAL at 06:38

## 2024-11-24 RX ADMIN — Medication 0.5 MILLIGRAM(S): at 17:32

## 2024-11-24 RX ADMIN — Medication 2 MILLIGRAM(S): at 22:40

## 2024-11-24 RX ADMIN — METOCLOPRAMIDE HYDROCHLORIDE 5 MILLIGRAM(S): 10 TABLET ORAL at 06:38

## 2024-11-24 RX ADMIN — ACETAMINOPHEN 500MG 650 MILLIGRAM(S): 500 TABLET, COATED ORAL at 06:38

## 2024-11-24 RX ADMIN — PANTOPRAZOLE SODIUM 40 MILLIGRAM(S): 40 TABLET, DELAYED RELEASE ORAL at 06:37

## 2024-11-24 RX ADMIN — PANTOPRAZOLE SODIUM 40 MILLIGRAM(S): 40 TABLET, DELAYED RELEASE ORAL at 17:33

## 2024-11-24 RX ADMIN — METOCLOPRAMIDE HYDROCHLORIDE 5 MILLIGRAM(S): 10 TABLET ORAL at 17:32

## 2024-11-24 RX ADMIN — ACETAMINOPHEN, DIPHENHYDRAMINE HCL, PHENYLEPHRINE HCL 3 MILLIGRAM(S): 325; 25; 5 TABLET ORAL at 22:19

## 2024-11-24 RX ADMIN — Medication 2 TABLET(S): at 22:14

## 2024-11-24 RX ADMIN — Medication 100 MICROGRAM(S): at 06:37

## 2024-11-24 RX ADMIN — POLYETHYLENE GLYCOL 3350 17 GRAM(S): 17 POWDER, FOR SOLUTION ORAL at 06:38

## 2024-11-24 RX ADMIN — POLYETHYLENE GLYCOL 3350 17 GRAM(S): 17 POWDER, FOR SOLUTION ORAL at 17:32

## 2024-11-24 RX ADMIN — Medication 2 PUFF(S): at 22:16

## 2024-11-24 RX ADMIN — METOCLOPRAMIDE HYDROCHLORIDE 5 MILLIGRAM(S): 10 TABLET ORAL at 22:18

## 2024-11-24 RX ADMIN — METOCLOPRAMIDE HYDROCHLORIDE 5 MILLIGRAM(S): 10 TABLET ORAL at 11:52

## 2024-11-24 RX ADMIN — CHLORHEXIDINE GLUCONATE 1 APPLICATION(S): 1.2 RINSE ORAL at 06:38

## 2024-11-24 RX ADMIN — METOCLOPRAMIDE HYDROCHLORIDE 5 MILLIGRAM(S): 10 TABLET ORAL at 01:01

## 2024-11-24 RX ADMIN — Medication 100 MILLIGRAM(S): at 11:53

## 2024-11-24 RX ADMIN — Medication 1: at 12:26

## 2024-11-24 RX ADMIN — Medication 10 MILLIGRAM(S): at 06:37

## 2024-11-24 RX ADMIN — BUMETANIDE 1 MILLIGRAM(S): 1 TABLET ORAL at 06:37

## 2024-11-24 NOTE — PROGRESS NOTE ADULT - SUBJECTIVE AND OBJECTIVE BOX
SUBJECTIVE/OVERNIGHT EVENTS  Today is hospital day 16d. This morning patient was seen and examined at bedside, resting comfortably in bed. No acute or major events overnight.    HOSPITAL COURSE  Day 1:   Day 2:   Day 3:     CODE STATUS:    FAMILY COMMUNICATION  Contact date:  Name of person contacted:  Relationship to patient:  Communication details:    MEDICATIONS  STANDING MEDICATIONS  acetaminophen     Tablet .. 650 milliGRAM(s) Oral every 8 hours  buMETAnide 1 milliGRAM(s) Oral daily  chlorhexidine 2% Cloths 1 Application(s) Topical <User Schedule>  cyclobenzaprine 10 milliGRAM(s) Oral three times a day  dextrose 5%. 1000 milliLiter(s) IV Continuous <Continuous>  dextrose 5%. 1000 milliLiter(s) IV Continuous <Continuous>  dextrose 50% Injectable 25 Gram(s) IV Push once  dextrose 50% Injectable 12.5 Gram(s) IV Push once  dextrose 50% Injectable 25 Gram(s) IV Push once  fluticasone propionate/ salmeterol 250-50 MICROgram(s) Diskus 1 Dose(s) Inhalation two times a day  folic acid 1 milliGRAM(s) Oral daily  glucagon  Injectable 1 milliGRAM(s) IntraMuscular once  heparin   Injectable 5000 Unit(s) SubCutaneous every 8 hours  insulin lispro (ADMELOG) corrective regimen sliding scale   SubCutaneous three times a day before meals  lamoTRIgine 150 milliGRAM(s) Oral daily  levothyroxine 100 MICROGram(s) Oral daily  metoclopramide 5 milliGRAM(s) Oral four times a day  pantoprazole    Tablet 40 milliGRAM(s) Oral two times a day  polyethylene glycol 3350 17 Gram(s) Oral two times a day  predniSONE   Tablet 20 milliGRAM(s) Oral daily  senna 2 Tablet(s) Oral at bedtime  sertraline 200 milliGRAM(s) Oral daily  sucralfate 1 Gram(s) Oral two times a day  thiamine 100 milliGRAM(s) Oral daily    PRN MEDICATIONS  albuterol    90 MICROgram(s) HFA Inhaler 2 Puff(s) Inhalation every 6 hours PRN  bisacodyl 5 milliGRAM(s) Oral every 12 hours PRN  dextrose Oral Gel 15 Gram(s) Oral once PRN  melatonin 3 milliGRAM(s) Oral at bedtime PRN  naloxone 1 mG/mL Injection for Intranasal Use 1 milliGRAM(s) IntraNasal every 5 minutes PRN    VITALS  T(F): 98 (11-23-24 @ 19:30), Max: 98 (11-23-24 @ 19:30)  HR: 78 (11-23-24 @ 19:30) (68 - 78)  BP: 114/71 (11-23-24 @ 19:30) (114/71 - 132/84)  RR: 18 (11-23-24 @ 19:30) (18 - 18)  SpO2: 95% (11-23-24 @ 19:30) (93% - 96%)  POCT Blood Glucose.: 83 mg/dL (11-23-24 @ 21:26)  POCT Blood Glucose.: 127 mg/dL (11-23-24 @ 16:57)  POCT Blood Glucose.: 106 mg/dL (11-23-24 @ 11:56)  POCT Blood Glucose.: 119 mg/dL (11-23-24 @ 07:51)    PHYSICAL EXAM  GENERAL  (  ) NAD, lying in bed comfortably     (  ) obtunded     (  ) lethargic     (  ) somnolent    HEAD  (  ) Atraumatic     (  ) hematoma     (  ) laceration (specify location:       )     NECK  (  ) Supple     (  ) neck stiffness     (  ) nuchal rigidity     (  )  no JVD     (  ) JVD present ( -- cm)    HEART  Rate -->  (  ) normal rate    (  ) bradycardic    (  ) tachycardic  Rhythm -->  (  ) regular    (  ) regularly irregular    (  ) irregularly irregular  Murmurs -->  (  ) normal s1/s2    (  ) systolic murmur    (  ) diastolic murmur    (  ) continuous murmur     (  ) S3 present    (  ) S4 present    LUNGS  (  )Unlabored respirations     (  ) tachypnea  (  ) B/L air entry     (  ) decreased breath sounds in:  (location     )    (  ) no adventitious sound     (  ) crackles     (  ) wheezing      (  ) rhonchi      (specify location:       )  (  ) chest wall tenderness (specify location:       )    ABDOMEN  (  ) Soft     (  ) tense   |   (  ) nondistended     (  ) distended   |   (  ) +BS     (  ) hypoactive bowel sounds     (  ) hyperactive bowel sounds  (  ) nontender     (  ) RUQ tenderness     (  ) RLQ tenderness     (  ) LLQ tenderness     (  ) epigastric tenderness     (  ) diffuse tenderness  (  ) Splenomegaly      (  ) Hepatomegaly      (  ) Jaundice     (  ) ecchymosis     EXTREMITIES  (  ) Normal     (  ) Rash     (  ) ecchymosis     (  ) varicose veins      (  ) pitting edema     (  ) non-pitting edema   (  ) ulceration     (  ) gangrene:     (location:     )    NERVOUS SYSTEM  (  ) A&Ox3     (  ) confused     (  ) lethargic  CN II-XII:     (  ) Intact     (  ) focal deficits  (Specify:     )   Upper extremities:     (  ) strength X/5     (  ) focal deficit (specify:    )  Lower extremities:     (  ) strength  X/5    (  ) focal deficit (specify:    )    SKIN  (  ) No rashes or lesions     (  ) maculopapular rash     (  ) pustules     (  ) vesicles     (  ) ulcer     (  ) ecchymosis     (specify location:     )    (  ) Indwelling Hendrix Catheter   Date insterted:    Reason (  ) Critical illness     (  ) urinary retention    (  ) Accurate Ins/Outs Monitoring     (  ) CMO patient    (  ) Central Line  Date inserted:  Location: (  ) Right IJ   (  ) Left IJ   (  ) Right Fem   (  ) Left Fem    (  ) SPC  (  ) pigtail  (  ) PEG tube  (  ) colostomy  (  ) jejunostomy  (  ) U-Dall    LABS             11.4   6.72  )-----------( 207      ( 11-23-24 @ 06:50 )             37.8     142  |  102  |  15  -------------------------<  116   11-23-24 @ 06:50  4.2  |  29  |  1.2    Ca      8.3     11-23-24 @ 06:50  Mg     2.2     11-23-24 @ 06:50          Urinalysis Basic - ( 23 Nov 2024 06:50 )    Color: x / Appearance: x / SG: x / pH: x  Gluc: 116 mg/dL / Ketone: x  / Bili: x / Urobili: x   Blood: x / Protein: x / Nitrite: x   Leuk Esterase: x / RBC: x / WBC x   Sq Epi: x / Non Sq Epi: x / Bacteria: x          Culture - Blood (collected 21 Nov 2024 17:46)  Source: .Blood BLOOD  Preliminary Report (23 Nov 2024 23:07):    No growth at 48 Hours      IMAGING

## 2024-11-24 NOTE — PROGRESS NOTE ADULT - SUBJECTIVE AND OBJECTIVE BOX
JANNETTE NAVARRETEY  64y  Female      Patient is a 64y old  Female who presents with a chief complaint of Abdominal pain.       INTERVAL HPI/OVERNIGHT EVENTS:      ******************************* REVIEW OF SYSTEMS:**********************************************    All other review of systems negative    *********************** VITALS ******************************************    T(F): 97.7 (11-24-24 @ 05:31)  HR: 66 (11-24-24 @ 05:31) (66 - 78)  BP: 106/69 (11-24-24 @ 05:31) (106/69 - 114/71)  RR: 18 (11-24-24 @ 05:31) (18 - 18)  SpO2: 93% (11-24-24 @ 05:31) (93% - 95%)            ******************************** PHYSICAL EXAM:**************************************************  GENERAL: NAD    PSYCH: no agitation, baseline mentation  HEENT:     NERVOUS SYSTEM:  Alert & Oriented X3,     PULMONARY: SHARIF, CTA    CARDIOVASCULAR: S1S2 RRR    GI: Soft, NT, ND; BS present.    EXTREMITIES:  2+ Peripheral Pulses, No clubbing, cyanosis, or edema    LYMPH: No lymphadenopathy noted    SKIN: No rashes or lesions      **************************** LABS *******************************************************                          11.8   7.30  )-----------( 214      ( 24 Nov 2024 06:44 )             39.1     11-24    142  |  103  |  15  ----------------------------<  79  4.6   |  28  |  1.1    Ca    8.7      24 Nov 2024 06:44  Mg     2.3     11-24        Urinalysis Basic - ( 24 Nov 2024 06:44 )    Color: x / Appearance: x / SG: x / pH: x  Gluc: 79 mg/dL / Ketone: x  / Bili: x / Urobili: x   Blood: x / Protein: x / Nitrite: x   Leuk Esterase: x / RBC: x / WBC x   Sq Epi: x / Non Sq Epi: x / Bacteria: x        Lactate Trend        CAPILLARY BLOOD GLUCOSE      POCT Blood Glucose.: 175 mg/dL (24 Nov 2024 12:20)          **************************Active Medications *******************************************  penicillin (Anaphylaxis)      acetaminophen     Tablet .. 650 milliGRAM(s) Oral every 8 hours  albuterol    90 MICROgram(s) HFA Inhaler 2 Puff(s) Inhalation every 6 hours PRN  ALPRAZolam 0.5 milliGRAM(s) Oral two times a day  bisacodyl 5 milliGRAM(s) Oral every 12 hours PRN  buMETAnide 1 milliGRAM(s) Oral daily  chlorhexidine 2% Cloths 1 Application(s) Topical <User Schedule>  cyclobenzaprine 10 milliGRAM(s) Oral three times a day  dextrose 5%. 1000 milliLiter(s) IV Continuous <Continuous>  dextrose 5%. 1000 milliLiter(s) IV Continuous <Continuous>  dextrose 50% Injectable 25 Gram(s) IV Push once  dextrose 50% Injectable 12.5 Gram(s) IV Push once  dextrose 50% Injectable 25 Gram(s) IV Push once  dextrose Oral Gel 15 Gram(s) Oral once PRN  fluticasone propionate/ salmeterol 250-50 MICROgram(s) Diskus 1 Dose(s) Inhalation two times a day  folic acid 1 milliGRAM(s) Oral daily  glucagon  Injectable 1 milliGRAM(s) IntraMuscular once  heparin   Injectable 5000 Unit(s) SubCutaneous every 8 hours  insulin lispro (ADMELOG) corrective regimen sliding scale   SubCutaneous three times a day before meals  lamoTRIgine 150 milliGRAM(s) Oral daily  levothyroxine 100 MICROGram(s) Oral daily  melatonin 3 milliGRAM(s) Oral at bedtime PRN  metoclopramide 5 milliGRAM(s) Oral four times a day  naloxone 1 mG/mL Injection for Intranasal Use 1 milliGRAM(s) IntraNasal every 5 minutes PRN  pantoprazole    Tablet 40 milliGRAM(s) Oral two times a day  polyethylene glycol 3350 17 Gram(s) Oral two times a day  senna 2 Tablet(s) Oral at bedtime  sertraline 200 milliGRAM(s) Oral daily  sucralfate 1 Gram(s) Oral two times a day  thiamine 100 milliGRAM(s) Oral daily      ***************************************************  RADIOLOGY & ADDITIONAL TESTS:    Imaging Personally Reviewed:  [ ] YES  [ ] NO    HEALTH ISSUES - PROBLEM Dx:

## 2024-11-24 NOTE — PROGRESS NOTE ADULT - ASSESSMENT
64-year-old female, former smoker with past medical history of asthma, hypothyroidism, anxiety, depression, MVP, Jaclyn-en-Y gastric bypass, appendectomy, cholecystectomy and Chronic back pain on methadone, presents to the ED following a syncopal episode last night. She also has complaints of worsening abdominal pain for 5 days.     # Abdominal pain with   #h/o Jaclyn en Y bypass   - unclear etiology.  >> resolved   - CT abdomen - Hepatomegaly   - EGD/ Colonoscopy (May '23) - Non erosive gastritis  - c/w pantoprazole   - EGD- nonerosive gastritis in stomach  - CTa abdomen pending read  - pain control per pain management. stopped  po morphine.   - bariatric surgery recs appreciated.     # Acute Hypoxic Respiratory Failure likely due to   # Asthma/ COPD exacerbation.  vs ACOS   #h/o MVP (follows Dr. Negrete)  #HTN  - CTA chest - no PE  - TTE --> echo 60-65% EF  - currently on O2 2L NC > titrate off as tolerated.    Ambulating 88% on RA    Resting 93% RA   - Taper down on  prednisone   - c/w albuterol prn   - c/w advair and symbicort  - TTE with bubbles 11/19: suspected pulmonary shunt.      per pulm f/u OP for PFT     #Syncope likely vasovagal vs dehydration  - ECG with prolonged qtc, no obvious ischemia, trops x2 negative  - no events on tele  - tele discontinued, Neg Orthostatics .     #Hypothyroidism,   - home meds - 88mcg thyroxine   - TSH 9.88  - increased to 100 mcg synthroid     #anxiety, depression,  - c/w lamotrigine and zoloft      #Chronic back pain   - on methadone 30mg   -  po morphine 6mg q4h PRN as per pain management    #Alcohol use disorder  # Suspected Thiamine deficiency   - 2-3 drinks per day  - c/w b12 and folate   - started thiamine     # Obesity >    #MISC  VTE ppx - heparin SC  GI ppx - pantoprazole (home meds)  Activity as tolerated  regular diet per pt requests  Full Code      Pending: Home O2 /   Dispo:  pt prefers going home.   Plan d/w the patient at bedside.

## 2024-11-25 LAB
ANION GAP SERPL CALC-SCNC: 12 MMOL/L — SIGNIFICANT CHANGE UP (ref 7–14)
BASOPHILS # BLD AUTO: 0.1 K/UL — SIGNIFICANT CHANGE UP (ref 0–0.2)
BASOPHILS NFR BLD AUTO: 1.1 % — HIGH (ref 0–1)
BUN SERPL-MCNC: 19 MG/DL — SIGNIFICANT CHANGE UP (ref 10–20)
CALCIUM SERPL-MCNC: 8.5 MG/DL — SIGNIFICANT CHANGE UP (ref 8.4–10.5)
CHLORIDE SERPL-SCNC: 103 MMOL/L — SIGNIFICANT CHANGE UP (ref 98–110)
CO2 SERPL-SCNC: 25 MMOL/L — SIGNIFICANT CHANGE UP (ref 17–32)
CREAT SERPL-MCNC: 1.1 MG/DL — SIGNIFICANT CHANGE UP (ref 0.7–1.5)
EGFR: 57 ML/MIN/1.73M2 — LOW
EOSINOPHIL # BLD AUTO: 0.47 K/UL — SIGNIFICANT CHANGE UP (ref 0–0.7)
EOSINOPHIL NFR BLD AUTO: 5.1 % — SIGNIFICANT CHANGE UP (ref 0–8)
FLUAV AG NPH QL: SIGNIFICANT CHANGE UP
FLUBV AG NPH QL: SIGNIFICANT CHANGE UP
GLUCOSE BLDC GLUCOMTR-MCNC: 155 MG/DL — HIGH (ref 70–99)
GLUCOSE BLDC GLUCOMTR-MCNC: 77 MG/DL — SIGNIFICANT CHANGE UP (ref 70–99)
GLUCOSE BLDC GLUCOMTR-MCNC: 85 MG/DL — SIGNIFICANT CHANGE UP (ref 70–99)
GLUCOSE BLDC GLUCOMTR-MCNC: 94 MG/DL — SIGNIFICANT CHANGE UP (ref 70–99)
GLUCOSE SERPL-MCNC: 79 MG/DL — SIGNIFICANT CHANGE UP (ref 70–99)
HCT VFR BLD CALC: 39.4 % — SIGNIFICANT CHANGE UP (ref 37–47)
HGB BLD-MCNC: 12 G/DL — SIGNIFICANT CHANGE UP (ref 12–16)
IMM GRANULOCYTES NFR BLD AUTO: 3.3 % — HIGH (ref 0.1–0.3)
LYMPHOCYTES # BLD AUTO: 1.4 K/UL — SIGNIFICANT CHANGE UP (ref 1.2–3.4)
LYMPHOCYTES # BLD AUTO: 15.3 % — LOW (ref 20.5–51.1)
MAGNESIUM SERPL-MCNC: 2.2 MG/DL — SIGNIFICANT CHANGE UP (ref 1.8–2.4)
MCHC RBC-ENTMCNC: 30.1 PG — SIGNIFICANT CHANGE UP (ref 27–31)
MCHC RBC-ENTMCNC: 30.5 G/DL — LOW (ref 32–37)
MCV RBC AUTO: 98.7 FL — SIGNIFICANT CHANGE UP (ref 81–99)
MONOCYTES # BLD AUTO: 0.53 K/UL — SIGNIFICANT CHANGE UP (ref 0.1–0.6)
MONOCYTES NFR BLD AUTO: 5.8 % — SIGNIFICANT CHANGE UP (ref 1.7–9.3)
NEUTROPHILS # BLD AUTO: 6.38 K/UL — SIGNIFICANT CHANGE UP (ref 1.4–6.5)
NEUTROPHILS NFR BLD AUTO: 69.4 % — SIGNIFICANT CHANGE UP (ref 42.2–75.2)
NRBC # BLD: 0 /100 WBCS — SIGNIFICANT CHANGE UP (ref 0–0)
PLATELET # BLD AUTO: 215 K/UL — SIGNIFICANT CHANGE UP (ref 130–400)
PMV BLD: 10.2 FL — SIGNIFICANT CHANGE UP (ref 7.4–10.4)
POTASSIUM SERPL-MCNC: 4.1 MMOL/L — SIGNIFICANT CHANGE UP (ref 3.5–5)
POTASSIUM SERPL-SCNC: 4.1 MMOL/L — SIGNIFICANT CHANGE UP (ref 3.5–5)
RBC # BLD: 3.99 M/UL — LOW (ref 4.2–5.4)
RBC # FLD: 13.8 % — SIGNIFICANT CHANGE UP (ref 11.5–14.5)
RSV RNA NPH QL NAA+NON-PROBE: SIGNIFICANT CHANGE UP
SARS-COV-2 RNA SPEC QL NAA+PROBE: SIGNIFICANT CHANGE UP
SODIUM SERPL-SCNC: 140 MMOL/L — SIGNIFICANT CHANGE UP (ref 135–146)
WBC # BLD: 9.18 K/UL — SIGNIFICANT CHANGE UP (ref 4.8–10.8)
WBC # FLD AUTO: 9.18 K/UL — SIGNIFICANT CHANGE UP (ref 4.8–10.8)

## 2024-11-25 PROCEDURE — 71045 X-RAY EXAM CHEST 1 VIEW: CPT | Mod: 26

## 2024-11-25 PROCEDURE — 99232 SBSQ HOSP IP/OBS MODERATE 35: CPT

## 2024-11-25 RX ORDER — IPRATROPIUM BROMIDE AND ALBUTEROL SULFATE 2.5; .5 MG/3ML; MG/3ML
3 SOLUTION RESPIRATORY (INHALATION) ONCE
Refills: 0 | Status: COMPLETED | OUTPATIENT
Start: 2024-11-25 | End: 2024-11-25

## 2024-11-25 RX ORDER — LACTULOSE 10 G/15ML
20 SOLUTION ORAL ONCE
Refills: 0 | Status: COMPLETED | OUTPATIENT
Start: 2024-11-25 | End: 2024-11-25

## 2024-11-25 RX ADMIN — BUMETANIDE 1 MILLIGRAM(S): 1 TABLET ORAL at 06:41

## 2024-11-25 RX ADMIN — METHADONE HYDROCHLORIDE 30 MILLIGRAM(S): 5 TABLET ORAL at 22:33

## 2024-11-25 RX ADMIN — Medication 10 MILLIGRAM(S): at 06:41

## 2024-11-25 RX ADMIN — SUCRALFATE 1 GRAM(S): 1 TABLET ORAL at 06:41

## 2024-11-25 RX ADMIN — Medication 6 MILLIGRAM(S): at 17:00

## 2024-11-25 RX ADMIN — METOCLOPRAMIDE HYDROCHLORIDE 5 MILLIGRAM(S): 10 TABLET ORAL at 11:29

## 2024-11-25 RX ADMIN — SERTRALINE HYDROCHLORIDE 200 MILLIGRAM(S): 100 TABLET, FILM COATED ORAL at 11:28

## 2024-11-25 RX ADMIN — Medication 100 MILLIGRAM(S): at 11:29

## 2024-11-25 RX ADMIN — Medication 2 TABLET(S): at 22:01

## 2024-11-25 RX ADMIN — ACETAMINOPHEN 500MG 650 MILLIGRAM(S): 500 TABLET, COATED ORAL at 07:41

## 2024-11-25 RX ADMIN — Medication 10 MILLIGRAM(S): at 13:06

## 2024-11-25 RX ADMIN — ACETAMINOPHEN 500MG 650 MILLIGRAM(S): 500 TABLET, COATED ORAL at 22:02

## 2024-11-25 RX ADMIN — PANTOPRAZOLE SODIUM 40 MILLIGRAM(S): 40 TABLET, DELAYED RELEASE ORAL at 06:42

## 2024-11-25 RX ADMIN — Medication 6 MILLIGRAM(S): at 22:33

## 2024-11-25 RX ADMIN — ACETAMINOPHEN 500MG 650 MILLIGRAM(S): 500 TABLET, COATED ORAL at 13:06

## 2024-11-25 RX ADMIN — METHADONE HYDROCHLORIDE 30 MILLIGRAM(S): 5 TABLET ORAL at 06:42

## 2024-11-25 RX ADMIN — Medication 6 MILLIGRAM(S): at 12:30

## 2024-11-25 RX ADMIN — METOCLOPRAMIDE HYDROCHLORIDE 5 MILLIGRAM(S): 10 TABLET ORAL at 23:06

## 2024-11-25 RX ADMIN — ACETAMINOPHEN 500MG 650 MILLIGRAM(S): 500 TABLET, COATED ORAL at 06:41

## 2024-11-25 RX ADMIN — Medication 100 MICROGRAM(S): at 06:41

## 2024-11-25 RX ADMIN — METOCLOPRAMIDE HYDROCHLORIDE 5 MILLIGRAM(S): 10 TABLET ORAL at 17:02

## 2024-11-25 RX ADMIN — IPRATROPIUM BROMIDE AND ALBUTEROL SULFATE 3 MILLILITER(S): 2.5; .5 SOLUTION RESPIRATORY (INHALATION) at 12:38

## 2024-11-25 RX ADMIN — Medication 6 MILLIGRAM(S): at 12:45

## 2024-11-25 RX ADMIN — CHLORHEXIDINE GLUCONATE 1 APPLICATION(S): 1.2 RINSE ORAL at 06:43

## 2024-11-25 RX ADMIN — Medication 0.5 MILLIGRAM(S): at 06:42

## 2024-11-25 RX ADMIN — ACETAMINOPHEN 500MG 650 MILLIGRAM(S): 500 TABLET, COATED ORAL at 22:33

## 2024-11-25 RX ADMIN — SUCRALFATE 1 GRAM(S): 1 TABLET ORAL at 17:01

## 2024-11-25 RX ADMIN — METOCLOPRAMIDE HYDROCHLORIDE 5 MILLIGRAM(S): 10 TABLET ORAL at 06:42

## 2024-11-25 RX ADMIN — POLYETHYLENE GLYCOL 3350 17 GRAM(S): 17 POWDER, FOR SOLUTION ORAL at 06:42

## 2024-11-25 RX ADMIN — LAMOTRIGINE 150 MILLIGRAM(S): 50 TABLET, EXTENDED RELEASE ORAL at 11:28

## 2024-11-25 RX ADMIN — Medication 1 MILLIGRAM(S): at 11:29

## 2024-11-25 RX ADMIN — Medication 10 MILLIGRAM(S): at 22:01

## 2024-11-25 RX ADMIN — Medication 0.5 MILLIGRAM(S): at 17:00

## 2024-11-25 RX ADMIN — ACETAMINOPHEN 500MG 650 MILLIGRAM(S): 500 TABLET, COATED ORAL at 14:06

## 2024-11-25 RX ADMIN — LACTULOSE 20 GRAM(S): 10 SOLUTION ORAL at 11:28

## 2024-11-25 RX ADMIN — Medication 6 MILLIGRAM(S): at 22:02

## 2024-11-25 RX ADMIN — POLYETHYLENE GLYCOL 3350 17 GRAM(S): 17 POWDER, FOR SOLUTION ORAL at 17:00

## 2024-11-25 RX ADMIN — METHADONE HYDROCHLORIDE 30 MILLIGRAM(S): 5 TABLET ORAL at 13:06

## 2024-11-25 RX ADMIN — Medication 6 MILLIGRAM(S): at 17:15

## 2024-11-25 RX ADMIN — PANTOPRAZOLE SODIUM 40 MILLIGRAM(S): 40 TABLET, DELAYED RELEASE ORAL at 17:01

## 2024-11-25 NOTE — PROGRESS NOTE ADULT - ASSESSMENT
64-year-old female, former smoker with past medical history of asthma, hypothyroidism, anxiety, depression, MVP, Jaclyn-en-Y gastric bypass, appendectomy, cholecystectomy and Chronic back pain on methadone, presents to the ED following a syncopal episode last night. She also has complaints of worsening abdominal pain for 5 days.     # Abdominal pain with   #h/o Jaclyn en Y bypass   - unclear etiology.  >> resolved   - CT abdomen - Hepatomegaly   - EGD/ Colonoscopy (May '23) - Non erosive gastritis  - c/w pantoprazole   - EGD- nonerosive gastritis in stomach  - CTa abdomen pending read  - pain control per pain management. stopped  po morphine.   - bariatric surgery recs appreciated.     # Acute Hypoxic Respiratory Failure likely due to   # Asthma/ COPD exacerbation.  vs ACOS   #h/o MVP (follows Dr. Negrete)  #HTN  - CTA chest - no PE  - TTE --> echo 60-65% EF  - currently on O2 2L NC > titrate off as tolerated.    Ambulating 88% on RA > will recheck today.,     Resting 93% RA   - Taper down on  prednisone   - c/w albuterol prn   - c/w advair and symbicort  - TTE with bubbles 11/19: suspected pulmonary shunt.      per pulm f/u OP for PFT     #Syncope likely vasovagal vs dehydration  - ECG with prolonged qtc, no obvious ischemia, trops x2 negative  - no events on tele  - tele discontinued, Neg Orthostatics .     #Hypothyroidism,   - home meds - 88mcg thyroxine   - TSH 9.88  - increased to 100 mcg synthroid     #anxiety, depression,  - c/w lamotrigine and zoloft      #Chronic back pain   - on methadone 30mg   -  po morphine 6mg q4h PRN as per pain management    #Alcohol use disorder  # Suspected Thiamine deficiency   - 2-3 drinks per day  - c/w b12 and folate   - started thiamine     # Obesity >    #MISC  VTE ppx - heparin SC  GI ppx - pantoprazole (home meds)  Activity as tolerated  regular diet per pt requests  Full Code      Pending: Home O2 / RVP   Dispo:  pt prefers going home.   Plan d/w the patient at bedside.

## 2024-11-25 NOTE — PROGRESS NOTE ADULT - SUBJECTIVE AND OBJECTIVE BOX
JULIET NAVARRETE  64y  Female      Patient is a 64y old  Female who presents with a chief complaint of Abdominal pain.     INTERVAL HPI/OVERNIGHT EVENTS:      ******************************* REVIEW OF SYSTEMS:**********************************************  c/o sore throat, otherwise feeling well.    All other review of systems negative    *********************** VITALS ******************************************    T(F): 97.5 (11-25-24 @ 05:05)  HR: 62 (11-25-24 @ 05:05) (62 - 93)  BP: 108/72 (11-25-24 @ 05:05) (108/72 - 109/70)  RR: 18 (11-25-24 @ 05:05) (18 - 18)  SpO2: 93% (11-24-24 @ 20:00) (93% - 93%)            ******************************** PHYSICAL EXAM:**************************************************  GENERAL: NAD    PSYCH: no agitation, baseline mentation  HEENT:     NERVOUS SYSTEM:  Alert & Oriented X3,     PULMONARY: SHARIF, CTA    CARDIOVASCULAR: S1S2 RRR    GI: Soft, NT, ND; BS present.    EXTREMITIES:  2+ Peripheral Pulses, No clubbing, cyanosis, or edema    LYMPH: No lymphadenopathy noted    SKIN: No rashes or lesions      **************************** LABS *******************************************************                          12.0   9.18  )-----------( 215      ( 25 Nov 2024 05:43 )             39.4     11-25    140  |  103  |  19  ----------------------------<  79  4.1   |  25  |  1.1    Ca    8.5      25 Nov 2024 05:43  Mg     2.2     11-25        Urinalysis Basic - ( 25 Nov 2024 05:43 )    Color: x / Appearance: x / SG: x / pH: x  Gluc: 79 mg/dL / Ketone: x  / Bili: x / Urobili: x   Blood: x / Protein: x / Nitrite: x   Leuk Esterase: x / RBC: x / WBC x   Sq Epi: x / Non Sq Epi: x / Bacteria: x        Lactate Trend        CAPILLARY BLOOD GLUCOSE      POCT Blood Glucose.: 94 mg/dL (25 Nov 2024 11:49)          **************************Active Medications *******************************************  penicillin (Anaphylaxis)      acetaminophen     Tablet .. 650 milliGRAM(s) Oral every 8 hours  albuterol    90 MICROgram(s) HFA Inhaler 2 Puff(s) Inhalation every 6 hours PRN  ALPRAZolam 0.5 milliGRAM(s) Oral two times a day  bisacodyl 5 milliGRAM(s) Oral every 12 hours PRN  buMETAnide 1 milliGRAM(s) Oral daily  chlorhexidine 2% Cloths 1 Application(s) Topical <User Schedule>  cyclobenzaprine 10 milliGRAM(s) Oral three times a day  dextrose 5%. 1000 milliLiter(s) IV Continuous <Continuous>  dextrose 5%. 1000 milliLiter(s) IV Continuous <Continuous>  dextrose 50% Injectable 25 Gram(s) IV Push once  dextrose 50% Injectable 12.5 Gram(s) IV Push once  dextrose 50% Injectable 25 Gram(s) IV Push once  dextrose Oral Gel 15 Gram(s) Oral once PRN  fluticasone propionate/ salmeterol 250-50 MICROgram(s) Diskus 1 Dose(s) Inhalation two times a day  folic acid 1 milliGRAM(s) Oral daily  glucagon  Injectable 1 milliGRAM(s) IntraMuscular once  heparin   Injectable 5000 Unit(s) SubCutaneous every 8 hours  insulin lispro (ADMELOG) corrective regimen sliding scale   SubCutaneous three times a day before meals  lamoTRIgine 150 milliGRAM(s) Oral daily  levothyroxine 100 MICROGram(s) Oral daily  melatonin 3 milliGRAM(s) Oral at bedtime PRN  methadone    Tablet 30 milliGRAM(s) Oral every 8 hours  metoclopramide 5 milliGRAM(s) Oral four times a day  morphine  - Injectable 6 milliGRAM(s) IV Push every 4 hours PRN  naloxone 1 mG/mL Injection for Intranasal Use 1 milliGRAM(s) IntraNasal every 5 minutes PRN  pantoprazole    Tablet 40 milliGRAM(s) Oral two times a day  polyethylene glycol 3350 17 Gram(s) Oral two times a day  senna 2 Tablet(s) Oral at bedtime  sertraline 200 milliGRAM(s) Oral daily  sucralfate 1 Gram(s) Oral two times a day  thiamine 100 milliGRAM(s) Oral daily      ***************************************************  RADIOLOGY & ADDITIONAL TESTS:    Imaging Personally Reviewed:  [ ] YES  [ ] NO    HEALTH ISSUES - PROBLEM Dx:

## 2024-11-25 NOTE — PROGRESS NOTE ADULT - ASSESSMENT
64-year-old female, former smoker with past medical history of asthma, hypothyroidism, anxiety, depression, MVP, Jaclyn-en-Y gastric bypass, appendectomy, cholecystectomy and Chronic back pain on methadone, presents to the ED following a syncopal episode last night. She also has complaints of worsening abdominal pain for 5 days.     # Abdominal pain with   #h/o Jaclyn en Y bypass   - unclear etiology.  >> resolved   - CT abdomen - Hepatomegaly   - EGD/ Colonoscopy (May '23) - Non erosive gastritis  - c/w pantoprazole   - EGD- nonerosive gastritis in stomach  - CTa abdomen pending read  - pain control per pain management. stopped  po morphine.   - bariatric surgery recs appreciated.     # Acute Hypoxic Respiratory Failure likely due to   # Asthma/ COPD exacerbation.  vs ACOS   #h/o MVP (follows Dr. Negrete)  #HTN  - CTA chest - no PE  - TTE --> echo 60-65% EF  - currently on O2 2L NC > titrate off as tolerated.    Ambulating 88% on RA    Resting 93% RA   - Taper down on  prednisone   - c/w albuterol prn   - c/w advair and symbicort  - TTE with bubbles 11/19: suspected pulmonary shunt.      per pulm f/u OP for PFT     #Syncope likely vasovagal vs dehydration  - ECG with prolonged qtc, no obvious ischemia, trops x2 negative  - no events on tele  - tele discontinued, Neg Orthostatics .     #Hypothyroidism,   - home meds - 88mcg thyroxine   - TSH 9.88  - increased to 100 mcg synthroid     #anxiety, depression,  - c/w lamotrigine and zoloft      #Chronic back pain   - on methadone 30mg   -  po morphine 6mg q4h PRN as per pain management    #Alcohol use disorder  # Suspected Thiamine deficiency   - 2-3 drinks per day  - c/w b12 and folate   - started thiamine     # Obesity >    #MISC  VTE ppx - heparin SC  GI ppx - pantoprazole (home meds)  Activity as tolerated  regular diet per pt requests  Full Code      Pending: Flu/RSV/COVID PCR, Chest X-ray, start  lactulose 20mg oral  Dispo:  pt prefers going home.   Plan d/w the patient at bedside.  no history of blood product transfusion

## 2024-11-25 NOTE — PROGRESS NOTE ADULT - SUBJECTIVE AND OBJECTIVE BOX
SUBJECTIVE/OVERNIGHT EVENTS  Today is hospital day 17d. This morning patient was seen and examined at bedside, resting comfortably in bed. Pt is endorsing sore throat and productive cough with green-yellow sputum starting 2 days ago. Pt also notes she has not had a bowel movement since 4 days ago.     MEDICATIONS  STANDING MEDICATIONS  acetaminophen     Tablet .. 650 milliGRAM(s) Oral every 8 hours  ALPRAZolam 0.5 milliGRAM(s) Oral two times a day  buMETAnide 1 milliGRAM(s) Oral daily  chlorhexidine 2% Cloths 1 Application(s) Topical <User Schedule>  cyclobenzaprine 10 milliGRAM(s) Oral three times a day  dextrose 5%. 1000 milliLiter(s) IV Continuous <Continuous>  dextrose 5%. 1000 milliLiter(s) IV Continuous <Continuous>  dextrose 50% Injectable 25 Gram(s) IV Push once  dextrose 50% Injectable 12.5 Gram(s) IV Push once  dextrose 50% Injectable 25 Gram(s) IV Push once  fluticasone propionate/ salmeterol 250-50 MICROgram(s) Diskus 1 Dose(s) Inhalation two times a day  folic acid 1 milliGRAM(s) Oral daily  glucagon  Injectable 1 milliGRAM(s) IntraMuscular once  heparin   Injectable 5000 Unit(s) SubCutaneous every 8 hours  insulin lispro (ADMELOG) corrective regimen sliding scale   SubCutaneous three times a day before meals  lactulose Syrup 20 Gram(s) Oral once  lamoTRIgine 150 milliGRAM(s) Oral daily  levothyroxine 100 MICROGram(s) Oral daily  methadone    Tablet 30 milliGRAM(s) Oral every 8 hours  metoclopramide 5 milliGRAM(s) Oral four times a day  pantoprazole    Tablet 40 milliGRAM(s) Oral two times a day  polyethylene glycol 3350 17 Gram(s) Oral two times a day  senna 2 Tablet(s) Oral at bedtime  sertraline 200 milliGRAM(s) Oral daily  sucralfate 1 Gram(s) Oral two times a day  thiamine 100 milliGRAM(s) Oral daily    PRN MEDICATIONS  albuterol    90 MICROgram(s) HFA Inhaler 2 Puff(s) Inhalation every 6 hours PRN  bisacodyl 5 milliGRAM(s) Oral every 12 hours PRN  dextrose Oral Gel 15 Gram(s) Oral once PRN  melatonin 3 milliGRAM(s) Oral at bedtime PRN  naloxone 1 mG/mL Injection for Intranasal Use 1 milliGRAM(s) IntraNasal every 5 minutes PRN    VITALS  T(F): 97.5 (11-25-24 @ 05:05), Max: 98.2 (11-24-24 @ 20:00)  HR: 62 (11-25-24 @ 05:05) (62 - 93)  BP: 108/72 (11-25-24 @ 05:05) (108/72 - 117/78)  RR: 18 (11-25-24 @ 05:05) (18 - 18)  SpO2: 93% (11-24-24 @ 20:00) (93% - 96%)  POCT Blood Glucose.: 85 mg/dL (11-25-24 @ 07:59)  POCT Blood Glucose.: 146 mg/dL (11-24-24 @ 21:01)  POCT Blood Glucose.: 105 mg/dL (11-24-24 @ 17:01)  POCT Blood Glucose.: 175 mg/dL (11-24-24 @ 12:20)    PHYSICAL EXAM  GENERAL: NAD, lying in bed comfortably     HEAD: Atraumatic       HEART: normal rate, regular rhythm      LUNGS: Unlabored   ABDOMEN: Soft  EXTREMITIES: LE bilateral pitting edema   SKIN: LE rash bilaterally     LABS             12.0   9.18  )-----------( 215      ( 11-25-24 @ 05:43 )             39.4     140  |  103  |  19  -------------------------<  79   11-25-24 @ 05:43  4.1  |  25  |  1.1    Ca      8.5     11-25-24 @ 05:43  Mg     2.2     11-25-24 @ 05:43          Urinalysis Basic - ( 25 Nov 2024 05:43 )    Color: x / Appearance: x / SG: x / pH: x  Gluc: 79 mg/dL / Ketone: x  / Bili: x / Urobili: x   Blood: x / Protein: x / Nitrite: x   Leuk Esterase: x / RBC: x / WBC x   Sq Epi: x / Non Sq Epi: x / Bacteria: x

## 2024-11-26 LAB
ANION GAP SERPL CALC-SCNC: 13 MMOL/L — SIGNIFICANT CHANGE UP (ref 7–14)
BASOPHILS # BLD AUTO: 0.1 K/UL — SIGNIFICANT CHANGE UP (ref 0–0.2)
BASOPHILS NFR BLD AUTO: 1.4 % — HIGH (ref 0–1)
BUN SERPL-MCNC: 20 MG/DL — SIGNIFICANT CHANGE UP (ref 10–20)
CALCIUM SERPL-MCNC: 8.6 MG/DL — SIGNIFICANT CHANGE UP (ref 8.4–10.5)
CHLORIDE SERPL-SCNC: 105 MMOL/L — SIGNIFICANT CHANGE UP (ref 98–110)
CO2 SERPL-SCNC: 25 MMOL/L — SIGNIFICANT CHANGE UP (ref 17–32)
CREAT SERPL-MCNC: 1.2 MG/DL — SIGNIFICANT CHANGE UP (ref 0.7–1.5)
CULTURE RESULTS: SIGNIFICANT CHANGE UP
EGFR: 51 ML/MIN/1.73M2 — LOW
EOSINOPHIL # BLD AUTO: 0.46 K/UL — SIGNIFICANT CHANGE UP (ref 0–0.7)
EOSINOPHIL NFR BLD AUTO: 6.4 % — SIGNIFICANT CHANGE UP (ref 0–8)
GLUCOSE BLDC GLUCOMTR-MCNC: 102 MG/DL — HIGH (ref 70–99)
GLUCOSE BLDC GLUCOMTR-MCNC: 208 MG/DL — HIGH (ref 70–99)
GLUCOSE BLDC GLUCOMTR-MCNC: 87 MG/DL — SIGNIFICANT CHANGE UP (ref 70–99)
GLUCOSE BLDC GLUCOMTR-MCNC: 99 MG/DL — SIGNIFICANT CHANGE UP (ref 70–99)
GLUCOSE SERPL-MCNC: 100 MG/DL — HIGH (ref 70–99)
HCT VFR BLD CALC: 38.7 % — SIGNIFICANT CHANGE UP (ref 37–47)
HGB BLD-MCNC: 11.8 G/DL — LOW (ref 12–16)
IMM GRANULOCYTES NFR BLD AUTO: 6.2 % — HIGH (ref 0.1–0.3)
LYMPHOCYTES # BLD AUTO: 1.42 K/UL — SIGNIFICANT CHANGE UP (ref 1.2–3.4)
LYMPHOCYTES # BLD AUTO: 19.7 % — LOW (ref 20.5–51.1)
MAGNESIUM SERPL-MCNC: 2.2 MG/DL — SIGNIFICANT CHANGE UP (ref 1.8–2.4)
MCHC RBC-ENTMCNC: 29.9 PG — SIGNIFICANT CHANGE UP (ref 27–31)
MCHC RBC-ENTMCNC: 30.5 G/DL — LOW (ref 32–37)
MCV RBC AUTO: 98.2 FL — SIGNIFICANT CHANGE UP (ref 81–99)
MONOCYTES # BLD AUTO: 0.52 K/UL — SIGNIFICANT CHANGE UP (ref 0.1–0.6)
MONOCYTES NFR BLD AUTO: 7.2 % — SIGNIFICANT CHANGE UP (ref 1.7–9.3)
NEUTROPHILS # BLD AUTO: 4.26 K/UL — SIGNIFICANT CHANGE UP (ref 1.4–6.5)
NEUTROPHILS NFR BLD AUTO: 59.1 % — SIGNIFICANT CHANGE UP (ref 42.2–75.2)
NRBC # BLD: 0 /100 WBCS — SIGNIFICANT CHANGE UP (ref 0–0)
PLATELET # BLD AUTO: 198 K/UL — SIGNIFICANT CHANGE UP (ref 130–400)
PMV BLD: 10.2 FL — SIGNIFICANT CHANGE UP (ref 7.4–10.4)
POTASSIUM SERPL-MCNC: 4.3 MMOL/L — SIGNIFICANT CHANGE UP (ref 3.5–5)
POTASSIUM SERPL-SCNC: 4.3 MMOL/L — SIGNIFICANT CHANGE UP (ref 3.5–5)
RBC # BLD: 3.94 M/UL — LOW (ref 4.2–5.4)
RBC # FLD: 13.9 % — SIGNIFICANT CHANGE UP (ref 11.5–14.5)
SODIUM SERPL-SCNC: 143 MMOL/L — SIGNIFICANT CHANGE UP (ref 135–146)
SPECIMEN SOURCE: SIGNIFICANT CHANGE UP
WBC # BLD: 7.21 K/UL — SIGNIFICANT CHANGE UP (ref 4.8–10.8)
WBC # FLD AUTO: 7.21 K/UL — SIGNIFICANT CHANGE UP (ref 4.8–10.8)

## 2024-11-26 PROCEDURE — 99232 SBSQ HOSP IP/OBS MODERATE 35: CPT

## 2024-11-26 RX ORDER — NYSTATIN 100000 [USP'U]/G
1 POWDER TOPICAL
Refills: 0 | Status: DISCONTINUED | OUTPATIENT
Start: 2024-11-26 | End: 2024-12-03

## 2024-11-26 RX ADMIN — Medication 6 MILLIGRAM(S): at 16:43

## 2024-11-26 RX ADMIN — Medication 2: at 08:12

## 2024-11-26 RX ADMIN — METHADONE HYDROCHLORIDE 30 MILLIGRAM(S): 5 TABLET ORAL at 13:28

## 2024-11-26 RX ADMIN — METOCLOPRAMIDE HYDROCHLORIDE 5 MILLIGRAM(S): 10 TABLET ORAL at 23:47

## 2024-11-26 RX ADMIN — NYSTATIN 1 APPLICATION(S): 100000 POWDER TOPICAL at 18:05

## 2024-11-26 RX ADMIN — METHADONE HYDROCHLORIDE 30 MILLIGRAM(S): 5 TABLET ORAL at 05:46

## 2024-11-26 RX ADMIN — SUCRALFATE 1 GRAM(S): 1 TABLET ORAL at 05:44

## 2024-11-26 RX ADMIN — Medication 6 MILLIGRAM(S): at 12:48

## 2024-11-26 RX ADMIN — Medication 6 MILLIGRAM(S): at 08:11

## 2024-11-26 RX ADMIN — BUMETANIDE 1 MILLIGRAM(S): 1 TABLET ORAL at 05:46

## 2024-11-26 RX ADMIN — Medication 1 MILLIGRAM(S): at 12:18

## 2024-11-26 RX ADMIN — Medication 2 TABLET(S): at 21:34

## 2024-11-26 RX ADMIN — FLUTICASONE PROPIONATE AND SALMETEROL XINAFOATE 1 DOSE(S): 45; 21 AEROSOL, METERED RESPIRATORY (INHALATION) at 08:22

## 2024-11-26 RX ADMIN — METOCLOPRAMIDE HYDROCHLORIDE 5 MILLIGRAM(S): 10 TABLET ORAL at 05:45

## 2024-11-26 RX ADMIN — ACETAMINOPHEN 500MG 650 MILLIGRAM(S): 500 TABLET, COATED ORAL at 13:29

## 2024-11-26 RX ADMIN — Medication 2 PUFF(S): at 05:43

## 2024-11-26 RX ADMIN — Medication 6 MILLIGRAM(S): at 20:50

## 2024-11-26 RX ADMIN — Medication 6 MILLIGRAM(S): at 08:41

## 2024-11-26 RX ADMIN — Medication 100 MILLIGRAM(S): at 12:18

## 2024-11-26 RX ADMIN — Medication 10 MILLIGRAM(S): at 21:34

## 2024-11-26 RX ADMIN — ACETAMINOPHEN 500MG 650 MILLIGRAM(S): 500 TABLET, COATED ORAL at 21:34

## 2024-11-26 RX ADMIN — METHADONE HYDROCHLORIDE 30 MILLIGRAM(S): 5 TABLET ORAL at 21:34

## 2024-11-26 RX ADMIN — PANTOPRAZOLE SODIUM 40 MILLIGRAM(S): 40 TABLET, DELAYED RELEASE ORAL at 18:05

## 2024-11-26 RX ADMIN — Medication 6 MILLIGRAM(S): at 03:50

## 2024-11-26 RX ADMIN — LAMOTRIGINE 150 MILLIGRAM(S): 50 TABLET, EXTENDED RELEASE ORAL at 12:19

## 2024-11-26 RX ADMIN — METOCLOPRAMIDE HYDROCHLORIDE 5 MILLIGRAM(S): 10 TABLET ORAL at 18:05

## 2024-11-26 RX ADMIN — Medication 6 MILLIGRAM(S): at 17:13

## 2024-11-26 RX ADMIN — POLYETHYLENE GLYCOL 3350 17 GRAM(S): 17 POWDER, FOR SOLUTION ORAL at 18:04

## 2024-11-26 RX ADMIN — Medication 6 MILLIGRAM(S): at 12:18

## 2024-11-26 RX ADMIN — Medication 10 MILLIGRAM(S): at 13:29

## 2024-11-26 RX ADMIN — SUCRALFATE 1 GRAM(S): 1 TABLET ORAL at 18:05

## 2024-11-26 RX ADMIN — POLYETHYLENE GLYCOL 3350 17 GRAM(S): 17 POWDER, FOR SOLUTION ORAL at 05:43

## 2024-11-26 RX ADMIN — METOCLOPRAMIDE HYDROCHLORIDE 5 MILLIGRAM(S): 10 TABLET ORAL at 12:18

## 2024-11-26 RX ADMIN — Medication 0.5 MILLIGRAM(S): at 18:05

## 2024-11-26 RX ADMIN — Medication 100 MICROGRAM(S): at 05:44

## 2024-11-26 RX ADMIN — PANTOPRAZOLE SODIUM 40 MILLIGRAM(S): 40 TABLET, DELAYED RELEASE ORAL at 05:46

## 2024-11-26 RX ADMIN — ACETAMINOPHEN 500MG 650 MILLIGRAM(S): 500 TABLET, COATED ORAL at 05:44

## 2024-11-26 RX ADMIN — Medication 6 MILLIGRAM(S): at 04:20

## 2024-11-26 RX ADMIN — ACETAMINOPHEN 500MG 650 MILLIGRAM(S): 500 TABLET, COATED ORAL at 13:59

## 2024-11-26 RX ADMIN — CHLORHEXIDINE GLUCONATE 1 APPLICATION(S): 1.2 RINSE ORAL at 05:47

## 2024-11-26 RX ADMIN — Medication 10 MILLIGRAM(S): at 05:44

## 2024-11-26 RX ADMIN — SERTRALINE HYDROCHLORIDE 200 MILLIGRAM(S): 100 TABLET, FILM COATED ORAL at 12:18

## 2024-11-26 RX ADMIN — Medication 6 MILLIGRAM(S): at 21:08

## 2024-11-26 RX ADMIN — Medication 0.5 MILLIGRAM(S): at 05:44

## 2024-11-26 RX ADMIN — Medication 2 PUFF(S): at 16:52

## 2024-11-26 NOTE — PROGRESS NOTE ADULT - ASSESSMENT
64-year-old female, former smoker with past medical history of asthma, hypothyroidism, anxiety, depression, MVP, Jaclyn-en-Y gastric bypass, appendectomy, cholecystectomy and Chronic back pain on methadone, presents to the ED following a syncopal episode last night. She also has complaints of worsening abdominal pain for 5 days.     # Abdominal pain with   #h/o Jaclyn en Y bypass   - unclear etiology.  >> resolved   - CT abdomen - Hepatomegaly   - EGD/ Colonoscopy (May '23) - Non erosive gastritis  - c/w pantoprazole   - EGD- nonerosive gastritis in stomach  - CTa abdomen pending read  - pain control per pain management. stopped  po morphine.   - bariatric surgery recs appreciated.     # Acute Hypoxic Respiratory Failure likely due to   # Asthma/ COPD exacerbation.  vs ACOS   #h/o MVP (follows Dr. Negrete)  #HTN  - CTA chest - no PE  - TTE --> echo 60-65% EF  - currently on O2 2L NC >Spo2 93%     titrate off as tolerated.  - Taper down on  prednisone   - c/w albuterol prn   - c/w advair and symbicort  - TTE with bubbles 11/19: suspected pulmonary shunt.      per pulm f/u OP for PFT     #Syncope likely vasovagal vs dehydration  - ECG with prolonged qtc, no obvious ischemia, trops x2 negative  - no events on tele  - tele discontinued, Neg Orthostatics .     #Hypothyroidism,   - home meds - 88mcg thyroxine   - TSH 9.88  - increased to 100 mcg synthroid     #anxiety, depression,  - c/w lamotrigine and zoloft      #Chronic back pain   - on methadone 30mg   -  po morphine 6mg q4h PRN as per pain management    #Alcohol use disorder  # Suspected Thiamine deficiency   - 2-3 drinks per day  - c/w b12 and folate   - started thiamine     # Obesity >    #MISC  VTE ppx - heparin SC  GI ppx - pantoprazole (home meds)  Activity as tolerated  regular diet per pt requests  Full Code      Pending: Home O2 /  Dispo:  pt prefers going home.   Plan d/w the patient at bedside.

## 2024-11-26 NOTE — CHART NOTE - NSCHARTNOTEFT_GEN_A_CORE
- 2L NC pulse ox. at rest:  94%   - Pulse ox while ambulating on 2 liters n/c  O2 88%    Patient will require home O2 for discharge.  Patient is aware and agreeable to home O2.  Patient is in a chronic stable state of COPD. - Pulse ox at Rest,  O2 88%  - 2L NC pulse ox. at rest:  94%   - Pulse ox while ambulating on 2 liters n/c  O2 88%    Patient will require home O2 for discharge.  Patient is aware and agreeable to home O2.  Patient is in a chronic stable state of COPD. Pulse ox (SpO2) on room air at rest:                 89 %   Pulse ox (SpO2) on room air with exertion:        88 %    Pulse ox (SpO2) on 2 L/min O2 at rest:              94%   Pulse ox (SpO2) on 2 L/min O2 with exertion:     88 %    Patient will require home O2 for discharge.  Patient is aware and agreeable to home O2.  Patient is in a chronic stable state of COPD.

## 2024-11-26 NOTE — PROGRESS NOTE ADULT - SUBJECTIVE AND OBJECTIVE BOX
SUBJECTIVE/OVERNIGHT EVENTS  Today is hospital day 18d. This morning patient was seen and examined at bedside, resting comfortably in bed. The pt endorses SOB and wheezing that occurred when she ambulated, oxygen was raised from 2L nasal cannula to 3L nasal cannula. Pt has not had a bowel movement for 5 days despite receiving lactulose yesterday.     MEDICATIONS  STANDING MEDICATIONS  acetaminophen     Tablet .. 650 milliGRAM(s) Oral every 8 hours  ALPRAZolam 0.5 milliGRAM(s) Oral two times a day  buMETAnide 1 milliGRAM(s) Oral daily  chlorhexidine 2% Cloths 1 Application(s) Topical <User Schedule>  cyclobenzaprine 10 milliGRAM(s) Oral three times a day  dextrose 5%. 1000 milliLiter(s) IV Continuous <Continuous>  dextrose 5%. 1000 milliLiter(s) IV Continuous <Continuous>  dextrose 50% Injectable 25 Gram(s) IV Push once  dextrose 50% Injectable 12.5 Gram(s) IV Push once  dextrose 50% Injectable 25 Gram(s) IV Push once  fluticasone propionate/ salmeterol 250-50 MICROgram(s) Diskus 1 Dose(s) Inhalation two times a day  folic acid 1 milliGRAM(s) Oral daily  glucagon  Injectable 1 milliGRAM(s) IntraMuscular once  heparin   Injectable 5000 Unit(s) SubCutaneous every 8 hours  insulin lispro (ADMELOG) corrective regimen sliding scale   SubCutaneous three times a day before meals  lamoTRIgine 150 milliGRAM(s) Oral daily  levothyroxine 100 MICROGram(s) Oral daily  methadone    Tablet 30 milliGRAM(s) Oral every 8 hours  metoclopramide 5 milliGRAM(s) Oral four times a day  nystatin Powder 1 Application(s) Topical two times a day  pantoprazole    Tablet 40 milliGRAM(s) Oral two times a day  polyethylene glycol 3350 17 Gram(s) Oral two times a day  senna 2 Tablet(s) Oral at bedtime  sertraline 200 milliGRAM(s) Oral daily  sucralfate 1 Gram(s) Oral two times a day  thiamine 100 milliGRAM(s) Oral daily    PRN MEDICATIONS  albuterol    90 MICROgram(s) HFA Inhaler 2 Puff(s) Inhalation every 6 hours PRN  bisacodyl 5 milliGRAM(s) Oral every 12 hours PRN  dextrose Oral Gel 15 Gram(s) Oral once PRN  melatonin 3 milliGRAM(s) Oral at bedtime PRN  morphine  - Injectable 6 milliGRAM(s) IV Push every 4 hours PRN  naloxone 1 mG/mL Injection for Intranasal Use 1 milliGRAM(s) IntraNasal every 5 minutes PRN    VITALS  T(F): 97.6 (11-26-24 @ 04:14), Max: 98.6 (11-25-24 @ 20:21)  HR: 67 (11-26-24 @ 04:14) (67 - 80)  BP: 107/62 (11-26-24 @ 04:14) (107/62 - 117/72)  RR: 18 (11-26-24 @ 04:14) (18 - 18)  SpO2: 93% (11-26-24 @ 07:39) (92% - 94%)  POCT Blood Glucose.: 208 mg/dL (11-26-24 @ 07:57)  POCT Blood Glucose.: 155 mg/dL (11-25-24 @ 23:43)  POCT Blood Glucose.: 77 mg/dL (11-25-24 @ 17:55)  POCT Blood Glucose.: 94 mg/dL (11-25-24 @ 11:49)    PHYSICAL EXAM  GENERAL: NAD, lying in bed comfortably       HEAD: Atraumatic   HEART: normal rate, regular rhythm   LUNGS: bilateral wheezing   ABDOMEN: RUQ tenderness, epigastric tenderness, LUQ tenderness   SKIN LLQ red blanching rash in skin fold     LABS             11.8   7.21  )-----------( 198      ( 11-26-24 @ 06:11 )             38.7     143  |  105  |  20  -------------------------<  100   11-26-24 @ 06:11  4.3  |  25  |  1.2    Ca      8.6     11-26-24 @ 06:11  Mg     2.2     11-26-24 @ 06:11

## 2024-11-26 NOTE — PROGRESS NOTE ADULT - SUBJECTIVE AND OBJECTIVE BOX
LEIA NAVARRETENDY  64y  Female      Patient is a 64y old  Female who presents with a chief complaint of Abdominal pain.      INTERVAL HPI/OVERNIGHT EVENTS:      ******************************* REVIEW OF SYSTEMS:**********************************************  c/o sore throat   All other review of systems negative    *********************** VITALS ******************************************    T(F): 97.5 (11-26-24 @ 12:25)  HR: 72 (11-26-24 @ 12:25) (67 - 80)  BP: 125/70 (11-26-24 @ 12:25) (107/62 - 125/70)  RR: 18 (11-26-24 @ 12:25) (18 - 18)  SpO2: 93% (11-26-24 @ 12:25) (93% - 94%)            ******************************** PHYSICAL EXAM:**************************************************  GENERAL: NAD    PSYCH: no agitation, baseline mentation  HEENT:     NERVOUS SYSTEM:  Alert & Oriented X3,   PULMONARY: SHARIF, CTA    CARDIOVASCULAR: S1S2 RRR    GI: Soft, NT, ND; BS present.    EXTREMITIES:  2+ Peripheral Pulses, No clubbing, cyanosis, or edema    LYMPH: No lymphadenopathy noted    SKIN: No rashes or lesions      **************************** LABS *******************************************************                          11.8   7.21  )-----------( 198      ( 26 Nov 2024 06:11 )             38.7     11-26    143  |  105  |  20  ----------------------------<  100[H]  4.3   |  25  |  1.2    Ca    8.6      26 Nov 2024 06:11  Mg     2.2     11-26        Urinalysis Basic - ( 26 Nov 2024 06:11 )    Color: x / Appearance: x / SG: x / pH: x  Gluc: 100 mg/dL / Ketone: x  / Bili: x / Urobili: x   Blood: x / Protein: x / Nitrite: x   Leuk Esterase: x / RBC: x / WBC x   Sq Epi: x / Non Sq Epi: x / Bacteria: x        Lactate Trend        CAPILLARY BLOOD GLUCOSE      POCT Blood Glucose.: 87 mg/dL (26 Nov 2024 10:55)          **************************Active Medications *******************************************  penicillin (Anaphylaxis)      acetaminophen     Tablet .. 650 milliGRAM(s) Oral every 8 hours  albuterol    90 MICROgram(s) HFA Inhaler 2 Puff(s) Inhalation every 6 hours PRN  ALPRAZolam 0.5 milliGRAM(s) Oral two times a day  bisacodyl 5 milliGRAM(s) Oral every 12 hours PRN  buMETAnide 1 milliGRAM(s) Oral daily  chlorhexidine 2% Cloths 1 Application(s) Topical <User Schedule>  cyclobenzaprine 10 milliGRAM(s) Oral three times a day  dextrose 5%. 1000 milliLiter(s) IV Continuous <Continuous>  dextrose 5%. 1000 milliLiter(s) IV Continuous <Continuous>  dextrose 50% Injectable 25 Gram(s) IV Push once  dextrose 50% Injectable 12.5 Gram(s) IV Push once  dextrose 50% Injectable 25 Gram(s) IV Push once  dextrose Oral Gel 15 Gram(s) Oral once PRN  fluticasone propionate/ salmeterol 250-50 MICROgram(s) Diskus 1 Dose(s) Inhalation two times a day  folic acid 1 milliGRAM(s) Oral daily  glucagon  Injectable 1 milliGRAM(s) IntraMuscular once  heparin   Injectable 5000 Unit(s) SubCutaneous every 8 hours  insulin lispro (ADMELOG) corrective regimen sliding scale   SubCutaneous three times a day before meals  lamoTRIgine 150 milliGRAM(s) Oral daily  levothyroxine 100 MICROGram(s) Oral daily  melatonin 3 milliGRAM(s) Oral at bedtime PRN  methadone    Tablet 30 milliGRAM(s) Oral every 8 hours  metoclopramide 5 milliGRAM(s) Oral four times a day  morphine  - Injectable 6 milliGRAM(s) IV Push every 4 hours PRN  naloxone 1 mG/mL Injection for Intranasal Use 1 milliGRAM(s) IntraNasal every 5 minutes PRN  nystatin Powder 1 Application(s) Topical two times a day  pantoprazole    Tablet 40 milliGRAM(s) Oral two times a day  polyethylene glycol 3350 17 Gram(s) Oral two times a day  senna 2 Tablet(s) Oral at bedtime  sertraline 200 milliGRAM(s) Oral daily  sucralfate 1 Gram(s) Oral two times a day  thiamine 100 milliGRAM(s) Oral daily      ***************************************************  RADIOLOGY & ADDITIONAL TESTS:    Imaging Personally Reviewed:  [ ] YES  [ ] NO    HEALTH ISSUES - PROBLEM Dx:

## 2024-11-27 LAB
ANION GAP SERPL CALC-SCNC: 13 MMOL/L — SIGNIFICANT CHANGE UP (ref 7–14)
BASOPHILS # BLD AUTO: 0.08 K/UL — SIGNIFICANT CHANGE UP (ref 0–0.2)
BASOPHILS NFR BLD AUTO: 0.9 % — SIGNIFICANT CHANGE UP (ref 0–1)
BUN SERPL-MCNC: 20 MG/DL — SIGNIFICANT CHANGE UP (ref 10–20)
CALCIUM SERPL-MCNC: 8.7 MG/DL — SIGNIFICANT CHANGE UP (ref 8.4–10.4)
CHLORIDE SERPL-SCNC: 103 MMOL/L — SIGNIFICANT CHANGE UP (ref 98–110)
CO2 SERPL-SCNC: 25 MMOL/L — SIGNIFICANT CHANGE UP (ref 17–32)
CREAT SERPL-MCNC: 1.1 MG/DL — SIGNIFICANT CHANGE UP (ref 0.7–1.5)
EGFR: 57 ML/MIN/1.73M2 — LOW
EOSINOPHIL # BLD AUTO: 0.46 K/UL — SIGNIFICANT CHANGE UP (ref 0–0.7)
EOSINOPHIL NFR BLD AUTO: 5.3 % — SIGNIFICANT CHANGE UP (ref 0–8)
GLUCOSE BLDC GLUCOMTR-MCNC: 104 MG/DL — HIGH (ref 70–99)
GLUCOSE BLDC GLUCOMTR-MCNC: 80 MG/DL — SIGNIFICANT CHANGE UP (ref 70–99)
GLUCOSE BLDC GLUCOMTR-MCNC: 91 MG/DL — SIGNIFICANT CHANGE UP (ref 70–99)
GLUCOSE BLDC GLUCOMTR-MCNC: 94 MG/DL — SIGNIFICANT CHANGE UP (ref 70–99)
GLUCOSE SERPL-MCNC: 109 MG/DL — HIGH (ref 70–99)
HCT VFR BLD CALC: 37.1 % — SIGNIFICANT CHANGE UP (ref 37–47)
HGB BLD-MCNC: 11.4 G/DL — LOW (ref 12–16)
LYMPHOCYTES # BLD AUTO: 0.69 K/UL — LOW (ref 1.2–3.4)
LYMPHOCYTES # BLD AUTO: 7.9 % — LOW (ref 20.5–51.1)
MAGNESIUM SERPL-MCNC: 2 MG/DL — SIGNIFICANT CHANGE UP (ref 1.8–2.4)
MCHC RBC-ENTMCNC: 30.1 PG — SIGNIFICANT CHANGE UP (ref 27–31)
MCHC RBC-ENTMCNC: 30.7 G/DL — LOW (ref 32–37)
MCV RBC AUTO: 97.9 FL — SIGNIFICANT CHANGE UP (ref 81–99)
MONOCYTES # BLD AUTO: 0.38 K/UL — SIGNIFICANT CHANGE UP (ref 0.1–0.6)
MONOCYTES NFR BLD AUTO: 4.4 % — SIGNIFICANT CHANGE UP (ref 1.7–9.3)
NEUTROPHILS # BLD AUTO: 6.76 K/UL — HIGH (ref 1.4–6.5)
NEUTROPHILS NFR BLD AUTO: 77.9 % — HIGH (ref 42.2–75.2)
PLATELET # BLD AUTO: 188 K/UL — SIGNIFICANT CHANGE UP (ref 130–400)
PMV BLD: 10.1 FL — SIGNIFICANT CHANGE UP (ref 7.4–10.4)
POTASSIUM SERPL-MCNC: 3.9 MMOL/L — SIGNIFICANT CHANGE UP (ref 3.5–5)
POTASSIUM SERPL-SCNC: 3.9 MMOL/L — SIGNIFICANT CHANGE UP (ref 3.5–5)
RBC # BLD: 3.79 M/UL — LOW (ref 4.2–5.4)
RBC # FLD: 13.7 % — SIGNIFICANT CHANGE UP (ref 11.5–14.5)
SODIUM SERPL-SCNC: 141 MMOL/L — SIGNIFICANT CHANGE UP (ref 135–146)
WBC # BLD: 8.68 K/UL — SIGNIFICANT CHANGE UP (ref 4.8–10.8)
WBC # FLD AUTO: 8.68 K/UL — SIGNIFICANT CHANGE UP (ref 4.8–10.8)

## 2024-11-27 PROCEDURE — 99232 SBSQ HOSP IP/OBS MODERATE 35: CPT

## 2024-11-27 RX ORDER — BUMETANIDE 1 MG/1
1 TABLET ORAL ONCE
Refills: 0 | Status: COMPLETED | OUTPATIENT
Start: 2024-11-27 | End: 2024-11-27

## 2024-11-27 RX ORDER — FUROSEMIDE 40 MG/1
20 TABLET ORAL ONCE
Refills: 0 | Status: DISCONTINUED | OUTPATIENT
Start: 2024-11-27 | End: 2024-11-27

## 2024-11-27 RX ORDER — IPRATROPIUM BROMIDE AND ALBUTEROL SULFATE 2.5; .5 MG/3ML; MG/3ML
3 SOLUTION RESPIRATORY (INHALATION) EVERY 6 HOURS
Refills: 0 | Status: COMPLETED | OUTPATIENT
Start: 2024-11-27 | End: 2024-11-28

## 2024-11-27 RX ADMIN — PANTOPRAZOLE SODIUM 40 MILLIGRAM(S): 40 TABLET, DELAYED RELEASE ORAL at 17:04

## 2024-11-27 RX ADMIN — Medication 2 PUFF(S): at 22:38

## 2024-11-27 RX ADMIN — ACETAMINOPHEN 500MG 650 MILLIGRAM(S): 500 TABLET, COATED ORAL at 22:37

## 2024-11-27 RX ADMIN — POLYETHYLENE GLYCOL 3350 17 GRAM(S): 17 POWDER, FOR SOLUTION ORAL at 05:23

## 2024-11-27 RX ADMIN — SUCRALFATE 1 GRAM(S): 1 TABLET ORAL at 17:04

## 2024-11-27 RX ADMIN — Medication 6 MILLIGRAM(S): at 01:30

## 2024-11-27 RX ADMIN — Medication 100 MILLIGRAM(S): at 11:40

## 2024-11-27 RX ADMIN — Medication 1 MILLIGRAM(S): at 11:40

## 2024-11-27 RX ADMIN — ACETAMINOPHEN 500MG 650 MILLIGRAM(S): 500 TABLET, COATED ORAL at 05:24

## 2024-11-27 RX ADMIN — METHADONE HYDROCHLORIDE 30 MILLIGRAM(S): 5 TABLET ORAL at 13:51

## 2024-11-27 RX ADMIN — Medication 6 MILLIGRAM(S): at 01:15

## 2024-11-27 RX ADMIN — NYSTATIN 1 APPLICATION(S): 100000 POWDER TOPICAL at 05:25

## 2024-11-27 RX ADMIN — BUMETANIDE 1 MILLIGRAM(S): 1 TABLET ORAL at 05:24

## 2024-11-27 RX ADMIN — Medication 5000 UNIT(S): at 13:51

## 2024-11-27 RX ADMIN — Medication 2 MILLIGRAM(S): at 11:40

## 2024-11-27 RX ADMIN — Medication 2 MILLIGRAM(S): at 18:00

## 2024-11-27 RX ADMIN — Medication 2 MILLIGRAM(S): at 12:00

## 2024-11-27 RX ADMIN — Medication 0.5 MILLIGRAM(S): at 17:04

## 2024-11-27 RX ADMIN — Medication 10 MILLIGRAM(S): at 13:51

## 2024-11-27 RX ADMIN — ACETAMINOPHEN 500MG 650 MILLIGRAM(S): 500 TABLET, COATED ORAL at 14:30

## 2024-11-27 RX ADMIN — METHADONE HYDROCHLORIDE 30 MILLIGRAM(S): 5 TABLET ORAL at 22:37

## 2024-11-27 RX ADMIN — Medication 6 MILLIGRAM(S): at 05:45

## 2024-11-27 RX ADMIN — FLUTICASONE PROPIONATE AND SALMETEROL XINAFOATE 1 DOSE(S): 45; 21 AEROSOL, METERED RESPIRATORY (INHALATION) at 10:26

## 2024-11-27 RX ADMIN — SUCRALFATE 1 GRAM(S): 1 TABLET ORAL at 05:24

## 2024-11-27 RX ADMIN — ACETAMINOPHEN 500MG 650 MILLIGRAM(S): 500 TABLET, COATED ORAL at 13:51

## 2024-11-27 RX ADMIN — Medication 100 MICROGRAM(S): at 05:24

## 2024-11-27 RX ADMIN — CHLORHEXIDINE GLUCONATE 1 APPLICATION(S): 1.2 RINSE ORAL at 06:37

## 2024-11-27 RX ADMIN — Medication 10 MILLIGRAM(S): at 22:38

## 2024-11-27 RX ADMIN — PANTOPRAZOLE SODIUM 40 MILLIGRAM(S): 40 TABLET, DELAYED RELEASE ORAL at 05:23

## 2024-11-27 RX ADMIN — Medication 6 MILLIGRAM(S): at 05:27

## 2024-11-27 RX ADMIN — METOCLOPRAMIDE HYDROCHLORIDE 5 MILLIGRAM(S): 10 TABLET ORAL at 05:24

## 2024-11-27 RX ADMIN — Medication 10 MILLIGRAM(S): at 05:23

## 2024-11-27 RX ADMIN — SERTRALINE HYDROCHLORIDE 200 MILLIGRAM(S): 100 TABLET, FILM COATED ORAL at 11:41

## 2024-11-27 RX ADMIN — Medication 2 MILLIGRAM(S): at 18:27

## 2024-11-27 RX ADMIN — Medication 2 TABLET(S): at 22:37

## 2024-11-27 RX ADMIN — METHADONE HYDROCHLORIDE 30 MILLIGRAM(S): 5 TABLET ORAL at 06:34

## 2024-11-27 RX ADMIN — NYSTATIN 1 APPLICATION(S): 100000 POWDER TOPICAL at 17:04

## 2024-11-27 RX ADMIN — POLYETHYLENE GLYCOL 3350 17 GRAM(S): 17 POWDER, FOR SOLUTION ORAL at 17:04

## 2024-11-27 RX ADMIN — LAMOTRIGINE 150 MILLIGRAM(S): 50 TABLET, EXTENDED RELEASE ORAL at 11:41

## 2024-11-27 RX ADMIN — Medication 0.5 MILLIGRAM(S): at 06:34

## 2024-11-27 RX ADMIN — Medication 2 PUFF(S): at 10:26

## 2024-11-27 RX ADMIN — BUMETANIDE 1 MILLIGRAM(S): 1 TABLET ORAL at 10:27

## 2024-11-27 RX ADMIN — ACETAMINOPHEN, DIPHENHYDRAMINE HCL, PHENYLEPHRINE HCL 3 MILLIGRAM(S): 325; 25; 5 TABLET ORAL at 22:38

## 2024-11-27 RX ADMIN — METOCLOPRAMIDE HYDROCHLORIDE 5 MILLIGRAM(S): 10 TABLET ORAL at 11:41

## 2024-11-27 RX ADMIN — METOCLOPRAMIDE HYDROCHLORIDE 5 MILLIGRAM(S): 10 TABLET ORAL at 17:04

## 2024-11-27 NOTE — PROGRESS NOTE ADULT - ATTENDING COMMENTS
64-year-old female, former smoker with past medical history of asthma, hypothyroidism, anxiety, depression, MVP, Jaclyn-en-Y gastric bypass, appendectomy, cholecystectomy and Chronic back pain on methadone, presents to the ED following a syncopal episode last night. She also has complaints of worsening abdominal pain for 5 days.     # Abdominal pain with   #h/o Jaclyn en Y bypass   - unclear etiology.  >> resolved   - CT abdomen - Hepatomegaly   - EGD/ Colonoscopy (May '23) - Non erosive gastritis  - c/w pantoprazole   - EGD- nonerosive gastritis in stomach  - CTa abdomen pending read  - pain control per pain management. stopped  po morphine.   - bariatric surgery recs appreciated.     # Acute Hypoxic Respiratory Failure likely due to   # Asthma/ COPD exacerbation.  vs ACOS   #h/o MVP (follows Dr. Negrete)  #HTN  - CTA chest - no PE  - TTE --> echo 60-65% EF  - currently on O2 2L NC >Spo2 93%     titrate off as tolerated.  - Taper down on  prednisone   - c/w albuterol prn   - c/w advair and symbicort  - TTE with bubbles 11/19: suspected pulmonary shunt.      per pulm f/u OP for PFT     #Syncope likely vasovagal vs dehydration  - ECG with prolonged qtc, no obvious ischemia, trops x2 negative  - no events on tele  - tele discontinued, Neg Orthostatics .     #Hypothyroidism,   - home meds - 88mcg thyroxine   - TSH 9.88  - increased to 100 mcg synthroid     #anxiety, depression,  - c/w lamotrigine and zoloft      #Chronic back pain   - on methadone 30mg   -  po morphine 6mg q4h PRN as per pain management    #Alcohol use disorder  # Suspected Thiamine deficiency   - 2-3 drinks per day  - c/w b12 and folate   - started thiamine     # Obesity   Height (cm): 167.6 (11-13-24 @ 10:30)  Weight (kg): 100.7 (11-13-24 @ 10:30)  BMI (kg/m2): 35.8 (11-13-24 @ 10:30)  BSA (m2): 2.09 (11-13-24 @ 10:30)    #Progress Note Handoff    Pending :  O2 for home to be delivered   Family discussion: dw pt   Disposition: home with home care   code status: full code

## 2024-11-27 NOTE — PROGRESS NOTE ADULT - SUBJECTIVE AND OBJECTIVE BOX
SUBJECTIVE/OVERNIGHT EVENTS  Today is hospital day 19d. This morning patient was seen and examined at bedside, resting comfortably in bed. No acute or major events overnight. The pt endorses sore throat and productive cough with yellow-green sputum for the past 3 days. She denies any fever, chills, or night sweats. Pt admits to abdominal distention and feeling of fullness. She has had minimal bowel movements since receiving lactulose on 11/25.     MEDICATIONS  STANDING MEDICATIONS  acetaminophen     Tablet .. 650 milliGRAM(s) Oral every 8 hours  ALPRAZolam 0.5 milliGRAM(s) Oral two times a day  buMETAnide 1 milliGRAM(s) Oral daily  buMETAnide Injectable 1 milliGRAM(s) IV Push once  chlorhexidine 2% Cloths 1 Application(s) Topical <User Schedule>  cyclobenzaprine 10 milliGRAM(s) Oral three times a day  dextrose 5%. 1000 milliLiter(s) IV Continuous <Continuous>  dextrose 5%. 1000 milliLiter(s) IV Continuous <Continuous>  dextrose 50% Injectable 25 Gram(s) IV Push once  dextrose 50% Injectable 12.5 Gram(s) IV Push once  dextrose 50% Injectable 25 Gram(s) IV Push once  fluticasone propionate/ salmeterol 250-50 MICROgram(s) Diskus 1 Dose(s) Inhalation two times a day  folic acid 1 milliGRAM(s) Oral daily  glucagon  Injectable 1 milliGRAM(s) IntraMuscular once  heparin   Injectable 5000 Unit(s) SubCutaneous every 8 hours  insulin lispro (ADMELOG) corrective regimen sliding scale   SubCutaneous three times a day before meals  lamoTRIgine 150 milliGRAM(s) Oral daily  levothyroxine 100 MICROGram(s) Oral daily  methadone    Tablet 30 milliGRAM(s) Oral every 8 hours  metoclopramide 5 milliGRAM(s) Oral four times a day  nystatin Powder 1 Application(s) Topical two times a day  pantoprazole    Tablet 40 milliGRAM(s) Oral two times a day  polyethylene glycol 3350 17 Gram(s) Oral two times a day  senna 2 Tablet(s) Oral at bedtime  sertraline 200 milliGRAM(s) Oral daily  sucralfate 1 Gram(s) Oral two times a day  thiamine 100 milliGRAM(s) Oral daily    PRN MEDICATIONS  albuterol    90 MICROgram(s) HFA Inhaler 2 Puff(s) Inhalation every 6 hours PRN  bisacodyl 5 milliGRAM(s) Oral every 12 hours PRN  dextrose Oral Gel 15 Gram(s) Oral once PRN  melatonin 3 milliGRAM(s) Oral at bedtime PRN  morphine  - Injectable 2 milliGRAM(s) IV Push every 6 hours PRN  naloxone 1 mG/mL Injection for Intranasal Use 1 milliGRAM(s) IntraNasal every 5 minutes PRN    VITALS  T(F): 98.4 (11-27-24 @ 05:17), Max: 98.4 (11-27-24 @ 05:17)  HR: 94 (11-27-24 @ 05:17) (72 - 94)  BP: 108/68 (11-27-24 @ 05:17) (108/68 - 125/70)  RR: 18 (11-27-24 @ 07:40) (18 - 18)  SpO2: 93% (11-27-24 @ 07:40) (89% - 94%)  POCT Blood Glucose.: 104 mg/dL (11-27-24 @ 07:52)  POCT Blood Glucose.: 102 mg/dL (11-26-24 @ 22:19)  POCT Blood Glucose.: 99 mg/dL (11-26-24 @ 16:52)  POCT Blood Glucose.: 87 mg/dL (11-26-24 @ 10:55)    PHYSICAL EXAM  GENERAL: NAD, lying in bed comfortably       HEAD: Atraumatic   HEART: normal rate, regular rhythm   LUNGS: bilateral wheezing   ABDOMEN: RUQ tenderness, epigastric tenderness, LUQ tenderness   SKIN LLQ red blanching rash in skin fold     LABS             11.4   8.68  )-----------( 188      ( 11-27-24 @ 06:24 )             37.1     141  |  103  |  20  -------------------------<  109   11-27-24 @ 06:24  3.9  |  25  |  1.1    Ca      8.7     11-27-24 @ 06:24  Mg     2.0     11-27-24 @ 06:24

## 2024-11-27 NOTE — PROGRESS NOTE ADULT - ASSESSMENT
64-year-old female, former smoker with past medical history of asthma, hypothyroidism, anxiety, depression, MVP, Jaclyn-en-Y gastric bypass, appendectomy, cholecystectomy and Chronic back pain on methadone, presents to the ED following a syncopal episode last night. She also has complaints of worsening abdominal pain for 5 days.       Plan  # Abdominal pain with   #h/o Jaclyn en Y bypass   - unclear etiology.  >> resolved   - CT abdomen - Hepatomegaly   - EGD/ Colonoscopy (May '23) - Non erosive gastritis  - c/w pantoprazole   - EGD- nonerosive gastritis in stomach  - CTa abdomen pending read  - pain control per pain management. stopped  po morphine.   - bariatric surgery recs appreciated.     # Acute Hypoxic Respiratory Failure likely due to   # Asthma/ COPD exacerbation.  vs ACOS   #h/o MVP (follows Dr. Negrete)  #HTN  - CTA chest - no PE  - TTE --> echo 60-65% EF  - currently on O2 2L NC > titrate off as tolerated.    Ambulating 88% on RA > will recheck today.,     Resting 93% RA   - Taper down on  prednisone   - c/w albuterol prn   - c/w advair and symbicort  - TTE with bubbles 11/19: suspected pulmonary shunt.      per pulm f/u OP for PFT   -c/w bumetanide 1mg oral. start bumetanide 1mg IV   -lasix 20mg IV 1 dose     #Syncope likely vasovagal vs dehydration  - ECG with prolonged qtc, no obvious ischemia, trops x2 negative  - no events on tele  - tele discontinued, Neg Orthostatics .     #Rash  -RLQ panus fold   -c/w nystatin powder BID     #Hypothyroidism,   - home meds - 88mcg thyroxine   - TSH 9.88  - increased to 100 mcg synthroid     #anxiety, depression,  - c/w lamotrigine and zoloft      #Chronic back pain   - on methadone 30mg   -  po morphine 6mg q4h PRN as per pain management    #Alcohol use disorder  # Suspected Thiamine deficiency   - 2-3 drinks per day  - c/w b12 and folate   - started thiamine     # Obesity >    #MISC  VTE ppx - heparin SC  GI ppx - pantoprazole (home meds)  Activity as tolerated  regular diet per pt requests  Full Code      Pending: Home O2 / RVP   Dispo:  pt prefers going home.   Plan d/w the patient at bedside.      64-year-old female, former smoker with past medical history of asthma, hypothyroidism, anxiety, depression, MVP, Jaclyn-en-Y gastric bypass, appendectomy, cholecystectomy and Chronic back pain on methadone, presents to the ED following a syncopal episode last night. She also has complaints of worsening abdominal pain for 5 days.     Plan  # Abdominal pain with   #h/o Jaclyn en Y bypass   - unclear etiology.  >> resolved   - CT abdomen - Hepatomegaly   - EGD/ Colonoscopy (May '23) - Non erosive gastritis  - c/w pantoprazole   - EGD- nonerosive gastritis in stomach  - CTa abdomen pending read  - pain control per pain management. stopped  po morphine.   - bariatric surgery recs appreciated.     # Acute Hypoxic Respiratory Failure likely due to   # Asthma/ COPD exacerbation.  vs ACOS   #h/o MVP (follows Dr. Negrete)  #HTN  - CTA chest - no PE  - TTE --> echo 60-65% EF  - currently on O2 2L NC > titrate off as tolerated.  - Necessitating home oxygen, 94% on 2L but destats to 89 on RA  - Taper down on  prednisone   - c/w albuterol prn   - c/w advair and symbicort  - TTE with bubbles 11/19: suspected pulmonary shunt.      per pulm f/u OP for PFT   - c/w bumetanide 1mg oral.   - Single dose of bumetanide 1mg IV     #Syncope likely vasovagal vs dehydration  - ECG with prolonged qtc, no obvious ischemia, trops x2 negative  - no events on tele  - tele discontinued, Neg Orthostatics .     #Rash  -RLQ panus fold   -c/w nystatin powder BID     #Hypothyroidism,   - home meds - 88mcg thyroxine   - TSH 9.88  - increased to 100 mcg synthroid     #anxiety, depression,  - c/w lamotrigine and zoloft      #Chronic back pain   - on methadone 30mg   -  po morphine 6mg q4h PRN as per pain management    #Alcohol use disorder  # Suspected Thiamine deficiency   - 2-3 drinks per day  - c/w b12 and folate   - started thiamine     # Obesity >    #MISC  VTE ppx - heparin SC  GI ppx - pantoprazole (home meds)  Activity as tolerated  regular diet per pt requests  Full Code    Pending: Home O2 / RVP   Dispo:  pt prefers going home.   Plan d/w the patient at bedside.

## 2024-11-28 LAB
ANION GAP SERPL CALC-SCNC: 11 MMOL/L — SIGNIFICANT CHANGE UP (ref 7–14)
BASOPHILS # BLD AUTO: 0.09 K/UL — SIGNIFICANT CHANGE UP (ref 0–0.2)
BASOPHILS NFR BLD AUTO: 1.1 % — HIGH (ref 0–1)
BUN SERPL-MCNC: 19 MG/DL — SIGNIFICANT CHANGE UP (ref 10–20)
CALCIUM SERPL-MCNC: 8.6 MG/DL — SIGNIFICANT CHANGE UP (ref 8.4–10.5)
CHLORIDE SERPL-SCNC: 101 MMOL/L — SIGNIFICANT CHANGE UP (ref 98–110)
CO2 SERPL-SCNC: 25 MMOL/L — SIGNIFICANT CHANGE UP (ref 17–32)
CREAT SERPL-MCNC: 1.1 MG/DL — SIGNIFICANT CHANGE UP (ref 0.7–1.5)
EGFR: 57 ML/MIN/1.73M2 — LOW
EOSINOPHIL # BLD AUTO: 0.44 K/UL — SIGNIFICANT CHANGE UP (ref 0–0.7)
EOSINOPHIL NFR BLD AUTO: 5.3 % — SIGNIFICANT CHANGE UP (ref 0–8)
GLUCOSE BLDC GLUCOMTR-MCNC: 114 MG/DL — HIGH (ref 70–99)
GLUCOSE BLDC GLUCOMTR-MCNC: 129 MG/DL — HIGH (ref 70–99)
GLUCOSE BLDC GLUCOMTR-MCNC: 144 MG/DL — HIGH (ref 70–99)
GLUCOSE BLDC GLUCOMTR-MCNC: 175 MG/DL — HIGH (ref 70–99)
GLUCOSE BLDC GLUCOMTR-MCNC: 31 MG/DL — CRITICAL LOW (ref 70–99)
GLUCOSE BLDC GLUCOMTR-MCNC: 92 MG/DL — SIGNIFICANT CHANGE UP (ref 70–99)
GLUCOSE SERPL-MCNC: 97 MG/DL — SIGNIFICANT CHANGE UP (ref 70–99)
HCT VFR BLD CALC: 37.1 % — SIGNIFICANT CHANGE UP (ref 37–47)
HGB BLD-MCNC: 11.5 G/DL — LOW (ref 12–16)
IMM GRANULOCYTES NFR BLD AUTO: 5 % — HIGH (ref 0.1–0.3)
LYMPHOCYTES # BLD AUTO: 1.22 K/UL — SIGNIFICANT CHANGE UP (ref 1.2–3.4)
LYMPHOCYTES # BLD AUTO: 14.8 % — LOW (ref 20.5–51.1)
MAGNESIUM SERPL-MCNC: 2 MG/DL — SIGNIFICANT CHANGE UP (ref 1.8–2.4)
MCHC RBC-ENTMCNC: 30.1 PG — SIGNIFICANT CHANGE UP (ref 27–31)
MCHC RBC-ENTMCNC: 31 G/DL — LOW (ref 32–37)
MCV RBC AUTO: 97.1 FL — SIGNIFICANT CHANGE UP (ref 81–99)
MONOCYTES # BLD AUTO: 0.57 K/UL — SIGNIFICANT CHANGE UP (ref 0.1–0.6)
MONOCYTES NFR BLD AUTO: 6.9 % — SIGNIFICANT CHANGE UP (ref 1.7–9.3)
NEUTROPHILS # BLD AUTO: 5.5 K/UL — SIGNIFICANT CHANGE UP (ref 1.4–6.5)
NEUTROPHILS NFR BLD AUTO: 66.9 % — SIGNIFICANT CHANGE UP (ref 42.2–75.2)
NRBC # BLD: 0 /100 WBCS — SIGNIFICANT CHANGE UP (ref 0–0)
PLATELET # BLD AUTO: 179 K/UL — SIGNIFICANT CHANGE UP (ref 130–400)
PMV BLD: 10.2 FL — SIGNIFICANT CHANGE UP (ref 7.4–10.4)
POTASSIUM SERPL-MCNC: 3.5 MMOL/L — SIGNIFICANT CHANGE UP (ref 3.5–5)
POTASSIUM SERPL-SCNC: 3.5 MMOL/L — SIGNIFICANT CHANGE UP (ref 3.5–5)
RBC # BLD: 3.82 M/UL — LOW (ref 4.2–5.4)
RBC # FLD: 13.7 % — SIGNIFICANT CHANGE UP (ref 11.5–14.5)
SODIUM SERPL-SCNC: 137 MMOL/L — SIGNIFICANT CHANGE UP (ref 135–146)
WBC # BLD: 8.23 K/UL — SIGNIFICANT CHANGE UP (ref 4.8–10.8)
WBC # FLD AUTO: 8.23 K/UL — SIGNIFICANT CHANGE UP (ref 4.8–10.8)

## 2024-11-28 PROCEDURE — 99232 SBSQ HOSP IP/OBS MODERATE 35: CPT

## 2024-11-28 RX ORDER — IPRATROPIUM BROMIDE AND ALBUTEROL SULFATE 2.5; .5 MG/3ML; MG/3ML
3 SOLUTION RESPIRATORY (INHALATION) EVERY 6 HOURS
Refills: 0 | Status: DISCONTINUED | OUTPATIENT
Start: 2024-11-28 | End: 2024-12-06

## 2024-11-28 RX ADMIN — Medication 2 MILLIGRAM(S): at 00:12

## 2024-11-28 RX ADMIN — NYSTATIN 1 APPLICATION(S): 100000 POWDER TOPICAL at 06:57

## 2024-11-28 RX ADMIN — Medication 10 MILLIGRAM(S): at 21:03

## 2024-11-28 RX ADMIN — Medication 1 MILLIGRAM(S): at 12:28

## 2024-11-28 RX ADMIN — SERTRALINE HYDROCHLORIDE 200 MILLIGRAM(S): 100 TABLET, FILM COATED ORAL at 12:27

## 2024-11-28 RX ADMIN — METOCLOPRAMIDE HYDROCHLORIDE 5 MILLIGRAM(S): 10 TABLET ORAL at 06:55

## 2024-11-28 RX ADMIN — METOCLOPRAMIDE HYDROCHLORIDE 5 MILLIGRAM(S): 10 TABLET ORAL at 12:27

## 2024-11-28 RX ADMIN — Medication 100 MICROGRAM(S): at 06:57

## 2024-11-28 RX ADMIN — ACETAMINOPHEN 500MG 650 MILLIGRAM(S): 500 TABLET, COATED ORAL at 13:30

## 2024-11-28 RX ADMIN — METOCLOPRAMIDE HYDROCHLORIDE 5 MILLIGRAM(S): 10 TABLET ORAL at 17:02

## 2024-11-28 RX ADMIN — METHADONE HYDROCHLORIDE 30 MILLIGRAM(S): 5 TABLET ORAL at 21:02

## 2024-11-28 RX ADMIN — Medication 10 MILLIGRAM(S): at 06:56

## 2024-11-28 RX ADMIN — ACETAMINOPHEN 500MG 650 MILLIGRAM(S): 500 TABLET, COATED ORAL at 14:30

## 2024-11-28 RX ADMIN — NYSTATIN 1 APPLICATION(S): 100000 POWDER TOPICAL at 17:02

## 2024-11-28 RX ADMIN — Medication 2 MILLIGRAM(S): at 06:57

## 2024-11-28 RX ADMIN — ACETAMINOPHEN 500MG 650 MILLIGRAM(S): 500 TABLET, COATED ORAL at 06:56

## 2024-11-28 RX ADMIN — BUMETANIDE 1 MILLIGRAM(S): 1 TABLET ORAL at 06:56

## 2024-11-28 RX ADMIN — IPRATROPIUM BROMIDE AND ALBUTEROL SULFATE 3 MILLILITER(S): 2.5; .5 SOLUTION RESPIRATORY (INHALATION) at 14:19

## 2024-11-28 RX ADMIN — Medication 0.5 MILLIGRAM(S): at 17:02

## 2024-11-28 RX ADMIN — SUCRALFATE 1 GRAM(S): 1 TABLET ORAL at 17:01

## 2024-11-28 RX ADMIN — POLYETHYLENE GLYCOL 3350 17 GRAM(S): 17 POWDER, FOR SOLUTION ORAL at 06:57

## 2024-11-28 RX ADMIN — Medication 100 MILLIGRAM(S): at 12:27

## 2024-11-28 RX ADMIN — LAMOTRIGINE 150 MILLIGRAM(S): 50 TABLET, EXTENDED RELEASE ORAL at 12:28

## 2024-11-28 RX ADMIN — METHADONE HYDROCHLORIDE 30 MILLIGRAM(S): 5 TABLET ORAL at 06:56

## 2024-11-28 RX ADMIN — Medication 2 PUFF(S): at 21:03

## 2024-11-28 RX ADMIN — ACETAMINOPHEN 500MG 650 MILLIGRAM(S): 500 TABLET, COATED ORAL at 21:02

## 2024-11-28 RX ADMIN — Medication 10 MILLIGRAM(S): at 14:30

## 2024-11-28 RX ADMIN — SUCRALFATE 1 GRAM(S): 1 TABLET ORAL at 06:56

## 2024-11-28 RX ADMIN — METHADONE HYDROCHLORIDE 30 MILLIGRAM(S): 5 TABLET ORAL at 14:30

## 2024-11-28 RX ADMIN — POLYETHYLENE GLYCOL 3350 17 GRAM(S): 17 POWDER, FOR SOLUTION ORAL at 17:01

## 2024-11-28 RX ADMIN — IPRATROPIUM BROMIDE AND ALBUTEROL SULFATE 3 MILLILITER(S): 2.5; .5 SOLUTION RESPIRATORY (INHALATION) at 07:30

## 2024-11-28 RX ADMIN — CHLORHEXIDINE GLUCONATE 1 APPLICATION(S): 1.2 RINSE ORAL at 06:58

## 2024-11-28 RX ADMIN — METOCLOPRAMIDE HYDROCHLORIDE 5 MILLIGRAM(S): 10 TABLET ORAL at 00:12

## 2024-11-28 RX ADMIN — PANTOPRAZOLE SODIUM 40 MILLIGRAM(S): 40 TABLET, DELAYED RELEASE ORAL at 06:56

## 2024-11-28 RX ADMIN — PANTOPRAZOLE SODIUM 40 MILLIGRAM(S): 40 TABLET, DELAYED RELEASE ORAL at 17:02

## 2024-11-28 RX ADMIN — ACETAMINOPHEN, DIPHENHYDRAMINE HCL, PHENYLEPHRINE HCL 3 MILLIGRAM(S): 325; 25; 5 TABLET ORAL at 20:58

## 2024-11-28 RX ADMIN — IPRATROPIUM BROMIDE AND ALBUTEROL SULFATE 3 MILLILITER(S): 2.5; .5 SOLUTION RESPIRATORY (INHALATION) at 19:38

## 2024-11-28 RX ADMIN — Medication 0.5 MILLIGRAM(S): at 06:56

## 2024-11-28 RX ADMIN — Medication 2 TABLET(S): at 21:02

## 2024-11-28 NOTE — PROGRESS NOTE ADULT - SUBJECTIVE AND OBJECTIVE BOX
SUBJECTIVE/OVERNIGHT EVENTS  Today is hospital day 20d. This morning patient was seen and examined at bedside, resting comfortably in bed. No acute or major events overnight.    MEDICATIONS  STANDING MEDICATIONS  acetaminophen     Tablet .. 650 milliGRAM(s) Oral every 8 hours  ALPRAZolam 0.5 milliGRAM(s) Oral two times a day  buMETAnide 1 milliGRAM(s) Oral daily  chlorhexidine 2% Cloths 1 Application(s) Topical <User Schedule>  cyclobenzaprine 10 milliGRAM(s) Oral three times a day  dextrose 5%. 1000 milliLiter(s) IV Continuous <Continuous>  dextrose 5%. 1000 milliLiter(s) IV Continuous <Continuous>  dextrose 50% Injectable 25 Gram(s) IV Push once  dextrose 50% Injectable 12.5 Gram(s) IV Push once  dextrose 50% Injectable 25 Gram(s) IV Push once  fluticasone propionate/ salmeterol 250-50 MICROgram(s) Diskus 1 Dose(s) Inhalation two times a day  folic acid 1 milliGRAM(s) Oral daily  glucagon  Injectable 1 milliGRAM(s) IntraMuscular once  heparin   Injectable 5000 Unit(s) SubCutaneous every 8 hours  insulin lispro (ADMELOG) corrective regimen sliding scale   SubCutaneous three times a day before meals  lamoTRIgine 150 milliGRAM(s) Oral daily  levothyroxine 100 MICROGram(s) Oral daily  methadone    Tablet 30 milliGRAM(s) Oral every 8 hours  metoclopramide 5 milliGRAM(s) Oral four times a day  nystatin Powder 1 Application(s) Topical two times a day  pantoprazole    Tablet 40 milliGRAM(s) Oral two times a day  polyethylene glycol 3350 17 Gram(s) Oral two times a day  senna 2 Tablet(s) Oral at bedtime  sertraline 200 milliGRAM(s) Oral daily  sucralfate 1 Gram(s) Oral two times a day  thiamine 100 milliGRAM(s) Oral daily    PRN MEDICATIONS  albuterol    90 MICROgram(s) HFA Inhaler 2 Puff(s) Inhalation every 6 hours PRN  bisacodyl 5 milliGRAM(s) Oral every 12 hours PRN  dextrose Oral Gel 15 Gram(s) Oral once PRN  melatonin 3 milliGRAM(s) Oral at bedtime PRN  morphine  - Injectable 2 milliGRAM(s) IV Push every 6 hours PRN  naloxone 1 mG/mL Injection for Intranasal Use 1 milliGRAM(s) IntraNasal every 5 minutes PRN    VITALS  T(F): 98.4 (11-28-24 @ 05:17), Max: 98.4 (11-28-24 @ 05:17)  HR: 68 (11-28-24 @ 05:17) (68 - 80)  BP: 131/77 (11-28-24 @ 05:17) (113/70 - 131/77)  RR: 18 (11-28-24 @ 05:17) (18 - 18)  SpO2: 92% (11-28-24 @ 05:17) (91% - 96%)  POCT Blood Glucose.: 175 mg/dL (11-28-24 @ 08:45)  POCT Blood Glucose.: 129 mg/dL (11-28-24 @ 08:04)  POCT Blood Glucose.: 31 mg/dL (11-28-24 @ 07:46)  POCT Blood Glucose.: 94 mg/dL (11-27-24 @ 21:19)  POCT Blood Glucose.: 80 mg/dL (11-27-24 @ 16:50)  POCT Blood Glucose.: 91 mg/dL (11-27-24 @ 11:00)    PHYSICAL EXAM  GENERAL  (  ) NAD, lying in bed comfortably     (  ) obtunded     (  ) lethargic     (  ) somnolent    HEAD  (  ) Atraumatic     (  ) hematoma     (  ) laceration (specify location:       )     NECK  (  ) Supple     (  ) neck stiffness     (  ) nuchal rigidity     (  )  no JVD     (  ) JVD present ( -- cm)    HEART  Rate -->  (  ) normal rate    (  ) bradycardic    (  ) tachycardic  Rhythm -->  (  ) regular    (  ) regularly irregular    (  ) irregularly irregular  Murmurs -->  (  ) normal s1/s2    (  ) systolic murmur    (  ) diastolic murmur    (  ) continuous murmur     (  ) S3 present    (  ) S4 present    LUNGS  (  )Unlabored respirations     (  ) tachypnea  (  ) B/L air entry     (  ) decreased breath sounds in:  (location     )    (  ) no adventitious sound     (  ) crackles     (  ) wheezing      (  ) rhonchi      (specify location:       )  (  ) chest wall tenderness (specify location:       )    ABDOMEN  (  ) Soft     (  ) tense   |   (  ) nondistended     (  ) distended   |   (  ) +BS     (  ) hypoactive bowel sounds     (  ) hyperactive bowel sounds  (  ) nontender     (  ) RUQ tenderness     (  ) RLQ tenderness     (  ) LLQ tenderness     (  ) epigastric tenderness     (  ) diffuse tenderness  (  ) Splenomegaly      (  ) Hepatomegaly      (  ) Jaundice     (  ) ecchymosis     EXTREMITIES  (  ) Normal     (  ) Rash     (  ) ecchymosis     (  ) varicose veins      (  ) pitting edema     (  ) non-pitting edema   (  ) ulceration     (  ) gangrene:     (location:     )    NERVOUS SYSTEM  (  ) A&Ox3     (  ) confused     (  ) lethargic  CN II-XII:     (  ) Intact     (  ) focal deficits  (Specify:     )   Upper extremities:     (  ) strength X/5     (  ) focal deficit (specify:    )  Lower extremities:     (  ) strength  X/5    (  ) focal deficit (specify:    )    SKIN  (  ) No rashes or lesions     (  ) maculopapular rash     (  ) pustules     (  ) vesicles     (  ) ulcer     (  ) ecchymosis     (specify location:     )    (  ) Indwelling Hendrix Catheter   Date insterted:    Reason (  ) Critical illness     (  ) urinary retention    (  ) Accurate Ins/Outs Monitoring     (  ) CMO patient    (  ) Central Line  Date inserted:  Location: (  ) Right IJ   (  ) Left IJ   (  ) Right Fem   (  ) Left Fem    (  ) SPC  (  ) pigtail  (  ) PEG tube  (  ) colostomy  (  ) jejunostomy  (  ) U-Dall    LABS             11.5   8.23  )-----------( 179      ( 11-28-24 @ 06:10 )             37.1     137  |  101  |  19  -------------------------<  97   11-28-24 @ 06:10  3.5  |  25  |  1.1    Ca      8.6     11-28-24 @ 06:10  Mg     2.0     11-28-24 @ 06:10          Urinalysis Basic - ( 28 Nov 2024 06:10 )    Color: x / Appearance: x / SG: x / pH: x  Gluc: 97 mg/dL / Ketone: x  / Bili: x / Urobili: x   Blood: x / Protein: x / Nitrite: x   Leuk Esterase: x / RBC: x / WBC x   Sq Epi: x / Non Sq Epi: x / Bacteria: x          IMAGING

## 2024-11-28 NOTE — CONSULT NOTE ADULT - SUBJECTIVE AND OBJECTIVE BOX
64-year-old female, former smoker with past medical history of asthma, hypothyroidism, anxiety, depression, MVP, Jaclyn-en-Y gastric bypass, appendectomy, cholecystectomy and Chronic back pain on methadone, presents to the ED following a syncopal episode last night. She also has complaints of worsening abdominal pain.        Today is hospital day 20d. This morning patient was seen and examined at bedside, resting comfortably in bed.    No acute or major events overnight.    PAST MEDICAL & SURGICAL HISTORY  Asthma  LAST ATTACK MANY YRS AGO    Gastroesophageal reflux disease without esophagitis    Hypothyroidism    Heart murmur    Anxiety    Depression    Mood disorder    Psoriasis    Constipation    Back pain  LOW    Morbid obesity    S/P tonsillectomy  1967    History of appendectomy  1974    Abscess of back  EVACUATION OF ABSCESS @ L5-S1 1997    History of Jaclyn-en-Y gastric bypass  WITH CHOLECYSTECTOMY 2006      SOCIAL HISTORY:  Social History:      ALLERGIES:  penicillin (Anaphylaxis)    MEDICATIONS:  STANDING MEDICATIONS  acetaminophen     Tablet .. 650 milliGRAM(s) Oral every 8 hours  ALPRAZolam 0.5 milliGRAM(s) Oral two times a day  buMETAnide 1 milliGRAM(s) Oral daily  chlorhexidine 2% Cloths 1 Application(s) Topical <User Schedule>  cyclobenzaprine 10 milliGRAM(s) Oral three times a day  dextrose 5%. 1000 milliLiter(s) IV Continuous <Continuous>  dextrose 5%. 1000 milliLiter(s) IV Continuous <Continuous>  dextrose 50% Injectable 25 Gram(s) IV Push once  dextrose 50% Injectable 12.5 Gram(s) IV Push once  dextrose 50% Injectable 25 Gram(s) IV Push once  fluticasone propionate/ salmeterol 250-50 MICROgram(s) Diskus 1 Dose(s) Inhalation two times a day  folic acid 1 milliGRAM(s) Oral daily  glucagon  Injectable 1 milliGRAM(s) IntraMuscular once  heparin   Injectable 5000 Unit(s) SubCutaneous every 8 hours  insulin lispro (ADMELOG) corrective regimen sliding scale   SubCutaneous three times a day before meals  lamoTRIgine 150 milliGRAM(s) Oral daily  levothyroxine 100 MICROGram(s) Oral daily  methadone    Tablet 30 milliGRAM(s) Oral every 8 hours  metoclopramide 5 milliGRAM(s) Oral four times a day  nystatin Powder 1 Application(s) Topical two times a day  pantoprazole    Tablet 40 milliGRAM(s) Oral two times a day  polyethylene glycol 3350 17 Gram(s) Oral two times a day  senna 2 Tablet(s) Oral at bedtime  sertraline 200 milliGRAM(s) Oral daily  sucralfate 1 Gram(s) Oral two times a day  thiamine 100 milliGRAM(s) Oral daily    PRN MEDICATIONS  albuterol    90 MICROgram(s) HFA Inhaler 2 Puff(s) Inhalation every 6 hours PRN  bisacodyl 5 milliGRAM(s) Oral every 12 hours PRN  dextrose Oral Gel 15 Gram(s) Oral once PRN  melatonin 3 milliGRAM(s) Oral at bedtime PRN  naloxone 1 mG/mL Injection for Intranasal Use 1 milliGRAM(s) IntraNasal every 5 minutes PRN  oxycodone    5 mG/acetaminophen 325 mG 1 Tablet(s) Oral every 6 hours PRN    VITALS:   T(F): 97.6  HR: 68  BP: 112/75  RR: 18  SpO2: 92%    PHYSICAL EXAM:  GENERAL: NAD, well-groomed, well-developed  HEAD:  Atraumatic, Normocephalic  EYES: EOMI  NECK: Supple  NERVOUS SYSTEM:  Alert & Oriented X3, non focal   CHEST/LUNG: Clear to auscultation bilaterally; No rales, rhonchi, wheezing, or rubs  HEART: Regular rate and rhythm; No murmurs, rubs, or gallops  ABDOMEN: Soft, Nontender, Nondistended; Bowel sounds present  EXTREMITIES:  2+ Peripheral Pulses, No clubbing, cyanosis, or edema  LYMPH: No lymphadenopathy noted  SKIN: No rashes or lesions  LABS:                        11.5   8.23  )-----------( 179      ( 28 Nov 2024 06:10 )             37.1     11-28    137  |  101  |  19  ----------------------------<  97  3.5   |  25  |  1.1    Ca    8.6      28 Nov 2024 06:10  Mg     2.0     11-28        Urinalysis Basic - ( 28 Nov 2024 06:10 )    Color: x / Appearance: x / SG: x / pH: x  Gluc: 97 mg/dL / Ketone: x  / Bili: x / Urobili: x   Blood: x / Protein: x / Nitrite: x   Leuk Esterase: x / RBC: x / WBC x   Sq Epi: x / Non Sq Epi: x / Bacteria: x                RADIOLOGY:

## 2024-11-28 NOTE — CONSULT NOTE ADULT - ASSESSMENT
64-year-old female, former smoker with past medical history of asthma, hypothyroidism, anxiety, depression, MVP, Jaclyn-en-Y gastric bypass, appendectomy, cholecystectomy and Chronic back pain on methadone, presents to the ED following a syncopal episode last night. She also has complaints of worsening abdominal pain.     #Hypoglycemia 64-year-old female, former smoker with past medical history of asthma, hypothyroidism, anxiety, depression, MVP, Jaclyn-en-Y gastric bypass, appendectomy, cholecystectomy and Chronic back pain on methadone, presents to the ED following a syncopal episode last night. She also has complaints of worsening abdominal pain.     #Hypoglycemia  - Had 1 point-of-care glucose reading down to 31 today morning, unclear if she was symptomatic states that she had lightheadedness denies any sweating palpitations dizziness  -On obtaining history patient states that she consumed chocolate pudding around 4 AM today and episode appears to have happened 2.5 hour or so after  -Symptoms could be consistent with postprandial hyperinsulinemic hypoglycemia, recommend to avoid large meals with simple sugars, start with small meals with complex carbs/fiber and proteins throughout the day  -If point-of-care glucose is noted to be below 55 mg/dL, and patient is symptomatic recommend to obtain serum glucose C-peptide insulin proinsulin and beta hydroxybutyrate before treating  -Recommend nutrition consult and support    #DM  -A1c noted to be 7 this visit  -Agree with low insulin sliding scale for now, if sugars go above 150 mg/dl persistently, blood glucose has gone up to 200sduring this hospitalization   - on DC can monitor off medication and just dietary interventions for now

## 2024-11-28 NOTE — PROGRESS NOTE ADULT - SUBJECTIVE AND OBJECTIVE BOX
pt seen and examined.     My notes supersede resident's notes in case of discrepancy       ROS: no cp, no sob, no n/v, no fever    Vital Signs Last 24 Hrs  T(C): 36.9 (28 Nov 2024 05:17), Max: 36.9 (28 Nov 2024 05:17)  T(F): 98.4 (28 Nov 2024 05:17), Max: 98.4 (28 Nov 2024 05:17)  HR: 68 (28 Nov 2024 05:17) (68 - 80)  BP: 131/77 (28 Nov 2024 05:17) (113/70 - 131/77)  BP(mean): --  RR: 18 (28 Nov 2024 05:17) (18 - 18)  SpO2: 92% (28 Nov 2024 05:17) (91% - 96%)        physical exam  constitutional NAD, AAOX3, Respiratory  lungs CTA, CVS heart RRR, GI: abdomen Soft NT, ND, BS+, skin: intact  neuro exam no focal deficit     MEDICATIONS  (STANDING):  acetaminophen     Tablet .. 650 milliGRAM(s) Oral every 8 hours  ALPRAZolam 0.5 milliGRAM(s) Oral two times a day  buMETAnide 1 milliGRAM(s) Oral daily  chlorhexidine 2% Cloths 1 Application(s) Topical <User Schedule>  cyclobenzaprine 10 milliGRAM(s) Oral three times a day  dextrose 5%. 1000 milliLiter(s) (100 mL/Hr) IV Continuous <Continuous>  dextrose 5%. 1000 milliLiter(s) (50 mL/Hr) IV Continuous <Continuous>  dextrose 50% Injectable 25 Gram(s) IV Push once  dextrose 50% Injectable 12.5 Gram(s) IV Push once  dextrose 50% Injectable 25 Gram(s) IV Push once  fluticasone propionate/ salmeterol 250-50 MICROgram(s) Diskus 1 Dose(s) Inhalation two times a day  folic acid 1 milliGRAM(s) Oral daily  glucagon  Injectable 1 milliGRAM(s) IntraMuscular once  heparin   Injectable 5000 Unit(s) SubCutaneous every 8 hours  insulin lispro (ADMELOG) corrective regimen sliding scale   SubCutaneous three times a day before meals  lamoTRIgine 150 milliGRAM(s) Oral daily  levothyroxine 100 MICROGram(s) Oral daily  methadone    Tablet 30 milliGRAM(s) Oral every 8 hours  metoclopramide 5 milliGRAM(s) Oral four times a day  nystatin Powder 1 Application(s) Topical two times a day  pantoprazole    Tablet 40 milliGRAM(s) Oral two times a day  polyethylene glycol 3350 17 Gram(s) Oral two times a day  senna 2 Tablet(s) Oral at bedtime  sertraline 200 milliGRAM(s) Oral daily  sucralfate 1 Gram(s) Oral two times a day  thiamine 100 milliGRAM(s) Oral daily    MEDICATIONS  (PRN):  albuterol    90 MICROgram(s) HFA Inhaler 2 Puff(s) Inhalation every 6 hours PRN Shortness of Breath and/or Wheezing  bisacodyl 5 milliGRAM(s) Oral every 12 hours PRN Constipation  dextrose Oral Gel 15 Gram(s) Oral once PRN Blood Glucose LESS THAN 70 milliGRAM(s)/deciliter  melatonin 3 milliGRAM(s) Oral at bedtime PRN Insomnia  morphine  - Injectable 2 milliGRAM(s) IV Push every 6 hours PRN Severe Pain (7 - 10)  naloxone 1 mG/mL Injection for Intranasal Use 1 milliGRAM(s) IntraNasal every 5 minutes PRN signs of narcotic overdose                 11.5   8.23  )-----------( 179      ( 28 Nov 2024 06:10 )             37.1     11-28    137  |  101  |  19  ----------------------------<  97  3.5   |  25  |  1.1    Ca    8.6      28 Nov 2024 06:10  Mg     2.0     11-28    a/p    64-year-old female, former smoker with past medical history of asthma, hypothyroidism, anxiety, depression, MVP, Jaclyn-en-Y gastric bypass, appendectomy, cholecystectomy and Chronic back pain on methadone, presents to the ED following a syncopal episode last night. She also has complaints of worsening abdominal pain.     # hypoglycemia   CAPILLARY BLOOD GLUCOSE    POCT Blood Glucose.: 175 mg/dL (28 Nov 2024 08:45)  POCT Blood Glucose.: 129 mg/dL (28 Nov 2024 08:04)  POCT Blood Glucose.: 31 mg/dL (28 Nov 2024 07:46)  POCT Blood Glucose.: 94 mg/dL (27 Nov 2024 21:19)  POCT Blood Glucose.: 80 mg/dL (27 Nov 2024 16:50)    this am pt had an episode of hypoglycemia 31, was given juice and repeat fs has improved,   pt is not on any po or sq meds except for sliding scale insulin   good po intake   will ask endo team to evaluate her,     # Abdominal pain intractable, requiring narcotics ( extra , added to her methadone) , pt has h/o Jaclyn en Y bypass   - unclear etiology.  >> resolved   - CT abdomen - Hepatomegaly   - EGD/ Colonoscopy (May '23) - Non erosive gastritis  - c/w pantoprazole   - EGD- nonerosive gastritis in stomach  - CTa abdomen pending read  - pain control per pain management.   - bariatric surgery recs appreciated.   fu pain management , dc iv morphine   may use po prn until reevaluation by pain management     # Acute Hypoxic Respiratory Failure likely due to Asthma/ COPD exacerbation.  vs ACOS   cont nebs     #HTN  cont meds     #Syncope likely vasovagal vs dehydration vs narcotics related   - ECG with prolonged qtc, no obvious ischemia, trops x2 negative  - no events on tele  - tele discontinued, Neg Orthostatics .     #Hypothyroidism,   - home meds - 88mcg thyroxine   - TSH 9.88  - increased to 100 mcg synthroid     #anxiety, depression,  - c/w lamotrigine and zoloft      #Chronic back pain   - on methadone 30mg   -  po morphine 6mg q4h PRN    #Alcohol use disorder  # Suspected Thiamine deficiency   - 2-3 drinks per day  - c/w b12 and folate   - started thiamine     # Obesity   Height (cm): 167.6 (11-13-24 @ 10:30)  Weight (kg): 100.7 (11-13-24 @ 10:30)  BMI (kg/m2): 35.8 (11-13-24 @ 10:30)  BSA (m2): 2.09 (11-13-24 @ 10:30)    #Progress Note Handoff    Pending :  O2 for home to be delivered , pain management and endo eval , monitor fingersticks   Family discussion: dw pt   Disposition: home with home care   code status: full code

## 2024-11-29 LAB
ANION GAP SERPL CALC-SCNC: 13 MMOL/L — SIGNIFICANT CHANGE UP (ref 7–14)
BASOPHILS # BLD AUTO: 0.07 K/UL — SIGNIFICANT CHANGE UP (ref 0–0.2)
BASOPHILS NFR BLD AUTO: 0.9 % — SIGNIFICANT CHANGE UP (ref 0–1)
BUN SERPL-MCNC: 16 MG/DL — SIGNIFICANT CHANGE UP (ref 10–20)
CALCIUM SERPL-MCNC: 8.2 MG/DL — LOW (ref 8.4–10.5)
CHLORIDE SERPL-SCNC: 103 MMOL/L — SIGNIFICANT CHANGE UP (ref 98–110)
CO2 SERPL-SCNC: 24 MMOL/L — SIGNIFICANT CHANGE UP (ref 17–32)
CREAT SERPL-MCNC: 1 MG/DL — SIGNIFICANT CHANGE UP (ref 0.7–1.5)
EGFR: 64 ML/MIN/1.73M2 — SIGNIFICANT CHANGE UP
EOSINOPHIL # BLD AUTO: 0.49 K/UL — SIGNIFICANT CHANGE UP (ref 0–0.7)
EOSINOPHIL NFR BLD AUTO: 6.4 % — SIGNIFICANT CHANGE UP (ref 0–8)
GLUCOSE BLDC GLUCOMTR-MCNC: 105 MG/DL — HIGH (ref 70–99)
GLUCOSE BLDC GLUCOMTR-MCNC: 106 MG/DL — HIGH (ref 70–99)
GLUCOSE BLDC GLUCOMTR-MCNC: 119 MG/DL — HIGH (ref 70–99)
GLUCOSE BLDC GLUCOMTR-MCNC: 160 MG/DL — HIGH (ref 70–99)
GLUCOSE BLDC GLUCOMTR-MCNC: 238 MG/DL — HIGH (ref 70–99)
GLUCOSE SERPL-MCNC: 96 MG/DL — SIGNIFICANT CHANGE UP (ref 70–99)
HCT VFR BLD CALC: 37 % — SIGNIFICANT CHANGE UP (ref 37–47)
HGB BLD-MCNC: 11.3 G/DL — LOW (ref 12–16)
IMM GRANULOCYTES NFR BLD AUTO: 4.7 % — HIGH (ref 0.1–0.3)
LYMPHOCYTES # BLD AUTO: 1.27 K/UL — SIGNIFICANT CHANGE UP (ref 1.2–3.4)
LYMPHOCYTES # BLD AUTO: 16.6 % — LOW (ref 20.5–51.1)
MAGNESIUM SERPL-MCNC: 1.9 MG/DL — SIGNIFICANT CHANGE UP (ref 1.8–2.4)
MCHC RBC-ENTMCNC: 30.1 PG — SIGNIFICANT CHANGE UP (ref 27–31)
MCHC RBC-ENTMCNC: 30.5 G/DL — LOW (ref 32–37)
MCV RBC AUTO: 98.4 FL — SIGNIFICANT CHANGE UP (ref 81–99)
MONOCYTES # BLD AUTO: 0.61 K/UL — HIGH (ref 0.1–0.6)
MONOCYTES NFR BLD AUTO: 8 % — SIGNIFICANT CHANGE UP (ref 1.7–9.3)
NEUTROPHILS # BLD AUTO: 4.84 K/UL — SIGNIFICANT CHANGE UP (ref 1.4–6.5)
NEUTROPHILS NFR BLD AUTO: 63.4 % — SIGNIFICANT CHANGE UP (ref 42.2–75.2)
NRBC # BLD: 0 /100 WBCS — SIGNIFICANT CHANGE UP (ref 0–0)
PLATELET # BLD AUTO: 184 K/UL — SIGNIFICANT CHANGE UP (ref 130–400)
PMV BLD: 10.6 FL — HIGH (ref 7.4–10.4)
POTASSIUM SERPL-MCNC: 3.6 MMOL/L — SIGNIFICANT CHANGE UP (ref 3.5–5)
POTASSIUM SERPL-SCNC: 3.6 MMOL/L — SIGNIFICANT CHANGE UP (ref 3.5–5)
RBC # BLD: 3.76 M/UL — LOW (ref 4.2–5.4)
RBC # FLD: 13.8 % — SIGNIFICANT CHANGE UP (ref 11.5–14.5)
SODIUM SERPL-SCNC: 140 MMOL/L — SIGNIFICANT CHANGE UP (ref 135–146)
WBC # BLD: 7.64 K/UL — SIGNIFICANT CHANGE UP (ref 4.8–10.8)
WBC # FLD AUTO: 7.64 K/UL — SIGNIFICANT CHANGE UP (ref 4.8–10.8)

## 2024-11-29 PROCEDURE — 99232 SBSQ HOSP IP/OBS MODERATE 35: CPT

## 2024-11-29 PROCEDURE — 99232 SBSQ HOSP IP/OBS MODERATE 35: CPT | Mod: 25

## 2024-11-29 RX ORDER — BUMETANIDE 1 MG/1
1 TABLET ORAL ONCE
Refills: 0 | Status: COMPLETED | OUTPATIENT
Start: 2024-11-29 | End: 2024-11-29

## 2024-11-29 RX ADMIN — PANTOPRAZOLE SODIUM 40 MILLIGRAM(S): 40 TABLET, DELAYED RELEASE ORAL at 17:23

## 2024-11-29 RX ADMIN — Medication 0.5 MILLIGRAM(S): at 17:23

## 2024-11-29 RX ADMIN — Medication 100 MILLIGRAM(S): at 11:17

## 2024-11-29 RX ADMIN — SUCRALFATE 1 GRAM(S): 1 TABLET ORAL at 17:23

## 2024-11-29 RX ADMIN — METHADONE HYDROCHLORIDE 30 MILLIGRAM(S): 5 TABLET ORAL at 06:49

## 2024-11-29 RX ADMIN — ACETAMINOPHEN 500MG 650 MILLIGRAM(S): 500 TABLET, COATED ORAL at 14:53

## 2024-11-29 RX ADMIN — FLUTICASONE PROPIONATE AND SALMETEROL XINAFOATE 1 DOSE(S): 45; 21 AEROSOL, METERED RESPIRATORY (INHALATION) at 08:30

## 2024-11-29 RX ADMIN — Medication 10 MILLIGRAM(S): at 22:15

## 2024-11-29 RX ADMIN — BUMETANIDE 1 MILLIGRAM(S): 1 TABLET ORAL at 16:35

## 2024-11-29 RX ADMIN — SUCRALFATE 1 GRAM(S): 1 TABLET ORAL at 06:49

## 2024-11-29 RX ADMIN — POLYETHYLENE GLYCOL 3350 17 GRAM(S): 17 POWDER, FOR SOLUTION ORAL at 17:23

## 2024-11-29 RX ADMIN — METOCLOPRAMIDE HYDROCHLORIDE 5 MILLIGRAM(S): 10 TABLET ORAL at 11:17

## 2024-11-29 RX ADMIN — ACETAMINOPHEN 500MG 650 MILLIGRAM(S): 500 TABLET, COATED ORAL at 22:15

## 2024-11-29 RX ADMIN — Medication 10 MILLIGRAM(S): at 06:49

## 2024-11-29 RX ADMIN — METHADONE HYDROCHLORIDE 30 MILLIGRAM(S): 5 TABLET ORAL at 13:53

## 2024-11-29 RX ADMIN — Medication 100 MICROGRAM(S): at 06:50

## 2024-11-29 RX ADMIN — CHLORHEXIDINE GLUCONATE 1 APPLICATION(S): 1.2 RINSE ORAL at 06:51

## 2024-11-29 RX ADMIN — IPRATROPIUM BROMIDE AND ALBUTEROL SULFATE 3 MILLILITER(S): 2.5; .5 SOLUTION RESPIRATORY (INHALATION) at 09:00

## 2024-11-29 RX ADMIN — Medication 1 MILLIGRAM(S): at 11:17

## 2024-11-29 RX ADMIN — ACETAMINOPHEN 500MG 650 MILLIGRAM(S): 500 TABLET, COATED ORAL at 13:53

## 2024-11-29 RX ADMIN — IPRATROPIUM BROMIDE AND ALBUTEROL SULFATE 3 MILLILITER(S): 2.5; .5 SOLUTION RESPIRATORY (INHALATION) at 19:25

## 2024-11-29 RX ADMIN — Medication 2 TABLET(S): at 22:14

## 2024-11-29 RX ADMIN — ACETAMINOPHEN 500MG 650 MILLIGRAM(S): 500 TABLET, COATED ORAL at 06:49

## 2024-11-29 RX ADMIN — METHADONE HYDROCHLORIDE 30 MILLIGRAM(S): 5 TABLET ORAL at 22:14

## 2024-11-29 RX ADMIN — PANTOPRAZOLE SODIUM 40 MILLIGRAM(S): 40 TABLET, DELAYED RELEASE ORAL at 06:51

## 2024-11-29 RX ADMIN — LAMOTRIGINE 150 MILLIGRAM(S): 50 TABLET, EXTENDED RELEASE ORAL at 11:17

## 2024-11-29 RX ADMIN — NYSTATIN 1 APPLICATION(S): 100000 POWDER TOPICAL at 06:51

## 2024-11-29 RX ADMIN — METOCLOPRAMIDE HYDROCHLORIDE 5 MILLIGRAM(S): 10 TABLET ORAL at 06:49

## 2024-11-29 RX ADMIN — ACETAMINOPHEN 500MG 650 MILLIGRAM(S): 500 TABLET, COATED ORAL at 22:45

## 2024-11-29 RX ADMIN — Medication 0.5 MILLIGRAM(S): at 06:50

## 2024-11-29 RX ADMIN — SERTRALINE HYDROCHLORIDE 200 MILLIGRAM(S): 100 TABLET, FILM COATED ORAL at 11:17

## 2024-11-29 RX ADMIN — METOCLOPRAMIDE HYDROCHLORIDE 5 MILLIGRAM(S): 10 TABLET ORAL at 23:27

## 2024-11-29 RX ADMIN — METOCLOPRAMIDE HYDROCHLORIDE 5 MILLIGRAM(S): 10 TABLET ORAL at 17:24

## 2024-11-29 RX ADMIN — BUMETANIDE 1 MILLIGRAM(S): 1 TABLET ORAL at 06:49

## 2024-11-29 RX ADMIN — Medication 2: at 11:34

## 2024-11-29 RX ADMIN — Medication 10 MILLIGRAM(S): at 13:53

## 2024-11-29 RX ADMIN — METOCLOPRAMIDE HYDROCHLORIDE 5 MILLIGRAM(S): 10 TABLET ORAL at 03:36

## 2024-11-29 RX ADMIN — NYSTATIN 1 APPLICATION(S): 100000 POWDER TOPICAL at 17:25

## 2024-11-29 RX ADMIN — POLYETHYLENE GLYCOL 3350 17 GRAM(S): 17 POWDER, FOR SOLUTION ORAL at 06:50

## 2024-11-29 NOTE — PROVIDER CONTACT NOTE (OTHER) - SITUATION
Patient has an unwitnessed fall.
Patient feel lethargic, weak and sleepy.
Pt appears restless, anxious, forgetful and confused. This is not pt's baseline

## 2024-11-29 NOTE — PROGRESS NOTE ADULT - SUBJECTIVE AND OBJECTIVE BOX
SUBJECTIVE/OVERNIGHT EVENTS  Today is hospital day 21d. This morning patient was seen and examined at bedside, resting comfortably in bed. No acute or major events overnight. Pt continues to endorse sore throat with productive cough and abdominal pain rating the pain a 3/10. Pt had a small bowel movement on 11/28.     MEDICATIONS  STANDING MEDICATIONS  acetaminophen     Tablet .. 650 milliGRAM(s) Oral every 8 hours  albuterol/ipratropium for Nebulization 3 milliLiter(s) Nebulizer every 6 hours  ALPRAZolam 0.5 milliGRAM(s) Oral two times a day  buMETAnide 1 milliGRAM(s) Oral daily  chlorhexidine 2% Cloths 1 Application(s) Topical <User Schedule>  cyclobenzaprine 10 milliGRAM(s) Oral three times a day  dextrose 5%. 1000 milliLiter(s) IV Continuous <Continuous>  dextrose 5%. 1000 milliLiter(s) IV Continuous <Continuous>  dextrose 50% Injectable 25 Gram(s) IV Push once  dextrose 50% Injectable 12.5 Gram(s) IV Push once  dextrose 50% Injectable 25 Gram(s) IV Push once  fluticasone propionate/ salmeterol 250-50 MICROgram(s) Diskus 1 Dose(s) Inhalation two times a day  folic acid 1 milliGRAM(s) Oral daily  glucagon  Injectable 1 milliGRAM(s) IntraMuscular once  heparin   Injectable 5000 Unit(s) SubCutaneous every 8 hours  insulin lispro (ADMELOG) corrective regimen sliding scale   SubCutaneous three times a day before meals  lamoTRIgine 150 milliGRAM(s) Oral daily  levothyroxine 100 MICROGram(s) Oral daily  methadone    Tablet 30 milliGRAM(s) Oral every 8 hours  metoclopramide 5 milliGRAM(s) Oral four times a day  nystatin Powder 1 Application(s) Topical two times a day  pantoprazole    Tablet 40 milliGRAM(s) Oral two times a day  polyethylene glycol 3350 17 Gram(s) Oral two times a day  senna 2 Tablet(s) Oral at bedtime  sertraline 200 milliGRAM(s) Oral daily  sucralfate 1 Gram(s) Oral two times a day  thiamine 100 milliGRAM(s) Oral daily    PRN MEDICATIONS  albuterol    90 MICROgram(s) HFA Inhaler 2 Puff(s) Inhalation every 6 hours PRN  bisacodyl 5 milliGRAM(s) Oral every 12 hours PRN  dextrose Oral Gel 15 Gram(s) Oral once PRN  melatonin 3 milliGRAM(s) Oral at bedtime PRN  naloxone 1 mG/mL Injection for Intranasal Use 1 milliGRAM(s) IntraNasal every 5 minutes PRN  oxycodone    5 mG/acetaminophen 325 mG 1 Tablet(s) Oral every 6 hours PRN    VITALS  T(F): 98.1 (11-29-24 @ 05:08), Max: 98.2 (11-28-24 @ 19:00)  HR: 70 (11-29-24 @ 05:08) (68 - 82)  BP: 108/64 (11-29-24 @ 05:08) (108/64 - 112/75)  RR: 18 (11-29-24 @ 05:08) (18 - 18)  SpO2: 90% (11-29-24 @ 05:08) (90% - 92%)  POCT Blood Glucose.: 144 mg/dL (11-28-24 @ 21:43)  POCT Blood Glucose.: 92 mg/dL (11-28-24 @ 17:26)  POCT Blood Glucose.: 114 mg/dL (11-28-24 @ 11:25)  POCT Blood Glucose.: 175 mg/dL (11-28-24 @ 08:45)  POCT Blood Glucose.: 129 mg/dL (11-28-24 @ 08:04)    PHYSICAL EXAM  GENERAL: NAD, lying in bed comfortably       HEAD: Atraumatic   HEART: normal rate, regular rhythm   LUNGS: bilateral wheezing   ABDOMEN: RUQ tenderness, epigastric tenderness, LUQ tenderness   EXTREMITIES: bilateral LE 2+ pitting edema   SKIN LLQ red blanching rash in skin fold     LABS             11.5   8.23  )-----------( 179      ( 11-28-24 @ 06:10 )             37.1     137  |  101  |  19  -------------------------<  97   11-28-24 @ 06:10  3.5  |  25  |  1.1    Ca      8.6     11-28-24 @ 06:10  Mg     2.0     11-28-24 @ 06:10

## 2024-11-29 NOTE — PROVIDER CONTACT NOTE (OTHER) - ASSESSMENT
Patient is stable.
Pt tries to leave the bed, stating that she is restless.
/88   HR 74  O2 95% on 2L via NC

## 2024-11-29 NOTE — CONSULT NOTE ADULT - NS ATTEST RISK PROBLEM GEN_ALL_CORE FT
This 64-year-old patient presents with a complex clinical picture including a recent syncopal episode, worsening abdominal pain, and chronic back pain, posing a moderate risk of complications. Her history of Jaclyn-en-Y gastric bypass surgery, methadone use for chronic pain, and multiple medical comorbidities (including asthma, hypothyroidism, anxiety, depression, and MVP) further increases the complexity of her presentation and potential for drug interactions and/or adverse events.
This 64-year-old patient presents with a complex clinical picture including a recent syncopal episode, worsening abdominal pain, and chronic back pain, posing a moderate risk of complications. Her history of Jaclyn-en-Y gastric bypass surgery, methadone use for chronic pain, and multiple medical comorbidities (including asthma, hypothyroidism, anxiety, depression, and MVP) further increases the complexity of her presentation and potential for drug interactions and/or adverse events. The need to differentiate between abdominal pain etiologies (gastritis versus nerve entrapment) adds to the diagnostic and management challenges.
This 64-year-old female with a history of chronic pain on methadone, presenting with worsening abdominal pain and intolerance to oral intake, has a moderate risk profile. Her treatment plan includes continuation of methadone, supportive care for pain management with PRN IV morphine, and coordination for an EGD to evaluate gastrointestinal symptoms. Medication adjustments and bowel and nausea management are required due to her opioid use and gastrointestinal intolerance.

## 2024-11-29 NOTE — PROGRESS NOTE ADULT - SUBJECTIVE AND OBJECTIVE BOX
pt seen and examined.     My notes supersede resident's notes in case of discrepancy       ROS: no cp, no sob, no n/v, no fever    Vital Signs Last 24 Hrs  T(C): 36.2 (29 Nov 2024 12:00), Max: 36.8 (28 Nov 2024 19:00)  T(F): 97.2 (29 Nov 2024 12:00), Max: 98.2 (28 Nov 2024 19:00)  HR: 80 (29 Nov 2024 12:00) (70 - 82)  BP: 110/71 (29 Nov 2024 12:00) (108/64 - 110/71)  RR: 18 (29 Nov 2024 12:00) (18 - 18)  SpO2: 90% (29 Nov 2024 12:00) (90% - 92%)    physical exam  constitutional NAD, AAOX3, Respiratory  lungs CTA, CVS heart RRR, GI: abdomen Soft NT, ND, BS+, skin: intact  neuro exam no focal deficit     MEDICATIONS  (STANDING):  acetaminophen     Tablet .. 650 milliGRAM(s) Oral every 8 hours  albuterol/ipratropium for Nebulization 3 milliLiter(s) Nebulizer every 6 hours  ALPRAZolam 0.5 milliGRAM(s) Oral two times a day  buMETAnide 1 milliGRAM(s) Oral daily  buMETAnide Injectable 1 milliGRAM(s) IV Push once  chlorhexidine 2% Cloths 1 Application(s) Topical <User Schedule>  cyclobenzaprine 10 milliGRAM(s) Oral three times a day  dextrose 5%. 1000 milliLiter(s) (100 mL/Hr) IV Continuous <Continuous>  dextrose 5%. 1000 milliLiter(s) (50 mL/Hr) IV Continuous <Continuous>  dextrose 50% Injectable 25 Gram(s) IV Push once  dextrose 50% Injectable 12.5 Gram(s) IV Push once  dextrose 50% Injectable 25 Gram(s) IV Push once  fluticasone propionate/ salmeterol 250-50 MICROgram(s) Diskus 1 Dose(s) Inhalation two times a day  folic acid 1 milliGRAM(s) Oral daily  glucagon  Injectable 1 milliGRAM(s) IntraMuscular once  heparin   Injectable 5000 Unit(s) SubCutaneous every 8 hours  insulin lispro (ADMELOG) corrective regimen sliding scale   SubCutaneous three times a day before meals  lamoTRIgine 150 milliGRAM(s) Oral daily  levothyroxine 100 MICROGram(s) Oral daily  methadone    Tablet 30 milliGRAM(s) Oral every 8 hours  metoclopramide 5 milliGRAM(s) Oral four times a day  nystatin Powder 1 Application(s) Topical two times a day  pantoprazole    Tablet 40 milliGRAM(s) Oral two times a day  polyethylene glycol 3350 17 Gram(s) Oral two times a day  senna 2 Tablet(s) Oral at bedtime  sertraline 200 milliGRAM(s) Oral daily  sucralfate 1 Gram(s) Oral two times a day  thiamine 100 milliGRAM(s) Oral daily    MEDICATIONS  (PRN):  albuterol    90 MICROgram(s) HFA Inhaler 2 Puff(s) Inhalation every 6 hours PRN Shortness of Breath and/or Wheezing  bisacodyl 5 milliGRAM(s) Oral every 12 hours PRN Constipation  dextrose Oral Gel 15 Gram(s) Oral once PRN Blood Glucose LESS THAN 70 milliGRAM(s)/deciliter  melatonin 3 milliGRAM(s) Oral at bedtime PRN Insomnia  naloxone 1 mG/mL Injection for Intranasal Use 1 milliGRAM(s) IntraNasal every 5 minutes PRN signs of narcotic overdose  oxycodone    5 mG/acetaminophen 325 mG 1 Tablet(s) Oral every 6 hours PRN Severe Pain (7 - 10)                        11.3   7.64  )-----------( 184      ( 29 Nov 2024 06:22 )             37.0     11-29    140  |  103  |  16  ----------------------------<  96  3.6   |  24  |  1.0    Ca    8.2[L]      29 Nov 2024 06:22  Mg     1.9     11-29    a/p  64-year-old female, former smoker with past medical history of asthma, hypothyroidism, anxiety, depression, MVP, Jaclyn-en-Y gastric bypass, appendectomy, cholecystectomy and Chronic back pain on methadone, presents to the ED following a syncopal episode last night. She also has complaints of worsening abdominal pain.     # hypoglycemia , resolved,   CAPILLARY BLOOD GLUCOSE  POCT Blood Glucose.: 160 mg/dL (29 Nov 2024 15:15)  POCT Blood Glucose.: 238 mg/dL (29 Nov 2024 11:27)  POCT Blood Glucose.: 105 mg/dL (29 Nov 2024 07:53)  POCT Blood Glucose.: 144 mg/dL (28 Nov 2024 21:43)  POCT Blood Glucose.: 92 mg/dL (28 Nov 2024 17:26)    endo consult appreciated   this am pt had an episode of hypoglycemia 31, was given juice and repeat fs has improved,   pt is not on any po or sq diabetic meds except for sliding scale insulin   good po intake     # Abdominal pain intractable, requiring narcotics ( extra , added to her methadone) , pt has h/o Jaclyn en Y bypass   - unclear etiology.  >> resolved   - CT abdomen - Hepatomegaly   - EGD/ Colonoscopy (May '23) - Non erosive gastritis  - c/w pantoprazole   - EGD- nonerosive gastritis in stomach  - CTa abdomen pending read  - pain control per pain management.   - bariatric surgery recs appreciated.   fu pain management , dc iv morphine   may use po prn until reevaluation by pain management     # Acute Hypoxic Respiratory Failure likely due to Asthma/ COPD exacerbation.  vs ACOS   cont nebs     #HTN  cont meds     #Syncope likely vasovagal vs dehydration vs narcotics related   - ECG with prolonged qtc, no obvious ischemia, trops x2 negative  - no events on tele  - tele discontinued, Neg Orthostatics .     #Hypothyroidism,   - home meds - 88mcg thyroxine   - TSH 9.88  - increased to 100 mcg synthroid     #anxiety, depression,  - c/w lamotrigine and zoloft      #Chronic back pain   - on methadone 30mg   -  po morphine 6mg q4h PRN    #Alcohol use disorder  # Suspected Thiamine deficiency   - 2-3 drinks per day  - c/w b12 and folate   - started thiamine     # Obesity   Height (cm): 167.6 (11-13-24 @ 10:30)  Weight (kg): 100.7 (11-13-24 @ 10:30)  BMI (kg/m2): 35.8 (11-13-24 @ 10:30)  BSA (m2): 2.09 (11-13-24 @ 10:30)    #Progress Note Handoff    Pending :  O2 for home to be delivered , pain management and endo eval , monitor fingersticks   Family discussion: dw pt   Disposition: home with home care   code status: full code

## 2024-11-29 NOTE — CONSULT NOTE ADULT - SUBJECTIVE AND OBJECTIVE BOX
HPI: Patient is a 64F with PMH of former smoker with past medical history of asthma, hypothyroidism, anxiety, depression, MVP, Jaclyn-en-Y gastric bypass, appendectomy, cholecystectomy and Chronic back pain on methadone, presents to the ED following a syncopal episode last night. She also has complaints of worsening abdominal pain.     Interval Hx: Patient has had a long complicated hospital course, seen in bed resting comfortably this morning stating that her abdominal pain has significantly lessened since admission and has returned to around her baseline where she feels comfortable. She is on her home regimen as well as additional oxycodone prn as needed and states that this is helpful.    Current Inpatient Medication Regimen:  acetaminophen     Tablet .. 650 milliGRAM(s) Oral every 6 hours PRN  albuterol    90 MICROgram(s) HFA Inhaler 2 Puff(s) Inhalation every 6 hours PRN  albuterol/ipratropium for Nebulization 3 milliLiter(s) Nebulizer every 6 hours  aluminum hydroxide/magnesium hydroxide/simethicone Suspension 30 milliLiter(s) Oral every 4 hours PRN  buMETAnide 1 milliGRAM(s) Oral daily  chlorhexidine 2% Cloths 1 Application(s) Topical <User Schedule>  cyclobenzaprine 10 milliGRAM(s) Oral three times a day PRN  dextrose 5%. 1000 milliLiter(s) IV Continuous <Continuous>  dextrose 5%. 1000 milliLiter(s) IV Continuous <Continuous>  dextrose 50% Injectable 25 Gram(s) IV Push once  dextrose 50% Injectable 12.5 Gram(s) IV Push once  dextrose 50% Injectable 25 Gram(s) IV Push once  dextrose Oral Gel 15 Gram(s) Oral once PRN  dicyclomine 20 milliGRAM(s) Oral four times a day before meals PRN  folic acid 1 milliGRAM(s) Oral daily  glucagon  Injectable 1 milliGRAM(s) IntraMuscular once  heparin   Injectable 5000 Unit(s) SubCutaneous every 8 hours  influenza   Vaccine 0.5 milliLiter(s) IntraMuscular once  insulin lispro (ADMELOG) corrective regimen sliding scale   SubCutaneous three times a day before meals  lactated ringers. 1000 milliLiter(s) IV Continuous <Continuous>  lamoTRIgine 150 milliGRAM(s) Oral daily  levothyroxine 100 MICROGram(s) Oral daily  melatonin 3 milliGRAM(s) Oral at bedtime PRN  methadone    Tablet 30 milliGRAM(s) Oral every 8 hours  morphine  - Injectable 2 milliGRAM(s) IV Push once  morphine  - Injectable 2 milliGRAM(s) IV Push every 4 hours PRN  pantoprazole    Tablet 40 milliGRAM(s) Oral before breakfast  pantoprazole    Tablet 40 milliGRAM(s) Oral two times a day  polyethylene glycol 3350 17 Gram(s) Oral two times a day  predniSONE   Tablet 40 milliGRAM(s) Oral daily  scopolamine 1 mG/72 Hr(s) Patch 1 Patch Transdermal once  senna 2 Tablet(s) Oral at bedtime  sertraline 200 milliGRAM(s) Oral daily  thiamine 100 milliGRAM(s) Oral daily      Home Analgesic Regimen:      Allergies:  penicillin (Anaphylaxis)      Past Medical History:  Chest pain    No pertinent family history in first degree relatives    CAD (coronary artery disease) (Father, Mother)    Asthma    Gastroesophageal reflux disease without esophagitis    Hypothyroidism    Heart murmur    Anxiety    Depression    Mood disorder    Psoriasis    Constipation    Back pain    Morbid obesity    SOB (shortness of breath) on exertion    S/P tonsillectomy    History of appendectomy    Abscess of back    History of Jaclyn-en-Y gastric bypass    S O B    Intractable abdominal pain    Hepatomegaly    Syncope    Chest pain        Review of Systems:  General: no fevers or chills  Eyes: no diplopia or blurred vision  ENT: no rhinorrhea  CV: no chest pain  Resp: no cough or dyspnea  GI: abdominal pain  : no urinary incontinence or dysuria  Neuro: no focal weakness or numbness    Physical Exam:  T(C): 36.8 (11-14-24 @ 11:39), Max: 36.8 (11-14-24 @ 11:39)  HR: 75 (11-14-24 @ 11:39) (69 - 75)  BP: 104/71 (11-14-24 @ 11:39) (98/64 - 110/68)  RR: 16 (11-14-24 @ 11:39) (16 - 18)  SpO2: 93% (11-14-24 @ 12:00) (93% - 97%)  Gen: NAD  Eyes: no glasses or scleral icterus  Head: Normocephalic / Atraumatic  CV: no JVD  Lungs: nonlabored breathing  Abdomen: nondistended, soft  : no garcia catheter in place  Back: tenderness to palpation  Neuro: AOx3  Extremities: full ROM in upper/lower extremities  Psych: normal affect      Labs:                        12.9   5.84  )-----------( 136      ( 13 Nov 2024 01:36 )             40.9         BMP  138 mmol/L [135 - 146] | 96 mmol/L[L] [98 - 110] | 12 mg/dL [10 - 20]  4.1 mmol/L [3.5 - 5.0] | 30 mmol/L [17 - 32] | 1.1 mg/dL [0.7 - 1.5]    80 mg/dL [70 - 99]

## 2024-11-29 NOTE — CONSULT NOTE ADULT - ASSESSMENT
A/P: Patient is a 64F with PMH of former smoker with past medical history of asthma, hypothyroidism, anxiety, depression, MVP, Jaclyn-en-Y gastric bypass, appendectomy, cholecystectomy and Chronic back pain on methadone, presents to the ED following a syncopal episode last night. She also has complaints of worsening abdominal pain.     Interval Hx: Patient has had a long complicated hospital course, seen in bed resting comfortably this morning stating that her abdominal pain has significantly lessened since admission and has returned to around her baseline where she feels comfortable. She is on her home regimen as well as additional oxycodone prn as needed and states that this is helpful.    PLAN  #Abdominal Pain  #Chronic back pain  - Continue methadone 30mg TID NIMCO  -  Cyclobenzaprine 10mg TID NIMCO  - Acetaminophen 650mg q8h NIMCO  - Continue Oxycodone prn as currently ordered, can discharge patient with 3 day supply. She will follow up with her outpatient pain management doctor for further adjustment.    #Opioid induced constipation  - Bowel regimen as per primary team  - Nausea regimen as per primary team

## 2024-11-29 NOTE — PROGRESS NOTE ADULT - ASSESSMENT
64-year-old female, former smoker with past medical history of asthma, hypothyroidism, anxiety, depression, MVP, Jaclyn-en-Y gastric bypass, appendectomy, cholecystectomy and Chronic back pain on methadone, presents to the ED following a syncopal episode last night. She also has complaints of worsening abdominal pain for 5 days.     Plan  # hypoglycemia   POCT Blood Glucose.: 144 mg/dL (11-28-24 @ 21:43)  POCT Blood Glucose.: 92 mg/dL (11-28-24 @ 17:26)  POCT Blood Glucose.: 114 mg/dL (11-28-24 @ 11:25)  POCT Blood Glucose.: 175 mg/dL (28 Nov 2024 08:45)  POCT Blood Glucose.: 129 mg/dL (28 Nov 2024 08:04)  POCT Blood Glucose.: 31 mg/dL (28 Nov 2024 07:46)  POCT Blood Glucose.: 94 mg/dL (27 Nov 2024 21:19)  POCT Blood Glucose.: 80 mg/dL (27 Nov 2024 16:50)  Per endocrine:   -Symptoms of lightheadedness could be consistent with postprandial hyperinsulinemic hypoglycemia  - avoid large meals with simple sugars, start with small meals with complex carbs/fiber and proteins throughout the day  -If point-of-care glucose is noted to be below 55 mg/dL, and patient is symptomatic recommend to obtain serum glucose C-peptide insulin proinsulin and beta hydroxybutyrate before treating  -Recommend nutrition consult and support    #DM  -A1c noted to be 7 this visit  Per endocrine  -agree low insulin sliding scale for now, if sugars go above 150 mg/dl persistently  -on DC can monitor off medication and just dietary interventions for now    # Abdominal pain with   #h/o Jaclyn en Y bypass   - unclear etiology.  >> resolved   - CT abdomen - Hepatomegaly   - EGD/ Colonoscopy (May '23) - Non erosive gastritis  - c/w pantoprazole   - EGD- nonerosive gastritis in stomach  - CTa abdomen pending read  - pain control per pain management. stopped  po morphine.   - bariatric surgery recs appreciated.     # Acute Hypoxic Respiratory Failure likely due to   # Asthma/ COPD exacerbation.  vs ACOS   #h/o MVP (follows Dr. Negrete)  #HTN  - CTA chest - no PE  - TTE --> echo 60-65% EF  - currently on O2 2L NC > titrate off as tolerated.  - Necessitating home oxygen, 94% on 2L but destats to 89 on RA  - Taper down on  prednisone   - c/w albuterol prn   - c/w advair and symbicort  - TTE with bubbles 11/19: suspected pulmonary shunt.      per pulm f/u OP for PFT   - c/w bumetanide 1mg oral.   - Single dose of bumetanide 1mg IV     #Syncope likely vasovagal vs dehydration  - ECG with prolonged qtc, no obvious ischemia, trops x2 negative  - no events on tele  - tele discontinued, Neg Orthostatics .     #Rash  -RLQ panus fold   -c/w nystatin powder BID     #Hypothyroidism,   - home meds - 88mcg thyroxine   - TSH 9.88  - increased to 100 mcg synthroid     #anxiety, depression,  - c/w lamotrigine and zoloft      #Chronic back pain   - on methadone 30mg   -  po morphine 6mg q4h PRN as per pain management    #Alcohol use disorder  # Suspected Thiamine deficiency   - 2-3 drinks per day  - c/w b12 and folate   - started thiamine     # Obesity >    #MISC  VTE ppx - heparin SC  GI ppx - pantoprazole (home meds)  Activity as tolerated  regular diet per pt requests  Full Code    Pending: Home O2 / RVP   Dispo:  pt prefers going home.   Plan d/w the patient at bedside.

## 2024-11-30 LAB
ANION GAP SERPL CALC-SCNC: 13 MMOL/L — SIGNIFICANT CHANGE UP (ref 7–14)
BASOPHILS # BLD AUTO: 0.08 K/UL — SIGNIFICANT CHANGE UP (ref 0–0.2)
BASOPHILS NFR BLD AUTO: 1.1 % — HIGH (ref 0–1)
BUN SERPL-MCNC: 14 MG/DL — SIGNIFICANT CHANGE UP (ref 10–20)
CALCIUM SERPL-MCNC: 8.2 MG/DL — LOW (ref 8.4–10.5)
CHLORIDE SERPL-SCNC: 102 MMOL/L — SIGNIFICANT CHANGE UP (ref 98–110)
CO2 SERPL-SCNC: 26 MMOL/L — SIGNIFICANT CHANGE UP (ref 17–32)
CREAT SERPL-MCNC: 0.9 MG/DL — SIGNIFICANT CHANGE UP (ref 0.7–1.5)
EGFR: 72 ML/MIN/1.73M2 — SIGNIFICANT CHANGE UP
EOSINOPHIL # BLD AUTO: 0.45 K/UL — SIGNIFICANT CHANGE UP (ref 0–0.7)
EOSINOPHIL NFR BLD AUTO: 6.1 % — SIGNIFICANT CHANGE UP (ref 0–8)
GLUCOSE BLDC GLUCOMTR-MCNC: 145 MG/DL — HIGH (ref 70–99)
GLUCOSE BLDC GLUCOMTR-MCNC: 184 MG/DL — HIGH (ref 70–99)
GLUCOSE BLDC GLUCOMTR-MCNC: 95 MG/DL — SIGNIFICANT CHANGE UP (ref 70–99)
GLUCOSE SERPL-MCNC: 131 MG/DL — HIGH (ref 70–99)
HCT VFR BLD CALC: 36.7 % — LOW (ref 37–47)
HGB BLD-MCNC: 11.4 G/DL — LOW (ref 12–16)
IMM GRANULOCYTES NFR BLD AUTO: 3.7 % — HIGH (ref 0.1–0.3)
LYMPHOCYTES # BLD AUTO: 1.05 K/UL — LOW (ref 1.2–3.4)
LYMPHOCYTES # BLD AUTO: 14.3 % — LOW (ref 20.5–51.1)
MAGNESIUM SERPL-MCNC: 2 MG/DL — SIGNIFICANT CHANGE UP (ref 1.8–2.4)
MCHC RBC-ENTMCNC: 29.9 PG — SIGNIFICANT CHANGE UP (ref 27–31)
MCHC RBC-ENTMCNC: 31.1 G/DL — LOW (ref 32–37)
MCV RBC AUTO: 96.3 FL — SIGNIFICANT CHANGE UP (ref 81–99)
MONOCYTES # BLD AUTO: 0.51 K/UL — SIGNIFICANT CHANGE UP (ref 0.1–0.6)
MONOCYTES NFR BLD AUTO: 6.9 % — SIGNIFICANT CHANGE UP (ref 1.7–9.3)
NEUTROPHILS # BLD AUTO: 4.98 K/UL — SIGNIFICANT CHANGE UP (ref 1.4–6.5)
NEUTROPHILS NFR BLD AUTO: 67.9 % — SIGNIFICANT CHANGE UP (ref 42.2–75.2)
NRBC # BLD: 0 /100 WBCS — SIGNIFICANT CHANGE UP (ref 0–0)
PLATELET # BLD AUTO: 171 K/UL — SIGNIFICANT CHANGE UP (ref 130–400)
PMV BLD: 10.3 FL — SIGNIFICANT CHANGE UP (ref 7.4–10.4)
POTASSIUM SERPL-MCNC: 3.4 MMOL/L — LOW (ref 3.5–5)
POTASSIUM SERPL-SCNC: 3.4 MMOL/L — LOW (ref 3.5–5)
RBC # BLD: 3.81 M/UL — LOW (ref 4.2–5.4)
RBC # FLD: 13.6 % — SIGNIFICANT CHANGE UP (ref 11.5–14.5)
SODIUM SERPL-SCNC: 141 MMOL/L — SIGNIFICANT CHANGE UP (ref 135–146)
WBC # BLD: 7.34 K/UL — SIGNIFICANT CHANGE UP (ref 4.8–10.8)
WBC # FLD AUTO: 7.34 K/UL — SIGNIFICANT CHANGE UP (ref 4.8–10.8)

## 2024-11-30 PROCEDURE — 99232 SBSQ HOSP IP/OBS MODERATE 35: CPT

## 2024-11-30 RX ORDER — POTASSIUM CHLORIDE 600 MG/1
40 TABLET, EXTENDED RELEASE ORAL ONCE
Refills: 0 | Status: COMPLETED | OUTPATIENT
Start: 2024-11-30 | End: 2024-11-30

## 2024-11-30 RX ADMIN — METHADONE HYDROCHLORIDE 30 MILLIGRAM(S): 5 TABLET ORAL at 14:38

## 2024-11-30 RX ADMIN — BUMETANIDE 1 MILLIGRAM(S): 1 TABLET ORAL at 05:36

## 2024-11-30 RX ADMIN — POTASSIUM CHLORIDE 40 MILLIEQUIVALENT(S): 600 TABLET, EXTENDED RELEASE ORAL at 09:09

## 2024-11-30 RX ADMIN — METOCLOPRAMIDE HYDROCHLORIDE 5 MILLIGRAM(S): 10 TABLET ORAL at 11:43

## 2024-11-30 RX ADMIN — ACETAMINOPHEN 500MG 650 MILLIGRAM(S): 500 TABLET, COATED ORAL at 21:36

## 2024-11-30 RX ADMIN — Medication 2 TABLET(S): at 21:34

## 2024-11-30 RX ADMIN — SERTRALINE HYDROCHLORIDE 200 MILLIGRAM(S): 100 TABLET, FILM COATED ORAL at 11:43

## 2024-11-30 RX ADMIN — PANTOPRAZOLE SODIUM 40 MILLIGRAM(S): 40 TABLET, DELAYED RELEASE ORAL at 18:24

## 2024-11-30 RX ADMIN — IPRATROPIUM BROMIDE AND ALBUTEROL SULFATE 3 MILLILITER(S): 2.5; .5 SOLUTION RESPIRATORY (INHALATION) at 05:35

## 2024-11-30 RX ADMIN — Medication 10 MILLIGRAM(S): at 05:36

## 2024-11-30 RX ADMIN — SUCRALFATE 1 GRAM(S): 1 TABLET ORAL at 05:36

## 2024-11-30 RX ADMIN — Medication 1 MILLIGRAM(S): at 11:42

## 2024-11-30 RX ADMIN — FLUTICASONE PROPIONATE AND SALMETEROL XINAFOATE 1 DOSE(S): 45; 21 AEROSOL, METERED RESPIRATORY (INHALATION) at 08:00

## 2024-11-30 RX ADMIN — Medication 10 MILLIGRAM(S): at 13:10

## 2024-11-30 RX ADMIN — CHLORHEXIDINE GLUCONATE 1 APPLICATION(S): 1.2 RINSE ORAL at 05:37

## 2024-11-30 RX ADMIN — METOCLOPRAMIDE HYDROCHLORIDE 5 MILLIGRAM(S): 10 TABLET ORAL at 23:42

## 2024-11-30 RX ADMIN — LAMOTRIGINE 150 MILLIGRAM(S): 50 TABLET, EXTENDED RELEASE ORAL at 11:47

## 2024-11-30 RX ADMIN — POLYETHYLENE GLYCOL 3350 17 GRAM(S): 17 POWDER, FOR SOLUTION ORAL at 18:26

## 2024-11-30 RX ADMIN — METHADONE HYDROCHLORIDE 30 MILLIGRAM(S): 5 TABLET ORAL at 21:34

## 2024-11-30 RX ADMIN — METOCLOPRAMIDE HYDROCHLORIDE 5 MILLIGRAM(S): 10 TABLET ORAL at 18:23

## 2024-11-30 RX ADMIN — Medication 0.5 MILLIGRAM(S): at 18:24

## 2024-11-30 RX ADMIN — NYSTATIN 1 APPLICATION(S): 100000 POWDER TOPICAL at 18:32

## 2024-11-30 RX ADMIN — IPRATROPIUM BROMIDE AND ALBUTEROL SULFATE 3 MILLILITER(S): 2.5; .5 SOLUTION RESPIRATORY (INHALATION) at 21:32

## 2024-11-30 RX ADMIN — Medication 10 MILLIGRAM(S): at 21:34

## 2024-11-30 RX ADMIN — PANTOPRAZOLE SODIUM 40 MILLIGRAM(S): 40 TABLET, DELAYED RELEASE ORAL at 05:36

## 2024-11-30 RX ADMIN — ACETAMINOPHEN 500MG 650 MILLIGRAM(S): 500 TABLET, COATED ORAL at 22:06

## 2024-11-30 RX ADMIN — Medication 0.5 MILLIGRAM(S): at 06:16

## 2024-11-30 RX ADMIN — Medication 100 MICROGRAM(S): at 05:36

## 2024-11-30 RX ADMIN — SUCRALFATE 1 GRAM(S): 1 TABLET ORAL at 18:23

## 2024-11-30 RX ADMIN — Medication 100 MILLIGRAM(S): at 11:42

## 2024-11-30 RX ADMIN — METOCLOPRAMIDE HYDROCHLORIDE 5 MILLIGRAM(S): 10 TABLET ORAL at 05:36

## 2024-11-30 RX ADMIN — ACETAMINOPHEN 500MG 650 MILLIGRAM(S): 500 TABLET, COATED ORAL at 14:39

## 2024-11-30 RX ADMIN — NYSTATIN 1 APPLICATION(S): 100000 POWDER TOPICAL at 05:36

## 2024-11-30 RX ADMIN — Medication 1: at 11:54

## 2024-11-30 RX ADMIN — METHADONE HYDROCHLORIDE 30 MILLIGRAM(S): 5 TABLET ORAL at 06:17

## 2024-11-30 NOTE — PROGRESS NOTE ADULT - SUBJECTIVE AND OBJECTIVE BOX
SUBJECTIVE/OVERNIGHT EVENTS  Today is hospital day 22d. This morning patient was seen and examined at bedside, resting comfortably in bed. Desatted over night when put on room air. Satting well at rest on 5L NC. Otherwise, no acute or major events overnight.      MEDICATIONS  STANDING MEDICATIONS  acetaminophen     Tablet .. 650 milliGRAM(s) Oral every 8 hours  albuterol/ipratropium for Nebulization 3 milliLiter(s) Nebulizer every 6 hours  ALPRAZolam 0.5 milliGRAM(s) Oral two times a day  buMETAnide 1 milliGRAM(s) Oral daily  chlorhexidine 2% Cloths 1 Application(s) Topical <User Schedule>  cyclobenzaprine 10 milliGRAM(s) Oral three times a day  dextrose 5%. 1000 milliLiter(s) IV Continuous <Continuous>  dextrose 5%. 1000 milliLiter(s) IV Continuous <Continuous>  dextrose 50% Injectable 25 Gram(s) IV Push once  dextrose 50% Injectable 12.5 Gram(s) IV Push once  dextrose 50% Injectable 25 Gram(s) IV Push once  fluticasone propionate/ salmeterol 250-50 MICROgram(s) Diskus 1 Dose(s) Inhalation two times a day  folic acid 1 milliGRAM(s) Oral daily  glucagon  Injectable 1 milliGRAM(s) IntraMuscular once  heparin   Injectable 5000 Unit(s) SubCutaneous every 8 hours  insulin lispro (ADMELOG) corrective regimen sliding scale   SubCutaneous three times a day before meals  lamoTRIgine 150 milliGRAM(s) Oral daily  levothyroxine 100 MICROGram(s) Oral daily  methadone    Tablet 30 milliGRAM(s) Oral every 8 hours  metoclopramide 5 milliGRAM(s) Oral four times a day  nystatin Powder 1 Application(s) Topical two times a day  pantoprazole    Tablet 40 milliGRAM(s) Oral two times a day  polyethylene glycol 3350 17 Gram(s) Oral two times a day  senna 2 Tablet(s) Oral at bedtime  sertraline 200 milliGRAM(s) Oral daily  sucralfate 1 Gram(s) Oral two times a day  thiamine 100 milliGRAM(s) Oral daily    PRN MEDICATIONS  albuterol    90 MICROgram(s) HFA Inhaler 2 Puff(s) Inhalation every 6 hours PRN  bisacodyl 5 milliGRAM(s) Oral every 12 hours PRN  dextrose Oral Gel 15 Gram(s) Oral once PRN  melatonin 3 milliGRAM(s) Oral at bedtime PRN  naloxone 1 mG/mL Injection for Intranasal Use 1 milliGRAM(s) IntraNasal every 5 minutes PRN  oxycodone    5 mG/acetaminophen 325 mG 1 Tablet(s) Oral every 6 hours PRN    VITALS  T(F): 97.8 (11-30-24 @ 05:11), Max: 97.8 (11-30-24 @ 05:11)  HR: 78 (11-30-24 @ 05:11) (74 - 80)  BP: 112/71 (11-30-24 @ 05:11) (105/73 - 112/71)  RR: 18 (11-30-24 @ 05:11) (18 - 18)  SpO2: 91% (11-30-24 @ 05:11) (81% - 95%)  POCT Blood Glucose.: 145 mg/dL (11-30-24 @ 07:48)  POCT Blood Glucose.: 106 mg/dL (11-29-24 @ 21:55)  POCT Blood Glucose.: 119 mg/dL (11-29-24 @ 17:06)  POCT Blood Glucose.: 160 mg/dL (11-29-24 @ 15:15)  POCT Blood Glucose.: 238 mg/dL (11-29-24 @ 11:27)    PHYSICAL EXAM  GENERAL  ( x ) NAD, lying in bed comfortably     (  ) obtunded     (  ) lethargic     (  ) somnolent    HEAD  (x  ) Atraumatic     (  ) hematoma     (  ) laceration (specify location:       )     NECK  (x  ) Supple     (  ) neck stiffness     (  ) nuchal rigidity     (  )  no JVD     (  ) JVD present ( -- cm)    HEART  Rate -->  (  x) normal rate    (  ) bradycardic    (  ) tachycardic  Rhythm -->  (  ) regular    (  ) regularly irregular    (  ) irregularly irregular  Murmurs -->  (  ) normal s1/s2    (  ) systolic murmur    (  ) diastolic murmur    (  ) continuous murmur     (  ) S3 present    (  ) S4 present    LUNGS  ( x )Unlabored respirations     (  ) tachypnea  (  ) B/L air entry     (  ) decreased breath sounds in:  (location     )    (  ) no adventitious sound     (  ) crackles     (  ) wheezing      (  ) rhonchi      (specify location:       )  (  ) chest wall tenderness (specify location:       )    ABDOMEN  ( x ) Soft     (  ) tense   |   (  ) nondistended     (  ) distended   |   (  ) +BS     (  ) hypoactive bowel sounds     (  ) hyperactive bowel sounds  (  ) nontender     (  ) RUQ tenderness     (  ) RLQ tenderness     (  ) LLQ tenderness     (  ) epigastric tenderness     (  ) diffuse tenderness  (  ) Splenomegaly      (  ) Hepatomegaly      (  ) Jaundice     (  ) ecchymosis     EXTREMITIES  (  ) Normal     (  ) Rash     (  ) ecchymosis     (  ) varicose veins      (x  ) pitting edema     (  ) non-pitting edema   (  ) ulceration     (  ) gangrene:     (location:     )    NERVOUS SYSTEM  (x  ) A&Ox3     (  ) confused     (  ) lethargic  CN II-XII:     (  ) Intact     (  ) focal deficits  (Specify:     )   Upper extremities:     (  ) strength X/5     (  ) focal deficit (specify:    )  Lower extremities:     (  ) strength  X/5    (  ) focal deficit (specify:    )    SKIN  (  ) No rashes or lesions     (  ) maculopapular rash     (  ) pustules     (  ) vesicles     (  ) ulcer     (  ) ecchymosis     (specify location:  umbilical fold rash  )    (  ) Indwelling Hendrix Catheter   Date insterted:    Reason (  ) Critical illness     (  ) urinary retention    (  ) Accurate Ins/Outs Monitoring     (  ) CMO patient    (  ) Central Line  Date inserted:  Location: (  ) Right IJ   (  ) Left IJ   (  ) Right Fem   (  ) Left Fem    (  ) SPC  (  ) pigtail  (  ) PEG tube  (  ) colostomy  (  ) jejunostomy  (  ) U-Dall    LABS             11.4   7.34  )-----------( 171      ( 11-30-24 @ 06:44 )             36.7     141  |  102  |  14  -------------------------<  131   11-30-24 @ 06:44  3.4  |  26  |  0.9    Ca      8.2     11-30-24 @ 06:44  Mg     2.0     11-30-24 @ 06:44          Urinalysis Basic - ( 30 Nov 2024 06:44 )    Color: x / Appearance: x / SG: x / pH: x  Gluc: 131 mg/dL / Ketone: x  / Bili: x / Urobili: x   Blood: x / Protein: x / Nitrite: x   Leuk Esterase: x / RBC: x / WBC x   Sq Epi: x / Non Sq Epi: x / Bacteria: x

## 2024-11-30 NOTE — PROGRESS NOTE ADULT - ASSESSMENT
64-year-old female, former smoker with past medical history of asthma, hypothyroidism, anxiety, depression, MVP, Jaclyn-en-Y gastric bypass, appendectomy, cholecystectomy and Chronic back pain on methadone, presents to the ED following a syncopal episode last night. She also has complaints of worsening abdominal pain for 5 days.     Plan  # hypoglycemia   POCT Blood Glucose.: 144 mg/dL (11-28-24 @ 21:43)  POCT Blood Glucose.: 92 mg/dL (11-28-24 @ 17:26)  POCT Blood Glucose.: 114 mg/dL (11-28-24 @ 11:25)  POCT Blood Glucose.: 175 mg/dL (28 Nov 2024 08:45)  POCT Blood Glucose.: 129 mg/dL (28 Nov 2024 08:04)  POCT Blood Glucose.: 31 mg/dL (28 Nov 2024 07:46)  POCT Blood Glucose.: 94 mg/dL (27 Nov 2024 21:19)  POCT Blood Glucose.: 80 mg/dL (27 Nov 2024 16:50)  Per endocrine:   -Symptoms of lightheadedness could be consistent with postprandial hyperinsulinemic hypoglycemia  - avoid large meals with simple sugars, start with small meals with complex carbs/fiber and proteins throughout the day  -If point-of-care glucose is noted to be below 55 mg/dL, and patient is symptomatic recommend to obtain serum glucose C-peptide insulin proinsulin and beta hydroxybutyrate before treating  -Recommend nutrition consult and support    #DM  -A1c noted to be 7 this visit  Per endocrine  -agree low insulin sliding scale for now, if sugars go above 150 mg/dl persistently  -on DC can monitor off medication and just dietary interventions for now    # Abdominal pain with   #h/o Jaclyn en Y bypass   - unclear etiology.  >> resolved   - CT abdomen - Hepatomegaly   - EGD/ Colonoscopy (May '23) - Non erosive gastritis  - c/w pantoprazole   - EGD- nonerosive gastritis in stomach  - CTa abdomen pending read  - pain control per pain management. stopped  po morphine.   - bariatric surgery recs appreciated.     # Acute Hypoxic Respiratory Failure likely due to   # Asthma/ COPD exacerbation.  vs ACOS   #h/o MVP (follows Dr. Negrete)  #HTN  - CTA chest - no PE  - TTE --> echo 60-65% EF  - currently on O2 2L NC > titrate off as tolerated.  - Necessitating home oxygen, 94% on 2L but destats to 89 on RA  - Taper down on  prednisone   - c/w albuterol prn   - c/w advair and symbicort  - TTE with bubbles 11/19: suspected pulmonary shunt.      per pulm f/u OP for PFT   - c/w bumetanide 1mg oral.   - Single dose of bumetanide 1mg IV     #Syncope likely vasovagal vs dehydration  - ECG with prolonged qtc, no obvious ischemia, trops x2 negative  - no events on tele  - tele discontinued, Neg Orthostatics .     #Rash  -RLQ panus fold   -c/w nystatin powder BID     #Hypothyroidism,   - home meds - 88mcg thyroxine   - TSH 9.88  - increased to 100 mcg synthroid     #anxiety, depression,  - c/w lamotrigine and zoloft      #Chronic back pain   - on methadone 30mg   -  po morphine 6mg q4h PRN as per pain management    #Alcohol use disorder  # Suspected Thiamine deficiency   - 2-3 drinks per day  - c/w b12 and folate   - started thiamine     # Obesity >    #MISC  VTE ppx - heparin SC  GI ppx - pantoprazole (home meds)  Activity as tolerated  regular diet per pt requests  Full Code    Pending: Home O2 / RVP

## 2024-11-30 NOTE — PROGRESS NOTE ADULT - ATTENDING COMMENTS
64-year-old female, former smoker with past medical history of asthma, hypothyroidism, anxiety, depression, MVP, Jaclyn-en-Y gastric bypass, appendectomy, cholecystectomy and Chronic back pain on methadone, presents to the ED following a syncopal episode last night. She also has complaints of worsening abdominal pain.     # hypoglycemia , resolved,   CAPILLARY BLOOD GLUCOSE  POCT Blood Glucose.: 160 mg/dL (29 Nov 2024 15:15)  POCT Blood Glucose.: 238 mg/dL (29 Nov 2024 11:27)  POCT Blood Glucose.: 105 mg/dL (29 Nov 2024 07:53)  POCT Blood Glucose.: 144 mg/dL (28 Nov 2024 21:43)  POCT Blood Glucose.: 92 mg/dL (28 Nov 2024 17:26)    endo consult appreciated   this am pt had an episode of hypoglycemia 31, was given juice and repeat fs has improved,   pt is not on any po or sq diabetic meds except for sliding scale insulin   good po intake     # Abdominal pain intractable, requiring narcotics ( extra , added to her methadone) , pt has h/o Jaclyn en Y bypass   - unclear etiology.  >> resolved   - CT abdomen - Hepatomegaly   - EGD/ Colonoscopy (May '23) - Non erosive gastritis  - c/w pantoprazole   - EGD- nonerosive gastritis in stomach  - CTa abdomen pending read  - pain control per pain management.   - bariatric surgery recs appreciated.   fu pain management , dc iv morphine   may use po prn until reevaluation by pain management     # Acute Hypoxic Respiratory Failure likely due to Asthma/ COPD exacerbation.  vs ACOS , and diastolic chf   cont nebs   cardio consult   cont bumex     # groing and lower abd rash, candida   cont nystatin     #HTN  cont meds     #Syncope likely vasovagal vs dehydration vs narcotics related   - ECG with prolonged qtc, no obvious ischemia, trops x2 negative  - no events on tele  - tele discontinued, Neg Orthostatics .     #Hypothyroidism,   - home meds - 88mcg thyroxine   - TSH 9.88  - increased to 100 mcg synthroid     #anxiety, depression,  - c/w lamotrigine and zoloft      #Chronic back pain   - on methadone 30mg   -  po morphine 6mg q4h PRN    #Alcohol use disorder  # Suspected Thiamine deficiency   - 2-3 drinks per day  - c/w b12 and folate   - started thiamine     # Obesity   Height (cm): 167.6 (11-13-24 @ 10:30)  Weight (kg): 100.7 (11-13-24 @ 10:30)  BMI (kg/m2): 35.8 (11-13-24 @ 10:30)  BSA (m2): 2.09 (11-13-24 @ 10:30)    #Progress Note Handoff    Pending :  O2 for home to be delivered , pain management and endo eval , monitor fingersticks , cardio consult   Family discussion: edis pt   Disposition: home with home care   code status: full code

## 2024-12-01 LAB
ANION GAP SERPL CALC-SCNC: 13 MMOL/L — SIGNIFICANT CHANGE UP (ref 7–14)
BASOPHILS # BLD AUTO: 0.08 K/UL — SIGNIFICANT CHANGE UP (ref 0–0.2)
BASOPHILS NFR BLD AUTO: 1.3 % — HIGH (ref 0–1)
BUN SERPL-MCNC: 14 MG/DL — SIGNIFICANT CHANGE UP (ref 10–20)
CALCIUM SERPL-MCNC: 8.6 MG/DL — SIGNIFICANT CHANGE UP (ref 8.4–10.5)
CHLORIDE SERPL-SCNC: 102 MMOL/L — SIGNIFICANT CHANGE UP (ref 98–110)
CO2 SERPL-SCNC: 26 MMOL/L — SIGNIFICANT CHANGE UP (ref 17–32)
CREAT SERPL-MCNC: 1 MG/DL — SIGNIFICANT CHANGE UP (ref 0.7–1.5)
EGFR: 64 ML/MIN/1.73M2 — SIGNIFICANT CHANGE UP
EOSINOPHIL # BLD AUTO: 0.45 K/UL — SIGNIFICANT CHANGE UP (ref 0–0.7)
EOSINOPHIL NFR BLD AUTO: 7.1 % — SIGNIFICANT CHANGE UP (ref 0–8)
GLUCOSE BLDC GLUCOMTR-MCNC: 102 MG/DL — HIGH (ref 70–99)
GLUCOSE BLDC GLUCOMTR-MCNC: 108 MG/DL — HIGH (ref 70–99)
GLUCOSE BLDC GLUCOMTR-MCNC: 159 MG/DL — HIGH (ref 70–99)
GLUCOSE BLDC GLUCOMTR-MCNC: 84 MG/DL — SIGNIFICANT CHANGE UP (ref 70–99)
GLUCOSE BLDC GLUCOMTR-MCNC: 97 MG/DL — SIGNIFICANT CHANGE UP (ref 70–99)
GLUCOSE SERPL-MCNC: 135 MG/DL — HIGH (ref 70–99)
HCT VFR BLD CALC: 39.3 % — SIGNIFICANT CHANGE UP (ref 37–47)
HGB BLD-MCNC: 11.7 G/DL — LOW (ref 12–16)
IMM GRANULOCYTES NFR BLD AUTO: 4.3 % — HIGH (ref 0.1–0.3)
LYMPHOCYTES # BLD AUTO: 1.15 K/UL — LOW (ref 1.2–3.4)
LYMPHOCYTES # BLD AUTO: 18.2 % — LOW (ref 20.5–51.1)
MAGNESIUM SERPL-MCNC: 1.9 MG/DL — SIGNIFICANT CHANGE UP (ref 1.8–2.4)
MCHC RBC-ENTMCNC: 29.6 PG — SIGNIFICANT CHANGE UP (ref 27–31)
MCHC RBC-ENTMCNC: 29.8 G/DL — LOW (ref 32–37)
MCV RBC AUTO: 99.5 FL — HIGH (ref 81–99)
MONOCYTES # BLD AUTO: 0.48 K/UL — SIGNIFICANT CHANGE UP (ref 0.1–0.6)
MONOCYTES NFR BLD AUTO: 7.6 % — SIGNIFICANT CHANGE UP (ref 1.7–9.3)
NEUTROPHILS # BLD AUTO: 3.88 K/UL — SIGNIFICANT CHANGE UP (ref 1.4–6.5)
NEUTROPHILS NFR BLD AUTO: 61.5 % — SIGNIFICANT CHANGE UP (ref 42.2–75.2)
NRBC # BLD: 0 /100 WBCS — SIGNIFICANT CHANGE UP (ref 0–0)
PLATELET # BLD AUTO: 172 K/UL — SIGNIFICANT CHANGE UP (ref 130–400)
PMV BLD: 10.5 FL — HIGH (ref 7.4–10.4)
POTASSIUM SERPL-MCNC: 4.1 MMOL/L — SIGNIFICANT CHANGE UP (ref 3.5–5)
POTASSIUM SERPL-SCNC: 4.1 MMOL/L — SIGNIFICANT CHANGE UP (ref 3.5–5)
RBC # BLD: 3.95 M/UL — LOW (ref 4.2–5.4)
RBC # FLD: 13.7 % — SIGNIFICANT CHANGE UP (ref 11.5–14.5)
SODIUM SERPL-SCNC: 141 MMOL/L — SIGNIFICANT CHANGE UP (ref 135–146)
WBC # BLD: 6.31 K/UL — SIGNIFICANT CHANGE UP (ref 4.8–10.8)
WBC # FLD AUTO: 6.31 K/UL — SIGNIFICANT CHANGE UP (ref 4.8–10.8)

## 2024-12-01 PROCEDURE — 99232 SBSQ HOSP IP/OBS MODERATE 35: CPT

## 2024-12-01 RX ADMIN — METOCLOPRAMIDE HYDROCHLORIDE 5 MILLIGRAM(S): 10 TABLET ORAL at 11:51

## 2024-12-01 RX ADMIN — BUMETANIDE 1 MILLIGRAM(S): 1 TABLET ORAL at 05:19

## 2024-12-01 RX ADMIN — SUCRALFATE 1 GRAM(S): 1 TABLET ORAL at 17:45

## 2024-12-01 RX ADMIN — SUCRALFATE 1 GRAM(S): 1 TABLET ORAL at 05:18

## 2024-12-01 RX ADMIN — Medication 0.5 MILLIGRAM(S): at 05:19

## 2024-12-01 RX ADMIN — CHLORHEXIDINE GLUCONATE 1 APPLICATION(S): 1.2 RINSE ORAL at 05:20

## 2024-12-01 RX ADMIN — IPRATROPIUM BROMIDE AND ALBUTEROL SULFATE 3 MILLILITER(S): 2.5; .5 SOLUTION RESPIRATORY (INHALATION) at 19:38

## 2024-12-01 RX ADMIN — ACETAMINOPHEN 500MG 650 MILLIGRAM(S): 500 TABLET, COATED ORAL at 13:20

## 2024-12-01 RX ADMIN — METHADONE HYDROCHLORIDE 30 MILLIGRAM(S): 5 TABLET ORAL at 13:21

## 2024-12-01 RX ADMIN — LAMOTRIGINE 150 MILLIGRAM(S): 50 TABLET, EXTENDED RELEASE ORAL at 11:50

## 2024-12-01 RX ADMIN — Medication 100 MICROGRAM(S): at 05:18

## 2024-12-01 RX ADMIN — NYSTATIN 1 APPLICATION(S): 100000 POWDER TOPICAL at 05:19

## 2024-12-01 RX ADMIN — ACETAMINOPHEN 500MG 650 MILLIGRAM(S): 500 TABLET, COATED ORAL at 05:49

## 2024-12-01 RX ADMIN — Medication 10 MILLIGRAM(S): at 21:24

## 2024-12-01 RX ADMIN — METOCLOPRAMIDE HYDROCHLORIDE 5 MILLIGRAM(S): 10 TABLET ORAL at 05:18

## 2024-12-01 RX ADMIN — NYSTATIN 1 APPLICATION(S): 100000 POWDER TOPICAL at 17:51

## 2024-12-01 RX ADMIN — METOCLOPRAMIDE HYDROCHLORIDE 5 MILLIGRAM(S): 10 TABLET ORAL at 23:15

## 2024-12-01 RX ADMIN — PANTOPRAZOLE SODIUM 40 MILLIGRAM(S): 40 TABLET, DELAYED RELEASE ORAL at 17:45

## 2024-12-01 RX ADMIN — ACETAMINOPHEN 500MG 650 MILLIGRAM(S): 500 TABLET, COATED ORAL at 05:19

## 2024-12-01 RX ADMIN — ACETAMINOPHEN 500MG 650 MILLIGRAM(S): 500 TABLET, COATED ORAL at 21:54

## 2024-12-01 RX ADMIN — Medication 10 MILLIGRAM(S): at 13:20

## 2024-12-01 RX ADMIN — Medication 1 MILLIGRAM(S): at 11:50

## 2024-12-01 RX ADMIN — POLYETHYLENE GLYCOL 3350 17 GRAM(S): 17 POWDER, FOR SOLUTION ORAL at 05:18

## 2024-12-01 RX ADMIN — POLYETHYLENE GLYCOL 3350 17 GRAM(S): 17 POWDER, FOR SOLUTION ORAL at 17:46

## 2024-12-01 RX ADMIN — PANTOPRAZOLE SODIUM 40 MILLIGRAM(S): 40 TABLET, DELAYED RELEASE ORAL at 05:19

## 2024-12-01 RX ADMIN — Medication 100 MILLIGRAM(S): at 11:50

## 2024-12-01 RX ADMIN — Medication 10 MILLIGRAM(S): at 05:18

## 2024-12-01 RX ADMIN — Medication 0.5 MILLIGRAM(S): at 17:45

## 2024-12-01 RX ADMIN — METHADONE HYDROCHLORIDE 30 MILLIGRAM(S): 5 TABLET ORAL at 05:18

## 2024-12-01 RX ADMIN — FLUTICASONE PROPIONATE AND SALMETEROL XINAFOATE 1 DOSE(S): 45; 21 AEROSOL, METERED RESPIRATORY (INHALATION) at 08:36

## 2024-12-01 RX ADMIN — SERTRALINE HYDROCHLORIDE 200 MILLIGRAM(S): 100 TABLET, FILM COATED ORAL at 11:51

## 2024-12-01 RX ADMIN — METOCLOPRAMIDE HYDROCHLORIDE 5 MILLIGRAM(S): 10 TABLET ORAL at 17:45

## 2024-12-01 RX ADMIN — METHADONE HYDROCHLORIDE 30 MILLIGRAM(S): 5 TABLET ORAL at 21:24

## 2024-12-01 RX ADMIN — Medication 2 TABLET(S): at 21:24

## 2024-12-01 RX ADMIN — ACETAMINOPHEN 500MG 650 MILLIGRAM(S): 500 TABLET, COATED ORAL at 21:24

## 2024-12-01 NOTE — CHART NOTE - NSCHARTNOTEFT_GEN_A_CORE
RD Consult/Brief Nutrition Note      An RD Consult for diabetes nutrition education was ordered (11/29). A diabetes nutrition education was provided to the patient via an explanation and a diabetic nutrition handout derived from the nutrition care manual. I recommended to the patient that after they are discharged, schedule to see the outpatient certified diabetes specialist for comprehensive diabetes nutritional counseling.

## 2024-12-01 NOTE — PROGRESS NOTE ADULT - SUBJECTIVE AND OBJECTIVE BOX
pt seen and examined.     My notes supersede resident's notes in case of discrepancy       ROS: no cp, no sob, no n/v, no fever    Vital Signs Last 24 Hrs  T(C): 36.6 (01 Dec 2024 05:03), Max: 36.8 (30 Nov 2024 11:55)  T(F): 97.8 (01 Dec 2024 05:03), Max: 98.2 (30 Nov 2024 11:55)  HR: 77 (01 Dec 2024 05:03) (77 - 89)  BP: 120/71 (01 Dec 2024 05:03) (110/74 - 137/84)  BP(mean): --  RR: 18 (01 Dec 2024 05:03) (18 - 18)  SpO2: 95% (01 Dec 2024 05:03) (93% - 96%)    physical exam  constitutional NAD, AAOX3, Respiratory  lungs CTA, CVS heart RRR, GI: abdomen Soft NT, ND, BS+, skin: intact  neuro exam no focal deficit     MEDICATIONS  (STANDING):  acetaminophen     Tablet .. 650 milliGRAM(s) Oral every 8 hours  albuterol/ipratropium for Nebulization 3 milliLiter(s) Nebulizer every 6 hours  ALPRAZolam 0.5 milliGRAM(s) Oral two times a day  buMETAnide 1 milliGRAM(s) Oral daily  chlorhexidine 2% Cloths 1 Application(s) Topical <User Schedule>  cyclobenzaprine 10 milliGRAM(s) Oral three times a day  dextrose 5%. 1000 milliLiter(s) (100 mL/Hr) IV Continuous <Continuous>  dextrose 5%. 1000 milliLiter(s) (50 mL/Hr) IV Continuous <Continuous>  dextrose 50% Injectable 25 Gram(s) IV Push once  dextrose 50% Injectable 12.5 Gram(s) IV Push once  dextrose 50% Injectable 25 Gram(s) IV Push once  fluticasone propionate/ salmeterol 250-50 MICROgram(s) Diskus 1 Dose(s) Inhalation two times a day  folic acid 1 milliGRAM(s) Oral daily  glucagon  Injectable 1 milliGRAM(s) IntraMuscular once  heparin   Injectable 5000 Unit(s) SubCutaneous every 8 hours  insulin lispro (ADMELOG) corrective regimen sliding scale   SubCutaneous three times a day before meals  lamoTRIgine 150 milliGRAM(s) Oral daily  levothyroxine 100 MICROGram(s) Oral daily  methadone    Tablet 30 milliGRAM(s) Oral every 8 hours  metoclopramide 5 milliGRAM(s) Oral four times a day  nystatin Powder 1 Application(s) Topical two times a day  pantoprazole    Tablet 40 milliGRAM(s) Oral two times a day  polyethylene glycol 3350 17 Gram(s) Oral two times a day  senna 2 Tablet(s) Oral at bedtime  sertraline 200 milliGRAM(s) Oral daily  sucralfate 1 Gram(s) Oral two times a day  thiamine 100 milliGRAM(s) Oral daily    MEDICATIONS  (PRN):  albuterol    90 MICROgram(s) HFA Inhaler 2 Puff(s) Inhalation every 6 hours PRN Shortness of Breath and/or Wheezing  bisacodyl 5 milliGRAM(s) Oral every 12 hours PRN Constipation  dextrose Oral Gel 15 Gram(s) Oral once PRN Blood Glucose LESS THAN 70 milliGRAM(s)/deciliter  melatonin 3 milliGRAM(s) Oral at bedtime PRN Insomnia  naloxone 1 mG/mL Injection for Intranasal Use 1 milliGRAM(s) IntraNasal every 5 minutes PRN signs of narcotic overdose  oxycodone    5 mG/acetaminophen 325 mG 1 Tablet(s) Oral every 6 hours PRN Severe Pain (7 - 10)                          11.7   6.31  )-----------( 172      ( 01 Dec 2024 05:48 )             39.3     12-01    141  |  102  |  14  ----------------------------<  135[H]  4.1   |  26  |  1.0    Ca    8.6      01 Dec 2024 05:48  Mg     1.9     12-01    a/p  64-year-old female, former smoker with past medical history of asthma, hypothyroidism, anxiety, depression, MVP, Jaclyn-en-Y gastric bypass, appendectomy, cholecystectomy and Chronic back pain on methadone, presents to the ED following a syncopal episode last night. She also has complaints of worsening abdominal pain.     # hypoglycemia , resolved,   CAPILLARY BLOOD GLUCOSE  POCT Blood Glucose.: 97 mg/dL (01 Dec 2024 08:06)  POCT Blood Glucose.: 102 mg/dL (01 Dec 2024 06:58)  POCT Blood Glucose.: 95 mg/dL (30 Nov 2024 17:30)  POCT Blood Glucose.: 184 mg/dL (30 Nov 2024 11:54)    endo consult appreciated   this am pt had an episode of hypoglycemia 31, was given juice and repeat fs has improved,   pt is not on any po or sq diabetic meds except for sliding scale insulin   good po intake       # Abdominal pain intractable, requiring narcotics ( extra , added to her methadone) , pt has h/o Jaclyn en Y bypass   - unclear etiology.  >> resolved   - CT abdomen - Hepatomegaly   - EGD/ Colonoscopy (May '23) - Non erosive gastritis  - c/w pantoprazole   - EGD- nonerosive gastritis in stomach  - CTa abdomen pending read  - pain control per pain management.   - bariatric surgery recs appreciated.   fu pain management , dc iv morphine   may use po prn until reevaluation by pain management     # Acute Hypoxic Respiratory Failure likely due to Asthma/ COPD exacerbation.  vs ACOS , and diastolic chf   cont nebs   cardio consult   cont bumex     # groing and lower abd rash, candida   cont nystatin     #HTN  cont meds     #Syncope likely vasovagal vs dehydration vs narcotics related   - ECG with prolonged qtc, no obvious ischemia, trops x2 negative  - no events on tele  - tele discontinued, Neg Orthostatics .     #Hypothyroidism,   - home meds - 88mcg thyroxine   - TSH 9.88  - increased to 100 mcg synthroid     #anxiety, depression,  - c/w lamotrigine and zoloft      #Chronic back pain   - on methadone 30mg   -  po morphine 6mg q4h PRN    #Alcohol use disorder  # Suspected Thiamine deficiency   - 2-3 drinks per day  - c/w b12 and folate   - started thiamine     # Obesity   Height (cm): 167.6 (11-13-24 @ 10:30)  Weight (kg): 100.7 (11-13-24 @ 10:30)  BMI (kg/m2): 35.8 (11-13-24 @ 10:30)  BSA (m2): 2.09 (11-13-24 @ 10:30)    #Progress Note Handoff    Pending :  O2 for home to be delivered , pain management and endo eval , monitor fingersticks , cardio consult   Family discussion: dw pt   Disposition: home with home care   code status: full code .

## 2024-12-02 LAB
ANION GAP SERPL CALC-SCNC: 13 MMOL/L — SIGNIFICANT CHANGE UP (ref 7–14)
B-OH-BUTYR SERPL-SCNC: <0.2 MMOL/L — SIGNIFICANT CHANGE UP
BASOPHILS # BLD AUTO: 0.07 K/UL — SIGNIFICANT CHANGE UP (ref 0–0.2)
BASOPHILS NFR BLD AUTO: 0.9 % — SIGNIFICANT CHANGE UP (ref 0–1)
BUN SERPL-MCNC: 17 MG/DL — SIGNIFICANT CHANGE UP (ref 10–20)
C PEPTIDE SERPL-MCNC: 6.8 NG/ML — HIGH (ref 1.1–4.4)
CALCIUM SERPL-MCNC: 8.9 MG/DL — SIGNIFICANT CHANGE UP (ref 8.4–10.5)
CHLORIDE SERPL-SCNC: 97 MMOL/L — LOW (ref 98–110)
CO2 SERPL-SCNC: 25 MMOL/L — SIGNIFICANT CHANGE UP (ref 17–32)
CREAT SERPL-MCNC: 1 MG/DL — SIGNIFICANT CHANGE UP (ref 0.7–1.5)
EGFR: 64 ML/MIN/1.73M2 — SIGNIFICANT CHANGE UP
EOSINOPHIL # BLD AUTO: 0.5 K/UL — SIGNIFICANT CHANGE UP (ref 0–0.7)
EOSINOPHIL NFR BLD AUTO: 6.1 % — SIGNIFICANT CHANGE UP (ref 0–8)
GLUCOSE BLDC GLUCOMTR-MCNC: 102 MG/DL — HIGH (ref 70–99)
GLUCOSE BLDC GLUCOMTR-MCNC: 139 MG/DL — HIGH (ref 70–99)
GLUCOSE BLDC GLUCOMTR-MCNC: 89 MG/DL — SIGNIFICANT CHANGE UP (ref 70–99)
GLUCOSE BLDC GLUCOMTR-MCNC: 91 MG/DL — SIGNIFICANT CHANGE UP (ref 70–99)
GLUCOSE SERPL-MCNC: 92 MG/DL — SIGNIFICANT CHANGE UP (ref 70–99)
HCT VFR BLD CALC: 38.3 % — SIGNIFICANT CHANGE UP (ref 37–47)
HGB BLD-MCNC: 11.7 G/DL — LOW (ref 12–16)
IMM GRANULOCYTES NFR BLD AUTO: 3.7 % — HIGH (ref 0.1–0.3)
LYMPHOCYTES # BLD AUTO: 1.33 K/UL — SIGNIFICANT CHANGE UP (ref 1.2–3.4)
LYMPHOCYTES # BLD AUTO: 16.3 % — LOW (ref 20.5–51.1)
MAGNESIUM SERPL-MCNC: 2 MG/DL — SIGNIFICANT CHANGE UP (ref 1.8–2.4)
MCHC RBC-ENTMCNC: 29.8 PG — SIGNIFICANT CHANGE UP (ref 27–31)
MCHC RBC-ENTMCNC: 30.5 G/DL — LOW (ref 32–37)
MCV RBC AUTO: 97.5 FL — SIGNIFICANT CHANGE UP (ref 81–99)
MONOCYTES # BLD AUTO: 0.68 K/UL — HIGH (ref 0.1–0.6)
MONOCYTES NFR BLD AUTO: 8.3 % — SIGNIFICANT CHANGE UP (ref 1.7–9.3)
NEUTROPHILS # BLD AUTO: 5.27 K/UL — SIGNIFICANT CHANGE UP (ref 1.4–6.5)
NEUTROPHILS NFR BLD AUTO: 64.7 % — SIGNIFICANT CHANGE UP (ref 42.2–75.2)
NRBC # BLD: 0 /100 WBCS — SIGNIFICANT CHANGE UP (ref 0–0)
PLATELET # BLD AUTO: 192 K/UL — SIGNIFICANT CHANGE UP (ref 130–400)
PMV BLD: 10.5 FL — HIGH (ref 7.4–10.4)
POTASSIUM SERPL-MCNC: 3.8 MMOL/L — SIGNIFICANT CHANGE UP (ref 3.5–5)
POTASSIUM SERPL-SCNC: 3.8 MMOL/L — SIGNIFICANT CHANGE UP (ref 3.5–5)
RBC # BLD: 3.93 M/UL — LOW (ref 4.2–5.4)
RBC # FLD: 13.6 % — SIGNIFICANT CHANGE UP (ref 11.5–14.5)
SODIUM SERPL-SCNC: 135 MMOL/L — SIGNIFICANT CHANGE UP (ref 135–146)
WBC # BLD: 8.15 K/UL — SIGNIFICANT CHANGE UP (ref 4.8–10.8)
WBC # FLD AUTO: 8.15 K/UL — SIGNIFICANT CHANGE UP (ref 4.8–10.8)

## 2024-12-02 PROCEDURE — 99232 SBSQ HOSP IP/OBS MODERATE 35: CPT

## 2024-12-02 RX ORDER — ALPRAZOLAM 0.5 MG
0.25 TABLET ORAL ONCE
Refills: 0 | Status: DISCONTINUED | OUTPATIENT
Start: 2024-12-02 | End: 2024-12-02

## 2024-12-02 RX ORDER — METHADONE HYDROCHLORIDE 5 MG/1
30 TABLET ORAL EVERY 8 HOURS
Refills: 0 | Status: DISCONTINUED | OUTPATIENT
Start: 2024-12-02 | End: 2024-12-06

## 2024-12-02 RX ORDER — BUMETANIDE 1 MG/1
1 TABLET ORAL ONCE
Refills: 0 | Status: COMPLETED | OUTPATIENT
Start: 2024-12-02 | End: 2024-12-02

## 2024-12-02 RX ORDER — CLOTRIMAZOLE 10 MG/G
1 CREAM TOPICAL
Refills: 0 | Status: DISCONTINUED | OUTPATIENT
Start: 2024-12-02 | End: 2024-12-06

## 2024-12-02 RX ORDER — ALPRAZOLAM 0.5 MG
0.5 TABLET ORAL
Refills: 0 | Status: DISCONTINUED | OUTPATIENT
Start: 2024-12-02 | End: 2024-12-06

## 2024-12-02 RX ADMIN — ACETAMINOPHEN 500MG 650 MILLIGRAM(S): 500 TABLET, COATED ORAL at 21:43

## 2024-12-02 RX ADMIN — PANTOPRAZOLE SODIUM 40 MILLIGRAM(S): 40 TABLET, DELAYED RELEASE ORAL at 17:15

## 2024-12-02 RX ADMIN — METHADONE HYDROCHLORIDE 30 MILLIGRAM(S): 5 TABLET ORAL at 22:55

## 2024-12-02 RX ADMIN — ACETAMINOPHEN 500MG 650 MILLIGRAM(S): 500 TABLET, COATED ORAL at 14:05

## 2024-12-02 RX ADMIN — BUMETANIDE 1 MILLIGRAM(S): 1 TABLET ORAL at 05:19

## 2024-12-02 RX ADMIN — SUCRALFATE 1 GRAM(S): 1 TABLET ORAL at 05:19

## 2024-12-02 RX ADMIN — METOCLOPRAMIDE HYDROCHLORIDE 5 MILLIGRAM(S): 10 TABLET ORAL at 05:19

## 2024-12-02 RX ADMIN — METOCLOPRAMIDE HYDROCHLORIDE 5 MILLIGRAM(S): 10 TABLET ORAL at 23:08

## 2024-12-02 RX ADMIN — NYSTATIN 1 APPLICATION(S): 100000 POWDER TOPICAL at 17:17

## 2024-12-02 RX ADMIN — SERTRALINE HYDROCHLORIDE 200 MILLIGRAM(S): 100 TABLET, FILM COATED ORAL at 11:35

## 2024-12-02 RX ADMIN — FLUTICASONE PROPIONATE AND SALMETEROL XINAFOATE 1 DOSE(S): 45; 21 AEROSOL, METERED RESPIRATORY (INHALATION) at 21:50

## 2024-12-02 RX ADMIN — LAMOTRIGINE 150 MILLIGRAM(S): 50 TABLET, EXTENDED RELEASE ORAL at 11:34

## 2024-12-02 RX ADMIN — Medication 100 MICROGRAM(S): at 05:19

## 2024-12-02 RX ADMIN — ACETAMINOPHEN 500MG 650 MILLIGRAM(S): 500 TABLET, COATED ORAL at 05:20

## 2024-12-02 RX ADMIN — Medication 10 MILLIGRAM(S): at 05:20

## 2024-12-02 RX ADMIN — Medication 100 MILLIGRAM(S): at 11:35

## 2024-12-02 RX ADMIN — ACETAMINOPHEN 500MG 650 MILLIGRAM(S): 500 TABLET, COATED ORAL at 05:50

## 2024-12-02 RX ADMIN — Medication 10 MILLIGRAM(S): at 13:06

## 2024-12-02 RX ADMIN — PANTOPRAZOLE SODIUM 40 MILLIGRAM(S): 40 TABLET, DELAYED RELEASE ORAL at 05:20

## 2024-12-02 RX ADMIN — Medication 2 TABLET(S): at 21:44

## 2024-12-02 RX ADMIN — CHLORHEXIDINE GLUCONATE 1 APPLICATION(S): 1.2 RINSE ORAL at 05:21

## 2024-12-02 RX ADMIN — IPRATROPIUM BROMIDE AND ALBUTEROL SULFATE 3 MILLILITER(S): 2.5; .5 SOLUTION RESPIRATORY (INHALATION) at 13:21

## 2024-12-02 RX ADMIN — POLYETHYLENE GLYCOL 3350 17 GRAM(S): 17 POWDER, FOR SOLUTION ORAL at 05:20

## 2024-12-02 RX ADMIN — ACETAMINOPHEN 500MG 650 MILLIGRAM(S): 500 TABLET, COATED ORAL at 13:05

## 2024-12-02 RX ADMIN — CLOTRIMAZOLE 1 APPLICATION(S): 10 CREAM TOPICAL at 17:15

## 2024-12-02 RX ADMIN — Medication 0.25 MILLIGRAM(S): at 01:58

## 2024-12-02 RX ADMIN — IPRATROPIUM BROMIDE AND ALBUTEROL SULFATE 3 MILLILITER(S): 2.5; .5 SOLUTION RESPIRATORY (INHALATION) at 08:46

## 2024-12-02 RX ADMIN — METOCLOPRAMIDE HYDROCHLORIDE 5 MILLIGRAM(S): 10 TABLET ORAL at 17:16

## 2024-12-02 RX ADMIN — BUMETANIDE 1 MILLIGRAM(S): 1 TABLET ORAL at 10:45

## 2024-12-02 RX ADMIN — IPRATROPIUM BROMIDE AND ALBUTEROL SULFATE 3 MILLILITER(S): 2.5; .5 SOLUTION RESPIRATORY (INHALATION) at 20:52

## 2024-12-02 RX ADMIN — Medication 0.5 MILLIGRAM(S): at 22:55

## 2024-12-02 RX ADMIN — METOCLOPRAMIDE HYDROCHLORIDE 5 MILLIGRAM(S): 10 TABLET ORAL at 11:35

## 2024-12-02 RX ADMIN — Medication 1 MILLIGRAM(S): at 11:35

## 2024-12-02 RX ADMIN — SUCRALFATE 1 GRAM(S): 1 TABLET ORAL at 17:16

## 2024-12-02 RX ADMIN — Medication 10 MILLIGRAM(S): at 21:43

## 2024-12-02 RX ADMIN — NYSTATIN 1 APPLICATION(S): 100000 POWDER TOPICAL at 05:20

## 2024-12-02 NOTE — PROGRESS NOTE ADULT - ASSESSMENT
64-year-old female, former smoker with past medical history of asthma, hypothyroidism, anxiety, depression, MVP, Jaclyn-en-Y gastric bypass, appendectomy, cholecystectomy and Chronic back pain on methadone, presents to the ED following a syncopal episode last night. She also has complaints of worsening abdominal pain for 5 days.     Plan  # Hypoglycemia - RESOLVED  Per endocrine:   -Symptoms of lightheadedness could be consistent with postprandial hyperinsulinemic hypoglycemia  -avoid large meals with simple sugars, start with small meals with complex carbs/fiber and proteins throughout the day  -If point-of-care glucose is noted to be below 55 mg/dL, and patient is symptomatic recommend to obtain serum glucose C-peptide insulin proinsulin and beta hydroxybutyrate before treating  -Per RD Conuslt, A diabetes nutrition education was provided to the patient via an explanation and a diabetic nutrition handout derived from the nutrition care manual. I recommended to the patient that after they are discharged, schedule to see the outpatient certified diabetes specialist for comprehensive diabetes nutritional counseling.    #DM  -A1c noted to be 7 this visit  Per endocrine  -agree low insulin sliding scale for now, if sugars go above 150 mg/dl persistently  -on DC can monitor off medication and just dietary interventions for now    #Abdominal Pain    #H/o Jaclyn en Y bypass   - unclear etiology.  >> resolved   - CT abdomen - Hepatomegaly   - EGD/ Colonoscopy (May '23) - Non erosive gastritis  - c/w pantoprazole   - EGD- nonerosive gastritis in stomach  - CTA abdomen 11/13: No acute abdominal or pelvic pathology. Atherosclerotic calcifications of the aorta and its branches. No evidence   of celiac axis or SMA abnormality.  - Avoid IV Morphine and Dilaudid    - Bariatric surgery recs appreciated.     # Acute Hypoxic Respiratory Failure likely due to   # Asthma/ COPD exacerbation.  vs ACOS   # H/o MVP (follows Dr. Negrete)  # HTN  - CTA chest - no PE  - TTE --> echo 60-65% EF  - TTE with bubbles 11/19: suspected pulmonary shunt.   - currently on O2 2L NC > titrate off as tolerated.  - Necessitating home oxygen, 94% on 2L but destats to 89 on RA  - c/w albuterol prn   - c/w advair and symbicort  - per pulm f/u OP for PFT   - c/w bumetanide 1mg oral.   - Single dose of BUMEX 1mg IV     #Syncope likely vasovagal vs dehydration  - ECG with prolonged qtc, no obvious ischemia, trops x2 negative  - No events on tele  - Neg Orthostatics .     #Rash  -RLQ panus fold   -c/w nystatin powder BID   -Started on Topical Clotrimazole 1% BID    #Hypothyroidism,   - home meds - 88mcg thyroxine   - TSH 9.88  - 100 mcg synthroid     #Anxiety, Depression,  - c/w lamotrigine and zoloft      #Chronic back pain   - on methadone 30mg   - po morphine 6mg q4h PRN as per pain management    #Alcohol use disorder  # Suspected Thiamine deficiency   - 2-3 drinks per day  - c/w b12 and folate   - started thiamine     # Obesity     #MISC  VTE ppx - heparin SC  GI ppx - pantoprazole (home meds)  Activity as tolerated  Regular diet per pt requests  Full Code    Pending: Home O2 / RVP

## 2024-12-02 NOTE — PROGRESS NOTE ADULT - SUBJECTIVE AND OBJECTIVE BOX
SUBJECTIVE/OVERNIGHT EVENTS  Today is hospital day 24d. This morning patient was seen and examined at bedside, resting comfortably in bed. No acute or major events overnight.  Patient is stating well at rest on 2L NC.   She endorses edema of her upper lower extremities and has not been seen by her Cardiologist Dr. Negrete over this weekend.     CODE STATUS: FULL    MEDICATIONS  STANDING MEDICATIONS  acetaminophen     Tablet .. 650 milliGRAM(s) Oral every 8 hours  albuterol/ipratropium for Nebulization 3 milliLiter(s) Nebulizer every 6 hours  buMETAnide 1 milliGRAM(s) Oral daily  chlorhexidine 2% Cloths 1 Application(s) Topical <User Schedule>  clotrimazole 1% Cream 1 Application(s) Topical two times a day  cyclobenzaprine 10 milliGRAM(s) Oral three times a day  dextrose 5%. 1000 milliLiter(s) IV Continuous <Continuous>  dextrose 5%. 1000 milliLiter(s) IV Continuous <Continuous>  dextrose 50% Injectable 25 Gram(s) IV Push once  dextrose 50% Injectable 12.5 Gram(s) IV Push once  dextrose 50% Injectable 25 Gram(s) IV Push once  fluticasone propionate/ salmeterol 250-50 MICROgram(s) Diskus 1 Dose(s) Inhalation two times a day  folic acid 1 milliGRAM(s) Oral daily  glucagon  Injectable 1 milliGRAM(s) IntraMuscular once  heparin   Injectable 5000 Unit(s) SubCutaneous every 8 hours  insulin lispro (ADMELOG) corrective regimen sliding scale   SubCutaneous three times a day before meals  lamoTRIgine 150 milliGRAM(s) Oral daily  levothyroxine 100 MICROGram(s) Oral daily  metoclopramide 5 milliGRAM(s) Oral four times a day  nystatin Powder 1 Application(s) Topical two times a day  pantoprazole    Tablet 40 milliGRAM(s) Oral two times a day  polyethylene glycol 3350 17 Gram(s) Oral two times a day  senna 2 Tablet(s) Oral at bedtime  sertraline 200 milliGRAM(s) Oral daily  sucralfate 1 Gram(s) Oral two times a day  thiamine 100 milliGRAM(s) Oral daily    PRN MEDICATIONS  albuterol    90 MICROgram(s) HFA Inhaler 2 Puff(s) Inhalation every 6 hours PRN  bisacodyl 5 milliGRAM(s) Oral every 12 hours PRN  dextrose Oral Gel 15 Gram(s) Oral once PRN  melatonin 3 milliGRAM(s) Oral at bedtime PRN  naloxone 1 mG/mL Injection for Intranasal Use 1 milliGRAM(s) IntraNasal every 5 minutes PRN  oxycodone    5 mG/acetaminophen 325 mG 1 Tablet(s) Oral every 6 hours PRN    VITALS  T(F): 98.4 (12-02-24 @ 05:00), Max: 98.4 (12-02-24 @ 05:00)  HR: 82 (12-02-24 @ 05:00) (82 - 87)  BP: 114/68 (12-02-24 @ 05:00) (114/68 - 120/82)  RR: 18 (12-02-24 @ 05:00) (18 - 19)  SpO2: 94% (12-02-24 @ 05:00) (94% - 95%)  POCT Blood Glucose.: 89 mg/dL (12-02-24 @ 07:56)  POCT Blood Glucose.: 159 mg/dL (12-01-24 @ 21:06)  POCT Blood Glucose.: 108 mg/dL (12-01-24 @ 16:43)  POCT Blood Glucose.: 84 mg/dL (12-01-24 @ 11:55)    PHYSICAL EXAM  GENERAL  ( x ) NAD, lying in bed comfortably     (  ) obtunded     (  ) lethargic     (  ) somnolent    HEAD  ( x ) Atraumatic     (  ) hematoma     (  ) laceration (specify location:       )     NECK  ( x ) Supple     (  ) neck stiffness     (  ) nuchal rigidity     (  )  no JVD     (  ) JVD present ( -- cm)    HEART  Rate -->  ( x ) normal rate    (  ) bradycardic    (  ) tachycardic  Rhythm -->  ( x ) regular    (  ) regularly irregular    (  ) irregularly irregular  Murmurs -->  (  ) normal s1/s2    (  ) systolic murmur    (  ) diastolic murmur    (  ) continuous murmur     (  ) S3 present    (  ) S4 present    LUNGS  ( x )Unlabored respirations     (  ) tachypnea  ( x ) B/L air entry     (  ) decreased breath sounds in:  (location     )    (  ) no adventitious sound     (  ) crackles     (  ) wheezing      (  ) rhonchi      (specify location:       )  (  ) chest wall tenderness (specify location:       )    ABDOMEN  ( x ) Soft     (  ) tense   |   ( x ) nondistended     (  ) distended   |   (  ) +BS     (  ) hypoactive bowel sounds     (  ) hyperactive bowel sounds  (  ) nontender     (  ) RUQ tenderness     (  ) RLQ tenderness     (  ) LLQ tenderness     (  ) epigastric tenderness     (  ) diffuse tenderness  (  ) Splenomegaly      (  ) Hepatomegaly      (  ) Jaundice     (  ) ecchymosis     EXTREMITIES  (  ) Normal     (  ) Rash     (  ) ecchymosis     ( x ) varicose veins BLE     (  ) pitting edema     (  ) non-pitting edema   (  ) ulceration     (  ) gangrene:     (location:     )    NERVOUS SYSTEM  ( x ) A&Ox3     (  ) confused     (  ) lethargic  CN II-XII:     (  ) Intact     (  ) focal deficits  (Specify:     )   Upper extremities:     (  ) strength X/5     (  ) focal deficit (specify:    )  Lower extremities:     (  ) strength  X/5    (  ) focal deficit (specify:    )    SKIN  ( x ) Rash of Pannus     LABS             11.7   8.15  )-----------( 192      ( 12-02-24 @ 06:02 )             38.3     135  |  97  |  17  -------------------------<  92   12-02-24 @ 06:02  3.8  |  25  |  1.0    Ca      8.9     12-02-24 @ 06:02  Mg     2.0     12-02-24 @ 06:02          Urinalysis Basic - ( 02 Dec 2024 06:02 )    Color: x / Appearance: x / SG: x / pH: x  Gluc: 92 mg/dL / Ketone: x  / Bili: x / Urobili: x   Blood: x / Protein: x / Nitrite: x   Leuk Esterase: x / RBC: x / WBC x   Sq Epi: x / Non Sq Epi: x / Bacteria: x          IMAGING

## 2024-12-02 NOTE — PROGRESS NOTE ADULT - SUBJECTIVE AND OBJECTIVE BOX
pt seen and examined.     My notes supersede resident's notes in case of discrepancy       ROS: no cp, no sob, no n/v, no fever    Vital Signs Last 24 Hrs  T(C): 36.9 (02 Dec 2024 05:00), Max: 36.9 (02 Dec 2024 05:00)  T(F): 98.4 (02 Dec 2024 05:00), Max: 98.4 (02 Dec 2024 05:00)  HR: 82 (02 Dec 2024 05:00) (82 - 87)  BP: 114/68 (02 Dec 2024 05:00) (114/68 - 115/79)  RR: 18 (02 Dec 2024 05:00) (18 - 18)  SpO2: 94% (02 Dec 2024 05:00) (94% - 94%)    Parameters below as of 02 Dec 2024 05:00  Patient On (Oxygen Delivery Method): nasal cannula  O2 Flow (L/min): 2    physical exam  constitutional NAD, AAOX3, Respiratory  lungs CTA, CVS heart RRR, GI: abdomen Soft NT, ND, BS+, skin: lower abd swelling and redness  neuro exam no focal deficit     MEDICATIONS  (STANDING):  acetaminophen     Tablet .. 650 milliGRAM(s) Oral every 8 hours  albuterol/ipratropium for Nebulization 3 milliLiter(s) Nebulizer every 6 hours  buMETAnide 1 milliGRAM(s) Oral daily  chlorhexidine 2% Cloths 1 Application(s) Topical <User Schedule>  clotrimazole 1% Cream 1 Application(s) Topical two times a day  cyclobenzaprine 10 milliGRAM(s) Oral three times a day  dextrose 5%. 1000 milliLiter(s) (100 mL/Hr) IV Continuous <Continuous>  dextrose 5%. 1000 milliLiter(s) (50 mL/Hr) IV Continuous <Continuous>  dextrose 50% Injectable 25 Gram(s) IV Push once  dextrose 50% Injectable 12.5 Gram(s) IV Push once  dextrose 50% Injectable 25 Gram(s) IV Push once  fluticasone propionate/ salmeterol 250-50 MICROgram(s) Diskus 1 Dose(s) Inhalation two times a day  folic acid 1 milliGRAM(s) Oral daily  glucagon  Injectable 1 milliGRAM(s) IntraMuscular once  heparin   Injectable 5000 Unit(s) SubCutaneous every 8 hours  insulin lispro (ADMELOG) corrective regimen sliding scale   SubCutaneous three times a day before meals  lamoTRIgine 150 milliGRAM(s) Oral daily  levothyroxine 100 MICROGram(s) Oral daily  metoclopramide 5 milliGRAM(s) Oral four times a day  nystatin Powder 1 Application(s) Topical two times a day  pantoprazole    Tablet 40 milliGRAM(s) Oral two times a day  polyethylene glycol 3350 17 Gram(s) Oral two times a day  senna 2 Tablet(s) Oral at bedtime  sertraline 200 milliGRAM(s) Oral daily  sucralfate 1 Gram(s) Oral two times a day  thiamine 100 milliGRAM(s) Oral daily    MEDICATIONS  (PRN):  albuterol    90 MICROgram(s) HFA Inhaler 2 Puff(s) Inhalation every 6 hours PRN Shortness of Breath and/or Wheezing  bisacodyl 5 milliGRAM(s) Oral every 12 hours PRN Constipation  dextrose Oral Gel 15 Gram(s) Oral once PRN Blood Glucose LESS THAN 70 milliGRAM(s)/deciliter  melatonin 3 milliGRAM(s) Oral at bedtime PRN Insomnia  naloxone 1 mG/mL Injection for Intranasal Use 1 milliGRAM(s) IntraNasal every 5 minutes PRN signs of narcotic overdose  oxycodone    5 mG/acetaminophen 325 mG 1 Tablet(s) Oral every 6 hours PRN Severe Pain (7 - 10)                        11.7   8.15  )-----------( 192      ( 02 Dec 2024 06:02 )             38.3     12-02    135  |  97[L]  |  17  ----------------------------<  92  3.8   |  25  |  1.0    Ca    8.9      02 Dec 2024 06:02  Mg     2.0     12-02    a/p  64-year-old female, former smoker with past medical history of asthma, hypothyroidism, anxiety, depression, MVP, Jaclyn-en-Y gastric bypass, appendectomy, cholecystectomy and Chronic back pain on methadone, presents to the ED following a syncopal episode last night. She also has complaints of worsening abdominal pain.     # hypoglycemia , resolved,   CAPILLARY BLOOD GLUCOSE  POCT Blood Glucose.: 139 mg/dL (02 Dec 2024 11:34)  POCT Blood Glucose.: 89 mg/dL (02 Dec 2024 07:56)  POCT Blood Glucose.: 159 mg/dL (01 Dec 2024 21:06)  POCT Blood Glucose.: 108 mg/dL (01 Dec 2024 16:43)    endo consult appreciated   this am pt had an episode of hypoglycemia 31, was given juice and repeat fs has improved,   pt is not on any po or sq diabetic meds except for sliding scale insulin   good po intake     # Abdominal pain intractable, requiring narcotics ( extra , added to her methadone) , pt has h/o Jaclyn en Y bypass   - unclear etiology.  >> resolved   - CT abdomen - Hepatomegaly   - EGD/ Colonoscopy (May '23) - Non erosive gastritis  - c/w pantoprazole   - EGD- nonerosive gastritis in stomach  - CTa abdomen pending read  - pain control per pain management.   - bariatric surgery recs appreciated.   fu pain management , dc iv morphine   may use po prn until reevaluation by pain management     # Acute Hypoxic Respiratory Failure likely due to Asthma/ COPD exacerbation.  vs ACOS , and diastolic chf   cont nebs   cardio consult   cont bumex     # groing and lower abd rash, candida   cont nystatin     #HTN  cont meds     #Syncope likely vasovagal vs dehydration vs narcotics related   - ECG with prolonged qtc, no obvious ischemia, trops x2 negative  - no events on tele  - tele discontinued, Neg Orthostatics .     #Hypothyroidism,   - home meds - 88mcg thyroxine   - TSH 9.88  - increased to 100 mcg synthroid     #anxiety, depression,  - c/w lamotrigine and zoloft      #Chronic back pain   - on methadone 30mg   -  po morphine 6mg q4h PRN    #Alcohol use disorder  # Suspected Thiamine deficiency   - 2-3 drinks per day  - c/w b12 and folate   - started thiamine     # Obesity   Height (cm): 167.6 (11-13-24 @ 10:30)  Weight (kg): 100.7 (11-13-24 @ 10:30)  BMI (kg/m2): 35.8 (11-13-24 @ 10:30)  BSA (m2): 2.09 (11-13-24 @ 10:30)    #Progress Note Handoff    Pending :  O2 for home to be delivered   Family discussion: dw pt   Disposition: home with home care   code status: full code .

## 2024-12-03 LAB
ANION GAP SERPL CALC-SCNC: 9 MMOL/L — SIGNIFICANT CHANGE UP (ref 7–14)
BASOPHILS # BLD AUTO: 0.08 K/UL — SIGNIFICANT CHANGE UP (ref 0–0.2)
BASOPHILS NFR BLD AUTO: 1.1 % — HIGH (ref 0–1)
BUN SERPL-MCNC: 17 MG/DL — SIGNIFICANT CHANGE UP (ref 10–20)
CALCIUM SERPL-MCNC: 8.5 MG/DL — SIGNIFICANT CHANGE UP (ref 8.4–10.4)
CHLORIDE SERPL-SCNC: 103 MMOL/L — SIGNIFICANT CHANGE UP (ref 98–110)
CO2 SERPL-SCNC: 28 MMOL/L — SIGNIFICANT CHANGE UP (ref 17–32)
CREAT SERPL-MCNC: 1 MG/DL — SIGNIFICANT CHANGE UP (ref 0.7–1.5)
EGFR: 64 ML/MIN/1.73M2 — SIGNIFICANT CHANGE UP
EOSINOPHIL # BLD AUTO: 0.49 K/UL — SIGNIFICANT CHANGE UP (ref 0–0.7)
EOSINOPHIL NFR BLD AUTO: 6.8 % — SIGNIFICANT CHANGE UP (ref 0–8)
GLUCOSE BLDC GLUCOMTR-MCNC: 106 MG/DL — HIGH (ref 70–99)
GLUCOSE BLDC GLUCOMTR-MCNC: 136 MG/DL — HIGH (ref 70–99)
GLUCOSE BLDC GLUCOMTR-MCNC: 87 MG/DL — SIGNIFICANT CHANGE UP (ref 70–99)
GLUCOSE BLDC GLUCOMTR-MCNC: 95 MG/DL — SIGNIFICANT CHANGE UP (ref 70–99)
GLUCOSE SERPL-MCNC: 89 MG/DL — SIGNIFICANT CHANGE UP (ref 70–99)
HCT VFR BLD CALC: 37.1 % — SIGNIFICANT CHANGE UP (ref 37–47)
HGB BLD-MCNC: 11.4 G/DL — LOW (ref 12–16)
IMM GRANULOCYTES NFR BLD AUTO: 4.6 % — HIGH (ref 0.1–0.3)
LYMPHOCYTES # BLD AUTO: 1.14 K/UL — LOW (ref 1.2–3.4)
LYMPHOCYTES # BLD AUTO: 15.7 % — LOW (ref 20.5–51.1)
MAGNESIUM SERPL-MCNC: 1.9 MG/DL — SIGNIFICANT CHANGE UP (ref 1.8–2.4)
MCHC RBC-ENTMCNC: 29.7 PG — SIGNIFICANT CHANGE UP (ref 27–31)
MCHC RBC-ENTMCNC: 30.7 G/DL — LOW (ref 32–37)
MCV RBC AUTO: 96.6 FL — SIGNIFICANT CHANGE UP (ref 81–99)
MONOCYTES # BLD AUTO: 0.62 K/UL — HIGH (ref 0.1–0.6)
MONOCYTES NFR BLD AUTO: 8.6 % — SIGNIFICANT CHANGE UP (ref 1.7–9.3)
NEUTROPHILS # BLD AUTO: 4.59 K/UL — SIGNIFICANT CHANGE UP (ref 1.4–6.5)
NEUTROPHILS NFR BLD AUTO: 63.2 % — SIGNIFICANT CHANGE UP (ref 42.2–75.2)
NRBC # BLD: 0 /100 WBCS — SIGNIFICANT CHANGE UP (ref 0–0)
PLATELET # BLD AUTO: 175 K/UL — SIGNIFICANT CHANGE UP (ref 130–400)
PMV BLD: 10.1 FL — SIGNIFICANT CHANGE UP (ref 7.4–10.4)
POTASSIUM SERPL-MCNC: 3.7 MMOL/L — SIGNIFICANT CHANGE UP (ref 3.5–5)
POTASSIUM SERPL-SCNC: 3.7 MMOL/L — SIGNIFICANT CHANGE UP (ref 3.5–5)
RBC # BLD: 3.84 M/UL — LOW (ref 4.2–5.4)
RBC # FLD: 13.7 % — SIGNIFICANT CHANGE UP (ref 11.5–14.5)
SODIUM SERPL-SCNC: 140 MMOL/L — SIGNIFICANT CHANGE UP (ref 135–146)
WBC # BLD: 7.25 K/UL — SIGNIFICANT CHANGE UP (ref 4.8–10.8)
WBC # FLD AUTO: 7.25 K/UL — SIGNIFICANT CHANGE UP (ref 4.8–10.8)

## 2024-12-03 PROCEDURE — 99232 SBSQ HOSP IP/OBS MODERATE 35: CPT

## 2024-12-03 RX ORDER — ALPRAZOLAM 0.5 MG
0.25 TABLET ORAL ONCE
Refills: 0 | Status: DISCONTINUED | OUTPATIENT
Start: 2024-12-03 | End: 2024-12-03

## 2024-12-03 RX ORDER — NYSTATIN 100000 [USP'U]/G
1 POWDER TOPICAL
Refills: 0 | Status: DISCONTINUED | OUTPATIENT
Start: 2024-12-03 | End: 2024-12-06

## 2024-12-03 RX ORDER — BENZOCAINE, MENTHOL 15; 3.6 MG/1; MG/1
1 LOZENGE ORAL
Refills: 0 | Status: DISCONTINUED | OUTPATIENT
Start: 2024-12-03 | End: 2024-12-06

## 2024-12-03 RX ORDER — BENZOCAINE, MENTHOL 15; 3.6 MG/1; MG/1
1 LOZENGE ORAL ONCE
Refills: 0 | Status: COMPLETED | OUTPATIENT
Start: 2024-12-03 | End: 2024-12-03

## 2024-12-03 RX ADMIN — ACETAMINOPHEN 500MG 650 MILLIGRAM(S): 500 TABLET, COATED ORAL at 14:14

## 2024-12-03 RX ADMIN — Medication 0.5 MILLIGRAM(S): at 18:13

## 2024-12-03 RX ADMIN — ACETAMINOPHEN 500MG 650 MILLIGRAM(S): 500 TABLET, COATED ORAL at 21:27

## 2024-12-03 RX ADMIN — ACETAMINOPHEN 500MG 650 MILLIGRAM(S): 500 TABLET, COATED ORAL at 13:14

## 2024-12-03 RX ADMIN — Medication 100 MICROGRAM(S): at 05:49

## 2024-12-03 RX ADMIN — CHLORHEXIDINE GLUCONATE 1 APPLICATION(S): 1.2 RINSE ORAL at 05:51

## 2024-12-03 RX ADMIN — SUCRALFATE 1 GRAM(S): 1 TABLET ORAL at 05:49

## 2024-12-03 RX ADMIN — Medication 100 MILLIGRAM(S): at 11:42

## 2024-12-03 RX ADMIN — SUCRALFATE 1 GRAM(S): 1 TABLET ORAL at 17:20

## 2024-12-03 RX ADMIN — Medication 0.25 MILLIGRAM(S): at 14:06

## 2024-12-03 RX ADMIN — Medication 0.5 MILLIGRAM(S): at 05:50

## 2024-12-03 RX ADMIN — POLYETHYLENE GLYCOL 3350 17 GRAM(S): 17 POWDER, FOR SOLUTION ORAL at 05:51

## 2024-12-03 RX ADMIN — METOCLOPRAMIDE HYDROCHLORIDE 5 MILLIGRAM(S): 10 TABLET ORAL at 11:43

## 2024-12-03 RX ADMIN — IPRATROPIUM BROMIDE AND ALBUTEROL SULFATE 3 MILLILITER(S): 2.5; .5 SOLUTION RESPIRATORY (INHALATION) at 21:13

## 2024-12-03 RX ADMIN — SERTRALINE HYDROCHLORIDE 200 MILLIGRAM(S): 100 TABLET, FILM COATED ORAL at 11:43

## 2024-12-03 RX ADMIN — CLOTRIMAZOLE 1 APPLICATION(S): 10 CREAM TOPICAL at 17:20

## 2024-12-03 RX ADMIN — Medication 1 MILLIGRAM(S): at 11:43

## 2024-12-03 RX ADMIN — IPRATROPIUM BROMIDE AND ALBUTEROL SULFATE 3 MILLILITER(S): 2.5; .5 SOLUTION RESPIRATORY (INHALATION) at 08:30

## 2024-12-03 RX ADMIN — METOCLOPRAMIDE HYDROCHLORIDE 5 MILLIGRAM(S): 10 TABLET ORAL at 17:20

## 2024-12-03 RX ADMIN — CLOTRIMAZOLE 1 APPLICATION(S): 10 CREAM TOPICAL at 05:52

## 2024-12-03 RX ADMIN — METHADONE HYDROCHLORIDE 30 MILLIGRAM(S): 5 TABLET ORAL at 13:13

## 2024-12-03 RX ADMIN — Medication 10 MILLIGRAM(S): at 13:13

## 2024-12-03 RX ADMIN — METHADONE HYDROCHLORIDE 30 MILLIGRAM(S): 5 TABLET ORAL at 05:50

## 2024-12-03 RX ADMIN — METHADONE HYDROCHLORIDE 30 MILLIGRAM(S): 5 TABLET ORAL at 21:27

## 2024-12-03 RX ADMIN — BUMETANIDE 1 MILLIGRAM(S): 1 TABLET ORAL at 05:50

## 2024-12-03 RX ADMIN — NYSTATIN 1 APPLICATION(S): 100000 POWDER TOPICAL at 05:52

## 2024-12-03 RX ADMIN — PANTOPRAZOLE SODIUM 40 MILLIGRAM(S): 40 TABLET, DELAYED RELEASE ORAL at 05:50

## 2024-12-03 RX ADMIN — LAMOTRIGINE 150 MILLIGRAM(S): 50 TABLET, EXTENDED RELEASE ORAL at 11:43

## 2024-12-03 RX ADMIN — FLUTICASONE PROPIONATE AND SALMETEROL XINAFOATE 1 DOSE(S): 45; 21 AEROSOL, METERED RESPIRATORY (INHALATION) at 21:28

## 2024-12-03 RX ADMIN — NYSTATIN 1 APPLICATION(S): 100000 POWDER TOPICAL at 21:28

## 2024-12-03 RX ADMIN — METOCLOPRAMIDE HYDROCHLORIDE 5 MILLIGRAM(S): 10 TABLET ORAL at 05:49

## 2024-12-03 RX ADMIN — NYSTATIN 1 APPLICATION(S): 100000 POWDER TOPICAL at 17:20

## 2024-12-03 RX ADMIN — Medication 5000 UNIT(S): at 21:25

## 2024-12-03 RX ADMIN — Medication 2 TABLET(S): at 21:27

## 2024-12-03 RX ADMIN — Medication 10 MILLIGRAM(S): at 21:27

## 2024-12-03 RX ADMIN — ACETAMINOPHEN 500MG 650 MILLIGRAM(S): 500 TABLET, COATED ORAL at 05:49

## 2024-12-03 RX ADMIN — PANTOPRAZOLE SODIUM 40 MILLIGRAM(S): 40 TABLET, DELAYED RELEASE ORAL at 17:19

## 2024-12-03 RX ADMIN — Medication 10 MILLIGRAM(S): at 05:49

## 2024-12-03 RX ADMIN — POLYETHYLENE GLYCOL 3350 17 GRAM(S): 17 POWDER, FOR SOLUTION ORAL at 17:21

## 2024-12-03 NOTE — CHART NOTE - NSCHARTNOTEFT_GEN_A_CORE
LETTER OF MEDICAL NECESSITY  The patient has a mobility limitation that significantly impairs the patient's ability to participate in one or more MRADLs such as toileting, eating, dressing, and bathing in customary locations in the home. The patient's home provides adequate access between rooms for the use of the rolling walker.  The rolling walker will significantly improve the patient's ability to participate in MRADLs and will be used on a regular basis in the home.

## 2024-12-03 NOTE — PROGRESS NOTE ADULT - SUBJECTIVE AND OBJECTIVE BOX
pt seen and examined.     My notes supersede resident's notes in case of discrepancy       ROS: no cp, no sob, no n/v, no fever    Vital Signs Last 24 Hrs  T(C): 36.6 (03 Dec 2024 12:20), Max: 36.8 (03 Dec 2024 05:00)  T(F): 97.9 (03 Dec 2024 12:20), Max: 98.2 (03 Dec 2024 05:00)  HR: 81 (03 Dec 2024 12:20) (76 - 85)  BP: 109/73 (03 Dec 2024 12:20) (108/71 - 116/74)  BP(mean): 85 (03 Dec 2024 12:20) (85 - 85)  RR: 18 (03 Dec 2024 12:20) (18 - 18)  SpO2: 91% (03 Dec 2024 12:20) (91% - 93%)    Parameters below as of 03 Dec 2024 12:20  Patient On (Oxygen Delivery Method): room air        physical exam  constitutional NAD, AAOX3, Respiratory  lungs CTA, CVS heart RRR, GI: abdomen Soft NT, ND, BS+, skin: intact  neuro exam no focal deficit     MEDICATIONS  (STANDING):  acetaminophen     Tablet .. 650 milliGRAM(s) Oral every 8 hours  albuterol/ipratropium for Nebulization 3 milliLiter(s) Nebulizer every 6 hours  ALPRAZolam 0.5 milliGRAM(s) Oral two times a day  buMETAnide 1 milliGRAM(s) Oral daily  chlorhexidine 2% Cloths 1 Application(s) Topical <User Schedule>  clotrimazole 1% Cream 1 Application(s) Topical two times a day  cyclobenzaprine 10 milliGRAM(s) Oral three times a day  dextrose 5%. 1000 milliLiter(s) (50 mL/Hr) IV Continuous <Continuous>  dextrose 5%. 1000 milliLiter(s) (100 mL/Hr) IV Continuous <Continuous>  dextrose 50% Injectable 25 Gram(s) IV Push once  dextrose 50% Injectable 12.5 Gram(s) IV Push once  dextrose 50% Injectable 25 Gram(s) IV Push once  fluticasone propionate/ salmeterol 250-50 MICROgram(s) Diskus 1 Dose(s) Inhalation two times a day  folic acid 1 milliGRAM(s) Oral daily  glucagon  Injectable 1 milliGRAM(s) IntraMuscular once  heparin   Injectable 5000 Unit(s) SubCutaneous every 8 hours  insulin lispro (ADMELOG) corrective regimen sliding scale   SubCutaneous three times a day before meals  lamoTRIgine 150 milliGRAM(s) Oral daily  levothyroxine 100 MICROGram(s) Oral daily  methadone    Tablet 30 milliGRAM(s) Oral every 8 hours  metoclopramide 5 milliGRAM(s) Oral four times a day  nystatin Powder 1 Application(s) Topical two times a day  pantoprazole    Tablet 40 milliGRAM(s) Oral two times a day  polyethylene glycol 3350 17 Gram(s) Oral two times a day  senna 2 Tablet(s) Oral at bedtime  sertraline 200 milliGRAM(s) Oral daily  sucralfate 1 Gram(s) Oral two times a day  thiamine 100 milliGRAM(s) Oral daily    MEDICATIONS  (PRN):  albuterol    90 MICROgram(s) HFA Inhaler 2 Puff(s) Inhalation every 6 hours PRN Shortness of Breath and/or Wheezing  bisacodyl 5 milliGRAM(s) Oral every 12 hours PRN Constipation  dextrose Oral Gel 15 Gram(s) Oral once PRN Blood Glucose LESS THAN 70 milliGRAM(s)/deciliter  melatonin 3 milliGRAM(s) Oral at bedtime PRN Insomnia  naloxone 1 mG/mL Injection for Intranasal Use 1 milliGRAM(s) IntraNasal every 5 minutes PRN signs of narcotic overdose  oxycodone    5 mG/acetaminophen 325 mG 1 Tablet(s) Oral every 6 hours PRN Severe Pain (7 - 10)                        11.4   7.25  )-----------( 175      ( 03 Dec 2024 05:29 )             37.1     12-03    140  |  103  |  17  ----------------------------<  89  3.7   |  28  |  1.0    Ca    8.5      03 Dec 2024 05:29  Mg     1.9     12-03    a/p  64-year-old female, former smoker with past medical history of asthma, hypothyroidism, anxiety, depression, MVP, Jaclyn-en-Y gastric bypass, appendectomy, cholecystectomy and Chronic back pain on methadone, presents to the ED following a syncopal episode last night. She also has complaints of worsening abdominal pain.     # hypoglycemia , resolved,   CAPILLARY BLOOD GLUCOSE  POCT Blood Glucose.: 136 mg/dL (03 Dec 2024 11:38)  POCT Blood Glucose.: 95 mg/dL (03 Dec 2024 08:01)  POCT Blood Glucose.: 102 mg/dL (02 Dec 2024 21:40)  POCT Blood Glucose.: 91 mg/dL (02 Dec 2024 16:58)    endo consult appreciated   this am pt had an episode of hypoglycemia 31, was given juice and repeat fs has improved,   pt is not on any po or sq diabetic meds except for sliding scale insulin   good po intake     # Abdominal pain intractable, requiring narcotics ( extra , added to her methadone) , pt has h/o Jaclyn en Y bypass   - unclear etiology.  >> resolved   - CT abdomen - Hepatomegaly   - EGD/ Colonoscopy (May '23) - Non erosive gastritis  - c/w pantoprazole   - EGD- nonerosive gastritis in stomach  - CTa abdomen pending read  - pain control per pain management.   - bariatric surgery recs appreciated.   fu pain management , dc iv morphine   may use po prn until reevaluation by pain management     # Acute Hypoxic Respiratory Failure likely due to Asthma/ COPD exacerbation.  vs ACOS , and diastolic chf   received extra dose of bumex  cont nebs   cardio consult pending   cont nebs, o2, burmex,     # groing and lower abd rash, candida   cont nystatin     #HTN  cont meds     #Syncope likely vasovagal vs dehydration vs narcotics related   - ECG with prolonged qtc, no obvious ischemia, trops x2 negative  - no events on tele  - tele discontinued, Neg Orthostatics .     #Hypothyroidism,   - home meds - 88mcg thyroxine   - TSH 9.88  - increased to 100 mcg synthroid     #anxiety, depression,  - c/w lamotrigine and zoloft      #Chronic back pain   - on methadone 30mg   -  po morphine 6mg q4h PRN    #Alcohol use disorder  # Suspected Thiamine deficiency   - 2-3 drinks per day  - c/w b12 and folate   - started thiamine     # Obesity   Height (cm): 167.6 (11-13-24 @ 10:30)  Weight (kg): 100.7 (11-13-24 @ 10:30)  BMI (kg/m2): 35.8 (11-13-24 @ 10:30)  BSA (m2): 2.09 (11-13-24 @ 10:30)    #Progress Note Handoff    Pending :  O2 for home to be delivered   Family discussion: edis pt   Disposition: home with home care   code status: full code .

## 2024-12-04 LAB
GLUCOSE BLDC GLUCOMTR-MCNC: 100 MG/DL — HIGH (ref 70–99)
GLUCOSE BLDC GLUCOMTR-MCNC: 125 MG/DL — HIGH (ref 70–99)
GLUCOSE BLDC GLUCOMTR-MCNC: 139 MG/DL — HIGH (ref 70–99)
GLUCOSE BLDC GLUCOMTR-MCNC: 96 MG/DL — SIGNIFICANT CHANGE UP (ref 70–99)

## 2024-12-04 PROCEDURE — 99232 SBSQ HOSP IP/OBS MODERATE 35: CPT

## 2024-12-04 RX ADMIN — ACETAMINOPHEN 500MG 650 MILLIGRAM(S): 500 TABLET, COATED ORAL at 14:53

## 2024-12-04 RX ADMIN — METHADONE HYDROCHLORIDE 30 MILLIGRAM(S): 5 TABLET ORAL at 22:24

## 2024-12-04 RX ADMIN — BENZOCAINE, MENTHOL 1 LOZENGE: 15; 3.6 LOZENGE ORAL at 00:12

## 2024-12-04 RX ADMIN — IPRATROPIUM BROMIDE AND ALBUTEROL SULFATE 3 MILLILITER(S): 2.5; .5 SOLUTION RESPIRATORY (INHALATION) at 08:44

## 2024-12-04 RX ADMIN — SERTRALINE HYDROCHLORIDE 200 MILLIGRAM(S): 100 TABLET, FILM COATED ORAL at 11:05

## 2024-12-04 RX ADMIN — METOCLOPRAMIDE HYDROCHLORIDE 5 MILLIGRAM(S): 10 TABLET ORAL at 00:12

## 2024-12-04 RX ADMIN — Medication 10 MILLIGRAM(S): at 22:25

## 2024-12-04 RX ADMIN — Medication 0.5 MILLIGRAM(S): at 06:10

## 2024-12-04 RX ADMIN — Medication 0.5 MILLIGRAM(S): at 17:09

## 2024-12-04 RX ADMIN — Medication 5000 UNIT(S): at 22:25

## 2024-12-04 RX ADMIN — CHLORHEXIDINE GLUCONATE 1 APPLICATION(S): 1.2 RINSE ORAL at 06:11

## 2024-12-04 RX ADMIN — BENZOCAINE, MENTHOL 1 LOZENGE: 15; 3.6 LOZENGE ORAL at 22:26

## 2024-12-04 RX ADMIN — ACETAMINOPHEN 500MG 650 MILLIGRAM(S): 500 TABLET, COATED ORAL at 22:25

## 2024-12-04 RX ADMIN — IPRATROPIUM BROMIDE AND ALBUTEROL SULFATE 3 MILLILITER(S): 2.5; .5 SOLUTION RESPIRATORY (INHALATION) at 19:29

## 2024-12-04 RX ADMIN — METHADONE HYDROCHLORIDE 30 MILLIGRAM(S): 5 TABLET ORAL at 06:08

## 2024-12-04 RX ADMIN — Medication 100 MILLIGRAM(S): at 11:05

## 2024-12-04 RX ADMIN — BUMETANIDE 1 MILLIGRAM(S): 1 TABLET ORAL at 06:09

## 2024-12-04 RX ADMIN — POLYETHYLENE GLYCOL 3350 17 GRAM(S): 17 POWDER, FOR SOLUTION ORAL at 06:08

## 2024-12-04 RX ADMIN — SUCRALFATE 1 GRAM(S): 1 TABLET ORAL at 06:09

## 2024-12-04 RX ADMIN — SUCRALFATE 1 GRAM(S): 1 TABLET ORAL at 17:09

## 2024-12-04 RX ADMIN — BENZOCAINE, MENTHOL 1 LOZENGE: 15; 3.6 LOZENGE ORAL at 17:04

## 2024-12-04 RX ADMIN — NYSTATIN 1 APPLICATION(S): 100000 POWDER TOPICAL at 06:10

## 2024-12-04 RX ADMIN — ACETAMINOPHEN 500MG 650 MILLIGRAM(S): 500 TABLET, COATED ORAL at 08:06

## 2024-12-04 RX ADMIN — Medication 10 MILLIGRAM(S): at 06:09

## 2024-12-04 RX ADMIN — CLOTRIMAZOLE 1 APPLICATION(S): 10 CREAM TOPICAL at 06:11

## 2024-12-04 RX ADMIN — LAMOTRIGINE 150 MILLIGRAM(S): 50 TABLET, EXTENDED RELEASE ORAL at 11:04

## 2024-12-04 RX ADMIN — Medication 1 MILLIGRAM(S): at 11:06

## 2024-12-04 RX ADMIN — METHADONE HYDROCHLORIDE 30 MILLIGRAM(S): 5 TABLET ORAL at 14:53

## 2024-12-04 RX ADMIN — POLYETHYLENE GLYCOL 3350 17 GRAM(S): 17 POWDER, FOR SOLUTION ORAL at 17:03

## 2024-12-04 RX ADMIN — METOCLOPRAMIDE HYDROCHLORIDE 5 MILLIGRAM(S): 10 TABLET ORAL at 06:09

## 2024-12-04 RX ADMIN — ACETAMINOPHEN 500MG 650 MILLIGRAM(S): 500 TABLET, COATED ORAL at 14:30

## 2024-12-04 RX ADMIN — PANTOPRAZOLE SODIUM 40 MILLIGRAM(S): 40 TABLET, DELAYED RELEASE ORAL at 06:10

## 2024-12-04 RX ADMIN — Medication 2 TABLET(S): at 22:25

## 2024-12-04 RX ADMIN — ACETAMINOPHEN 500MG 650 MILLIGRAM(S): 500 TABLET, COATED ORAL at 06:08

## 2024-12-04 RX ADMIN — PANTOPRAZOLE SODIUM 40 MILLIGRAM(S): 40 TABLET, DELAYED RELEASE ORAL at 17:04

## 2024-12-04 RX ADMIN — Medication 5000 UNIT(S): at 06:10

## 2024-12-04 RX ADMIN — METOCLOPRAMIDE HYDROCHLORIDE 5 MILLIGRAM(S): 10 TABLET ORAL at 11:05

## 2024-12-04 RX ADMIN — METOCLOPRAMIDE HYDROCHLORIDE 5 MILLIGRAM(S): 10 TABLET ORAL at 17:09

## 2024-12-04 RX ADMIN — Medication 10 MILLIGRAM(S): at 14:54

## 2024-12-04 RX ADMIN — BENZOCAINE, MENTHOL 1 LOZENGE: 15; 3.6 LOZENGE ORAL at 06:11

## 2024-12-04 RX ADMIN — NYSTATIN 1 APPLICATION(S): 100000 POWDER TOPICAL at 17:11

## 2024-12-04 RX ADMIN — Medication 100 MICROGRAM(S): at 06:09

## 2024-12-04 NOTE — PROGRESS NOTE ADULT - SUBJECTIVE AND OBJECTIVE BOX
SUBJECTIVE/OVERNIGHT EVENTS  Today is hospital day 26d. This morning patient was seen and examined at bedside, resting comfortably in bed. No acute or major events overnight.    CODE STATUS: FULL     MEDICATIONS  STANDING MEDICATIONS  acetaminophen     Tablet .. 650 milliGRAM(s) Oral every 8 hours  albuterol/ipratropium for Nebulization 3 milliLiter(s) Nebulizer every 6 hours  ALPRAZolam 0.5 milliGRAM(s) Oral two times a day  benzocaine/menthol Lozenge 1 Lozenge Oral every 3 hours  buMETAnide 1 milliGRAM(s) Oral daily  chlorhexidine 2% Cloths 1 Application(s) Topical <User Schedule>  clotrimazole 1% Cream 1 Application(s) Topical two times a day  cyclobenzaprine 10 milliGRAM(s) Oral three times a day  dextrose 5%. 1000 milliLiter(s) IV Continuous <Continuous>  dextrose 5%. 1000 milliLiter(s) IV Continuous <Continuous>  dextrose 50% Injectable 25 Gram(s) IV Push once  dextrose 50% Injectable 12.5 Gram(s) IV Push once  dextrose 50% Injectable 25 Gram(s) IV Push once  fluticasone propionate/ salmeterol 250-50 MICROgram(s) Diskus 1 Dose(s) Inhalation two times a day  folic acid 1 milliGRAM(s) Oral daily  glucagon  Injectable 1 milliGRAM(s) IntraMuscular once  heparin   Injectable 5000 Unit(s) SubCutaneous every 8 hours  insulin lispro (ADMELOG) corrective regimen sliding scale   SubCutaneous three times a day before meals  lamoTRIgine 150 milliGRAM(s) Oral daily  levothyroxine 100 MICROGram(s) Oral daily  methadone    Tablet 30 milliGRAM(s) Oral every 8 hours  metoclopramide 5 milliGRAM(s) Oral four times a day  nystatin Powder 1 Application(s) Topical two times a day  pantoprazole    Tablet 40 milliGRAM(s) Oral two times a day  polyethylene glycol 3350 17 Gram(s) Oral two times a day  senna 2 Tablet(s) Oral at bedtime  sertraline 200 milliGRAM(s) Oral daily  sucralfate 1 Gram(s) Oral two times a day  thiamine 100 milliGRAM(s) Oral daily    PRN MEDICATIONS  albuterol    90 MICROgram(s) HFA Inhaler 2 Puff(s) Inhalation every 6 hours PRN  bisacodyl 5 milliGRAM(s) Oral every 12 hours PRN  dextrose Oral Gel 15 Gram(s) Oral once PRN  melatonin 3 milliGRAM(s) Oral at bedtime PRN  naloxone 1 mG/mL Injection for Intranasal Use 1 milliGRAM(s) IntraNasal every 5 minutes PRN  oxycodone    5 mG/acetaminophen 325 mG 1 Tablet(s) Oral every 6 hours PRN    VITALS  T(F): 97.1 (12-04-24 @ 05:00), Max: 98 (12-03-24 @ 21:00)  HR: 80 (12-04-24 @ 06:08) (80 - 95)  BP: 109/66 (12-04-24 @ 06:08) (98/58 - 121/72)  RR: 18 (12-04-24 @ 05:00) (18 - 18)  SpO2: 93% (12-04-24 @ 08:30) (93% - 96%)  POCT Blood Glucose.: 139 mg/dL (12-04-24 @ 11:50)  POCT Blood Glucose.: 96 mg/dL (12-04-24 @ 07:54)  POCT Blood Glucose.: 106 mg/dL (12-03-24 @ 21:28)  POCT Blood Glucose.: 87 mg/dL (12-03-24 @ 17:00)    PHYSICAL EXAM  GENERAL  ( x ) NAD, lying in bed comfortably     (  ) obtunded     (  ) lethargic     (  ) somnolent    HEAD  ( x ) Atraumatic     (  ) hematoma     (  ) laceration (specify location:       )     NECK  ( x ) Supple     (  ) neck stiffness     (  ) nuchal rigidity     (  )  no JVD     (  ) JVD present ( -- cm)    HEART  Rate -->  ( x ) normal rate    (  ) bradycardic    (  ) tachycardic  Rhythm -->  ( x ) regular    (  ) regularly irregular    (  ) irregularly irregular  Murmurs -->  (  ) normal s1/s2    (  ) systolic murmur    (  ) diastolic murmur    (  ) continuous murmur     (  ) S3 present    (  ) S4 present    LUNGS  ( x )Unlabored respirations     (  ) tachypnea  ( x ) B/L air entry     (  ) decreased breath sounds in:  (location     )    (  ) no adventitious sound     (  ) crackles     (  ) wheezing      (  ) rhonchi      (specify location:       )  (  ) chest wall tenderness (specify location:       )    ABDOMEN  ( x ) Soft     (  ) tense   |   ( x ) nondistended     (  ) distended   |   (  ) +BS     (  ) hypoactive bowel sounds     (  ) hyperactive bowel sounds  (  ) nontender     (  ) RUQ tenderness     (  ) RLQ tenderness     (  ) LLQ tenderness     (  ) epigastric tenderness     (  ) diffuse tenderness  (  ) Splenomegaly      (  ) Hepatomegaly      (  ) Jaundice     (  ) ecchymosis     EXTREMITIES  (  ) Normal     (  ) Rash     (  ) ecchymosis     ( x ) varicose veins BLE     (  ) pitting edema     (  ) non-pitting edema   (  ) ulceration     (  ) gangrene:     (location:     )    NERVOUS SYSTEM  ( x ) A&Ox3     (  ) confused     (  ) lethargic  CN II-XII:     (  ) Intact     (  ) focal deficits  (Specify:     )   Upper extremities:     (  ) strength X/5     (  ) focal deficit (specify:    )  Lower extremities:     (  ) strength  X/5    (  ) focal deficit (specify:    )    SKIN  ( x ) Rash of Pannus     LABS             11.4   7.25  )-----------( 175      ( 12-03-24 @ 05:29 )             37.1     140  |  103  |  17  -------------------------<  89   12-03-24 @ 05:29  3.7  |  28  |  1.0    Ca      8.5     12-03-24 @ 05:29  Mg     1.9     12-03-24 @ 05:29          Urinalysis Basic - ( 03 Dec 2024 05:29 )    Color: x / Appearance: x / SG: x / pH: x  Gluc: 89 mg/dL / Ketone: x  / Bili: x / Urobili: x   Blood: x / Protein: x / Nitrite: x   Leuk Esterase: x / RBC: x / WBC x   Sq Epi: x / Non Sq Epi: x / Bacteria: x          IMAGING

## 2024-12-04 NOTE — PROGRESS NOTE ADULT - SUBJECTIVE AND OBJECTIVE BOX
LANDON JULIET  64y  Female      Patient is a 64y old  Female who presents with a chief complaint of Abdominal pain.      INTERVAL HPI/OVERNIGHT EVENTS:      ******************************* REVIEW OF SYSTEMS:**********************************************    All other review of systems negative    *********************** VITALS ******************************************    T(F): 98.3 (12-04-24 @ 14:52)  HR: 93 (12-04-24 @ 14:52) (80 - 95)  BP: 136/76 (12-04-24 @ 14:52) (98/58 - 136/76)  RR: 18 (12-04-24 @ 14:52) (18 - 18)  SpO2: 91% (12-04-24 @ 14:52) (91% - 96%)            ******************************** PHYSICAL EXAM:**************************************************  GENERAL: NAD    PSYCH: no agitation, baseline mentation  HEENT:     NERVOUS SYSTEM:  Alert & Oriented X3,   PULMONARY: SHARIF, CTA    CARDIOVASCULAR: S1S2 RRR    GI: Soft, NT, ND; BS present.    EXTREMITIES:  2+ Peripheral Pulses, No clubbing, cyanosis, or edema    LYMPH: No lymphadenopathy noted    SKIN: No rashes or lesions      **************************** LABS *******************************************************                          11.4   7.25  )-----------( 175      ( 03 Dec 2024 05:29 )             37.1     12-03    140  |  103  |  17  ----------------------------<  89  3.7   |  28  |  1.0    Ca    8.5      03 Dec 2024 05:29  Mg     1.9     12-03        Urinalysis Basic - ( 03 Dec 2024 05:29 )    Color: x / Appearance: x / SG: x / pH: x  Gluc: 89 mg/dL / Ketone: x  / Bili: x / Urobili: x   Blood: x / Protein: x / Nitrite: x   Leuk Esterase: x / RBC: x / WBC x   Sq Epi: x / Non Sq Epi: x / Bacteria: x        Lactate Trend        CAPILLARY BLOOD GLUCOSE      POCT Blood Glucose.: 100 mg/dL (04 Dec 2024 17:08)          **************************Active Medications *******************************************  penicillin (Anaphylaxis)      acetaminophen     Tablet .. 650 milliGRAM(s) Oral every 8 hours  albuterol    90 MICROgram(s) HFA Inhaler 2 Puff(s) Inhalation every 6 hours PRN  albuterol/ipratropium for Nebulization 3 milliLiter(s) Nebulizer every 6 hours  ALPRAZolam 0.5 milliGRAM(s) Oral two times a day  benzocaine/menthol Lozenge 1 Lozenge Oral every 3 hours  bisacodyl 5 milliGRAM(s) Oral every 12 hours PRN  buMETAnide 1 milliGRAM(s) Oral daily  chlorhexidine 2% Cloths 1 Application(s) Topical <User Schedule>  clotrimazole 1% Cream 1 Application(s) Topical two times a day  cyclobenzaprine 10 milliGRAM(s) Oral three times a day  dextrose 5%. 1000 milliLiter(s) IV Continuous <Continuous>  dextrose 5%. 1000 milliLiter(s) IV Continuous <Continuous>  dextrose 50% Injectable 25 Gram(s) IV Push once  dextrose 50% Injectable 12.5 Gram(s) IV Push once  dextrose 50% Injectable 25 Gram(s) IV Push once  dextrose Oral Gel 15 Gram(s) Oral once PRN  fluticasone propionate/ salmeterol 250-50 MICROgram(s) Diskus 1 Dose(s) Inhalation two times a day  folic acid 1 milliGRAM(s) Oral daily  glucagon  Injectable 1 milliGRAM(s) IntraMuscular once  heparin   Injectable 5000 Unit(s) SubCutaneous every 8 hours  insulin lispro (ADMELOG) corrective regimen sliding scale   SubCutaneous three times a day before meals  lamoTRIgine 150 milliGRAM(s) Oral daily  levothyroxine 100 MICROGram(s) Oral daily  melatonin 3 milliGRAM(s) Oral at bedtime PRN  methadone    Tablet 30 milliGRAM(s) Oral every 8 hours  metoclopramide 5 milliGRAM(s) Oral four times a day  naloxone 1 mG/mL Injection for Intranasal Use 1 milliGRAM(s) IntraNasal every 5 minutes PRN  nystatin Powder 1 Application(s) Topical two times a day  oxycodone    5 mG/acetaminophen 325 mG 1 Tablet(s) Oral every 6 hours PRN  pantoprazole    Tablet 40 milliGRAM(s) Oral two times a day  polyethylene glycol 3350 17 Gram(s) Oral two times a day  senna 2 Tablet(s) Oral at bedtime  sertraline 200 milliGRAM(s) Oral daily  sucralfate 1 Gram(s) Oral two times a day  thiamine 100 milliGRAM(s) Oral daily      ***************************************************  RADIOLOGY & ADDITIONAL TESTS:    Imaging Personally Reviewed:  [ ] YES  [ ] NO    HEALTH ISSUES - PROBLEM Dx:

## 2024-12-04 NOTE — CONSULT NOTE ADULT - CONSULT REASON
CHF
Abdominal pain
Asthma
Abdominal pain
Pt with chronic back pain - on methadone and carisoprodol    follows Dr. Craig
Abdominal pain
Hypglycemia
Abdominal pain

## 2024-12-04 NOTE — CONSULT NOTE ADULT - PROVIDER SPECIALTY LIST ADULT
Gastroenterology
Pain Medicine
Bariatric Surgery
Cardiology
Endocrinology
Pulmonology
Pain Medicine
Pain Medicine

## 2024-12-04 NOTE — CONSULT NOTE ADULT - ASSESSMENT
1] Leg edema - etiology multifactorial  - Venous duplex done during this admission showed no evidence of DVT  - Consider outpatient workup for venous reflux that may account for chronic  LE venous insufficiency  - TSH level is elevated.  Dose of Levothyroxine may need to be adjusted.  - Continue Bumex    2] ASHD  - Stable    3] Diastolic Dysfunction  - No clinical evidence of CHF  - Check proBNP if clinically indicated    4] Alcohol Use Disorder  - Patient has history of alcohol use disorder per review of office note.  Patient was last seen in office (Dr. Negrete) in Feb 2024.    Will follow as needed  Patient advised to follow up with Dr. Negrete 2 weeks after discharge    Ramon Cordova MD (covering for Dr. Emiliano Negrete)  [phone] Office  On TEAMS

## 2024-12-04 NOTE — PROGRESS NOTE ADULT - ASSESSMENT
64-year-old female, former smoker with past medical history of asthma, hypothyroidism, anxiety, depression, MVP, Jaclyn-en-Y gastric bypass, appendectomy, cholecystectomy and Chronic back pain on methadone, presents to the ED following a syncopal episode last night. She also has complaints of worsening abdominal pain for 5 days.     #Abdominal Pain in setting of   #H/o Jaclyn en Y bypass   - unclear etiology >> resolved      - EGD/ Colonoscopy (May '23) - Non erosive gastritis  - c/w pantoprazole   - EGD- nonerosive gastritis in stomach  - CTA abdomen 11/13: No acute abdominal or pelvic pathology. Atherosclerotic calcifications of the aorta and its branches. No evidence   of celiac axis or SMA abnormality.  - Avoid IV Morphine and Dilaudid    - Bariatric surgery recs appreciated.     # Hypoglycemia - resolved   Per endocrine:   -Symptoms of lightheadedness could be consistent with postprandial hyperinsulinemic hypoglycemia  -avoid large meals with simple sugars, start with small meals with complex carbs/fiber and proteins throughout the day  -If point-of-care glucose is noted to be below 55 mg/dL, and patient is symptomatic recommend to obtain serum glucose C-peptide insulin proinsulin and beta hydroxybutyrate before treating  -Per RD Glenn, A diabetes nutrition education was provided to the patient via an explanation and a diabetic nutrition handout derived from the nutrition care manual. I recommended to the patient that after they are discharged, schedule to see the outpatient certified diabetes specialist for comprehensive diabetes nutritional counseling.    #DM  -A1c noted to be 7 this visit  Per endocrine  -agree low insulin sliding scale for now, if sugars go above 150 mg/dl persistently  -on DC can monitor off medication and just dietary interventions for now    # Acute Hypoxic Respiratory Failure likely due to   # Asthma/ COPD exacerbation.  vs ACOS   # H/o MVP (follows Dr. Negrete)  # HTN  - CTA chest - no PE  - TTE --> echo 60-65% EF  - TTE with bubbles 11/19: suspected pulmonary shunt.   - currently on O2 2L NC > titrate off as tolerated.  - Necessitating home oxygen,  - c/w albuterol prn   - c/w advair and symbicort  - per pulm f/u OP for PFT   - c/w bumetanide 1mg oral.     #Syncope likely vasovagal vs dehydration  - ECG with prolonged qtc, no obvious ischemia, trops x2 negative  - No events on tele  - Neg Orthostatics .     #Rash  -RLQ panus fold   -c/w nystatin powder BID   -cw Topical Clotrimazole 1% BID    #Hypothyroidism,   - home meds - 88mcg thyroxine   - TSH 9.88  - dose increased to 100 mcg     #Anxiety, Depression,  - c/w lamotrigine and zoloft      #Chronic back pain   - on methadone 30mg   - po morphine 6mg q4h PRN as per pain management    #Alcohol use disorder  # Suspected Thiamine deficiency   - 2-3 drinks per day  - c/w b12 and folate   - started thiamine     #Abdominal Hernia  No signs of incarceration on exam  No pain on palpation, reducible  F/u OP surgery for recommendations    # Obesity     #MISC  VTE ppx - heparin SC  GI ppx - pantoprazole (home meds)  Activity as tolerated  Regular diet per pt requests  Full Code    DC planning, PFD in 24 hrs.   Plan d/w pt and agrees to dc plan.

## 2024-12-04 NOTE — CONSULT NOTE ADULT - CONSULT REQUESTED DATE/TIME
14-Nov-2024 13:52
12-Nov-2024 07:48
13-Nov-2024 14:42
14-Nov-2024 15:33
08-Nov-2024 10:23
03-Dec-2024 14:00
28-Nov-2024 12:36
13-Nov-2024 14:42

## 2024-12-04 NOTE — PROGRESS NOTE ADULT - ASSESSMENT
64-year-old female, former smoker with past medical history of asthma, hypothyroidism, anxiety, depression, MVP, Jaclyn-en-Y gastric bypass, appendectomy, cholecystectomy and Chronic back pain on methadone, presents to the ED following a syncopal episode last night. She also has complaints of worsening abdominal pain for 5 days.     Plan  # Hypoglycemia - RESOLVED  Per endocrine:   -Symptoms of lightheadedness could be consistent with postprandial hyperinsulinemic hypoglycemia  -avoid large meals with simple sugars, start with small meals with complex carbs/fiber and proteins throughout the day  -If point-of-care glucose is noted to be below 55 mg/dL, and patient is symptomatic recommend to obtain serum glucose C-peptide insulin proinsulin and beta hydroxybutyrate before treating  -Per RD Conuslt, A diabetes nutrition education was provided to the patient via an explanation and a diabetic nutrition handout derived from the nutrition care manual. I recommended to the patient that after they are discharged, schedule to see the outpatient certified diabetes specialist for comprehensive diabetes nutritional counseling.    #DM  -A1c noted to be 7 this visit  Per endocrine  -agree low insulin sliding scale for now, if sugars go above 150 mg/dl persistently  -on DC can monitor off medication and just dietary interventions for now    #Abdominal Pain    #H/o Jaclyn en Y bypass   - unclear etiology.  >> resolved   - CT abdomen - Hepatomegaly   - EGD/ Colonoscopy (May '23) - Non erosive gastritis  - c/w pantoprazole   - EGD- nonerosive gastritis in stomach  - CTA abdomen 11/13: No acute abdominal or pelvic pathology. Atherosclerotic calcifications of the aorta and its branches. No evidence   of celiac axis or SMA abnormality.  - Avoid IV Morphine and Dilaudid    - Bariatric surgery recs appreciated.     # Acute Hypoxic Respiratory Failure likely due to   # Asthma/ COPD exacerbation.  vs ACOS   # H/o MVP (follows Dr. Negrete)  # HTN  - CTA chest - no PE  - TTE --> echo 60-65% EF  - TTE with bubbles 11/19: suspected pulmonary shunt.   - currently on O2 2L NC > titrate off as tolerated.  - Necessitating home oxygen, 94% on 2L but destats to 89 on RA  - c/w albuterol prn   - c/w advair and symbicort  - per pulm f/u OP for PFT   - c/w bumetanide 1mg oral.     #Syncope likely vasovagal vs dehydration  - ECG with prolonged qtc, no obvious ischemia, trops x2 negative  - No events on tele  - Neg Orthostatics .     #Rash  -RLQ panus fold   -c/w nystatin powder BID   -cw Topical Clotrimazole 1% BID    #Hypothyroidism,   - home meds - 88mcg thyroxine   - TSH 9.88  - 100 mcg synthroid     #Anxiety, Depression,  - c/w lamotrigine and zoloft      #Chronic back pain   - on methadone 30mg   - po morphine 6mg q4h PRN as per pain management    #Alcohol use disorder  # Suspected Thiamine deficiency   - 2-3 drinks per day  - c/w b12 and folate   - started thiamine     #Abdominal Hernia  No signs of incarceration on exam  No pain on palpation, reducible  F/u OP surgery for recommendations    # Obesity     #MISC  VTE ppx - heparin SC  GI ppx - pantoprazole (home meds)  Activity as tolerated  Regular diet per pt requests  Full Code    Pending: Home O2 / RVP

## 2024-12-04 NOTE — CONSULT NOTE ADULT - SUBJECTIVE AND OBJECTIVE BOX
Patient is a 64y old  Female who presents with a chief complaint of Abdominal pain (04 Dec 2024 17:23)      REASON FOR CONSULT     HPI:  64-year-old female, former smoker with past medical history of asthma, hypothyroidism, anxiety, depression, MVP, Jaclyn-en-Y gastric bypass, appendectomy, cholecystectomy and Chronic back pain on methadone, presents to the ED following a syncopal episode last night. She also has complaints of worsening abdominal pain for 5 days.     Pt was sitting down while  is helping her dry after a shower when she felt dizzy and lost consciousness for a brief moment. No truama. No  incontinence or shaking movements. No chest pain or sudden SOB. However, pt endorses having reduced effort tolerance and shortness of breath for the past 2-3 months. She reportedly uses nebs/albuterol on almost a daily basis. She follows with Dr. Negrete (cardiology). Per pt, cath was done 5-10 yrs back which was normal. She is due for a stress test. Takes bumex daily for LE swelling.       She also reports of nausea, vomiting, decreased p.o. intake and worsening abdominal pain for the past 5 days. Pain in localized to right lower quadrant but radiates all over the abd region. Not passed stool in the past 5 days, says didn't what much.    Patient drinks a couple glasses of scotch nightly.    ED vitals - HR -  84/min; BP-  137/82mmhg; RR -  18/min; SPO2 -  98%; Afebrile  ECG - nsr, irbbb, qtc 603  labs - AST/ ALT 77/36, ALP - 194; Lactate 3.0  CT abdomen - hepatomegaly, no other acute findings     Pt admitted for Syncope and Abdominal pain w/u.  (08 Nov 2024 09:17)      PAST MEDICAL & SURGICAL HISTORY:  Asthma  LAST ATTACK MANY YRS AGO      Gastroesophageal reflux disease without esophagitis      Hypothyroidism      Heart murmur      Anxiety      Depression      Mood disorder      Psoriasis      Constipation      Back pain  LOW      Morbid obesity      S/P tonsillectomy  1967      History of appendectomy  1974      Abscess of back  EVACUATION OF ABSCESS @ L5-S1 1997      History of Jaclyn-en-Y gastric bypass  WITH CHOLECYSTECTOMY 2006              SOCIAL HISTORY:     FAMILY HISTORY:  CAD (coronary artery disease) (Father, Mother)      penicillin (Anaphylaxis)      MEDICATIONS  (STANDING):  acetaminophen     Tablet .. 650 milliGRAM(s) Oral every 8 hours  albuterol/ipratropium for Nebulization 3 milliLiter(s) Nebulizer every 6 hours  ALPRAZolam 0.5 milliGRAM(s) Oral two times a day  benzocaine/menthol Lozenge 1 Lozenge Oral every 3 hours  buMETAnide 1 milliGRAM(s) Oral daily  chlorhexidine 2% Cloths 1 Application(s) Topical <User Schedule>  clotrimazole 1% Cream 1 Application(s) Topical two times a day  cyclobenzaprine 10 milliGRAM(s) Oral three times a day  dextrose 5%. 1000 milliLiter(s) (50 mL/Hr) IV Continuous <Continuous>  dextrose 5%. 1000 milliLiter(s) (100 mL/Hr) IV Continuous <Continuous>  dextrose 50% Injectable 25 Gram(s) IV Push once  dextrose 50% Injectable 12.5 Gram(s) IV Push once  dextrose 50% Injectable 25 Gram(s) IV Push once  fluticasone propionate/ salmeterol 250-50 MICROgram(s) Diskus 1 Dose(s) Inhalation two times a day  folic acid 1 milliGRAM(s) Oral daily  glucagon  Injectable 1 milliGRAM(s) IntraMuscular once  heparin   Injectable 5000 Unit(s) SubCutaneous every 8 hours  insulin lispro (ADMELOG) corrective regimen sliding scale   SubCutaneous three times a day before meals  lamoTRIgine 150 milliGRAM(s) Oral daily  levothyroxine 100 MICROGram(s) Oral daily  methadone    Tablet 30 milliGRAM(s) Oral every 8 hours  metoclopramide 5 milliGRAM(s) Oral four times a day  nystatin Powder 1 Application(s) Topical two times a day  pantoprazole    Tablet 40 milliGRAM(s) Oral two times a day  polyethylene glycol 3350 17 Gram(s) Oral two times a day  senna 2 Tablet(s) Oral at bedtime  sertraline 200 milliGRAM(s) Oral daily  sucralfate 1 Gram(s) Oral two times a day  thiamine 100 milliGRAM(s) Oral daily    MEDICATIONS  (PRN):  albuterol    90 MICROgram(s) HFA Inhaler 2 Puff(s) Inhalation every 6 hours PRN Shortness of Breath and/or Wheezing  bisacodyl 5 milliGRAM(s) Oral every 12 hours PRN Constipation  dextrose Oral Gel 15 Gram(s) Oral once PRN Blood Glucose LESS THAN 70 milliGRAM(s)/deciliter  melatonin 3 milliGRAM(s) Oral at bedtime PRN Insomnia  naloxone 1 mG/mL Injection for Intranasal Use 1 milliGRAM(s) IntraNasal every 5 minutes PRN signs of narcotic overdose  oxycodone    5 mG/acetaminophen 325 mG 1 Tablet(s) Oral every 6 hours PRN Severe Pain (7 - 10)      Vital Signs Last 24 Hrs  T(C): 36.8 (04 Dec 2024 14:52), Max: 36.8 (04 Dec 2024 14:52)  T(F): 98.3 (04 Dec 2024 14:52), Max: 98.3 (04 Dec 2024 14:52)  HR: 93 (04 Dec 2024 14:52) (80 - 95)  BP: 136/76 (04 Dec 2024 14:52) (98/58 - 136/76)  BP(mean): 80 (04 Dec 2024 06:08) (80 - 80)  RR: 18 (04 Dec 2024 14:52) (18 - 18)  SpO2: 91% (04 Dec 2024 14:52) (91% - 96%)    Parameters below as of 04 Dec 2024 08:30  Patient On (Oxygen Delivery Method): room air     I&O's Detail            LABS:                        11.4   7.25  )-----------( 175      ( 03 Dec 2024 05:29 )             37.1     12-03    140  |  103  |  17  ----------------------------<  89  3.7   |  28  |  1.0    Ca    8.5      03 Dec 2024 05:29  Mg     1.9     12-03           Patient was seen and examined earlier today by me on 3A.  EMR reviewed.    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    She presented at [facility] with complaints of abdominal pain and syncope on Nov 8, 2024.   Workup for abdominal pain showed no acute pathology.  She is concerned with the increased leg edema she has had.    Physical Exam:  Not in gross distress  Alert, oriented x 3  Regular rhythm; nl S1S2  Bilateral breath sounds  Abdomen obese  Pitting edema with erythema in both legs; chronic venous stasis changes  Moving all extremities    ROS: as stated in HPI  Labs: noted.  Elevated TSH    < from: CT Angio Chest PE Protocol w/ IV Cont (11.08.24 @ 04:01) >  PROCEDURE:  CT Angiography of the Chest was performed followed by portal venous phase   imaging of the Abdomen and Pelvis.  Sagittal and coronal reformats were performed as well as 3D (MIP)   reconstructions.    FINDINGS:  CHEST:  LUNGS AND LARGE AIRWAYS: Trachea is clear. Multifocal scarring in the   apices, anteriorly in the upper lobes. No acute lobar consolidation. No   suspicious masses.  PLEURA: No pleural effusion.  VESSELS: Mild atherosclerosis. No acute pulmonary embolism.  HEART: Heart size is normal. Coronary artery calcifications.  MEDIASTINUM AND REINA: No lymphadenopathy.  CHEST WALL AND LOWER NECK: Within normal limits.    ABDOMEN AND PELVIS:  LIVER: Diffuse severe fatty infiltration. Enlarged measuring up to 26 cm   in length.  BILE DUCTS: Normal caliber.  GALLBLADDER: Surgically absent  SPLEEN: Within normal limits.  PANCREAS: Within normal limits.  ADRENALS: Within normal limits.  KIDNEYS/URETERS: Within normal limits.    BLADDER: Within normal limits.  REPRODUCTIVE ORGANS: Within normal limits.    BOWEL: Jaclyn-en-Y gastric bypass. No bowel obstruction.  PERITONEUM/RETROPERITONEUM: Within normal limits.  VESSELS: Mild atherosclerosis. No abdominal aortic aneurysm  LYMPH NODES: No lymphadenopathy.  ABDOMINAL WALL: Within normal limits.  BONES: Degenerative changes along the vertebral column.      IMPRESSION:  1.  No evidence of acute pulmonary embolism.  2.  No evidence of acute thoracic or abdominopelvic pathology.  3.  Since 4/27/2023, increasing hepatomegaly with new diffuse severe   fatty infiltration of the liver.    < end of copied text >      < from: TTE Echo Complete w/ Contrast w/ Doppler (11.19.24 @ 11:16) >  Summary:   1. Technically suboptimal study. Endocardial visualization was enhanced   with intravenous echo contrast.   2. Normal global left ventricular systolic function with a biplane EF of   65%. Mild (grade 1) diastolic dysfunction. Mild concentric left   ventricular hypertrophy.   3. Normal right ventricular size and function.   4. Normal atria.   5. Sclerotic aortic valve with normal opening.   6. Adequate TR velocity was not obtained to accurately assess RVSP.   7. With infusion of agitated saline, a large volume of late bubbles is   seen - consistent with pulmonary shunt. Due to image quality, few early   bubbles cannot be excluded.    PHYSICIAN INTERPRETATION:  Left Ventricle: Endocardial visualization was enhanced with intravenous   echo contrast. There is mild concentric left ventricular hypertrophy.   Global LV systolic function was normal. Spectral Doppler shows impaired   relaxation pattern of left ventricular myocardial filling (Grade I   diastolic dysfunction).  Right Ventricle: Normal right ventricular size and function.  Left Atrium: Normal left atrial size. LA volume Index is 20.4 ml/m² ml/m2.  Right Atrium: Normal right atrial size.  Pericardium: There is no evidence of pericardial effusion.  Mitral Valve: Structurally normal mitral valve, with normal leaflet   excursion. No evidence of mitral valve regurgitation is seen.  Tricuspid Valve: Structurally normal tricuspid valve, with normal leaflet   excursion. Trivial tricuspid regurgitation is visualized. Adequate TR   velocity was not obtained to accurately assess RVSP.  Aortic Valve: The aortic valve is trileaflet. No evidence of aortic   stenosis. Sclerotic aortic valve with normal opening. Trivial aortic   valve regurgitation is seen.  Pulmonic Valve: The pulmonic valve was not well visualized. Trace   pulmonic valve regurgitation.  Aorta: The aortic root and ascending aorta are structurally normal, with   no evidence of dilitation.  Venous: The inferior vena cava is not well visualized.    < end of copied text >    _____________________________________________  House Staff Note    Patient is a 64y old  Female who presents with a chief complaint of Abdominal pain (04 Dec 2024 17:23)      REASON FOR CONSULT     HPI:  64-year-old female, former smoker with past medical history of asthma, hypothyroidism, anxiety, depression, MVP, Jaclyn-en-Y gastric bypass, appendectomy, cholecystectomy and Chronic back pain on methadone, presents to the ED following a syncopal episode last night. She also has complaints of worsening abdominal pain for 5 days.     Pt was sitting down while  is helping her dry after a shower when she felt dizzy and lost consciousness for a brief moment. No truama. No  incontinence or shaking movements. No chest pain or sudden SOB. However, pt endorses having reduced effort tolerance and shortness of breath for the past 2-3 months. She reportedly uses nebs/albuterol on almost a daily basis. She follows with Dr. Negrete (cardiology). Per pt, cath was done 5-10 yrs back which was normal. She is due for a stress test. Takes bumex daily for LE swelling.       She also reports of nausea, vomiting, decreased p.o. intake and worsening abdominal pain for the past 5 days. Pain in localized to right lower quadrant but radiates all over the abd region. Not passed stool in the past 5 days, says didn't what much.    Patient drinks a couple glasses of scotch nightly.    ED vitals - HR -  84/min; BP-  137/82mmhg; RR -  18/min; SPO2 -  98%; Afebrile  ECG - nsr, irbbb, qtc 603  labs - AST/ ALT 77/36, ALP - 194; Lactate 3.0  CT abdomen - hepatomegaly, no other acute findings     Pt admitted for Syncope and Abdominal pain w/u.  (08 Nov 2024 09:17)      PAST MEDICAL & SURGICAL HISTORY:  Asthma  LAST ATTACK MANY YRS AGO  Gastroesophageal reflux disease without esophagitis  Hypothyroidism  Heart murmur  Anxiety  Depression  Mood disorder  Psoriasis  Constipation  Back pain  LOW  Morbid obesity  S/P tonsillectomy  1967  History of appendectomy  1974      Abscess of back  EVACUATION OF ABSCESS @ L5-S1 1997      History of Jaclyn-en-Y gastric bypass  WITH CHOLECYSTECTOMY 2006    SOCIAL HISTORY:     FAMILY HISTORY:  CAD (coronary artery disease) (Father, Mother)      penicillin (Anaphylaxis)      MEDICATIONS  (STANDING):  acetaminophen     Tablet .. 650 milliGRAM(s) Oral every 8 hours  albuterol/ipratropium for Nebulization 3 milliLiter(s) Nebulizer every 6 hours  ALPRAZolam 0.5 milliGRAM(s) Oral two times a day  benzocaine/menthol Lozenge 1 Lozenge Oral every 3 hours  buMETAnide 1 milliGRAM(s) Oral daily  chlorhexidine 2% Cloths 1 Application(s) Topical <User Schedule>  clotrimazole 1% Cream 1 Application(s) Topical two times a day  cyclobenzaprine 10 milliGRAM(s) Oral three times a day  dextrose 5%. 1000 milliLiter(s) (50 mL/Hr) IV Continuous <Continuous>  dextrose 5%. 1000 milliLiter(s) (100 mL/Hr) IV Continuous <Continuous>  dextrose 50% Injectable 25 Gram(s) IV Push once  dextrose 50% Injectable 12.5 Gram(s) IV Push once  dextrose 50% Injectable 25 Gram(s) IV Push once  fluticasone propionate/ salmeterol 250-50 MICROgram(s) Diskus 1 Dose(s) Inhalation two times a day  folic acid 1 milliGRAM(s) Oral daily  glucagon  Injectable 1 milliGRAM(s) IntraMuscular once  heparin   Injectable 5000 Unit(s) SubCutaneous every 8 hours  insulin lispro (ADMELOG) corrective regimen sliding scale   SubCutaneous three times a day before meals  lamoTRIgine 150 milliGRAM(s) Oral daily  levothyroxine 100 MICROGram(s) Oral daily  methadone    Tablet 30 milliGRAM(s) Oral every 8 hours  metoclopramide 5 milliGRAM(s) Oral four times a day  nystatin Powder 1 Application(s) Topical two times a day  pantoprazole    Tablet 40 milliGRAM(s) Oral two times a day  polyethylene glycol 3350 17 Gram(s) Oral two times a day  senna 2 Tablet(s) Oral at bedtime  sertraline 200 milliGRAM(s) Oral daily  sucralfate 1 Gram(s) Oral two times a day  thiamine 100 milliGRAM(s) Oral daily    MEDICATIONS  (PRN):  albuterol    90 MICROgram(s) HFA Inhaler 2 Puff(s) Inhalation every 6 hours PRN Shortness of Breath and/or Wheezing  bisacodyl 5 milliGRAM(s) Oral every 12 hours PRN Constipation  dextrose Oral Gel 15 Gram(s) Oral once PRN Blood Glucose LESS THAN 70 milliGRAM(s)/deciliter  melatonin 3 milliGRAM(s) Oral at bedtime PRN Insomnia  naloxone 1 mG/mL Injection for Intranasal Use 1 milliGRAM(s) IntraNasal every 5 minutes PRN signs of narcotic overdose  oxycodone    5 mG/acetaminophen 325 mG 1 Tablet(s) Oral every 6 hours PRN Severe Pain (7 - 10)      Vital Signs Last 24 Hrs  T(C): 36.8 (04 Dec 2024 14:52), Max: 36.8 (04 Dec 2024 14:52)  T(F): 98.3 (04 Dec 2024 14:52), Max: 98.3 (04 Dec 2024 14:52)  HR: 93 (04 Dec 2024 14:52) (80 - 95)  BP: 136/76 (04 Dec 2024 14:52) (98/58 - 136/76)  BP(mean): 80 (04 Dec 2024 06:08) (80 - 80)  RR: 18 (04 Dec 2024 14:52) (18 - 18)  SpO2: 91% (04 Dec 2024 14:52) (91% - 96%)    Parameters below as of 04 Dec 2024 08:30  Patient On (Oxygen Delivery Method): room air     I&O's Detail            LABS:                        11.4   7.25  )-----------( 175      ( 03 Dec 2024 05:29 )             37.1     12-03    140  |  103  |  17  ----------------------------<  89  3.7   |  28  |  1.0    Ca    8.5      03 Dec 2024 05:29  Mg     1.9     12-03

## 2024-12-05 ENCOUNTER — TRANSCRIPTION ENCOUNTER (OUTPATIENT)
Age: 64
End: 2024-12-05

## 2024-12-05 LAB
GLUCOSE BLDC GLUCOMTR-MCNC: 106 MG/DL — HIGH (ref 70–99)
GLUCOSE BLDC GLUCOMTR-MCNC: 114 MG/DL — HIGH (ref 70–99)
GLUCOSE BLDC GLUCOMTR-MCNC: 123 MG/DL — HIGH (ref 70–99)
GLUCOSE BLDC GLUCOMTR-MCNC: 88 MG/DL — SIGNIFICANT CHANGE UP (ref 70–99)

## 2024-12-05 PROCEDURE — 99232 SBSQ HOSP IP/OBS MODERATE 35: CPT

## 2024-12-05 RX ORDER — FLUTICASONE PROPIONATE AND SALMETEROL XINAFOATE 45; 21 UG/1; UG/1
1 AEROSOL, METERED RESPIRATORY (INHALATION)
Qty: 1 | Refills: 0
Start: 2024-12-05

## 2024-12-05 RX ORDER — BUMETANIDE 1 MG/1
1 TABLET ORAL
Qty: 30 | Refills: 0
Start: 2024-12-05 | End: 2025-01-03

## 2024-12-05 RX ORDER — IPRATROPIUM BROMIDE AND ALBUTEROL SULFATE 2.5; .5 MG/3ML; MG/3ML
3 SOLUTION RESPIRATORY (INHALATION)
Qty: 1 | Refills: 0
Start: 2024-12-05

## 2024-12-05 RX ORDER — FLUTICASONE PROPIONATE AND SALMETEROL XINAFOATE 45; 21 UG/1; UG/1
1 AEROSOL, METERED RESPIRATORY (INHALATION)
Qty: 1 | Refills: 0
Start: 2024-12-05 | End: 2025-01-03

## 2024-12-05 RX ORDER — ALPRAZOLAM 0.5 MG
1 TABLET ORAL
Qty: 30 | Refills: 0
Start: 2024-12-05 | End: 2024-12-19

## 2024-12-05 RX ORDER — LACTULOSE 10 G/15ML
15 SOLUTION ORAL
Qty: 450 | Refills: 0
Start: 2024-12-05 | End: 2025-01-03

## 2024-12-05 RX ADMIN — PANTOPRAZOLE SODIUM 40 MILLIGRAM(S): 40 TABLET, DELAYED RELEASE ORAL at 17:44

## 2024-12-05 RX ADMIN — SERTRALINE HYDROCHLORIDE 200 MILLIGRAM(S): 100 TABLET, FILM COATED ORAL at 11:40

## 2024-12-05 RX ADMIN — SUCRALFATE 1 GRAM(S): 1 TABLET ORAL at 17:44

## 2024-12-05 RX ADMIN — Medication 10 MILLIGRAM(S): at 05:53

## 2024-12-05 RX ADMIN — PANTOPRAZOLE SODIUM 40 MILLIGRAM(S): 40 TABLET, DELAYED RELEASE ORAL at 05:52

## 2024-12-05 RX ADMIN — Medication 0.5 MILLIGRAM(S): at 05:53

## 2024-12-05 RX ADMIN — METOCLOPRAMIDE HYDROCHLORIDE 5 MILLIGRAM(S): 10 TABLET ORAL at 17:43

## 2024-12-05 RX ADMIN — BENZOCAINE, MENTHOL 1 LOZENGE: 15; 3.6 LOZENGE ORAL at 16:18

## 2024-12-05 RX ADMIN — NYSTATIN 1 APPLICATION(S): 100000 POWDER TOPICAL at 05:55

## 2024-12-05 RX ADMIN — METOCLOPRAMIDE HYDROCHLORIDE 5 MILLIGRAM(S): 10 TABLET ORAL at 05:53

## 2024-12-05 RX ADMIN — ACETAMINOPHEN 500MG 650 MILLIGRAM(S): 500 TABLET, COATED ORAL at 23:36

## 2024-12-05 RX ADMIN — ACETAMINOPHEN 500MG 650 MILLIGRAM(S): 500 TABLET, COATED ORAL at 22:34

## 2024-12-05 RX ADMIN — SUCRALFATE 1 GRAM(S): 1 TABLET ORAL at 05:53

## 2024-12-05 RX ADMIN — Medication 100 MILLIGRAM(S): at 11:40

## 2024-12-05 RX ADMIN — ACETAMINOPHEN 500MG 650 MILLIGRAM(S): 500 TABLET, COATED ORAL at 05:52

## 2024-12-05 RX ADMIN — CLOTRIMAZOLE 1 APPLICATION(S): 10 CREAM TOPICAL at 05:55

## 2024-12-05 RX ADMIN — ACETAMINOPHEN 500MG 650 MILLIGRAM(S): 500 TABLET, COATED ORAL at 13:07

## 2024-12-05 RX ADMIN — ACETAMINOPHEN 500MG 650 MILLIGRAM(S): 500 TABLET, COATED ORAL at 13:37

## 2024-12-05 RX ADMIN — METHADONE HYDROCHLORIDE 30 MILLIGRAM(S): 5 TABLET ORAL at 05:52

## 2024-12-05 RX ADMIN — METHADONE HYDROCHLORIDE 30 MILLIGRAM(S): 5 TABLET ORAL at 22:34

## 2024-12-05 RX ADMIN — CHLORHEXIDINE GLUCONATE 1 APPLICATION(S): 1.2 RINSE ORAL at 05:54

## 2024-12-05 RX ADMIN — Medication 10 MILLIGRAM(S): at 22:33

## 2024-12-05 RX ADMIN — Medication 2 PUFF(S): at 22:35

## 2024-12-05 RX ADMIN — IPRATROPIUM BROMIDE AND ALBUTEROL SULFATE 3 MILLILITER(S): 2.5; .5 SOLUTION RESPIRATORY (INHALATION) at 02:31

## 2024-12-05 RX ADMIN — BUMETANIDE 1 MILLIGRAM(S): 1 TABLET ORAL at 05:53

## 2024-12-05 RX ADMIN — IPRATROPIUM BROMIDE AND ALBUTEROL SULFATE 3 MILLILITER(S): 2.5; .5 SOLUTION RESPIRATORY (INHALATION) at 07:46

## 2024-12-05 RX ADMIN — BENZOCAINE, MENTHOL 1 LOZENGE: 15; 3.6 LOZENGE ORAL at 17:44

## 2024-12-05 RX ADMIN — METOCLOPRAMIDE HYDROCHLORIDE 5 MILLIGRAM(S): 10 TABLET ORAL at 22:40

## 2024-12-05 RX ADMIN — METHADONE HYDROCHLORIDE 30 MILLIGRAM(S): 5 TABLET ORAL at 13:53

## 2024-12-05 RX ADMIN — Medication 2 TABLET(S): at 22:33

## 2024-12-05 RX ADMIN — POLYETHYLENE GLYCOL 3350 17 GRAM(S): 17 POWDER, FOR SOLUTION ORAL at 05:54

## 2024-12-05 RX ADMIN — METOCLOPRAMIDE HYDROCHLORIDE 5 MILLIGRAM(S): 10 TABLET ORAL at 11:39

## 2024-12-05 RX ADMIN — BENZOCAINE, MENTHOL 1 LOZENGE: 15; 3.6 LOZENGE ORAL at 22:34

## 2024-12-05 RX ADMIN — Medication 100 MICROGRAM(S): at 05:53

## 2024-12-05 RX ADMIN — POLYETHYLENE GLYCOL 3350 17 GRAM(S): 17 POWDER, FOR SOLUTION ORAL at 17:44

## 2024-12-05 RX ADMIN — BENZOCAINE, MENTHOL 1 LOZENGE: 15; 3.6 LOZENGE ORAL at 05:52

## 2024-12-05 RX ADMIN — ACETAMINOPHEN 500MG 650 MILLIGRAM(S): 500 TABLET, COATED ORAL at 05:54

## 2024-12-05 RX ADMIN — Medication 10 MILLIGRAM(S): at 13:07

## 2024-12-05 RX ADMIN — Medication 0.5 MILLIGRAM(S): at 17:44

## 2024-12-05 RX ADMIN — BENZOCAINE, MENTHOL 1 LOZENGE: 15; 3.6 LOZENGE ORAL at 11:40

## 2024-12-05 RX ADMIN — NYSTATIN 1 APPLICATION(S): 100000 POWDER TOPICAL at 17:47

## 2024-12-05 RX ADMIN — Medication 1 MILLIGRAM(S): at 11:39

## 2024-12-05 RX ADMIN — BENZOCAINE, MENTHOL 1 LOZENGE: 15; 3.6 LOZENGE ORAL at 08:11

## 2024-12-05 RX ADMIN — CLOTRIMAZOLE 1 APPLICATION(S): 10 CREAM TOPICAL at 17:48

## 2024-12-05 RX ADMIN — LAMOTRIGINE 150 MILLIGRAM(S): 50 TABLET, EXTENDED RELEASE ORAL at 11:39

## 2024-12-05 NOTE — PROGRESS NOTE ADULT - ASSESSMENT
64-year-old female, former smoker with past medical history of asthma, hypothyroidism, anxiety, depression, MVP, Jaclyn-en-Y gastric bypass, appendectomy, cholecystectomy and Chronic back pain on methadone, presents to the ED following a syncopal episode last night. She also has complaints of worsening abdominal pain for 5 days.     #Abdominal Pain in setting of   #H/o Jaclyn en Y bypass   - unclear etiology >> resolved      - EGD/ Colonoscopy (May '23) - Non erosive gastritis  - c/w pantoprazole   - EGD- nonerosive gastritis in stomach  - CTA abdomen 11/13: No acute abdominal or pelvic pathology. Atherosclerotic calcifications of the aorta and its branches. No evidence   of celiac axis or SMA abnormality.  - Avoid IV Morphine and Dilaudid    - Bariatric surgery recs appreciated.     # Hypoglycemia - resolved   Per endocrine:   -Symptoms of lightheadedness could be consistent with postprandial hyperinsulinemic hypoglycemia  -avoid large meals with simple sugars, start with small meals with complex carbs/fiber and proteins throughout the day  -If point-of-care glucose is noted to be below 55 mg/dL, and patient is symptomatic recommend to obtain serum glucose C-peptide insulin proinsulin and beta hydroxybutyrate before treating  -Per RD Glenn, A diabetes nutrition education was provided to the patient via an explanation and a diabetic nutrition handout derived from the nutrition care manual. I recommended to the patient that after they are discharged, schedule to see the outpatient certified diabetes specialist for comprehensive diabetes nutritional counseling.    #DM  -A1c noted to be 7 this visit  Per endocrine  -agree low insulin sliding scale for now, if sugars go above 150 mg/dl persistently  -on DC can monitor off medication and just dietary interventions for now    # Acute Hypoxic Respiratory Failure likely due to   # Asthma/ COPD exacerbation.  vs ACOS   # H/o MVP (follows Dr. Negrete)  # HTN  - CTA chest - no PE  - TTE --> echo 60-65% EF  - TTE with bubbles 11/19: suspected pulmonary shunt.   - currently on O2 2L NC > .  - Necessitating home oxygen, >> Home O2 delivered including portable one at bedside  - c/w albuterol prn   - c/w advair and symbicort  - per pulm f/u OP for PFT   - c/w bumetanide 1mg oral.     #Syncope likely vasovagal vs dehydration  - ECG with prolonged qtc, no obvious ischemia, trops x2 negative  - No events on tele  - Neg Orthostatics .     #Rash  -RLQ panus fold   -c/w nystatin powder BID   -cw Topical Clotrimazole 1% BID    #Hypothyroidism,   - home meds - 88mcg thyroxine   - TSH 9.88  - dose increased to 100 mcg     #Anxiety, Depression,  - c/w lamotrigine and zoloft   - added Xanax for breakthrough      #Chronic back pain   - on methadone 30mg   - po morphine 6mg q4h PRN as per pain management    #Alcohol use disorder  # Suspected Thiamine deficiency   - 2-3 drinks per day  - c/w b12 and folate   - started thiamine     #Abdominal Hernia  No signs of incarceration on exam  No pain on palpation, reducible  F/u OP surgery for recommendations    # Obesity     #MISC  VTE ppx - heparin SC  GI ppx - pantoprazole (home meds)  Activity as tolerated  Regular diet per pt requests  Full Code    DC planning today, possible appeal/.

## 2024-12-05 NOTE — DISCHARGE NOTE NURSING/CASE MANAGEMENT/SOCIAL WORK - NSDCFUADDAPPT_GEN_ALL_CORE_FT
APPTS ARE READY TO BE MADE: [x ] YES    Best Family or Patient Contact (if needed):    Additional Information about above appointments (if needed):    1: Brayan Marrero for PFT  2: Wes Husain for LFT  3:     Other comments or requests:       
APPTS ARE READY TO BE MADE: [ x] YES      1: pulm map

## 2024-12-05 NOTE — DISCHARGE NOTE NURSING/CASE MANAGEMENT/SOCIAL WORK - FINANCIAL ASSISTANCE
[facility] provides services at a reduced cost to those who are determined to be eligible through [facility]’s financial assistance program. Information regarding [facility]’s financial assistance program can be found by going to [email or web address] or by calling [phone].
[facility] provides services at a reduced cost to those who are determined to be eligible through [facility]’s financial assistance program. Information regarding [facility]’s financial assistance program can be found by going to [email or web address] or by calling [phone].

## 2024-12-05 NOTE — DISCHARGE NOTE NURSING/CASE MANAGEMENT/SOCIAL WORK - PATIENT PORTAL LINK FT
You can access the FollowMyHealth Patient Portal offered by [facility] by registering at the following website: [email or web address]. By joining [facility]’s FollowMyHealth portal, you will also be able to view your health information using other applications (apps) compatible with our system.
You can access the FollowMyHealth Patient Portal offered by [facility] by registering at the following website: [email or web address]. By joining [facility]’s FollowMyHealth portal, you will also be able to view your health information using other applications (apps) compatible with our system.

## 2024-12-05 NOTE — DISCHARGE NOTE NURSING/CASE MANAGEMENT/SOCIAL WORK - NSDCPEFALRISK_GEN_ALL_CORE
For information on Fall & Injury Prevention, visit: [email or web address] OR  [email or web address] OR  https://www.cdc.gov/steadi/patient.html
For information on Fall & Injury Prevention, visit: [email or web address] OR  [email or web address] OR  https://www.cdc.gov/steadi/patient.html

## 2024-12-05 NOTE — PROGRESS NOTE ADULT - SUBJECTIVE AND OBJECTIVE BOX
JULIET NAVARRETE  64y  Female      Patient is a 64y old  Female who presents with a chief complaint of Abdominal pain.      INTERVAL HPI/OVERNIGHT EVENTS:      ******************************* REVIEW OF SYSTEMS:**********************************************    All other review of systems negative    *********************** VITALS ******************************************    T(F): 97.9 (12-05-24 @ 05:00)  HR: 52 (12-05-24 @ 05:00) (52 - 93)  BP: 123/76 (12-05-24 @ 05:00) (123/76 - 136/76)  RR: 18 (12-05-24 @ 05:00) (18 - 18)  SpO2: 96% (12-05-24 @ 05:00) (91% - 96%)            ******************************** PHYSICAL EXAM:**************************************************  GENERAL: NAD    PSYCH: no agitation, baseline mentation  HEENT:     NERVOUS SYSTEM:  Alert & Oriented X3,   PULMONARY: SHARIF, CTA    CARDIOVASCULAR: S1S2 RRR    GI: Soft, NT, ND; BS present.    EXTREMITIES:  2+ Peripheral Pulses, No clubbing, cyanosis, or edema    LYMPH: No lymphadenopathy noted    SKIN: No rashes or lesions      **************************** LABS *******************************************************                  Lactate Trend        CAPILLARY BLOOD GLUCOSE      POCT Blood Glucose.: 106 mg/dL (05 Dec 2024 11:34)          **************************Active Medications *******************************************  penicillin (Anaphylaxis)      acetaminophen     Tablet .. 650 milliGRAM(s) Oral every 8 hours  albuterol    90 MICROgram(s) HFA Inhaler 2 Puff(s) Inhalation every 6 hours PRN  albuterol/ipratropium for Nebulization 3 milliLiter(s) Nebulizer every 6 hours  ALPRAZolam 0.5 milliGRAM(s) Oral two times a day  benzocaine/menthol Lozenge 1 Lozenge Oral every 3 hours  bisacodyl 5 milliGRAM(s) Oral every 12 hours PRN  buMETAnide 1 milliGRAM(s) Oral daily  chlorhexidine 2% Cloths 1 Application(s) Topical <User Schedule>  clotrimazole 1% Cream 1 Application(s) Topical two times a day  cyclobenzaprine 10 milliGRAM(s) Oral three times a day  dextrose 5%. 1000 milliLiter(s) IV Continuous <Continuous>  dextrose 5%. 1000 milliLiter(s) IV Continuous <Continuous>  dextrose 50% Injectable 25 Gram(s) IV Push once  dextrose 50% Injectable 12.5 Gram(s) IV Push once  dextrose 50% Injectable 25 Gram(s) IV Push once  dextrose Oral Gel 15 Gram(s) Oral once PRN  fluticasone propionate/ salmeterol 250-50 MICROgram(s) Diskus 1 Dose(s) Inhalation two times a day  folic acid 1 milliGRAM(s) Oral daily  glucagon  Injectable 1 milliGRAM(s) IntraMuscular once  heparin   Injectable 5000 Unit(s) SubCutaneous every 8 hours  insulin lispro (ADMELOG) corrective regimen sliding scale   SubCutaneous three times a day before meals  lamoTRIgine 150 milliGRAM(s) Oral daily  levothyroxine 100 MICROGram(s) Oral daily  melatonin 3 milliGRAM(s) Oral at bedtime PRN  methadone    Tablet 30 milliGRAM(s) Oral every 8 hours  metoclopramide 5 milliGRAM(s) Oral four times a day  naloxone 1 mG/mL Injection for Intranasal Use 1 milliGRAM(s) IntraNasal every 5 minutes PRN  nystatin Powder 1 Application(s) Topical two times a day  oxycodone    5 mG/acetaminophen 325 mG 1 Tablet(s) Oral every 6 hours PRN  pantoprazole    Tablet 40 milliGRAM(s) Oral two times a day  polyethylene glycol 3350 17 Gram(s) Oral two times a day  senna 2 Tablet(s) Oral at bedtime  sertraline 200 milliGRAM(s) Oral daily  sucralfate 1 Gram(s) Oral two times a day  thiamine 100 milliGRAM(s) Oral daily      ***************************************************  RADIOLOGY & ADDITIONAL TESTS:    Imaging Personally Reviewed:  [ ] YES  [ ] NO    HEALTH ISSUES - PROBLEM Dx:

## 2024-12-06 VITALS
SYSTOLIC BLOOD PRESSURE: 113 MMHG | RESPIRATION RATE: 18 BRPM | OXYGEN SATURATION: 96 % | DIASTOLIC BLOOD PRESSURE: 72 MMHG | TEMPERATURE: 98 F | HEART RATE: 72 BPM

## 2024-12-06 LAB
GLUCOSE BLDC GLUCOMTR-MCNC: 98 MG/DL — SIGNIFICANT CHANGE UP (ref 70–99)
PROINSULIN SERPL-MCNC: 6 PMOL/L — SIGNIFICANT CHANGE UP (ref 0–10)

## 2024-12-06 PROCEDURE — 99238 HOSP IP/OBS DSCHRG MGMT 30/<: CPT

## 2024-12-06 RX ORDER — LEVOTHYROXINE SODIUM 150 MCG
1 TABLET ORAL
Qty: 30 | Refills: 2
Start: 2024-12-06 | End: 2025-03-05

## 2024-12-06 RX ADMIN — IPRATROPIUM BROMIDE AND ALBUTEROL SULFATE 3 MILLILITER(S): 2.5; .5 SOLUTION RESPIRATORY (INHALATION) at 08:21

## 2024-12-06 RX ADMIN — BENZOCAINE, MENTHOL 1 LOZENGE: 15; 3.6 LOZENGE ORAL at 03:10

## 2024-12-06 RX ADMIN — PANTOPRAZOLE SODIUM 40 MILLIGRAM(S): 40 TABLET, DELAYED RELEASE ORAL at 05:53

## 2024-12-06 RX ADMIN — SUCRALFATE 1 GRAM(S): 1 TABLET ORAL at 05:51

## 2024-12-06 RX ADMIN — POLYETHYLENE GLYCOL 3350 17 GRAM(S): 17 POWDER, FOR SOLUTION ORAL at 05:50

## 2024-12-06 RX ADMIN — ACETAMINOPHEN 500MG 650 MILLIGRAM(S): 500 TABLET, COATED ORAL at 05:51

## 2024-12-06 RX ADMIN — CHLORHEXIDINE GLUCONATE 1 APPLICATION(S): 1.2 RINSE ORAL at 05:52

## 2024-12-06 RX ADMIN — CLOTRIMAZOLE 1 APPLICATION(S): 10 CREAM TOPICAL at 05:55

## 2024-12-06 RX ADMIN — METOCLOPRAMIDE HYDROCHLORIDE 5 MILLIGRAM(S): 10 TABLET ORAL at 05:52

## 2024-12-06 RX ADMIN — BENZOCAINE, MENTHOL 1 LOZENGE: 15; 3.6 LOZENGE ORAL at 05:57

## 2024-12-06 RX ADMIN — Medication 0.5 MILLIGRAM(S): at 05:51

## 2024-12-06 RX ADMIN — FLUTICASONE PROPIONATE AND SALMETEROL XINAFOATE 1 DOSE(S): 45; 21 AEROSOL, METERED RESPIRATORY (INHALATION) at 05:54

## 2024-12-06 RX ADMIN — NYSTATIN 1 APPLICATION(S): 100000 POWDER TOPICAL at 05:55

## 2024-12-06 RX ADMIN — METHADONE HYDROCHLORIDE 30 MILLIGRAM(S): 5 TABLET ORAL at 05:51

## 2024-12-06 RX ADMIN — BUMETANIDE 1 MILLIGRAM(S): 1 TABLET ORAL at 05:52

## 2024-12-06 RX ADMIN — Medication 100 MICROGRAM(S): at 05:52

## 2024-12-06 RX ADMIN — Medication 10 MILLIGRAM(S): at 05:51

## 2024-12-06 NOTE — PROGRESS NOTE ADULT - SUBJECTIVE AND OBJECTIVE BOX
SUBJECTIVE/OVERNIGHT EVENTS  Today is hospital day 28d. This morning patient was seen and examined at bedside, resting comfortably in bed. No acute or major events overnight.      CODE STATUS: FULL      MEDICATIONS  STANDING MEDICATIONS  acetaminophen     Tablet .. 650 milliGRAM(s) Oral every 8 hours  albuterol/ipratropium for Nebulization 3 milliLiter(s) Nebulizer every 6 hours  ALPRAZolam 0.5 milliGRAM(s) Oral two times a day  benzocaine/menthol Lozenge 1 Lozenge Oral every 3 hours  buMETAnide 1 milliGRAM(s) Oral daily  chlorhexidine 2% Cloths 1 Application(s) Topical <User Schedule>  clotrimazole 1% Cream 1 Application(s) Topical two times a day  cyclobenzaprine 10 milliGRAM(s) Oral three times a day  dextrose 5%. 1000 milliLiter(s) IV Continuous <Continuous>  dextrose 5%. 1000 milliLiter(s) IV Continuous <Continuous>  dextrose 50% Injectable 25 Gram(s) IV Push once  dextrose 50% Injectable 12.5 Gram(s) IV Push once  dextrose 50% Injectable 25 Gram(s) IV Push once  fluticasone propionate/ salmeterol 250-50 MICROgram(s) Diskus 1 Dose(s) Inhalation two times a day  folic acid 1 milliGRAM(s) Oral daily  glucagon  Injectable 1 milliGRAM(s) IntraMuscular once  heparin   Injectable 5000 Unit(s) SubCutaneous every 8 hours  insulin lispro (ADMELOG) corrective regimen sliding scale   SubCutaneous three times a day before meals  lamoTRIgine 150 milliGRAM(s) Oral daily  levothyroxine 100 MICROGram(s) Oral daily  methadone    Tablet 30 milliGRAM(s) Oral every 8 hours  metoclopramide 5 milliGRAM(s) Oral four times a day  nystatin Powder 1 Application(s) Topical two times a day  pantoprazole    Tablet 40 milliGRAM(s) Oral two times a day  polyethylene glycol 3350 17 Gram(s) Oral two times a day  senna 2 Tablet(s) Oral at bedtime  sertraline 200 milliGRAM(s) Oral daily  sucralfate 1 Gram(s) Oral two times a day  thiamine 100 milliGRAM(s) Oral daily    PRN MEDICATIONS  albuterol    90 MICROgram(s) HFA Inhaler 2 Puff(s) Inhalation every 6 hours PRN  bisacodyl 5 milliGRAM(s) Oral every 12 hours PRN  dextrose Oral Gel 15 Gram(s) Oral once PRN  melatonin 3 milliGRAM(s) Oral at bedtime PRN  naloxone 1 mG/mL Injection for Intranasal Use 1 milliGRAM(s) IntraNasal every 5 minutes PRN    VITALS  T(F): 98.3 (12-06-24 @ 04:43), Max: 98.3 (12-06-24 @ 04:43)  HR: 72 (12-06-24 @ 04:43) (72 - 76)  BP: 113/72 (12-06-24 @ 04:43) (113/72 - 113/72)  RR: 18 (12-06-24 @ 04:43) (18 - 18)  SpO2: 96% (12-06-24 @ 04:43) (85% - 96%)  POCT Blood Glucose.: 98 mg/dL (12-06-24 @ 07:58)  POCT Blood Glucose.: 114 mg/dL (12-05-24 @ 21:12)  POCT Blood Glucose.: 123 mg/dL (12-05-24 @ 16:43)    PHYSICAL EXAM  GENERAL: NAD  HEAD:  Atraumatic, normocephalic  EYES: EOMI, PERRL  NECK: Supple, trachea midline, no JVD  HEART: Regular rate and rhythm  LUNGS: Clear to auscultation bilaterally  ABDOMEN: Soft, nontender, nondistended, +BS  EXTREMITIES: 2+ peripheral pulses bilaterally. No clubbing, cyanosis, or edema  NERVOUS SYSTEM:  A&Ox3      (  ) Indwelling Hendrix Catheter   Date inserted:    Reason (  ) Critical illness     (  ) urinary retention    (  ) Accurate Ins/Outs Monitoring     (  ) CMO patient    (  ) Central Line  Date inserted:  Location: (  ) Right IJ   (  ) Left IJ   (  ) Right Fem   (  ) Left Fem    (  ) SPC  (  ) pigtail  (  ) PEG tube  (  ) colostomy  (  ) jejunostomy  (  ) U-Dall    LABS                    IMAGING

## 2024-12-06 NOTE — PROGRESS NOTE ADULT - ASSESSMENT
64-year-old female, former smoker with past medical history of asthma, hypothyroidism, anxiety, depression, MVP, Jaclyn-en-Y gastric bypass, appendectomy, cholecystectomy and Chronic back pain on methadone, presents to the ED following a syncopal episode last night. She also has complaints of worsening abdominal pain for 5 days.     #Abdominal Pain in setting of   #H/o Jaclyn en Y bypass   - unclear etiology >> resolved      - EGD/ Colonoscopy (May '23) - Non erosive gastritis  - c/w pantoprazole   - EGD- nonerosive gastritis in stomach  - CTA abdomen 11/13: No acute abdominal or pelvic pathology. Atherosclerotic calcifications of the aorta and its branches. No evidence   of celiac axis or SMA abnormality.  - Avoid IV Morphine and Dilaudid    - Bariatric surgery recs appreciated.     # Hypoglycemia - resolved   Per endocrine:   -Symptoms of lightheadedness could be consistent with postprandial hyperinsulinemic hypoglycemia  -avoid large meals with simple sugars, start with small meals with complex carbs/fiber and proteins throughout the day  -If point-of-care glucose is noted to be below 55 mg/dL, and patient is symptomatic recommend to obtain serum glucose C-peptide insulin proinsulin and beta hydroxybutyrate before treating  -Per RD Glenn, A diabetes nutrition education was provided to the patient via an explanation and a diabetic nutrition handout derived from the nutrition care manual. I recommended to the patient that after they are discharged, schedule to see the outpatient certified diabetes specialist for comprehensive diabetes nutritional counseling.    #DM  -A1c noted to be 7 this visit  Per endocrine  -agree low insulin sliding scale for now, if sugars go above 150 mg/dl persistently  -on DC can monitor off medication and just dietary interventions for now    # Acute Hypoxic Respiratory Failure likely due to   # Asthma/ COPD exacerbation.  vs ACOS   # H/o MVP (follows Dr. Negrete)  # HTN  - CTA chest - no PE  - TTE --> echo 60-65% EF  - TTE with bubbles 11/19: suspected pulmonary shunt.   - currently on O2 2L NC > .  - Necessitating home oxygen, >> Home O2 delivered including portable one at bedside  - c/w albuterol prn   - c/w advair and symbicort  - per pulm f/u OP for PFT   - c/w bumetanide 1mg oral.     #Syncope likely vasovagal vs dehydration  - ECG with prolonged qtc, no obvious ischemia, trops x2 negative  - No events on tele  - Neg Orthostatics .     #Rash  -RLQ panus fold   -c/w nystatin powder BID   -cw Topical Clotrimazole 1% BID    #Hypothyroidism,   - home meds - 88mcg thyroxine   - TSH 9.88  - dose increased to 100 mcg     #Anxiety, Depression,  - c/w lamotrigine and zoloft   - added Xanax for breakthrough      #Chronic back pain   - on methadone 30mg   - po morphine 6mg q4h PRN as per pain management    #Alcohol use disorder  # Suspected Thiamine deficiency   - 2-3 drinks per day  - c/w b12 and folate   - started thiamine     #Abdominal Hernia  No signs of incarceration on exam  No pain on palpation, reducible  F/u OP surgery for recommendations    # Obesity     #MISC  VTE ppx - heparin SC  GI ppx - pantoprazole (home meds)  Activity as tolerated  Regular diet per pt requests  Full Code    Dispo: DC home today with home O2

## 2024-12-06 NOTE — PROGRESS NOTE ADULT - PROVIDER SPECIALTY LIST ADULT
Hospitalist
Internal Medicine
Gastroenterology
Hospitalist
Internal Medicine
Bariatric Surgery
Hospitalist
Internal Medicine
Internal Medicine
Hospitalist
Internal Medicine

## 2024-12-10 NOTE — CHART NOTE - NSCHARTNOTESELECT_GEN_ALL_CORE
Event Note
Follow Up/Nutrition Services
LMN
LOMN Rolling Walker/Event Note
NON-CLINICAL APPT INFO/Event Note
iSTOP
Anesthesia/Event Note
Event Note
Medical Necessity O2/Event Note

## 2024-12-10 NOTE — CHART NOTE - NSCHARTNOTEFT_GEN_A_CORE
SPECIALTY: gastroenterology    [facility] MRN [id number] GI, Pulmo, & GenSurg / No answer 12/9 - JL / Left message 12/10 - DORIS

## 2024-12-15 NOTE — CDI QUERY NOTE - NSCDIOTHERTXTBX_GEN_ALL_CORE_HH
There is conflicting documentation in the medical record arising from the documentation of different providers.     In order to ensure accurate coding and accuracy of the clinical record, the documentation in this patient’s medical record requires additional clarification from the Attending of Record.      For reference, please see below the conflicting documentation noted in the medical record.    Based upon the patient’s presentation, evaluation, and medical management, please identify the appropriate diagnosis for this patient:    •  Asthma and COPD exacerbation were ruled out at the time of discharge.  •  Asthma and COPD exacerbation were resolved at the time of discharge.  •  Other (specify)    Conflicting documentation and/or clinical evidence is noted:     The diagnosis of “dyspnea was attributed to deconditioning and obesity, with no evidence of CHF or acute exacerbation of asthma” was documented in the Discharge Note.     The diagnosis of “Asthma/ COPD exacerbation vs ACOS” was documented in the Discharge Note     The following information is also documented in the medical record: [include all information supporting and not supporting both conditions]     11/14 Pulmonology Consult:  Initial VBG with hypercapnia   ABG noted  Check O2 saturation on room air at rest and with ambulation   Incentive spirometry   Nebs every 4 hours PRN; Advair 250 BID   If remains hypoxic then consider echo with bubble study to rule out shunting  Finish 10 day PO prednisone course   LEs markedly edematous - duplex LEs   Resume patient's home diuretics and check BNP again   DVT PPX  Maintain oxygen 92-95%  Will need outpatient PFTs and sleep study    12/5 Progress Note Adult-Hospitalist Attending:  # Asthma/ COPD exacerbation.  vs ACOS   # H/o MVP (follows Dr. Negrete)  # HTN  - CTA chest - no PE  - TTE --> echo 60-65% EF  - TTE with bubbles 11/19: suspected pulmonary shunt.   - currently on O2 2L NC > .  - Necessitating home oxygen, >> Home O2 delivered including portable one at bedside  - c/w albuterol prn   - c/w advair and symbicort  - per pulm f/u OP for PFT   - c/w bumetanide 1mg oral.

## 2024-12-15 NOTE — CDI QUERY NOTE - NSCDIOTHERTXTBX2_GEN_ALL_CORE_FT
Although respiratory failure is documented in the medical record, it lacks the specific indicators to clinically substantiate and support the diagnosis.     In order to ensure accurate coding and accuracy of the clinical record, the documentation in this patient’s medical record requires additional clarification of the diagnosis.    Please clarify the status of respiratory function in your progress note and/or discharge summary:    •  After evaluation, the patient was diagnosed with chronic hypoxic respiratory failure without acute hypoxic respiratory failure was excluded at the time of discharge.    •  Acute hyopxic respiratory failure diagnosis was confirmed (please document the additional information that supports this diagnosis)    •  Other (specify)    Supporting documentation:   11/14 Pulmonology Consult: 11/14  Consult Note Adult-Pulmonology Resident/Attending IMPRESSION:  Acute hypoxemic respiratory failure ?  Asthma COPD  H/o KASSI/OHS    11/12 Nursing Flowsheet:  Ambulating SaO2 89% RA; Resp Rate 18  Lying SaO2 96% O2 NC 2LPM; Resp Rate 18    11/14 ABG: pH 7.48; pCO2 35; pO2 55%; FiO2 21%; O2 Sat: 90%  11/18 ABG: pH 7.50; pCO2 31; [id number]; O2 Sat 98.4% - Nursing Flowsheet O2 NC 2LPM    11/14 14:28-15:00. Physical Therapy Evaluation: Pt encountered semifowler in bed in NAD, +2L O2 via NC, +redness and pitting edema noted B LEs, pt agreeable to PT and OOB activity. Dr. Brayan Marrero pulmonary team examined pt at b/s prior to OOB activity. SPO2 on 2L O2 via NC 95%, on RA ranged from 90-92% at rest, on RA upon standing decreased 83%, recuperated to 92% with 2L via NC, during amb 88%, recuperated to 96-97% titrated to 3L O2 via NC, maintained 95% on 2L post amb with rest, pt reports 7/10 pain in her R abdomen, Tammy RN notified. Tammy RN aware.    11/26 Home Oxygen Evaluation:  Pulse ox (SpO2) on room air at rest:	89 %  Pulse ox (SpO2) on room air with exertion:	88 %  Pulse ox (SpO2) on	2 L/min  O2 with exertion:	88 %  Provider Attestion (chronic conditions)	All acute illnesses and/or exacerbations have been treated and resolved; patient is in a chronic stable state. Alternative treatment methods have been tried and failed.  Patient needs home oxygen due to hypoxia related to their diagnosis of	COPD  Patient requires home oxygen on	2 L/min  Provider Attestation (acute conditions)	.  Patient needs home oxygen due to hypoxia related to their diagnosis of	COPD exacerbation  Patient requires home oxygen on	2 L/min  Provider attestation (portable oxygen)	Patient needs home and portable oxygen as the patient is mobile in the home.    Discharge Note: Her dyspnea was attributed to deconditioning and obesity, with no evidence of CHF or acute exacerbation of asthma.  # Acute Hypoxic Respiratory Failure likely due to   # Asthma/ COPD exacerbation.  vs ACOS      Thank you,  Karen Menon RN, CCS, CCDS  Contact via Teams

## 2025-01-07 ENCOUNTER — APPOINTMENT (OUTPATIENT)
Dept: PULMONOLOGY | Facility: CLINIC | Age: 65
End: 2025-01-07

## 2025-01-15 ENCOUNTER — APPOINTMENT (OUTPATIENT)
Dept: SURGERY | Facility: CLINIC | Age: 65
End: 2025-01-15

## 2025-02-11 ENCOUNTER — INPATIENT (INPATIENT)
Facility: HOSPITAL | Age: 65
LOS: 18 days | Discharge: NURSING, REHAB OR LONG-TERM CARE | DRG: 641 | End: 2025-03-02
Attending: INTERNAL MEDICINE | Admitting: INTERNAL MEDICINE
Payer: MEDICARE

## 2025-02-11 VITALS
WEIGHT: 207.01 LBS | RESPIRATION RATE: 18 BRPM | OXYGEN SATURATION: 94 % | TEMPERATURE: 98 F | SYSTOLIC BLOOD PRESSURE: 113 MMHG | HEART RATE: 76 BPM | DIASTOLIC BLOOD PRESSURE: 78 MMHG

## 2025-02-11 DIAGNOSIS — Z90.89 ACQUIRED ABSENCE OF OTHER ORGANS: Chronic | ICD-10-CM

## 2025-02-11 DIAGNOSIS — Z98.84 BARIATRIC SURGERY STATUS: Chronic | ICD-10-CM

## 2025-02-11 DIAGNOSIS — L02.212 CUTANEOUS ABSCESS OF BACK [ANY PART, EXCEPT BUTTOCK]: Chronic | ICD-10-CM

## 2025-02-11 DIAGNOSIS — R62.7 ADULT FAILURE TO THRIVE: ICD-10-CM

## 2025-02-11 DIAGNOSIS — Z90.49 ACQUIRED ABSENCE OF OTHER SPECIFIED PARTS OF DIGESTIVE TRACT: Chronic | ICD-10-CM

## 2025-02-11 LAB
ALBUMIN SERPL ELPH-MCNC: 3.8 G/DL — SIGNIFICANT CHANGE UP (ref 3.5–5.2)
ALP SERPL-CCNC: 214 U/L — HIGH (ref 30–115)
ALT FLD-CCNC: 17 U/L — SIGNIFICANT CHANGE UP (ref 0–41)
ANION GAP SERPL CALC-SCNC: 13 MMOL/L — SIGNIFICANT CHANGE UP (ref 7–14)
ANION GAP SERPL CALC-SCNC: 18 MMOL/L — HIGH (ref 7–14)
APPEARANCE UR: CLEAR — SIGNIFICANT CHANGE UP
AST SERPL-CCNC: 49 U/L — HIGH (ref 0–41)
BASOPHILS # BLD AUTO: 0.05 K/UL — SIGNIFICANT CHANGE UP (ref 0–0.2)
BASOPHILS NFR BLD AUTO: 0.6 % — SIGNIFICANT CHANGE UP (ref 0–1)
BILIRUB SERPL-MCNC: 2.2 MG/DL — HIGH (ref 0.2–1.2)
BILIRUB UR-MCNC: NEGATIVE — SIGNIFICANT CHANGE UP
BUN SERPL-MCNC: 11 MG/DL — SIGNIFICANT CHANGE UP (ref 10–20)
BUN SERPL-MCNC: 11 MG/DL — SIGNIFICANT CHANGE UP (ref 10–20)
CALCIUM SERPL-MCNC: 8.4 MG/DL — SIGNIFICANT CHANGE UP (ref 8.4–10.4)
CALCIUM SERPL-MCNC: 9 MG/DL — SIGNIFICANT CHANGE UP (ref 8.4–10.4)
CHLORIDE SERPL-SCNC: 70 MMOL/L — LOW (ref 98–110)
CHLORIDE SERPL-SCNC: 73 MMOL/L — LOW (ref 98–110)
CO2 SERPL-SCNC: 38 MMOL/L — HIGH (ref 17–32)
CO2 SERPL-SCNC: 39 MMOL/L — HIGH (ref 17–32)
COLOR SPEC: SIGNIFICANT CHANGE UP
CREAT SERPL-MCNC: 1.4 MG/DL — SIGNIFICANT CHANGE UP (ref 0.7–1.5)
CREAT SERPL-MCNC: 1.4 MG/DL — SIGNIFICANT CHANGE UP (ref 0.7–1.5)
DIFF PNL FLD: NEGATIVE — SIGNIFICANT CHANGE UP
EGFR: 43 ML/MIN/1.73M2 — LOW
EOSINOPHIL # BLD AUTO: 0.05 K/UL — SIGNIFICANT CHANGE UP (ref 0–0.7)
EOSINOPHIL NFR BLD AUTO: 0.6 % — SIGNIFICANT CHANGE UP (ref 0–8)
GLUCOSE SERPL-MCNC: 80 MG/DL — SIGNIFICANT CHANGE UP (ref 70–99)
GLUCOSE SERPL-MCNC: 92 MG/DL — SIGNIFICANT CHANGE UP (ref 70–99)
GLUCOSE UR QL: NEGATIVE MG/DL — SIGNIFICANT CHANGE UP
HCT VFR BLD CALC: 45.9 % — SIGNIFICANT CHANGE UP (ref 37–47)
HGB BLD-MCNC: 15.4 G/DL — SIGNIFICANT CHANGE UP (ref 12–16)
IMM GRANULOCYTES NFR BLD AUTO: 1 % — HIGH (ref 0.1–0.3)
KETONES UR-MCNC: 15 MG/DL
LEUKOCYTE ESTERASE UR-ACNC: NEGATIVE — SIGNIFICANT CHANGE UP
LIDOCAIN IGE QN: 61 U/L — HIGH (ref 7–60)
LYMPHOCYTES # BLD AUTO: 0.92 K/UL — LOW (ref 1.2–3.4)
LYMPHOCYTES # BLD AUTO: 10.3 % — LOW (ref 20.5–51.1)
MAGNESIUM SERPL-MCNC: 2 MG/DL — SIGNIFICANT CHANGE UP (ref 1.8–2.4)
MCHC RBC-ENTMCNC: 30.1 PG — SIGNIFICANT CHANGE UP (ref 27–31)
MCHC RBC-ENTMCNC: 33.6 G/DL — SIGNIFICANT CHANGE UP (ref 32–37)
MCV RBC AUTO: 89.6 FL — SIGNIFICANT CHANGE UP (ref 81–99)
MONOCYTES # BLD AUTO: 0.6 K/UL — SIGNIFICANT CHANGE UP (ref 0.1–0.6)
MONOCYTES NFR BLD AUTO: 6.7 % — SIGNIFICANT CHANGE UP (ref 1.7–9.3)
NEUTROPHILS # BLD AUTO: 7.26 K/UL — HIGH (ref 1.4–6.5)
NEUTROPHILS NFR BLD AUTO: 80.8 % — HIGH (ref 42.2–75.2)
NITRITE UR-MCNC: NEGATIVE — SIGNIFICANT CHANGE UP
NRBC BLD AUTO-RTO: 0 /100 WBCS — SIGNIFICANT CHANGE UP (ref 0–0)
PH UR: 7 — SIGNIFICANT CHANGE UP (ref 5–8)
PHOSPHATE SERPL-MCNC: 2.6 MG/DL — SIGNIFICANT CHANGE UP (ref 2.1–4.9)
PLATELET # BLD AUTO: 285 K/UL — SIGNIFICANT CHANGE UP (ref 130–400)
PMV BLD: 11.5 FL — HIGH (ref 7.4–10.4)
POTASSIUM SERPL-MCNC: 2.1 MMOL/L — CRITICAL LOW (ref 3.5–5)
POTASSIUM SERPL-MCNC: 2.4 MMOL/L — CRITICAL LOW (ref 3.5–5)
POTASSIUM SERPL-SCNC: 2.1 MMOL/L — CRITICAL LOW (ref 3.5–5)
POTASSIUM SERPL-SCNC: 2.4 MMOL/L — CRITICAL LOW (ref 3.5–5)
PROT SERPL-MCNC: 7 G/DL — SIGNIFICANT CHANGE UP (ref 6–8)
PROT UR-MCNC: SIGNIFICANT CHANGE UP MG/DL
RBC # BLD: 5.12 M/UL — SIGNIFICANT CHANGE UP (ref 4.2–5.4)
RBC # FLD: 13.9 % — SIGNIFICANT CHANGE UP (ref 11.5–14.5)
SODIUM SERPL-SCNC: 126 MMOL/L — LOW (ref 135–146)
SODIUM SERPL-SCNC: 126 MMOL/L — LOW (ref 135–146)
SP GR SPEC: 1.01 — SIGNIFICANT CHANGE UP (ref 1–1.03)
UROBILINOGEN FLD QL: 2 MG/DL (ref 0.2–1)
WBC # BLD: 8.97 K/UL — SIGNIFICANT CHANGE UP (ref 4.8–10.8)
WBC # FLD AUTO: 8.97 K/UL — SIGNIFICANT CHANGE UP (ref 4.8–10.8)

## 2025-02-11 PROCEDURE — 84484 ASSAY OF TROPONIN QUANT: CPT

## 2025-02-11 PROCEDURE — 80074 ACUTE HEPATITIS PANEL: CPT

## 2025-02-11 PROCEDURE — 85025 COMPLETE CBC W/AUTO DIFF WBC: CPT

## 2025-02-11 PROCEDURE — A9500: CPT

## 2025-02-11 PROCEDURE — 82607 VITAMIN B-12: CPT

## 2025-02-11 PROCEDURE — 84134 ASSAY OF PREALBUMIN: CPT

## 2025-02-11 PROCEDURE — 74177 CT ABD & PELVIS W/CONTRAST: CPT | Mod: 26

## 2025-02-11 PROCEDURE — 82962 GLUCOSE BLOOD TEST: CPT

## 2025-02-11 PROCEDURE — 97110 THERAPEUTIC EXERCISES: CPT | Mod: GP

## 2025-02-11 PROCEDURE — 84425 ASSAY OF VITAMIN B-1: CPT

## 2025-02-11 PROCEDURE — 83930 ASSAY OF BLOOD OSMOLALITY: CPT

## 2025-02-11 PROCEDURE — 78452 HT MUSCLE IMAGE SPECT MULT: CPT | Mod: MC

## 2025-02-11 PROCEDURE — 0225U NFCT DS DNA&RNA 21 SARSCOV2: CPT

## 2025-02-11 PROCEDURE — 99223 1ST HOSP IP/OBS HIGH 75: CPT

## 2025-02-11 PROCEDURE — 71045 X-RAY EXAM CHEST 1 VIEW: CPT

## 2025-02-11 PROCEDURE — 84540 ASSAY OF URINE/UREA-N: CPT

## 2025-02-11 PROCEDURE — 0241U: CPT

## 2025-02-11 PROCEDURE — 85027 COMPLETE CBC AUTOMATED: CPT

## 2025-02-11 PROCEDURE — 84100 ASSAY OF PHOSPHORUS: CPT

## 2025-02-11 PROCEDURE — 83935 ASSAY OF URINE OSMOLALITY: CPT

## 2025-02-11 PROCEDURE — 84436 ASSAY OF TOTAL THYROXINE: CPT

## 2025-02-11 PROCEDURE — 93010 ELECTROCARDIOGRAM REPORT: CPT

## 2025-02-11 PROCEDURE — 97530 THERAPEUTIC ACTIVITIES: CPT | Mod: GP

## 2025-02-11 PROCEDURE — 84630 ASSAY OF ZINC: CPT

## 2025-02-11 PROCEDURE — 94640 AIRWAY INHALATION TREATMENT: CPT

## 2025-02-11 PROCEDURE — 83735 ASSAY OF MAGNESIUM: CPT

## 2025-02-11 PROCEDURE — 83880 ASSAY OF NATRIURETIC PEPTIDE: CPT

## 2025-02-11 PROCEDURE — 36415 COLL VENOUS BLD VENIPUNCTURE: CPT

## 2025-02-11 PROCEDURE — 82570 ASSAY OF URINE CREATININE: CPT

## 2025-02-11 PROCEDURE — 84156 ASSAY OF PROTEIN URINE: CPT

## 2025-02-11 PROCEDURE — 82652 VIT D 1 25-DIHYDROXY: CPT

## 2025-02-11 PROCEDURE — 84443 ASSAY THYROID STIM HORMONE: CPT

## 2025-02-11 PROCEDURE — 97163 PT EVAL HIGH COMPLEX 45 MIN: CPT | Mod: GP

## 2025-02-11 PROCEDURE — 80048 BASIC METABOLIC PNL TOTAL CA: CPT

## 2025-02-11 PROCEDURE — 93017 CV STRESS TEST TRACING ONLY: CPT

## 2025-02-11 PROCEDURE — 84300 ASSAY OF URINE SODIUM: CPT

## 2025-02-11 PROCEDURE — 99291 CRITICAL CARE FIRST HOUR: CPT

## 2025-02-11 PROCEDURE — 84133 ASSAY OF URINE POTASSIUM: CPT

## 2025-02-11 PROCEDURE — 85610 PROTHROMBIN TIME: CPT

## 2025-02-11 PROCEDURE — 97116 GAIT TRAINING THERAPY: CPT | Mod: GP

## 2025-02-11 PROCEDURE — 82746 ASSAY OF FOLIC ACID SERUM: CPT

## 2025-02-11 PROCEDURE — 81001 URINALYSIS AUTO W/SCOPE: CPT

## 2025-02-11 PROCEDURE — 93005 ELECTROCARDIOGRAM TRACING: CPT

## 2025-02-11 PROCEDURE — 74018 RADEX ABDOMEN 1 VIEW: CPT

## 2025-02-11 PROCEDURE — 80053 COMPREHEN METABOLIC PANEL: CPT

## 2025-02-11 PROCEDURE — 71045 X-RAY EXAM CHEST 1 VIEW: CPT | Mod: 26

## 2025-02-11 PROCEDURE — 83036 HEMOGLOBIN GLYCOSYLATED A1C: CPT

## 2025-02-11 RX ORDER — MAGNESIUM SULFATE 500 MG/ML
2 SYRINGE (ML) INJECTION ONCE
Refills: 0 | Status: COMPLETED | OUTPATIENT
Start: 2025-02-11 | End: 2025-02-11

## 2025-02-11 RX ORDER — SODIUM CHLORIDE 9 G/1000ML
2900 INJECTION, SOLUTION INTRAVENOUS ONCE
Refills: 0 | Status: DISCONTINUED | OUTPATIENT
Start: 2025-02-11 | End: 2025-02-11

## 2025-02-11 RX ORDER — ACETAMINOPHEN 500 MG/5ML
650 LIQUID (ML) ORAL ONCE
Refills: 0 | Status: COMPLETED | OUTPATIENT
Start: 2025-02-11 | End: 2025-02-11

## 2025-02-11 RX ORDER — SODIUM CHLORIDE 9 G/1000ML
500 INJECTION, SOLUTION INTRAVENOUS ONCE
Refills: 0 | Status: COMPLETED | OUTPATIENT
Start: 2025-02-11 | End: 2025-02-11

## 2025-02-11 RX ADMIN — Medication 50 MILLIEQUIVALENT(S): at 20:12

## 2025-02-11 RX ADMIN — Medication 25 GRAM(S): at 20:11

## 2025-02-11 RX ADMIN — SODIUM CHLORIDE 500 MILLILITER(S): 9 INJECTION, SOLUTION INTRAVENOUS at 17:39

## 2025-02-11 RX ADMIN — Medication 40 MILLIEQUIVALENT(S): at 20:08

## 2025-02-11 RX ADMIN — Medication 650 MILLIGRAM(S): at 16:00

## 2025-02-11 NOTE — ED PROVIDER NOTE - OBJECTIVE STATEMENT
Patient is a 64-year-old female with a past medical history of asthma, hypothyroidism, anxiety, depression, MVP, Jaclyn-en-Y gastric bypass, appendectomy, cholecystectomy, and chronic back pain (on methadone) presenting for multiple medical complaints. Patient endorses generalized weakness/fatigue, significant recent weight loss, decreased PO intake, persistent diffuse abdominal discomfort, and worsening difficulty urinating for the past month. Patient denies fevers, chest pain, shortness of breath, nausea, vomiting, diarrhea, constipation, hematuria, dysuria, or additional complaints.

## 2025-02-11 NOTE — H&P ADULT - ATTENDING COMMENTS
64-year-old female with a history of asthma, hypothyroidism, anxiety, depression, MVP, Jaclyn-en-Y gastric bypass, appendectomy, cholecystectomy, and chronic back pain (on methadone) is presenting for decreased PO intake x3-4 weeks, diffuse abdominal pain, and weakness.      Agree  with assessment  except for changes below.   Vital Signs Last 24 Hrs  T(C): 36.4 (11 Feb 2025 23:41), Max: 36.7 (11 Feb 2025 13:52)  T(F): 97.5 (11 Feb 2025 23:41), Max: 98 (11 Feb 2025 13:52)  HR: 69 (11 Feb 2025 23:41) (69 - 76)  BP: 119/77 (11 Feb 2025 23:41) (113/78 - 119/77)  BP(mean): --  RR: 18 (11 Feb 2025 13:52) (18 - 18)  SpO2: 98% (11 Feb 2025 23:41) (94% - 98%)    Parameters below as of 11 Feb 2025 23:41  Patient On (Oxygen Delivery Method): nasal cannula  O2 Flow (L/min): 3    CTA abdomen 11/13: No acute abdominal or pelvic pathology. Atherosclerotic calcifications of the aorta and its branches. No evidence of celiac axis or SMA abnormality.    CTAP this admission negative for any acute pathology    IMPRESSION   Severe Hypokalemia and Moderate Hyponatremia in setting of Decreased PO Intake and Bumex Use  Diffuse Abdominal Pain Sever Hypokalemia   Prolonged QTC in setting of Hypokalemia and Multiple QTC-Prolonging Meds  Hx of Jaclyn-en-Y Bypass  Correct electrolytes (Target Na = 135-145 | Mg = >2.2 | K = 3.5-5)   Admit to Tele   No need for urgent RRT  Monitor Cr/BUN Electrolytes including Phosphorus  Avoid Nephro toxins   PT    Eval calorie  Count   Nutritional Eval   Hold  Diuresis     Hx DM  - A1c last admission 7  - Had episodes of hypoglycemia  - If glucose consistently >150 can start ISS    Hx of COPD on Home O2 PRN not in Exacerbation  - TTE 2024 --> 60-65% EF  - TTE with bubbles 11/19: suspected pulmonary shunt.   - C/w home inhalers  - Hold home Bumex for now    Hypothyroidism - C/w home levothyroxine    Hx Anxiety  Hx Depression  - Patient on Lamotrigine, Zoloft, and Xanax - Hold  for QT Temporarily     Hx Chronic Back Pain w/ Hx of Laminectomy   - c/w  Methadone 30mg q8h a    Hx Alcohol Use Disorder  Hx Hepatic Steatosis  Ciwa  monitoring PRN   C/w B12 and Folate supplement 64-year-old female with a history of asthma, hypothyroidism, anxiety, depression, MVP, Jaclyn-en-Y gastric bypass, appendectomy, cholecystectomy, and chronic back pain (on methadone) is presenting for decreased PO intake x3-4 weeks, diffuse abdominal pain, and weakness.      Agree  with assessment  except for changes below.   Vital Signs Last 24 Hrs  T(C): 36.4 (11 Feb 2025 23:41), Max: 36.7 (11 Feb 2025 13:52)  T(F): 97.5 (11 Feb 2025 23:41), Max: 98 (11 Feb 2025 13:52)  HR: 69 (11 Feb 2025 23:41) (69 - 76)  BP: 119/77 (11 Feb 2025 23:41) (113/78 - 119/77)  BP(mean): --  RR: 18 (11 Feb 2025 13:52) (18 - 18)  SpO2: 98% (11 Feb 2025 23:41) (94% - 98%)    Parameters below as of 11 Feb 2025 23:41  Patient On (Oxygen Delivery Method): nasal cannula  O2 Flow (L/min): 3    CTA abdomen 11/13: No acute abdominal or pelvic pathology. Atherosclerotic calcifications of the aorta and its branches. No evidence of celiac axis or SMA abnormality.    CTAP this admission negative for any acute pathology    IMPRESSION   Severe Hypokalemia and Moderate Hyponatremia in setting of Decreased PO Intake and Bumex Use  Diffuse Abdominal Pain Sever Hypokalemia   Recurrent vomiting   Prolonged QTC in setting of Hypokalemia and Multiple QTC-Prolonging Meds  Hx of Jaclyn-en-Y Bypass  Correct electrolytes (Target Na = 135-145 | Mg = >2.2 | K = 3.5-5)   Admit to Tele   No need for urgent RRT  Monitor Cr/BUN Electrolytes including Phosphorus  Avoid Nephro toxins   PT    Eval calorie  Count   Nutritional Eval   Hold  Diuresis   GI Eval     Hx DM  - A1c last admission 7  - Had episodes of hypoglycemia  - If glucose consistently >150 can start ISS    Hx of COPD on Home O2 PRN not in Exacerbation  - TTE 2024 --> 60-65% EF  - TTE with bubbles 11/19: suspected pulmonary shunt.   - C/w home inhalers  - Hold home Bumex for now    Hypothyroidism - C/w home levothyroxine    Hx Anxiety  Hx Depression  - Patient on Lamotrigine, Zoloft, and Xanax - Hold  for QT Temporarily     Hx Chronic Back Pain w/ Hx of Laminectomy   - c/w  Methadone 30mg q8h a    Hx Alcohol Use Disorder  Hx Hepatic Steatosis  Ciwa  monitoring PRN   C/w B12 and Folate supplement    Seen on 02/11

## 2025-02-11 NOTE — H&P ADULT - NSHPREVIEWOFSYSTEMS_GEN_ALL_CORE
Constitutional: Endorses weakness  Eyes/ENT: No visual changes. No vertigo or throat pain   Neck: No pain or stiffness  Respiratory: Feels SOB, tired  Cardiovascular: No chest pain or palpitations  Gastrointestinal: Endorses diffuse abdominal pain, early satiety, no nausea, vomiting, or diarrhea.  Genitourinary: No dysuria, frequency, or hematuria  Musculoskeletal: FROM all extremities, feels weak when moving  Neurological: No numbness or weakness  Skin: No itching or rashes

## 2025-02-11 NOTE — ED PROVIDER NOTE - INPATIENT RECORD SUMMARY
Inpatient notes reviewed from December 2024 which showed admission for syncope with prolonged QTc secondary to methadone and sertraline and acute hypoxic respiratory failure secondary to COPD

## 2025-02-11 NOTE — ED PROVIDER NOTE - CLINICAL SUMMARY MEDICAL DECISION MAKING FREE TEXT BOX
Patient evaluated for dehydration found to have multiple electrolyte abnormalities with prolonged QTc.  Given abnormalities potassium and magnesium repleted.  Patient given IV fluids.  Patient admitted for further electrolyte abnormalities and repletion

## 2025-02-11 NOTE — ED PROVIDER NOTE - CARE PLAN
Principal Discharge DX:	Adult failure to thrive  Secondary Diagnosis:	Hypokalemia  Secondary Diagnosis:	Electrolyte abnormality   1 Principal Discharge DX:	Adult failure to thrive  Secondary Diagnosis:	Hypokalemia  Secondary Diagnosis:	Electrolyte abnormality  Secondary Diagnosis:	Hyponatremia  Secondary Diagnosis:	Abnormal QT interval present on electrocardiogram

## 2025-02-11 NOTE — H&P ADULT - ASSESSMENT
#Misc  #Code Status:  #DVT ppx:  #GI ppx:  #Diet:  #Activity:  #Inpatient Dispo: Telemetry  #Discharge Dispo: #Severe Hypokalemia and Moderate Hyponatremia in setting of Decreased PO Intake and Bumex Use  #Diffuse Abdominal Pain in setting of Hx of Jaclyn-en-Y Bypass  #CEM on CKD   #Prolonged QTC in setting of Hypokalemia and Multiple QTC-Prolonging Meds  - Endorses lack of appetite and early satiety x3-4 weeks  - K 2.1, Na 126  - EGD/ Colonoscopy 5/2023: Non erosive gastritis  - CTA abdomen 11/13: No acute abdominal or pelvic pathology. Atherosclerotic calcifications of the aorta and its branches. No evidence of celiac axis or SMA abnormality.  - CTAP this admission negative for any acute pathology  - No infectious etiology suspected  - Can consider bariatric sx eval  - Keep on Tele  - Keep Mg >2, replete K to >4  - Send urine studies  - Nutrition eval  - Nephro eval  - PT eval    #DM  - A1c last admission 7  - Had episodes of hypoglycemia  - If glucose consistently >150 can start ISS    #Hx of COPD on Home O2 PRN not in Exacerbation  - TTE 2024 --> 60-65% EF  - TTE with bubbles 11/19: suspected pulmonary shunt.   - C/w home inhalers  - Hold home Bumex for now    #Hypothyroidism  - C/w home levothyroxine    #Anxiety  #Depression  - Patient on Lamotrigine, Zoloft, and Xanax  - Holding medication overnight due to severe hypokalemia w/ prolonged QTC  - Trend lytes and EKG and re-introduce tomorrow     #Chronic Back Pain w/ Hx of Laminectomy   - Takes Methadone 30mg q8h at home  - Repeat EKG and BMP, will give Methadone dose if QTC improving    #Alcohol Use Disorder  #Hepatic Steatosis  - Previously 2 scotchs per night or more  - No alcohol since November per pt  - RUQ US last admission shows fatty liver likely due to obesity/alcohol use  - C/w B12 and Folate supplement    #Misc  #Code Status: Full Code  #DVT ppx: Heparin subcutaneous  #GI ppx: Protonix  #Diet: DASH  #Activity: AAT  #Inpatient Dispo: Telemetry  #Discharge Dispo: From Home

## 2025-02-11 NOTE — H&P ADULT - HISTORY OF PRESENT ILLNESS
Vitals  Temp: 98.6  HR: 76  BP: 113/78  O2: 94% on 3L NC  RR: 18    Labs  WBC: 8.97  HgB: 15.4  Plt: 285  Cr: 1.4 (baseline 1.0)  Na: 126  K: 2.4 -> 2.1  A -> 13  Bicarb: 39  ALK: 214  AST: 49  ALT: 17  Lipase: 61  M.0  Phos: 2.6    Imaging  CXR: No radiographic evidence of acute cardiopulmonary disease.    CTAP: No evidence of acute abdominal pathology.    In the ED  Potassium Chloride 40 mEq PO x2  KCL 40 mEq IV ordered  KCL 40 mEq PO ordered  Mg Sufate 2g IV x1  Tylenol 650mg PO x1 64-year-old female with a history of asthma, hypothyroidism, anxiety, depression, MVP, Jaclyn-en-Y gastric bypass, appendectomy, cholecystectomy, and chronic back pain (on methadone) is presenting for decreased PO intake x3-4 weeks, diffuse abdominal pain, and weakness. She used to drink 2 scotchs per night but has not drank alcohol since she was hospitalized in November. She lives at home with her  whom helps care for her, however he recently was admitted to the hospital and is currently at a STR where she was left alone to care for herself. She states that she has had a non-specific generalized abdominal pain not associated with nausea, vomiting, or diarrhea. She has decreased PO intake and only eats 1-2 bites/sips of food or drink before she feels full. She was found to be significantly hypokalemic on admission and is admitted for further management. after a syncopal episode. She lost consciousness briefly while sitting after a shower. She also reported 5 days of worsening abdominal pain, nausea, vomiting, decreased oral intake, and constipation. She reports regular alcohol use (a couple of glasses of scotch nightly) and worsening shortness of breath for the past 2-3 months, requiring near-daily albuterol nebulizer treatments.    Of note, she was discharged from [facility] in December after a syncopal episode. Per previous documentation, the syncopal episode was likely vasovagal, though a prolonged QTc (603 on admission EKG) raised concern for arrhythmia. This was likely secondary to methadone and sertraline. Telemetry showed no arrhythmias. Echocardiogram showed normal LVEF (65%). CT chest was negative for PE. Abdominal pain workup, including CT abdomen and abdominal ultrasound, revealed hepatomegaly and fatty liver infiltration, but no acute pathology. Lipase was normal. LFTs were mildly elevated. Elevated lactate (3.0) was attributed to alcohol use. Her dyspnea was attributed to deconditioning and obesity, with no evidence of CHF or acute exacerbation of asthma. Leg edema was stable and mild.    Vitals  Temp: 98.6  HR: 76  BP: 113/78  O2: 94% on 3L NC  RR: 18    Labs  WBC: 8.97  HgB: 15.4  Plt: 285  Cr: 1.4 (baseline 1.0)  Na: 126  K: 2.4 -> 2.1  A -> 13  Bicarb: 39  ALK: 214  AST: 49  ALT: 17  Lipase: 61  M.0  Phos: 2.6    Imaging  CXR: No radiographic evidence of acute cardiopulmonary disease.    CTAP: No evidence of acute abdominal pathology.    In the ED  Potassium Chloride 40 mEq PO x2  KCL 40 mEq IV ordered  KCL 40 mEq PO ordered  Mg Suflate 2g IV x1  Tylenol 650mg PO x1

## 2025-02-11 NOTE — ED PROVIDER NOTE - ATTENDING CONTRIBUTION TO CARE
64-year-old female with a history of asthma, hypothyroidism, anxiety, depression, MVP, Jaclyn-en-Y gastric bypass, appendectomy, cholecystectomy, and chronic back pain (on methadone) 64-year-old female with a history of asthma, hypothyroidism, anxiety, depression, MVP, Jaclyn-en-Y gastric bypass, appendectomy, cholecystectomy, and chronic back pain (on methadone) 64-year-old female with history of asthma hypothyroid depression Jaclyn-en-Y gastric bypass appendectomy cholecystectomy who is presenting for generalized weakness and fatigue with difficulty urinating decreased oral appetite and vomiting.  On exam there are some mild discomfort in the abdomen.  There are some 1+ pitting edema to lower extremities.  Oropharynx is dry.  Cap refill is normal.  The lungs are clear.  Plan is to obtain labs provide IV fluids and reassess.  Patient found to have multiple electrolyte abnormalities with prolonged QTc requiring repletion.

## 2025-02-11 NOTE — ED PROVIDER NOTE - EKG/XRAY ADDITIONAL INFORMATION
Normal sinus rhythm with a heart rate 71.  162 QRS 8018 QTc 630 prolonged QTc no elevations or depressions  independent interpretation of ED X-Ray films performed by Dr. Bird Shields shows no inflitrate no ptx

## 2025-02-11 NOTE — ED ADULT TRIAGE NOTE - CHIEF COMPLAINT QUOTE
Patient bibems from home a+ox3 in NAD co generalized weakness, loss of appetite, dizziness and SOB X 1 month.  on scene. pt on home O2 3L for COPD

## 2025-02-11 NOTE — ED PROVIDER NOTE - PHYSICAL EXAMINATION
VITAL SIGNS: I have reviewed nursing notes and confirm.  CONSTITUTIONAL: Non-toxic, in NAD  SKIN: Slightly pallor dry, normal skin turgor  HEAD: NCAT  EYES: No conjunctival injection, scleral anicteric  ENT: Moist mucous membranes, normal pharynx with no erythema or exudates  NECK: Supple; full ROM. Nontender. No cervical LAD  CARD: RRR, no murmurs, rubs or gallops  RESP: Clear to ausculation bilaterally.  No rales, rhonchi, or wheezing.  ABD: distended, diffusely tender  EXT: Full ROM, no bony or calf tenderness, venous dermatitis

## 2025-02-11 NOTE — H&P ADULT - NSHPPHYSICALEXAM_GEN_ALL_CORE
General: NAD, lying in bed comfortably  Head: NC in place  Neck: Supple, no JVD  Cardiac: Regular rate and rhythm  Pulmonary: No wheezing  Abdominal: Soft, mild diffuse tenderness to palpation  Extremities: No pitting edema  Neurologic: AOx3, nonfocal  Skin: No rashes or lesions

## 2025-02-11 NOTE — ED ADULT NURSE NOTE - NSFALLHARMRISKINTERV_ED_ALL_ED

## 2025-02-12 LAB
ALBUMIN SERPL ELPH-MCNC: 2.9 G/DL — LOW (ref 3.5–5.2)
ALP SERPL-CCNC: 157 U/L — HIGH (ref 30–115)
ALT FLD-CCNC: 12 U/L — SIGNIFICANT CHANGE UP (ref 0–41)
ANION GAP SERPL CALC-SCNC: 10 MMOL/L — SIGNIFICANT CHANGE UP (ref 7–14)
ANION GAP SERPL CALC-SCNC: 13 MMOL/L — SIGNIFICANT CHANGE UP (ref 7–14)
ANION GAP SERPL CALC-SCNC: 15 MMOL/L — HIGH (ref 7–14)
APPEARANCE UR: CLEAR — SIGNIFICANT CHANGE UP
AST SERPL-CCNC: 36 U/L — SIGNIFICANT CHANGE UP (ref 0–41)
BASOPHILS # BLD AUTO: 0.05 K/UL — SIGNIFICANT CHANGE UP (ref 0–0.2)
BASOPHILS NFR BLD AUTO: 0.9 % — SIGNIFICANT CHANGE UP (ref 0–1)
BILIRUB SERPL-MCNC: 1.7 MG/DL — HIGH (ref 0.2–1.2)
BILIRUB UR-MCNC: NEGATIVE — SIGNIFICANT CHANGE UP
BUN SERPL-MCNC: 10 MG/DL — SIGNIFICANT CHANGE UP (ref 10–20)
BUN SERPL-MCNC: 8 MG/DL — LOW (ref 10–20)
BUN SERPL-MCNC: 9 MG/DL — LOW (ref 10–20)
CALCIUM SERPL-MCNC: 7.9 MG/DL — LOW (ref 8.4–10.4)
CALCIUM SERPL-MCNC: 7.9 MG/DL — LOW (ref 8.4–10.5)
CALCIUM SERPL-MCNC: 8.3 MG/DL — LOW (ref 8.4–10.5)
CHLORIDE SERPL-SCNC: 78 MMOL/L — LOW (ref 98–110)
CHLORIDE SERPL-SCNC: 83 MMOL/L — LOW (ref 98–110)
CHLORIDE SERPL-SCNC: 87 MMOL/L — LOW (ref 98–110)
CO2 SERPL-SCNC: 33 MMOL/L — HIGH (ref 17–32)
CO2 SERPL-SCNC: 34 MMOL/L — HIGH (ref 17–32)
CO2 SERPL-SCNC: 34 MMOL/L — HIGH (ref 17–32)
COLOR SPEC: YELLOW — SIGNIFICANT CHANGE UP
CREAT ?TM UR-MCNC: 57 MG/DL — SIGNIFICANT CHANGE UP
CREAT SERPL-MCNC: 1.1 MG/DL — SIGNIFICANT CHANGE UP (ref 0.7–1.5)
CREAT SERPL-MCNC: 1.1 MG/DL — SIGNIFICANT CHANGE UP (ref 0.7–1.5)
CREAT SERPL-MCNC: 1.3 MG/DL — SIGNIFICANT CHANGE UP (ref 0.7–1.5)
CULTURE RESULTS: NO GROWTH — SIGNIFICANT CHANGE UP
DIFF PNL FLD: ABNORMAL
EGFR: 46 ML/MIN/1.73M2 — LOW
EGFR: 46 ML/MIN/1.73M2 — LOW
EGFR: 57 ML/MIN/1.73M2 — LOW
EOSINOPHIL # BLD AUTO: 0.13 K/UL — SIGNIFICANT CHANGE UP (ref 0–0.7)
EOSINOPHIL NFR BLD AUTO: 2.4 % — SIGNIFICANT CHANGE UP (ref 0–8)
FOLATE SERPL-MCNC: 11.3 NG/ML — SIGNIFICANT CHANGE UP
GLUCOSE BLDC GLUCOMTR-MCNC: 132 MG/DL — HIGH (ref 70–99)
GLUCOSE BLDC GLUCOMTR-MCNC: 82 MG/DL — SIGNIFICANT CHANGE UP (ref 70–99)
GLUCOSE BLDC GLUCOMTR-MCNC: 85 MG/DL — SIGNIFICANT CHANGE UP (ref 70–99)
GLUCOSE BLDC GLUCOMTR-MCNC: 85 MG/DL — SIGNIFICANT CHANGE UP (ref 70–99)
GLUCOSE BLDC GLUCOMTR-MCNC: 88 MG/DL — SIGNIFICANT CHANGE UP (ref 70–99)
GLUCOSE SERPL-MCNC: 61 MG/DL — LOW (ref 70–99)
GLUCOSE SERPL-MCNC: 77 MG/DL — SIGNIFICANT CHANGE UP (ref 70–99)
GLUCOSE SERPL-MCNC: 82 MG/DL — SIGNIFICANT CHANGE UP (ref 70–99)
GLUCOSE UR QL: NEGATIVE MG/DL — SIGNIFICANT CHANGE UP
HCT VFR BLD CALC: 37.6 % — SIGNIFICANT CHANGE UP (ref 37–47)
HGB BLD-MCNC: 12.5 G/DL — SIGNIFICANT CHANGE UP (ref 12–16)
IMM GRANULOCYTES NFR BLD AUTO: 1.5 % — HIGH (ref 0.1–0.3)
KETONES UR-MCNC: ABNORMAL MG/DL
LEUKOCYTE ESTERASE UR-ACNC: ABNORMAL
LYMPHOCYTES # BLD AUTO: 0.94 K/UL — LOW (ref 1.2–3.4)
LYMPHOCYTES # BLD AUTO: 17.1 % — LOW (ref 20.5–51.1)
MAGNESIUM SERPL-MCNC: 2.8 MG/DL — HIGH (ref 1.8–2.4)
MAGNESIUM SERPL-MCNC: 3.3 MG/DL — CRITICAL HIGH (ref 1.8–2.4)
MCHC RBC-ENTMCNC: 30.5 PG — SIGNIFICANT CHANGE UP (ref 27–31)
MCHC RBC-ENTMCNC: 33.2 G/DL — SIGNIFICANT CHANGE UP (ref 32–37)
MCV RBC AUTO: 91.7 FL — SIGNIFICANT CHANGE UP (ref 81–99)
MONOCYTES # BLD AUTO: 0.43 K/UL — SIGNIFICANT CHANGE UP (ref 0.1–0.6)
MONOCYTES NFR BLD AUTO: 7.8 % — SIGNIFICANT CHANGE UP (ref 1.7–9.3)
NEUTROPHILS # BLD AUTO: 3.87 K/UL — SIGNIFICANT CHANGE UP (ref 1.4–6.5)
NEUTROPHILS NFR BLD AUTO: 70.3 % — SIGNIFICANT CHANGE UP (ref 42.2–75.2)
NITRITE UR-MCNC: NEGATIVE — SIGNIFICANT CHANGE UP
NRBC BLD AUTO-RTO: 0 /100 WBCS — SIGNIFICANT CHANGE UP (ref 0–0)
OSMOLALITY SERPL: 264 MOS/KG — LOW (ref 280–301)
OSMOLALITY UR: 218 MOS/KG — SIGNIFICANT CHANGE UP (ref 50–1200)
PH UR: 7.5 — SIGNIFICANT CHANGE UP (ref 5–8)
PLATELET # BLD AUTO: 206 K/UL — SIGNIFICANT CHANGE UP (ref 130–400)
PMV BLD: 10.6 FL — HIGH (ref 7.4–10.4)
POTASSIUM SERPL-MCNC: 2.4 MMOL/L — CRITICAL LOW (ref 3.5–5)
POTASSIUM SERPL-MCNC: 2.4 MMOL/L — CRITICAL LOW (ref 3.5–5)
POTASSIUM SERPL-MCNC: 2.7 MMOL/L — CRITICAL LOW (ref 3.5–5)
POTASSIUM SERPL-SCNC: 2.4 MMOL/L — CRITICAL LOW (ref 3.5–5)
POTASSIUM SERPL-SCNC: 2.4 MMOL/L — CRITICAL LOW (ref 3.5–5)
POTASSIUM SERPL-SCNC: 2.7 MMOL/L — CRITICAL LOW (ref 3.5–5)
POTASSIUM UR-SCNC: 13 MMOL/L — SIGNIFICANT CHANGE UP
PROT ?TM UR-MCNC: 12 MG/DLG/24H — SIGNIFICANT CHANGE UP
PROT SERPL-MCNC: 5.2 G/DL — LOW (ref 6–8)
PROT UR-MCNC: SIGNIFICANT CHANGE UP MG/DL
PROT/CREAT UR-RTO: 0.2 RATIO — SIGNIFICANT CHANGE UP (ref 0–0.2)
RBC # BLD: 4.1 M/UL — LOW (ref 4.2–5.4)
RBC # FLD: 14.1 % — SIGNIFICANT CHANGE UP (ref 11.5–14.5)
SODIUM SERPL-SCNC: 127 MMOL/L — LOW (ref 135–146)
SODIUM SERPL-SCNC: 129 MMOL/L — LOW (ref 135–146)
SODIUM SERPL-SCNC: 131 MMOL/L — LOW (ref 135–146)
SODIUM UR-SCNC: 20 MMOL/L — SIGNIFICANT CHANGE UP
SP GR SPEC: 1.02 — SIGNIFICANT CHANGE UP (ref 1–1.03)
SPECIMEN SOURCE: SIGNIFICANT CHANGE UP
T4 AB SER-ACNC: 6.4 UG/DL — SIGNIFICANT CHANGE UP (ref 4.6–12)
TSH SERPL-MCNC: 3.93 UIU/ML — SIGNIFICANT CHANGE UP (ref 0.27–4.2)
UROBILINOGEN FLD QL: 1 MG/DL — SIGNIFICANT CHANGE UP (ref 0.2–1)
UUN UR-MCNC: 194 MG/DL — SIGNIFICANT CHANGE UP
VIT B12 SERPL-MCNC: >2000 PG/ML — HIGH (ref 232–1245)
WBC # BLD: 5.5 K/UL — SIGNIFICANT CHANGE UP (ref 4.8–10.8)
WBC # FLD AUTO: 5.5 K/UL — SIGNIFICANT CHANGE UP (ref 4.8–10.8)

## 2025-02-12 PROCEDURE — 93010 ELECTROCARDIOGRAM REPORT: CPT

## 2025-02-12 PROCEDURE — 99232 SBSQ HOSP IP/OBS MODERATE 35: CPT

## 2025-02-12 PROCEDURE — 74018 RADEX ABDOMEN 1 VIEW: CPT | Mod: 26

## 2025-02-12 RX ORDER — LACTULOSE 10 G/15ML
10 SOLUTION ORAL DAILY
Refills: 0 | Status: DISCONTINUED | OUTPATIENT
Start: 2025-02-12 | End: 2025-03-02

## 2025-02-12 RX ORDER — INFLUENZA A VIRUS A/IDAHO/07/2018 (H1N1) ANTIGEN (MDCK CELL DERIVED, PROPIOLACTONE INACTIVATED, INFLUENZA A VIRUS A/INDIANA/08/2018 (H3N2) ANTIGEN (MDCK CELL DERIVED, PROPIOLACTONE INACTIVATED), INFLUENZA B VIRUS B/SINGAPORE/INFTT-16-0610/2016 ANTIGEN (MDCK CELL DERIVED, PROPIOLACTONE INACTIVATED), INFLUENZA B VIRUS B/IOWA/06/2017 ANTIGEN (MDCK CELL DERIVED, PROPIOLACTONE INACTIVATED) 15; 15; 15; 15 UG/.5ML; UG/.5ML; UG/.5ML; UG/.5ML
0.5 INJECTION, SUSPENSION INTRAMUSCULAR ONCE
Refills: 0 | Status: DISCONTINUED | OUTPATIENT
Start: 2025-02-12 | End: 2025-03-02

## 2025-02-12 RX ORDER — KETOROLAC TROMETHAMINE 30 MG/ML
30 INJECTION, SOLUTION INTRAMUSCULAR; INTRAVENOUS ONCE
Refills: 0 | Status: DISCONTINUED | OUTPATIENT
Start: 2025-02-12 | End: 2025-02-12

## 2025-02-12 RX ORDER — HYDROMORPHONE/SOD CHLOR,ISO/PF 2 MG/10 ML
1 SYRINGE (ML) INJECTION ONCE
Refills: 0 | Status: DISCONTINUED | OUTPATIENT
Start: 2025-02-12 | End: 2025-02-12

## 2025-02-12 RX ORDER — SODIUM CHLORIDE 9 G/1000ML
1000 INJECTION, SOLUTION INTRAVENOUS
Refills: 0 | Status: DISCONTINUED | OUTPATIENT
Start: 2025-02-12 | End: 2025-02-12

## 2025-02-12 RX ORDER — KETOROLAC TROMETHAMINE 30 MG/ML
15 INJECTION, SOLUTION INTRAMUSCULAR; INTRAVENOUS ONCE
Refills: 0 | Status: DISCONTINUED | OUTPATIENT
Start: 2025-02-12 | End: 2025-02-12

## 2025-02-12 RX ORDER — HYDROMORPHONE/SOD CHLOR,ISO/PF 2 MG/10 ML
0.5 SYRINGE (ML) INJECTION ONCE
Refills: 0 | Status: DISCONTINUED | OUTPATIENT
Start: 2025-02-12 | End: 2025-02-12

## 2025-02-12 RX ORDER — HYDROMORPHONE/SOD CHLOR,ISO/PF 2 MG/10 ML
2 SYRINGE (ML) INJECTION ONCE
Refills: 0 | Status: DISCONTINUED | OUTPATIENT
Start: 2025-02-12 | End: 2025-02-12

## 2025-02-12 RX ORDER — SODIUM CHLORIDE 9 G/1000ML
1000 INJECTION, SOLUTION INTRAVENOUS
Refills: 0 | Status: COMPLETED | OUTPATIENT
Start: 2025-02-12 | End: 2025-02-12

## 2025-02-12 RX ORDER — HEPARIN SODIUM 1000 [USP'U]/ML
5000 INJECTION INTRAVENOUS; SUBCUTANEOUS EVERY 12 HOURS
Refills: 0 | Status: DISCONTINUED | OUTPATIENT
Start: 2025-02-12 | End: 2025-03-02

## 2025-02-12 RX ORDER — POLYETHYLENE GLYCOL 3350 17 G/17G
17 POWDER, FOR SOLUTION ORAL DAILY
Refills: 0 | Status: DISCONTINUED | OUTPATIENT
Start: 2025-02-12 | End: 2025-03-02

## 2025-02-12 RX ORDER — FOLIC ACID 1 MG/1
1 TABLET ORAL DAILY
Refills: 0 | Status: DISCONTINUED | OUTPATIENT
Start: 2025-02-12 | End: 2025-03-02

## 2025-02-12 RX ORDER — CYANOCOBALAMIN 1000 UG/ML
1000 INJECTION INTRAMUSCULAR; SUBCUTANEOUS DAILY
Refills: 0 | Status: DISCONTINUED | OUTPATIENT
Start: 2025-02-12 | End: 2025-03-02

## 2025-02-12 RX ORDER — ALBUTEROL SULFATE 2.5 MG/3ML
2 VIAL, NEBULIZER (ML) INHALATION EVERY 6 HOURS
Refills: 0 | Status: DISCONTINUED | OUTPATIENT
Start: 2025-02-12 | End: 2025-03-02

## 2025-02-12 RX ORDER — MAGNESIUM SULFATE 500 MG/ML
2 SYRINGE (ML) INJECTION ONCE
Refills: 0 | Status: COMPLETED | OUTPATIENT
Start: 2025-02-12 | End: 2025-02-12

## 2025-02-12 RX ORDER — SENNA 187 MG
2 TABLET ORAL AT BEDTIME
Refills: 0 | Status: DISCONTINUED | OUTPATIENT
Start: 2025-02-12 | End: 2025-03-02

## 2025-02-12 RX ORDER — MELATONIN 5 MG
5 TABLET ORAL AT BEDTIME
Refills: 0 | Status: DISCONTINUED | OUTPATIENT
Start: 2025-02-12 | End: 2025-03-02

## 2025-02-12 RX ORDER — LEVOTHYROXINE SODIUM 300 MCG
100 TABLET ORAL DAILY
Refills: 0 | Status: DISCONTINUED | OUTPATIENT
Start: 2025-02-12 | End: 2025-03-02

## 2025-02-12 RX ORDER — ACETAMINOPHEN 500 MG/5ML
650 LIQUID (ML) ORAL EVERY 6 HOURS
Refills: 0 | Status: DISCONTINUED | OUTPATIENT
Start: 2025-02-12 | End: 2025-02-13

## 2025-02-12 RX ORDER — HYDROMORPHONE/SOD CHLOR,ISO/PF 2 MG/10 ML
0.2 SYRINGE (ML) INJECTION ONCE
Refills: 0 | Status: DISCONTINUED | OUTPATIENT
Start: 2025-02-12 | End: 2025-02-12

## 2025-02-12 RX ORDER — IPRATROPIUM BROMIDE AND ALBUTEROL SULFATE .5; 2.5 MG/3ML; MG/3ML
3 SOLUTION RESPIRATORY (INHALATION) EVERY 6 HOURS
Refills: 0 | Status: DISCONTINUED | OUTPATIENT
Start: 2025-02-12 | End: 2025-03-02

## 2025-02-12 RX ADMIN — KETOROLAC TROMETHAMINE 30 MILLIGRAM(S): 30 INJECTION, SOLUTION INTRAMUSCULAR; INTRAVENOUS at 01:33

## 2025-02-12 RX ADMIN — Medication 100 MICROGRAM(S): at 05:00

## 2025-02-12 RX ADMIN — Medication 25 GRAM(S): at 06:05

## 2025-02-12 RX ADMIN — Medication 5 MILLIGRAM(S): at 22:41

## 2025-02-12 RX ADMIN — Medication 1 MILLIGRAM(S): at 23:21

## 2025-02-12 RX ADMIN — Medication 40 MILLIGRAM(S): at 05:00

## 2025-02-12 RX ADMIN — Medication 50 MILLIEQUIVALENT(S): at 09:17

## 2025-02-12 RX ADMIN — CYANOCOBALAMIN 1000 MICROGRAM(S): 1000 INJECTION INTRAMUSCULAR; SUBCUTANEOUS at 12:55

## 2025-02-12 RX ADMIN — Medication 1 MILLIGRAM(S): at 22:51

## 2025-02-12 RX ADMIN — Medication 50 MILLIEQUIVALENT(S): at 01:59

## 2025-02-12 RX ADMIN — Medication 1 MILLIGRAM(S): at 18:33

## 2025-02-12 RX ADMIN — SODIUM CHLORIDE 100 MILLILITER(S): 9 INJECTION, SOLUTION INTRAVENOUS at 01:18

## 2025-02-12 RX ADMIN — FOLIC ACID 1 MILLIGRAM(S): 1 TABLET ORAL at 12:55

## 2025-02-12 RX ADMIN — Medication 0.2 MILLIGRAM(S): at 11:26

## 2025-02-12 RX ADMIN — Medication 0.2 MILLIGRAM(S): at 12:00

## 2025-02-12 RX ADMIN — Medication 50 MILLIEQUIVALENT(S): at 00:03

## 2025-02-12 RX ADMIN — SODIUM CHLORIDE 50 MILLILITER(S): 9 INJECTION, SOLUTION INTRAVENOUS at 19:07

## 2025-02-12 RX ADMIN — Medication 75 MILLILITER(S): at 09:44

## 2025-02-12 RX ADMIN — KETOROLAC TROMETHAMINE 30 MILLIGRAM(S): 30 INJECTION, SOLUTION INTRAMUSCULAR; INTRAVENOUS at 01:18

## 2025-02-12 RX ADMIN — Medication 50 MILLIEQUIVALENT(S): at 04:00

## 2025-02-12 RX ADMIN — Medication 1 MILLIGRAM(S): at 02:19

## 2025-02-12 RX ADMIN — Medication 1 MILLIGRAM(S): at 19:28

## 2025-02-12 RX ADMIN — Medication 50 MILLIEQUIVALENT(S): at 06:05

## 2025-02-12 RX ADMIN — Medication 1 MILLIGRAM(S): at 02:34

## 2025-02-12 RX ADMIN — Medication 50 MILLIEQUIVALENT(S): at 18:37

## 2025-02-12 RX ADMIN — Medication 50 MILLIEQUIVALENT(S): at 22:41

## 2025-02-12 NOTE — CONSULT NOTE ADULT - ASSESSMENT
Patient is a 64-year-old female with a history of asthma, hypothyroidism, anxiety, depression, MVP, Jaclyn-en-Y gastric bypass, appendectomy, cholecystectomy, and chronic back pain (on methadone) is presenting for decreased PO intake x3-4 weeks, diffuse abdominal pain, and weakness.  Nephrology was consulted for Hyponatremia and hypokalemia; CEM improved.     #Non oliguric CEM on CKD stage 2/3A likely from dehydration 2/2 poor PO intake-improved.   #Hypotonic Hyponatremia likely from poor PO intake.   -Uosm, serumOsm and urine electrolytes noted.   #Hypokalemia likely 2/2 diuretics.   #Metabolic alkalosis 2/2 diuretics use.     Plan:  Monitor vitals, avoid hypotension.   Recommend to stop fluids.  Encourage PO intake.  Monitor electrolytes, potassium noted as 2.7 on repeat labs.   -Magnesium noted.  -Give IV and PO potassium repletion.   Monitor BMP tonight and tomorrow.   UA noted as moderate blood.  -Repeat clean catch UA.   Serum and urine osm and serum sodium noted as likely hypovolemic hyponatremia.   Avoid nephrotoxic medications.   Adjust all medication to GFR<30 mls/min.   Nephrology will follow.       Patient is a 64-year-old female with a history of asthma, hypothyroidism, anxiety, depression, MVP, Jaclyn-en-Y gastric bypass, appendectomy, cholecystectomy, and chronic back pain (on methadone) is presenting for decreased PO intake x3-4 weeks, diffuse abdominal pain, and weakness.  Nephrology was consulted for Hyponatremia and hypokalemia; CEM improved.     #Non oliguric CEM on CKD stage 2/3A likely from dehydration 2/2 poor PO intake-improved.   #Hypotonic Hyponatremia likely from poor PO intake.   -Uosm, serumOsm and urine electrolytes noted.   #Hypokalemia likely 2/2 diuretics.   #Metabolic alkalosis 2/2 diuretics use.     Plan:  Monitor vitals, avoid hypotension.   Recommend to stop fluids.  Encourage PO intake.  Monitor electrolytes, potassium noted as 2.7 on repeat labs.   -Magnesium noted.  -Give IV and PO potassium repletion.   Monitor BMP tonight and tomorrow.   UA noted with RBCs  -Repeat clean catch UA.   Serum and urine osm and serum sodium noted as likely hypovolemic hyponatremia.   Avoid nephrotoxic medications.   Adjust all medication to GFR<30 mls/min.   Nephrology will follow.

## 2025-02-12 NOTE — PROGRESS NOTE ADULT - ASSESSMENT
#Severe Hypokalemia and Moderate Hyponatremia in setting of Decreased PO Intake and Bumex Use  #Diffuse Abdominal Pain in setting of Hx of Jaclyn-en-Y Bypass  #CEM on CKD   #Prolonged QTC in setting of Hypokalemia and Multiple QTC-Prolonging Meds  - Endorses lack of appetite and early satiety x3-4 weeks  - K 2.1, Na 126  - EGD/ Colonoscopy 5/2023: Non erosive gastritis  - CTA abdomen 11/13: No acute abdominal or pelvic pathology. Atherosclerotic calcifications of the aorta and its branches. No evidence of celiac axis or SMA abnormality.  - CTAP this admission negative for any acute pathology  - No infectious etiology suspected  - Can consider bariatric sx eval  - Keep on Tele  - Keep Mg >2, replete K to >4  - Send urine studies  - Dietician eval  - Nephro eval  - PT eval  - NS at 75cc/hr  - repeat daily ECG and monitor QTc    #DM  - A1c last admission 7  - Had episodes of hypoglycemia  - If glucose consistently >150 can start ISS    #Hx of COPD on Home O2 PRN not in Exacerbation  - TTE 2024 --> 60-65% EF  - TTE with bubbles 11/19: suspected pulmonary shunt.   - C/w home inhalers  - Hold home Bumex for now    #Hypothyroidism  - C/w home levothyroxine    #Anxiety  #Depression  - Patient on Lamotrigine, Zoloft, and Xanax  - Holding medication overnight due to severe hypokalemia w/ prolonged QTC  - Trend lytes and EKG and re-introduce tomorrow     #Chronic Back Pain w/ Hx of Laminectomy   - Takes Methadone 30mg q8h at home  - Repeat EKG and BMP, will give Methadone dose if QTC improving    #Alcohol Use Disorder  #Hepatic Steatosis  - Previously 2 scotchs per night or more  - No alcohol since November per pt  - RUQ US last admission shows fatty liver likely due to obesity/alcohol use  - C/w B12 and Folate supplement    #MISC  - Code Status: Full Code  - DVT ppx: Heparin subcutaneous  - GI ppx: Protonix  - Diet: DASH  - Activity: AAT  - Inpatient Dispo: Telemetry  - Discharge Dispo: From Home    Pendings: start NS at 75cc/hr, repeat 4pm BMP, daily ECG, pain control, RUQ US?

## 2025-02-12 NOTE — PROGRESS NOTE ADULT - ATTENDING COMMENTS
Patient seen at bedside. Changes made within note as well. Discussed with medical team that includes resident(s), medical student(s), nursing staff, case management.     as per initial summary     64-year-old female with a history of asthma, hypothyroidism, anxiety, depression, MVP, Jaclyn-en-Y gastric bypass, appendectomy, cholecystectomy, and chronic back pain (on methadone) is presenting for decreased PO intake x3-4 weeks, diffuse abdominal pain, and weakness.      IMPRESSION   Severe Hypokalemia and Moderate Hyponatremia in setting of Decreased PO Intake and Bumex Use  intermitent abdominal pain   Prolonged QTC in setting of Hypokalemia and Multiple QTC-Prolonging Meds, now imporoivng   Hx of Jaclyn-en-Y Bypass  Correct electrolytes (Target Na = 135-145 | Mg = >2.2 | K = 3.5-5)   Admit to Tele   Monitor Cr/BUN Electrolytes including Phosphorus  Avoid Nephro toxins   PT   dietician evaluation   Hold  Diuresis   GI Eval if persistent abdominal; pain   follow xray kub     Hx DM  - A1c last admission 7  - Had episodes of hypoglycemia  - If glucose consistently >150 can start ISS    Hx of COPD on Home O2 PRN not in Exacerbation  - TTE 2024 --> 60-65% EF  - TTE with bubbles 11/19: suspected pulmonary shunt.   - C/w home inhalers  - Hold home Bumex for now    Hypothyroidism - C/w home levothyroxine    Hx Anxiety  Hx Depression  - Patient on Lamotrigine, Zoloft, and Xanax - Hold  for QT Temporarily     Hx Chronic Back Pain w/ Hx of Laminectomy   - c/w  Methadone 30mg q8h a   hold for now until seen by pain management. prolonged qtc     Hx Alcohol Use Disorder  Hx Hepatic Steatosis  Ciwa  monitoring PRN   C/w B12 and Folate supplement      follow up:   ramone qtc daily (prolonged). follow pain management.  follow xray kub. bowel regimen   monitor potassium. oral and iv supplementation, follow repeat bmp at  8 pm. d/w resident   follow dietician   may need repeat imaging RUQ US if persistent pain, GI consult.   monitor sodium  follow nephrology   follow [facility] .

## 2025-02-12 NOTE — PATIENT PROFILE ADULT - IS PATIENT POST-MENOPAUSAL?
Accepted at [facility] in [place] By md humphrey  
Pt frequently talks loud cussing and swearing into his phone.  Spoke to Denzel's daughter outside the room.  I asked if her father owns guns.  Yes he does.   
Public remains at bedside for elopement/suicidal precautions; restless; complains of left leg pain; medicated with norco  
public remains at bedside for elopement/suicidal precautions; no issues; pt very talkative tallking in hallway;  Report nurse to nurse @[facility] done by Brian VARGAS RN  
yes

## 2025-02-12 NOTE — PROGRESS NOTE ADULT - SUBJECTIVE AND OBJECTIVE BOX
SUBJECTIVE/OVERNIGHT EVENTS  Today is hospital day 1d. This morning patient was seen and examined at bedside, resting comfortably in bed. No acute or major events overnight.  Patient reports b/l LE and back pain and is requesting pain meds, she also reports diffuse abdominal pain worse on the R that has been going on for like a month along with nausea and vomiting.      MEDICATIONS  STANDING MEDICATIONS  cyanocobalamin 1000 MICROGram(s) Oral daily  folic acid 1 milliGRAM(s) Oral daily  heparin   Injectable 5000 Unit(s) SubCutaneous every 12 hours  HYDROmorphone  Injectable 0.2 milliGRAM(s) IV Push once  influenza   Vaccine 0.5 milliLiter(s) IntraMuscular once  levothyroxine 100 MICROGram(s) Oral daily  melatonin 5 milliGRAM(s) Oral at bedtime  pantoprazole    Tablet 40 milliGRAM(s) Oral before breakfast  sodium chloride 0.9%. 1000 milliLiter(s) IV Continuous <Continuous>    PRN MEDICATIONS  acetaminophen     Tablet .. 650 milliGRAM(s) Oral every 6 hours PRN  albuterol    90 MICROgram(s) HFA Inhaler 2 Puff(s) Inhalation every 6 hours PRN  albuterol/ipratropium for Nebulization 3 milliLiter(s) Nebulizer every 6 hours PRN  fluticasone propionate/ salmeterol 250-50 MICROgram(s) Diskus 1 Dose(s) Inhalation two times a day PRN  lactulose Syrup 10 Gram(s) Oral daily PRN    VITALS  T(F): 97.2 (02-12-25 @ 08:09), Max: 98 (02-11-25 @ 13:52)  HR: 70 (02-12-25 @ 08:09) (69 - 76)  BP: 115/75 (02-12-25 @ 08:09) (113/78 - 119/77)  RR: 18 (02-12-25 @ 08:09) (18 - 18)  SpO2: 97% (02-12-25 @ 08:09) (94% - 98%)  POCT Blood Glucose.: 132 mg/dL (02-12-25 @ 08:28)  POCT Blood Glucose.: 88 mg/dL (02-11-25 @ 23:57)    PHYSICAL EXAM  GENERAL  ( x ) NAD, lying in bed comfortably     (  ) obtunded     (  ) lethargic     (  ) somnolent    HEAD  ( x ) Atraumatic     (  ) hematoma     (  ) laceration (specify location:       )     NECK  ( x ) Supple     (  ) neck stiffness     (  ) nuchal rigidity     (  )  no JVD     (  ) JVD present ( -- cm)    HEART  Rate -->  ( x ) normal rate    (  ) bradycardic    (  ) tachycardic  Rhythm -->  ( x ) regular    (  ) regularly irregular    (  ) irregularly irregular  Murmurs -->  ( x ) normal s1/s2    (  ) systolic murmur    (  ) diastolic murmur    (  ) continuous murmur     (  ) S3 present    (  ) S4 present    LUNGS  ( x ) Unlabored respirations     (  ) tachypnea  ( x ) B/L air entry     (  ) decreased breath sounds in:  (location     )    (  ) no adventitious sound     (  ) crackles     (  ) wheezing      (  ) rhonchi      (specify location:       )  (  ) chest wall tenderness (specify location:       )    ABDOMEN  ( x ) Soft     (  ) tense   |   (  ) nondistended     (  ) distended   |   (  ) +BS     (  ) hypoactive bowel sounds     (  ) hyperactive bowel sounds  (  ) nontender     ( x ) RUQ tenderness     (  ) RLQ tenderness     (  ) LLQ tenderness     (  ) epigastric tenderness     (  ) diffuse tenderness  (  ) Splenomegaly      (  ) Hepatomegaly      (  ) Jaundice     (  ) ecchymosis     EXTREMITIES  ( x ) Normal     (  ) Rash     (  ) ecchymosis     (  ) varicose veins      (  ) pitting edema     (  ) non-pitting edema   (  ) ulceration     (  ) gangrene:     (location:     )    NERVOUS SYSTEM  ( x ) A&Ox3     (  ) confused     (  ) lethargic  CN II-XII:     (  ) Intact     (  ) focal deficits  (Specify:     )   Upper extremities:     (  ) strength X/5     (  ) focal deficit (specify:    )  Lower extremities:     (  ) strength  X/5    (  ) focal deficit (specify:    )      LABS             12.5   5.50  )-----------( 206      ( 02-12-25 @ 05:35 )             37.6     129  |  83  |  9   -------------------------<  61   02-12-25 @ 05:35  2.7  |  33  |  1.1    Ca      7.9     02-12-25 @ 05:35  Phos   2.6     02-11-25 @ 15:41  Mg     3.3     02-12-25 @ 05:35    TPro  5.2  /  Alb  2.9  /  TBili  1.7  /  DBili  x   /  AST  36  /  ALT  12  /  AlkPhos  157  /  GGT  x     02-12-25 @ 05:35        Urinalysis Basic - ( 12 Feb 2025 05:35 )    Color: x / Appearance: x / SG: x / pH: x  Gluc: 61 mg/dL / Ketone: x  / Bili: x / Urobili: x   Blood: x / Protein: x / Nitrite: x   Leuk Esterase: x / RBC: x / WBC x   Sq Epi: x / Non Sq Epi: x / Bacteria: x          IMAGING

## 2025-02-12 NOTE — CONSULT NOTE ADULT - ATTENDING COMMENTS
64F admitted with mild CEM, severe hypokalemia and hyponatremia iso heavy diuretic use and poor po intake.    Can d/c iv fluids if tolerating po intake.  Na level will continue to improve as K+ is replenished (needs aggressive repletion Iv and po).  Continue to hold bumetanide. 64F admitted with mild CEM, severe hypokalemia and hyponatremia iso heavy diuretic use and poor po intake.    Can d/c iv fluids if tolerating po intake.  Na level will continue to improve as K+ is replenished (needs aggressive repletion Iv and po).  Continue to hold bumetanide.  Rest of plan as above

## 2025-02-12 NOTE — CONSULT NOTE ADULT - SUBJECTIVE AND OBJECTIVE BOX
NEPHROLOGY CONSULTATION NOTE    Patient is a 64-year-old female with a history of asthma, hypothyroidism, anxiety, depression, MVP, Jaclyn-en-Y gastric bypass, appendectomy, cholecystectomy, and chronic back pain (on methadone) is presenting for decreased PO intake x3-4 weeks, diffuse abdominal pain, and weakness. She used to drink 2 scotchs per night but has not drank alcohol since she was hospitalized in November. She lives at home with her  whom helps care for her, however he recently was admitted to the hospital and is currently at a STR where she was left alone to care for herself. She states that she has had a non-specific generalized abdominal pain not associated with nausea, vomiting, or diarrhea. She has decreased PO intake and only eats 1-2 bites/sips of food or drink before she feels full. She was found to be significantly hypokalemic on admission and is admitted for further management. after a syncopal episode. She lost consciousness briefly while sitting after a shower. She also reported 5 days of worsening abdominal pain, nausea, vomiting, decreased oral intake, and constipation. She reports regular alcohol use (a couple of glasses of scotch nightly) and worsening shortness of breath for the past 2-3 months, requiring near-daily albuterol nebulizer treatments.  ----------------------  Above information obtained from admission H&P.   Nephrology was consulted for CEM on stage 2/3A-improved; hypokalemia and hyponatremia.   Patient was seen and examined at bedside.  Patient is comfortable, not in distress.   Patient reported taking bumex 2 mg for her LE edema and potassium supplementation.  Patient reported decreased PO intake, abdominal pain and weakness for the past 3-4 week; and ~50 lbs weight loss per patient which were stable before.    PAST MEDICAL & SURGICAL HISTORY:  Asthma  LAST ATTACK MANY YRS AGO  Gastroesophageal reflux disease without esophagitis  Hypothyroidism  Heart murmur  Anxiety  Depression  Mood disorder  Psoriasis  Constipation  Back pain  LOW  Morbid obesity  S/P tonsillectomy  1967  History of appendectomy  1974  Abscess of back  EVACUATION OF ABSCESS @ L5-S1 1997  History of Jaclyn-en-Y gastric bypass  WITH CHOLECYSTECTOMY 2006    Allergies:  penicillin (Anaphylaxis)    Home Medications:  albuterol 90 mcg/inh inhalation aerosol: 2 puff(s) inhaled every 6 hours As needed Shortness of Breath and/or Wheezing (12 Feb 2025 00:04)  folic acid 1 mg oral tablet: 1 tab(s) orally once a day (12 Feb 2025 00:04)  lamoTRIgine 150 mg oral tablet: 1 tab(s) orally once a day (12 Feb 2025 00:04)  methadone 10 mg oral tablet: 3 tab(s) orally every 8 hours (12 Feb 2025 00:04)  sertraline: 200 milligram(s) orally once a day (12 Feb 2025 00:04)  Soma 350 mg oral tablet: 1 tab(s) orally 3 times a day (12 Feb 2025 00:04)    Hospital Medications:   MEDICATIONS  (STANDING):  cyanocobalamin 1000 MICROGram(s) Oral daily  folic acid 1 milliGRAM(s) Oral daily  heparin   Injectable 5000 Unit(s) SubCutaneous every 12 hours  influenza   Vaccine 0.5 milliLiter(s) IntraMuscular once  levothyroxine 100 MICROGram(s) Oral daily  melatonin 5 milliGRAM(s) Oral at bedtime  pantoprazole    Tablet 40 milliGRAM(s) Oral before breakfast  sodium chloride 0.9%. 1000 milliLiter(s) (75 mL/Hr) IV Continuous <Continuous>    SOCIAL HISTORY:  Denies ETOH,Smoking,   FAMILY HISTORY:  CAD (coronary artery disease) (Father, Mother)    REVIEW OF SYSTEMS:  CONSTITUTIONAL: No weakness, fevers or chills  RESPIRATORY: No cough, wheezing, hemoptysis; SOB due to COPD on home oxygen.   CARDIOVASCULAR: No chest pain or palpitations.  GASTROINTESTINAL: No abdominal or epigastric pain. No nausea, vomiting, or hematemesis; No diarrhea or constipation. No melena or hematochezia.  GENITOURINARY: No dysuria, frequency, foamy urine, urinary urgency, incontinence or hematuria  NEUROLOGICAL: No numbness or weakness  SKIN: No itching, burning, rashes, or lesions   VASCULAR: No bilateral lower extremity edema.   All other review of systems is negative unless indicated above.    VITALS:  Vital Signs Last 24 Hrs  T(C): 36.2 (12 Feb 2025 08:09), Max: 36.7 (11 Feb 2025 13:52)  T(F): 97.2 (12 Feb 2025 08:09), Max: 98 (11 Feb 2025 13:52)  HR: 70 (12 Feb 2025 08:09) (69 - 76)  BP: 115/75 (12 Feb 2025 08:09) (113/78 - 119/77)  BP(mean): --  RR: 18 (12 Feb 2025 08:09) (18 - 18)  SpO2: 97% (12 Feb 2025 08:09) (94% - 98%)    Parameters below as of 11 Feb 2025 23:41  Patient On (Oxygen Delivery Method): nasal cannula  O2 Flow (L/min): 3      02-12 @ 07:01  -  02-12 @ 13:38  --------------------------------------------------------  IN: 0 mL / OUT: 600 mL / NET: -600 mL    Weight (kg): 93.9 (02-11 @ 13:52)    PHYSICAL EXAM:  Constitutional: NAD  Respiratory: CTAB, no wheezes, rales or rhonchi  Cardiovascular: S1, S2, RRR  Gastrointestinal: BS+, soft, NT/ND  Extremities: No cyanosis or clubbing. No peripheral edema  Neurological: A/O x 3, no focal deficits  Psychiatric: Normal mood, normal affect  : No CVA tenderness. No garcia.   Skin: bilateral LE skin appear very dry.   Vascular Access:    LABS:  02-12    129[L]  |  83[L]  |  9[L]  ----------------------------<  61[L]  2.7[LL]   |  33[H]  |  1.1    Ca    7.9[L]      12 Feb 2025 05:35  Phos  2.6     02-11  Mg     2.8     02-12    TPro  5.2[L]  /  Alb  2.9[L]  /  TBili  1.7[H]  /  DBili      /  AST  36  /  ALT  12  /  AlkPhos  157[H]  02-12    Creatinine Trend: 1.1 <--, 1.3 <--, 1.4 <--, 1.4 <--                        12.5   5.50  )-----------( 206      ( 12 Feb 2025 05:35 )             37.6     Urine Studies:  Urinalysis Basic - ( 12 Feb 2025 05:35 )    Color:  / Appearance:  / SG:  / pH:   Gluc: 61 mg/dL / Ketone:   / Bili:  / Urobili:    Blood:  / Protein:  / Nitrite:    Leuk Esterase:  / RBC:  / WBC    Sq Epi:  / Non Sq Epi:  / Bacteria:       Sodium, Random Urine: 20.0 mmoL/L (02-12 @ 01:00)  Osmolality, Random Urine: 218 mos/kg (02-12 @ 01:00)  Potassium, Random Urine: 13 mmol/L (02-12 @ 01:00)    RADIOLOGY & ADDITIONAL STUDIES:  CT abdomen/pelvis:  KIDNEYS: No hydronephrosis.    ABDOMINOPELVIC NODES: Unremarkable.    PELVIC ORGANS: Urinary bladder collapsed with Garcia catheter.    PERITONEUM/MESENTERY/BOWEL: Post gastric bypass surgery. No bowel   obstruction. No ascites. No pneumoperitoneum.. Post ventral hernia repair   with mesh    BONES/SOFT TISSUES: Degenerative change, lumbar spine..    VASCULAR: Vascular calcifications.      IMPRESSION:    No evidence of acute abdominal pathology.

## 2025-02-12 NOTE — SBIRT NOTE ADULT - NSSBIRTBRIEFINTDET_GEN_A_CORE
Screening results were reviewed with the patient. Patient was provided educational materials on low-risk guidelines and substance use and health. Motivation and goals were discussed. Patient was not provided with a referral to substance use treatment because patient declined.

## 2025-02-12 NOTE — PHYSICAL THERAPY INITIAL EVALUATION ADULT - SPECIFY REASON(S)
pt not tolerating mobility at this time due to 9/10 LBP and LT LE pain with mobility and 7/10 pain at rest; pt received IV pain meds during attempted evaluation, however still not able to participate

## 2025-02-12 NOTE — PATIENT PROFILE ADULT - FALL HARM RISK - HARM RISK INTERVENTIONS
Assistance with ambulation/Assistance OOB with selected safe patient handling equipment/Communicate Risk of Fall with Harm to all staff/Monitor for mental status changes/Monitor gait and stability/Reinforce activity limits and safety measures with patient and family/Tailored Fall Risk Interventions/Toileting schedule using arm’s reach rule for commode and bathroom/Use of alarms - bed, chair and/or voice tab/Visual Cue: Yellow wristband and red socks/Bed in lowest position, wheels locked, appropriate side rails in place/Call bell, personal items and telephone in reach/Instruct patient to call for assistance before getting out of bed or chair/Non-slip footwear when patient is out of bed/[place] to call system/Physically safe environment - no spills, clutter or unnecessary equipment/Purposeful Proactive Rounding/Room/bathroom lighting operational, light cord in reach

## 2025-02-13 LAB
ALBUMIN SERPL ELPH-MCNC: 3.1 G/DL — LOW (ref 3.5–5.2)
ALP SERPL-CCNC: 156 U/L — HIGH (ref 30–115)
ALT FLD-CCNC: 15 U/L — SIGNIFICANT CHANGE UP (ref 0–41)
ANION GAP SERPL CALC-SCNC: 10 MMOL/L — SIGNIFICANT CHANGE UP (ref 7–14)
ANION GAP SERPL CALC-SCNC: 14 MMOL/L — SIGNIFICANT CHANGE UP (ref 7–14)
ANION GAP SERPL CALC-SCNC: 15 MMOL/L — HIGH (ref 7–14)
AST SERPL-CCNC: 42 U/L — HIGH (ref 0–41)
BASOPHILS # BLD AUTO: 0.07 K/UL — SIGNIFICANT CHANGE UP (ref 0–0.2)
BASOPHILS NFR BLD AUTO: 1.5 % — HIGH (ref 0–1)
BILIRUB SERPL-MCNC: 1.1 MG/DL — SIGNIFICANT CHANGE UP (ref 0.2–1.2)
BUN SERPL-MCNC: 5 MG/DL — LOW (ref 10–20)
BUN SERPL-MCNC: 5 MG/DL — LOW (ref 10–20)
BUN SERPL-MCNC: 6 MG/DL — LOW (ref 10–20)
CALCIUM SERPL-MCNC: 8.2 MG/DL — LOW (ref 8.4–10.5)
CALCIUM SERPL-MCNC: 8.3 MG/DL — LOW (ref 8.4–10.5)
CALCIUM SERPL-MCNC: 8.4 MG/DL — SIGNIFICANT CHANGE UP (ref 8.4–10.4)
CHLORIDE SERPL-SCNC: 87 MMOL/L — LOW (ref 98–110)
CHLORIDE SERPL-SCNC: 90 MMOL/L — LOW (ref 98–110)
CHLORIDE SERPL-SCNC: 92 MMOL/L — LOW (ref 98–110)
CO2 SERPL-SCNC: 24 MMOL/L — SIGNIFICANT CHANGE UP (ref 17–32)
CO2 SERPL-SCNC: 26 MMOL/L — SIGNIFICANT CHANGE UP (ref 17–32)
CO2 SERPL-SCNC: 31 MMOL/L — SIGNIFICANT CHANGE UP (ref 17–32)
CREAT SERPL-MCNC: 1 MG/DL — SIGNIFICANT CHANGE UP (ref 0.7–1.5)
CREAT SERPL-MCNC: 1 MG/DL — SIGNIFICANT CHANGE UP (ref 0.7–1.5)
CREAT SERPL-MCNC: 1.1 MG/DL — SIGNIFICANT CHANGE UP (ref 0.7–1.5)
EGFR: 57 ML/MIN/1.73M2 — LOW
EGFR: 57 ML/MIN/1.73M2 — LOW
EGFR: 64 ML/MIN/1.73M2 — SIGNIFICANT CHANGE UP
EOSINOPHIL # BLD AUTO: 0.19 K/UL — SIGNIFICANT CHANGE UP (ref 0–0.7)
EOSINOPHIL NFR BLD AUTO: 4.1 % — SIGNIFICANT CHANGE UP (ref 0–8)
GLUCOSE BLDC GLUCOMTR-MCNC: 98 MG/DL — SIGNIFICANT CHANGE UP (ref 70–99)
GLUCOSE SERPL-MCNC: 103 MG/DL — HIGH (ref 70–99)
GLUCOSE SERPL-MCNC: 141 MG/DL — HIGH (ref 70–99)
GLUCOSE SERPL-MCNC: 86 MG/DL — SIGNIFICANT CHANGE UP (ref 70–99)
HCT VFR BLD CALC: 40.6 % — SIGNIFICANT CHANGE UP (ref 37–47)
HGB BLD-MCNC: 13.3 G/DL — SIGNIFICANT CHANGE UP (ref 12–16)
IMM GRANULOCYTES NFR BLD AUTO: 1.5 % — HIGH (ref 0.1–0.3)
LYMPHOCYTES # BLD AUTO: 0.75 K/UL — LOW (ref 1.2–3.4)
LYMPHOCYTES # BLD AUTO: 16.1 % — LOW (ref 20.5–51.1)
MAGNESIUM SERPL-MCNC: 1.9 MG/DL — SIGNIFICANT CHANGE UP (ref 1.8–2.4)
MAGNESIUM SERPL-MCNC: 2.2 MG/DL — SIGNIFICANT CHANGE UP (ref 1.8–2.4)
MCHC RBC-ENTMCNC: 30.4 PG — SIGNIFICANT CHANGE UP (ref 27–31)
MCHC RBC-ENTMCNC: 32.8 G/DL — SIGNIFICANT CHANGE UP (ref 32–37)
MCV RBC AUTO: 92.9 FL — SIGNIFICANT CHANGE UP (ref 81–99)
MONOCYTES # BLD AUTO: 0.35 K/UL — SIGNIFICANT CHANGE UP (ref 0.1–0.6)
MONOCYTES NFR BLD AUTO: 7.5 % — SIGNIFICANT CHANGE UP (ref 1.7–9.3)
NEUTROPHILS # BLD AUTO: 3.24 K/UL — SIGNIFICANT CHANGE UP (ref 1.4–6.5)
NEUTROPHILS NFR BLD AUTO: 69.3 % — SIGNIFICANT CHANGE UP (ref 42.2–75.2)
NRBC BLD AUTO-RTO: 0 /100 WBCS — SIGNIFICANT CHANGE UP (ref 0–0)
PLATELET # BLD AUTO: 243 K/UL — SIGNIFICANT CHANGE UP (ref 130–400)
PMV BLD: 10.9 FL — HIGH (ref 7.4–10.4)
POTASSIUM SERPL-MCNC: 2.8 MMOL/L — LOW (ref 3.5–5)
POTASSIUM SERPL-MCNC: 2.9 MMOL/L — LOW (ref 3.5–5)
POTASSIUM SERPL-MCNC: 3.2 MMOL/L — LOW (ref 3.5–5)
POTASSIUM SERPL-SCNC: 2.8 MMOL/L — LOW (ref 3.5–5)
POTASSIUM SERPL-SCNC: 2.9 MMOL/L — LOW (ref 3.5–5)
POTASSIUM SERPL-SCNC: 3.2 MMOL/L — LOW (ref 3.5–5)
PROT SERPL-MCNC: 5.7 G/DL — LOW (ref 6–8)
RBC # BLD: 4.37 M/UL — SIGNIFICANT CHANGE UP (ref 4.2–5.4)
RBC # FLD: 14.3 % — SIGNIFICANT CHANGE UP (ref 11.5–14.5)
SODIUM SERPL-SCNC: 128 MMOL/L — LOW (ref 135–146)
SODIUM SERPL-SCNC: 130 MMOL/L — LOW (ref 135–146)
SODIUM SERPL-SCNC: 131 MMOL/L — LOW (ref 135–146)
WBC # BLD: 4.67 K/UL — LOW (ref 4.8–10.8)
WBC # FLD AUTO: 4.67 K/UL — LOW (ref 4.8–10.8)

## 2025-02-13 PROCEDURE — 99233 SBSQ HOSP IP/OBS HIGH 50: CPT

## 2025-02-13 RX ORDER — CYCLOBENZAPRINE HYDROCHLORIDE 15 MG/1
5 CAPSULE, EXTENDED RELEASE ORAL THREE TIMES A DAY
Refills: 0 | Status: DISCONTINUED | OUTPATIENT
Start: 2025-02-13 | End: 2025-03-02

## 2025-02-13 RX ORDER — ACETAMINOPHEN 500 MG/5ML
975 LIQUID (ML) ORAL EVERY 8 HOURS
Refills: 0 | Status: DISCONTINUED | OUTPATIENT
Start: 2025-02-13 | End: 2025-03-02

## 2025-02-13 RX ORDER — METHADONE HCL 10 MG
30 TABLET ORAL EVERY 8 HOURS
Refills: 0 | Status: DISCONTINUED | OUTPATIENT
Start: 2025-02-13 | End: 2025-02-20

## 2025-02-13 RX ORDER — HYDROMORPHONE/SOD CHLOR,ISO/PF 2 MG/10 ML
1 SYRINGE (ML) INJECTION ONCE
Refills: 0 | Status: DISCONTINUED | OUTPATIENT
Start: 2025-02-13 | End: 2025-02-13

## 2025-02-13 RX ORDER — METHADONE HCL 10 MG
30 TABLET ORAL THREE TIMES A DAY
Refills: 0 | Status: DISCONTINUED | OUTPATIENT
Start: 2025-02-13 | End: 2025-02-13

## 2025-02-13 RX ADMIN — Medication 50 MILLIEQUIVALENT(S): at 14:30

## 2025-02-13 RX ADMIN — Medication 30 MILLIGRAM(S): at 11:05

## 2025-02-13 RX ADMIN — FOLIC ACID 1 MILLIGRAM(S): 1 TABLET ORAL at 11:37

## 2025-02-13 RX ADMIN — Medication 1 DOSE(S): at 12:16

## 2025-02-13 RX ADMIN — Medication 975 MILLIGRAM(S): at 16:23

## 2025-02-13 RX ADMIN — Medication 30 MILLIGRAM(S): at 19:07

## 2025-02-13 RX ADMIN — Medication 975 MILLIGRAM(S): at 23:21

## 2025-02-13 RX ADMIN — Medication 40 MILLIGRAM(S): at 05:02

## 2025-02-13 RX ADMIN — Medication 50 MILLIEQUIVALENT(S): at 10:24

## 2025-02-13 RX ADMIN — Medication 50 MILLIEQUIVALENT(S): at 02:31

## 2025-02-13 RX ADMIN — Medication 2 TABLET(S): at 22:20

## 2025-02-13 RX ADMIN — CYANOCOBALAMIN 1000 MICROGRAM(S): 1000 INJECTION INTRAMUSCULAR; SUBCUTANEOUS at 11:37

## 2025-02-13 RX ADMIN — Medication 40 MILLIEQUIVALENT(S): at 11:37

## 2025-02-13 RX ADMIN — Medication 50 MILLIEQUIVALENT(S): at 06:16

## 2025-02-13 RX ADMIN — CYCLOBENZAPRINE HYDROCHLORIDE 5 MILLIGRAM(S): 15 CAPSULE, EXTENDED RELEASE ORAL at 22:21

## 2025-02-13 RX ADMIN — Medication 100 MICROGRAM(S): at 05:02

## 2025-02-13 RX ADMIN — POLYETHYLENE GLYCOL 3350 17 GRAM(S): 17 POWDER, FOR SOLUTION ORAL at 11:36

## 2025-02-13 RX ADMIN — CYCLOBENZAPRINE HYDROCHLORIDE 5 MILLIGRAM(S): 15 CAPSULE, EXTENDED RELEASE ORAL at 11:05

## 2025-02-13 RX ADMIN — Medication 975 MILLIGRAM(S): at 22:21

## 2025-02-13 RX ADMIN — Medication 1 MILLIGRAM(S): at 04:39

## 2025-02-13 RX ADMIN — Medication 40 MILLIEQUIVALENT(S): at 08:44

## 2025-02-13 RX ADMIN — Medication 975 MILLIGRAM(S): at 13:01

## 2025-02-13 RX ADMIN — Medication 5 MILLIGRAM(S): at 22:21

## 2025-02-13 NOTE — CONSULT NOTE ADULT - SUBJECTIVE AND OBJECTIVE BOX
HPI: Patient is a 64F with PMH of asthma, hypothyroidism, anxiety, depression, MVP, Jaclyn-en-Y gastric bypass, appendectomy, cholecystectomy, and chronic back pain (on methadone) is presenting for decreased PO intake x3-4 weeks, diffuse abdominal pain, and weakness. She used to drink 2 scotchs per night but has not drank alcohol since she was hospitalized in November. She lives at home with her  whom helps care for her, however he recently was admitted to the hospital and is currently at a STR where she was left alone to care for herself. She states that she has had a non-specific generalized abdominal pain not associated with nausea, vomiting, or diarrhea. She has decreased PO intake and only eats 1-2 bites/sips of food or drink before she feels full. She was found to be significantly hypokalemic on admission and is admitted for further management. after a syncopal episode. She lost consciousness briefly while sitting after a shower. She also reported 5 days of worsening abdominal pain, nausea, vomiting, decreased oral intake, and constipation. She reports regular alcohol use (a couple of glasses of scotch nightly) and worsening shortness of breath for the past 2-3 months, requiring near-daily albuterol nebulizer treatments.    Of note, she was discharged from [facility] in December after a syncopal episode. Per previous documentation, the syncopal episode was likely vasovagal, though a prolonged QTc (603 on admission EKG) raised concern for arrhythmia. This was likely secondary to methadone and sertraline. Telemetry showed no arrhythmias. Echocardiogram showed normal LVEF (65%). CT chest was negative for PE. Abdominal pain workup, including CT abdomen and abdominal ultrasound, revealed hepatomegaly and fatty liver infiltration, but no acute pathology. Lipase was normal. LFTs were mildly elevated. Elevated lactate (3.0) was attributed to alcohol use. Her dyspnea was attributed to deconditioning and obesity, with no evidence of CHF or acute exacerbation of asthma. Leg edema was stable and mild.    Current Inpatient Medication Regimen:  acetaminophen     Tablet .. 650 milliGRAM(s) Oral every 6 hours PRN  albuterol    90 MICROgram(s) HFA Inhaler 2 Puff(s) Inhalation every 6 hours PRN  albuterol/ipratropium for Nebulization 3 milliLiter(s) Nebulizer every 6 hours PRN  cyanocobalamin 1000 MICROGram(s) Oral daily  cyclobenzaprine 5 milliGRAM(s) Oral three times a day  fluticasone propionate/ salmeterol 250-50 MICROgram(s) Diskus 1 Dose(s) Inhalation two times a day PRN  folic acid 1 milliGRAM(s) Oral daily  heparin   Injectable 5000 Unit(s) SubCutaneous every 12 hours  influenza   Vaccine 0.5 milliLiter(s) IntraMuscular once  lactulose Syrup 10 Gram(s) Oral daily PRN  levothyroxine 100 MICROGram(s) Oral daily  melatonin 5 milliGRAM(s) Oral at bedtime  methadone    Tablet 30 milliGRAM(s) Oral three times a day  pantoprazole    Tablet 40 milliGRAM(s) Oral before breakfast  polyethylene glycol 3350 17 Gram(s) Oral daily  potassium chloride  20 mEq/100 mL IVPB 20 milliEquivalent(s) IV Intermittent every 2 hours  senna 2 Tablet(s) Oral at bedtime      Home Analgesic Regimen:  methadone 30mg TID  carisoprodol 350mg TID    Allergies:  penicillin (Anaphylaxis)      Past Medical History:  Failure to thrive in adult    No pertinent family history in first degree relatives    CAD (coronary artery disease) (Father, Mother)    Asthma    Gastroesophageal reflux disease without esophagitis    Hypothyroidism    Heart murmur    Anxiety    Depression    Mood disorder    Psoriasis    Constipation    Back pain    Morbid obesity    Adult failure to thrive    S/P tonsillectomy    History of appendectomy    Abscess of back    History of Jaclyn-en-Y gastric bypass    WEAKNESS DIZZY    Hypokalemia    Electrolyte abnormality    Hyponatremia    Abnormal QT interval present on electrocardiogram        Review of Systems:  General: no fevers or chills  Eyes: no diplopia or blurred vision  ENT: no rhinorrhea  CV: no chest pain  Resp: no cough or dyspnea  GI: no abdominal pain, constipation, or diarrhea  : no urinary incontinence or dysuria  Neuro: no focal weakness or numbness  Psych: anxiety, depression, mood disorder    Physical Exam:  T(C): 36.4 (02-13-25 @ 04:38), Max: 36.7 (02-12-25 @ 19:48)  HR: 72 (02-13-25 @ 04:38) (56 - 72)  BP: 103/78 (02-13-25 @ 04:38) (92/58 - 107/69)  RR: 19 (02-13-25 @ 04:38) (18 - 19)  SpO2: 93% (02-13-25 @ 04:38) (93% - 96%)  Gen: NAD  Eyes: no glasses or scleral icterus  Head: Normocephalic / Atraumatic  CV: no JVD  Lungs: nonlabored breathing  Abdomen: nondistended, soft  : no garcia catheter in place  Back: tenderness to palpation  Neuro: AOx3  Extremities: full ROM in upper/lower extremities  Psych: normal affect      Labs:  CBC  4.67 K/uL[L] [4.80 - 10.80] > 13.3 g/dL [12.0 - 16.0] / 40.6 % [37.0 - 47.0] < 243 K/uL [130 - 400]      BMP  128 mmol/L[L] [135 - 146] | 87 mmol/L[L] [98 - 110] | 5 mg/dL[L] [10 - 20]  2.9 mmol/L[L] [3.5 - 5.0] | 26 mmol/L [17 - 32] | 1.0 mg/dL [0.7 - 1.5]    103 mg/dL[H] [70 - 99]  CBC  -- > -- / -- < --      BMP  131 mmol/L[L] [135 - 146] | 90 mmol/L[L] [98 - 110] | 6 mg/dL[L] [10 - 20]  2.8 mmol/L[L] [3.5 - 5.0] | 31 mmol/L [17 - 32] | 1.0 mg/dL [0.7 - 1.5]    86 mg/dL [70 - 99]  CBC  -- > -- / -- < --      BMP  131 mmol/L[L] [135 - 146] | 87 mmol/L[L] [98 - 110] | 8 mg/dL[L] [10 - 20]  2.4 mmol/L[LL] [3.5 - 5.0] | 34 mmol/L[H] [17 - 32] | 1.1 mg/dL [0.7 - 1.5]    82 mg/dL [70 - 99]            Opioid Risk Assessment Tool                                                                           Female       Male  Family History  Alcohol                                                              1                3  Illegal drugs                                                       2                3  Rx drugs                                                            4                4    Personal History   Alcohol                                                              3                3  Illegal drugs                                                       4                4  Rx drugs                                                            5                5    Age between 16—45 years                                1                1  History of preadolescent sexual abuse               3                0    Psychological disease  ADD, OCD, bipolar, schizophrenia                      2                2  Depression                                                       1                1    Total Score                                                  6            __    0 - 3 = low risk for future opioid abuse  4 - 7 = moderate risk for future opioid abuse  8+ = high risk for future opioid abuse

## 2025-02-13 NOTE — CONSULT NOTE ADULT - ASSESSMENT
A/P: Patient is a 64F with PMH of asthma, hypothyroidism, anxiety, depression, MVP, Jaclyn-en-Y gastric bypass, appendectomy, cholecystectomy, and chronic back pain (on methadone) is presenting for decreased PO intake x3-4 weeks, diffuse abdominal pain, and weakness.     PLAN  #Chronic pain  - Methadone 30mg TID resumed as QTC resolved per primary team  - Acetaminophen 975mg q8h scheduled  - Cyclobenzaprine 5mg TID scheduled    #Opioid induced constipation  - Bowel regimen as per primary team

## 2025-02-13 NOTE — PROGRESS NOTE ADULT - ASSESSMENT
Patient is a 64-year-old female with a history of asthma, hypothyroidism, anxiety, depression, MVP, Jaclyn-en-Y gastric bypass, appendectomy, cholecystectomy, and chronic back pain (on methadone) is presenting for decreased PO intake x3-4 weeks, diffuse abdominal pain, and weakness.  Nephrology was consulted for Hyponatremia and hypokalemia; CEM improved.   #Non oliguric CEM on CKD stage 2/3A likely from dehydration 2/2 poor PO intake-improved.   #Hypotonic Hyponatremia likely from poor PO intake.   -Uosm, serumOsm and urine electrolytes noted.   #Hypokalemia likely 2/2 diuretics.   #Metabolic alkalosis 2/2 diuretics use.   sodium level stable   diuretics on hold   replete k to 4   magnesium at goal   gentle iv hydration if poor po intake   will follow

## 2025-02-13 NOTE — CHART NOTE - NSCHARTNOTEFT_GEN_A_CORE
Confidential Drug Utilization Report  Search Terms: ramonita motta, 1960Search Date: 02/13/2025 08:20:31 AM  The Drug Utilization Report below displays all of the controlled substance prescriptions, if any, that your patient has filled in the last twelve months. The information displayed on this report is compiled from pharmacy submissions to the Department, and accurately reflects the information as submitted by the pharmacies.    This report was requested by: Trupti Sanchez | Reference #: [id number]    You have not added a PALMA number. Keeping your PALMA number(s) up to date on the My PALMA # tab will enable the separation of your prescriptions from others in the search results.    Practitioner Count: 2  Pharmacy Count: 1  Current Opioid Prescriptions: 0  Current Benzodiazepine Prescriptions: 0  Current Stimulant Prescriptions: 0      Patient Demographic Information (PDI)       PDI	First Name	Last Name	Birth Date	Gender	Street Address	City	State	Zip Code  A	Ramonita Motta	1960	Female	[address]	[address]  B	Ramonita Motta	1960	Female	[address] [facility]	[address]    Prescription Information      PDI Filter:    PDI	Current Rx	Drug Type	Rx Written	Rx Dispensed	Drug	Quantity	Days Supply	Prescriber Name	Prescriber PALMA #	Payment Method	Dispenser  B	N		01/08/2025	01/09/2025	carisoprodol 350 mg tablet	90	30	Amol Craig MD	[id number]	[facility]  B	N	O	01/08/2025	01/09/2025	methadone hcl 10 mg tablet	270	30	Amol Craig MD	[id number]	[facility]  B	N		12/06/2024	12/06/2024	carisoprodol 350 mg tablet	90	30	Amol Craig MD	[id number]	[facility]  B	N	O	12/06/2024	12/06/2024	methadone hcl 10 mg tablet	270	30	Amol Craig MD	[id number]	[facility]  B	N	B	12/05/2024	12/06/2024	alprazolam 0.5 mg tablet	30	15	Francisca Bunn	[id number]	[facility]  B	N	O	10/25/2024	10/26/2024	methadone hcl 10 mg tablet	63	7	Amol Craig MD	[id number]	[facility]  B	N		10/25/2024	10/26/2024	carisoprodol 350 mg tablet	21	7	Amol Craig MD	[id number]	Insurance	[facility]  B	N		09/27/2024	09/27/2024	carisoprodol 350 mg tablet	90	30	Amol Craig MD	[id number]	Insurance	[facility]  B	N	O	09/27/2024	09/27/2024	methadone hcl 10 mg tablet	270	30	Amol Craig MD	[id number]	[facility]  B	N	O	08/29/2024	08/30/2024	methadone hcl 10 mg tablet	270	30	Amol Craig MD	[id number]	[facility]  B	N		08/29/2024	08/30/2024	carisoprodol 350 mg tablet	90	30	Amol Craig MD	[id number]	Insurance	[facility]  B	N	O	08/22/2024	08/23/2024	methadone hcl 10 mg tablet	63	7	Amol Craig MD	[id number]	[facility]  B	N		08/22/2024	08/23/2024	carisoprodol 350 mg tablet	21	7	Amol Craig MD	[id number]	Insurance	[facility]  B	N		07/24/2024	07/25/2024	carisoprodol 350 mg tablet	90	30	Amol Craig MD	[id number]	Insurance	[facility]  B	N	O	07/24/2024	07/25/2024	methadone hcl 10 mg tablet	270	30	Amol Craig MD	[id number]	[facility]  B	N	O	06/26/2024	06/26/2024	methadone hcl 10 mg tablet	270	30	Amol Craig MD	[id number]	[facility]  B	N		06/26/2024	06/26/2024	carisoprodol 350 mg tablet	90	30	Amol Craig MD	[id number]	Insurance	[facility]  B	N	O	06/24/2024	06/24/2024	methadone hcl 10 mg tablet	27	3	Amol Craig MD	[id number]	Insurance	[facility]  A	N		05/24/2024	05/25/2024	carisoprodol 350 mg tablet	90	30	Amol Craig MD	[id number]	Insurance	[facility]  A	N	O	05/24/2024	05/25/2024	methadone hcl 10 mg tablet	270	30	Amol Craig MD	[id number]	[facility]  A	N	O	04/26/2024	04/27/2024	methadone hcl 10 mg tablet	270	30	Amol Craig MD	[id number]	[facility]

## 2025-02-13 NOTE — PROGRESS NOTE ADULT - ASSESSMENT
#Severe Hypokalemia and Moderate Hyponatremia in setting of Decreased PO Intake and Bumex Use  #Diffuse Abdominal Pain in setting of Hx of Jaclyn-en-Y Bypass  #CEM on CKD   - Endorses lack of appetite and early satiety x3-4 weeks  - K 2.1, Na 126  - EGD/ Colonoscopy 5/2023: Non erosive gastritis  - CTA abdomen 11/13: No acute abdominal or pelvic pathology. Atherosclerotic calcifications of the aorta and its branches. No evidence of celiac axis or SMA abnormality.  - CTAP this admission negative for any acute pathology  - No infectious etiology suspected  - Keep on Tele  - Keep Mg >2, replete K to >4  - Dietician eval  - Nephro eval  - PT eval  - NS at 75cc/hr  - repeat daily ECG and monitor QTc    #Prolonged QTC in setting of Hypokalemia and Multiple QTC-Prolonging Meds  - Improve on today EKG  - Holding bumex   - K repletion   - will restart home methadone does today (30 TID)    #DM  - A1c last admission 7  - Had episodes of hypoglycemia  - If glucose consistently >150 can start ISS    #Hx of COPD on Home O2 PRN not in Exacerbation  - TTE 2024 --> 60-65% EF  - TTE with bubbles 11/19: suspected pulmonary shunt.   - C/w home inhalers  - Hold home Bumex for now    #Hypothyroidism  - C/w home levothyroxine    #Anxiety  #Depression  - Patient on Lamotrigine, Zoloft, and Xanax  - Holding medication overnight due to severe hypokalemia w/ prolonged QTC  - Trend lytes and EKG and re-introduce tomorrow     #Chronic Back Pain w/ Hx of Laminectomy   - Takes Methadone 30mg q8h at home  - Repeat EKG and BMP, will give Methadone dose if QTC improving    #Alcohol Use Disorder  #Hepatic Steatosis  - Previously 2 scotchs per night or more  - No alcohol since November per pt  - RUQ US last admission shows fatty liver likely due to obesity/alcohol use  - C/w B12 and Folate supplement    #MISC  - Code Status: Full Code  - DVT ppx: Heparin subcutaneous  - GI ppx: Protonix  - Diet: DASH  - Activity: AAT  - Inpatient Dispo: Telemetry  - Discharge Dispo: From Home    Pendings: start NS at 75cc/hr, repeat 4pm BMP, daily ECG, pain control, RUQ US?

## 2025-02-13 NOTE — PROGRESS NOTE ADULT - SUBJECTIVE AND OBJECTIVE BOX
JULIET NAVARRETE 64y Female  MRN#: [id number]     Hospital Day: 2d      SUBJECTIVE  No acute events overnight, pt seen and examined this morning.                                          ----------------------------------------------------------  OBJECTIVE  PAST MEDICAL & SURGICAL HISTORY  Asthma  LAST ATTACK MANY YRS AGO    Gastroesophageal reflux disease without esophagitis    Hypothyroidism    Heart murmur    Anxiety    Depression    Mood disorder    Psoriasis    Constipation    Back pain  LOW    Morbid obesity    S/P tonsillectomy  1967    History of appendectomy  1974    Abscess of back  EVACUATION OF ABSCESS @ L5-S1 1997    History of Jaclyn-en-Y gastric bypass  WITH CHOLECYSTECTOMY 2006                                              -----------------------------------------------------------  ALLERGIES:  penicillin (Anaphylaxis)                                            ------------------------------------------------------------    HOME MEDICATIONS  Home Medications:  albuterol 90 mcg/inh inhalation aerosol: 2 puff(s) inhaled every 6 hours As needed Shortness of Breath and/or Wheezing (12 Feb 2025 00:04)  folic acid 1 mg oral tablet: 1 tab(s) orally once a day (12 Feb 2025 00:04)  lamoTRIgine 150 mg oral tablet: 1 tab(s) orally once a day (12 Feb 2025 00:04)  methadone 10 mg oral tablet: 3 tab(s) orally every 8 hours (12 Feb 2025 00:04)  sertraline: 200 milligram(s) orally once a day (12 Feb 2025 00:04)  Soma 350 mg oral tablet: 1 tab(s) orally 3 times a day (12 Feb 2025 00:04)                           MEDICATIONS:  STANDING MEDICATIONS  acetaminophen     Tablet .. 975 milliGRAM(s) Oral every 8 hours  cyanocobalamin 1000 MICROGram(s) Oral daily  cyclobenzaprine 5 milliGRAM(s) Oral three times a day  folic acid 1 milliGRAM(s) Oral daily  heparin   Injectable 5000 Unit(s) SubCutaneous every 12 hours  influenza   Vaccine 0.5 milliLiter(s) IntraMuscular once  levothyroxine 100 MICROGram(s) Oral daily  melatonin 5 milliGRAM(s) Oral at bedtime  methadone    Tablet 30 milliGRAM(s) Oral three times a day  pantoprazole    Tablet 40 milliGRAM(s) Oral before breakfast  polyethylene glycol 3350 17 Gram(s) Oral daily  potassium chloride  20 mEq/100 mL IVPB 20 milliEquivalent(s) IV Intermittent every 2 hours  senna 2 Tablet(s) Oral at bedtime    PRN MEDICATIONS  albuterol    90 MICROgram(s) HFA Inhaler 2 Puff(s) Inhalation every 6 hours PRN  albuterol/ipratropium for Nebulization 3 milliLiter(s) Nebulizer every 6 hours PRN  fluticasone propionate/ salmeterol 250-50 MICROgram(s) Diskus 1 Dose(s) Inhalation two times a day PRN  lactulose Syrup 10 Gram(s) Oral daily PRN                                            ------------------------------------------------------------  VITAL SIGNS: Last 24 Hours  T(C): 36.3 (13 Feb 2025 12:06), Max: 36.7 (12 Feb 2025 19:48)  T(F): 97.3 (13 Feb 2025 12:06), Max: 98 (12 Feb 2025 19:48)  HR: 70 (13 Feb 2025 12:06) (56 - 72)  BP: 113/75 (13 Feb 2025 12:06) (92/58 - 113/75)  BP(mean): 79 (13 Feb 2025 04:38) (72 - 79)  RR: 19 (13 Feb 2025 04:38) (18 - 19)  SpO2: 96% (13 Feb 2025 12:06) (93% - 96%)      02-12-25 @ 07:01  -  02-13-25 @ 07:00  --------------------------------------------------------  IN: 0 mL / OUT: 1500 mL / NET: -1500 mL    02-13-25 @ 07:01  -  02-13-25 @ 13:13  --------------------------------------------------------  IN: 344 mL / OUT: 0 mL / NET: 344 mL                                             --------------------------------------------------------------  LABS:                        13.3   4.67  )-----------( 243      ( 13 Feb 2025 05:52 )             40.6     02-13    128[L]  |  87[L]  |  5[L]  ----------------------------<  103[H]  2.9[L]   |  26  |  1.0    Ca    8.3[L]      13 Feb 2025 05:52  Phos  2.6     02-11  Mg     2.2     02-13    TPro  5.7[L]  /  Alb  3.1[L]  /  TBili  1.1  /  DBili  x   /  AST  42[H]  /  ALT  15  /  AlkPhos  156[H]  02-13      Urinalysis Basic - ( 13 Feb 2025 05:52 )    Color: x / Appearance: x / SG: x / pH: x  Gluc: 103 mg/dL / Ketone: x  / Bili: x / Urobili: x   Blood: x / Protein: x / Nitrite: x   Leuk Esterase: x / RBC: x / WBC x   Sq Epi: x / Non Sq Epi: x / Bacteria: x              Culture - Urine (collected 11 Feb 2025 15:41)  Source: Clean Catch Clean Catch (Midstream)  Final Report (12 Feb 2025 22:28):    No growth                                                    -------------------------------------------------------------  RADIOLOGY:  CXR  Xray Chest 1 View- PORTABLE-Urgent:   ACC: [id number] EXAM:  XR CHEST PORTABLE URGENT 1V   ORDERED BY: RENATE SERRANO     PROCEDURE DATE:  02/11/2025          INTERPRETATION:  CLINICAL HISTORY: Cough.    COMPARISON: November 25, 2024.    TECHNIQUE: Portable frontal chest radiograph. Low lung volume.    FINDINGS:    Support devices: None.    Cardiac/mediastinum/hilum: Stable.    Lung parenchyma/Pleura: No focal parenchymal opacities, pleural   effusions, or pneumothorax.    Skeleton/soft tissues: Stable.      IMPRESSION: Low lung volume.    No radiographic evidence of acute cardiopulmonary disease.    --- End of Report ---            MADALYN COBURN MD; Attending Radiologist  This document has been electronically signed. Feb 11 2025  6:07PM (02-11-25 @ 17:51)      CT  CT Abdomen and Pelvis w/ IV Cont:   ACC: [id number] EXAM:  CT ABDOMEN AND PELVIS IC   ORDERED BY: RENATE SERRANO     PROCEDURE DATE:  02/11/2025          INTERPRETATION:  CLINICAL STATEMENT: Abdominal pain.    TECHNIQUE: Contiguous axial CT images were obtained from the lower chest   to the pubic symphysis following administration of 94 cc Omnipaque 350   intravenous contrast.  Oral contrast was not administered.  Reformatted   images in the coronal and sagittal planes were acquired. 6 cc contrast   discarded.    COMPARISON: CT abdomen and pelvis 11/13/2024.    FINDINGS:    LOWER CHEST: Bibasilar subsegmental atelectasis.    HEPATOBILIARY: Hepatic steatosis. Hepatomegaly, 25.5 cm in length.   Postcholecystectomy. No biliary dilation.    SPLEEN: Unremarkable.    PANCREAS: Unremarkable.    ADRENAL GLANDS: Unremarkable.    KIDNEYS: No hydronephrosis.    ABDOMINOPELVIC NODES: Unremarkable.    PELVIC ORGANS: Urinary bladder collapsed with Hendrix catheter.    PERITONEUM/MESENTERY/BOWEL: Post gastric bypass surgery. No bowel   obstruction. No ascites. No pneumoperitoneum.. Post ventral hernia repair   with mesh    BONES/SOFT TISSUES: Degenerative change, lumbar spine..    VASCULAR: Vascular calcifications.      IMPRESSION:    No evidence of acute abdominal pathology.    --- End of Report ---            RENATE MATTHEWS MD; Attending Radiologist  This document has been electronically signed. Feb 11 2025  8:03PM (02-11-25 @ 17:29)                                            --------------------------------------------------------------    PHYSICAL EXAM:  GENERAL: sitting in bed   CHEST/LUNG: Clear to auscultation bilaterally; No rales, rhonchi, wheezing, or rubs. Unlabored respirations  HEART: Regular rate and rhythm; No murmurs, rubs, or gallops  ABDOMEN: Soft, nontender, nondistended  EXTREMITIES:  slight edema in LE   NERVOUS SYSTEM:  A&Ox3

## 2025-02-13 NOTE — PROGRESS NOTE ADULT - SUBJECTIVE AND OBJECTIVE BOX
seen and examined  24 h events noted   no distress       PAST HISTORY  --------------------------------------------------------------------------------  No significant changes to PMH, PSH, FHx, SHx, unless otherwise noted    ALLERGIES & MEDICATIONS  --------------------------------------------------------------------------------  Allergies    penicillin (Anaphylaxis)    Intolerances      Standing Inpatient Medications  cyanocobalamin 1000 MICROGram(s) Oral daily  folic acid 1 milliGRAM(s) Oral daily  heparin   Injectable 5000 Unit(s) SubCutaneous every 12 hours  influenza   Vaccine 0.5 milliLiter(s) IntraMuscular once  levothyroxine 100 MICROGram(s) Oral daily  melatonin 5 milliGRAM(s) Oral at bedtime  pantoprazole    Tablet 40 milliGRAM(s) Oral before breakfast  polyethylene glycol 3350 17 Gram(s) Oral daily  potassium chloride  20 mEq/100 mL IVPB 20 milliEquivalent(s) IV Intermittent every 2 hours  senna 2 Tablet(s) Oral at bedtime    PRN Inpatient Medications  acetaminophen     Tablet .. 650 milliGRAM(s) Oral every 6 hours PRN  albuterol    90 MICROgram(s) HFA Inhaler 2 Puff(s) Inhalation every 6 hours PRN  albuterol/ipratropium for Nebulization 3 milliLiter(s) Nebulizer every 6 hours PRN  fluticasone propionate/ salmeterol 250-50 MICROgram(s) Diskus 1 Dose(s) Inhalation two times a day PRN  lactulose Syrup 10 Gram(s) Oral daily PRN        VITALS/PHYSICAL EXAM  --------------------------------------------------------------------------------  T(C): 36.4 (02-13-25 @ 04:38), Max: 36.7 (02-12-25 @ 19:48)  HR: 72 (02-13-25 @ 04:38) (56 - 72)  BP: 103/78 (02-13-25 @ 04:38) (92/58 - 107/69)  RR: 19 (02-13-25 @ 04:38) (18 - 19)  SpO2: 93% (02-13-25 @ 04:38) (93% - 96%)  Wt(kg): --    Weight (kg): 93.9 (02-11-25 @ 13:52)      02-12-25 @ 07:01  -  02-13-25 @ 07:00  --------------------------------------------------------  IN: 0 mL / OUT: 1500 mL / NET: -1500 mL    02-13-25 @ 07:01  -  02-13-25 @ 09:54  --------------------------------------------------------  IN: 344 mL / OUT: 0 mL / NET: 344 mL      Physical Exam:  	Gen: NAD  	Pulm: decrease BS B/L  	CV: S1S2; no rub  	Abd: +distended  	    LABS/STUDIES  --------------------------------------------------------------------------------              13.3   4.67  >-----------<  243      [02-13-25 @ 05:52]              40.6     128  |  87  |  5   ----------------------------<  103      [02-13-25 @ 05:52]  2.9   |  26  |  1.0        Ca     8.3     [02-13-25 @ 05:52]      Mg     2.2     [02-13-25 @ 05:52]      Phos  2.6     [02-11-25 @ 15:41]    TPro  5.7  /  Alb  3.1  /  TBili  1.1  /  DBili  x   /  AST  42  /  ALT  15  /  AlkPhos  156  [02-13-25 @ 05:52]    Serum Osmolality 264      [02-12-25 @ 00:14]    Creatinine Trend:  SCr 1.0 [02-13 @ 05:52]  SCr 1.0 [02-13 @ 00:57]  SCr 1.1 [02-12 @ 16:19]  SCr 1.1 [02-12 @ 05:35]  SCr 1.3 [02-12 @ 00:14]    Urinalysis - [02-13-25 @ 05:52]      Color  / Appearance  / SG  / pH       Gluc 103 / Ketone   / Bili  / Urobili        Blood  / Protein  / Leuk Est  / Nitrite       RBC  / WBC  / Hyaline  / Gran  / Sq Epi  / Non Sq Epi  / Bacteria     Urine Creatinine 57      [02-12-25 @ 01:00]  Urine Protein 12      [02-12-25 @ 01:00]  Urine Sodium 20.0      [02-12-25 @ 01:00]  Urine Urea Nitrogen 194      [02-12-25 @ 01:00]  Urine Potassium 13      [02-12-25 @ 01:00]  Urine Osmolality 218      [02-12-25 @ 01:00]    TSH 3.93      [02-12-25 @ 05:35]  Lipid: chol 198, , HDL 23, LDL --      [11-09-24 @ 06:43]

## 2025-02-13 NOTE — PROGRESS NOTE ADULT - ASSESSMENT
64-year-old female with a history of asthma, hypothyroidism, anxiety, depression, MVP, Jaclyn-en-Y gastric bypass, appendectomy, cholecystectomy, and chronic back pain (on methadone) is presenting for decreased PO intake x3-4 weeks, diffuse abdominal pain, and weakness.    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 Today pt was agreeable for K pill but still intermitently shutting off IV replacement, pt counselled that we need to aggressively replete both PO and IV    -nephro following  -continue to monitor on tele  -EKG and qt greatly improving, on admission qtc>600 with lots of repolarization changes, now repolarization changes resolving and qtc<500, continue to check daily ekg and monitor qtc  -Monitor Cr/BUN Electrolytes including Phosphorus  -dietician evaluation   -Hold  Diuresis/bumex    Hx DM  - A1c last admission 7  - Had episodes of hypoglycemia  -monitor FS, hold on any insulin for now until K stable     Hx of COPD on Home O2 PRN not in Exacerbation  Hx of HFpEF?  - TTE 2024 --> 60-65% EF  - TTE with bubbles 11/19: suspected pulmonary shunt.   - C/w home inhalers  - Hold Bumex for now, would not restart on dc, pt can f/u with cardio outpt if needs resumption of diuretic     Hypothyroidism - C/w home levothyroxine    Hx Anxiety  Hx Depression  - Patient on Lamotrigine, Zoloft, and Xanax - Hold  for QT Temporarily     Hx Chronic Back Pain w/ Hx of Laminectomy   - can resume home Methadone 30mg q8h as qtc normalized, do not order any additional opiod medicaitons as restarting home meds, can also have flexiril and tylenol  -pain management appreciated     Hx Alcohol Use Disorder  Hx Hepatic Steatosis  [place]  monitoring PRN, not withdrawing    C/w B12 and Folate supplement    Pending: aggressive IV and po K repletion, c/w tele monitoring, resume home pain meds

## 2025-02-13 NOTE — PROGRESS NOTE ADULT - SUBJECTIVE AND OBJECTIVE BOX
SUBJECTIVE/OVERNIGHT EVENTS  Today is hospital day 2d. This morning patient was seen and examined at bedside, resting comfortably in bed. No acute or major events overnight.      CODE STATUS:      PMH:  Asthma    Gastroesophageal reflux disease without esophagitis    Hypothyroidism    Heart murmur    Anxiety    Depression    Mood disorder    Psoriasis    Constipation    Back pain    Morbid obesity          PSH:  S/P tonsillectomy    History of appendectomy    Abscess of back    History of Jaclyn-en-Y gastric bypass          MEDICATIONS  STANDING MEDICATIONS  acetaminophen     Tablet .. 975 milliGRAM(s) Oral every 8 hours  cyanocobalamin 1000 MICROGram(s) Oral daily  cyclobenzaprine 5 milliGRAM(s) Oral three times a day  folic acid 1 milliGRAM(s) Oral daily  heparin   Injectable 5000 Unit(s) SubCutaneous every 12 hours  influenza   Vaccine 0.5 milliLiter(s) IntraMuscular once  levothyroxine 100 MICROGram(s) Oral daily  melatonin 5 milliGRAM(s) Oral at bedtime  methadone    Tablet 30 milliGRAM(s) Oral three times a day  pantoprazole    Tablet 40 milliGRAM(s) Oral before breakfast  polyethylene glycol 3350 17 Gram(s) Oral daily  potassium chloride  20 mEq/100 mL IVPB 20 milliEquivalent(s) IV Intermittent every 2 hours  senna 2 Tablet(s) Oral at bedtime    PRN MEDICATIONS  albuterol    90 MICROgram(s) HFA Inhaler 2 Puff(s) Inhalation every 6 hours PRN  albuterol/ipratropium for Nebulization 3 milliLiter(s) Nebulizer every 6 hours PRN  fluticasone propionate/ salmeterol 250-50 MICROgram(s) Diskus 1 Dose(s) Inhalation two times a day PRN  lactulose Syrup 10 Gram(s) Oral daily PRN    VITALS  T(F): 97.3 (02-13-25 @ 12:06), Max: 98 (02-12-25 @ 19:48)  HR: 70 (02-13-25 @ 12:06) (56 - 72)  BP: 113/75 (02-13-25 @ 12:06) (92/58 - 113/75)  RR: 19 (02-13-25 @ 04:38) (18 - 19)  SpO2: 96% (02-13-25 @ 12:06) (93% - 96%)  POCT Blood Glucose.: 101 mg/dL (02-13-25 @ 11:21)  POCT Blood Glucose.: 98 mg/dL (02-13-25 @ 07:40)  POCT Blood Glucose.: 85 mg/dL (02-12-25 @ 21:04)  POCT Blood Glucose.: 82 mg/dL (02-12-25 @ 17:06)    PHYSICAL EXAM  GENERAL  ( x ) NAD, lying in bed comfortably     (  ) obtunded     (  ) lethargic     (  ) somnolent    HEAD  ( x ) Atraumatic     (  ) hematoma     (  ) laceration (specify location:       )     NECK  ( x ) Supple     (  ) neck stiffness     (  ) nuchal rigidity     (  )  no JVD     (  ) JVD present ( -- cm)    HEART  Rate -->  ( x ) normal rate    (  ) bradycardic    (  ) tachycardic  Rhythm -->  ( x ) regular    (  ) regularly irregular    (  ) irregularly irregular  Murmurs -->  ( x ) normal s1/s2    (  ) systolic murmur    (  ) diastolic murmur    (  ) continuous murmur     (  ) S3 present    (  ) S4 present    LUNGS  ( x )Unlabored respirations     (  ) tachypnea  ( x ) B/L air entry     (  ) decreased breath sounds in:  (location     )    ( x ) no adventitious sound     (  ) crackles     (  ) wheezing      (  ) rhonchi      (specify location:       )  (  ) chest wall tenderness (specify location:       )    ABDOMEN  ( x ) Soft     (  ) tense   |   ( x ) nondistended     (  ) distended   |   (  ) +BS     (  ) hypoactive bowel sounds     (  ) hyperactive bowel sounds  ( x ) nontender     (  ) RUQ tenderness     (  ) RLQ tenderness     (  ) LLQ tenderness     (  ) epigastric tenderness     (  ) diffuse tenderness  (  ) Splenomegaly      (  ) Hepatomegaly      (  ) Jaundice     (  ) ecchymosis     EXTREMITIES  (x  ) Normal     (  ) Rash     (  ) ecchymosis     (  ) varicose veins      (  ) pitting edema     (  ) non-pitting edema   (  ) ulceration     (  ) gangrene:     (location:     )    NERVOUS SYSTEM  ( x ) A&Ox3     (  ) confused     (  ) lethargic  CN II-XII:     (  ) Intact     (  ) focal deficits  (Specify:     )   Upper extremities:     (  ) strength X/5     (  ) focal deficit (specify:    )  Lower extremities:     (  ) strength  X/5    (  ) focal deficit (specify:    )      LABS             13.3   4.67  )-----------( 243      ( 02-13-25 @ 05:52 )             40.6     128  |  87  |  5   -------------------------<  103   02-13-25 @ 05:52  2.9  |  26  |  1.0    Ca      8.3     02-13-25 @ 05:52  Phos   2.6     02-11-25 @ 15:41  Mg     2.2     02-13-25 @ 05:52    TPro  5.7  /  Alb  3.1  /  TBili  1.1  /  DBili  x   /  AST  42  /  ALT  15  /  AlkPhos  156  /  GGT  x     02-13-25 @ 05:52        Urinalysis Basic - ( 13 Feb 2025 05:52 )    Color: x / Appearance: x / SG: x / pH: x  Gluc: 103 mg/dL / Ketone: x  / Bili: x / Urobili: x   Blood: x / Protein: x / Nitrite: x   Leuk Esterase: x / RBC: x / WBC x   Sq Epi: x / Non Sq Epi: x / Bacteria: x          Culture - Urine (collected 11 Feb 2025 15:41)  Source: Clean Catch Clean Catch (Midstream)  Final Report (12 Feb 2025 22:28):    No growth

## 2025-02-14 LAB
ALBUMIN SERPL ELPH-MCNC: 3.1 G/DL — LOW (ref 3.5–5.2)
ALP SERPL-CCNC: 154 U/L — HIGH (ref 30–115)
ALT FLD-CCNC: 14 U/L — SIGNIFICANT CHANGE UP (ref 0–41)
ANION GAP SERPL CALC-SCNC: 11 MMOL/L — SIGNIFICANT CHANGE UP (ref 7–14)
ANION GAP SERPL CALC-SCNC: 11 MMOL/L — SIGNIFICANT CHANGE UP (ref 7–14)
AST SERPL-CCNC: 39 U/L — SIGNIFICANT CHANGE UP (ref 0–41)
BASOPHILS # BLD AUTO: 0.07 K/UL — SIGNIFICANT CHANGE UP (ref 0–0.2)
BASOPHILS NFR BLD AUTO: 1.8 % — HIGH (ref 0–1)
BILIRUB SERPL-MCNC: 0.9 MG/DL — SIGNIFICANT CHANGE UP (ref 0.2–1.2)
BUN SERPL-MCNC: 5 MG/DL — LOW (ref 10–20)
BUN SERPL-MCNC: 6 MG/DL — LOW (ref 10–20)
CALCIUM SERPL-MCNC: 8.2 MG/DL — LOW (ref 8.4–10.5)
CALCIUM SERPL-MCNC: 8.6 MG/DL — SIGNIFICANT CHANGE UP (ref 8.4–10.5)
CHLORIDE SERPL-SCNC: 98 MMOL/L — SIGNIFICANT CHANGE UP (ref 98–110)
CHLORIDE SERPL-SCNC: 99 MMOL/L — SIGNIFICANT CHANGE UP (ref 98–110)
CO2 SERPL-SCNC: 27 MMOL/L — SIGNIFICANT CHANGE UP (ref 17–32)
CO2 SERPL-SCNC: 27 MMOL/L — SIGNIFICANT CHANGE UP (ref 17–32)
CREAT SERPL-MCNC: 1 MG/DL — SIGNIFICANT CHANGE UP (ref 0.7–1.5)
CREAT SERPL-MCNC: 1 MG/DL — SIGNIFICANT CHANGE UP (ref 0.7–1.5)
EGFR: 64 ML/MIN/1.73M2 — SIGNIFICANT CHANGE UP
EOSINOPHIL # BLD AUTO: 0.24 K/UL — SIGNIFICANT CHANGE UP (ref 0–0.7)
EOSINOPHIL NFR BLD AUTO: 6.2 % — SIGNIFICANT CHANGE UP (ref 0–8)
GLUCOSE SERPL-MCNC: 83 MG/DL — SIGNIFICANT CHANGE UP (ref 70–99)
GLUCOSE SERPL-MCNC: 83 MG/DL — SIGNIFICANT CHANGE UP (ref 70–99)
HCT VFR BLD CALC: 40.7 % — SIGNIFICANT CHANGE UP (ref 37–47)
HGB BLD-MCNC: 13 G/DL — SIGNIFICANT CHANGE UP (ref 12–16)
IMM GRANULOCYTES NFR BLD AUTO: 2.6 % — HIGH (ref 0.1–0.3)
LYMPHOCYTES # BLD AUTO: 1 K/UL — LOW (ref 1.2–3.4)
LYMPHOCYTES # BLD AUTO: 25.9 % — SIGNIFICANT CHANGE UP (ref 20.5–51.1)
MAGNESIUM SERPL-MCNC: 2.1 MG/DL — SIGNIFICANT CHANGE UP (ref 1.8–2.4)
MCHC RBC-ENTMCNC: 30.4 PG — SIGNIFICANT CHANGE UP (ref 27–31)
MCHC RBC-ENTMCNC: 31.9 G/DL — LOW (ref 32–37)
MCV RBC AUTO: 95.3 FL — SIGNIFICANT CHANGE UP (ref 81–99)
MONOCYTES # BLD AUTO: 0.34 K/UL — SIGNIFICANT CHANGE UP (ref 0.1–0.6)
MONOCYTES NFR BLD AUTO: 8.8 % — SIGNIFICANT CHANGE UP (ref 1.7–9.3)
NEUTROPHILS # BLD AUTO: 2.11 K/UL — SIGNIFICANT CHANGE UP (ref 1.4–6.5)
NEUTROPHILS NFR BLD AUTO: 54.7 % — SIGNIFICANT CHANGE UP (ref 42.2–75.2)
NRBC BLD AUTO-RTO: 0 /100 WBCS — SIGNIFICANT CHANGE UP (ref 0–0)
PHOSPHATE SERPL-MCNC: 2.7 MG/DL — SIGNIFICANT CHANGE UP (ref 2.1–4.9)
PLATELET # BLD AUTO: 222 K/UL — SIGNIFICANT CHANGE UP (ref 130–400)
PMV BLD: 10.1 FL — SIGNIFICANT CHANGE UP (ref 7.4–10.4)
POTASSIUM SERPL-MCNC: 3.2 MMOL/L — LOW (ref 3.5–5)
POTASSIUM SERPL-MCNC: 4 MMOL/L — SIGNIFICANT CHANGE UP (ref 3.5–5)
POTASSIUM SERPL-SCNC: 3.2 MMOL/L — LOW (ref 3.5–5)
POTASSIUM SERPL-SCNC: 4 MMOL/L — SIGNIFICANT CHANGE UP (ref 3.5–5)
PROT SERPL-MCNC: 5.3 G/DL — LOW (ref 6–8)
RBC # BLD: 4.27 M/UL — SIGNIFICANT CHANGE UP (ref 4.2–5.4)
RBC # FLD: 14.6 % — HIGH (ref 11.5–14.5)
SODIUM SERPL-SCNC: 136 MMOL/L — SIGNIFICANT CHANGE UP (ref 135–146)
SODIUM SERPL-SCNC: 137 MMOL/L — SIGNIFICANT CHANGE UP (ref 135–146)
WBC # BLD: 3.86 K/UL — LOW (ref 4.8–10.8)
WBC # FLD AUTO: 3.86 K/UL — LOW (ref 4.8–10.8)

## 2025-02-14 PROCEDURE — 99232 SBSQ HOSP IP/OBS MODERATE 35: CPT

## 2025-02-14 PROCEDURE — 93010 ELECTROCARDIOGRAM REPORT: CPT

## 2025-02-14 PROCEDURE — 71045 X-RAY EXAM CHEST 1 VIEW: CPT | Mod: 26

## 2025-02-14 RX ORDER — SERTRALINE 100 MG/1
200 TABLET, FILM COATED ORAL DAILY
Refills: 0 | Status: DISCONTINUED | OUTPATIENT
Start: 2025-02-14 | End: 2025-03-02

## 2025-02-14 RX ORDER — LAMOTRIGINE 150 MG/1
150 TABLET ORAL DAILY
Refills: 0 | Status: DISCONTINUED | OUTPATIENT
Start: 2025-02-14 | End: 2025-03-02

## 2025-02-14 RX ADMIN — Medication 5 MILLIGRAM(S): at 21:55

## 2025-02-14 RX ADMIN — CYCLOBENZAPRINE HYDROCHLORIDE 5 MILLIGRAM(S): 15 CAPSULE, EXTENDED RELEASE ORAL at 06:05

## 2025-02-14 RX ADMIN — Medication 40 MILLIGRAM(S): at 06:05

## 2025-02-14 RX ADMIN — CYCLOBENZAPRINE HYDROCHLORIDE 5 MILLIGRAM(S): 15 CAPSULE, EXTENDED RELEASE ORAL at 13:04

## 2025-02-14 RX ADMIN — Medication 100 MICROGRAM(S): at 06:05

## 2025-02-14 RX ADMIN — Medication 30 MILLIGRAM(S): at 03:41

## 2025-02-14 RX ADMIN — Medication 30 MILLIGRAM(S): at 11:01

## 2025-02-14 RX ADMIN — CYCLOBENZAPRINE HYDROCHLORIDE 5 MILLIGRAM(S): 15 CAPSULE, EXTENDED RELEASE ORAL at 21:55

## 2025-02-14 RX ADMIN — Medication 975 MILLIGRAM(S): at 06:05

## 2025-02-14 RX ADMIN — CYANOCOBALAMIN 1000 MICROGRAM(S): 1000 INJECTION INTRAMUSCULAR; SUBCUTANEOUS at 11:53

## 2025-02-14 RX ADMIN — Medication 975 MILLIGRAM(S): at 13:03

## 2025-02-14 RX ADMIN — Medication 975 MILLIGRAM(S): at 13:53

## 2025-02-14 RX ADMIN — Medication 40 MILLIEQUIVALENT(S): at 11:01

## 2025-02-14 RX ADMIN — Medication 975 MILLIGRAM(S): at 21:55

## 2025-02-14 RX ADMIN — SERTRALINE 200 MILLIGRAM(S): 100 TABLET, FILM COATED ORAL at 11:53

## 2025-02-14 RX ADMIN — LAMOTRIGINE 150 MILLIGRAM(S): 150 TABLET ORAL at 11:52

## 2025-02-14 RX ADMIN — Medication 40 MILLIEQUIVALENT(S): at 13:03

## 2025-02-14 RX ADMIN — Medication 975 MILLIGRAM(S): at 07:05

## 2025-02-14 RX ADMIN — Medication 30 MILLIGRAM(S): at 19:04

## 2025-02-14 RX ADMIN — FOLIC ACID 1 MILLIGRAM(S): 1 TABLET ORAL at 11:53

## 2025-02-14 NOTE — PROGRESS NOTE ADULT - SUBJECTIVE AND OBJECTIVE BOX
JULIET NAVARRETE 64y Female  MRN#: [id number]     Hospital Day: 3d      SUBJECTIVE  No acute events overnight, pt seen and examined this morning.                                          ----------------------------------------------------------  OBJECTIVE  PAST MEDICAL & SURGICAL HISTORY  Asthma  LAST ATTACK MANY YRS AGO    Gastroesophageal reflux disease without esophagitis    Hypothyroidism    Heart murmur    Anxiety    Depression    Mood disorder    Psoriasis    Constipation    Back pain  LOW    Morbid obesity    S/P tonsillectomy  1967    History of appendectomy  1974    Abscess of back  EVACUATION OF ABSCESS @ L5-S1 1997    History of Jaclyn-en-Y gastric bypass  WITH CHOLECYSTECTOMY 2006                                              -----------------------------------------------------------  ALLERGIES:  penicillin (Anaphylaxis)                                            ------------------------------------------------------------    HOME MEDICATIONS  Home Medications:  albuterol 90 mcg/inh inhalation aerosol: 2 puff(s) inhaled every 6 hours As needed Shortness of Breath and/or Wheezing (12 Feb 2025 00:04)  folic acid 1 mg oral tablet: 1 tab(s) orally once a day (12 Feb 2025 00:04)  lamoTRIgine 150 mg oral tablet: 1 tab(s) orally once a day (12 Feb 2025 00:04)  methadone 10 mg oral tablet: 3 tab(s) orally every 8 hours (12 Feb 2025 00:04)  sertraline: 200 milligram(s) orally once a day (12 Feb 2025 00:04)  Soma 350 mg oral tablet: 1 tab(s) orally 3 times a day (12 Feb 2025 00:04)                           MEDICATIONS:  STANDING MEDICATIONS  acetaminophen     Tablet .. 975 milliGRAM(s) Oral every 8 hours  cyanocobalamin 1000 MICROGram(s) Oral daily  cyclobenzaprine 5 milliGRAM(s) Oral three times a day  folic acid 1 milliGRAM(s) Oral daily  heparin   Injectable 5000 Unit(s) SubCutaneous every 12 hours  influenza   Vaccine 0.5 milliLiter(s) IntraMuscular once  lamoTRIgine 150 milliGRAM(s) Oral daily  levothyroxine 100 MICROGram(s) Oral daily  melatonin 5 milliGRAM(s) Oral at bedtime  methadone    Tablet 30 milliGRAM(s) Oral every 8 hours  pantoprazole    Tablet 40 milliGRAM(s) Oral before breakfast  polyethylene glycol 3350 17 Gram(s) Oral daily  senna 2 Tablet(s) Oral at bedtime  sertraline 200 milliGRAM(s) Oral daily    PRN MEDICATIONS  albuterol    90 MICROgram(s) HFA Inhaler 2 Puff(s) Inhalation every 6 hours PRN  albuterol/ipratropium for Nebulization 3 milliLiter(s) Nebulizer every 6 hours PRN  fluticasone propionate/ salmeterol 250-50 MICROgram(s) Diskus 1 Dose(s) Inhalation two times a day PRN  lactulose Syrup 10 Gram(s) Oral daily PRN                                            ------------------------------------------------------------  VITAL SIGNS: Last 24 Hours  T(C): 36.4 (14 Feb 2025 11:53), Max: 36.4 (13 Feb 2025 19:48)  T(F): 97.5 (14 Feb 2025 11:53), Max: 97.6 (13 Feb 2025 19:48)  HR: 89 (14 Feb 2025 11:53) (60 - 89)  BP: 118/75 (14 Feb 2025 11:53) (99/69 - 118/75)  BP(mean): 79 (14 Feb 2025 05:06) (79 - 79)  RR: 17 (14 Feb 2025 11:53) (17 - 18)  SpO2: 95% (14 Feb 2025 05:06) (95% - 97%)      02-13-25 @ 07:01  -  02-14-25 @ 07:00  --------------------------------------------------------  IN: 1117 mL / OUT: 1750 mL / NET: -633 mL    02-14-25 @ 07:01  -  02-14-25 @ 14:57  --------------------------------------------------------  IN: 446 mL / OUT: 0 mL / NET: 446 mL                                             --------------------------------------------------------------  LABS:                        13.0   3.86  )-----------( 222      ( 14 Feb 2025 05:20 )             40.7     02-14    137  |  99  |  5[L]  ----------------------------<  83  3.2[L]   |  27  |  1.0    Ca    8.2[L]      14 Feb 2025 05:20  Phos  2.7     02-14  Mg     2.1     02-14    TPro  5.3[L]  /  Alb  3.1[L]  /  TBili  0.9  /  DBili  x   /  AST  39  /  ALT  14  /  AlkPhos  154[H]  02-14      Urinalysis Basic - ( 14 Feb 2025 05:20 )    Color: x / Appearance: x / SG: x / pH: x  Gluc: 83 mg/dL / Ketone: x  / Bili: x / Urobili: x   Blood: x / Protein: x / Nitrite: x   Leuk Esterase: x / RBC: x / WBC x   Sq Epi: x / Non Sq Epi: x / Bacteria: x              Culture - Urine (collected 11 Feb 2025 15:41)  Source: Clean Catch Clean Catch (Midstream)  Final Report (12 Feb 2025 22:28):    No growth                                                    -------------------------------------------------------------  RADIOLOGY:  CXR  Xray Chest 1 View- PORTABLE-Urgent:   ACC: [id number] EXAM:  XR CHEST PORTABLE URGENT 1V   ORDERED BY: ASHLY LUNA     PROCEDURE DATE:  02/14/2025          INTERPRETATION:  CLINICAL HISTORY: Shortness of breath    COMPARISON: Chest x-ray 2/11/2025.    TECHNIQUE: Portable frontal chestradiograph.    FINDINGS:    Low lung volumes    Support devices: None.    Cardiac/mediastinum/hilum: Stable.    Lung parenchyma/Pleura: No focal parenchymal opacities, pleural   effusions, or pneumothorax.    Skeleton/soft tissues: Stable.      IMPRESSION:    No radiographic evidence of acute cardiopulmonary disease.    --- End of Report ---            TWILA AGUILAR MD; Attending Radiologist  This document has been electronically signed. Feb 14 2025 10:09AM (02-14-25 @ 10:07)  Xray Chest 1 View- PORTABLE-Urgent:   ACC: [id number] EXAM:  XR CHEST PORTABLE URGENT 1V   ORDERED BY: RENATE SERRANO     PROCEDURE DATE:  02/11/2025          INTERPRETATION:  CLINICAL HISTORY: Cough.    COMPARISON: November 25, 2024.    TECHNIQUE: Portable frontal chest radiograph. Low lung volume.    FINDINGS:    Support devices: None.    Cardiac/mediastinum/hilum: Stable.    Lung parenchyma/Pleura: No focal parenchymal opacities, pleural   effusions, or pneumothorax.    Skeleton/soft tissues: Stable.      IMPRESSION: Low lung volume.    No radiographic evidence of acute cardiopulmonary disease.    --- End of Report ---            MADALYN COBURN MD; Attending Radiologist  This document has been electronically signed. Feb 11 2025  6:07PM (02-11-25 @ 17:51)      CT  CT Abdomen and Pelvis w/ IV Cont:   ACC: [id number] EXAM:  CT ABDOMEN AND PELVIS IC   ORDERED BY: RENATE SERRANO     PROCEDURE DATE:  02/11/2025          INTERPRETATION:  CLINICAL STATEMENT: Abdominal pain.    TECHNIQUE: Contiguous axial CT images were obtained from the lower chest   to the pubic symphysis following administration of 94 cc Omnipaque 350   intravenous contrast.  Oral contrast was not administered.  Reformatted   images in the coronal and sagittal planes were acquired. 6 cc contrast   discarded.    COMPARISON: CT abdomen and pelvis 11/13/2024.    FINDINGS:    LOWER CHEST: Bibasilar subsegmental atelectasis.    HEPATOBILIARY: Hepatic steatosis. Hepatomegaly, 25.5 cm in length.   Postcholecystectomy. No biliary dilation.    SPLEEN: Unremarkable.    PANCREAS: Unremarkable.    ADRENAL GLANDS: Unremarkable.    KIDNEYS: No hydronephrosis.    ABDOMINOPELVIC NODES: Unremarkable.    PELVIC ORGANS: Urinary bladder collapsed with Hendrix catheter.    PERITONEUM/MESENTERY/BOWEL: Post gastric bypass surgery. No bowel   obstruction. No ascites. No pneumoperitoneum.. Post ventral hernia repair   with mesh    BONES/SOFT TISSUES: Degenerative change, lumbar spine..    VASCULAR: Vascular calcifications.      IMPRESSION:    No evidence of acute abdominal pathology.    --- End of Report ---            RENATE MATTHEWS MD; Attending Radiologist  This document has been electronically signed. Feb 11 2025  8:03PM (02-11-25 @ 17:29)                                            --------------------------------------------------------------    PHYSICAL EXAM:  GENERAL: NAD, lying in bed comfortably  CHEST/LUNG: Clear to auscultation bilaterally; No rales, rhonchi, wheezing, or rubs. Unlabored respirations  HEART: Regular rate and rhythm; No murmurs, rubs, or gallops  ABDOMEN: Soft, nontender, nondistended  EXTREMITIES:  No clubbing, cyanosis, or edema  NERVOUS SYSTEM:  A&Ox3

## 2025-02-14 NOTE — DIETITIAN INITIAL EVALUATION ADULT - OTHER INFO
Pt 63 y/o female admitted for FTT.   #Severe Hypokalemia and Moderate Hyponatremia in setting of Decreased PO Intake and Bumex Use  #Diffuse Abdominal Pain in setting of Hx of Jaclyn-en-Y Bypass  - CTAP this admission negative for any acute pathology  - No infectious etiology suspected- Keep Mg >2, replete K to >4  - Dietician eval  - Nephro eval  - PT eval  #Prolonged QTC in setting of Hypokalemia and Multiple QTC-Prolonging Meds  - Improve EKG 2/14  - K repletion   #Hx of COPD on Home O2 PRN not in Exacerbation  - TTE 2024 --> 60-65% EF  #Hepatic Steatosis   RUQ US last admission shows fatty liver likely due to obesity/alcohol use  - C/w B12 and Folate supplement      #CEM on CKD   - Endorses lack of appetite and early satiety x3-4 weeks

## 2025-02-14 NOTE — DIETITIAN INITIAL EVALUATION ADULT - ENTER FROM (CAL/KG)
DEVANTE TOLENTINO  MRN: [id number]    S: 01/01/2020: Still coughing. Sitting up in chair.     PAST MEDICAL & SURGICAL HISTORY:  Suprapubic catheter: 2/2 neurogenic bladder  Aspiration pneumonia: July &#x27;19- hospitalized and treated  Encounter for insertion of venous access port: Rt chest wall Mediport  Torn rotator cuff  Lymphedema: both lower legs  used ready wraps  Schizoaffective disorder, unspecified type  Postgastric surgery syndrome  Hypomagnesemia  Hypokalemia  Hyponatremia  Septic embolism: 4/08  Spinal stenosis: s/p epidural injection 4/12  Seroma: abdominal wall and buttock  Migraine headache  Hypogammaglobulinemia: treate with gamma globulin  Anemia: IV Iron  PCOS (polycystic ovarian syndrome)  Endometriosis  Clostridium Difficile Infection: 1999  Salmonella infection: history of  GERD (gastroesophageal reflux disease)  Orthostatic hypotension  Hypoglycemia  Irritable bowel syndrome (IBS)  Hypothyroid: on Synthroid  Duodenal ulcer: hx of bleeding in past  Adrenal insufficiency  GI bleed: s/p transfusion 9/12  Recurrent urinary tract infection  Narcolepsy  Peripheral Neuropathy  CHF (congestive heart failure): last echo 7/1/19, EF 60-65%  Chronic obstructive pulmonary disease (COPD): Asthma on Symbicort, 2L O2 at night  Afib: s/p ablation  Renal Abscess  Empyema  Manic Depression  Hx MRSA Infection: treated now none  Chronic Low Back Pain  Neurogenic Bladder  Sigmoid Volvulus: 1985  Suprapubic catheter  S/P ablation of atrial fibrillation  S/P knee replacement: bilateral  Lung abnormality: septic emboli 4/08, right lower lobe procedure and thoracentesis  SCFE (slipped capital femoral epiphysis): bilateral pinning 1974, pins removed  History of colon resection: 1986  Corneal abnormality: s/p left corneal transplant 1985  H/O abdominal hysterectomy: left salpingo oophorectomy 2002  Ventral hernia: 2003 surgical repair and lysis of adhesions  History of colonoscopy  History of arthroscopy of knee  right  Bladder suspension  B/l hip surgery for subcapital femoral epiphysis  hiatal hernia repair: surgical repair 7/11  S/P Cholecystectomy  left corneal transplant  Gastric Bypass Status for Obesity: s/p gastric bypass 2002 275lb weight loss      O: T(C): 36.8 (01-01-20 @ 11:25), Max: 37.1 (12-31-19 @ 21:42)  HR: 95 (01-01-20 @ 11:25) (69 - 95)  BP: 113/74 (01-01-20 @ 11:25) (104/67 - 113/74)  RR: 20 (01-01-20 @ 11:25) (20 - 20)  SpO2: 95% (01-01-20 @ 11:25) (95% - 98%)  Wt(kg): --    PHYSICAL EXAM:      GENERAL: no respiratory distress    NEURO: awake and alert    NECK: no JVD    CHEST: coarse breath sounds which improve after cough    CARDIAC: RR    EXT: no edema    ABG - ( 31 Dec 2019 18:42 )  pH, Arterial: 7.42  pH, Blood: x     /  pCO2: 48    /  pO2: 94    / HCO3: 30    / Base Excess: 5.6   /  SaO2: 97          < from: Xray Chest 1 View- PORTABLE-Routine (12.31.19 @ 18:38) >    EXAM:  XR CHEST PORTABLE ROUTINE 1V                            PROCEDURE DATE:  12/31/2019          INTERPRETATION:  Exam Date: 12/31/2019 6:38 PM    Chest radiograph (one view)         CLINICAL INFORMATION:  sob    TECHNIQUE:  Single frontal view of the chest was obtained.    COMPARISON: 12/29/2019    FINDINGS/  IMPRESSION:      Right port catheter with tip in the SVC is unchanged.    Diffuse prominence of the interstitium throughout the bilateral lungs is present. No significant pleural effusions. Cardiomegaly.        CONSUELO ESCOBAR M.D., ATTENDING RADIOLOGIST    < end of copied text >      CT Angio Chest PE Protocol w/ IV Cont (12.29.19 @ 17:30) >    FINDINGS:    LUNGS AND AIRWAYS: Patent central airways. There is an 7 mm irregular nodule medially inthe right upper lobe on image #23/series 2. This appears to be contiguous inferiorly with a right upper lobe consolidation. There is patchy consolidation in the right upper lobe and superior segment of the right lower lobe, compatible with pneumonia.There is mild diffuse interstitial prominence throughout both lungs.    PLEURA: Small right pleural effusion.    MEDIASTINUM AND STEFANIE: No lymphadenopathy.    VESSELS: There is no pulmonary embolism. The thoracic aorta and main pulmonary artery are normal in caliber.    HEART: Heart size is normal. No pericardial effusion.    CHEST WALL AND LOWER NECK: The thyroid gland is within normal limits. There is no supraclavicular or axillary lymphadenopathy.    VISUALIZED UPPER ABDOMEN: The adrenal glandsare within normal limits. The imaged portions of the liver, spleen, pancreas are within normal limits. The patient has had gastric bypass surgery. Patient has had cholecystectomy.    BONES: Degenerative change of the thoracic spine with levels of vertebroplasty.    IMPRESSION:     No pulmonary embolism.    Right upper and lower lobe pneumonia.    Irregular subpleural 7 mm nodule in the right upper lobe appears contiguous with the right upper lobe consolidation. However, the possibility of a malignancy cannot be excluded and therefore short interval follow-up in 1-2 months to complete resolution is recommended.    Other incidental findings are as above.                  MEDICATIONS  (STANDING):  albuterol/ipratropium for Nebulization 3 milliLiter(s) Nebulizer every 4 hours  armodafinil 250 milliGRAM(s) Oral daily  aspirin enteric coated 81 milliGRAM(s) Oral daily  benztropine 2 milliGRAM(s) Oral two times a day  budesonide 160 MICROgram(s)/formoterol 4.5 MICROgram(s) Inhaler 2 Puff(s) Inhalation two times a day  cefepime  Injectable. 1000 milliGRAM(s) IV Push every 8 hours  diltiazem    milliGRAM(s) Oral daily  ergocalciferol 24185 Unit(s) Oral <User Schedule>  fluconAZOLE   Tablet 100 milliGRAM(s) Oral daily  furosemide    Tablet 40 milliGRAM(s) Oral daily  heparin  Injectable 5000 Unit(s) SubCutaneous every 8 hours  lactobacillus acidophilus 1 Tablet(s) Oral daily  lamoTRIgine 100 milliGRAM(s) Oral at bedtime  lamoTRIgine 200 milliGRAM(s) Oral daily  levothyroxine 75 MICROGram(s) Oral daily  loratadine 10 milliGRAM(s) Oral daily  methylPREDNISolone sodium succinate Injectable 40 milliGRAM(s) IV Push every 8 hours  montelukast 10 milliGRAM(s) Oral daily  nystatin Powder 1 Application(s) Topical two times a day  pantoprazole    Tablet 40 milliGRAM(s) Oral before breakfast  polyethylene glycol 3350 17 Gram(s) Oral <User Schedule>  pregabalin 75 milliGRAM(s) Oral two times a day  QUEtiapine 100 milliGRAM(s) Oral at bedtime  QUEtiapine 50 milliGRAM(s) Oral daily  Relistor 150 milliGRAM(s) 150 milliGRAM(s) Oral daily  sertraline 100 milliGRAM(s) Oral at bedtime  sucralfate suspension 1 Gram(s) Oral four times a day  Trulance 3 milliGRAM(s) 3 milliGRAM(s) Oral daily  vancomycin  IVPB 1000 milliGRAM(s) IV Intermittent every 12 hours    MEDICATIONS  (PRN):  acetaminophen   Tablet .. 650 milliGRAM(s) Oral every 6 hours PRN Temp greater or equal to 38C (100.4F), Mild Pain (1 - 3)  acetaminophen   Tablet .. 650 milliGRAM(s) Oral once PRN Temp greater or equal to 38C (100.4F), Mild Pain (1 - 3)  diazepam    Tablet 5 milliGRAM(s) Oral two times a day PRN muscle spasm, pain  diphenhydrAMINE   Injectable 25 milliGRAM(s) IV Push every 12 hours PRN Allergy symptoms  guaiFENesin   Syrup  (Sugar-Free) 100 milliGRAM(s) Oral every 6 hours PRN Cough  HYDROmorphone   Tablet 4 milliGRAM(s) Oral every 8 hours PRN Severe Pain (7 - 10)  methocarbamol 750 milliGRAM(s) Oral three times a day PRN for muscle spasm  ondansetron   Disintegrating Tablet 8 milliGRAM(s) Oral three times a day PRN Nausea and/or Vomiting        A/P: 1. Pneumonia    2. History of COPD.     3. Lung nodule.     4. Gastroparesis.     PLAN: Continue antibiotics. IV steroids. Nebulized bronchodilators. Aspiration precautions. BiPAP prn. Supplement O2. Repeat CT as outpatient to follow up lung nodule.     Dr. Luna will follow up in AM. 20

## 2025-02-14 NOTE — DIETITIAN INITIAL EVALUATION ADULT - NUTRITIONGOAL OUTCOME1
Patient will consume 50% or > of meals within 3-5 days.   Pt will consume 75% or > of nutritional supplement within 3-5 days

## 2025-02-14 NOTE — PROGRESS NOTE ADULT - SUBJECTIVE AND OBJECTIVE BOX
SUBJECTIVE/OVERNIGHT EVENTS  Today is hospital day 3d. This morning patient was seen and examined at bedside, resting comfortably in bed. No acute or major events overnight.      CODE STATUS:      PMH:  Asthma    Gastroesophageal reflux disease without esophagitis    Hypothyroidism    Heart murmur    Anxiety    Depression    Mood disorder    Psoriasis    Constipation    Back pain    Morbid obesity          PSH:  S/P tonsillectomy    History of appendectomy    Abscess of back    History of Jaclyn-en-Y gastric bypass          MEDICATIONS  STANDING MEDICATIONS  acetaminophen     Tablet .. 975 milliGRAM(s) Oral every 8 hours  cyanocobalamin 1000 MICROGram(s) Oral daily  cyclobenzaprine 5 milliGRAM(s) Oral three times a day  folic acid 1 milliGRAM(s) Oral daily  heparin   Injectable 5000 Unit(s) SubCutaneous every 12 hours  influenza   Vaccine 0.5 milliLiter(s) IntraMuscular once  lamoTRIgine 150 milliGRAM(s) Oral daily  levothyroxine 100 MICROGram(s) Oral daily  melatonin 5 milliGRAM(s) Oral at bedtime  methadone    Tablet 30 milliGRAM(s) Oral every 8 hours  pantoprazole    Tablet 40 milliGRAM(s) Oral before breakfast  polyethylene glycol 3350 17 Gram(s) Oral daily  senna 2 Tablet(s) Oral at bedtime  sertraline 200 milliGRAM(s) Oral daily    PRN MEDICATIONS  albuterol    90 MICROgram(s) HFA Inhaler 2 Puff(s) Inhalation every 6 hours PRN  albuterol/ipratropium for Nebulization 3 milliLiter(s) Nebulizer every 6 hours PRN  fluticasone propionate/ salmeterol 250-50 MICROgram(s) Diskus 1 Dose(s) Inhalation two times a day PRN  lactulose Syrup 10 Gram(s) Oral daily PRN    VITALS  T(F): 97.5 (02-14-25 @ 11:53), Max: 97.6 (02-13-25 @ 19:48)  HR: 89 (02-14-25 @ 11:53) (60 - 89)  BP: 118/75 (02-14-25 @ 11:53) (99/69 - 118/75)  RR: 17 (02-14-25 @ 11:53) (17 - 18)  SpO2: 95% (02-14-25 @ 05:06) (95% - 97%)  POCT Blood Glucose.: 126 mg/dL (02-14-25 @ 11:34)  POCT Blood Glucose.: 106 mg/dL (02-14-25 @ 07:35)  POCT Blood Glucose.: 94 mg/dL (02-13-25 @ 21:45)  POCT Blood Glucose.: 90 mg/dL (02-13-25 @ 16:20)    PHYSICAL EXAM  GENERAL  ( x ) NAD, lying in bed comfortably     (  ) obtunded     (  ) lethargic     (  ) somnolent    HEAD  ( x ) Atraumatic     (  ) hematoma     (  ) laceration (specify location:       )     NECK  ( x ) Supple     (  ) neck stiffness     (  ) nuchal rigidity     (  )  no JVD     (  ) JVD present ( -- cm)    HEART  Rate -->  ( x ) normal rate    (  ) bradycardic    (  ) tachycardic  Rhythm -->  ( x ) regular    (  ) regularly irregular    (  ) irregularly irregular  Murmurs -->  ( x ) normal s1/s2    (  ) systolic murmur    (  ) diastolic murmur    (  ) continuous murmur     (  ) S3 present    (  ) S4 present    LUNGS  ( x )Unlabored respirations     (  ) tachypnea  ( x ) B/L air entry     (  ) decreased breath sounds in:  (location     )    ( x ) no adventitious sound     (  ) crackles     (  ) wheezing      (  ) rhonchi      (specify location:       )  (  ) chest wall tenderness (specify location:       )    ABDOMEN  ( x ) Soft     (  ) tense   |   ( x ) nondistended     (  ) distended   |   (  ) +BS     (  ) hypoactive bowel sounds     (  ) hyperactive bowel sounds  ( x ) nontender     (  ) RUQ tenderness     (  ) RLQ tenderness     (  ) LLQ tenderness     (  ) epigastric tenderness     (  ) diffuse tenderness  (  ) Splenomegaly      (  ) Hepatomegaly      (  ) Jaundice     (  ) ecchymosis     EXTREMITIES  (x  ) Normal     (  ) Rash     (  ) ecchymosis     (  ) varicose veins      (  ) pitting edema     (  ) non-pitting edema   (  ) ulceration     (  ) gangrene:     (location:     )    NERVOUS SYSTEM  ( x ) A&Ox3     (  ) confused     (  ) lethargic  CN II-XII:     (  ) Intact     (  ) focal deficits  (Specify:     )   Upper extremities:     (  ) strength X/5     (  ) focal deficit (specify:    )  Lower extremities:     (  ) strength  X/5    (  ) focal deficit (specify:    )      LABS             13.0   3.86  )-----------( 222      ( 02-14-25 @ 05:20 )             40.7     137  |  99  |  5   -------------------------<  83   02-14-25 @ 05:20  3.2  |  27  |  1.0    Ca      8.2     02-14-25 @ 05:20  Phos   2.7     02-14-25 @ 05:20  Mg     2.1     02-14-25 @ 05:20    TPro  5.3  /  Alb  3.1  /  TBili  0.9  /  DBili  x   /  AST  39  /  ALT  14  /  AlkPhos  154  /  GGT  x     02-14-25 @ 05:20        Urinalysis Basic - ( 14 Feb 2025 05:20 )    Color: x / Appearance: x / SG: x / pH: x  Gluc: 83 mg/dL / Ketone: x  / Bili: x / Urobili: x   Blood: x / Protein: x / Nitrite: x   Leuk Esterase: x / RBC: x / WBC x   Sq Epi: x / Non Sq Epi: x / Bacteria: x          IMAGING

## 2025-02-14 NOTE — DIETITIAN INITIAL EVALUATION ADULT - PERTINENT MEDS FT
MEDICATIONS  (STANDING):  acetaminophen     Tablet .. 975 milliGRAM(s) Oral every 8 hours  cyanocobalamin 1000 MICROGram(s) Oral daily  cyclobenzaprine 5 milliGRAM(s) Oral three times a day  folic acid 1 milliGRAM(s) Oral daily  heparin   Injectable 5000 Unit(s) SubCutaneous every 12 hours  influenza   Vaccine 0.5 milliLiter(s) IntraMuscular once  lamoTRIgine 150 milliGRAM(s) Oral daily  levothyroxine 100 MICROGram(s) Oral daily  melatonin 5 milliGRAM(s) Oral at bedtime  methadone    Tablet 30 milliGRAM(s) Oral every 8 hours  pantoprazole    Tablet 40 milliGRAM(s) Oral before breakfast  polyethylene glycol 3350 17 Gram(s) Oral daily  senna 2 Tablet(s) Oral at bedtime  sertraline 200 milliGRAM(s) Oral daily    MEDICATIONS  (PRN):  albuterol    90 MICROgram(s) HFA Inhaler 2 Puff(s) Inhalation every 6 hours PRN Shortness of Breath and/or Wheezing  albuterol/ipratropium for Nebulization 3 milliLiter(s) Nebulizer every 6 hours PRN Shortness of Breath and/or Wheezing  fluticasone propionate/ salmeterol 250-50 MICROgram(s) Diskus 1 Dose(s) Inhalation two times a day PRN for bronchospasm  lactulose Syrup 10 Gram(s) Oral daily PRN for constipation

## 2025-02-14 NOTE — DIETITIAN INITIAL EVALUATION ADULT - PERTINENT LABORATORY DATA
02-14    137  |  99  |  5[L]  ----------------------------<  83  3.2[L]   |  27  |  1.0    Ca    8.2[L]      14 Feb 2025 05:20  Phos  2.7     02-14  Mg     2.1     02-14    TPro  5.3[L]  /  Alb  3.1[L]  /  TBili  0.9  /  DBili  x   /  AST  39  /  ALT  14  /  AlkPhos  154[H]  02-14  POCT Blood Glucose.: 94 mg/dL (02-14-25 @ 21:09)  A1C with Estimated Average Glucose Result: 7.0 % (11-09-24 @ 06:43)

## 2025-02-14 NOTE — PROGRESS NOTE ADULT - ASSESSMENT
64-year-old female with a history of asthma, hypothyroidism, anxiety, depression, MVP, Jaclyn-en-Y gastric bypass, appendectomy, cholecystectomy, and chronic back pain (on methadone) is presenting for decreased PO intake x3-4 weeks, diffuse abdominal pain, and weakness.  Nephrology was consulted for Hyponatremia and hypokalemia; CEM improved.   #Non oliguric CEM on CKD stage 2/3A likely from dehydration 2/2 poor PO intake-improved.   #Hypotonic Hyponatremia likely from poor PO intake.   -Uosm, serumOsm and urine electrolytes noted.   #Hypokalemia likely 2/2 diuretics.   #Metabolic alkalosis 2/2 diuretics use.   sodium level stable   diuretics on hold   replete k to 4   magnesium at goal       W/ Acute renal issues improving will sign off  please recall w/ any questions/concerns

## 2025-02-14 NOTE — PHYSICAL THERAPY INITIAL EVALUATION ADULT - GENERAL OBSERVATIONS, REHAB EVAL
[id number] am. attempted PT evaluation. pt rec'd/left in bed, + 2L O2, IV, c/o severe LBP and LT LE pain at rest and with mobility, s/p pain meds. pt not able to change position at this time, however partially moving bilat UE/LE's.
Patient was seen from 11:15-11:45 for PT IE. Patient was rec'd in semi reclined in bed, +IVL, +tele, NAD, agreeable to participate in PT.

## 2025-02-14 NOTE — PROGRESS NOTE ADULT - ASSESSMENT
#Severe Hypokalemia and Moderate Hyponatremia in setting of Decreased PO Intake and Bumex Use  #Diffuse Abdominal Pain in setting of Hx of Jaclyn-en-Y Bypass  #CEM on CKD   - Endorses lack of appetite and early satiety x3-4 weeks  - K 2.1, Na 126  - EGD/ Colonoscopy 5/2023: Non erosive gastritis  - CTA abdomen 11/13: No acute abdominal or pelvic pathology. Atherosclerotic calcifications of the aorta and its branches. No evidence of celiac axis or SMA abnormality.  - CTAP this admission negative for any acute pathology  - No infectious etiology suspected  - Keep on Tele  - Keep Mg >2, replete K to >4  - Dietician eval  - Nephro eval  - PT eval  - NS at 75cc/hr  - repeat daily ECG and monitor QTc    #Prolonged QTC in setting of Hypokalemia and Multiple QTC-Prolonging Meds  - Improve on today EKG  - Holding bumex   - K repletion   - will restart home methadone does today (30 TID)    #DM  - A1c last admission 7  - Had episodes of hypoglycemia  - If glucose consistently >150 can start ISS    #Hx of COPD on Home O2 PRN not in Exacerbation  - TTE 2024 --> 60-65% EF  - TTE with bubbles 11/19: suspected pulmonary shunt.   - C/w home inhalers  - Hold home Bumex for now    #Hypothyroidism  - C/w home levothyroxine    #Anxiety  #Depression  - Patient on Lamotrigine, Zoloft, and Xanax  - Holding medication overnight due to severe hypokalemia w/ prolonged QTC  - Trend lytes and EKG and re-introduce tomorrow     #Chronic Back Pain w/ Hx of Laminectomy   - Takes Methadone 30mg q8h at home  - Repeat EKG and BMP, will give Methadone dose if QTC improving    #Alcohol Use Disorder  #Hepatic Steatosis  - Previously 2 scotchs per night or more  - No alcohol since November per pt  - RUQ US last admission shows fatty liver likely due to obesity/alcohol use  - C/w B12 and Folate supplement    #MISC  - Code Status: Full Code  - DVT ppx: Heparin subcutaneous  - GI ppx: Protonix  - Diet: DASH  - Activity: AAT  - Inpatient Dispo: Telemetry  - Discharge Dispo: From Home

## 2025-02-14 NOTE — PHYSICAL THERAPY INITIAL EVALUATION ADULT - IMPAIRMENTS CONTRIBUTING TO GAIT DEVIATIONS, PT EVAL
Patient c/o dizziness during ambulation. BP after returning back to the chair: 118/75, HR: 89. SPO2 on RA: 100%/impaired balance/decreased strength

## 2025-02-14 NOTE — DIETITIAN INITIAL EVALUATION ADULT - ORAL INTAKE PTA/DIET HISTORY
PTA pt reports no appetite & reduced po for ~ 1 month. Usually consumes 50-75% 1-2 meals/day. NKFA. Does not endorse a special diet. Pt report wt at 94kg 2/10, current wt 93.9, not a significant wt change.

## 2025-02-14 NOTE — DIETITIAN INITIAL EVALUATION ADULT - EDUCATION DIETARY MODIFICATIONS
Described benefits of recommended oral supplement pt communicated agreement and understanding./(1) partially meets; needs review/practice/verbalization

## 2025-02-14 NOTE — DIETITIAN INITIAL EVALUATION ADULT - ADD RECOMMEND
High Risk    Interventions: Coordination of care, meals, and nutritional supplements  Monitoring/Evaluation: Weights, labs, PO intake, nutrition-focused physical findings, tolerance of nutritional supplements.  1. Continue current diet  2. Add nutritional supplement   3. Encourage PO intake & assist PRN

## 2025-02-14 NOTE — PROGRESS NOTE ADULT - ASSESSMENT
64-year-old female with a history of asthma, hypothyroidism, anxiety, depression, MVP, Jaclyn-en-Y gastric bypass, appendectomy, cholecystectomy, and chronic back pain (on methadone) is presenting for decreased PO intake x3-4 weeks, diffuse abdominal pain, and weakness.    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 Now more agreeable since methadone restarted   -nephro had been following   -continue to monitor on tele  -EKG and qt greatly improving, on admission qtc>600 with lots of repolarization changes, now repolarization changes resolving and qtc<500, continue to check daily ekg and monitor qtc  -Monitor Cr/BUN Electrolytes including Phosphorus  -dietician evaluation   -Hold  Diuresis/bumex  -Need stable K at ~4 for dc     Hx DM  - A1c last admission 7  - Had episodes of hypoglycemia  -monitor FS, hold on any insulin for now until K stable     Hx of COPD on Home O2 PRN not in Exacerbation  Hx of HFpEF?  - TTE 2024 --> 60-65% EF  - TTE with bubbles 11/19: suspected pulmonary shunt.   - C/w home inhalers  - Hold Bumex for now, would liklely not restart on dc, pt can f/u with cardio outpt if needs resumption of diuretic     Hypothyroidism - C/w home levothyroxine    Hx Anxiety  Hx Depression  - Patient on Lamotrigine, Zoloft, and Xanax - resume    Hx Chronic Back Pain w/ Hx of Laminectomy   - can resume home Methadone 30mg q8h as qtc normalized, do not order any additional opiod medicaitons as restarting home meds, can also have flexiril and tylenol  -pain management appreciated     Hx Alcohol Use Disorder  Hx Hepatic Steatosis  Ciwa  monitoring PRN, not withdrawing    C/w B12 and Folate supplement    Pending: aggressive IV and po K repletion, c/w tele monitoring, resumed home pain meds, resume psych meds, pending K stablitiy

## 2025-02-14 NOTE — PHYSICAL THERAPY INITIAL EVALUATION ADULT - PERTINENT HX OF CURRENT PROBLEM, REHAB EVAL
admitted with c/o dizziness x 1 months and chronic LBP exacerbation
64-year-old female with a history of asthma, hypothyroidism, anxiety, depression, MVP, Jaclyn-en-Y gastric bypass, appendectomy, cholecystectomy, and chronic back pain (on methadone) is presenting for decreased PO intake x3-4 weeks, diffuse abdominal pain, and weakness. She used to drink 2 scotchs per night but has not drank alcohol since she was hospitalized in November. She lives at home with her  whom helps care for her, however he recently was admitted to the hospital and is currently at a STR where she was left alone to care for herself. She states that she has had a non-specific generalized abdominal pain not associated with nausea, vomiting, or diarrhea. She has decreased PO intake and only eats 1-2 bites/sips of food or drink before she feels full. She was found to be significantly hypokalemic on admission and is admitted for further management. after a syncopal episode. She lost consciousness briefly while sitting after a shower. She also reported 5 days of worsening abdominal pain, nausea, vomiting, decreased oral intake, and constipation. She reports regular alcohol use (a couple of glasses of scotch nightly) and worsening shortness of breath for the past 2-3 months, requiring near-daily albuterol nebulizer treatments.

## 2025-02-14 NOTE — PROGRESS NOTE ADULT - SUBJECTIVE AND OBJECTIVE BOX
Nephrology progress note    Patient was seen and examined, events over the last 24 h noted .    Allergies:  penicillin (Anaphylaxis)    Hospital Medications:   MEDICATIONS  (STANDING):  acetaminophen     Tablet .. 975 milliGRAM(s) Oral every 8 hours  cyanocobalamin 1000 MICROGram(s) Oral daily  cyclobenzaprine 5 milliGRAM(s) Oral three times a day  folic acid 1 milliGRAM(s) Oral daily  heparin   Injectable 5000 Unit(s) SubCutaneous every 12 hours  influenza   Vaccine 0.5 milliLiter(s) IntraMuscular once  lamoTRIgine 150 milliGRAM(s) Oral daily  levothyroxine 100 MICROGram(s) Oral daily  melatonin 5 milliGRAM(s) Oral at bedtime  methadone    Tablet 30 milliGRAM(s) Oral every 8 hours  pantoprazole    Tablet 40 milliGRAM(s) Oral before breakfast  polyethylene glycol 3350 17 Gram(s) Oral daily  senna 2 Tablet(s) Oral at bedtime  sertraline 200 milliGRAM(s) Oral daily        VITALS:  T(F): 97.5 (02-14-25 @ 11:53), Max: 97.6 (02-13-25 @ 19:48)  HR: 89 (02-14-25 @ 11:53)  BP: 118/75 (02-14-25 @ 11:53)  RR: 17 (02-14-25 @ 11:53)  SpO2: 95% (02-14-25 @ 05:06)  Wt(kg): --    02-12 @ 07:01  -  02-13 @ 07:00  --------------------------------------------------------  IN: 0 mL / OUT: 1500 mL / NET: -1500 mL    02-13 @ 07:01  -  02-14 @ 07:00  --------------------------------------------------------  IN: 1117 mL / OUT: 1750 mL / NET: -633 mL    02-14 @ 07:01  -  02-14 @ 14:17  --------------------------------------------------------  IN: 446 mL / OUT: 0 mL / NET: 446 mL          PHYSICAL EXAM:  Constitutional: NAD  HEENT: anicteric sclera, oropharynx clear, MMM  Neck: No JVD  Respiratory: CTAB, no wheezes, rales or rhonchi  Cardiovascular: S1, S2, RRR  Gastrointestinal: BS+, soft, NT/ND  Extremities: No cyanosis or clubbing. No peripheral edema  :  No garcia.   Skin: No rashes    LABS:  02-14    137  |  99  |  5[L]  ----------------------------<  83  3.2[L]   |  27  |  1.0    Ca    8.2[L]      14 Feb 2025 05:20  Phos  2.7     02-14  Mg     2.1     02-14    TPro  5.3[L]  /  Alb  3.1[L]  /  TBili  0.9  /  DBili      /  AST  39  /  ALT  14  /  AlkPhos  154[H]  02-14                          13.0   3.86  )-----------( 222      ( 14 Feb 2025 05:20 )             40.7       Urine Studies:  Urinalysis Basic - ( 14 Feb 2025 05:20 )    Color:  / Appearance:  / SG:  / pH:   Gluc: 83 mg/dL / Ketone:   / Bili:  / Urobili:    Blood:  / Protein:  / Nitrite:    Leuk Esterase:  / RBC:  / WBC    Sq Epi:  / Non Sq Epi:  / Bacteria:       Sodium, Random Urine: 20.0 mmoL/L (02-12 @ 01:00)  Creatinine, Random Urine: 57 mg/dL (02-12 @ 01:00)  Protein/Creatinine Ratio Calculation: 0.2 Ratio (02-12 @ 01:00)  Osmolality, Random Urine: 218 mos/kg (02-12 @ 01:00)  Potassium, Random Urine: 13 mmol/L (02-12 @ 01:00)    RADIOLOGY & ADDITIONAL STUDIES:

## 2025-02-14 NOTE — PHYSICAL THERAPY INITIAL EVALUATION ADULT - AMBULATION SKILLS, REHAB EVAL
Patient has rolling walker and straight cane at home and used the device as needed./independent/needs device

## 2025-02-15 LAB
ALBUMIN SERPL ELPH-MCNC: 3.2 G/DL — LOW (ref 3.5–5.2)
ALP SERPL-CCNC: 161 U/L — HIGH (ref 30–115)
ALT FLD-CCNC: 15 U/L — SIGNIFICANT CHANGE UP (ref 0–41)
ANION GAP SERPL CALC-SCNC: 16 MMOL/L — HIGH (ref 7–14)
AST SERPL-CCNC: 44 U/L — HIGH (ref 0–41)
BASOPHILS # BLD AUTO: 0.07 K/UL — SIGNIFICANT CHANGE UP (ref 0–0.2)
BASOPHILS NFR BLD AUTO: 1.4 % — HIGH (ref 0–1)
BILIRUB SERPL-MCNC: 0.8 MG/DL — SIGNIFICANT CHANGE UP (ref 0.2–1.2)
BUN SERPL-MCNC: 9 MG/DL — LOW (ref 10–20)
CALCIUM SERPL-MCNC: 8.6 MG/DL — SIGNIFICANT CHANGE UP (ref 8.4–10.4)
CHLORIDE SERPL-SCNC: 98 MMOL/L — SIGNIFICANT CHANGE UP (ref 98–110)
CO2 SERPL-SCNC: 20 MMOL/L — SIGNIFICANT CHANGE UP (ref 17–32)
CREAT SERPL-MCNC: 1 MG/DL — SIGNIFICANT CHANGE UP (ref 0.7–1.5)
EGFR: 64 ML/MIN/1.73M2 — SIGNIFICANT CHANGE UP
EGFR: 64 ML/MIN/1.73M2 — SIGNIFICANT CHANGE UP
EOSINOPHIL # BLD AUTO: 0.29 K/UL — SIGNIFICANT CHANGE UP (ref 0–0.7)
EOSINOPHIL NFR BLD AUTO: 6 % — SIGNIFICANT CHANGE UP (ref 0–8)
GLUCOSE BLDC GLUCOMTR-MCNC: 107 MG/DL — HIGH (ref 70–99)
GLUCOSE SERPL-MCNC: 93 MG/DL — SIGNIFICANT CHANGE UP (ref 70–99)
HCT VFR BLD CALC: 42.1 % — SIGNIFICANT CHANGE UP (ref 37–47)
HGB BLD-MCNC: 13.1 G/DL — SIGNIFICANT CHANGE UP (ref 12–16)
IMM GRANULOCYTES NFR BLD AUTO: 1.9 % — HIGH (ref 0.1–0.3)
LYMPHOCYTES # BLD AUTO: 1.13 K/UL — LOW (ref 1.2–3.4)
LYMPHOCYTES # BLD AUTO: 23.3 % — SIGNIFICANT CHANGE UP (ref 20.5–51.1)
MAGNESIUM SERPL-MCNC: 1.8 MG/DL — SIGNIFICANT CHANGE UP (ref 1.8–2.4)
MCHC RBC-ENTMCNC: 30.4 PG — SIGNIFICANT CHANGE UP (ref 27–31)
MCHC RBC-ENTMCNC: 31.1 G/DL — LOW (ref 32–37)
MCV RBC AUTO: 97.7 FL — SIGNIFICANT CHANGE UP (ref 81–99)
MONOCYTES # BLD AUTO: 0.36 K/UL — SIGNIFICANT CHANGE UP (ref 0.1–0.6)
MONOCYTES NFR BLD AUTO: 7.4 % — SIGNIFICANT CHANGE UP (ref 1.7–9.3)
NEUTROPHILS # BLD AUTO: 2.91 K/UL — SIGNIFICANT CHANGE UP (ref 1.4–6.5)
NEUTROPHILS NFR BLD AUTO: 60 % — SIGNIFICANT CHANGE UP (ref 42.2–75.2)
NRBC BLD AUTO-RTO: 0 /100 WBCS — SIGNIFICANT CHANGE UP (ref 0–0)
PHOSPHATE SERPL-MCNC: 3.3 MG/DL — SIGNIFICANT CHANGE UP (ref 2.1–4.9)
PLATELET # BLD AUTO: 226 K/UL — SIGNIFICANT CHANGE UP (ref 130–400)
PMV BLD: 10.5 FL — HIGH (ref 7.4–10.4)
POTASSIUM SERPL-MCNC: 3.9 MMOL/L — SIGNIFICANT CHANGE UP (ref 3.5–5)
POTASSIUM SERPL-SCNC: 3.9 MMOL/L — SIGNIFICANT CHANGE UP (ref 3.5–5)
PROT SERPL-MCNC: 5.5 G/DL — LOW (ref 6–8)
RBC # BLD: 4.31 M/UL — SIGNIFICANT CHANGE UP (ref 4.2–5.4)
RBC # FLD: 14.8 % — HIGH (ref 11.5–14.5)
SODIUM SERPL-SCNC: 134 MMOL/L — LOW (ref 135–146)
WBC # BLD: 4.85 K/UL — SIGNIFICANT CHANGE UP (ref 4.8–10.8)
WBC # FLD AUTO: 4.85 K/UL — SIGNIFICANT CHANGE UP (ref 4.8–10.8)

## 2025-02-15 PROCEDURE — 99232 SBSQ HOSP IP/OBS MODERATE 35: CPT

## 2025-02-15 PROCEDURE — 93010 ELECTROCARDIOGRAM REPORT: CPT

## 2025-02-15 PROCEDURE — 99223 1ST HOSP IP/OBS HIGH 75: CPT

## 2025-02-15 RX ADMIN — CYCLOBENZAPRINE HYDROCHLORIDE 5 MILLIGRAM(S): 15 CAPSULE, EXTENDED RELEASE ORAL at 21:31

## 2025-02-15 RX ADMIN — CYCLOBENZAPRINE HYDROCHLORIDE 5 MILLIGRAM(S): 15 CAPSULE, EXTENDED RELEASE ORAL at 05:47

## 2025-02-15 RX ADMIN — Medication 30 MILLIGRAM(S): at 21:30

## 2025-02-15 RX ADMIN — Medication 975 MILLIGRAM(S): at 13:18

## 2025-02-15 RX ADMIN — Medication 40 MILLIGRAM(S): at 05:47

## 2025-02-15 RX ADMIN — POLYETHYLENE GLYCOL 3350 17 GRAM(S): 17 POWDER, FOR SOLUTION ORAL at 11:44

## 2025-02-15 RX ADMIN — SERTRALINE 200 MILLIGRAM(S): 100 TABLET, FILM COATED ORAL at 11:45

## 2025-02-15 RX ADMIN — Medication 30 MILLIGRAM(S): at 02:42

## 2025-02-15 RX ADMIN — Medication 5 MILLIGRAM(S): at 21:31

## 2025-02-15 RX ADMIN — Medication 975 MILLIGRAM(S): at 21:31

## 2025-02-15 RX ADMIN — FOLIC ACID 1 MILLIGRAM(S): 1 TABLET ORAL at 11:45

## 2025-02-15 RX ADMIN — Medication 100 MICROGRAM(S): at 05:47

## 2025-02-15 RX ADMIN — CYANOCOBALAMIN 1000 MICROGRAM(S): 1000 INJECTION INTRAMUSCULAR; SUBCUTANEOUS at 11:45

## 2025-02-15 RX ADMIN — Medication 975 MILLIGRAM(S): at 05:47

## 2025-02-15 RX ADMIN — Medication 30 MILLIGRAM(S): at 11:45

## 2025-02-15 RX ADMIN — CYCLOBENZAPRINE HYDROCHLORIDE 5 MILLIGRAM(S): 15 CAPSULE, EXTENDED RELEASE ORAL at 13:18

## 2025-02-15 RX ADMIN — Medication 975 MILLIGRAM(S): at 14:00

## 2025-02-15 RX ADMIN — LAMOTRIGINE 150 MILLIGRAM(S): 150 TABLET ORAL at 11:47

## 2025-02-15 NOTE — CONSULT NOTE ADULT - ATTENDING COMMENTS
Seen in past for same complaints. PO intolerance has worsened and significant weight loss since last admission, also lost insurance since that time and has been sadly unable to follow up with me. Last EGD was november. CT reviewed and no abnormalities with bypass- pouch is normal sized, no evidence of fistula, no small bowel dilation. Massive hepatomegaly likely still cause of right sided pain. She has stop alcohol intake at her last visit and has not had a drink since November.   Will check nutritional and vit levels and suspect malnutrition. Recheck A1c.   Add scop patch for nausea. Add protein shakes.   Will discuss with GI to see if would benefit from repeat scope with worsening symptoms, if unable to scope will get UGI next week.   Due to open bypass and midline hernia repair with mesh- open revision of bypass would by high risk for very little benefit (if any benefit at all). She might benefit from gtube placement if continues to have po intolerance while we address her other medical issues. Will follow.

## 2025-02-15 NOTE — PROGRESS NOTE ADULT - ASSESSMENT
64-year-old female with a history of asthma, hypothyroidism, anxiety, depression, MVP, Jaclyn-en-Y gastric bypass, appendectomy, cholecystectomy, and chronic back pain (on methadone) is presenting for decreased PO intake x3-4 weeks, diffuse abdominal pain, and weakness.    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 Now more agreeable since methadone restarted   -nephro had been following   -continue to monitor on tele  -EKG and qt greatly improving, on admission qtc>600 with lots of repolarization changes, now repolarization changes resolving and qtc<500, continue to check daily ekg and monitor qtc  -Monitor Cr/BUN Electrolytes including Phosphorus  -dietician evaluation   -Hold  Diuresis/bumex-pt did state taking bumex with K replacement at home for some time  -Need stable K at ~4 for dc     #Poor PO intake  #Pt reports 50lb weight loss in last 2 months  -Bariatric surgery consult placed 2/15  -Pt had gastric bypass 2006    #Hx DM  - A1c last admission 7  - Had episodes of hypoglycemia  -monitor FS,     Hx of COPD on Home O2 PRN not in Exacerbation  Hx of HFpEF?  - TTE 2024 --> 60-65% EF  - TTE with bubbles 11/19: suspected pulmonary shunt.   - C/w home inhalers  - Hold Bumex for now, would liklely not restart on dc, pt can f/u with cardio outpt if needs resumption of diuretic     Hypothyroidism - C/w home levothyroxine    Hx Anxiety  Hx Depression  - Patient on Lamotrigine, Zoloft, and Xanax - resume    Hx Chronic Back Pain w/ Hx of Laminectomy   - can resume home Methadone 30mg q8h as qtc normalized, do not order any additional opiod medicaitons as restarting home meds, can also have flexiril and tylenol  -pain management appreciated     Hx Alcohol Use Disorder  Hx Hepatic Steatosis  Ciwa  monitoring PRN, not withdrawing    C/w B12 and Folate supplement    Pending:  c/w tele monitoring, resumed home pain meds, resume psych meds, pending K stablitiy, bariatric consult, PO intake

## 2025-02-15 NOTE — CONSULT NOTE ADULT - SUBJECTIVE AND OBJECTIVE BOX
GENERAL SURGERY CONSULT NOTE    Patient: JULIET NAVARRETE , 64y (64)Female   MRN: [id number]  Location: [place] [address] A  Visit: 25 Inpatient  Date: 02-15-25 @ 14:41    HPI:  64-year-old female with a history of asthma, hypothyroidism, anxiety, depression, MVP, Jaclyn-en-Y gastric bypass, appendectomy, cholecystectomy, and chronic back pain (on methadone) is presenting for decreased PO intake x3-4 weeks, diffuse abdominal pain, and weakness. She used to drink 2 scotchs per night but has not drank alcohol since she was hospitalized in November. She lives at home with her  whom helps care for her, however he recently was admitted to the hospital and is currently at a STR where she was left alone to care for herself. She states that she has had a non-specific generalized abdominal pain not associated with nausea, vomiting, or diarrhea. She has decreased PO intake and only eats 1-2 bites/sips of food or drink before she feels full. She was found to be significantly hypokalemic on admission and is admitted for further management. after a syncopal episode. She lost consciousness briefly while sitting after a shower. She also reported 5 days of worsening abdominal pain, nausea, vomiting, decreased oral intake, and constipation. She reports regular alcohol use (a couple of glasses of scotch nightly) and worsening shortness of breath for the past 2-3 months, requiring near-daily albuterol nebulizer treatments.    Of note, she was discharged from [facility] in December after a syncopal episode. Per previous documentation, the syncopal episode was likely vasovagal, though a prolonged QTc (603 on admission EKG) raised concern for arrhythmia. This was likely secondary to methadone and sertraline. Telemetry showed no arrhythmias. Echocardiogram showed normal LVEF (65%). CT chest was negative for PE. Abdominal pain workup, including CT abdomen and abdominal ultrasound, revealed hepatomegaly and fatty liver infiltration, but no acute pathology. Lipase was normal. LFTs were mildly elevated. Elevated lactate (3.0) was attributed to alcohol use. Her dyspnea was attributed to deconditioning and obesity, with no evidence of CHF or acute exacerbation of asthma. Leg edema was stable and mild.    Patient was seen as a surgery consult due to poor PO intake. Patient recalls initially losing about 120 lbs after her gastric bypass surgery, and gradually gained 50 lbs back. She has noted her symptoms of poor food intake for approximately a month and a half, feeling nausea after eating just a few bites of food. She also notes occasional accompanying weakness. Although passing gas, she notes to be passing less than usual (she notes that she has been belching more frequently in recent years since her bypass). She last past stool 2 days ago, which was the first time she had in 5 days.    Vitals  Temp: 98.6  HR: 76  BP: 113/78  O2: 94% on 3L NC  RR: 18    Labs  WBC: 8.97  HgB: 15.4  Plt: 285  Cr: 1.4 (baseline 1.0)  Na: 126  K: 2.4 -> 2.1  A -> 13  Bicarb: 39  ALK: 214  AST: 49  ALT: 17  Lipase: 61  M.0  Phos: 2.6    Imaging  < from: CT Abdomen and Pelvis w/ IV Cont (25 @ 17:29) >  IMPRESSION:    No evidence of acute abdominal pathology.      < from: Xray Chest 1 View- PORTABLE-Urgent (25 @ 17:51) >  IMPRESSION: Low lung volume.    No radiographic evidence of acute cardiopulmonary disease.      < from: Xray Kidney Ureter Bladder (25 @ 15:10) >  IMPRESSION:    Nonspecific nonobstructive bowel gas pattern. Postsurgical changes.   Stable bones.      < from: Xray Chest 1 View- PORTABLE-Urgent (Xray Chest 1 View- PORTABLE-Urgent .) (25 @ 10:07) >  IMPRESSION:    No radiographic evidence of acute cardiopulmonary disease.        In the ED  Potassium Chloride 40 mEq PO x2  KCL 40 mEq IV ordered  KCL 40 mEq PO ordered  Mg Suflate 2g IV x1  Tylenol 650mg PO x1 (2025 22:12)      PAST MEDICAL & SURGICAL HISTORY:  Asthma  LAST ATTACK MANY YRS AGO      Gastroesophageal reflux disease without esophagitis      Hypothyroidism      Heart murmur      Anxiety      Depression      Mood disorder      Psoriasis      Constipation      Back pain  LOW      Morbid obesity      S/P tonsillectomy  1967      History of appendectomy  1974      Abscess of back  EVACUATION OF ABSCESS @ L5-S1       History of Jaclyn-en-Y gastric bypass  WITH CHOLECYSTECTOMY           Home Medications:  albuterol 90 mcg/inh inhalation aerosol: 2 puff(s) inhaled every 6 hours As needed Shortness of Breath and/or Wheezing (2025 00:04)  folic acid 1 mg oral tablet: 1 tab(s) orally once a day (:04)  lamoTRIgine 150 mg oral tablet: 1 tab(s) orally once a day (:04)  methadone 10 mg oral tablet: 3 tab(s) orally every 8 hours (:04)  sertraline: 200 milligram(s) orally once a day (04)  Soma 350 mg oral tablet: 1 tab(s) orally 3 times a day (:04)        VITALS:  T(F): 98.3 (02-15-25 @ 12:05), Max: 98.3 (02-15-25 @ 12:05)  HR: 66 (02-15-25 @ 12:05) (58 - 66)  BP: 106/74 (02-15-25 @ 12:05) (106/74 - 124/83)  RR: 17 (02-15-25 @ 12:05) (17 - 17)  SpO2: 95% (02-15-25 @ 05:08) (95% - 95%)    PHYSICAL EXAM:  General: NAD, calm and cooperative  HEENT: NCAT  Abdomen: Soft, distended, tender, no rebound, no guarding.  Musculoskeletal: ROM intact, compartments soft  Vascular: Extremities well perfused  Skin: Warm/dry, normal color, no jaundice    MEDICATIONS  (STANDING):  acetaminophen     Tablet .. 975 milliGRAM(s) Oral every 8 hours  cyanocobalamin 1000 MICROGram(s) Oral daily  cyclobenzaprine 5 milliGRAM(s) Oral three times a day  folic acid 1 milliGRAM(s) Oral daily  heparin   Injectable 5000 Unit(s) SubCutaneous every 12 hours  influenza   Vaccine 0.5 milliLiter(s) IntraMuscular once  lamoTRIgine 150 milliGRAM(s) Oral daily  levothyroxine 100 MICROGram(s) Oral daily  melatonin 5 milliGRAM(s) Oral at bedtime  methadone    Tablet 30 milliGRAM(s) Oral every 8 hours  pantoprazole    Tablet 40 milliGRAM(s) Oral before breakfast  polyethylene glycol 3350 17 Gram(s) Oral daily  senna 2 Tablet(s) Oral at bedtime  sertraline 200 milliGRAM(s) Oral daily    MEDICATIONS  (PRN):  albuterol    90 MICROgram(s) HFA Inhaler 2 Puff(s) Inhalation every 6 hours PRN Shortness of Breath and/or Wheezing  albuterol/ipratropium for Nebulization 3 milliLiter(s) Nebulizer every 6 hours PRN Shortness of Breath and/or Wheezing  fluticasone propionate/ salmeterol 250-50 MICROgram(s) Diskus 1 Dose(s) Inhalation two times a day PRN for bronchospasm  lactulose Syrup 10 Gram(s) Oral daily PRN for constipation      LAB/STUDIES:                        13.1   4.85  )-----------( 226      ( 15 Feb 2025 05:37 )             42.1     02-15    134[L]  |  98  |  9[L]  ----------------------------<  93  3.9   |  20  |  1.0    Ca    8.6      15 Feb 2025 05:37  Phos  3.3     02-15  Mg     1.8     02-15    TPro  5.5[L]  /  Alb  3.2[L]  /  TBili  0.8  /  DBili  x   /  AST  44[H]  /  ALT  15  /  AlkPhos  161[H]  02-15      LIVER FUNCTIONS - ( 15 Feb 2025 05:37 )  Alb: 3.2 g/dL / Pro: 5.5 g/dL / ALK PHOS: 161 U/L / ALT: 15 U/L / AST: 44 U/L / GGT: x             IMAGING:  < from: CT Abdomen and Pelvis w/ IV Cont (25 @ 17:29) >  IMPRESSION:    No evidence of acute abdominal pathology.      < from: Xray Chest 1 View- PORTABLE-Urgent (25 @ 17:51) >  IMPRESSION: Low lung volume.    No radiographic evidence of acute cardiopulmonary disease.      < from: Xray Kidney Ureter Bladder (25 @ 15:10) >  IMPRESSION:    Nonspecific nonobstructive bowel gas pattern. Postsurgical changes.   Stable bones.      < from: Xray Chest 1 View- PORTABLE-Urgent (Xray Chest 1 View- PORTABLE-Urgent .) (25 @ 10:07) >  IMPRESSION:    No radiographic evidence of acute cardiopulmonary disease.       GENERAL SURGERY CONSULT NOTE    Patient: JULIET NAVARRETE , 64y ([birth year])Female   MRN: [id number]  Location: [place] [address] A  Visit: 02-11-25 Inpatient  Date: 02-15-25 @ 14:41    HPI:  64-year-old female with a history of asthma, hypothyroidism, anxiety, depression, MVP, Jaclyn-en-Y gastric bypass, appendectomy, cholecystectomy, and chronic back pain (on methadone) is presenting for decreased PO intake x3-4 weeks, diffuse abdominal pain, and weakness. She used to drink 2 scotchs per night but has not drank alcohol since she was hospitalized in November. She lives at home with her  whom helps care for her, however he recently was admitted to the hospital and is currently at a STR where she was left alone to care for herself. She states that she has had a non-specific generalized abdominal pain not associated with nausea, vomiting, or diarrhea. She has decreased PO intake and only eats 1-2 bites/sips of food or drink before she feels full. She was found to be significantly hypokalemic on admission and is admitted for further management. after a syncopal episode. She lost consciousness briefly while sitting after a shower. She also reported 5 days of worsening abdominal pain, nausea, vomiting, decreased oral intake, and constipation. She reports regular alcohol use (a couple of glasses of scotch nightly) and worsening shortness of breath for the past 2-3 months, requiring near-daily albuterol nebulizer treatments.    Of note, she was discharged from [facility] in December after a syncopal episode. Per previous documentation, the syncopal episode was likely vasovagal, though a prolonged QTc (603 on admission EKG) raised concern for arrhythmia. This was likely secondary to methadone and sertraline. Telemetry showed no arrhythmias. Echocardiogram showed normal LVEF (65%). CT chest was negative for PE. Abdominal pain workup, including CT abdomen and abdominal ultrasound, revealed hepatomegaly and fatty liver infiltration, but no acute pathology. Lipase was normal. LFTs were mildly elevated. Elevated lactate (3.0) was attributed to alcohol use. Her dyspnea was attributed to deconditioning and obesity, with no evidence of CHF or acute exacerbation of asthma. Leg edema was stable and mild.      Bariatric surgery consulted for evaluation of decreased PO intake in the setting of R&Y gastric bypass. Patient recalls initially losing about 120 lbs after her gastric bypass surgery back in 2006, since then she has gradually gained 50 lbs back. She has noted her symptoms of poor food intake for approximately a month and a half, feeling nausea after eating just a few bites of food. Patient also reports weakness, +gas, last BM was 2 days ago. Of note patient currently denies any alcohol use since last admission and quitted smoking in July. Pt denies any use of NSAIDS.    PAST MEDICAL & SURGICAL HISTORY:  Asthma  LAST ATTACK MANY YRS AGO      Gastroesophageal reflux disease without esophagitis      Hypothyroidism      Heart murmur      Anxiety      Depression      Mood disorder      Psoriasis      Constipation      Back pain  LOW      Morbid obesity      S/P tonsillectomy  1967      History of appendectomy  1974      Abscess of back  EVACUATION OF ABSCESS @ L5-S1 1997      History of Jaclyn-en-Y gastric bypass  WITH CHOLECYSTECTOMY 2006          Home Medications:  albuterol 90 mcg/inh inhalation aerosol: 2 puff(s) inhaled every 6 hours As needed Shortness of Breath and/or Wheezing (12 Feb 2025 00:04)  folic acid 1 mg oral tablet: 1 tab(s) orally once a day (12 Feb 2025 00:04)  lamoTRIgine 150 mg oral tablet: 1 tab(s) orally once a day (12 Feb 2025 00:04)  methadone 10 mg oral tablet: 3 tab(s) orally every 8 hours (12 Feb 2025 00:04)  sertraline: 200 milligram(s) orally once a day (12 Feb 2025 00:04)  Soma 350 mg oral tablet: 1 tab(s) orally 3 times a day (12 Feb 2025 00:04)        VITALS:  T(F): 98.3 (02-15-25 @ 12:05), Max: 98.3 (02-15-25 @ 12:05)  HR: 66 (02-15-25 @ 12:05) (58 - 66)  BP: 106/74 (02-15-25 @ 12:05) (106/74 - 124/83)  RR: 17 (02-15-25 @ 12:05) (17 - 17)  SpO2: 95% (02-15-25 @ 05:08) (95% - 95%)    PHYSICAL EXAM:  General: NAD, calm and cooperative  HEENT: NCAT  Abdomen: Soft, distended, tender in the epigastric area, palpable liver, no rebound, no guarding.  Musculoskeletal: ROM intact, compartments soft  Vascular: Extremities well perfused  Skin: Warm/dry, normal color, no jaundice    MEDICATIONS  (STANDING):  acetaminophen     Tablet .. 975 milliGRAM(s) Oral every 8 hours  cyanocobalamin 1000 MICROGram(s) Oral daily  cyclobenzaprine 5 milliGRAM(s) Oral three times a day  folic acid 1 milliGRAM(s) Oral daily  heparin   Injectable 5000 Unit(s) SubCutaneous every 12 hours  influenza   Vaccine 0.5 milliLiter(s) IntraMuscular once  lamoTRIgine 150 milliGRAM(s) Oral daily  levothyroxine 100 MICROGram(s) Oral daily  melatonin 5 milliGRAM(s) Oral at bedtime  methadone    Tablet 30 milliGRAM(s) Oral every 8 hours  pantoprazole    Tablet 40 milliGRAM(s) Oral before breakfast  polyethylene glycol 3350 17 Gram(s) Oral daily  senna 2 Tablet(s) Oral at bedtime  sertraline 200 milliGRAM(s) Oral daily    MEDICATIONS  (PRN):  albuterol    90 MICROgram(s) HFA Inhaler 2 Puff(s) Inhalation every 6 hours PRN Shortness of Breath and/or Wheezing  albuterol/ipratropium for Nebulization 3 milliLiter(s) Nebulizer every 6 hours PRN Shortness of Breath and/or Wheezing  fluticasone propionate/ salmeterol 250-50 MICROgram(s) Diskus 1 Dose(s) Inhalation two times a day PRN for bronchospasm  lactulose Syrup 10 Gram(s) Oral daily PRN for constipation      LAB/STUDIES:                        13.1   4.85  )-----------( 226      ( 15 Feb 2025 05:37 )             42.1     02-15    134[L]  |  98  |  9[L]  ----------------------------<  93  3.9   |  20  |  1.0    Ca    8.6      15 Feb 2025 05:37  Phos  3.3     02-15  Mg     1.8     02-15    TPro  5.5[L]  /  Alb  3.2[L]  /  TBili  0.8  /  DBili  x   /  AST  44[H]  /  ALT  15  /  AlkPhos  161[H]  02-15      LIVER FUNCTIONS - ( 15 Feb 2025 05:37 )  Alb: 3.2 g/dL / Pro: 5.5 g/dL / ALK PHOS: 161 U/L / ALT: 15 U/L / AST: 44 U/L / GGT: x             IMAGING:  < from: CT Abdomen and Pelvis w/ IV Cont (02.11.25 @ 17:29) >  IMPRESSION:    No evidence of acute abdominal pathology.      < from: Xray Chest 1 View- PORTABLE-Urgent (02.11.25 @ 17:51) >  IMPRESSION: Low lung volume.    No radiographic evidence of acute cardiopulmonary disease.      < from: Xray Kidney Ureter Bladder (02.12.25 @ 15:10) >  IMPRESSION:    Nonspecific nonobstructive bowel gas pattern. Postsurgical changes.   Stable bones.      < from: Xray Chest 1 View- PORTABLE-Urgent (Xray Chest 1 View- PORTABLE-Urgent .) (02.14.25 @ 10:07) >  IMPRESSION:    No radiographic evidence of acute cardiopulmonary disease.

## 2025-02-15 NOTE — PROGRESS NOTE ADULT - SUBJECTIVE AND OBJECTIVE BOX
Patient is a 64y old  Female who presents with a chief complaint of Failure to thrive in adult     (14 Feb 2025 21:13)      Patient seen and examined at bedside.    ALLERGIES:  penicillin (Anaphylaxis)    MEDICATIONS:  acetaminophen     Tablet .. 975 milliGRAM(s) Oral every 8 hours  albuterol    90 MICROgram(s) HFA Inhaler 2 Puff(s) Inhalation every 6 hours PRN  albuterol/ipratropium for Nebulization 3 milliLiter(s) Nebulizer every 6 hours PRN  cyanocobalamin 1000 MICROGram(s) Oral daily  cyclobenzaprine 5 milliGRAM(s) Oral three times a day  fluticasone propionate/ salmeterol 250-50 MICROgram(s) Diskus 1 Dose(s) Inhalation two times a day PRN  folic acid 1 milliGRAM(s) Oral daily  heparin   Injectable 5000 Unit(s) SubCutaneous every 12 hours  influenza   Vaccine 0.5 milliLiter(s) IntraMuscular once  lactulose Syrup 10 Gram(s) Oral daily PRN  lamoTRIgine 150 milliGRAM(s) Oral daily  levothyroxine 100 MICROGram(s) Oral daily  melatonin 5 milliGRAM(s) Oral at bedtime  methadone    Tablet 30 milliGRAM(s) Oral every 8 hours  pantoprazole    Tablet 40 milliGRAM(s) Oral before breakfast  polyethylene glycol 3350 17 Gram(s) Oral daily  senna 2 Tablet(s) Oral at bedtime  sertraline 200 milliGRAM(s) Oral daily    Vital Signs Last 24 Hrs  T(F): 98.3 (15 Feb 2025 12:05), Max: 98.3 (15 Feb 2025 12:05)  HR: 66 (15 Feb 2025 12:05) (58 - 66)  BP: 106/74 (15 Feb 2025 12:05) (106/74 - 124/83)  RR: 17 (15 Feb 2025 12:05) (17 - 17)  SpO2: 95% (15 Feb 2025 05:08) (95% - 95%)  I&O's Summary    14 Feb 2025 07:01  -  15 Feb 2025 07:00  --------------------------------------------------------  IN: 766 mL / OUT: 0 mL / NET: 766 mL    15 Feb 2025 07:01  -  15 Feb 2025 12:35  --------------------------------------------------------  IN: 266 mL / OUT: 0 mL / NET: 266 mL        PHYSICAL EXAM:  General: NAD, A/O x 3  ENT: MMM  Neck: Supple, No JVD  Lungs: Clear to auscultation bilaterally  Cardio: RRR, S1/S2, 2/6 murmur   Abdomen: Soft, Nontender, Nondistended; Bowel sounds present  Extremities: No cyanosis, trace LE edema-skin darkening and thickening     LABS:                        13.1   4.85  )-----------( 226      ( 15 Feb 2025 05:37 )             42.1     02-15    134  |  98  |  9   ----------------------------<  93  3.9   |  20  |  1.0    Ca    8.6      15 Feb 2025 05:37  Phos  3.3     02-15  Mg     1.8     02-15    TPro  5.5  /  Alb  3.2  /  TBili  0.8  /  DBili  x   /  AST  44  /  ALT  15  /  AlkPhos  161  02-15                            POCT Blood Glucose.: 94 mg/dL (14 Feb 2025 21:09)  POCT Blood Glucose.: 94 mg/dL (14 Feb 2025 16:21)      Urinalysis Basic - ( 15 Feb 2025 05:37 )    Color: x / Appearance: x / SG: x / pH: x  Gluc: 93 mg/dL / Ketone: x  / Bili: x / Urobili: x   Blood: x / Protein: x / Nitrite: x   Leuk Esterase: x / RBC: x / WBC x   Sq Epi: x / Non Sq Epi: x / Bacteria: x        Culture - Urine (collected 11 Feb 2025 15:41)  Source: Clean Catch Clean Catch (Midstream)  Final Report (12 Feb 2025 22:28):    No growth          RADIOLOGY & ADDITIONAL TESTS:    Care Discussed with Consultants/Other Providers:

## 2025-02-16 LAB
ALBUMIN SERPL ELPH-MCNC: 3.2 G/DL — LOW (ref 3.5–5.2)
ALP SERPL-CCNC: 159 U/L — HIGH (ref 30–115)
ALT FLD-CCNC: 20 U/L — SIGNIFICANT CHANGE UP (ref 0–41)
ANION GAP SERPL CALC-SCNC: 14 MMOL/L — SIGNIFICANT CHANGE UP (ref 7–14)
AST SERPL-CCNC: 52 U/L — HIGH (ref 0–41)
BASOPHILS # BLD AUTO: 0.05 K/UL — SIGNIFICANT CHANGE UP (ref 0–0.2)
BASOPHILS NFR BLD AUTO: 0.6 % — SIGNIFICANT CHANGE UP (ref 0–1)
BILIRUB SERPL-MCNC: 1 MG/DL — SIGNIFICANT CHANGE UP (ref 0.2–1.2)
BUN SERPL-MCNC: 9 MG/DL — LOW (ref 10–20)
CALCIUM SERPL-MCNC: 8.7 MG/DL — SIGNIFICANT CHANGE UP (ref 8.4–10.5)
CHLORIDE SERPL-SCNC: 98 MMOL/L — SIGNIFICANT CHANGE UP (ref 98–110)
CO2 SERPL-SCNC: 20 MMOL/L — SIGNIFICANT CHANGE UP (ref 17–32)
CREAT SERPL-MCNC: 1 MG/DL — SIGNIFICANT CHANGE UP (ref 0.7–1.5)
EGFR: 64 ML/MIN/1.73M2 — SIGNIFICANT CHANGE UP
EGFR: 64 ML/MIN/1.73M2 — SIGNIFICANT CHANGE UP
EOSINOPHIL # BLD AUTO: 0.2 K/UL — SIGNIFICANT CHANGE UP (ref 0–0.7)
EOSINOPHIL NFR BLD AUTO: 2.5 % — SIGNIFICANT CHANGE UP (ref 0–8)
GLUCOSE BLDC GLUCOMTR-MCNC: 82 MG/DL — SIGNIFICANT CHANGE UP (ref 70–99)
GLUCOSE SERPL-MCNC: 85 MG/DL — SIGNIFICANT CHANGE UP (ref 70–99)
HCT VFR BLD CALC: 41.6 % — SIGNIFICANT CHANGE UP (ref 37–47)
HGB BLD-MCNC: 13.2 G/DL — SIGNIFICANT CHANGE UP (ref 12–16)
IMM GRANULOCYTES NFR BLD AUTO: 1.1 % — HIGH (ref 0.1–0.3)
INR BLD: 0.97 RATIO — SIGNIFICANT CHANGE UP (ref 0.65–1.3)
LYMPHOCYTES # BLD AUTO: 0.67 K/UL — LOW (ref 1.2–3.4)
LYMPHOCYTES # BLD AUTO: 8.4 % — LOW (ref 20.5–51.1)
MAGNESIUM SERPL-MCNC: 1.8 MG/DL — SIGNIFICANT CHANGE UP (ref 1.8–2.4)
MCHC RBC-ENTMCNC: 30.6 PG — SIGNIFICANT CHANGE UP (ref 27–31)
MCHC RBC-ENTMCNC: 31.7 G/DL — LOW (ref 32–37)
MCV RBC AUTO: 96.3 FL — SIGNIFICANT CHANGE UP (ref 81–99)
MONOCYTES # BLD AUTO: 0.32 K/UL — SIGNIFICANT CHANGE UP (ref 0.1–0.6)
MONOCYTES NFR BLD AUTO: 4 % — SIGNIFICANT CHANGE UP (ref 1.7–9.3)
NEUTROPHILS # BLD AUTO: 6.6 K/UL — HIGH (ref 1.4–6.5)
NEUTROPHILS NFR BLD AUTO: 83.4 % — HIGH (ref 42.2–75.2)
NRBC BLD AUTO-RTO: 0 /100 WBCS — SIGNIFICANT CHANGE UP (ref 0–0)
PHOSPHATE SERPL-MCNC: 3.5 MG/DL — SIGNIFICANT CHANGE UP (ref 2.1–4.9)
PLATELET # BLD AUTO: 240 K/UL — SIGNIFICANT CHANGE UP (ref 130–400)
PMV BLD: 10.6 FL — HIGH (ref 7.4–10.4)
POTASSIUM SERPL-MCNC: 4 MMOL/L — SIGNIFICANT CHANGE UP (ref 3.5–5)
POTASSIUM SERPL-SCNC: 4 MMOL/L — SIGNIFICANT CHANGE UP (ref 3.5–5)
PROT SERPL-MCNC: 6 G/DL — SIGNIFICANT CHANGE UP (ref 6–8)
PROTHROM AB SERPL-ACNC: 11.4 SEC — SIGNIFICANT CHANGE UP (ref 9.95–12.87)
RBC # BLD: 4.32 M/UL — SIGNIFICANT CHANGE UP (ref 4.2–5.4)
RBC # FLD: 14.7 % — HIGH (ref 11.5–14.5)
SODIUM SERPL-SCNC: 132 MMOL/L — LOW (ref 135–146)
WBC # BLD: 7.93 K/UL — SIGNIFICANT CHANGE UP (ref 4.8–10.8)
WBC # FLD AUTO: 7.93 K/UL — SIGNIFICANT CHANGE UP (ref 4.8–10.8)

## 2025-02-16 PROCEDURE — 99232 SBSQ HOSP IP/OBS MODERATE 35: CPT

## 2025-02-16 PROCEDURE — 93010 ELECTROCARDIOGRAM REPORT: CPT

## 2025-02-16 RX ADMIN — CYCLOBENZAPRINE HYDROCHLORIDE 5 MILLIGRAM(S): 15 CAPSULE, EXTENDED RELEASE ORAL at 13:19

## 2025-02-16 RX ADMIN — LAMOTRIGINE 150 MILLIGRAM(S): 150 TABLET ORAL at 11:09

## 2025-02-16 RX ADMIN — Medication 2 TABLET(S): at 21:30

## 2025-02-16 RX ADMIN — CYCLOBENZAPRINE HYDROCHLORIDE 5 MILLIGRAM(S): 15 CAPSULE, EXTENDED RELEASE ORAL at 05:28

## 2025-02-16 RX ADMIN — SERTRALINE 200 MILLIGRAM(S): 100 TABLET, FILM COATED ORAL at 11:08

## 2025-02-16 RX ADMIN — CYANOCOBALAMIN 1000 MICROGRAM(S): 1000 INJECTION INTRAMUSCULAR; SUBCUTANEOUS at 11:09

## 2025-02-16 RX ADMIN — Medication 40 MILLIGRAM(S): at 05:28

## 2025-02-16 RX ADMIN — Medication 975 MILLIGRAM(S): at 05:28

## 2025-02-16 RX ADMIN — Medication 30 MILLIGRAM(S): at 11:08

## 2025-02-16 RX ADMIN — FOLIC ACID 1 MILLIGRAM(S): 1 TABLET ORAL at 11:08

## 2025-02-16 RX ADMIN — CYCLOBENZAPRINE HYDROCHLORIDE 5 MILLIGRAM(S): 15 CAPSULE, EXTENDED RELEASE ORAL at 21:30

## 2025-02-16 RX ADMIN — Medication 975 MILLIGRAM(S): at 13:19

## 2025-02-16 RX ADMIN — Medication 100 MICROGRAM(S): at 05:28

## 2025-02-16 RX ADMIN — Medication 30 MILLIGRAM(S): at 03:18

## 2025-02-16 RX ADMIN — Medication 975 MILLIGRAM(S): at 21:30

## 2025-02-16 RX ADMIN — Medication 5 MILLIGRAM(S): at 21:30

## 2025-02-16 RX ADMIN — Medication 30 MILLIGRAM(S): at 21:31

## 2025-02-16 NOTE — CHART NOTE - NSCHARTNOTEFT_GEN_A_CORE
3 Day Calorie count initiated from 2/16 - 2/18, RN aware and will post. Covering RD to f/u on results of Calorie Count on 2/19    Marsha Emery RD [phone] or via Teams

## 2025-02-16 NOTE — PROGRESS NOTE ADULT - ASSESSMENT
#Severe Hypokalemia and Moderate Hyponatremia in setting of Decreased PO Intake and Bumex Use  #Diffuse Abdominal Pain in setting of Hx of Jaclyn-en-Y Bypass  #CEM on CKD   - Endorses lack of appetite and early satiety x3-4 weeks  - K 2.1, Na 126  - EGD/ Colonoscopy 5/2023: Non erosive gastritis  - CTA abdomen 11/13: No acute abdominal or pelvic pathology. Atherosclerotic calcifications of the aorta and its branches. No evidence of celiac axis or SMA abnormality.  - CTAP this admission negative for any acute pathology  - No infectious etiology suspected  - Keep on Tele  - Keep Mg >2, replete K to >4  - Dietician eval  - Nephro eval  - PT eval  - repeat daily ECG and monitor QTc    #Prolonged QTC in setting of Hypokalemia and Multiple QTC-Prolonging Meds  - Improve on today EKG  - Holding bumex   - K repletion   - restart home methadone does (30 TID)    #DM  - A1c last admission 7  - Had episodes of hypoglycemia  - If glucose consistently >150 can start ISS    #Hx of COPD on Home O2 PRN not in Exacerbation  - TTE 2024 --> 60-65% EF  - TTE with bubbles 11/19: suspected pulmonary shunt.   - C/w home inhalers  - Hold home Bumex for now    #Hypothyroidism  - C/w home levothyroxine    #Anxiety  #Depression  - Patient on Lamotrigine, Zoloft, and Xanax  - Holding medication overnight due to severe hypokalemia w/ prolonged QTC  - Trend lytes and EKG and re-introduce tomorrow     #Chronic Back Pain w/ Hx of Laminectomy   - Takes Methadone 30mg q8h at home  - Repeat EKG and BMP, will give Methadone dose if QTC improving    #Alcohol Use Disorder  #Hepatic Steatosis  - Previously 2 scotchs per night or more  - No alcohol since November per pt  - RUQ US last admission shows fatty liver likely due to obesity/alcohol use  - C/w B12 and Folate supplement    #MISC  - Code Status: Full Code  - DVT ppx: Heparin subcutaneous  - GI ppx: Protonix  - Diet: DASH  - Activity: AAT  - Inpatient Dispo: Telemetry  - Discharge Dispo: From Home

## 2025-02-16 NOTE — PROGRESS NOTE ADULT - ASSESSMENT
64-year-old female with a history of asthma, hypothyroidism, anxiety, depression, MVP, Jaclyn-en-Y gastric bypass, appendectomy, cholecystectomy, and chronic back pain (on methadone) is presenting for decreased PO intake x3-4 weeks, diffuse abdominal pain, and weakness.     PLAN:  - Care as per primary team  - Bariatric regular diet  - Avoid any NSAIDs  - Will discuss with patient again about following outpatient to establish care w medical weight loss    spectra [phone] 64-year-old female with a history of asthma, hypothyroidism, anxiety, depression, MVP, Jaclyn-en-Y gastric bypass, appendectomy, cholecystectomy, and chronic back pain (on methadone) is presenting for decreased PO intake x3-4 weeks, diffuse abdominal pain, and weakness.     PLAN:  - GI consult   - Nutrition labs   - Coags   - May add scopolamine patch  - Bariatric regular diet with Ensure protein shakes   - Avoid any NSAIDs  - Care as per primary team    spectra [phone]

## 2025-02-16 NOTE — PROGRESS NOTE ADULT - SUBJECTIVE AND OBJECTIVE BOX
SUBJECTIVE/OVERNIGHT EVENTS  Today is hospital day 5d. This morning patient was seen and examined at bedside, resting comfortably in bed. No acute or major events overnight.      CODE STATUS:      PMH:  Asthma    Gastroesophageal reflux disease without esophagitis    Hypothyroidism    Heart murmur    Anxiety    Depression    Mood disorder    Psoriasis    Constipation    Back pain    Morbid obesity          PSH:  S/P tonsillectomy    History of appendectomy    Abscess of back    History of Jaclyn-en-Y gastric bypass          MEDICATIONS  STANDING MEDICATIONS  acetaminophen     Tablet .. 975 milliGRAM(s) Oral every 8 hours  cyanocobalamin 1000 MICROGram(s) Oral daily  cyclobenzaprine 5 milliGRAM(s) Oral three times a day  folic acid 1 milliGRAM(s) Oral daily  heparin   Injectable 5000 Unit(s) SubCutaneous every 12 hours  influenza   Vaccine 0.5 milliLiter(s) IntraMuscular once  lamoTRIgine 150 milliGRAM(s) Oral daily  levothyroxine 100 MICROGram(s) Oral daily  melatonin 5 milliGRAM(s) Oral at bedtime  methadone    Tablet 30 milliGRAM(s) Oral every 8 hours  pantoprazole    Tablet 40 milliGRAM(s) Oral before breakfast  polyethylene glycol 3350 17 Gram(s) Oral daily  senna 2 Tablet(s) Oral at bedtime  sertraline 200 milliGRAM(s) Oral daily    PRN MEDICATIONS  albuterol    90 MICROgram(s) HFA Inhaler 2 Puff(s) Inhalation every 6 hours PRN  albuterol/ipratropium for Nebulization 3 milliLiter(s) Nebulizer every 6 hours PRN  fluticasone propionate/ salmeterol 250-50 MICROgram(s) Diskus 1 Dose(s) Inhalation two times a day PRN  lactulose Syrup 10 Gram(s) Oral daily PRN    VITALS  T(F): 97.8 (02-15-25 @ 20:05), Max: 98.3 (02-15-25 @ 12:05)  HR: 70 (02-15-25 @ 20:05) (58 - 70)  BP: 111/76 (02-15-25 @ 20:05) (106/74 - 111/76)  RR: 17 (02-15-25 @ 20:05) (17 - 17)  SpO2: 95% (02-15-25 @ 05:08) (95% - 95%)  POCT Blood Glucose.: 107 mg/dL (02-15-25 @ 16:21)    PHYSICAL EXAM  GENERAL  ( x ) NAD, lying in bed comfortably     (  ) obtunded     (  ) lethargic     (  ) somnolent    HEAD  ( x ) Atraumatic     (  ) hematoma     (  ) laceration (specify location:       )     NECK  ( x ) Supple     (  ) neck stiffness     (  ) nuchal rigidity     (  )  no JVD     (  ) JVD present ( -- cm)    HEART  Rate -->  ( x ) normal rate    (  ) bradycardic    (  ) tachycardic  Rhythm -->  ( x ) regular    (  ) regularly irregular    (  ) irregularly irregular  Murmurs -->  ( x ) normal s1/s2    (  ) systolic murmur    (  ) diastolic murmur    (  ) continuous murmur     (  ) S3 present    (  ) S4 present    LUNGS  ( x )Unlabored respirations     (  ) tachypnea  ( x ) B/L air entry     (  ) decreased breath sounds in:  (location     )    ( x ) no adventitious sound     (  ) crackles     (  ) wheezing      (  ) rhonchi      (specify location:       )  (  ) chest wall tenderness (specify location:       )    ABDOMEN  ( x ) Soft     (  ) tense   |   ( x ) nondistended     (  ) distended   |   (  ) +BS     (  ) hypoactive bowel sounds     (  ) hyperactive bowel sounds  ( x ) nontender     (  ) RUQ tenderness     (  ) RLQ tenderness     (  ) LLQ tenderness     (  ) epigastric tenderness     (  ) diffuse tenderness  (  ) Splenomegaly      (  ) Hepatomegaly      (  ) Jaundice     (  ) ecchymosis     EXTREMITIES  (x  ) Normal     (  ) Rash     (  ) ecchymosis     (  ) varicose veins      (  ) pitting edema     (  ) non-pitting edema   (  ) ulceration     (  ) gangrene:     (location:     )    NERVOUS SYSTEM  ( x ) A&Ox3     (  ) confused     (  ) lethargic  CN II-XII:     (  ) Intact     (  ) focal deficits  (Specify:     )   Upper extremities:     (  ) strength X/5     (  ) focal deficit (specify:    )  Lower extremities:     (  ) strength  X/5    (  ) focal deficit (specify:    )      LABS             13.1   4.85  )-----------( 226      ( 02-15-25 @ 05:37 )             42.1     134  |  98  |  9   -------------------------<  93   02-15-25 @ 05:37  3.9  |  20  |  1.0    Ca      8.6     02-15-25 @ 05:37  Phos   3.3     02-15-25 @ 05:37  Mg     1.8     02-15-25 @ 05:37    TPro  5.5  /  Alb  3.2  /  TBili  0.8  /  DBili  x   /  AST  44  /  ALT  15  /  AlkPhos  161  /  GGT  x     02-15-25 @ 05:37        Urinalysis Basic - ( 15 Feb 2025 05:37 )    Color: x / Appearance: x / SG: x / pH: x  Gluc: 93 mg/dL / Ketone: x  / Bili: x / Urobili: x   Blood: x / Protein: x / Nitrite: x   Leuk Esterase: x / RBC: x / WBC x   Sq Epi: x / Non Sq Epi: x / Bacteria: x          IMAGING

## 2025-02-16 NOTE — PROGRESS NOTE ADULT - ATTENDING COMMENTS
Small amount of po intake- add protein shakes, follow up labs.   PRN nausea meds  Consult GI for possible scope  Plan per consult note yesterday unchanged

## 2025-02-16 NOTE — PROGRESS NOTE ADULT - ASSESSMENT
64-year-old female with a history of asthma, hypothyroidism, anxiety, depression, MVP, Jaclyn-en-Y gastric bypass, appendectomy, cholecystectomy, and chronic back pain (on methadone) is presenting for decreased PO intake x3-4 weeks, diffuse abdominal pain, and weakness.    #Severe symptomatic Hypokalemia with ekg changes, prolonged qt, multifactorial from decreased PO intake, hx of jaclyn-en-y, and recent addition of daily diuretic (bumex)   #Moderate Hyponatremia   -nephro had been following   -continue to monitor on tele  -EKG and qt greatly improving, on admission qtc>600 with lots of repolarization changes, now repolarization changes resolving and qtc<500, continue to check daily ekg and monitor qtc  -Monitor Cr/BUN Electrolytes including Phosphorus  -dietician evaluation   -Hold  Diuresis/bumex-pt did state taking bumex with K replacement at home for some time    #Poor PO intake  #Pt reports 50lb weight loss in last 2 months  -Bariatric surgery consult appreciated  -Pt had gastric bypass 2006  -GI consult placed per bariactric rec  -vit b1, zinic, Vit D levels ordered    #Hx DM  - A1c last admission 7  - Had episodes of hypoglycemia  -monitor FS,     Hx of COPD on Home O2 PRN not in Exacerbation  Hx of HFpEF?  - TTE 2024 --> 60-65% EF  - TTE with bubbles 11/19: suspected pulmonary shunt.   - C/w home inhalers  - Hold Bumex for now, would liklely not restart on dc, pt can f/u with cardio outpt if needs resumption of diuretic     Hypothyroidism - C/w home levothyroxine    Hx Anxiety  Hx Depression  - Patient on Lamotrigine, Zoloft, and Xanax - resume    Hx Chronic Back Pain w/ Hx of Laminectomy   - can resume home Methadone 30mg q8h as qtc normalized, do not order any additional opiod medicaitons as restarting home meds, can also have flexiril and tylenol  -pain management appreciated     Hx Alcohol Use Disorder  Hx Hepatic Steatosis  Ciwa  monitoring PRN, not withdrawing    C/w B12 and Folate supplement    Pending:  c/w tele monitoring,  bariatric consult, GI consult, PO intake   Dispo: KYLE - only walking 10ft

## 2025-02-16 NOTE — PROGRESS NOTE ADULT - SUBJECTIVE AND OBJECTIVE BOX
Patient is a 64y old  Female who presents with a chief complaint of Hypokalemia (16 Feb 2025 01:17)      Patient seen and examined at bedside.  Pt reports no chest pain, reports feeling breathless with ambulation - she states "I will need to be another week"  ALLERGIES:  penicillin (Anaphylaxis)    MEDICATIONS:  acetaminophen     Tablet .. 975 milliGRAM(s) Oral every 8 hours  albuterol    90 MICROgram(s) HFA Inhaler 2 Puff(s) Inhalation every 6 hours PRN  albuterol/ipratropium for Nebulization 3 milliLiter(s) Nebulizer every 6 hours PRN  cyanocobalamin 1000 MICROGram(s) Oral daily  cyclobenzaprine 5 milliGRAM(s) Oral three times a day  fluticasone propionate/ salmeterol 250-50 MICROgram(s) Diskus 1 Dose(s) Inhalation two times a day PRN  folic acid 1 milliGRAM(s) Oral daily  heparin   Injectable 5000 Unit(s) SubCutaneous every 12 hours  influenza   Vaccine 0.5 milliLiter(s) IntraMuscular once  lactulose Syrup 10 Gram(s) Oral daily PRN  lamoTRIgine 150 milliGRAM(s) Oral daily  levothyroxine 100 MICROGram(s) Oral daily  melatonin 5 milliGRAM(s) Oral at bedtime  methadone    Tablet 30 milliGRAM(s) Oral every 8 hours  pantoprazole    Tablet 40 milliGRAM(s) Oral before breakfast  polyethylene glycol 3350 17 Gram(s) Oral daily  senna 2 Tablet(s) Oral at bedtime  sertraline 200 milliGRAM(s) Oral daily    Vital Signs Last 24 Hrs  T(F): 98.5 (16 Feb 2025 12:16), Max: 98.5 (16 Feb 2025 12:16)  HR: 80 (16 Feb 2025 12:16) (70 - 80)  BP: 119/82 (16 Feb 2025 12:16) (111/76 - 119/82)  RR: 17 (16 Feb 2025 12:16) (17 - 18)  SpO2: 96% (16 Feb 2025 04:28) (96% - 96%)  I&O's Summary    15 Feb 2025 07:01  -  16 Feb 2025 07:00  --------------------------------------------------------  IN: 987 mL / OUT: 1150 mL / NET: -163 mL    16 Feb 2025 07:01  -  16 Feb 2025 13:30  --------------------------------------------------------  IN: 384 mL / OUT: 0 mL / NET: 384 mL        PHYSICAL EXAM:  General: NAD, A/O x 3  ENT: MMM  Neck: Supple, No JVD  Lungs: right lower rhonchi   Cardio: RRR, S1/S2, No murmurs  Abdomen: Soft, Nontender, Nondistended; Bowel sounds present  Extremities: No cyanosis, No edema    LABS:                        13.1   4.85  )-----------( 226      ( 15 Feb 2025 05:37 )             42.1     02-16    132  |  98  |  9   ----------------------------<  85  4.0   |  20  |  1.0    Ca    8.7      16 Feb 2025 07:52  Phos  3.5     02-16  Mg     1.8     02-16    TPro  6.0  /  Alb  3.2  /  TBili  1.0  /  DBili  x   /  AST  52  /  ALT  20  /  AlkPhos  159  02-16                            POCT Blood Glucose.: 107 mg/dL (15 Feb 2025 16:21)      Urinalysis Basic - ( 16 Feb 2025 07:52 )    Color: x / Appearance: x / SG: x / pH: x  Gluc: 85 mg/dL / Ketone: x  / Bili: x / Urobili: x   Blood: x / Protein: x / Nitrite: x   Leuk Esterase: x / RBC: x / WBC x   Sq Epi: x / Non Sq Epi: x / Bacteria: x        Culture - Urine (collected 11 Feb 2025 15:41)  Source: Clean Catch Clean Catch (Midstream)  Final Report (12 Feb 2025 22:28):    No growth          RADIOLOGY & ADDITIONAL TESTS:    Care Discussed with Consultants/Other Providers:

## 2025-02-16 NOTE — PROGRESS NOTE ADULT - SUBJECTIVE AND OBJECTIVE BOX
SURGERY PROGRESS NOTE    Patient: JULIET NAVARRETE , 64y ([birth year])Female   MRN: [id number]  Location: [address]  Visit: 02-11-25 Inpatient  Date: 02-16-25 @ 00:18    Hospital Day #: 7     Procedures/ diagnosis: PO INTOLERANCE    24h events: Yesterday the patient only reported eating 2 eggs in AM, a bite of dinner, and some grapes. Continues to have nausea, no vomit, +gas, no bowel movement. Abdomen soft. No acute events over night.     PHYSICAL EXAM:  General: NAD, calm and cooperative  HEENT: NCAT  Abdomen: Soft, distended, tender in the epigastric area, palpable liver, no rebound, no guarding.  Musculoskeletal: ROM intact, compartments soft  Vascular: Extremities well perfused  Skin: Warm/dry, normal color, no jaundice    PAST MEDICAL & SURGICAL HISTORY:  Asthma  LAST ATTACK MANY YRS AGO      Gastroesophageal reflux disease without esophagitis      Hypothyroidism      Heart murmur      Anxiety      Depression      Mood disorder      Psoriasis      Constipation      Back pain  LOW      Morbid obesity      S/P tonsillectomy  1967      History of appendectomy  1974      Abscess of back  EVACUATION OF ABSCESS @ L5-S1 1997      History of Jaclyn-en-Y gastric bypass  WITH CHOLECYSTECTOMY 2006          Vitals:   T(F): 97.8 (02-15-25 @ 20:05), Max: 98.3 (02-15-25 @ 12:05)  HR: 70 (02-15-25 @ 20:05)  BP: 111/76 (02-15-25 @ 20:05)  RR: 17 (02-15-25 @ 20:05)  SpO2: 95% (02-15-25 @ 05:08)      Diet, DASH/TLC:   Sodium & Cholesterol Restricted      Fluids:     I & O's:    02-14-25 @ 07:01  -  02-15-25 @ 07:00  --------------------------------------------------------  IN:    Oral Fluid: 766 mL  Total IN: 766 mL    OUT:  Total OUT: 0 mL    Total NET: 766 mL      MEDICATIONS  (STANDING):  acetaminophen     Tablet .. 975 milliGRAM(s) Oral every 8 hours  cyanocobalamin 1000 MICROGram(s) Oral daily  cyclobenzaprine 5 milliGRAM(s) Oral three times a day  folic acid 1 milliGRAM(s) Oral daily  heparin   Injectable 5000 Unit(s) SubCutaneous every 12 hours  influenza   Vaccine 0.5 milliLiter(s) IntraMuscular once  lamoTRIgine 150 milliGRAM(s) Oral daily  levothyroxine 100 MICROGram(s) Oral daily  melatonin 5 milliGRAM(s) Oral at bedtime  methadone    Tablet 30 milliGRAM(s) Oral every 8 hours  pantoprazole    Tablet 40 milliGRAM(s) Oral before breakfast  polyethylene glycol 3350 17 Gram(s) Oral daily  senna 2 Tablet(s) Oral at bedtime  sertraline 200 milliGRAM(s) Oral daily    MEDICATIONS  (PRN):  albuterol    90 MICROgram(s) HFA Inhaler 2 Puff(s) Inhalation every 6 hours PRN Shortness of Breath and/or Wheezing  albuterol/ipratropium for Nebulization 3 milliLiter(s) Nebulizer every 6 hours PRN Shortness of Breath and/or Wheezing  fluticasone propionate/ salmeterol 250-50 MICROgram(s) Diskus 1 Dose(s) Inhalation two times a day PRN for bronchospasm  lactulose Syrup 10 Gram(s) Oral daily PRN for constipation      DVT PROPHYLAXIS: heparin   Injectable 5000 Unit(s) SubCutaneous every 12 hours    GI PROPHYLAXIS: pantoprazole    Tablet 40 milliGRAM(s) Oral before breakfast    ANTICOAGULATION:   ANTIBIOTICS:            LAB/STUDIES:  Labs:  CAPILLARY BLOOD GLUCOSE      POCT Blood Glucose.: 107 mg/dL (15 Feb 2025 16:21)                          13.1   4.85  )-----------( 226      ( 15 Feb 2025 05:37 )             42.1       Auto Immature Granulocyte %: 1.9 % (02-15-25 @ 05:37)    02-15    134[L]  |  98  |  9[L]  ----------------------------<  93  3.9   |  20  |  1.0      Calcium: 8.6 mg/dL (02-15-25 @ 05:37)      LFTs:             5.5  | 0.8  | 44       ------------------[161     ( 15 Feb 2025 05:37 )  3.2  | x    | 15          Lipase:x      Amylase:x             Coags:            Urinalysis Basic - ( 15 Feb 2025 05:37 )    Color: x / Appearance: x / SG: x / pH: x  Gluc: 93 mg/dL / Ketone: x  / Bili: x / Urobili: x   Blood: x / Protein: x / Nitrite: x   Leuk Esterase: x / RBC: x / WBC x   Sq Epi: x / Non Sq Epi: x / Bacteria: x

## 2025-02-17 LAB
A1C WITH ESTIMATED AVERAGE GLUCOSE RESULT: 5.6 % — SIGNIFICANT CHANGE UP (ref 4–5.6)
ALBUMIN SERPL ELPH-MCNC: 3.3 G/DL — LOW (ref 3.5–5.2)
ALP SERPL-CCNC: 173 U/L — HIGH (ref 30–115)
ALT FLD-CCNC: 19 U/L — SIGNIFICANT CHANGE UP (ref 0–41)
ANION GAP SERPL CALC-SCNC: 11 MMOL/L — SIGNIFICANT CHANGE UP (ref 7–14)
AST SERPL-CCNC: 51 U/L — HIGH (ref 0–41)
BASOPHILS # BLD AUTO: 0.04 K/UL — SIGNIFICANT CHANGE UP (ref 0–0.2)
BASOPHILS NFR BLD AUTO: 0.8 % — SIGNIFICANT CHANGE UP (ref 0–1)
BILIRUB SERPL-MCNC: 0.9 MG/DL — SIGNIFICANT CHANGE UP (ref 0.2–1.2)
BUN SERPL-MCNC: 11 MG/DL — SIGNIFICANT CHANGE UP (ref 10–20)
CALCIUM SERPL-MCNC: 9.1 MG/DL — SIGNIFICANT CHANGE UP (ref 8.4–10.4)
CHLORIDE SERPL-SCNC: 99 MMOL/L — SIGNIFICANT CHANGE UP (ref 98–110)
CO2 SERPL-SCNC: 22 MMOL/L — SIGNIFICANT CHANGE UP (ref 17–32)
CREAT SERPL-MCNC: 0.9 MG/DL — SIGNIFICANT CHANGE UP (ref 0.7–1.5)
EGFR: 72 ML/MIN/1.73M2 — SIGNIFICANT CHANGE UP
EGFR: 72 ML/MIN/1.73M2 — SIGNIFICANT CHANGE UP
EOSINOPHIL # BLD AUTO: 0.19 K/UL — SIGNIFICANT CHANGE UP (ref 0–0.7)
EOSINOPHIL NFR BLD AUTO: 3.8 % — SIGNIFICANT CHANGE UP (ref 0–8)
ESTIMATED AVERAGE GLUCOSE: 114 MG/DL — SIGNIFICANT CHANGE UP (ref 68–114)
GLUCOSE BLDC GLUCOMTR-MCNC: 109 MG/DL — HIGH (ref 70–99)
GLUCOSE BLDC GLUCOMTR-MCNC: 93 MG/DL — SIGNIFICANT CHANGE UP (ref 70–99)
GLUCOSE SERPL-MCNC: 91 MG/DL — SIGNIFICANT CHANGE UP (ref 70–99)
HCT VFR BLD CALC: 42.6 % — SIGNIFICANT CHANGE UP (ref 37–47)
HGB BLD-MCNC: 13.6 G/DL — SIGNIFICANT CHANGE UP (ref 12–16)
IMM GRANULOCYTES NFR BLD AUTO: 1.4 % — HIGH (ref 0.1–0.3)
INR BLD: 0.96 RATIO — SIGNIFICANT CHANGE UP (ref 0.65–1.3)
LYMPHOCYTES # BLD AUTO: 0.94 K/UL — LOW (ref 1.2–3.4)
LYMPHOCYTES # BLD AUTO: 19 % — LOW (ref 20.5–51.1)
MAGNESIUM SERPL-MCNC: 1.8 MG/DL — SIGNIFICANT CHANGE UP (ref 1.8–2.4)
MCHC RBC-ENTMCNC: 30.8 PG — SIGNIFICANT CHANGE UP (ref 27–31)
MCHC RBC-ENTMCNC: 31.9 G/DL — LOW (ref 32–37)
MCV RBC AUTO: 96.6 FL — SIGNIFICANT CHANGE UP (ref 81–99)
MONOCYTES # BLD AUTO: 0.3 K/UL — SIGNIFICANT CHANGE UP (ref 0.1–0.6)
MONOCYTES NFR BLD AUTO: 6.1 % — SIGNIFICANT CHANGE UP (ref 1.7–9.3)
NEUTROPHILS # BLD AUTO: 3.41 K/UL — SIGNIFICANT CHANGE UP (ref 1.4–6.5)
NEUTROPHILS NFR BLD AUTO: 68.9 % — SIGNIFICANT CHANGE UP (ref 42.2–75.2)
NRBC BLD AUTO-RTO: 0 /100 WBCS — SIGNIFICANT CHANGE UP (ref 0–0)
PHOSPHATE SERPL-MCNC: 3.6 MG/DL — SIGNIFICANT CHANGE UP (ref 2.1–4.9)
PLATELET # BLD AUTO: 196 K/UL — SIGNIFICANT CHANGE UP (ref 130–400)
PMV BLD: 10.1 FL — SIGNIFICANT CHANGE UP (ref 7.4–10.4)
POTASSIUM SERPL-MCNC: 3.7 MMOL/L — SIGNIFICANT CHANGE UP (ref 3.5–5)
POTASSIUM SERPL-SCNC: 3.7 MMOL/L — SIGNIFICANT CHANGE UP (ref 3.5–5)
PREALB SERPL-MCNC: 16 MG/DL — LOW (ref 20–40)
PROT SERPL-MCNC: 5.9 G/DL — LOW (ref 6–8)
PROTHROM AB SERPL-ACNC: 11.3 SEC — SIGNIFICANT CHANGE UP (ref 9.95–12.87)
RBC # BLD: 4.41 M/UL — SIGNIFICANT CHANGE UP (ref 4.2–5.4)
RBC # FLD: 14.7 % — HIGH (ref 11.5–14.5)
SODIUM SERPL-SCNC: 132 MMOL/L — LOW (ref 135–146)
WBC # BLD: 4.95 K/UL — SIGNIFICANT CHANGE UP (ref 4.8–10.8)
WBC # FLD AUTO: 4.95 K/UL — SIGNIFICANT CHANGE UP (ref 4.8–10.8)

## 2025-02-17 PROCEDURE — 93010 ELECTROCARDIOGRAM REPORT: CPT

## 2025-02-17 PROCEDURE — 99232 SBSQ HOSP IP/OBS MODERATE 35: CPT

## 2025-02-17 PROCEDURE — 99222 1ST HOSP IP/OBS MODERATE 55: CPT

## 2025-02-17 RX ORDER — ONDANSETRON HCL/PF 4 MG/2 ML
4 VIAL (ML) INJECTION ONCE
Refills: 0 | Status: COMPLETED | OUTPATIENT
Start: 2025-02-17 | End: 2025-02-17

## 2025-02-17 RX ORDER — MAGNESIUM SULFATE 500 MG/ML
2 SYRINGE (ML) INJECTION ONCE
Refills: 0 | Status: COMPLETED | OUTPATIENT
Start: 2025-02-17 | End: 2025-02-17

## 2025-02-17 RX ADMIN — CYCLOBENZAPRINE HYDROCHLORIDE 5 MILLIGRAM(S): 15 CAPSULE, EXTENDED RELEASE ORAL at 05:47

## 2025-02-17 RX ADMIN — SERTRALINE 200 MILLIGRAM(S): 100 TABLET, FILM COATED ORAL at 11:37

## 2025-02-17 RX ADMIN — Medication 975 MILLIGRAM(S): at 13:01

## 2025-02-17 RX ADMIN — CYCLOBENZAPRINE HYDROCHLORIDE 5 MILLIGRAM(S): 15 CAPSULE, EXTENDED RELEASE ORAL at 13:01

## 2025-02-17 RX ADMIN — Medication 5 MILLIGRAM(S): at 21:53

## 2025-02-17 RX ADMIN — POLYETHYLENE GLYCOL 3350 17 GRAM(S): 17 POWDER, FOR SOLUTION ORAL at 11:38

## 2025-02-17 RX ADMIN — CYCLOBENZAPRINE HYDROCHLORIDE 5 MILLIGRAM(S): 15 CAPSULE, EXTENDED RELEASE ORAL at 21:53

## 2025-02-17 RX ADMIN — CYANOCOBALAMIN 1000 MICROGRAM(S): 1000 INJECTION INTRAMUSCULAR; SUBCUTANEOUS at 11:37

## 2025-02-17 RX ADMIN — Medication 2 TABLET(S): at 21:54

## 2025-02-17 RX ADMIN — Medication 40 MILLIGRAM(S): at 05:47

## 2025-02-17 RX ADMIN — Medication 25 GRAM(S): at 11:39

## 2025-02-17 RX ADMIN — Medication 4 MILLIGRAM(S): at 23:24

## 2025-02-17 RX ADMIN — LAMOTRIGINE 150 MILLIGRAM(S): 150 TABLET ORAL at 11:38

## 2025-02-17 RX ADMIN — Medication 30 MILLIGRAM(S): at 03:49

## 2025-02-17 RX ADMIN — Medication 30 MILLIGRAM(S): at 21:54

## 2025-02-17 RX ADMIN — Medication 30 MILLIGRAM(S): at 11:38

## 2025-02-17 RX ADMIN — FOLIC ACID 1 MILLIGRAM(S): 1 TABLET ORAL at 11:38

## 2025-02-17 RX ADMIN — Medication 975 MILLIGRAM(S): at 21:54

## 2025-02-17 RX ADMIN — Medication 100 MICROGRAM(S): at 05:47

## 2025-02-17 RX ADMIN — Medication 975 MILLIGRAM(S): at 05:47

## 2025-02-17 NOTE — PROGRESS NOTE ADULT - SUBJECTIVE AND OBJECTIVE BOX
GENERAL SURGERY PROGRESS NOTE    Patient: JULIET NAVARRETE , 64y ([birth year])Female   MRN: [id number]  Location: [place] [address] [address]  Visit: 02-11-25 Inpatient  Date: 02-17-25 @ 08:07    Hospital Day #: 7    Procedure/Dx/Injuries: Patient seen and examined at bedside. Patient reports she tolerated half of her dinner. Patient admits to feeling nauseous but has not vomited. Patient is tender to right upper quadrant, non distended.     Events of past 24 hours:    PAST MEDICAL & SURGICAL HISTORY:  Asthma  LAST ATTACK MANY YRS AGO    Gastroesophageal reflux disease without esophagitis    Hypothyroidism    Heart murmur    Anxiety    Depression    Mood disorder    Psoriasis    Constipation    Back pain  LOW    Morbid obesity    S/P tonsillectomy  1967    History of appendectomy  1974    Abscess of back  EVACUATION OF ABSCESS @ L5-S1 1997    History of Jaclyn-en-Y gastric bypass  WITH CHOLECYSTECTOMY 2006    Vitals:   T(F): 97.5 (02-17-25 @ 05:08), Max: 98.5 (02-16-25 @ 12:16)  HR: 58 (02-17-25 @ 05:08)  BP: 115/72 (02-17-25 @ 05:08)  RR: 18 (02-17-25 @ 05:08)  SpO2: 96% (02-17-25 @ 05:08)    Diet, DASH/TLC:   Sodium & Cholesterol Restricted  Supplement Feeding Modality:  Oral  Ensure Plus High Protein Cans or Servings Per Day:  1       Frequency:  Two Times a day      Fluids:     I & O's:    02-16-25 @ 07:01  -  02-17-25 @ 07:00  --------------------------------------------------------  IN:    Oral Fluid: 825 mL  Total IN: 825 mL    OUT:    Voided (mL): 700 mL  Total OUT: 700 mL    Total NET: 125 mL    PHYSICAL EXAM:  GENERAL: NAD, well-appearing  CHEST/LUNG: Clear to auscultation bilaterally  HEART: Regular rate and rhythm  ABDOMEN: Soft, Nondistended, tender to RUQ,  palpable liver  EXTREMITIES:  No clubbing, cyanosis, or edema    MEDICATIONS  (STANDING):  acetaminophen     Tablet .. 975 milliGRAM(s) Oral every 8 hours  cyanocobalamin 1000 MICROGram(s) Oral daily  cyclobenzaprine 5 milliGRAM(s) Oral three times a day  folic acid 1 milliGRAM(s) Oral daily  heparin   Injectable 5000 Unit(s) SubCutaneous every 12 hours  influenza   Vaccine 0.5 milliLiter(s) IntraMuscular once  lamoTRIgine 150 milliGRAM(s) Oral daily  levothyroxine 100 MICROGram(s) Oral daily  melatonin 5 milliGRAM(s) Oral at bedtime  methadone    Tablet 30 milliGRAM(s) Oral every 8 hours  pantoprazole    Tablet 40 milliGRAM(s) Oral before breakfast  polyethylene glycol 3350 17 Gram(s) Oral daily  senna 2 Tablet(s) Oral at bedtime  sertraline 200 milliGRAM(s) Oral daily    MEDICATIONS  (PRN):  albuterol    90 MICROgram(s) HFA Inhaler 2 Puff(s) Inhalation every 6 hours PRN Shortness of Breath and/or Wheezing  albuterol/ipratropium for Nebulization 3 milliLiter(s) Nebulizer every 6 hours PRN Shortness of Breath and/or Wheezing  fluticasone propionate/ salmeterol 250-50 MICROgram(s) Diskus 1 Dose(s) Inhalation two times a day PRN for bronchospasm  lactulose Syrup 10 Gram(s) Oral daily PRN for constipation      DVT PROPHYLAXIS: heparin   Injectable 5000 Unit(s) SubCutaneous every 12 hours    GI PROPHYLAXIS: pantoprazole    Tablet 40 milliGRAM(s) Oral before breakfast    ANTICOAGULATION:   ANTIBIOTICS:      LAB/STUDIES:  Labs:  CAPILLARY BLOOD GLUCOSE      POCT Blood Glucose.: 82 mg/dL (16 Feb 2025 17:39)                          13.6   4.95  )-----------( 196      ( 17 Feb 2025 04:30 )             42.6       Auto Immature Granulocyte %: 1.4 % (02-17-25 @ 04:30)    02-17    132[L]  |  99  |  11  ----------------------------<  91  3.7   |  22  |  0.9      Calcium: 9.1 mg/dL (02-17-25 @ 04:30)      LFTs:             5.9  | 0.9  | 51       ------------------[173     ( 17 Feb 2025 04:30 )  3.3  | x    | 19          Lipase:x      Amylase:x        Coags:     11.30  ----< 0.96    ( 17 Feb 2025 04:30 )     x        Urinalysis Basic - ( 17 Feb 2025 04:30 )    Color: x / Appearance: x / SG: x / pH: x  Gluc: 91 mg/dL / Ketone: x  / Bili: x / Urobili: x   Blood: x / Protein: x / Nitrite: x   Leuk Esterase: x / RBC: x / WBC x   Sq Epi: x / Non Sq Epi: x / Bacteria: x       GENERAL SURGERY PROGRESS NOTE    Patient: JULIET NAVARRETE , 64y ([birth year])Female   MRN: [id number]  Location: [address]  Visit: 02-11-25 Inpatient  Date: 02-17-25 @ 08:07    Hospital Day #: 7      Events of past 24 hours:  Patient seen and examined at bedside. Patient reports she tolerated half of her dinner. Patient admits to feeling nauseous but has not vomited. Patient is tender to right upper quadrant, non distended.     PAST MEDICAL & SURGICAL HISTORY:  Asthma  LAST ATTACK MANY YRS AGO    Gastroesophageal reflux disease without esophagitis    Hypothyroidism    Heart murmur    Anxiety    Depression    Mood disorder    Psoriasis    Constipation    Back pain  LOW    Morbid obesity    S/P tonsillectomy  1967    History of appendectomy  1974    Abscess of back  EVACUATION OF ABSCESS @ L5-S1 1997    History of Jaclyn-en-Y gastric bypass  WITH CHOLECYSTECTOMY 2006    Vitals:   T(F): 97.5 (02-17-25 @ 05:08), Max: 98.5 (02-16-25 @ 12:16)  HR: 58 (02-17-25 @ 05:08)  BP: 115/72 (02-17-25 @ 05:08)  RR: 18 (02-17-25 @ 05:08)  SpO2: 96% (02-17-25 @ 05:08)    Diet, DASH/TLC:   Sodium & Cholesterol Restricted  Supplement Feeding Modality:  Oral  Ensure Plus High Protein Cans or Servings Per Day:  1       Frequency:  Two Times a day      Fluids:     I & O's:    02-16-25 @ 07:01  -  02-17-25 @ 07:00  --------------------------------------------------------  IN:    Oral Fluid: 825 mL  Total IN: 825 mL    OUT:    Voided (mL): 700 mL  Total OUT: 700 mL    Total NET: 125 mL    PHYSICAL EXAM:  GENERAL: NAD, well-appearing  CHEST/LUNG: Clear to auscultation bilaterally  HEART: Regular rate and rhythm  ABDOMEN: Soft, Nondistended, tender to RUQ,  palpable liver  EXTREMITIES:  No clubbing, cyanosis, or edema    MEDICATIONS  (STANDING):  acetaminophen     Tablet .. 975 milliGRAM(s) Oral every 8 hours  cyanocobalamin 1000 MICROGram(s) Oral daily  cyclobenzaprine 5 milliGRAM(s) Oral three times a day  folic acid 1 milliGRAM(s) Oral daily  heparin   Injectable 5000 Unit(s) SubCutaneous every 12 hours  influenza   Vaccine 0.5 milliLiter(s) IntraMuscular once  lamoTRIgine 150 milliGRAM(s) Oral daily  levothyroxine 100 MICROGram(s) Oral daily  melatonin 5 milliGRAM(s) Oral at bedtime  methadone    Tablet 30 milliGRAM(s) Oral every 8 hours  pantoprazole    Tablet 40 milliGRAM(s) Oral before breakfast  polyethylene glycol 3350 17 Gram(s) Oral daily  senna 2 Tablet(s) Oral at bedtime  sertraline 200 milliGRAM(s) Oral daily    MEDICATIONS  (PRN):  albuterol    90 MICROgram(s) HFA Inhaler 2 Puff(s) Inhalation every 6 hours PRN Shortness of Breath and/or Wheezing  albuterol/ipratropium for Nebulization 3 milliLiter(s) Nebulizer every 6 hours PRN Shortness of Breath and/or Wheezing  fluticasone propionate/ salmeterol 250-50 MICROgram(s) Diskus 1 Dose(s) Inhalation two times a day PRN for bronchospasm  lactulose Syrup 10 Gram(s) Oral daily PRN for constipation      DVT PROPHYLAXIS: heparin   Injectable 5000 Unit(s) SubCutaneous every 12 hours    GI PROPHYLAXIS: pantoprazole    Tablet 40 milliGRAM(s) Oral before breakfast    ANTICOAGULATION:   ANTIBIOTICS:      LAB/STUDIES:  Labs:  CAPILLARY BLOOD GLUCOSE      POCT Blood Glucose.: 82 mg/dL (16 Feb 2025 17:39)                          13.6   4.95  )-----------( 196      ( 17 Feb 2025 04:30 )             42.6       Auto Immature Granulocyte %: 1.4 % (02-17-25 @ 04:30)    02-17    132[L]  |  99  |  11  ----------------------------<  91  3.7   |  22  |  0.9      Calcium: 9.1 mg/dL (02-17-25 @ 04:30)      LFTs:             5.9  | 0.9  | 51       ------------------[173     ( 17 Feb 2025 04:30 )  3.3  | x    | 19          Lipase:x      Amylase:x        Coags:     11.30  ----< 0.96    ( 17 Feb 2025 04:30 )     x        Urinalysis Basic - ( 17 Feb 2025 04:30 )    Color: x / Appearance: x / SG: x / pH: x  Gluc: 91 mg/dL / Ketone: x  / Bili: x / Urobili: x   Blood: x / Protein: x / Nitrite: x   Leuk Esterase: x / RBC: x / WBC x   Sq Epi: x / Non Sq Epi: x / Bacteria: x

## 2025-02-17 NOTE — CONSULT NOTE ADULT - ASSESSMENT
64-year-old female with a history of Alcohol use, asthma, hypothyroidism, anxiety, depression, MVP, Jaclyn-en-Y gastric bypass (2006 at [place]), appendectomy, cholecystectomy, COPD on home O2, hx of HFpEF suspected pulm shunt, hepatic steatosis, morbid obesity chronic back pain (on methadone) is presenting for decreased PO intake x3-4 weeks, diffuse abdominal pain, and weakness. Patient was admitted on 2/11/25. Admitted to medicine. CTAP non revealing. Patient reports that she lost approximately 50 lbs in 1.5months. Denies any recent NSAID use. She reports feeling full and loss of appetite. Currently on calorie count. She has decreased PO intake and only eats 1-2 bites/sips of food or drink before she feels full. She was found to be significantly hypokalemic on admission and is admitted for further management. after a syncopal episode. GI consulted for further evaluation.       #Chronic abdominal pain with N/V , decrease PO intake   #hx of Rou-en-Y bypass 2006, obesity  #Hepatic Steatosis with elevated liver enzymes, AUD  - CTAP IC: Hepatic steatosis. Hepatomegaly, 25.5 cm in length. Postcholecystectomy.  - seen by GI in 11/24 , suspicious for anterior cutaneous nerve entrapment  - EGD 11/24: normal Esophagus, non erosive gastritis, rou-en-Y surgery  - EGD 5.23: irrgeular Zline (bx normal), non erosive gastritis (HP-), rou-en-Y  - s/p Colonoscopy 5.23: goof prep: melanosis, random colon biopsy: normal  - Family hx of colon cancer  - on home O2, currently on room air, hx of HFPEF with pulm shunt was on bumex, hypokalemic with EKG changes    RECS  - patient on calorie count starting today  - Seen by Bariatric surgery  - Will recommend bariatric diet, if no improvement will consider EGD  - Check hepatitis panel, trend LFTs  - Monitor for abdominal pain  - Avoid NSAIDs  - Will recommend OP fibroscan  - We will follow 64-year-old female with a history of Alcohol use, asthma, hypothyroidism, anxiety, depression, MVP, Jaclyn-en-Y gastric bypass (2006 at [place]), appendectomy, cholecystectomy, COPD on home O2, hx of HFpEF suspected pulm shunt, hepatic steatosis, morbid obesity chronic back pain (on methadone) is presenting for decreased PO intake x3-4 weeks, diffuse abdominal pain, and weakness. Patient was admitted on 2/11/25. Admitted to medicine. CTAP non revealing. Patient reports that she lost approximately 50 lbs in 1.5months. Denies any recent NSAID use. She reports feeling full and loss of appetite. Currently on calorie count. She has decreased PO intake and only eats 1-2 bites/sips of food or drink before she feels full. She was found to be significantly hypokalemic on admission and is admitted for further management. after a syncopal episode. GI consulted for further evaluation.       #Chronic abdominal pain with N/V , decrease PO intake   #hx of Rou-en-Y bypass 2006, obesity  #Hepatic Steatosis with elevated liver enzymes, AUD  - CTAP IC: Hepatic steatosis. Hepatomegaly, 25.5 cm in length. Postcholecystectomy.  - seen by GI in 11/24 , suspicious for anterior cutaneous nerve entrapment  - EGD 11/24: normal Esophagus, non erosive gastritis, rou-en-Y surgery  - EGD 5.23: irrgeular Zline (bx normal), non erosive gastritis (HP-), rou-en-Y  - s/p Colonoscopy 5.23: goof prep: melanosis, random colon biopsy: normal  - Family hx of colon cancer  - on home O2, currently on room air, hx of HFPEF with pulm shunt was on bumex, hypokalemic with EKG changes    RECS  - patient on calorie count starting today  - Seen by Bariatric surgery  - Will recommend bariatric diet and advancing diet as tolerated  - low yield w/ rpt EGD as pt had a recent endoscopy in 11/2024 and it will likely be unchanged  - Check hepatitis panel (HAV IgG, HBcAb total, HBsAb, HBsAg, HCV ab w/ reflex RNA), trend LFTs  - Monitor for abdominal pain  - Avoid NSAIDs  - Will recommend OP fibroscan  - We will follow

## 2025-02-17 NOTE — PROGRESS NOTE ADULT - ASSESSMENT
ASSESSMENT:  64-year-old female with a history of asthma, hypothyroidism, anxiety, depression, MVP, Jaclyn-en-Y gastric bypass, appendectomy, cholecystectomy, and chronic back pain (on methadone) is presenting for decreased PO intake x3-4 weeks, diffuse abdominal pain, and weakness.     PLAN:  - F/U Nutrition labs   - F/U GI consult   - Scopolamine patch   - Bariatric regular diet with Ensure protein shakes   - Avoid any NSAIDs  - Care as per primary team      BLUE TEAM SPECTRA: [id number]     ASSESSMENT:  64-year-old female with a history of asthma, hypothyroidism, anxiety, depression, MVP, Jaclyn-en-Y gastric bypass, appendectomy, cholecystectomy, and chronic back pain (on methadone) is presenting for decreased PO intake x3-4 weeks, diffuse abdominal pain, and weakness.     PLAN:  - Bariatric regular diet with Ensure protein shakes   - Patient refusing linette diet   - Patient educated on importance of a bariatric diet   - Ok for scopolamine patch   - Avoid any NSAIDs  - Care as per primary team   - Surgery to sign off       BLUE TEAM SPECTRA: [id number]

## 2025-02-17 NOTE — CONSULT NOTE ADULT - SUBJECTIVE AND OBJECTIVE BOX
Gastroenterology Consultation:    Patient is a 64y old  Female who presents with a chief complaint of Hypokalemia (2025 08:07)        Admitted on: 25      HPI:  64-year-old female with a history of asthma, hypothyroidism, anxiety, depression, MVP, Jaclyn-en-Y gastric bypass, appendectomy, cholecystectomy, and chronic back pain (on methadone) is presenting for decreased PO intake x3-4 weeks, diffuse abdominal pain, and weakness. She used to drink 2 scotchs per night but has not drank alcohol since she was hospitalized in November. She lives at home with her  whom helps care for her, however he recently was admitted to the hospital and is currently at a STR where she was left alone to care for herself. She states that she has had a non-specific generalized abdominal pain not associated with nausea, vomiting, or diarrhea. She has decreased PO intake and only eats 1-2 bites/sips of food or drink before she feels full. She was found to be significantly hypokalemic on admission and is admitted for further management. after a syncopal episode. She lost consciousness briefly while sitting after a shower. She also reported 5 days of worsening abdominal pain, nausea, vomiting, decreased oral intake, and constipation. She reports regular alcohol use (a couple of glasses of scotch nightly) and worsening shortness of breath for the past 2-3 months, requiring near-daily albuterol nebulizer treatments.    Of note, she was discharged from [facility] in December after a syncopal episode. Per previous documentation, the syncopal episode was likely vasovagal, though a prolonged QTc (603 on admission EKG) raised concern for arrhythmia. This was likely secondary to methadone and sertraline. Telemetry showed no arrhythmias. Echocardiogram showed normal LVEF (65%). CT chest was negative for PE. Abdominal pain workup, including CT abdomen and abdominal ultrasound, revealed hepatomegaly and fatty liver infiltration, but no acute pathology. Lipase was normal. LFTs were mildly elevated. Elevated lactate (3.0) was attributed to alcohol use. Her dyspnea was attributed to deconditioning and obesity, with no evidence of CHF or acute exacerbation of asthma. Leg edema was stable and mild.    Vitals  Temp: 98.6  HR: 76  BP: 113/78  O2: 94% on 3L NC  RR: 18    Labs  WBC: 8.97  HgB: 15.4  Plt: 285  Cr: 1.4 (baseline 1.0)  Na: 126  K: 2.4 -> 2.1  A -> 13  Bicarb: 39  ALK: 214  AST: 49  ALT: 17  Lipase: 61  M.0  Phos: 2.6    Imaging  CXR: No radiographic evidence of acute cardiopulmonary disease.    CTAP: No evidence of acute abdominal pathology.    In the ED  Potassium Chloride 40 mEq PO x2  KCL 40 mEq IV ordered  KCL 40 mEq PO ordered  Mg Suflate 2g IV x1  Tylenol 650mg PO x1 (2025 22:12)        Prior EGD:    Prior Colonoscopy:      PAST MEDICAL & SURGICAL HISTORY:  Asthma  LAST ATTACK MANY YRS AGO      Gastroesophageal reflux disease without esophagitis      Hypothyroidism      Heart murmur      Anxiety      Depression      Mood disorder      Psoriasis      Constipation      Back pain  LOW      Morbid obesity      S/P tonsillectomy  1967      History of appendectomy  1974      Abscess of back  EVACUATION OF ABSCESS @ L5-S1       History of Jaclyn-en-Y gastric bypass  WITH CHOLECYSTECTOMY             FAMILY HISTORY:  CAD (coronary artery disease) (Father, Mother)        Social History:  Tobacco:  Alcohol:  Drugs:    Home Medications:  albuterol 90 mcg/inh inhalation aerosol: 2 puff(s) inhaled every 6 hours As needed Shortness of Breath and/or Wheezing (2025 00:04)  folic acid 1 mg oral tablet: 1 tab(s) orally once a day (2025 00:04)  lamoTRIgine 150 mg oral tablet: 1 tab(s) orally once a day (2025 00:04)  methadone 10 mg oral tablet: 3 tab(s) orally every 8 hours (2025 00:04)  sertraline: 200 milligram(s) orally once a day (2025 00:04)  Soma 350 mg oral tablet: 1 tab(s) orally 3 times a day (2025 00:04)        MEDICATIONS  (STANDING):  acetaminophen     Tablet .. 975 milliGRAM(s) Oral every 8 hours  cyanocobalamin 1000 MICROGram(s) Oral daily  cyclobenzaprine 5 milliGRAM(s) Oral three times a day  folic acid 1 milliGRAM(s) Oral daily  heparin   Injectable 5000 Unit(s) SubCutaneous every 12 hours  influenza   Vaccine 0.5 milliLiter(s) IntraMuscular once  lamoTRIgine 150 milliGRAM(s) Oral daily  levothyroxine 100 MICROGram(s) Oral daily  magnesium sulfate  IVPB 2 Gram(s) IV Intermittent once  melatonin 5 milliGRAM(s) Oral at bedtime  methadone    Tablet 30 milliGRAM(s) Oral every 8 hours  pantoprazole    Tablet 40 milliGRAM(s) Oral before breakfast  polyethylene glycol 3350 17 Gram(s) Oral daily  senna 2 Tablet(s) Oral at bedtime  sertraline 200 milliGRAM(s) Oral daily    MEDICATIONS  (PRN):  albuterol    90 MICROgram(s) HFA Inhaler 2 Puff(s) Inhalation every 6 hours PRN Shortness of Breath and/or Wheezing  albuterol/ipratropium for Nebulization 3 milliLiter(s) Nebulizer every 6 hours PRN Shortness of Breath and/or Wheezing  fluticasone propionate/ salmeterol 250-50 MICROgram(s) Diskus 1 Dose(s) Inhalation two times a day PRN for bronchospasm  lactulose Syrup 10 Gram(s) Oral daily PRN for constipation      Allergies  penicillin (Anaphylaxis)      Review of Systems:   Constitutional:  No Fever, No Chills  ENT/Mouth:  No Hearing Changes,  No Difficulty Swallowing  Eyes:  No Eye Pain, No Vision Changes  Cardiovascular:  No Chest Pain, No Palpitations  Respiratory:  No Cough, No Dyspnea  Gastrointestinal:  As described in HPI          Physical Examination:  T(C): 36.4 (25 @ 05:08), Max: 36.9 (25 @ 12:16)  HR: 58 (25 @ 05:08) (58 - 80)  BP: 115/72 (25 @ 05:08) (107/74 - 119/82)  RR: 18 (25 @ 05:08) (17 - 18)  SpO2: 96% (25 @ 05:08) (96% - 96%)      02-15-25 @ 07:01  -  25 @ 07:00  --------------------------------------------------------  IN: 987 mL / OUT: 1150 mL / NET: -163 mL    25 @ 07:01  -  25 @ 07:00  --------------------------------------------------------  IN: 825 mL / OUT: 700 mL / NET: 125 mL    25 @ 07:01  -  25 @ 11:30  --------------------------------------------------------  IN: 480 mL / OUT: 0 mL / NET: 480 mL          GENERAL: AAOx3, no acute distress.  HEAD:  Atraumatic, Normocephalic  EYES: conjunctiva and sclera clear  CHEST/LUNG: Clear to auscultation bilaterally; No wheeze, rhonchi, or rales  HEART: Regular rate and rhythm; normal S1, S2, No murmurs.  ABDOMEN: Soft, tender, nondistended; Bowel sounds present  SKIN: Intact, no jaundice        Data:                        13.6   4.95  )-----------( 196      ( 2025 04:30 )             42.6     Hgb Trend:  13.6  25 @ 04:30  13.1  02-15-25 @ 05:37        17    132[L]  |  99  |  11  ----------------------------<  91  3.7   |  22  |  0.9    Ca    9.1      2025 04:30  Phos  3.6       Mg     1.8         TPro  5.9[L]  /  Alb  3.3[L]  /  TBili  0.9  /  DBili  x   /  AST  51[H]  /  ALT  19  /  AlkPhos  173[H]      Liver panel trend:  TBili 0.9   /   AST 51   /   ALT 19   /   AlkP 173   /   Tptn 5.9   /   Alb 3.3    /   DBili --        TBili 1.0   /   AST 52   /   ALT 20   /   AlkP 159   /   Tptn 6.0   /   Alb 3.2    /   DBili --      16  TBili 0.8   /   AST 44   /   ALT 15   /   AlkP 161   /   Tptn 5.5   /   Alb 3.2    /   DBili --      02-15  TBili 0.9   /   AST 39   /   ALT 14   /   AlkP 154   /   Tptn 5.3   /   Alb 3.1    /   DBili --        TBili 1.1   /   AST 42   /   ALT 15   /   AlkP 156   /   Tptn 5.7   /   Alb 3.1    /   DBili --        TBili 1.7   /   AST 36   /   ALT 12   /   AlkP 157   /   Tptn 5.2   /   Alb 2.9    /   DBili --        TBili 2.2   /   AST 49   /   ALT 17   /   AlkP 214   /   Tptn 7.0   /   Alb 3.8    /   DBili --            PT/INR - ( 2025 04:30 )   PT: 11.30 sec;   INR: 0.96 ratio        Gastroenterology Consultation:    Patient is a 64y old  Female who presents with a chief complaint of Hypokalemia (17 Feb 2025 08:07)        Admitted on: 02-11-25      HPI:  64-year-old female with a history of Alcohol use, asthma, hypothyroidism, anxiety, depression, MVP, Jaclyn-en-Y gastric bypass (2006 at [place]), appendectomy, cholecystectomy, COPD on home O2, hx of HFpEF suspected pulm shunt, hepatic steatosis, morbid obesity chronic back pain (on methadone) is presenting for decreased PO intake x3-4 weeks, diffuse abdominal pain, and weakness. Patient was admitted on 2/11/25. Admitted to medicine. CTAP non revealing. Patient reports that she lost approximately 50 lbs in 1.5months. Denies any recent NSAID use. She reports feeling full and loss of appetite. Currently on calorie count. She has decreased PO intake and only eats 1-2 bites/sips of food or drink before she feels full. She was found to be significantly hypokalemic on admission and is admitted for further management. after a syncopal episode. GI consulted for further evaluation.       Prior EGD: see below    Prior Colonoscopy: see below      PAST MEDICAL & SURGICAL HISTORY:  Asthma  LAST ATTACK MANY YRS AGO      Gastroesophageal reflux disease without esophagitis      Hypothyroidism      Heart murmur      Anxiety      Depression      Mood disorder      Psoriasis      Constipation      Back pain  LOW      Morbid obesity      S/P tonsillectomy  1967      History of appendectomy  1974      Abscess of back  EVACUATION OF ABSCESS @ L5-S1 1997      History of Jaclyn-en-Y gastric bypass  WITH CHOLECYSTECTOMY 2006            FAMILY HISTORY:  CAD (coronary artery disease) (Father, Mother)  family hx of colon cancer      Social History:  Tobacco: denies  Alcohol: drinks alcohol  Drugs: denies    Home Medications:  albuterol 90 mcg/inh inhalation aerosol: 2 puff(s) inhaled every 6 hours As needed Shortness of Breath and/or Wheezing (12 Feb 2025 00:04)  folic acid 1 mg oral tablet: 1 tab(s) orally once a day (12 Feb 2025 00:04)  lamoTRIgine 150 mg oral tablet: 1 tab(s) orally once a day (12 Feb 2025 00:04)  methadone 10 mg oral tablet: 3 tab(s) orally every 8 hours (12 Feb 2025 00:04)  sertraline: 200 milligram(s) orally once a day (12 Feb 2025 00:04)  Soma 350 mg oral tablet: 1 tab(s) orally 3 times a day (12 Feb 2025 00:04)        MEDICATIONS  (STANDING):  acetaminophen     Tablet .. 975 milliGRAM(s) Oral every 8 hours  cyanocobalamin 1000 MICROGram(s) Oral daily  cyclobenzaprine 5 milliGRAM(s) Oral three times a day  folic acid 1 milliGRAM(s) Oral daily  heparin   Injectable 5000 Unit(s) SubCutaneous every 12 hours  influenza   Vaccine 0.5 milliLiter(s) IntraMuscular once  lamoTRIgine 150 milliGRAM(s) Oral daily  levothyroxine 100 MICROGram(s) Oral daily  magnesium sulfate  IVPB 2 Gram(s) IV Intermittent once  melatonin 5 milliGRAM(s) Oral at bedtime  methadone    Tablet 30 milliGRAM(s) Oral every 8 hours  pantoprazole    Tablet 40 milliGRAM(s) Oral before breakfast  polyethylene glycol 3350 17 Gram(s) Oral daily  senna 2 Tablet(s) Oral at bedtime  sertraline 200 milliGRAM(s) Oral daily    MEDICATIONS  (PRN):  albuterol    90 MICROgram(s) HFA Inhaler 2 Puff(s) Inhalation every 6 hours PRN Shortness of Breath and/or Wheezing  albuterol/ipratropium for Nebulization 3 milliLiter(s) Nebulizer every 6 hours PRN Shortness of Breath and/or Wheezing  fluticasone propionate/ salmeterol 250-50 MICROgram(s) Diskus 1 Dose(s) Inhalation two times a day PRN for bronchospasm  lactulose Syrup 10 Gram(s) Oral daily PRN for constipation      Allergies  penicillin (Anaphylaxis)      Review of Systems:   Constitutional:  No Fever, No Chills  ENT/Mouth:  No Hearing Changes,  No Difficulty Swallowing  Eyes:  No Eye Pain, No Vision Changes  Cardiovascular:  No Chest Pain, No Palpitations  Respiratory:  No Cough, No Dyspnea  Gastrointestinal:  As described in HPI          Physical Examination:  T(C): 36.4 (02-17-25 @ 05:08), Max: 36.9 (02-16-25 @ 12:16)  HR: 58 (02-17-25 @ 05:08) (58 - 80)  BP: 115/72 (02-17-25 @ 05:08) (107/74 - 119/82)  RR: 18 (02-17-25 @ 05:08) (17 - 18)  SpO2: 96% (02-17-25 @ 05:08) (96% - 96%)      02-15-25 @ 07:01  -  02-16-25 @ 07:00  --------------------------------------------------------  IN: 987 mL / OUT: 1150 mL / NET: -163 mL    02-16-25 @ 07:01  -  02-17-25 @ 07:00  --------------------------------------------------------  IN: 825 mL / OUT: 700 mL / NET: 125 mL    02-17-25 @ 07:01  -  02-17-25 @ 11:30  --------------------------------------------------------  IN: 480 mL / OUT: 0 mL / NET: 480 mL          GENERAL: AAOx3, no acute distress.  HEAD:  Atraumatic, Normocephalic  EYES: conjunctiva and sclera clear  CHEST/LUNG: Clear to auscultation bilaterally; No wheeze, rhonchi, or rales  HEART: Regular rate and rhythm; normal S1, S2, No murmurs.  ABDOMEN: Soft, tender, nondistended; Bowel sounds present  SKIN: Intact, no jaundice        Data:                        13.6   4.95  )-----------( 196      ( 17 Feb 2025 04:30 )             42.6     Hgb Trend:  13.6  02-17-25 @ 04:30  13.1  02-15-25 @ 05:37        02-17    132[L]  |  99  |  11  ----------------------------<  91  3.7   |  22  |  0.9    Ca    9.1      17 Feb 2025 04:30  Phos  3.6     02-17  Mg     1.8     02-17    TPro  5.9[L]  /  Alb  3.3[L]  /  TBili  0.9  /  DBili  x   /  AST  51[H]  /  ALT  19  /  AlkPhos  173[H]  02-17    Liver panel trend:  TBili 0.9   /   AST 51   /   ALT 19   /   AlkP 173   /   Tptn 5.9   /   Alb 3.3    /   DBili --      02-17  TBili 1.0   /   AST 52   /   ALT 20   /   AlkP 159   /   Tptn 6.0   /   Alb 3.2    /   DBili --      02-16  TBili 0.8   /   AST 44   /   ALT 15   /   AlkP 161   /   Tptn 5.5   /   Alb 3.2    /   DBili --      02-15  TBili 0.9   /   AST 39   /   ALT 14   /   AlkP 154   /   Tptn 5.3   /   Alb 3.1    /   DBili --      02-14  TBili 1.1   /   AST 42   /   ALT 15   /   AlkP 156   /   Tptn 5.7   /   Alb 3.1    /   DBili --      02-13  TBili 1.7   /   AST 36   /   ALT 12   /   AlkP 157   /   Tptn 5.2   /   Alb 2.9    /   DBili --      02-12  TBili 2.2   /   AST 49   /   ALT 17   /   AlkP 214   /   Tptn 7.0   /   Alb 3.8    /   DBili --      02-11      PT/INR - ( 17 Feb 2025 04:30 )   PT: 11.30 sec;   INR: 0.96 ratio      CT Abdomen and Pelvis w/ IV Cont (02.11.25 @ 17:29) >   Hepatic steatosis. Hepatomegaly, 25.5 cm in length.   Postcholecystectomy.

## 2025-02-17 NOTE — PROGRESS NOTE ADULT - ASSESSMENT
64-year-old female with a history of asthma, hypothyroidism, anxiety, depression, MVP, Jaclyn-en-Y gastric bypass, appendectomy, cholecystectomy, and chronic back pain (on methadone) is presenting for decreased PO intake x3-4 weeks, diffuse abdominal pain, and weakness.    #Severe Hypokalemia and Moderate Hyponatremia in setting of Decreased PO Intake and Bumex Use  #Prolonged QTC in setting of Hypokalemia and Multiple QTC-Prolonging Meds  #CEM on CKD   - K 2.1, Na 126  - CTA abdomen 11/13: No acute abdominal or pelvic pathology. Atherosclerotic calcifications of the aorta and its branches. No evidence of celiac axis or SMA abnormality.  - CTAP this admission negative for any acute pathology  - No infectious etiology suspected  - Keep on Tele  - Keep Mg >2, replete K to >4  - Nephro eval  - PT eval  - repeat daily ECG and monitor QTc  - Holding bumex   - restart home methadone does (30 TID)      #Poor PO intake  #Pt reports 50lb weight loss in last 2 months  #Diffuse Abdominal Pain in setting of Hx of Jaclyn-en-Y Bypass  - Endorses lack of appetite and early satiety x3-4 weeks  - EGD/ Colonoscopy 5/2023: Non erosive gastritis  - CTA abdomen 11/13: No acute abdominal or pelvic pathology. Atherosclerotic calcifications of the aorta and its branches. No evidence of celiac axis or SMA abnormality.  -Bariatric surgery consult appreciated  -Pt had gastric bypass 2006  - Dietician eval  -GI consult per [facility] rec  -vit b1, zinic, Vit D levels ordered    #DM  - A1c last admission 7  - Had episodes of hypoglycemia  - If glucose consistently >150 can start ISS    #Hx of COPD on Home O2 PRN not in Exacerbation  - TTE 2024 --> 60-65% EF  - TTE with bubbles 11/19: suspected pulmonary shunt.   - C/w home inhalers  - Hold home Bumex for now    #Hypothyroidism  - C/w home levothyroxine    #Anxiety  #Depression  - Patient on Lamotrigine, Zoloft, and Xanax  - Holding medication overnight due to severe hypokalemia w/ prolonged QTC  - Trend lytes and EKG and re-introduce tomorrow     #Chronic Back Pain w/ Hx of Laminectomy   - Takes Methadone 30mg q8h at home  - Repeat EKG and BMP, will give Methadone dose if QTC improving    #Alcohol Use Disorder  #Hepatic Steatosis  - Previously 2 scotchs per night or more  - No alcohol since November per pt  - RUQ US last admission shows fatty liver likely due to obesity/alcohol use  - C/w B12 and Folate supplement    #MISC  - Code Status: Full Code  - DVT ppx: Heparin subcutaneous  - GI ppx: Protonix  - Diet: DASH  - Activity: AAT  - Inpatient Dispo: Telemetry  - Discharge Dispo: From Home

## 2025-02-17 NOTE — CONSULT NOTE ADULT - ATTENDING COMMENTS
I edited the note.  Time-based billing (NON-critical care).   55 minutes spent on total encounter; more than 50% of the visit was spent counseling and / or coordinating care by the attending physician.  The necessity of the time spent during the encounter on this date of service was due to: Coordination of care.

## 2025-02-17 NOTE — CHART NOTE - NSCHARTNOTEFT_GEN_A_CORE
Pt insists that she is not a bariatric surgery patient because she underwent surgery in 2006 and not recently. Pt refuse to eat bariatric diet. Explained to pt that bariatric diet could be helpful to her abd pain but she disargeed.    Switched pt back to DASH/TLC diet Pt insists that she is not a bariatric surgery patient because she underwent surgery in 2006 and not recently. Pt refuse to eat bariatric diet. Explained to pt that bariatric diet could be helpful to her abd pain but she disagreed.    Switched pt back to regular DASH/TLC diet

## 2025-02-17 NOTE — PROGRESS NOTE ADULT - ATTENDING COMMENTS
***My note supersedes any discrepancies that may be above in the resident's note***    64-year-old female with a history of asthma, hypothyroidism, anxiety, depression, MVP, Jaclyn-en-Y gastric bypass, appendectomy, cholecystectomy, and chronic back pain (on methadone) is presenting for decreased PO intake x3-4 weeks, diffuse abdominal pain, and weakness.    # Hypokalemia , resolved  # Hyponatremia , improved  #Prolonged QTC in setting of Hypokalemia and Multiple QTC-Prolonging Meds  - likely 2/2 to Decreased PO Intake and Bumex Use  - K 2.1, Na 126 on admission  - now K 3.7, Na 130s  - CTA abdomen 11/13: No acute abdominal or pelvic pathology. Atherosclerotic calcifications of the aorta and its branches. No evidence of celiac axis or SMA abnormality.  - CTAP this admission negative for any acute pathology  - No infectious etiology suspected  - Keep on Tele  - Keep Mg >2, replete K to >4  - Nephro eval  - PT eval  - repeat daily ECG and monitor QTc  - Holding bumex   - restart home methadone does (30 TID)    #Poor PO intake  #Pt reports 50lb weight loss in last 2 months  #Diffuse Abdominal Pain in setting of Hx of Jaclyn-en-Y Bypass  - Endorses lack of appetite and early satiety x3-4 weeks  - EGD/ Colonoscopy 5/2023: Non erosive gastritis  - CTA abdomen 11/13: No acute abdominal or pelvic pathology. Atherosclerotic calcifications of the aorta and its branches. No evidence of celiac axis or SMA abnormality.  -Bariatric surgery consult appreciated  -Pt had gastric bypass 2006  - Dietician eval  -GI consult per bariatric rec      patient on calorie count starting today      Will recommend bariatric diet, if no improvement will consider EGD  -vit b1, zinic, Vit D levels ordered    #CEM on CKD, resolved  - likely pre-renal in setting of poor po intake  - trend on serial BMP    #DM  - A1c last admission 7  - Had episodes of hypoglycemia  - If glucose consistently >150 can start ISS    #Hx of COPD on Home O2 PRN not in Exacerbation  - TTE 2024 --> 60-65% EF  - TTE with bubbles 11/19: suspected pulmonary shunt.   - C/w home inhalers  - Hold home Bumex for now    #Hypothyroidism  - C/w home levothyroxine    #Anxiety  #Depression  - Patient on Lamotrigine, Zoloft, and Xanax  - Holding medication overnight due to severe hypokalemia w/ prolonged QTC  - Trend lytes and EKG and re-introduce tomorrow     #Chronic Back Pain w/ Hx of Laminectomy   - Takes Methadone 30mg q8h at home  - Repeat EKG and BMP, will give Methadone dose if QTC improving    #Alcohol Use Disorder  #Hepatic Steatosis  - Previously 2 scotchs per night or more  - No alcohol since November per pt  - RUQ US last admission shows fatty liver likely due to obesity/alcohol use  - C/w B12 and Folate supplement    #MISC  - Code Status: Full Code  - DVT ppx: Heparin subcutaneous  - GI ppx: Protonix  - Diet: DASH  - Activity: AAT  - Inpatient Dispo: Telemetry  - Discharge Dispo: From Home    #Progress Note Handoff  Pending (specify):  calorie count +/- EGD eval from GI  Family discussion: updated   Disposition:  Unknown at this time________

## 2025-02-17 NOTE — PROGRESS NOTE ADULT - ATTENDING COMMENTS
CT scan reviewed again, no evidence of issue with bypass, no obstruction. Appreciate GI input.   On rounds, she was refusing the "linette diet" because she does not like the taste. I suspect the regular DASH diet is contributing to her nausea and vomiting due to her normal bypass anatomy- she should switch to small frequent meals, avoid carbonation and straws, and avoid carbs like bread and pasta. She does not want to try the linette diet to see if it will help due to not being appetizing to her.   I continue to recommend trying to address her nausea and vomiting with dietary changes with her bypass anatomy and previously normal scope. She can also add protein shakes to improve her protein and nutrition. She needs to try and eat a high protein low carb diet for her new onset diabetes as well.   If patient unwilling to comply with diet then will be difficult to offer any surgical intervention due to lack of post-op compliance. This was explained to the patient and she understood. She does not want to try the linette diet at this time, please call surgery back if she would like to re-discuss diet recommendations.

## 2025-02-17 NOTE — PROGRESS NOTE ADULT - SUBJECTIVE AND OBJECTIVE BOX
SUBJECTIVE/OVERNIGHT EVENTS  Today is hospital day 6d. This morning patient was seen and examined at bedside, resting comfortably in bed. No acute or major events overnight.      CODE STATUS:      PMH:  Asthma    Gastroesophageal reflux disease without esophagitis    Hypothyroidism    Heart murmur    Anxiety    Depression    Mood disorder    Psoriasis    Constipation    Back pain    Morbid obesity          PSH:  S/P tonsillectomy    History of appendectomy    Abscess of back    History of Jaclyn-en-Y gastric bypass          MEDICATIONS  STANDING MEDICATIONS  acetaminophen     Tablet .. 975 milliGRAM(s) Oral every 8 hours  cyanocobalamin 1000 MICROGram(s) Oral daily  cyclobenzaprine 5 milliGRAM(s) Oral three times a day  folic acid 1 milliGRAM(s) Oral daily  heparin   Injectable 5000 Unit(s) SubCutaneous every 12 hours  influenza   Vaccine 0.5 milliLiter(s) IntraMuscular once  lamoTRIgine 150 milliGRAM(s) Oral daily  levothyroxine 100 MICROGram(s) Oral daily  melatonin 5 milliGRAM(s) Oral at bedtime  methadone    Tablet 30 milliGRAM(s) Oral every 8 hours  pantoprazole    Tablet 40 milliGRAM(s) Oral before breakfast  polyethylene glycol 3350 17 Gram(s) Oral daily  senna 2 Tablet(s) Oral at bedtime  sertraline 200 milliGRAM(s) Oral daily    PRN MEDICATIONS  albuterol    90 MICROgram(s) HFA Inhaler 2 Puff(s) Inhalation every 6 hours PRN  albuterol/ipratropium for Nebulization 3 milliLiter(s) Nebulizer every 6 hours PRN  fluticasone propionate/ salmeterol 250-50 MICROgram(s) Diskus 1 Dose(s) Inhalation two times a day PRN  lactulose Syrup 10 Gram(s) Oral daily PRN    VITALS  T(F): 97.5 (02-17-25 @ 05:08), Max: 98.2 (02-16-25 @ 20:10)  HR: 58 (02-17-25 @ 05:08) (58 - 65)  BP: 115/72 (02-17-25 @ 05:08) (107/74 - 115/72)  RR: 18 (02-17-25 @ 05:08) (17 - 18)  SpO2: 96% (02-17-25 @ 05:08) (96% - 96%)  POCT Blood Glucose.: 82 mg/dL (02-16-25 @ 17:39)    PHYSICAL EXAM  GENERAL  ( x ) NAD, lying in bed comfortably     (  ) obtunded     (  ) lethargic     (  ) somnolent    HEAD  ( x ) Atraumatic     (  ) hematoma     (  ) laceration (specify location:       )     NECK  ( x ) Supple     (  ) neck stiffness     (  ) nuchal rigidity     (  )  no JVD     (  ) JVD present ( -- cm)    HEART  Rate -->  ( x ) normal rate    (  ) bradycardic    (  ) tachycardic  Rhythm -->  ( x ) regular    (  ) regularly irregular    (  ) irregularly irregular  Murmurs -->  ( x ) normal s1/s2    (  ) systolic murmur    (  ) diastolic murmur    (  ) continuous murmur     (  ) S3 present    (  ) S4 present    LUNGS  ( x )Unlabored respirations     (  ) tachypnea  ( x ) B/L air entry     (  ) decreased breath sounds in:  (location     )    ( x ) no adventitious sound     (  ) crackles     (  ) wheezing      (  ) rhonchi      (specify location:       )  (  ) chest wall tenderness (specify location:       )    ABDOMEN  ( x ) Soft     (  ) tense   |   ( x ) nondistended     (  ) distended   |   (  ) +BS     (  ) hypoactive bowel sounds     (  ) hyperactive bowel sounds  ( x ) nontender     (  ) RUQ tenderness     (  ) RLQ tenderness     (  ) LLQ tenderness     (  ) epigastric tenderness     (  ) diffuse tenderness  (  ) Splenomegaly      (  ) Hepatomegaly      (  ) Jaundice     (  ) ecchymosis     EXTREMITIES  (x  ) Normal     (  ) Rash     (  ) ecchymosis     (  ) varicose veins      (  ) pitting edema     (  ) non-pitting edema   (  ) ulceration     (  ) gangrene:     (location:     )    NERVOUS SYSTEM  ( x ) A&Ox3     (  ) confused     (  ) lethargic  CN II-XII:     (  ) Intact     (  ) focal deficits  (Specify:     )   Upper extremities:     (  ) strength X/5     (  ) focal deficit (specify:    )  Lower extremities:     (  ) strength  X/5    (  ) focal deficit (specify:    )      LABS             13.6   4.95  )-----------( 196      ( 02-17-25 @ 04:30 )             42.6     132  |  99  |  11  -------------------------<  91   02-17-25 @ 04:30  3.7  |  22  |  0.9    Ca      9.1     02-17-25 @ 04:30  Phos   3.6     02-17-25 @ 04:30  Mg     1.8     02-17-25 @ 04:30    TPro  5.9  /  Alb  3.3  /  TBili  0.9  /  DBili  x   /  AST  51  /  ALT  19  /  AlkPhos  173  /  GGT  x     02-17-25 @ 04:30    PT/INR - ( 02-17-25 @ 04:30 )   PT: 11.30 sec;   INR: 0.96 ratio      Urinalysis Basic - ( 17 Feb 2025 04:30 )    Color: x / Appearance: x / SG: x / pH: x  Gluc: 91 mg/dL / Ketone: x  / Bili: x / Urobili: x   Blood: x / Protein: x / Nitrite: x   Leuk Esterase: x / RBC: x / WBC x   Sq Epi: x / Non Sq Epi: x / Bacteria: x          IMAGING

## 2025-02-18 LAB
ALBUMIN SERPL ELPH-MCNC: 3.4 G/DL — LOW (ref 3.5–5.2)
ALP SERPL-CCNC: 171 U/L — HIGH (ref 30–115)
ALT FLD-CCNC: 20 U/L — SIGNIFICANT CHANGE UP (ref 0–41)
ANION GAP SERPL CALC-SCNC: 14 MMOL/L — SIGNIFICANT CHANGE UP (ref 7–14)
AST SERPL-CCNC: 53 U/L — HIGH (ref 0–41)
BASOPHILS # BLD AUTO: 0.05 K/UL — SIGNIFICANT CHANGE UP (ref 0–0.2)
BASOPHILS NFR BLD AUTO: 0.7 % — SIGNIFICANT CHANGE UP (ref 0–1)
BILIRUB SERPL-MCNC: 0.8 MG/DL — SIGNIFICANT CHANGE UP (ref 0.2–1.2)
BUN SERPL-MCNC: 12 MG/DL — SIGNIFICANT CHANGE UP (ref 10–20)
CALCIUM SERPL-MCNC: 8.8 MG/DL — SIGNIFICANT CHANGE UP (ref 8.4–10.4)
CHLORIDE SERPL-SCNC: 101 MMOL/L — SIGNIFICANT CHANGE UP (ref 98–110)
CO2 SERPL-SCNC: 19 MMOL/L — SIGNIFICANT CHANGE UP (ref 17–32)
CREAT SERPL-MCNC: 1.1 MG/DL — SIGNIFICANT CHANGE UP (ref 0.7–1.5)
EGFR: 57 ML/MIN/1.73M2 — LOW
EGFR: 57 ML/MIN/1.73M2 — LOW
EOSINOPHIL # BLD AUTO: 0.21 K/UL — SIGNIFICANT CHANGE UP (ref 0–0.7)
EOSINOPHIL NFR BLD AUTO: 2.8 % — SIGNIFICANT CHANGE UP (ref 0–8)
GLUCOSE BLDC GLUCOMTR-MCNC: 102 MG/DL — HIGH (ref 70–99)
GLUCOSE BLDC GLUCOMTR-MCNC: 103 MG/DL — HIGH (ref 70–99)
GLUCOSE BLDC GLUCOMTR-MCNC: 130 MG/DL — HIGH (ref 70–99)
GLUCOSE BLDC GLUCOMTR-MCNC: 96 MG/DL — SIGNIFICANT CHANGE UP (ref 70–99)
GLUCOSE SERPL-MCNC: 94 MG/DL — SIGNIFICANT CHANGE UP (ref 70–99)
HCT VFR BLD CALC: 41.4 % — SIGNIFICANT CHANGE UP (ref 37–47)
HGB BLD-MCNC: 13 G/DL — SIGNIFICANT CHANGE UP (ref 12–16)
IMM GRANULOCYTES NFR BLD AUTO: 1.6 % — HIGH (ref 0.1–0.3)
LYMPHOCYTES # BLD AUTO: 0.81 K/UL — LOW (ref 1.2–3.4)
LYMPHOCYTES # BLD AUTO: 10.9 % — LOW (ref 20.5–51.1)
MAGNESIUM SERPL-MCNC: 2 MG/DL — SIGNIFICANT CHANGE UP (ref 1.8–2.4)
MCHC RBC-ENTMCNC: 30.4 PG — SIGNIFICANT CHANGE UP (ref 27–31)
MCHC RBC-ENTMCNC: 31.4 G/DL — LOW (ref 32–37)
MCV RBC AUTO: 97 FL — SIGNIFICANT CHANGE UP (ref 81–99)
MONOCYTES # BLD AUTO: 0.5 K/UL — SIGNIFICANT CHANGE UP (ref 0.1–0.6)
MONOCYTES NFR BLD AUTO: 6.7 % — SIGNIFICANT CHANGE UP (ref 1.7–9.3)
NEUTROPHILS # BLD AUTO: 5.75 K/UL — SIGNIFICANT CHANGE UP (ref 1.4–6.5)
NEUTROPHILS NFR BLD AUTO: 77.3 % — HIGH (ref 42.2–75.2)
NRBC BLD AUTO-RTO: 0 /100 WBCS — SIGNIFICANT CHANGE UP (ref 0–0)
PHOSPHATE SERPL-MCNC: 3.9 MG/DL — SIGNIFICANT CHANGE UP (ref 2.1–4.9)
PLATELET # BLD AUTO: 225 K/UL — SIGNIFICANT CHANGE UP (ref 130–400)
PMV BLD: 10.4 FL — SIGNIFICANT CHANGE UP (ref 7.4–10.4)
POTASSIUM SERPL-MCNC: 4 MMOL/L — SIGNIFICANT CHANGE UP (ref 3.5–5)
POTASSIUM SERPL-SCNC: 4 MMOL/L — SIGNIFICANT CHANGE UP (ref 3.5–5)
PROT SERPL-MCNC: 6 G/DL — SIGNIFICANT CHANGE UP (ref 6–8)
RBC # BLD: 4.27 M/UL — SIGNIFICANT CHANGE UP (ref 4.2–5.4)
RBC # FLD: 14.8 % — HIGH (ref 11.5–14.5)
SODIUM SERPL-SCNC: 134 MMOL/L — LOW (ref 135–146)
VIT D25+D1,25 OH+D1,25 PNL SERPL-MCNC: 51.7 PG/ML — SIGNIFICANT CHANGE UP (ref 19.9–79.3)
VIT D25+D1,25 OH+D1,25 PNL SERPL-MCNC: 53.1 PG/ML — SIGNIFICANT CHANGE UP (ref 19.9–79.3)
WBC # BLD: 7.44 K/UL — SIGNIFICANT CHANGE UP (ref 4.8–10.8)
WBC # FLD AUTO: 7.44 K/UL — SIGNIFICANT CHANGE UP (ref 4.8–10.8)

## 2025-02-18 PROCEDURE — 99232 SBSQ HOSP IP/OBS MODERATE 35: CPT

## 2025-02-18 PROCEDURE — 93010 ELECTROCARDIOGRAM REPORT: CPT

## 2025-02-18 RX ADMIN — Medication 975 MILLIGRAM(S): at 21:56

## 2025-02-18 RX ADMIN — Medication 2 TABLET(S): at 21:57

## 2025-02-18 RX ADMIN — SERTRALINE 200 MILLIGRAM(S): 100 TABLET, FILM COATED ORAL at 12:07

## 2025-02-18 RX ADMIN — Medication 975 MILLIGRAM(S): at 13:05

## 2025-02-18 RX ADMIN — Medication 100 MICROGRAM(S): at 05:16

## 2025-02-18 RX ADMIN — CYCLOBENZAPRINE HYDROCHLORIDE 5 MILLIGRAM(S): 15 CAPSULE, EXTENDED RELEASE ORAL at 13:05

## 2025-02-18 RX ADMIN — POLYETHYLENE GLYCOL 3350 17 GRAM(S): 17 POWDER, FOR SOLUTION ORAL at 12:07

## 2025-02-18 RX ADMIN — CYCLOBENZAPRINE HYDROCHLORIDE 5 MILLIGRAM(S): 15 CAPSULE, EXTENDED RELEASE ORAL at 05:16

## 2025-02-18 RX ADMIN — Medication 30 MILLIGRAM(S): at 12:07

## 2025-02-18 RX ADMIN — Medication 40 MILLIGRAM(S): at 05:21

## 2025-02-18 RX ADMIN — CYANOCOBALAMIN 1000 MICROGRAM(S): 1000 INJECTION INTRAMUSCULAR; SUBCUTANEOUS at 12:07

## 2025-02-18 RX ADMIN — CYCLOBENZAPRINE HYDROCHLORIDE 5 MILLIGRAM(S): 15 CAPSULE, EXTENDED RELEASE ORAL at 21:57

## 2025-02-18 RX ADMIN — Medication 5 MILLIGRAM(S): at 21:57

## 2025-02-18 RX ADMIN — FOLIC ACID 1 MILLIGRAM(S): 1 TABLET ORAL at 12:08

## 2025-02-18 RX ADMIN — Medication 975 MILLIGRAM(S): at 05:15

## 2025-02-18 RX ADMIN — LAMOTRIGINE 150 MILLIGRAM(S): 150 TABLET ORAL at 12:07

## 2025-02-18 RX ADMIN — Medication 30 MILLIGRAM(S): at 05:15

## 2025-02-18 RX ADMIN — Medication 30 MILLIGRAM(S): at 18:15

## 2025-02-18 NOTE — PROGRESS NOTE ADULT - SUBJECTIVE AND OBJECTIVE BOX
LANDON JULIET  64y  Female      Patient is a 64y old  Female who presents with a chief complaint of Hypokalemia (17 Feb 2025 13:17)      INTERVAL HPI/OVERNIGHT EVENTS: patient had thrown up yesterdays lunch but tolerated dinner and breakfast. refused to comply with bariatric diet because shes "not bariatric" but was convinced to trial 24hrs to see if improves her po toleration         T(C): 36.5 (02-18-25 @ 05:02), Max: 36.7 (02-17-25 @ 20:05)  HR: 78 (02-18-25 @ 05:02) (73 - 78)  BP: 102/70 (02-18-25 @ 05:02) (102/70 - 115/75)  RR: 18 (02-18-25 @ 05:02) (18 - 18)  SpO2: --  Wt(kg): --Vital Signs Last 24 Hrs  T(C): 36.5 (18 Feb 2025 05:02), Max: 36.7 (17 Feb 2025 20:05)  T(F): 97.7 (18 Feb 2025 05:02), Max: 98 (17 Feb 2025 20:05)  HR: 78 (18 Feb 2025 05:02) (73 - 78)  BP: 102/70 (18 Feb 2025 05:02) (102/70 - 115/75)  BP(mean): --  RR: 18 (18 Feb 2025 05:02) (18 - 18)  SpO2: --          02-17-25 @ 07:01  -  02-18-25 @ 07:00  --------------------------------------------------------  IN: 1285 mL / OUT: 0 mL / NET: 1285 mL    02-18-25 @ 07:01  -  02-18-25 @ 11:51  --------------------------------------------------------  IN: 358 mL / OUT: 0 mL / NET: 358 mL        PHYSICAL EXAM:  GENERAL: morbidly obese F, NAD, non toxic appearing  NECK: large neck circumference  PSYCH: no agitation, baseline mentation  NERVOUS SYSTEM:  Alert & Oriented X3   PULMONARY: symmetrical chest rise, no accessory muscle use  GI: protuberant, deferred palpation  EXTREMITIES:  No clubbing, cyanosis, or edema  SKIN: No rashes or lesions    Consultant(s) Notes Reviewed:  [x ] YES  [ ] NO    Discussed with Consultants/Other Providers [ x] YES     LABS                          13.0   7.44  )-----------( 225      ( 18 Feb 2025 05:37 )             41.4     02-18    134[L]  |  101  |  12  ----------------------------<  94  4.0   |  19  |  1.1    Ca    8.8      18 Feb 2025 05:37  Phos  3.9     02-18  Mg     2.0     02-18    TPro  6.0  /  Alb  3.4[L]  /  TBili  0.8  /  DBili  x   /  AST  53[H]  /  ALT  20  /  AlkPhos  171[H]  02-18      Urinalysis Basic - ( 18 Feb 2025 05:37 )    Color: x / Appearance: x / SG: x / pH: x  Gluc: 94 mg/dL / Ketone: x  / Bili: x / Urobili: x   Blood: x / Protein: x / Nitrite: x   Leuk Esterase: x / RBC: x / WBC x   Sq Epi: x / Non Sq Epi: x / Bacteria: x      PT/INR - ( 17 Feb 2025 04:30 )   PT: 11.30 sec;   INR: 0.96 ratio      POCT Blood Glucose.: 130 mg/dL (18 Feb 2025 07:33)    RADIOLOGY & ADDITIONAL TESTS:  - no images 2/18  Imaging Personally Reviewed:  [ ] YES  [ ] NO    HEALTH ISSUES - PROBLEM Dx:    MEDICATIONS  (STANDING):  acetaminophen     Tablet .. 975 milliGRAM(s) Oral every 8 hours  chlorhexidine 2% Cloths 1 Application(s) Topical <User Schedule>  cyanocobalamin 1000 MICROGram(s) Oral daily  cyclobenzaprine 5 milliGRAM(s) Oral three times a day  folic acid 1 milliGRAM(s) Oral daily  heparin   Injectable 5000 Unit(s) SubCutaneous every 12 hours  influenza   Vaccine 0.5 milliLiter(s) IntraMuscular once  lamoTRIgine 150 milliGRAM(s) Oral daily  levothyroxine 100 MICROGram(s) Oral daily  melatonin 5 milliGRAM(s) Oral at bedtime  methadone    Tablet 30 milliGRAM(s) Oral every 8 hours  pantoprazole    Tablet 40 milliGRAM(s) Oral before breakfast  polyethylene glycol 3350 17 Gram(s) Oral daily  senna 2 Tablet(s) Oral at bedtime  sertraline 200 milliGRAM(s) Oral daily    MEDICATIONS  (PRN):  albuterol    90 MICROgram(s) HFA Inhaler 2 Puff(s) Inhalation every 6 hours PRN Shortness of Breath and/or Wheezing  albuterol/ipratropium for Nebulization 3 milliLiter(s) Nebulizer every 6 hours PRN Shortness of Breath and/or Wheezing  fluticasone propionate/ salmeterol 250-50 MICROgram(s) Diskus 1 Dose(s) Inhalation two times a day PRN for bronchospasm  lactulose Syrup 10 Gram(s) Oral daily PRN for constipation

## 2025-02-18 NOTE — PROGRESS NOTE ADULT - ASSESSMENT
64-year-old female with a history of Alcohol use, asthma, hypothyroidism, anxiety, depression, MVP, Jaclyn-en-Y gastric bypass (2006 at [place]), appendectomy, cholecystectomy, COPD on home O2, hx of HFpEF suspected pulm shunt, hepatic steatosis, morbid obesity chronic back pain (on methadone) is presenting for decreased PO intake x3-4 weeks, diffuse abdominal pain, and weakness. Patient was admitted on 2/11/25. Admitted to medicine. CTAP non revealing. Patient reports that she lost approximately 50 lbs in 1.5months. Denies any recent NSAID use. She reports feeling full and loss of appetite. Currently on calorie count. She has decreased PO intake and only eats 1-2 bites/sips of food or drink before she feels full. She was found to be significantly hypokalemic on admission and is admitted for further management. after a syncopal episode. GI consulted for further evaluation.       #Chronic abdominal pain with N/V with dietary non compliance  #hx of Rou-en-Y bypass 2006, obesity  #Hepatic Steatosis with elevated liver enzymes, AUD  - CTAP IC: Hepatic steatosis. Hepatomegaly, 25.5 cm in length. Postcholecystectomy.  - seen by GI in 11/24 , suspicious for anterior cutaneous nerve entrapment  - EGD 11/24: normal Esophagus, non erosive gastritis, rou-en-Y surgery  - EGD 5.23: irregular Zline (bx normal), non erosive gastritis (HP-), rou-en-Y  - s/p Colonoscopy 5.23: goof prep: melanosis, random colon biopsy: normal  - Family hx of colon cancer  - on home O2, currently on room air, hx of HFpEF with pulm shunt was on bumex, hypokalemic with EKG changes  - Patient tolerating diet, refusing bariatric diet, but complaints of abdominal pain    RECS  - low yield w/ repeat EGD as pt had a recent endoscopy in 11/2024 and it will likely be unchanged  - Advised bariatric diet, reinforced bariatric diet compliance  - Monitor for abdominal pain  - Avoid NSAIDs  - Will recommend OP fibroscan;Check hepatitis panel (HAV IgG, HBcAb total, HBsAb, HBsAg, HCV ab w/ reflex RNA), trend LFTs  - Recommend pain management   64-year-old female with a history of Alcohol use, asthma, hypothyroidism, anxiety, depression, MVP, Jaclyn-en-Y gastric bypass (2006 at [place]), appendectomy, cholecystectomy, COPD on home O2, hx of HFpEF suspected pulm shunt, hepatic steatosis, morbid obesity chronic back pain (on methadone) is presenting for decreased PO intake x3-4 weeks, diffuse abdominal pain, and weakness. Patient was admitted on 2/11/25. Admitted to medicine. CTAP non revealing. Patient reports that she lost approximately 50 lbs in 1.5months. Denies any recent NSAID use. She reports feeling full and loss of appetite. Currently on calorie count. She has decreased PO intake and only eats 1-2 bites/sips of food or drink before she feels full. She was found to be significantly hypokalemic on admission and is admitted for further management. after a syncopal episode. GI consulted for further evaluation.       #Chronic abdominal pain with N/V with dietary non compliance  #hx of Rou-en-Y bypass 2006, obesity  #Hepatic Steatosis with elevated liver enzymes, AUD  - CTAP IC: Hepatic steatosis. Hepatomegaly, 25.5 cm in length. Postcholecystectomy.  - seen by GI in 11/24 , suspicious for anterior cutaneous nerve entrapment  - EGD 11/24: normal Esophagus, non erosive gastritis, rou-en-Y surgery  - EGD 5.23: irregular Zline (bx normal), non erosive gastritis (HP-), rou-en-Y  - s/p Colonoscopy 5.23: goof prep: melanosis, random colon biopsy: normal  - Family hx of colon cancer  - on home O2, currently on room air, hx of HFpEF with pulm shunt was on bumex, hypokalemic with EKG changes  - Patient tolerating diet, refusing bariatric diet, but complaints of abdominal pain    RECS  - low yield w/ repeat EGD as pt had a recent endoscopy in 11/2024 and it will likely be unchanged  - Advised bariatric diet, reinforced bariatric diet compliance  - continue with daily PPI but change to open capsule omeprazole 40 mg once daily  - Monitor for abdominal pain  - Avoid NSAIDs  - Will recommend OP fibroscan;Check hepatitis panel (HAV IgG, HBcAb total, HBsAb, HBsAg, HCV ab w/ reflex RNA), trend LFTs  - Recommend pain management  - Follow up with our [facility] located at [address] [place] [address]. Phone Number: [phone]  64-year-old female with a history of Alcohol use, asthma, hypothyroidism, anxiety, depression, MVP, Jaclyn-en-Y gastric bypass (2006 at [place]), appendectomy, cholecystectomy, COPD on home O2, hx of HFpEF suspected pulm shunt, hepatic steatosis, morbid obesity chronic back pain (on methadone) is presenting for decreased PO intake x3-4 weeks, diffuse abdominal pain, and weakness. Patient was admitted on 2/11/25. Admitted to medicine. CTAP non revealing. Patient reports that she lost approximately 50 lbs in 1.5months. Denies any recent NSAID use. She reports feeling full and loss of appetite. Currently on calorie count. She has decreased PO intake and only eats 1-2 bites/sips of food or drink before she feels full. She was found to be significantly hypokalemic on admission and is admitted for further management. after a syncopal episode. GI consulted for further evaluation.       #Chronic abdominal pain with N/V with dietary non compliance  #hx of Rou-en-Y bypass 2006, obesity  #Hepatic Steatosis with elevated liver enzymes, AUD  - CTAP IC: Hepatic steatosis. Hepatomegaly, 25.5 cm in length. Postcholecystectomy.  - seen by GI in 11/24 , suspicious for anterior cutaneous nerve entrapment  - EGD 11/24: normal Esophagus, non erosive gastritis, rou-en-Y surgery  - EGD 5.23: irregular Zline (bx normal), non erosive gastritis (HP-), rou-en-Y  - s/p Colonoscopy 5.23: goof prep: melanosis, random colon biopsy: normal  - Family hx of colon cancer  - on home O2, currently on room air, hx of HFpEF with pulm shunt was on bumex, hypokalemic with EKG changes  - Patient tolerating diet, refusing bariatric diet, but complaints of abdominal pain    RECS  - low yield w/ repeat EGD as pt had a recent endoscopy in 11/2024 and it will likely be unchanged  - Advised bariatric diet, reinforced bariatric diet compliance  - continue with daily PPI but change to open capsule omeprazole 40 mg once daily  - trial of dicyclomine 10 mg q8h PRN  - Monitor for abdominal pain  - Avoid NSAIDs  - Will recommend OP fibroscan;Check hepatitis panel (HAV IgG, HBcAb total, HBsAb, HBsAg, HCV ab w/ reflex RNA), trend LFTs  - Recommend pain management eval  - Follow up with our [facility] located at [address] [place] [address]. Phone Number: [phone]

## 2025-02-18 NOTE — PROGRESS NOTE ADULT - ASSESSMENT
64-year-old female with a history of asthma, hypothyroidism, anxiety, depression, MVP, Jaclyn-en-Y gastric bypass, appendectomy, cholecystectomy, and chronic back pain (on methadone) is presenting for decreased PO intake x3-4 weeks, diffuse abdominal pain, and weakness.    #Severe Hypokalemia and Moderate Hyponatremia in setting of Decreased PO Intake and Bumex Use  #Prolonged QTC in setting of Hypokalemia and Multiple QTC-Prolonging Meds  #CEM on CKD   - K 2.1, Na 126  - CTA abdomen 11/13: No acute abdominal or pelvic pathology. Atherosclerotic calcifications of the aorta and its branches. No evidence of celiac axis or SMA abnormality.  - CTAP this admission negative for any acute pathology  - No infectious etiology suspected  - Keep on Tele  - Keep Mg >2, replete K to >4  - Nephro eval  - PT eval  - repeat daily ECG and monitor QTc  - Holding bumex   - restart home methadone does (30 TID)      #Poor PO intake  #Pt reports 50lb weight loss in last 2 months  #Diffuse Abdominal Pain in setting of Hx of Jaclyn-en-Y Bypass  - Endorses lack of appetite and early satiety x3-4 weeks  - EGD/ Colonoscopy 5/2023: Non erosive gastritis  - CTA abdomen 11/13: No acute abdominal or pelvic pathology. Atherosclerotic calcifications of the aorta and its branches. No evidence of celiac axis or SMA abnormality.  -Bariatric surgery consult appreciated  -Pt had gastric bypass 2006  - Dietician eval  -GI consult per bariactric rec  -vit b1, zinic, Vit D levels ordered  -bariactric diet    #DM  - A1c last admission 7  - Had episodes of hypoglycemia  - If glucose consistently >150 can start ISS    #Hx of COPD on Home O2 PRN not in Exacerbation  - TTE 2024 --> 60-65% EF  - TTE with bubbles 11/19: suspected pulmonary shunt.   - C/w home inhalers  - Hold home Bumex for now    #Hypothyroidism  - C/w home levothyroxine    #Anxiety  #Depression  - Patient on Lamotrigine, Zoloft, and Xanax  - Holding medication overnight due to severe hypokalemia w/ prolonged QTC  - Trend lytes and EKG and re-introduce tomorrow     #Chronic Back Pain w/ Hx of Laminectomy   - Takes Methadone 30mg q8h at home  - Repeat EKG and BMP, will give Methadone dose if QTC improving    #Alcohol Use Disorder  #Hepatic Steatosis  - Previously 2 scotchs per night or more  - No alcohol since November per pt  - RUQ US last admission shows fatty liver likely due to obesity/alcohol use  - C/w B12 and Folate supplement    #MISC  - Code Status: Full Code  - DVT ppx: Heparin subcutaneous  - GI ppx: Protonix  - Diet: DASH  - Activity: AAT  - Inpatient Dispo: Telemetry  - Discharge Dispo: From Home

## 2025-02-18 NOTE — PROGRESS NOTE ADULT - ATTENDING COMMENTS
I edited the note.  Time-based billing (NON-critical care).   35 minutes spent on total encounter; more than 50% of the visit was spent counseling and / or coordinating care by the attending physician.  The necessity of the time spent during the encounter on this date of service was due to: Coordination of care.

## 2025-02-18 NOTE — PROGRESS NOTE ADULT - SUBJECTIVE AND OBJECTIVE BOX
Gastroenterology progress note:     Patient is a 64y old  Female who presents with a chief complaint of Hypokalemia (17 Feb 2025 13:17)       Admitted on: 02-11-25    We are following the patient for: nausea, vomiting       Interval History: Patient is tolerating diet but complaints of non specific abdominal pain.    No acute events overnight.     PAST MEDICAL & SURGICAL HISTORY:  Asthma  LAST ATTACK MANY YRS AGO      Gastroesophageal reflux disease without esophagitis      Hypothyroidism      Heart murmur      Anxiety      Depression      Mood disorder      Psoriasis      Constipation      Back pain  LOW      Morbid obesity      S/P tonsillectomy  1967      History of appendectomy  1974      Abscess of back  EVACUATION OF ABSCESS @ L5-S1 1997      History of Jaclyn-en-Y gastric bypass  WITH CHOLECYSTECTOMY 2006          MEDICATIONS  (STANDING):  acetaminophen     Tablet .. 975 milliGRAM(s) Oral every 8 hours  chlorhexidine 2% Cloths 1 Application(s) Topical <User Schedule>  cyanocobalamin 1000 MICROGram(s) Oral daily  cyclobenzaprine 5 milliGRAM(s) Oral three times a day  folic acid 1 milliGRAM(s) Oral daily  heparin   Injectable 5000 Unit(s) SubCutaneous every 12 hours  influenza   Vaccine 0.5 milliLiter(s) IntraMuscular once  lamoTRIgine 150 milliGRAM(s) Oral daily  levothyroxine 100 MICROGram(s) Oral daily  melatonin 5 milliGRAM(s) Oral at bedtime  methadone    Tablet 30 milliGRAM(s) Oral every 8 hours  pantoprazole    Tablet 40 milliGRAM(s) Oral before breakfast  polyethylene glycol 3350 17 Gram(s) Oral daily  senna 2 Tablet(s) Oral at bedtime  sertraline 200 milliGRAM(s) Oral daily    MEDICATIONS  (PRN):  albuterol    90 MICROgram(s) HFA Inhaler 2 Puff(s) Inhalation every 6 hours PRN Shortness of Breath and/or Wheezing  albuterol/ipratropium for Nebulization 3 milliLiter(s) Nebulizer every 6 hours PRN Shortness of Breath and/or Wheezing  fluticasone propionate/ salmeterol 250-50 MICROgram(s) Diskus 1 Dose(s) Inhalation two times a day PRN for bronchospasm  lactulose Syrup 10 Gram(s) Oral daily PRN for constipation      Allergies  penicillin (Anaphylaxis)      Review of Systems:   Cardiovascular:  No Chest Pain, No Palpitations  Respiratory:  No Cough, No Dyspnea  Gastrointestinal:  As described in HPI  Skin:  No Skin Lesions, No Jaundice  Neuro:  No Syncope, No Dizziness    Physical Examination:  T(C): 36.5 (02-18-25 @ 05:02), Max: 36.7 (02-17-25 @ 20:05)  HR: 78 (02-18-25 @ 05:02) (73 - 78)  BP: 102/70 (02-18-25 @ 05:02) (102/70 - 115/75)  RR: 18 (02-18-25 @ 05:02) (18 - 18)    02-17-25 @ 07:01  -  02-18-25 @ 07:00  --------------------------------------------------------  IN: 1285 mL / OUT: 0 mL / NET: 1285 mL    02-18-25 @ 07:01  -  02-18-25 @ 11:55  --------------------------------------------------------  IN: 358 mL / OUT: 0 mL / NET: 358 mL        GENERAL: AAOx3, no acute distress.  HEAD:  Atraumatic, Normocephalic  EYES: conjunctiva and sclera clear  NECK: Supple, no JVD or thyromegaly  CHEST/LUNG: Clear to auscultation bilaterally  HEART: Regular rate and rhythm; normal S1, S2  ABDOMEN: Soft, nontender, nondistended; Bowel sounds present  NEUROLOGY: No asterixis or tremor.   SKIN:  no jaundice     Data:                        13.0   7.44  )-----------( 225      ( 18 Feb 2025 05:37 )             41.4     Hgb trend:  13.0  02-18-25 @ 05:37  13.6  02-17-25 @ 04:30        02-18    134[L]  |  101  |  12  ----------------------------<  94  4.0   |  19  |  1.1    Ca    8.8      18 Feb 2025 05:37  Phos  3.9     02-18  Mg     2.0     02-18    TPro  6.0  /  Alb  3.4[L]  /  TBili  0.8  /  DBili  x   /  AST  53[H]  /  ALT  20  /  AlkPhos  171[H]  02-18    Liver panel trend:  TBili 0.8   /   AST 53   /   ALT 20   /   AlkP 171   /   Tptn 6.0   /   Alb 3.4    /   DBili --      02-18  TBili 0.9   /   AST 51   /   ALT 19   /   AlkP 173   /   Tptn 5.9   /   Alb 3.3    /   DBili --      02-17  TBili 1.0   /   AST 52   /   ALT 20   /   AlkP 159   /   Tptn 6.0   /   Alb 3.2    /   DBili --      02-16  TBili 0.8   /   AST 44   /   ALT 15   /   AlkP 161   /   Tptn 5.5   /   Alb 3.2    /   DBili --      02-15  TBili 0.9   /   AST 39   /   ALT 14   /   AlkP 154   /   Tptn 5.3   /   Alb 3.1    /   DBili --      02-14  TBili 1.1   /   AST 42   /   ALT 15   /   AlkP 156   /   Tptn 5.7   /   Alb 3.1    /   DBili --      02-13  TBili 1.7   /   AST 36   /   ALT 12   /   AlkP 157   /   Tptn 5.2   /   Alb 2.9    /   DBili --      02-12  TBili 2.2   /   AST 49   /   ALT 17   /   AlkP 214   /   Tptn 7.0   /   Alb 3.8    /   DBili --      02-11      PT/INR - ( 17 Feb 2025 04:30 )   PT: 11.30 sec;   INR: 0.96 ratio

## 2025-02-18 NOTE — PROGRESS NOTE ADULT - ASSESSMENT
64-year-old female with a history of asthma, hypothyroidism, anxiety, depression, MVP, Jaclyn-en-Y gastric bypass, appendectomy, cholecystectomy, and chronic back pain (on methadone) is presenting for decreased PO intake x3-4 weeks, diffuse abdominal pain, and weakness.    # Hypokalemia , resolved  # Hyponatremia , improved  #Prolonged QTC in setting of Hypokalemia and Multiple QTC-Prolonging Meds  - likely 2/2 to Decreased PO Intake and Bumex Use  - K 2.1, Na 126 on admission  - now K 3.7, Na 130s  - CTA abdomen 11/13: No acute abdominal or pelvic pathology. Atherosclerotic calcifications of the aorta and its branches. No evidence of celiac axis or SMA abnormality.  - CTAP this admission negative for any acute pathology  - No infectious etiology suspected  - Keep on Tele  - Keep Mg >2, replete K to >4  - Nephro eval  - PT eval  - repeat daily ECG and monitor QTc  - Holding bumex   - restart home methadone does (30 TID)    #Poor PO intake  #Pt reports 50lb weight loss in last 2 months  #Diffuse Abdominal Pain in setting of Hx of Jaclyn-en-Y Bypass  - Endorses lack of appetite and early satiety x3-4 weeks  - EGD/ Colonoscopy 5/2023: Non erosive gastritis  - CTA abdomen 11/13: No acute abdominal or pelvic pathology. Atherosclerotic calcifications of the aorta and its branches. No evidence of celiac axis or SMA abnormality.  -Bariatric surgery consult appreciated  -Pt had gastric bypass 2006  - Dietician eval  -GI consult per bariatric rec      patient on calorie count starting today 2/17      Will recommend bariatric diet, if/ no improvement will consider EGD  -vit b1, zinic, Vit D levels ordered    #CEM on CKD, resolved  - likely pre-renal in setting of poor po intake  - trend on serial BMP    #DM  - A1c last admission 7  - Had episodes of hypoglycemia  - If glucose consistently >150 can start ISS    #Hx of COPD on Home O2 PRN not in Exacerbation  - TTE 2024 --> 60-65% EF  - TTE with bubbles 11/19: suspected pulmonary shunt.   - C/w home inhalers  - Hold home Bumex for now    #Hypothyroidism  - C/w home levothyroxine    #Anxiety  #Depression  - Patient on Lamotrigine, Zoloft, and Xanax  - Holding medication overnight due to severe hypokalemia w/ prolonged QTC  - Trend lytes and EKG and re-introduce tomorrow     #Chronic Back Pain w/ Hx of Laminectomy   - Takes Methadone 30mg q8h at home  - Repeat EKG and BMP, will give Methadone dose if QTC improving    #Alcohol Use Disorder  #Hepatic Steatosis  - Previously 2 scotchs per night or more  - No alcohol since November per pt  - RUQ US last admission shows fatty liver likely due to obesity/alcohol use  - C/w B12 and Folate supplement    #MISC  - Code Status: Full Code  - DVT ppx: Heparin subcutaneous  - GI ppx: Protonix  - Diet: DASH  - Activity: AAT  - Inpatient Dispo: Telemetry  - Discharge Dispo: From Home    #Progress Note Handoff  Pending (specify):  calorie count +/- EGD eval from GI  Family discussion: updated   Disposition:  Unknown at this time________ .

## 2025-02-18 NOTE — PROGRESS NOTE ADULT - SUBJECTIVE AND OBJECTIVE BOX
SUBJECTIVE/OVERNIGHT EVENTS  Today is hospital day 7d. This morning patient was seen and examined at bedside, resting comfortably in bed. No acute or major events overnight.      CODE STATUS:      PMH:  Asthma    Gastroesophageal reflux disease without esophagitis    Hypothyroidism    Heart murmur    Anxiety    Depression    Mood disorder    Psoriasis    Constipation    Back pain    Morbid obesity          PSH:  S/P tonsillectomy    History of appendectomy    Abscess of back    History of Jaclyn-en-Y gastric bypass          MEDICATIONS  STANDING MEDICATIONS  acetaminophen     Tablet .. 975 milliGRAM(s) Oral every 8 hours  chlorhexidine 2% Cloths 1 Application(s) Topical <User Schedule>  cyanocobalamin 1000 MICROGram(s) Oral daily  cyclobenzaprine 5 milliGRAM(s) Oral three times a day  folic acid 1 milliGRAM(s) Oral daily  heparin   Injectable 5000 Unit(s) SubCutaneous every 12 hours  influenza   Vaccine 0.5 milliLiter(s) IntraMuscular once  lamoTRIgine 150 milliGRAM(s) Oral daily  levothyroxine 100 MICROGram(s) Oral daily  melatonin 5 milliGRAM(s) Oral at bedtime  methadone    Tablet 30 milliGRAM(s) Oral every 8 hours  pantoprazole    Tablet 40 milliGRAM(s) Oral before breakfast  polyethylene glycol 3350 17 Gram(s) Oral daily  senna 2 Tablet(s) Oral at bedtime  sertraline 200 milliGRAM(s) Oral daily    PRN MEDICATIONS  albuterol    90 MICROgram(s) HFA Inhaler 2 Puff(s) Inhalation every 6 hours PRN  albuterol/ipratropium for Nebulization 3 milliLiter(s) Nebulizer every 6 hours PRN  fluticasone propionate/ salmeterol 250-50 MICROgram(s) Diskus 1 Dose(s) Inhalation two times a day PRN  lactulose Syrup 10 Gram(s) Oral daily PRN    VITALS  T(F): 97.7 (02-18-25 @ 05:02), Max: 98 (02-17-25 @ 20:05)  HR: 78 (02-18-25 @ 05:02) (73 - 78)  BP: 102/70 (02-18-25 @ 05:02) (102/70 - 115/75)  RR: 18 (02-18-25 @ 05:02) (18 - 18)  SpO2: --  POCT Blood Glucose.: 102 mg/dL (02-18-25 @ 11:53)  POCT Blood Glucose.: 130 mg/dL (02-18-25 @ 07:33)  POCT Blood Glucose.: 93 mg/dL (02-17-25 @ 21:04)  POCT Blood Glucose.: 109 mg/dL (02-17-25 @ 16:15)    PHYSICAL EXAM  GENERAL  ( x ) NAD, lying in bed comfortably     (  ) obtunded     (  ) lethargic     (  ) somnolent    HEAD  ( x ) Atraumatic     (  ) hematoma     (  ) laceration (specify location:       )     NECK  ( x ) Supple     (  ) neck stiffness     (  ) nuchal rigidity     (  )  no JVD     (  ) JVD present ( -- cm)    HEART  Rate -->  ( x ) normal rate    (  ) bradycardic    (  ) tachycardic  Rhythm -->  ( x ) regular    (  ) regularly irregular    (  ) irregularly irregular  Murmurs -->  ( x ) normal s1/s2    (  ) systolic murmur    (  ) diastolic murmur    (  ) continuous murmur     (  ) S3 present    (  ) S4 present    LUNGS  ( x )Unlabored respirations     (  ) tachypnea  ( x ) B/L air entry     (  ) decreased breath sounds in:  (location     )    ( x ) no adventitious sound     (  ) crackles     (  ) wheezing      (  ) rhonchi      (specify location:       )  (  ) chest wall tenderness (specify location:       )    ABDOMEN  ( x ) Soft     (  ) tense   |   ( x ) nondistended     (  ) distended   |   (  ) +BS     (  ) hypoactive bowel sounds     (  ) hyperactive bowel sounds  ( x ) nontender     (  ) RUQ tenderness     (  ) RLQ tenderness     (  ) LLQ tenderness     (  ) epigastric tenderness     (  ) diffuse tenderness  (  ) Splenomegaly      (  ) Hepatomegaly      (  ) Jaundice     (  ) ecchymosis     EXTREMITIES  (  ) Normal     (  ) Rash     (  ) ecchymosis     (  ) varicose veins      ( BL ) pitting edema     (  ) non-pitting edema   (  ) ulceration     (  ) gangrene:     (location:     )    NERVOUS SYSTEM  ( x ) A&Ox3     (  ) confused     (  ) lethargic  CN II-XII:     (  ) Intact     (  ) focal deficits  (Specify:     )   Upper extremities:     (  ) strength X/5     (  ) focal deficit (specify:    )  Lower extremities:     (  ) strength  X/5    (  ) focal deficit (specify:    )      LABS             13.0   7.44  )-----------( 225      ( 02-18-25 @ 05:37 )             41.4     134  |  101  |  12  -------------------------<  94   02-18-25 @ 05:37  4.0  |  19  |  1.1    Ca      8.8     02-18-25 @ 05:37  Phos   3.9     02-18-25 @ 05:37  Mg     2.0     02-18-25 @ 05:37    TPro  6.0  /  Alb  3.4  /  TBili  0.8  /  DBili  x   /  AST  53  /  ALT  20  /  AlkPhos  171  /  GGT  x     02-18-25 @ 05:37    PT/INR - ( 02-17-25 @ 04:30 )   PT: 11.30 sec;   INR: 0.96 ratio      Urinalysis Basic - ( 18 Feb 2025 05:37 )    Color: x / Appearance: x / SG: x / pH: x  Gluc: 94 mg/dL / Ketone: x  / Bili: x / Urobili: x   Blood: x / Protein: x / Nitrite: x   Leuk Esterase: x / RBC: x / WBC x   Sq Epi: x / Non Sq Epi: x / Bacteria: x          IMAGING

## 2025-02-19 LAB
ALBUMIN SERPL ELPH-MCNC: 3.4 G/DL — LOW (ref 3.5–5.2)
ALP SERPL-CCNC: 163 U/L — HIGH (ref 30–115)
ALT FLD-CCNC: 22 U/L — SIGNIFICANT CHANGE UP (ref 0–41)
ANION GAP SERPL CALC-SCNC: 15 MMOL/L — HIGH (ref 7–14)
AST SERPL-CCNC: 57 U/L — HIGH (ref 0–41)
BASOPHILS # BLD AUTO: 0.04 K/UL — SIGNIFICANT CHANGE UP (ref 0–0.2)
BASOPHILS NFR BLD AUTO: 0.6 % — SIGNIFICANT CHANGE UP (ref 0–1)
BILIRUB SERPL-MCNC: 0.8 MG/DL — SIGNIFICANT CHANGE UP (ref 0.2–1.2)
BUN SERPL-MCNC: 11 MG/DL — SIGNIFICANT CHANGE UP (ref 10–20)
CALCIUM SERPL-MCNC: 8.8 MG/DL — SIGNIFICANT CHANGE UP (ref 8.4–10.5)
CHLORIDE SERPL-SCNC: 100 MMOL/L — SIGNIFICANT CHANGE UP (ref 98–110)
CO2 SERPL-SCNC: 20 MMOL/L — SIGNIFICANT CHANGE UP (ref 17–32)
CREAT SERPL-MCNC: 1 MG/DL — SIGNIFICANT CHANGE UP (ref 0.7–1.5)
EGFR: 64 ML/MIN/1.73M2 — SIGNIFICANT CHANGE UP
EGFR: 64 ML/MIN/1.73M2 — SIGNIFICANT CHANGE UP
EOSINOPHIL # BLD AUTO: 0.16 K/UL — SIGNIFICANT CHANGE UP (ref 0–0.7)
EOSINOPHIL NFR BLD AUTO: 2.3 % — SIGNIFICANT CHANGE UP (ref 0–8)
GLUCOSE BLDC GLUCOMTR-MCNC: 116 MG/DL — HIGH (ref 70–99)
GLUCOSE BLDC GLUCOMTR-MCNC: 77 MG/DL — SIGNIFICANT CHANGE UP (ref 70–99)
GLUCOSE BLDC GLUCOMTR-MCNC: 88 MG/DL — SIGNIFICANT CHANGE UP (ref 70–99)
GLUCOSE BLDC GLUCOMTR-MCNC: 90 MG/DL — SIGNIFICANT CHANGE UP (ref 70–99)
GLUCOSE SERPL-MCNC: 69 MG/DL — LOW (ref 70–99)
HAV IGM SER-ACNC: SIGNIFICANT CHANGE UP
HBV CORE IGM SER-ACNC: SIGNIFICANT CHANGE UP
HBV SURFACE AG SER-ACNC: SIGNIFICANT CHANGE UP
HCT VFR BLD CALC: 41.4 % — SIGNIFICANT CHANGE UP (ref 37–47)
HCV AB S/CO SERPL IA: 0.13 S/CO — SIGNIFICANT CHANGE UP (ref 0–0.79)
HCV AB SERPL-IMP: SIGNIFICANT CHANGE UP
HGB BLD-MCNC: 12.6 G/DL — SIGNIFICANT CHANGE UP (ref 12–16)
IMM GRANULOCYTES NFR BLD AUTO: 1 % — HIGH (ref 0.1–0.3)
LYMPHOCYTES # BLD AUTO: 0.56 K/UL — LOW (ref 1.2–3.4)
LYMPHOCYTES # BLD AUTO: 8.1 % — LOW (ref 20.5–51.1)
MAGNESIUM SERPL-MCNC: 1.9 MG/DL — SIGNIFICANT CHANGE UP (ref 1.8–2.4)
MCHC RBC-ENTMCNC: 30.2 PG — SIGNIFICANT CHANGE UP (ref 27–31)
MCHC RBC-ENTMCNC: 30.4 G/DL — LOW (ref 32–37)
MCV RBC AUTO: 99.3 FL — HIGH (ref 81–99)
MONOCYTES # BLD AUTO: 0.44 K/UL — SIGNIFICANT CHANGE UP (ref 0.1–0.6)
MONOCYTES NFR BLD AUTO: 6.3 % — SIGNIFICANT CHANGE UP (ref 1.7–9.3)
NEUTROPHILS # BLD AUTO: 5.67 K/UL — SIGNIFICANT CHANGE UP (ref 1.4–6.5)
NEUTROPHILS NFR BLD AUTO: 81.7 % — HIGH (ref 42.2–75.2)
NRBC BLD AUTO-RTO: 0 /100 WBCS — SIGNIFICANT CHANGE UP (ref 0–0)
PHOSPHATE SERPL-MCNC: 3.8 MG/DL — SIGNIFICANT CHANGE UP (ref 2.1–4.9)
PLATELET # BLD AUTO: 215 K/UL — SIGNIFICANT CHANGE UP (ref 130–400)
PMV BLD: 10.5 FL — HIGH (ref 7.4–10.4)
POTASSIUM SERPL-MCNC: 4.4 MMOL/L — SIGNIFICANT CHANGE UP (ref 3.5–5)
POTASSIUM SERPL-SCNC: 4.4 MMOL/L — SIGNIFICANT CHANGE UP (ref 3.5–5)
PROT SERPL-MCNC: 6.1 G/DL — SIGNIFICANT CHANGE UP (ref 6–8)
RAPID RVP RESULT: SIGNIFICANT CHANGE UP
RBC # BLD: 4.17 M/UL — LOW (ref 4.2–5.4)
RBC # FLD: 15 % — HIGH (ref 11.5–14.5)
SARS-COV-2 RNA SPEC QL NAA+PROBE: SIGNIFICANT CHANGE UP
SODIUM SERPL-SCNC: 135 MMOL/L — SIGNIFICANT CHANGE UP (ref 135–146)
WBC # BLD: 6.94 K/UL — SIGNIFICANT CHANGE UP (ref 4.8–10.8)
WBC # FLD AUTO: 6.94 K/UL — SIGNIFICANT CHANGE UP (ref 4.8–10.8)

## 2025-02-19 PROCEDURE — 99232 SBSQ HOSP IP/OBS MODERATE 35: CPT

## 2025-02-19 RX ADMIN — Medication 1 LOZENGE: at 05:03

## 2025-02-19 RX ADMIN — FOLIC ACID 1 MILLIGRAM(S): 1 TABLET ORAL at 12:01

## 2025-02-19 RX ADMIN — Medication 975 MILLIGRAM(S): at 13:39

## 2025-02-19 RX ADMIN — Medication 30 MILLIGRAM(S): at 03:34

## 2025-02-19 RX ADMIN — Medication 975 MILLIGRAM(S): at 05:03

## 2025-02-19 RX ADMIN — Medication 100 MICROGRAM(S): at 05:11

## 2025-02-19 RX ADMIN — Medication 975 MILLIGRAM(S): at 22:29

## 2025-02-19 RX ADMIN — CYCLOBENZAPRINE HYDROCHLORIDE 5 MILLIGRAM(S): 15 CAPSULE, EXTENDED RELEASE ORAL at 05:03

## 2025-02-19 RX ADMIN — Medication 2 TABLET(S): at 22:28

## 2025-02-19 RX ADMIN — CYCLOBENZAPRINE HYDROCHLORIDE 5 MILLIGRAM(S): 15 CAPSULE, EXTENDED RELEASE ORAL at 22:29

## 2025-02-19 RX ADMIN — SERTRALINE 200 MILLIGRAM(S): 100 TABLET, FILM COATED ORAL at 12:00

## 2025-02-19 RX ADMIN — Medication 30 MILLIGRAM(S): at 12:00

## 2025-02-19 RX ADMIN — Medication 1 APPLICATION(S): at 05:11

## 2025-02-19 RX ADMIN — CYANOCOBALAMIN 1000 MICROGRAM(S): 1000 INJECTION INTRAMUSCULAR; SUBCUTANEOUS at 12:00

## 2025-02-19 RX ADMIN — POLYETHYLENE GLYCOL 3350 17 GRAM(S): 17 POWDER, FOR SOLUTION ORAL at 12:00

## 2025-02-19 RX ADMIN — Medication 30 MILLIGRAM(S): at 19:24

## 2025-02-19 RX ADMIN — LAMOTRIGINE 150 MILLIGRAM(S): 150 TABLET ORAL at 12:00

## 2025-02-19 RX ADMIN — Medication 40 MILLIGRAM(S): at 05:03

## 2025-02-19 RX ADMIN — Medication 5 MILLIGRAM(S): at 22:28

## 2025-02-19 RX ADMIN — CYCLOBENZAPRINE HYDROCHLORIDE 5 MILLIGRAM(S): 15 CAPSULE, EXTENDED RELEASE ORAL at 13:39

## 2025-02-19 NOTE — PROGRESS NOTE ADULT - ATTENDING COMMENTS
****My note supersedes any discrepancies that may be above in the resident's note***    64-year-old female with a history of asthma, hypothyroidism, anxiety, depression, MVP, Jaclyn-en-Y gastric bypass, appendectomy, cholecystectomy, and chronic back pain (on methadone) is presenting for decreased PO intake x3-4 weeks, diffuse abdominal pain, and weakness.    # Hypokalemia , resolved  # Hyponatremia , improved  #Prolonged QTC in setting of Hypokalemia and Multiple QTC-Prolonging Meds  - likely 2/2 to Decreased PO Intake and Bumex Use  - K 2.1, Na 126 on admission  - now K 3.7, Na 130s  - CTA abdomen 11/13: No acute abdominal or pelvic pathology. Atherosclerotic calcifications of the aorta and its branches. No evidence of celiac axis or SMA abnormality.  - CTAP this admission negative for any acute pathology  - No infectious etiology suspected  - Keep on Tele  - Keep Mg >2, replete K to >4  - Nephro eval  - PT eval  - repeat daily ECG and monitor QTc  - Holding bumex   - restart home methadone does (30 TID)    #Poor PO intake  #Pt reports 50lb weight loss in last 2 months  #Diffuse Abdominal Pain in setting of Hx of Jaclyn-en-Y Bypass  - Endorses lack of appetite and early satiety x3-4 weeks  - EGD/ Colonoscopy 5/2023: Non erosive gastritis  - CTA abdomen 11/13: No acute abdominal or pelvic pathology. Atherosclerotic calcifications of the aorta and its branches. No evidence of celiac axis or SMA abnormality.  -Bariatric surgery consult appreciated  -Pt had gastric bypass 2006  - Dietician eval  -GI consult per bariatric rec      patient on calorie count starting today 2/17      Will recommend bariatric diet, if/ no improvement will consider EGD  -vit b1, zinic, Vit D levels ordered    #CEM on CKD, resolved  - likely pre-renal in setting of poor po intake  - trend on serial BMP    #DM  - A1c last admission 7  - Had episodes of hypoglycemia  - If glucose consistently >150 can start ISS    #Hx of COPD on Home O2 PRN not in Exacerbation  - TTE 2024 --> 60-65% EF  - TTE with bubbles 11/19: suspected pulmonary shunt.   - C/w home inhalers  - Hold home Bumex for now    #Hypothyroidism  - C/w home levothyroxine    #Anxiety  #Depression  - Patient on Lamotrigine, Zoloft, and Xanax  - Holding medication overnight due to severe hypokalemia w/ prolonged QTC  - Trend lytes and EKG and re-introduce tomorrow     #Chronic Back Pain w/ Hx of Laminectomy   - Takes Methadone 30mg q8h at home  - Repeat EKG and BMP, will give Methadone dose if QTC improving    #Alcohol Use Disorder  #Hepatic Steatosis  - Previously 2 scotchs per night or more  - No alcohol since November per pt  - RUQ US last admission shows fatty liver likely due to obesity/alcohol use  - C/w B12 and Folate supplement    #MISC  - Code Status: Full Code  - DVT ppx: Heparin subcutaneous  - GI ppx: Protonix  - Diet: DASH  - Activity: AAT  - Inpatient Dispo: Telemetry  - Discharge Dispo: From Home    #Progress Note Handoff  Pending (specify):  calorie count +/- EGD eval from GI  Family discussion: updated   Disposition:  Unknown at this time________ .

## 2025-02-19 NOTE — PROGRESS NOTE ADULT - ASSESSMENT
64-year-old female with a history of asthma, hypothyroidism, anxiety, depression, MVP, Jaclyn-en-Y gastric bypass, appendectomy, cholecystectomy, and chronic back pain (on methadone) is presenting for decreased PO intake x3-4 weeks, diffuse abdominal pain, and weakness.    #Severe Hypokalemia and Moderate Hyponatremia in setting of Decreased PO Intake and Bumex Use  #Prolonged QTC in setting of Hypokalemia and Multiple QTC-Prolonging Meds  #CEM on CKD   - K 2.1, Na 126  - CTA abdomen 11/13: No acute abdominal or pelvic pathology. Atherosclerotic calcifications of the aorta and its branches. No evidence of celiac axis or SMA abnormality.  - CTAP this admission negative for any acute pathology  - No infectious etiology suspected  - Keep on Tele  - Keep Mg >2, replete K to >4  - Nephro eval  - PT eval  - repeat daily ECG and monitor QTc  - Holding bumex   - restart home methadone does (30 TID)      #Poor PO intake  #Pt reports 50lb weight loss in last 2 months  #Diffuse Abdominal Pain in setting of Hx of Jaclyn-en-Y Bypass  - Endorses lack of appetite and early satiety x3-4 weeks  - EGD/ Colonoscopy 5/2023: Non erosive gastritis  - CTA abdomen 11/13: No acute abdominal or pelvic pathology. Atherosclerotic calcifications of the aorta and its branches. No evidence of celiac axis or SMA abnormality.  -Bariatric surgery consult appreciated  -Pt had gastric bypass 2006  - Dietician eval  -GI consult per bariactric rec  -vit b1, zinic, Vit D levels ordered  -bariactric diet  - Per GI: low yield w/ repeat EGD as pt had a recent endoscopy in 11/2024 and it will likely be unchanged, continue with daily PPI but change to open capsule omeprazole 40 mg once daily, trial of dicyclomine 10 mg q8h PRN    #DM  - A1c last admission 7  - Had episodes of hypoglycemia  - If glucose consistently >150 can start ISS    #Hx of COPD on Home O2 PRN not in Exacerbation  - TTE 2024 --> 60-65% EF  - TTE with bubbles 11/19: suspected pulmonary shunt.   - C/w home inhalers  - Hold home Bumex for now    #Hypothyroidism  - C/w home levothyroxine    #Anxiety  #Depression  - Patient on Lamotrigine, Zoloft, and Xanax  - Holding medication overnight due to severe hypokalemia w/ prolonged QTC  - Trend lytes and EKG and re-introduce tomorrow     #Chronic Back Pain w/ Hx of Laminectomy   - Takes Methadone 30mg q8h at home  - Repeat EKG and BMP, will give Methadone dose if QTC improving    #Alcohol Use Disorder  #Hepatic Steatosis  - Previously 2 scotchs per night or more  - No alcohol since November per pt  - RUQ US last admission shows fatty liver likely due to obesity/alcohol use  - C/w B12 and Folate supplement    #MISC  - Code Status: Full Code  - DVT ppx: Heparin subcutaneous  - GI ppx: Protonix  - Diet: DASH  - Activity: AAT  - Inpatient Dispo: Telemetry  - Discharge Dispo: From Home

## 2025-02-19 NOTE — CHART NOTE - NSCHARTNOTEFT_GEN_A_CORE
Calorie Count Results:    Day 1: 450 kcal, 18 g protein    Day 2: 370 kcal, 15 g protein    Day 3: 470 kcal, 17 g protein    3-day average: 430 kcal, 17 g protein    Pt not meeting estimated nutrient needs at this time. RD assessment to follow.

## 2025-02-19 NOTE — PROGRESS NOTE ADULT - SUBJECTIVE AND OBJECTIVE BOX
SUBJECTIVE/OVERNIGHT EVENTS  Today is hospital day 8d. This morning patient was seen and examined at bedside, resting comfortably in bed. No acute or major events overnight.      CODE STATUS:      PMH:  Asthma    Gastroesophageal reflux disease without esophagitis    Hypothyroidism    Heart murmur    Anxiety    Depression    Mood disorder    Psoriasis    Constipation    Back pain    Morbid obesity          PSH:  S/P tonsillectomy    History of appendectomy    Abscess of back    History of Jaclyn-en-Y gastric bypass          MEDICATIONS  STANDING MEDICATIONS  acetaminophen     Tablet .. 975 milliGRAM(s) Oral every 8 hours  chlorhexidine 2% Cloths 1 Application(s) Topical <User Schedule>  cyanocobalamin 1000 MICROGram(s) Oral daily  cyclobenzaprine 5 milliGRAM(s) Oral three times a day  folic acid 1 milliGRAM(s) Oral daily  heparin   Injectable 5000 Unit(s) SubCutaneous every 12 hours  influenza   Vaccine 0.5 milliLiter(s) IntraMuscular once  lamoTRIgine 150 milliGRAM(s) Oral daily  levothyroxine 100 MICROGram(s) Oral daily  melatonin 5 milliGRAM(s) Oral at bedtime  methadone    Tablet 30 milliGRAM(s) Oral every 8 hours  pantoprazole    Tablet 40 milliGRAM(s) Oral before breakfast  polyethylene glycol 3350 17 Gram(s) Oral daily  senna 2 Tablet(s) Oral at bedtime  sertraline 200 milliGRAM(s) Oral daily    PRN MEDICATIONS  albuterol    90 MICROgram(s) HFA Inhaler 2 Puff(s) Inhalation every 6 hours PRN  albuterol/ipratropium for Nebulization 3 milliLiter(s) Nebulizer every 6 hours PRN  dicyclomine 10 milliGRAM(s) Oral three times a day before meals PRN  fluticasone propionate/ salmeterol 250-50 MICROgram(s) Diskus 1 Dose(s) Inhalation two times a day PRN  lactulose Syrup 10 Gram(s) Oral daily PRN    VITALS  T(F): 97.8 (02-19-25 @ 12:45), Max: 98 (02-18-25 @ 19:53)  HR: 69 (02-19-25 @ 12:45) (69 - 79)  BP: 120/78 (02-19-25 @ 12:45) (116/81 - 126/79)  RR: 18 (02-19-25 @ 12:45) (17 - 18)  SpO2: 95% (02-19-25 @ 12:45) (93% - 95%)  POCT Blood Glucose.: 116 mg/dL (02-19-25 @ 11:32)  POCT Blood Glucose.: 90 mg/dL (02-19-25 @ 07:38)  POCT Blood Glucose.: 96 mg/dL (02-18-25 @ 21:19)  POCT Blood Glucose.: 103 mg/dL (02-18-25 @ 16:18)    PHYSICAL EXAM  GENERAL  ( x ) NAD, lying in bed comfortably     (  ) obtunded     (  ) lethargic     (  ) somnolent    HEAD  ( x ) Atraumatic     (  ) hematoma     (  ) laceration (specify location:       )     NECK  ( x ) Supple     (  ) neck stiffness     (  ) nuchal rigidity     (  )  no JVD     (  ) JVD present ( -- cm)    HEART  Rate -->  ( x ) normal rate    (  ) bradycardic    (  ) tachycardic  Rhythm -->  ( x ) regular    (  ) regularly irregular    (  ) irregularly irregular  Murmurs -->  ( x ) normal s1/s2    (  ) systolic murmur    (  ) diastolic murmur    (  ) continuous murmur     (  ) S3 present    (  ) S4 present    LUNGS  ( x )Unlabored respirations     (  ) tachypnea  ( x ) B/L air entry     (  ) decreased breath sounds in:  (location     )    ( x ) no adventitious sound     (  ) crackles     (  ) wheezing      (  ) rhonchi      (specify location:       )  (  ) chest wall tenderness (specify location:       )    ABDOMEN  ( x ) Soft     (  ) tense   |   ( x ) nondistended     (  ) distended   |   (  ) +BS     (  ) hypoactive bowel sounds     (  ) hyperactive bowel sounds  ( x ) nontender     (  ) RUQ tenderness     (  ) RLQ tenderness     (  ) LLQ tenderness     (  ) epigastric tenderness     (  ) diffuse tenderness  (  ) Splenomegaly      (  ) Hepatomegaly      (  ) Jaundice     (  ) ecchymosis     EXTREMITIES  (  ) Normal     (  ) Rash     (  ) ecchymosis     (  ) varicose veins      ( B/L ) pitting edema     (  ) non-pitting edema   (  ) ulceration     (  ) gangrene:     (location:     )    NERVOUS SYSTEM  ( x ) A&Ox3     (  ) confused     (  ) lethargic  CN II-XII:     (  ) Intact     (  ) focal deficits  (Specify:     )   Upper extremities:     (  ) strength X/5     (  ) focal deficit (specify:    )  Lower extremities:     (  ) strength  X/5    (  ) focal deficit (specify:    )      LABS             12.6   6.94  )-----------( 215      ( 02-19-25 @ 06:40 )             41.4     135  |  100  |  11  -------------------------<  69   02-19-25 @ 06:40  4.4  |  20  |  1.0    Ca      8.8     02-19-25 @ 06:40  Phos   3.8     02-19-25 @ 06:40  Mg     1.9     02-19-25 @ 06:40    TPro  6.1  /  Alb  3.4  /  TBili  0.8  /  DBili  x   /  AST  57  /  ALT  22  /  AlkPhos  163  /  GGT  x     02-19-25 @ 06:40        Urinalysis Basic - ( 19 Feb 2025 06:40 )    Color: x / Appearance: x / SG: x / pH: x  Gluc: 69 mg/dL / Ketone: x  / Bili: x / Urobili: x   Blood: x / Protein: x / Nitrite: x   Leuk Esterase: x / RBC: x / WBC x   Sq Epi: x / Non Sq Epi: x / Bacteria: x

## 2025-02-20 LAB
ALBUMIN SERPL ELPH-MCNC: 3.3 G/DL — LOW (ref 3.5–5.2)
ALP SERPL-CCNC: 163 U/L — HIGH (ref 30–115)
ALT FLD-CCNC: 20 U/L — SIGNIFICANT CHANGE UP (ref 0–41)
ANION GAP SERPL CALC-SCNC: 10 MMOL/L — SIGNIFICANT CHANGE UP (ref 7–14)
AST SERPL-CCNC: 45 U/L — HIGH (ref 0–41)
BASOPHILS # BLD AUTO: 0.05 K/UL — SIGNIFICANT CHANGE UP (ref 0–0.2)
BASOPHILS NFR BLD AUTO: 1.1 % — HIGH (ref 0–1)
BILIRUB SERPL-MCNC: 0.8 MG/DL — SIGNIFICANT CHANGE UP (ref 0.2–1.2)
BUN SERPL-MCNC: 10 MG/DL — SIGNIFICANT CHANGE UP (ref 10–20)
CALCIUM SERPL-MCNC: 8.8 MG/DL — SIGNIFICANT CHANGE UP (ref 8.4–10.4)
CHLORIDE SERPL-SCNC: 103 MMOL/L — SIGNIFICANT CHANGE UP (ref 98–110)
CO2 SERPL-SCNC: 24 MMOL/L — SIGNIFICANT CHANGE UP (ref 17–32)
CREAT SERPL-MCNC: 1 MG/DL — SIGNIFICANT CHANGE UP (ref 0.7–1.5)
EGFR: 64 ML/MIN/1.73M2 — SIGNIFICANT CHANGE UP
EGFR: 64 ML/MIN/1.73M2 — SIGNIFICANT CHANGE UP
EOSINOPHIL # BLD AUTO: 0.2 K/UL — SIGNIFICANT CHANGE UP (ref 0–0.7)
EOSINOPHIL NFR BLD AUTO: 4.5 % — SIGNIFICANT CHANGE UP (ref 0–8)
GLUCOSE BLDC GLUCOMTR-MCNC: 71 MG/DL — SIGNIFICANT CHANGE UP (ref 70–99)
GLUCOSE BLDC GLUCOMTR-MCNC: 77 MG/DL — SIGNIFICANT CHANGE UP (ref 70–99)
GLUCOSE BLDC GLUCOMTR-MCNC: 77 MG/DL — SIGNIFICANT CHANGE UP (ref 70–99)
GLUCOSE BLDC GLUCOMTR-MCNC: 85 MG/DL — SIGNIFICANT CHANGE UP (ref 70–99)
GLUCOSE BLDC GLUCOMTR-MCNC: 89 MG/DL — SIGNIFICANT CHANGE UP (ref 70–99)
GLUCOSE SERPL-MCNC: 75 MG/DL — SIGNIFICANT CHANGE UP (ref 70–99)
HCT VFR BLD CALC: 38.2 % — SIGNIFICANT CHANGE UP (ref 37–47)
HGB BLD-MCNC: 12 G/DL — SIGNIFICANT CHANGE UP (ref 12–16)
IMM GRANULOCYTES NFR BLD AUTO: 1.8 % — HIGH (ref 0.1–0.3)
LYMPHOCYTES # BLD AUTO: 1.04 K/UL — LOW (ref 1.2–3.4)
LYMPHOCYTES # BLD AUTO: 23.5 % — SIGNIFICANT CHANGE UP (ref 20.5–51.1)
MAGNESIUM SERPL-MCNC: 1.9 MG/DL — SIGNIFICANT CHANGE UP (ref 1.8–2.4)
MCHC RBC-ENTMCNC: 30.6 PG — SIGNIFICANT CHANGE UP (ref 27–31)
MCHC RBC-ENTMCNC: 31.4 G/DL — LOW (ref 32–37)
MCV RBC AUTO: 97.4 FL — SIGNIFICANT CHANGE UP (ref 81–99)
MONOCYTES # BLD AUTO: 0.44 K/UL — SIGNIFICANT CHANGE UP (ref 0.1–0.6)
MONOCYTES NFR BLD AUTO: 10 % — HIGH (ref 1.7–9.3)
NEUTROPHILS # BLD AUTO: 2.61 K/UL — SIGNIFICANT CHANGE UP (ref 1.4–6.5)
NEUTROPHILS NFR BLD AUTO: 59.1 % — SIGNIFICANT CHANGE UP (ref 42.2–75.2)
NRBC BLD AUTO-RTO: 0 /100 WBCS — SIGNIFICANT CHANGE UP (ref 0–0)
PHOSPHATE SERPL-MCNC: 3.6 MG/DL — SIGNIFICANT CHANGE UP (ref 2.1–4.9)
PLATELET # BLD AUTO: 207 K/UL — SIGNIFICANT CHANGE UP (ref 130–400)
PMV BLD: 10.4 FL — SIGNIFICANT CHANGE UP (ref 7.4–10.4)
POTASSIUM SERPL-MCNC: 4.2 MMOL/L — SIGNIFICANT CHANGE UP (ref 3.5–5)
POTASSIUM SERPL-SCNC: 4.2 MMOL/L — SIGNIFICANT CHANGE UP (ref 3.5–5)
PROT SERPL-MCNC: 5.6 G/DL — LOW (ref 6–8)
RBC # BLD: 3.92 M/UL — LOW (ref 4.2–5.4)
RBC # FLD: 14.8 % — HIGH (ref 11.5–14.5)
SODIUM SERPL-SCNC: 137 MMOL/L — SIGNIFICANT CHANGE UP (ref 135–146)
VIT B1 SERPL-MCNC: 75.2 NMOL/L — SIGNIFICANT CHANGE UP (ref 66.5–200)
WBC # BLD: 4.42 K/UL — LOW (ref 4.8–10.8)
WBC # FLD AUTO: 4.42 K/UL — LOW (ref 4.8–10.8)
ZINC SERPL-MCNC: 66 UG/DL — SIGNIFICANT CHANGE UP (ref 44–115)

## 2025-02-20 PROCEDURE — 71045 X-RAY EXAM CHEST 1 VIEW: CPT | Mod: 26

## 2025-02-20 PROCEDURE — 99232 SBSQ HOSP IP/OBS MODERATE 35: CPT | Mod: GC

## 2025-02-20 RX ADMIN — HEPARIN SODIUM 5000 UNIT(S): 1000 INJECTION INTRAVENOUS; SUBCUTANEOUS at 17:25

## 2025-02-20 RX ADMIN — Medication 30 MILLIGRAM(S): at 11:18

## 2025-02-20 RX ADMIN — FOLIC ACID 1 MILLIGRAM(S): 1 TABLET ORAL at 11:19

## 2025-02-20 RX ADMIN — Medication 975 MILLIGRAM(S): at 13:25

## 2025-02-20 RX ADMIN — Medication 30 MILLIGRAM(S): at 19:38

## 2025-02-20 RX ADMIN — Medication 30 MILLIGRAM(S): at 05:44

## 2025-02-20 RX ADMIN — CYCLOBENZAPRINE HYDROCHLORIDE 5 MILLIGRAM(S): 15 CAPSULE, EXTENDED RELEASE ORAL at 13:25

## 2025-02-20 RX ADMIN — CYCLOBENZAPRINE HYDROCHLORIDE 5 MILLIGRAM(S): 15 CAPSULE, EXTENDED RELEASE ORAL at 05:44

## 2025-02-20 RX ADMIN — POLYETHYLENE GLYCOL 3350 17 GRAM(S): 17 POWDER, FOR SOLUTION ORAL at 11:19

## 2025-02-20 RX ADMIN — SERTRALINE 200 MILLIGRAM(S): 100 TABLET, FILM COATED ORAL at 11:19

## 2025-02-20 RX ADMIN — CYANOCOBALAMIN 1000 MICROGRAM(S): 1000 INJECTION INTRAMUSCULAR; SUBCUTANEOUS at 11:19

## 2025-02-20 RX ADMIN — Medication 5 MILLIGRAM(S): at 21:42

## 2025-02-20 RX ADMIN — Medication 975 MILLIGRAM(S): at 05:44

## 2025-02-20 RX ADMIN — Medication 2 TABLET(S): at 21:42

## 2025-02-20 RX ADMIN — Medication 40 MILLIGRAM(S): at 05:46

## 2025-02-20 RX ADMIN — Medication 1 APPLICATION(S): at 05:47

## 2025-02-20 RX ADMIN — Medication 100 MICROGRAM(S): at 05:44

## 2025-02-20 RX ADMIN — LAMOTRIGINE 150 MILLIGRAM(S): 150 TABLET ORAL at 11:18

## 2025-02-20 RX ADMIN — CYCLOBENZAPRINE HYDROCHLORIDE 5 MILLIGRAM(S): 15 CAPSULE, EXTENDED RELEASE ORAL at 21:42

## 2025-02-20 RX ADMIN — Medication 975 MILLIGRAM(S): at 21:43

## 2025-02-20 NOTE — PROGRESS NOTE ADULT - SUBJECTIVE AND OBJECTIVE BOX
SUBJECTIVE/OVERNIGHT EVENTS  Today is hospital day 9d. This morning patient was seen and examined at bedside, resting comfortably in bed. No acute or major events overnight.  Pt endorses that she will not eat a bariatric diet- understands why bariatric diet is important but says she does not like it.   Endorses continued abdominal pain and nausea, no vomiting.  She endorses urinating appropriately, no BM in 4-5 days.       MEDICATIONS  STANDING MEDICATIONS  acetaminophen     Tablet .. 975 milliGRAM(s) Oral every 8 hours  chlorhexidine 2% Cloths 1 Application(s) Topical <User Schedule>  cyanocobalamin 1000 MICROGram(s) Oral daily  cyclobenzaprine 5 milliGRAM(s) Oral three times a day  folic acid 1 milliGRAM(s) Oral daily  heparin   Injectable 5000 Unit(s) SubCutaneous every 12 hours  influenza   Vaccine 0.5 milliLiter(s) IntraMuscular once  lamoTRIgine 150 milliGRAM(s) Oral daily  levothyroxine 100 MICROGram(s) Oral daily  melatonin 5 milliGRAM(s) Oral at bedtime  methadone    Tablet 30 milliGRAM(s) Oral every 8 hours  pantoprazole    Tablet 40 milliGRAM(s) Oral before breakfast  polyethylene glycol 3350 17 Gram(s) Oral daily  senna 2 Tablet(s) Oral at bedtime  sertraline 200 milliGRAM(s) Oral daily    PRN MEDICATIONS  albuterol    90 MICROgram(s) HFA Inhaler 2 Puff(s) Inhalation every 6 hours PRN  albuterol/ipratropium for Nebulization 3 milliLiter(s) Nebulizer every 6 hours PRN  dicyclomine 10 milliGRAM(s) Oral three times a day before meals PRN  fluticasone propionate/ salmeterol 250-50 MICROgram(s) Diskus 1 Dose(s) Inhalation two times a day PRN  lactulose Syrup 10 Gram(s) Oral daily PRN    VITALS  T(F): 97.6 (02-19-25 @ 23:41), Max: 98.4 (02-19-25 @ 15:30)  HR: 77 (02-19-25 @ 23:41) (69 - 77)  BP: 111/60 (02-19-25 @ 23:41) (111/60 - 120/78)  RR: 18 (02-19-25 @ 23:41) (18 - 18)  SpO2: 97% (02-19-25 @ 23:41) (95% - 97%)  POCT Blood Glucose.: 77 mg/dL (02-20-25 @ 08:06)  POCT Blood Glucose.: 77 mg/dL (02-19-25 @ 20:52)  POCT Blood Glucose.: 88 mg/dL (02-19-25 @ 16:38)  POCT Blood Glucose.: 116 mg/dL (02-19-25 @ 11:32)    PHYSICAL EXAM  General: NAD, non-toxic appearing  HEENT: EOMi, no scleral icterus  CV: RRR, normal s1 and s2  Lungs: normal respiratory effort, CTAB  Abd: BS+. Soft, ttp on right Upper and lower quadrant, no rebound tenderness   Ext: b/l LE edema, warm, well perfused, erythema of b/l LE extremities worse on R   Psych: A+Ox3, appropriate affect  Neuro: grossly non-focal, moving all extremities spontaneously  Skin: no rashes or lesions     LABS             12.0   4.42  )-----------( 207      ( 02-20-25 @ 06:45 )             38.2     135  |  100  |  11  -------------------------<  69   02-19-25 @ 06:40  4.4  |  20  |  1.0    Ca      8.8     02-19-25 @ 06:40  Phos   3.8     02-19-25 @ 06:40  Mg     1.9     02-19-25 @ 06:40    TPro  6.1  /  Alb  3.4  /  TBili  0.8  /  DBili  x   /  AST  57  /  ALT  22  /  AlkPhos  163  /  GGT  x     02-19-25 @ 06:40        Urinalysis Basic - ( 19 Feb 2025 06:40 )    Color: x / Appearance: x / SG: x / pH: x  Gluc: 69 mg/dL / Ketone: x  / Bili: x / Urobili: x   Blood: x / Protein: x / Nitrite: x   Leuk Esterase: x / RBC: x / WBC x   Sq Epi: x / Non Sq Epi: x / Bacteria: x

## 2025-02-20 NOTE — PROGRESS NOTE ADULT - ATTENDING COMMENTS
64-year-old female with a history of asthma, hypothyroidism, anxiety, depression, MVP, Jaclyn-en-Y gastric bypass, appendectomy, cholecystectomy, and chronic back pain (on methadone) presented for decreased PO intake x3-4 weeks, diffuse abdominal pain, and weakness.    #Severe Hypokalemia and Moderate Hyponatremia in setting of Decreased PO Intake and Bumex Use- resolved  #Prolonged QTC in setting of Hypokalemia and Multiple QTC-Prolonging Meds- resolved  #CEM on CKD   - CTAP this admission negative for any acute pathology  monitor BMP    # decreased PO intake  # wt loss  # Diffuse Abdominal Pain in setting of Hx of Jaclyn-en-Y Bypass- 2006   - Endorses lack of appetite and early satiety x3-4 weeks  - EGD/ Colonoscopy 5/2023: Non erosive gastritis, Repeat 11/2024  - CTA abdomen 11/13: No acute abdominal or pelvic pathology. Atherosclerotic calcifications of the aorta and its branches. No evidence of celiac axis or SMA abnormality.  -Bariatric surgery evalauted   - Bariatric diet was recommended but patient is refusing to eat anything if she has bariatric diet - d/w patient and started on dash diet- for small frequent meals; low carb contenet; increase protein  - start on ensure supplement  - Vit D (wnl), zinc (66) levels ordered  - Per GI: low yield w/ repeat EGD as pt had a recent endoscopy in 11/2024 and it will likely be unchanged,   -continue with daily PPI but change to open capsule omeprazole 40 mg once daily, trial of dicyclomine 10 mg q8h PRN  - Acetaminophen PRN for pain  - hep panel negative     #DM  - A1c last admission 7 --> now a1c 5.6  - Had episodes of hypoglycemia  - If glucose consistently >150 can start ISS    #Hx of COPD on Home O2 PRN not in Exacerbation  # SOB with exertion- possibly due to pulmonary shunt  COPD stable  - TTE 11/2024 --> 60-65% ; suspected pulmonary shunt.   - C/w home inhalers  - Hold home Bumex for now  check CXR- no acute cardiopulm process  check BNP    #Hypothyroidism  - C/w home levothyroxine    #Anxiety  #Depression  - c/w home Lamotrigine, Zoloft- were initially held due to prolonged Qtc - now restarted  - At home also on Xanax    #Chronic Back Pain w/ Hx of Laminectomy   - c/w home Methadone 30mg q8h  - On carisoprodol at home ;  cyclobenzaprine here     #Alcohol Use Disorder  #Hepatic Steatosis  - Previously 2 scotches per night or more  - No alcohol since November per pt  - RUQ US last admission showed fatty liver likely due to obesity/alcohol use  - C/w B12 and Folate supplement    - Code Status: Full Code  - DVT ppx: Heparin subcutaneous      Pending: CXR, BNP, tolerance in diet, 64-year-old female with a history of asthma, hypothyroidism, anxiety, depression, MVP, Jaclyn-en-Y gastric bypass, appendectomy, cholecystectomy, and chronic back pain (on methadone) presented for decreased PO intake x3-4 weeks, diffuse abdominal pain, and weakness.      # decreased PO intake  # wt loss  # Diffuse Abdominal Pain in setting of Hx of Jaclyn-en-Y Bypass- 2006   - Endorses lack of appetite and early satiety x3-4 weeks  - EGD/ Colonoscopy 5/2023: Non erosive gastritis, Repeat 11/2024  - CTA abdomen 11/13: No acute abdominal or pelvic pathology. Atherosclerotic calcifications of the aorta and its branches. No evidence of celiac axis or SMA abnormality.  -Bariatric surgery evalauted   - Bariatric diet was recommended but patient is refusing to eat anything if she has bariatric diet - d/w patient and started on dash diet- for small frequent meals; low carb contenet; increase protein  - start on ensure supplement  - Vit D (wnl), zinc (66) levels ordered  - Per GI: low yield w/ repeat EGD as pt had a recent endoscopy in 11/2024 and it will likely be unchanged,   -continue with daily PPI but change to open capsule omeprazole 40 mg once daily, trial of dicyclomine 10 mg q8h PRN  - Acetaminophen PRN for pain  - hep panel negative     # dyspnea on exertion  # to r/o cardiac/pulm shunt;   # COPD- stable  COPD stable  - TTE 11/2024 --> 60-65% ; suspected cardiac/pulmonary shunt.   - C/w home inhalers  - Hold home Bumex for now  CXR- no acute cardiopulm process  check BNP  d/w cardiothoracic surgery; recommended cardiology eval prior to surgery consult.  patient follows up with Dr Negrete, consult cardiology    #Severe Hypokalemia and Moderate Hyponatremia in setting of Decreased PO Intake and Bumex Use- resolved  #Prolonged QTC in setting of Hypokalemia and Multiple QTC-Prolonging Meds- resolved  #CEM on CKD   - CTAP this admission negative for any acute pathology  monitor BMP    #DM  - A1c last admission 7 --> now a1c 5.6  - Had episodes of hypoglycemia  - If glucose consistently >150 can start ISS    #Hypothyroidism  - C/w home levothyroxine    #Anxiety  #Depression  - c/w home Lamotrigine, Zoloft- were initially held due to prolonged Qtc - now restarted  - At home also on Xanax    #Chronic Back Pain w/ Hx of Laminectomy   - c/w home Methadone 30mg q8h  - On carisoprodol at home ;  cyclobenzaprine here     #Alcohol Use Disorder  #Hepatic Steatosis  - Previously 2 scotches per night or more  - No alcohol since November per pt  - RUQ US last admission showed fatty liver likely due to obesity/alcohol use  - C/w B12 and Folate supplement    - Code Status: Full Code  - DVT ppx: Heparin subcutaneous      Pending: CXR, BNP, tolerance in diet,

## 2025-02-20 NOTE — PROGRESS NOTE ADULT - ASSESSMENT
64-year-old female with a history of asthma, hypothyroidism, anxiety, depression, MVP, Jaclyn-en-Y gastric bypass, appendectomy, cholecystectomy, and chronic back pain (on methadone) presented for decreased PO intake x3-4 weeks, diffuse abdominal pain, and weakness.    #Severe Hypokalemia and Moderate Hyponatremia in setting of Decreased PO Intake and Bumex Use- IMPROVED   #Prolonged QTC in setting of Hypokalemia and Multiple QTC-Prolonging Meds- IMPROVED   #CEM on CKD   - K 2.1, Na 126 on admission: now improved   - CTA abdomen 11/13: No acute abdominal or pelvic pathology. Atherosclerotic calcifications of the aorta and its branches. No evidence of celiac axis or SMA abnormality.  - CTAP this admission negative for any acute pathology  - No infectious etiology suspected  - Had been on tele   - Keep Mg >2, replete K to >4  - Nephro eval  - PT eval  - Qtc on admission: 630 --> 2/17: 465   - Holding bumex (At home on 1mg daily)  - restart home methadone does (30 TID)      #Poor PO intake  #Pt reports 50lb weight loss in last 2 months  #Diffuse Abdominal Pain in setting of Hx of Jaclyn-en-Y Bypass- 2006   - Endorses lack of appetite and early satiety x3-4 weeks  - EGD/ Colonoscopy 5/2023: Non erosive gastritis, Repeat 11/2024  - CTA abdomen 11/13: No acute abdominal or pelvic pathology. Atherosclerotic calcifications of the aorta and its branches. No evidence of celiac axis or SMA abnormality.  -Bariatric surgery consult appreciated  - Pt had gastric bypass 2006  - Dietician eval- on calorie count- not meeting requirements   - Vit D (wnl), vit b1, zinc (66) levels ordered  - Bariatric diet was recommended but patient is refusing to eat anything if she has bariatric diet   - Per GI: low yield w/ repeat EGD as pt had a recent endoscopy in 11/2024 and it will likely be unchanged, continue with daily PPI but change to open capsule omeprazole 40 mg once daily, trial of dicyclomine 10 mg q8h PRN  - Bariatric surg: was following- Regular DASH diet is contributing to n/v due to bypass anatomy- she should switch to small frequent meals, avoid carbonation and straws, and avoid carbs like bread and pasta. She does not want to try the linette diet to see if it will help due to not being appetizing to her. She can also add protein shakes to improve her protein and nutrition. She needs to try and eat a high protein low carb diet for her new onset diabetes as well. If patient unwilling to comply with diet then will be difficult to offer any surgical intervention due to lack of post-op compliance. This was explained to the patient and she understood. She does not want to try the linette diet at this time  - Acetaminophen PRN for pain  - hep panel negative       #DM  - A1c last admission 7 --> now a1c 5.6  - Had episodes of hypoglycemia  - If glucose consistently >150 can start ISS    #Hx of COPD on Home O2 PRN not in Exacerbation  - TTE 2024 --> 60-65% EF  - TTE with bubbles 11/19: suspected pulmonary shunt.   - C/w home inhalers  - Hold home Bumex for now    #Hypothyroidism  - C/w home levothyroxine    #Anxiety  #Depression  - c/w home Lamotrigine, Zoloft- were initially held due to prolonged Qtc - now restarted  - At home also on Xanax       #Chronic Back Pain w/ Hx of Laminectomy   - c/w home Methadone 30mg q8h  - On carisoprodol at home   - cyclobenzaprine here       #Alcohol Use Disorder  #Hepatic Steatosis  - Previously 2 scotches per night or more  - No alcohol since November per pt  - RUQ US last admission showed fatty liver likely due to obesity/alcohol use  - C/w B12 and Folate supplement    #MISC  - Code Status: Full Code  - DVT ppx: Heparin subcutaneous  - GI ppx: Protonix  - Diet: DASH- refusing bariatric   - Activity: AAT  - Discharge Dispo: From Home    Pending:  - Calorie count  - Holding bumex  - Monitor electrolytes

## 2025-02-21 LAB
ALBUMIN SERPL ELPH-MCNC: 3.3 G/DL — LOW (ref 3.5–5.2)
ALP SERPL-CCNC: 176 U/L — HIGH (ref 30–115)
ALT FLD-CCNC: 20 U/L — SIGNIFICANT CHANGE UP (ref 0–41)
ANION GAP SERPL CALC-SCNC: 11 MMOL/L — SIGNIFICANT CHANGE UP (ref 7–14)
AST SERPL-CCNC: 48 U/L — HIGH (ref 0–41)
BASOPHILS # BLD AUTO: 0.06 K/UL — SIGNIFICANT CHANGE UP (ref 0–0.2)
BASOPHILS NFR BLD AUTO: 1.2 % — HIGH (ref 0–1)
BILIRUB SERPL-MCNC: 0.7 MG/DL — SIGNIFICANT CHANGE UP (ref 0.2–1.2)
BUN SERPL-MCNC: 8 MG/DL — LOW (ref 10–20)
CALCIUM SERPL-MCNC: 8.7 MG/DL — SIGNIFICANT CHANGE UP (ref 8.4–10.4)
CHLORIDE SERPL-SCNC: 105 MMOL/L — SIGNIFICANT CHANGE UP (ref 98–110)
CO2 SERPL-SCNC: 22 MMOL/L — SIGNIFICANT CHANGE UP (ref 17–32)
CREAT SERPL-MCNC: 0.9 MG/DL — SIGNIFICANT CHANGE UP (ref 0.7–1.5)
EGFR: 72 ML/MIN/1.73M2 — SIGNIFICANT CHANGE UP
EGFR: 72 ML/MIN/1.73M2 — SIGNIFICANT CHANGE UP
EOSINOPHIL # BLD AUTO: 0.2 K/UL — SIGNIFICANT CHANGE UP (ref 0–0.7)
EOSINOPHIL NFR BLD AUTO: 3.9 % — SIGNIFICANT CHANGE UP (ref 0–8)
FLUAV AG NPH QL: SIGNIFICANT CHANGE UP
FLUBV AG NPH QL: SIGNIFICANT CHANGE UP
GLUCOSE BLDC GLUCOMTR-MCNC: 107 MG/DL — HIGH (ref 70–99)
GLUCOSE BLDC GLUCOMTR-MCNC: 76 MG/DL — SIGNIFICANT CHANGE UP (ref 70–99)
GLUCOSE BLDC GLUCOMTR-MCNC: 82 MG/DL — SIGNIFICANT CHANGE UP (ref 70–99)
GLUCOSE BLDC GLUCOMTR-MCNC: 85 MG/DL — SIGNIFICANT CHANGE UP (ref 70–99)
GLUCOSE SERPL-MCNC: 62 MG/DL — LOW (ref 70–99)
HCT VFR BLD CALC: 41 % — SIGNIFICANT CHANGE UP (ref 37–47)
HGB BLD-MCNC: 12.6 G/DL — SIGNIFICANT CHANGE UP (ref 12–16)
IMM GRANULOCYTES NFR BLD AUTO: 1.5 % — HIGH (ref 0.1–0.3)
LYMPHOCYTES # BLD AUTO: 1.06 K/UL — LOW (ref 1.2–3.4)
LYMPHOCYTES # BLD AUTO: 20.5 % — SIGNIFICANT CHANGE UP (ref 20.5–51.1)
MAGNESIUM SERPL-MCNC: 1.9 MG/DL — SIGNIFICANT CHANGE UP (ref 1.8–2.4)
MCHC RBC-ENTMCNC: 30.2 PG — SIGNIFICANT CHANGE UP (ref 27–31)
MCHC RBC-ENTMCNC: 30.7 G/DL — LOW (ref 32–37)
MCV RBC AUTO: 98.3 FL — SIGNIFICANT CHANGE UP (ref 81–99)
MONOCYTES # BLD AUTO: 0.45 K/UL — SIGNIFICANT CHANGE UP (ref 0.1–0.6)
MONOCYTES NFR BLD AUTO: 8.7 % — SIGNIFICANT CHANGE UP (ref 1.7–9.3)
NEUTROPHILS # BLD AUTO: 3.32 K/UL — SIGNIFICANT CHANGE UP (ref 1.4–6.5)
NEUTROPHILS NFR BLD AUTO: 64.2 % — SIGNIFICANT CHANGE UP (ref 42.2–75.2)
NRBC BLD AUTO-RTO: 0 /100 WBCS — SIGNIFICANT CHANGE UP (ref 0–0)
NT-PROBNP SERPL-SCNC: 906 PG/ML — HIGH (ref 0–300)
PHOSPHATE SERPL-MCNC: 3.6 MG/DL — SIGNIFICANT CHANGE UP (ref 2.1–4.9)
PLATELET # BLD AUTO: 228 K/UL — SIGNIFICANT CHANGE UP (ref 130–400)
PMV BLD: 10.1 FL — SIGNIFICANT CHANGE UP (ref 7.4–10.4)
POTASSIUM SERPL-MCNC: 4.1 MMOL/L — SIGNIFICANT CHANGE UP (ref 3.5–5)
POTASSIUM SERPL-SCNC: 4.1 MMOL/L — SIGNIFICANT CHANGE UP (ref 3.5–5)
PROT SERPL-MCNC: 5.7 G/DL — LOW (ref 6–8)
RBC # BLD: 4.17 M/UL — LOW (ref 4.2–5.4)
RBC # FLD: 15.2 % — HIGH (ref 11.5–14.5)
RSV RNA NPH QL NAA+NON-PROBE: SIGNIFICANT CHANGE UP
SARS-COV-2 RNA SPEC QL NAA+PROBE: SIGNIFICANT CHANGE UP
SODIUM SERPL-SCNC: 138 MMOL/L — SIGNIFICANT CHANGE UP (ref 135–146)
WBC # BLD: 5.17 K/UL — SIGNIFICANT CHANGE UP (ref 4.8–10.8)
WBC # FLD AUTO: 5.17 K/UL — SIGNIFICANT CHANGE UP (ref 4.8–10.8)
ZINC SERPL-MCNC: 68 UG/DL — SIGNIFICANT CHANGE UP (ref 44–115)

## 2025-02-21 PROCEDURE — 99232 SBSQ HOSP IP/OBS MODERATE 35: CPT

## 2025-02-21 RX ORDER — METHADONE HCL 10 MG
30 TABLET ORAL EVERY 8 HOURS
Refills: 0 | Status: DISCONTINUED | OUTPATIENT
Start: 2025-02-21 | End: 2025-02-28

## 2025-02-21 RX ORDER — BUMETANIDE 1 MG/1
0.5 TABLET ORAL
Refills: 0 | Status: DISCONTINUED | OUTPATIENT
Start: 2025-02-21 | End: 2025-02-28

## 2025-02-21 RX ADMIN — Medication 975 MILLIGRAM(S): at 06:41

## 2025-02-21 RX ADMIN — Medication 30 MILLIGRAM(S): at 14:14

## 2025-02-21 RX ADMIN — Medication 975 MILLIGRAM(S): at 22:14

## 2025-02-21 RX ADMIN — BUMETANIDE 0.5 MILLIGRAM(S): 1 TABLET ORAL at 10:31

## 2025-02-21 RX ADMIN — CYCLOBENZAPRINE HYDROCHLORIDE 5 MILLIGRAM(S): 15 CAPSULE, EXTENDED RELEASE ORAL at 22:14

## 2025-02-21 RX ADMIN — Medication 5 MILLIGRAM(S): at 22:16

## 2025-02-21 RX ADMIN — Medication 30 MILLIGRAM(S): at 07:55

## 2025-02-21 RX ADMIN — Medication 30 MILLIGRAM(S): at 22:13

## 2025-02-21 RX ADMIN — CYANOCOBALAMIN 1000 MICROGRAM(S): 1000 INJECTION INTRAMUSCULAR; SUBCUTANEOUS at 12:23

## 2025-02-21 RX ADMIN — Medication 1 APPLICATION(S): at 06:42

## 2025-02-21 RX ADMIN — FOLIC ACID 1 MILLIGRAM(S): 1 TABLET ORAL at 12:23

## 2025-02-21 RX ADMIN — CYCLOBENZAPRINE HYDROCHLORIDE 5 MILLIGRAM(S): 15 CAPSULE, EXTENDED RELEASE ORAL at 14:14

## 2025-02-21 RX ADMIN — Medication 100 MICROGRAM(S): at 06:41

## 2025-02-21 RX ADMIN — CYCLOBENZAPRINE HYDROCHLORIDE 5 MILLIGRAM(S): 15 CAPSULE, EXTENDED RELEASE ORAL at 06:41

## 2025-02-21 RX ADMIN — Medication 2 TABLET(S): at 22:14

## 2025-02-21 RX ADMIN — LAMOTRIGINE 150 MILLIGRAM(S): 150 TABLET ORAL at 12:22

## 2025-02-21 RX ADMIN — Medication 975 MILLIGRAM(S): at 14:13

## 2025-02-21 RX ADMIN — Medication 40 MILLIGRAM(S): at 06:41

## 2025-02-21 RX ADMIN — SERTRALINE 200 MILLIGRAM(S): 100 TABLET, FILM COATED ORAL at 12:22

## 2025-02-21 RX ADMIN — POLYETHYLENE GLYCOL 3350 17 GRAM(S): 17 POWDER, FOR SOLUTION ORAL at 12:23

## 2025-02-21 NOTE — PROGRESS NOTE ADULT - ATTENDING COMMENTS
plan of care discussed with patient  agree with plan  discussed with resident physician  awaiting cardiology consult  flu/RSV/COVID- neg  continue oxygen supplementation  continue diet as toelrated- small frequent meals; d/w patient- symptoms improving  restart bumex  patient states he has SOB with minimal ambulation and has no help at home since  is at rehab; states stairs at home- PT follow up

## 2025-02-21 NOTE — PROGRESS NOTE ADULT - SUBJECTIVE AND OBJECTIVE BOX
SUBJECTIVE/OVERNIGHT EVENTS  Today is hospital day 10d. This morning patient was seen and examined at bedside, resting comfortably in bed. No acute or major events overnight.  Pt reports new sore throat, cough.   Eating 25-50% per calorie count.     MEDICATIONS  STANDING MEDICATIONS  acetaminophen     Tablet .. 975 milliGRAM(s) Oral every 8 hours  chlorhexidine 2% Cloths 1 Application(s) Topical <User Schedule>  cyanocobalamin 1000 MICROGram(s) Oral daily  cyclobenzaprine 5 milliGRAM(s) Oral three times a day  folic acid 1 milliGRAM(s) Oral daily  heparin   Injectable 5000 Unit(s) SubCutaneous every 12 hours  influenza   Vaccine 0.5 milliLiter(s) IntraMuscular once  lamoTRIgine 150 milliGRAM(s) Oral daily  levothyroxine 100 MICROGram(s) Oral daily  melatonin 5 milliGRAM(s) Oral at bedtime  methadone    Tablet 30 milliGRAM(s) Oral every 8 hours  pantoprazole    Tablet 40 milliGRAM(s) Oral before breakfast  polyethylene glycol 3350 17 Gram(s) Oral daily  senna 2 Tablet(s) Oral at bedtime  sertraline 200 milliGRAM(s) Oral daily    PRN MEDICATIONS  albuterol    90 MICROgram(s) HFA Inhaler 2 Puff(s) Inhalation every 6 hours PRN  albuterol/ipratropium for Nebulization 3 milliLiter(s) Nebulizer every 6 hours PRN  dicyclomine 10 milliGRAM(s) Oral three times a day before meals PRN  fluticasone propionate/ salmeterol 250-50 MICROgram(s) Diskus 1 Dose(s) Inhalation two times a day PRN  lactulose Syrup 10 Gram(s) Oral daily PRN    VITALS  T(F): 97.3 (02-21-25 @ 00:18), Max: 98.4 (02-20-25 @ 14:34)  HR: 64 (02-21-25 @ 00:18) (63 - 76)  BP: 109/63 (02-21-25 @ 00:18) (107/69 - 111/68)  RR: 18 (02-21-25 @ 00:18) (18 - 18)  SpO2: 96% (02-21-25 @ 00:18) (90% - 96%)  POCT Blood Glucose.: 82 mg/dL (02-21-25 @ 07:28)  POCT Blood Glucose.: 71 mg/dL (02-20-25 @ 22:06)  POCT Blood Glucose.: 77 mg/dL (02-20-25 @ 16:31)  POCT Blood Glucose.: 85 mg/dL (02-20-25 @ 14:30)  POCT Blood Glucose.: 89 mg/dL (02-20-25 @ 11:28)    PHYSICAL EXAM  General: NAD, non-toxic appearing  HEENT: EOMi, no scleral icterus  CV: RRR, normal s1 and s2  Lungs: normal respiratory effort, CTAB, on 2L NC  Abd: BS+. Soft, ttp on right Upper and lower quadrant- less tender than previously, no rebound tenderness   Ext: b/l LE edema, warm, well perfused, erythema of b/l LE extremities worse on R   Psych: A+Ox3, appropriate affect  Neuro: grossly non-focal, moving all extremities spontaneously  Skin: no rashes or lesions       LABS             12.6   5.17  )-----------( 228      ( 02-21-25 @ 05:24 )             41.0     138  |  105  |  8   -------------------------<  62   02-21-25 @ 05:24  4.1  |  22  |  0.9    Ca      8.7     02-21-25 @ 05:24  Phos   3.6     02-21-25 @ 05:24  Mg     1.9     02-21-25 @ 05:24    TPro  5.7  /  Alb  3.3  /  TBili  0.7  /  DBili  x   /  AST  48  /  ALT  20  /  AlkPhos  176  /  GGT  x     02-21-25 @ 05:24      Pro-Brain Natriuretic Peptide: 906 pg/mL (02-21-25 @ 05:24)    Urinalysis Basic - ( 21 Feb 2025 05:24 )    Color: x / Appearance: x / SG: x / pH: x  Gluc: 62 mg/dL / Ketone: x  / Bili: x / Urobili: x   Blood: x / Protein: x / Nitrite: x   Leuk Esterase: x / RBC: x / WBC x   Sq Epi: x / Non Sq Epi: x / Bacteria: x

## 2025-02-21 NOTE — PROGRESS NOTE ADULT - ASSESSMENT
64-year-old female with a history of asthma, hypothyroidism, anxiety, depression, MVP, Jaclyn-en-Y gastric bypass, appendectomy, cholecystectomy, and chronic back pain (on methadone) presented for decreased PO intake x3-4 weeks, diffuse abdominal pain, and weakness.    #Severe Hypokalemia and Moderate Hyponatremia in setting of Decreased PO Intake and Bumex Use- IMPROVED   #Prolonged QTC in setting of Hypokalemia and Multiple QTC-Prolonging Meds- IMPROVED   #CEM on CKD   - K 2.1, Na 126 on admission: now improved   - CTA abdomen 11/13: No acute abdominal or pelvic pathology. Atherosclerotic calcifications of the aorta and its branches. No evidence of celiac axis or SMA abnormality.  - CTAP this admission negative for any acute pathology  - No infectious etiology suspected  - Had been on tele   - Keep Mg >2, replete K to >4  - Nephro had been following   - PT eval- rec KYLE  - Qtc on admission: 630 --> 2/17: 465   - Holding bumex (At home on 1mg daily)  - restarted home methadone (30 TID)      #Poor PO intake  #Pt reports 50lb weight loss in last 2 months  #Diffuse Abdominal Pain in setting of Hx of Jaclyn-en-Y Bypass- 2006   - Endorses lack of appetite and early satiety x3-4 weeks  - EGD/ Colonoscopy 5/2023: Non erosive gastritis, Repeat 11/2024  - CTA abdomen 11/13: No acute abdominal or pelvic pathology. Atherosclerotic calcifications of the aorta and its branches. No evidence of celiac axis or SMA abnormality.  - Bariatric surgery consult appreciated  - Pt had gastric bypass 2006  - Dietician eval- on calorie count- not meeting requirements   - Vit D (wnl), vit b1 (wnl), zinc (66)   - Bariatric diet was recommended but patient is refusing to eat anything if she has bariatric diet - now on Regular diet: supplemented with ensure   - Per GI: low yield w/ repeat EGD as pt had a recent endoscopy in 11/2024 and it will likely be unchanged, continue with daily PPI but change to open capsule omeprazole 40 mg once daily, trial of dicyclomine 10 mg q8h PRN  - Bariatric surg: was following- Regular DASH diet is contributing to n/v due to bypass anatomy- she should switch to small frequent meals, avoid carbonation and straws, and avoid carbs like bread and pasta. She does not want to try the linette diet to see if it will help due to not being appetizing to her. She can also add protein shakes to improve her protein and nutrition. She needs to try and eat a high protein low carb diet for her new onset diabetes as well. If patient unwilling to comply with diet then will be difficult to offer any surgical intervention due to lack of post-op compliance. This was explained to the patient and she understood. She does not want to try the linette diet at this time  - Acetaminophen PRN for pain  - hep panel negative     #Dyspnea - now on 2L NC  #Hx of COPD on Home O2 PRN not in Exacerbation  #B/l LE edema   - Pt reporting new cough and sore throat 2/21- f/u miniRVP  - TTE 2024 --> 60-65% EF  - TTE with bubbles 11/19: suspected pulmonary shunt  -   - D/w cardiothoracic surgeryL recommended cardiology eval prior to surgery consult.  - patient follows up with Dr Negrete: f/u cardiology c/s   - C/w home inhalers  - Hold home Bumex for now      #DM  - A1c last admission 7 --> now a1c 5.6  - Had episodes of hypoglycemia  - If glucose consistently >150 can start ISS      #Hypothyroidism  - C/w home levothyroxine    #Anxiety  #Depression  - c/w home Lamotrigine, Zoloft- were initially held due to prolonged Qtc - now restarted  - At home also on Xanax       #Chronic Back Pain w/ Hx of Laminectomy   - c/w home Methadone 30mg q8h  - On carisoprodol at home   - cyclobenzaprine here       #Alcohol Use Disorder  #Hepatic Steatosis  - Previously 2 scotches per night or more  - No alcohol since November per pt  - RUQ US last admission showed fatty liver likely due to obesity/alcohol use  - C/w B12 and Folate supplement    #MISC  - Code Status: Full Code  - DVT ppx: Heparin subcutaneous  - GI ppx: Protonix  - Diet: Regular diet: supplemented with ensure (refusing bariatric diet)   - Activity: AAT  - Discharge Dispo: From Home    Pending:  - Calorie count  - Holding bumex  - Monitor electrolytes   - f/u cards c/s for pulm shunt  - f/u miniRVP   64-year-old female with a history of asthma, hypothyroidism, anxiety, depression, MVP, Jaclyn-en-Y gastric bypass, appendectomy, cholecystectomy, and chronic back pain (on methadone) presented for decreased PO intake x3-4 weeks, diffuse abdominal pain, and weakness.    #Severe Hypokalemia and Moderate Hyponatremia in setting of Decreased PO Intake and Bumex Use- IMPROVED   #Prolonged QTC in setting of Hypokalemia and Multiple QTC-Prolonging Meds- IMPROVED   #CEM on CKD   - K 2.1, Na 126 on admission: now improved   - CTA abdomen 11/13: No acute abdominal or pelvic pathology. Atherosclerotic calcifications of the aorta and its branches. No evidence of celiac axis or SMA abnormality.  - CTAP this admission negative for any acute pathology  - No infectious etiology suspected  - Had been on tele   - Keep Mg >2, replete K to >4  - Nephro had been following   - PT eval- rec KYLE  - Qtc on admission: 630 --> 2/17: 465   - Holding bumex (At home on 1mg daily)  - restarted home methadone (30 TID)      #Poor PO intake  #Pt reports 50lb weight loss in last 2 months  #Diffuse Abdominal Pain in setting of Hx of Jaclyn-en-Y Bypass- 2006   - Endorses lack of appetite and early satiety x3-4 weeks  - EGD/ Colonoscopy 5/2023: Non erosive gastritis, Repeat 11/2024  - CTA abdomen 11/13: No acute abdominal or pelvic pathology. Atherosclerotic calcifications of the aorta and its branches. No evidence of celiac axis or SMA abnormality.  - Bariatric surgery consult appreciated  - Pt had gastric bypass 2006  - Dietician eval- on calorie count- not meeting requirements   - Vit D (wnl), vit b1 (wnl), zinc (66)   - Bariatric diet was recommended but patient is refusing to eat anything if she has bariatric diet - now on Regular diet: supplemented with ensure   - Per GI: low yield w/ repeat EGD as pt had a recent endoscopy in 11/2024 and it will likely be unchanged, continue with daily PPI but change to open capsule omeprazole 40 mg once daily, trial of dicyclomine 10 mg q8h PRN  - Bariatric surg: was following- Regular DASH diet is contributing to n/v due to bypass anatomy- she should switch to small frequent meals, avoid carbonation and straws, and avoid carbs like bread and pasta. She does not want to try the linette diet to see if it will help due to not being appetizing to her. She can also add protein shakes to improve her protein and nutrition. She needs to try and eat a high protein low carb diet for her new onset diabetes as well. If patient unwilling to comply with diet then will be difficult to offer any surgical intervention due to lack of post-op compliance. This was explained to the patient and she understood. She does not want to try the linette diet at this time  - Acetaminophen PRN for pain  - hep panel negative     #Dyspnea - now on 2L NC  #Hx of COPD on Home O2 PRN not in Exacerbation  #B/l LE edema   - Pt reporting new cough and sore throat 2/21- f/u miniRVP  - TTE 2024 --> 60-65% EF  - TTE with bubbles 11/19: suspected pulmonary shunt  -   - D/w cardiothoracic surgery: recommended cardiology eval prior to surgery consult.  - patient follows up with Dr Negrete: f/u cardiology c/s   - C/w home inhalers  - Restart bumex 0.5 every other day with K supplementation       #DM  - A1c last admission 7 --> now a1c 5.6  - Had episodes of hypoglycemia  - If glucose consistently >150 can start ISS      #Hypothyroidism  - C/w home levothyroxine    #Anxiety  #Depression  - c/w home Lamotrigine, Zoloft- were initially held due to prolonged Qtc - now restarted  - At home also on Xanax       #Chronic Back Pain w/ Hx of Laminectomy   - c/w home Methadone 30mg q8h  - On carisoprodol at home   - cyclobenzaprine here       #Alcohol Use Disorder  #Hepatic Steatosis  - Previously 2 scotches per night or more  - No alcohol since November per pt  - RUQ US last admission showed fatty liver likely due to obesity/alcohol use  - C/w B12 and Folate supplement    #MISC  - Code Status: Full Code  - DVT ppx: Heparin subcutaneous  - GI ppx: Protonix  - Diet: Regular diet: supplemented with ensure (refusing bariatric diet)   - Activity: AAT  - Discharge Dispo: From Home    Pending:  - Calorie count  - Restart bumex 0.5 every other day with K supplementation   - Monitor electrolytes   - f/u cards c/s for pulm shunt  - f/u miniRVP

## 2025-02-22 LAB
ALBUMIN SERPL ELPH-MCNC: 3.3 G/DL — LOW (ref 3.5–5.2)
ALP SERPL-CCNC: 168 U/L — HIGH (ref 30–115)
ALT FLD-CCNC: 19 U/L — SIGNIFICANT CHANGE UP (ref 0–41)
ANION GAP SERPL CALC-SCNC: 12 MMOL/L — SIGNIFICANT CHANGE UP (ref 7–14)
AST SERPL-CCNC: 47 U/L — HIGH (ref 0–41)
BASOPHILS # BLD AUTO: 0.06 K/UL — SIGNIFICANT CHANGE UP (ref 0–0.2)
BASOPHILS NFR BLD AUTO: 1.3 % — HIGH (ref 0–1)
BILIRUB SERPL-MCNC: 0.7 MG/DL — SIGNIFICANT CHANGE UP (ref 0.2–1.2)
BUN SERPL-MCNC: 9 MG/DL — LOW (ref 10–20)
CALCIUM SERPL-MCNC: 8.3 MG/DL — LOW (ref 8.4–10.5)
CHLORIDE SERPL-SCNC: 104 MMOL/L — SIGNIFICANT CHANGE UP (ref 98–110)
CO2 SERPL-SCNC: 22 MMOL/L — SIGNIFICANT CHANGE UP (ref 17–32)
CREAT SERPL-MCNC: 0.9 MG/DL — SIGNIFICANT CHANGE UP (ref 0.7–1.5)
EGFR: 72 ML/MIN/1.73M2 — SIGNIFICANT CHANGE UP
EGFR: 72 ML/MIN/1.73M2 — SIGNIFICANT CHANGE UP
EOSINOPHIL # BLD AUTO: 0.2 K/UL — SIGNIFICANT CHANGE UP (ref 0–0.7)
EOSINOPHIL NFR BLD AUTO: 4.2 % — SIGNIFICANT CHANGE UP (ref 0–8)
GLUCOSE BLDC GLUCOMTR-MCNC: 112 MG/DL — HIGH (ref 70–99)
GLUCOSE BLDC GLUCOMTR-MCNC: 77 MG/DL — SIGNIFICANT CHANGE UP (ref 70–99)
GLUCOSE BLDC GLUCOMTR-MCNC: 83 MG/DL — SIGNIFICANT CHANGE UP (ref 70–99)
GLUCOSE BLDC GLUCOMTR-MCNC: 96 MG/DL — SIGNIFICANT CHANGE UP (ref 70–99)
GLUCOSE SERPL-MCNC: 77 MG/DL — SIGNIFICANT CHANGE UP (ref 70–99)
HCT VFR BLD CALC: 41.3 % — SIGNIFICANT CHANGE UP (ref 37–47)
HGB BLD-MCNC: 13 G/DL — SIGNIFICANT CHANGE UP (ref 12–16)
IMM GRANULOCYTES NFR BLD AUTO: 1.7 % — HIGH (ref 0.1–0.3)
LYMPHOCYTES # BLD AUTO: 0.99 K/UL — LOW (ref 1.2–3.4)
LYMPHOCYTES # BLD AUTO: 20.8 % — SIGNIFICANT CHANGE UP (ref 20.5–51.1)
MAGNESIUM SERPL-MCNC: 1.9 MG/DL — SIGNIFICANT CHANGE UP (ref 1.8–2.4)
MCHC RBC-ENTMCNC: 30.7 PG — SIGNIFICANT CHANGE UP (ref 27–31)
MCHC RBC-ENTMCNC: 31.5 G/DL — LOW (ref 32–37)
MCV RBC AUTO: 97.4 FL — SIGNIFICANT CHANGE UP (ref 81–99)
MONOCYTES # BLD AUTO: 0.37 K/UL — SIGNIFICANT CHANGE UP (ref 0.1–0.6)
MONOCYTES NFR BLD AUTO: 7.8 % — SIGNIFICANT CHANGE UP (ref 1.7–9.3)
NEUTROPHILS # BLD AUTO: 3.05 K/UL — SIGNIFICANT CHANGE UP (ref 1.4–6.5)
NEUTROPHILS NFR BLD AUTO: 64.2 % — SIGNIFICANT CHANGE UP (ref 42.2–75.2)
NRBC BLD AUTO-RTO: 0 /100 WBCS — SIGNIFICANT CHANGE UP (ref 0–0)
PHOSPHATE SERPL-MCNC: 3.7 MG/DL — SIGNIFICANT CHANGE UP (ref 2.1–4.9)
PLATELET # BLD AUTO: 233 K/UL — SIGNIFICANT CHANGE UP (ref 130–400)
PMV BLD: 10.2 FL — SIGNIFICANT CHANGE UP (ref 7.4–10.4)
POTASSIUM SERPL-MCNC: 4.2 MMOL/L — SIGNIFICANT CHANGE UP (ref 3.5–5)
POTASSIUM SERPL-SCNC: 4.2 MMOL/L — SIGNIFICANT CHANGE UP (ref 3.5–5)
PROT SERPL-MCNC: 5.7 G/DL — LOW (ref 6–8)
RBC # BLD: 4.24 M/UL — SIGNIFICANT CHANGE UP (ref 4.2–5.4)
RBC # FLD: 14.9 % — HIGH (ref 11.5–14.5)
SODIUM SERPL-SCNC: 138 MMOL/L — SIGNIFICANT CHANGE UP (ref 135–146)
TROPONIN T, HIGH SENSITIVITY RESULT: 9 NG/L — SIGNIFICANT CHANGE UP (ref 6–13)
TROPONIN T, HIGH SENSITIVITY RESULT: 9 NG/L — SIGNIFICANT CHANGE UP (ref 6–13)
VIT B1 SERPL-MCNC: 62.4 NMOL/L — LOW (ref 66.5–200)
WBC # BLD: 4.75 K/UL — LOW (ref 4.8–10.8)
WBC # FLD AUTO: 4.75 K/UL — LOW (ref 4.8–10.8)

## 2025-02-22 PROCEDURE — 93010 ELECTROCARDIOGRAM REPORT: CPT

## 2025-02-22 PROCEDURE — 99232 SBSQ HOSP IP/OBS MODERATE 35: CPT

## 2025-02-22 RX ORDER — REGADENOSON 0.08 MG/ML
0.4 INJECTION, SOLUTION INTRAVENOUS ONCE
Refills: 0 | Status: DISCONTINUED | OUTPATIENT
Start: 2025-02-22 | End: 2025-03-02

## 2025-02-22 RX ADMIN — Medication 30 MILLIGRAM(S): at 06:04

## 2025-02-22 RX ADMIN — Medication 975 MILLIGRAM(S): at 13:09

## 2025-02-22 RX ADMIN — FOLIC ACID 1 MILLIGRAM(S): 1 TABLET ORAL at 11:38

## 2025-02-22 RX ADMIN — CYANOCOBALAMIN 1000 MICROGRAM(S): 1000 INJECTION INTRAMUSCULAR; SUBCUTANEOUS at 11:38

## 2025-02-22 RX ADMIN — LAMOTRIGINE 150 MILLIGRAM(S): 150 TABLET ORAL at 11:38

## 2025-02-22 RX ADMIN — Medication 2 TABLET(S): at 21:33

## 2025-02-22 RX ADMIN — Medication 30 MILLIGRAM(S): at 21:34

## 2025-02-22 RX ADMIN — Medication 5 MILLIGRAM(S): at 21:34

## 2025-02-22 RX ADMIN — Medication 30 MILLIGRAM(S): at 13:08

## 2025-02-22 RX ADMIN — SERTRALINE 200 MILLIGRAM(S): 100 TABLET, FILM COATED ORAL at 11:37

## 2025-02-22 RX ADMIN — Medication 975 MILLIGRAM(S): at 21:34

## 2025-02-22 RX ADMIN — Medication 40 MILLIGRAM(S): at 06:04

## 2025-02-22 RX ADMIN — Medication 1 LOZENGE: at 06:39

## 2025-02-22 RX ADMIN — CYCLOBENZAPRINE HYDROCHLORIDE 5 MILLIGRAM(S): 15 CAPSULE, EXTENDED RELEASE ORAL at 13:08

## 2025-02-22 RX ADMIN — Medication 1 APPLICATION(S): at 06:06

## 2025-02-22 RX ADMIN — POLYETHYLENE GLYCOL 3350 17 GRAM(S): 17 POWDER, FOR SOLUTION ORAL at 11:38

## 2025-02-22 RX ADMIN — Medication 975 MILLIGRAM(S): at 06:03

## 2025-02-22 RX ADMIN — CYCLOBENZAPRINE HYDROCHLORIDE 5 MILLIGRAM(S): 15 CAPSULE, EXTENDED RELEASE ORAL at 21:33

## 2025-02-22 RX ADMIN — CYCLOBENZAPRINE HYDROCHLORIDE 5 MILLIGRAM(S): 15 CAPSULE, EXTENDED RELEASE ORAL at 06:03

## 2025-02-22 RX ADMIN — Medication 100 MICROGRAM(S): at 06:03

## 2025-02-22 NOTE — CONSULT NOTE ADULT - ASSESSMENT
1] Exertional Shortness of Breath, ?Anginal Equivalent      ASHD  - Option of pharmacological nuclear stress test offered and discussed.  Will recommend lexiscan nuclear stress test  - Further recommendations pending results of of NST    2] Hypertension  - Continue to monitor    3] Pulmonary Shunt  - Etiology ?PFO  - Will review echo images    Will follow    Ramon Cordova MD(covering for Dr. Emiliano Negrete)  [phone] Office  On TEAMS

## 2025-02-22 NOTE — CONSULT NOTE ADULT - SUBJECTIVE AND OBJECTIVE BOX
Patient was seen and examined by me on 4B.  EMR reviewed.            Patient is a 64y old  Female who presents with a chief complaint of Hypokalemia (2025 13:54)      REASON FOR CONSULT     HPI:  64-year-old female with a history of asthma, hypothyroidism, anxiety, depression, MVP, Jaclyn-en-Y gastric bypass, appendectomy, cholecystectomy, and chronic back pain (on methadone) is presenting for decreased PO intake x3-4 weeks, diffuse abdominal pain, and weakness. She used to drink 2 scotchs per night but has not drank alcohol since she was hospitalized in November. She lives at home with her  whom helps care for her, however he recently was admitted to the hospital and is currently at a STR where she was left alone to care for herself. She states that she has had a non-specific generalized abdominal pain not associated with nausea, vomiting, or diarrhea. She has decreased PO intake and only eats 1-2 bites/sips of food or drink before she feels full. She was found to be significantly hypokalemic on admission and is admitted for further management. after a syncopal episode. She lost consciousness briefly while sitting after a shower. She also reported 5 days of worsening abdominal pain, nausea, vomiting, decreased oral intake, and constipation. She reports regular alcohol use (a couple of glasses of scotch nightly) and worsening shortness of breath for the past 2-3 months, requiring near-daily albuterol nebulizer treatments.    Of note, she was discharged from [facility] in December after a syncopal episode. Per previous documentation, the syncopal episode was likely vasovagal, though a prolonged QTc (603 on admission EKG) raised concern for arrhythmia. This was likely secondary to methadone and sertraline. Telemetry showed no arrhythmias. Echocardiogram showed normal LVEF (65%). CT chest was negative for PE. Abdominal pain workup, including CT abdomen and abdominal ultrasound, revealed hepatomegaly and fatty liver infiltration, but no acute pathology. Lipase was normal. LFTs were mildly elevated. Elevated lactate (3.0) was attributed to alcohol use. Her dyspnea was attributed to deconditioning and obesity, with no evidence of CHF or acute exacerbation of asthma. Leg edema was stable and mild.    Vitals  Temp: 98.6  HR: 76  BP: 113/78  O2: 94% on 3L NC  RR: 18    Labs  WBC: 8.97  HgB: 15.4  Plt: 285  Cr: 1.4 (baseline 1.0)  Na: 126  K: 2.4 -> 2.1  A -> 13  Bicarb: 39  ALK: 214  AST: 49  ALT: 17  Lipase: 61  M.0  Phos: 2.6    Imaging  CXR: No radiographic evidence of acute cardiopulmonary disease.    CTAP: No evidence of acute abdominal pathology.    In the ED  Potassium Chloride 40 mEq PO x2  KCL 40 mEq IV ordered  KCL 40 mEq PO ordered  Mg Suflate 2g IV x1  Tylenol 650mg PO x1 (2025 22:12)      PAST MEDICAL & SURGICAL HISTORY:  Asthma  LAST ATTACK MANY YRS AGO  Gastroesophageal reflux disease without esophagitis  Hypothyroidism  Heart murmur  Anxiety  Depression  Mood disorder  Psoriasis  Constipation  Back pain  LOW  Morbid obesity  S/P tonsillectomy  1967  History of appendectomy  1974  Abscess of back  EVACUATION OF ABSCESS @ L5-S1     History of Jaclyn-en-Y gastric bypass  WITH CHOLECYSTECTOMY     SOCIAL HISTORY:     FAMILY HISTORY:  CAD (coronary artery disease) (Father, Mother)      penicillin (Anaphylaxis)      MEDICATIONS  (STANDING):  acetaminophen     Tablet .. 975 milliGRAM(s) Oral every 8 hours  buMETAnide 0.5 milliGRAM(s) Oral <User Schedule>  chlorhexidine 2% Cloths 1 Application(s) Topical <User Schedule>  cyanocobalamin 1000 MICROGram(s) Oral daily  cyclobenzaprine 5 milliGRAM(s) Oral three times a day  folic acid 1 milliGRAM(s) Oral daily  heparin   Injectable 5000 Unit(s) SubCutaneous every 12 hours  influenza   Vaccine 0.5 milliLiter(s) IntraMuscular once  lamoTRIgine 150 milliGRAM(s) Oral daily  levothyroxine 100 MICROGram(s) Oral daily  melatonin 5 milliGRAM(s) Oral at bedtime  methadone    Tablet 30 milliGRAM(s) Oral every 8 hours  pantoprazole    Tablet 40 milliGRAM(s) Oral before breakfast  polyethylene glycol 3350 17 Gram(s) Oral daily  regadenoson Injectable 0.4 milliGRAM(s) IV Push once  senna 2 Tablet(s) Oral at bedtime  sertraline 200 milliGRAM(s) Oral daily    MEDICATIONS  (PRN):  albuterol    90 MICROgram(s) HFA Inhaler 2 Puff(s) Inhalation every 6 hours PRN Shortness of Breath and/or Wheezing  albuterol/ipratropium for Nebulization 3 milliLiter(s) Nebulizer every 6 hours PRN Shortness of Breath and/or Wheezing  dicyclomine 10 milliGRAM(s) Oral three times a day before meals PRN Abd Pain  fluticasone propionate/ salmeterol 250-50 MICROgram(s) Diskus 1 Dose(s) Inhalation two times a day PRN for bronchospasm  lactulose Syrup 10 Gram(s) Oral daily PRN for constipation      Vital Signs Last 24 Hrs  T(C): 36.4 (2025 15:00), Max: 36.5 (2025 00:25)  T(F): 97.5 (2025 15:00), Max: 97.7 (2025 00:25)  HR: 63 (2025 15:00) (59 - 63)  BP: 131/80 (2025 15:00) (115/73 - 131/80)  BP(mean): 87 (2025 00:25) (87 - 87)  RR: 18 (2025 00:25) (18 - 18)  SpO2: 96% (2025 15:00) (93% - 96%)     I&O's Detail    PHYSICAL EXAM:      Constitutional: appears stated age, well developed/nourished, no acute distress    Eyes: EOM's intact.  PERRLA    ENMT: Normocepahic, atraumatic.  Clear oropharynx.  No ear discharge.    Neck: Jugular veins non-distended; no carotid bruits bilaterally.    Breasts: No gross abnormalities noted.    Respiratory: respiratory pattern unlabored; no dullness to percussion; lungs clear to asuculatation bilaterally.    Cardiovascular: Regular rhythm.  S1 and S2 normal.  No murmur nor rub appreciated.    Abdomen: Soft, non-tender.  Normal bowel sounds.    Extremities: extremities warm; no cyanosis, clubbing or edema.    Pulses: Intact bilaterally    Skin: No gross abnormalities noted.    Musculoskeletal: No gross deformities    Neurological: Alert, oriented x 3.  No focal neurologic deficits noted.  REVIEW OF SYSTEMS      CONSTITUTIONAL:  No night sweats.  No fatigue, malaise, lethargy.  No fever or chills.    HEENT:  Eyes:  No visual changes.  No eye pain.  No eye discharge.  ENT:  No runny nose.  No epistaxis.  No sinus pain.  No sore throat.  No odynophagia.  No ear pain.  No congestion.    BREASTS:  No breast pain, soreness, lumps, or discharge.    RESPIRATORY:  No cough.  No wheeze.  No hemoptysis.  No shortness of breath.    CARDIOVASCULAR:  No chest pains.  No palpitations.     GASTROINTESTINAL:  No abdominal pain.  No nausea or vomiting.  No diarrhea or constipation.  No hematemesis.  No hematochezia.  No melena.    GENITOURINARY:  No urgency.  No frequency.  No dysuria.  No hematuria.  No obstructive symptoms.  No discharge.  No pain.  No significant abnormal bleeding.    MUSCULOSKELETAL:  No musculoskeletal pain.  No joint swelling.  No arthritis.    NEUROLOGICAL:    No headache or neck pain.  No syncope or seizure. no weakness . No Vertigo.    SKIN:  No rashes.  No lesions.  No wounds.    ENDOCRINE:  No unexplained weight loss.  No polydipsia.  No polyuria.  No polyphagia.    HEMATOLOGIC:  No anemia.  No purpura.  No petechiae.  No prolonged or excessive bleeding.     ALLERGIC AND IMMUNOLOGIC:  No pruritus.  No swelling.       ECG:  ECHOCARDIOGRAM:  RADIOLOGY & ADDITIONAL STUDIES:      LABS:                        13.0   4.75  )-----------( 233      ( 2025 05:39 )             41.3         138  |  104  |  9[L]  ----------------------------<  77  4.2   |  22  |  0.9    Ca    8.3[L]      2025 05:39  Phos  3.7       Mg     1.9         TPro  5.7[L]  /  Alb  3.3[L]  /  TBili  0.7  /  DBili  x   /  AST  47[H]  /  ALT  19  /  AlkPhos  168[H]            I&O's Summary    BNP    ASSESMENT AND PLAN    Patient was seen and examined by me on 4B.  EMR reviewed.    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    She is referred to cardiology because of her complaint of shortness of breath with minimal exertion.  She is currently admitted with initial complaint of hypokalemia.      On 4B, she is comfortable in bed.  She denies any chest pain.    Physical Exam:  Not in gross distress  On supplemental oxygen via nasal cannula.  Alert, oriented x 3  No JVD; regular rhythm; nl S1S2  Bilateral breath sounds  Abdomen soft  1+ edema in both legs  Moving all extremities purposefully    ROS: as stated in HPI  Labs: noted    < from: TTE Echo Complete w/ Contrast w/ Doppler (24 @ 11:16) >  Summary:   1. Technically suboptimal study. Endocardial visualization was enhanced   with intravenous echo contrast.   2. Normal global left ventricular systolic function with a biplane EF of   65%. Mild (grade 1) diastolic dysfunction. Mild concentric left   ventricular hypertrophy.   3. Normal right ventricular size and function.   4. Normal atria.   5. Sclerotic aortic valve with normal opening.   6. Adequate TR velocity was not obtained to accurately assess RVSP.   7. With infusion of agitated saline, a large volume of late bubbles is   seen - consistent with pulmonary shunt. Due to image quality, few early   bubbles cannot be excluded.    PHYSICIAN INTERPRETATION:  Left Ventricle: Endocardial visualization was enhanced with intravenous   echo contrast. There is mild concentric left ventricular hypertrophy.   Global LV systolic function was normal. Spectral Doppler shows impaired   relaxation pattern of left ventricular myocardial filling (Grade I   diastolic dysfunction).  Right Ventricle: Normal right ventricular size and function.  Left Atrium: Normal left atrial size. LA volume Index is 20.4 ml/m² ml/m2.  Right Atrium: Normal right atrial size.  Pericardium: There is no evidence of pericardial effusion.  Mitral Valve: Structurally normal mitral valve, with normal leaflet   excursion. No evidence of mitral valve regurgitation is seen.  Tricuspid Valve: Structurally normal tricuspid valve, with normal leaflet   excursion. Trivial tricuspid regurgitation is visualized. Adequate TR   velocity was not obtained to accurately assess RVSP.  Aortic Valve: The aortic valve is trileaflet. No evidence of aortic   stenosis. Sclerotic aortic valve with normal opening. Trivial aortic   valve regurgitation is seen.  Pulmonic Valve: The pulmonic valve was not well visualized. Trace   pulmonic valve regurgitation.  Aorta: The aortic root and ascending aorta are structurally normal, with   no evidence of dilitation.  Venous: The inferior vena cava is not well visualized.      < end of copied text >    ______________________________________________________  House Staff Note    Patient is a 64y old  Female who presents with a chief complaint of Hypokalemia (2025 13:54)    REASON FOR CONSULT     HPI:  64-year-old female with a history of asthma, hypothyroidism, anxiety, depression, MVP, Jaclyn-en-Y gastric bypass, appendectomy, cholecystectomy, and chronic back pain (on methadone) is presenting for decreased PO intake x3-4 weeks, diffuse abdominal pain, and weakness. She used to drink 2 scotchs per night but has not drank alcohol since she was hospitalized in November. She lives at home with her  whom helps care for her, however he recently was admitted to the hospital and is currently at a STR where she was left alone to care for herself. She states that she has had a non-specific generalized abdominal pain not associated with nausea, vomiting, or diarrhea. She has decreased PO intake and only eats 1-2 bites/sips of food or drink before she feels full. She was found to be significantly hypokalemic on admission and is admitted for further management. after a syncopal episode. She lost consciousness briefly while sitting after a shower. She also reported 5 days of worsening abdominal pain, nausea, vomiting, decreased oral intake, and constipation. She reports regular alcohol use (a couple of glasses of scotch nightly) and worsening shortness of breath for the past 2-3 months, requiring near-daily albuterol nebulizer treatments.    Of note, she was discharged from [facility] in December after a syncopal episode. Per previous documentation, the syncopal episode was likely vasovagal, though a prolonged QTc (603 on admission EKG) raised concern for arrhythmia. This was likely secondary to methadone and sertraline. Telemetry showed no arrhythmias. Echocardiogram showed normal LVEF (65%). CT chest was negative for PE. Abdominal pain workup, including CT abdomen and abdominal ultrasound, revealed hepatomegaly and fatty liver infiltration, but no acute pathology. Lipase was normal. LFTs were mildly elevated. Elevated lactate (3.0) was attributed to alcohol use. Her dyspnea was attributed to deconditioning and obesity, with no evidence of CHF or acute exacerbation of asthma. Leg edema was stable and mild.    Vitals  Temp: 98.6  HR: 76  BP: 113/78  O2: 94% on 3L NC  RR: 18    Labs  WBC: 8.97  HgB: 15.4  Plt: 285  Cr: 1.4 (baseline 1.0)  Na: 126  K: 2.4 -> 2.1  A -> 13  Bicarb: 39  ALK: 214  AST: 49  ALT: 17  Lipase: 61  M.0  Phos: 2.6    Imaging  CXR: No radiographic evidence of acute cardiopulmonary disease.    CTAP: No evidence of acute abdominal pathology.    In the ED  Potassium Chloride 40 mEq PO x2  KCL 40 mEq IV ordered  KCL 40 mEq PO ordered  Mg Suflate 2g IV x1  Tylenol 650mg PO x1 (2025 22:12)      PAST MEDICAL & SURGICAL HISTORY:  Asthma  LAST ATTACK MANY YRS AGO  Gastroesophageal reflux disease without esophagitis  Hypothyroidism  Heart murmur  Anxiety  Depression  Mood disorder  Psoriasis  Constipation  Back pain  LOW  Morbid obesity  S/P tonsillectomy  1967  History of appendectomy  1974  Abscess of back  EVACUATION OF ABSCESS @ L5-S1     History of Jaclyn-en-Y gastric bypass  WITH CHOLECYSTECTOMY     SOCIAL HISTORY:     FAMILY HISTORY:  CAD (coronary artery disease) (Father, Mother)    penicillin (Anaphylaxis)    MEDICATIONS  (STANDING):  acetaminophen     Tablet .. 975 milliGRAM(s) Oral every 8 hours  buMETAnide 0.5 milliGRAM(s) Oral <User Schedule>  chlorhexidine 2% Cloths 1 Application(s) Topical <User Schedule>  cyanocobalamin 1000 MICROGram(s) Oral daily  cyclobenzaprine 5 milliGRAM(s) Oral three times a day  folic acid 1 milliGRAM(s) Oral daily  heparin   Injectable 5000 Unit(s) SubCutaneous every 12 hours  influenza   Vaccine 0.5 milliLiter(s) IntraMuscular once  lamoTRIgine 150 milliGRAM(s) Oral daily  levothyroxine 100 MICROGram(s) Oral daily  melatonin 5 milliGRAM(s) Oral at bedtime  methadone    Tablet 30 milliGRAM(s) Oral every 8 hours  pantoprazole    Tablet 40 milliGRAM(s) Oral before breakfast  polyethylene glycol 3350 17 Gram(s) Oral daily  regadenoson Injectable 0.4 milliGRAM(s) IV Push once  senna 2 Tablet(s) Oral at bedtime  sertraline 200 milliGRAM(s) Oral daily    MEDICATIONS  (PRN):  albuterol    90 MICROgram(s) HFA Inhaler 2 Puff(s) Inhalation every 6 hours PRN Shortness of Breath and/or Wheezing  albuterol/ipratropium for Nebulization 3 milliLiter(s) Nebulizer every 6 hours PRN Shortness of Breath and/or Wheezing  dicyclomine 10 milliGRAM(s) Oral three times a day before meals PRN Abd Pain  fluticasone propionate/ salmeterol 250-50 MICROgram(s) Diskus 1 Dose(s) Inhalation two times a day PRN for bronchospasm  lactulose Syrup 10 Gram(s) Oral daily PRN for constipation      Vital Signs Last 24 Hrs  T(C): 36.4 (2025 15:00), Max: 36.5 (2025 00:25)  T(F): 97.5 (2025 15:00), Max: 97.7 (2025 00:25)  HR: 63 (2025 15:00) (59 - 63)  BP: 131/80 (2025 15:00) (115/73 - 131/80)  BP(mean): 87 (2025 00:25) (87 - 87)  RR: 18 (2025 00:25) (18 - 18)  SpO2: 96% (2025 15:00) (93% - 96%)     I&O's Detail      LABS:                        13.0   4.75  )-----------( 233      ( 2025 05:39 )             41.3     -    138  |  104  |  9[L]  ----------------------------<  77  4.2   |  22  |  0.9    Ca    8.3[L]      2025 05:39  Phos  3.7       Mg     1.9         TPro  5.7[L]  /  Alb  3.3[L]  /  TBili  0.7  /  DBili  x   /  AST  47[H]  /  ALT  19  /  AlkPhos  168[H]

## 2025-02-22 NOTE — PROGRESS NOTE ADULT - ASSESSMENT
64-year-old female with a history of asthma, hypothyroidism, anxiety, depression, MVP, Jaclyn-en-Y gastric bypass, appendectomy, cholecystectomy, and chronic back pain (on methadone) presented for decreased PO intake x3-4 weeks, diffuse abdominal pain, and weakness.    # dyspnea on exertion- r/o anginal equivalent  # possible cardiac/pulm shunt  # COPD- stable  COPD stable  - TTE 11/2024 --> 60-65% ; suspected cardiac/pulmonary shunt.   - C/w home inhalers  - cont Bumex every other day; monitor electrolytes  CXR- no acute cardiopulm process  BNP- 900  d/w cardiology- recommended stress test- ordered    # decreased PO intake  # wt loss  # Diffuse Abdominal Pain in setting of Hx of Jaclyn-en-Y Bypass- 2006   - EGD/ Colonoscopy 5/2023: Non erosive gastritis, Repeat 11/2024  - CTA abdomen 11/13: No acute abdominal or pelvic pathology. Atherosclerotic calcifications of the aorta and its branches. No evidence of celiac axis or SMA abnormality.  -Bariatric surgery evalauted   - Bariatric diet was recommended but patient is refusing to eat anything if she has bariatric diet - d/w patient and started on dash diet- for small frequent meals; low carbt; increase protein  - start on ensure supplement  - Vit D (wnl), zinc (66)- normal  - Per GI: low yield w/ repeat EGD as pt had a recent endoscopy in 11/2024 and it will likely be unchanged,   -continue with daily PPI but change to open capsule omeprazole 40 mg once daily, trial of dicyclomine 10 mg q8h PRN  - Acetaminophen PRN for pain  - hep panel negative     #Severe Hypokalemia and Moderate Hyponatremia in setting of Decreased PO Intake and Bumex Use- resolved  #Prolonged QTC in setting of Hypokalemia and Multiple QTC-Prolonging Meds- resolved  #CEM on CKD   - CTAP this admission negative for any acute pathology  monitor BMP    #DM  - A1c last admission 7 --> now a1c 5.6  - Had episodes of hypoglycemia  - If glucose consistently >150 can start ISS    #Hypothyroidism  - C/w home levothyroxine    #Anxiety  #Depression  - c/w home Lamotrigine, Zoloft- were initially held due to prolonged Qtc - now restarted  - At home also on Xanax    #Chronic Back Pain w/ Hx of Laminectomy   - c/w home Methadone 30mg q8h  - On carisoprodol at home ;  cyclobenzaprine here     #Alcohol Use Disorder  #Hepatic Steatosis  - Previously 2 scotches per night or more  - No alcohol since November per pt  - RUQ US last admission showed fatty liver likely due to obesity/alcohol use  - C/w B12 and Folate supplement    - Code Status: Full Code  - DVT ppx: Heparin subcutaneous    Pending: stress test, PT follow up

## 2025-02-22 NOTE — PROGRESS NOTE ADULT - SUBJECTIVE AND OBJECTIVE BOX
HPI  Patient is a 64y old Female who presents with a chief complaint of Hypokalemia (21 Feb 2025 08:50)    Currently admitted to medicine with the primary diagnosis of Adult failure to thrive       Today is hospital day 11d.     INTERVAL HPI / OVERNIGHT EVENTS:  Patient was seen and examined at bedside  Patient Feels okay  tolerating small quantify of food  still has HORTON  no chest pain      PAST MEDICAL & SURGICAL HISTORY  Asthma  LAST ATTACK MANY YRS AGO    Gastroesophageal reflux disease without esophagitis    Hypothyroidism    Heart murmur    Anxiety    Depression    Mood disorder    Psoriasis    Constipation    Back pain  LOW    Morbid obesity    S/P tonsillectomy  1967    History of appendectomy  1974    Abscess of back  EVACUATION OF ABSCESS @ L5-S1 1997    History of Jaclyn-en-Y gastric bypass  WITH CHOLECYSTECTOMY 2006      ALLERGIES  penicillin (Anaphylaxis)    MEDICATIONS  STANDING MEDICATIONS  acetaminophen     Tablet .. 975 milliGRAM(s) Oral every 8 hours  buMETAnide 0.5 milliGRAM(s) Oral <User Schedule>  chlorhexidine 2% Cloths 1 Application(s) Topical <User Schedule>  cyanocobalamin 1000 MICROGram(s) Oral daily  cyclobenzaprine 5 milliGRAM(s) Oral three times a day  folic acid 1 milliGRAM(s) Oral daily  heparin   Injectable 5000 Unit(s) SubCutaneous every 12 hours  influenza   Vaccine 0.5 milliLiter(s) IntraMuscular once  lamoTRIgine 150 milliGRAM(s) Oral daily  levothyroxine 100 MICROGram(s) Oral daily  melatonin 5 milliGRAM(s) Oral at bedtime  methadone    Tablet 30 milliGRAM(s) Oral every 8 hours  pantoprazole    Tablet 40 milliGRAM(s) Oral before breakfast  polyethylene glycol 3350 17 Gram(s) Oral daily  regadenoson Injectable 0.4 milliGRAM(s) IV Push once  senna 2 Tablet(s) Oral at bedtime  sertraline 200 milliGRAM(s) Oral daily    PRN MEDICATIONS  albuterol    90 MICROgram(s) HFA Inhaler 2 Puff(s) Inhalation every 6 hours PRN  albuterol/ipratropium for Nebulization 3 milliLiter(s) Nebulizer every 6 hours PRN  dicyclomine 10 milliGRAM(s) Oral three times a day before meals PRN  fluticasone propionate/ salmeterol 250-50 MICROgram(s) Diskus 1 Dose(s) Inhalation two times a day PRN  lactulose Syrup 10 Gram(s) Oral daily PRN    VITALS:  T(F): 97.4  HR: 61  BP: 121/72  RR: 18  SpO2: 96%    PHYSICAL EXAM  GEN: no distress, comfortable  PULM: BS heard b/l equal, No wheezing  CVS: S1S2 present, no rubs or gallops  EXT: 1+ lower extremity edema  NEURO: A&Ox3, moving all extremities    LABS                        13.0   4.75  )-----------( 233      ( 22 Feb 2025 05:39 )             41.3     02-22    138  |  104  |  9[L]  ----------------------------<  77  4.2   |  22  |  0.9    Ca    8.3[L]      22 Feb 2025 05:39  Phos  3.7     02-22  Mg     1.9     02-22    TPro  5.7[L]  /  Alb  3.3[L]  /  TBili  0.7  /  DBili  x   /  AST  47[H]  /  ALT  19  /  AlkPhos  168[H]  02-22      Urinalysis Basic - ( 22 Feb 2025 05:39 )    Color: x / Appearance: x / SG: x / pH: x  Gluc: 77 mg/dL / Ketone: x  / Bili: x / Urobili: x   Blood: x / Protein: x / Nitrite: x   Leuk Esterase: x / RBC: x / WBC x   Sq Epi: x / Non Sq Epi: x / Bacteria: x                RADIOLOGY

## 2025-02-22 NOTE — CONSULT NOTE ADULT - CONSULT REASON
PHYSICIAN DISCHARGE SUMMARY    This is a female infant, delivered at Gestational Age: 39w3d.      Admit date: 4/15/18    Discharge date and time: 18 12:45pm    Admission Diagnoses: [place]    Discharge Diagnoses:  [place]      Active Hospital Problems    Diagnosis Date Noted   • Term  delivered by  section, current hospitalization 2018     Priority: Low   • [place] suspected to be affected by chorioamnionitis 2018     Priority: Low   • Need for observation and evaluation of  for sepsis 2018     Priority: Low   • Superficial swelling of scalp 2018     Priority: Low             Admission Condition: good    Discharge Condition: good    Hospital Course:   weight down 10.5% at time of discharge  · Maternal chorio - cultures negative to date. Abx x 48 hours. Monitored x 24 hours off abx without any event  ·  hypoglycemia/Feeding - Initially received glucose gel x2. Had some issues with feeding and got supplementation. Mother's milk in at time of d/c, but patient not transferring milk well. At time of d/c, mom instructed to put to breast and supplement w at least 1/2 oz ebm (or formula) q feed. No further issues with glucose.     · Thu 1+ /  AO incompatibility - Bilis monitored, remained in LIR range. Prior to d/c,  Serum bilis in LIR range and no concern on subsequent tcbs  · Fluid collection - initial swelling to head quickly improved without issue    Feeding: brest  Micturition: positive  Stool: positive  Hearing screen: passed    Consults: Lactation consultation    Significant Diagnostic Studies:   Transcutaneous bilirubin: LIR prior to d/c       Treatments: Routine care  IV abx, IVF as per orders    Disposition: to home with mother    Patient Instructions:   · Home with parents  · Routine discharge and home care information reviewed.  On , discussed feeding, monitoring urine and stool output to ensure adequate intake, jaundice, back sleeping and general 
Exertional Shortness of Breath
Sudden infant death syndrome prevention, monitoring for fever and signs of sepsis.  · All questions were answered.  · Follow up with PCP in 1 days.           
decreased PO intake
CEM resolved.   Hypokalemia and hyponatremia
Poor PO intake
patient on methadone for chronic back pain, pain and anxiety meds on hold now for prolonged qtc, consult for optimizing pain regimen for patient, thank you

## 2025-02-23 LAB
ALBUMIN SERPL ELPH-MCNC: 3.4 G/DL — LOW (ref 3.5–5.2)
ALP SERPL-CCNC: 167 U/L — HIGH (ref 30–115)
ALT FLD-CCNC: 20 U/L — SIGNIFICANT CHANGE UP (ref 0–41)
ANION GAP SERPL CALC-SCNC: 12 MMOL/L — SIGNIFICANT CHANGE UP (ref 7–14)
AST SERPL-CCNC: 43 U/L — HIGH (ref 0–41)
BASOPHILS # BLD AUTO: 0.06 K/UL — SIGNIFICANT CHANGE UP (ref 0–0.2)
BASOPHILS NFR BLD AUTO: 1.1 % — HIGH (ref 0–1)
BILIRUB SERPL-MCNC: 0.6 MG/DL — SIGNIFICANT CHANGE UP (ref 0.2–1.2)
BUN SERPL-MCNC: 9 MG/DL — LOW (ref 10–20)
CALCIUM SERPL-MCNC: 8.9 MG/DL — SIGNIFICANT CHANGE UP (ref 8.4–10.5)
CHLORIDE SERPL-SCNC: 106 MMOL/L — SIGNIFICANT CHANGE UP (ref 98–110)
CO2 SERPL-SCNC: 22 MMOL/L — SIGNIFICANT CHANGE UP (ref 17–32)
CREAT SERPL-MCNC: 0.9 MG/DL — SIGNIFICANT CHANGE UP (ref 0.7–1.5)
EGFR: 72 ML/MIN/1.73M2 — SIGNIFICANT CHANGE UP
EGFR: 72 ML/MIN/1.73M2 — SIGNIFICANT CHANGE UP
EOSINOPHIL # BLD AUTO: 0.21 K/UL — SIGNIFICANT CHANGE UP (ref 0–0.7)
EOSINOPHIL NFR BLD AUTO: 4 % — SIGNIFICANT CHANGE UP (ref 0–8)
GLUCOSE BLDC GLUCOMTR-MCNC: 101 MG/DL — HIGH (ref 70–99)
GLUCOSE BLDC GLUCOMTR-MCNC: 78 MG/DL — SIGNIFICANT CHANGE UP (ref 70–99)
GLUCOSE BLDC GLUCOMTR-MCNC: 91 MG/DL — SIGNIFICANT CHANGE UP (ref 70–99)
GLUCOSE BLDC GLUCOMTR-MCNC: 96 MG/DL — SIGNIFICANT CHANGE UP (ref 70–99)
GLUCOSE SERPL-MCNC: 73 MG/DL — SIGNIFICANT CHANGE UP (ref 70–99)
HCT VFR BLD CALC: 41.7 % — SIGNIFICANT CHANGE UP (ref 37–47)
HGB BLD-MCNC: 13.1 G/DL — SIGNIFICANT CHANGE UP (ref 12–16)
IMM GRANULOCYTES NFR BLD AUTO: 1.3 % — HIGH (ref 0.1–0.3)
LYMPHOCYTES # BLD AUTO: 1.03 K/UL — LOW (ref 1.2–3.4)
LYMPHOCYTES # BLD AUTO: 19.5 % — LOW (ref 20.5–51.1)
MAGNESIUM SERPL-MCNC: 1.9 MG/DL — SIGNIFICANT CHANGE UP (ref 1.8–2.4)
MCHC RBC-ENTMCNC: 30.8 PG — SIGNIFICANT CHANGE UP (ref 27–31)
MCHC RBC-ENTMCNC: 31.4 G/DL — LOW (ref 32–37)
MCV RBC AUTO: 98.1 FL — SIGNIFICANT CHANGE UP (ref 81–99)
MONOCYTES # BLD AUTO: 0.38 K/UL — SIGNIFICANT CHANGE UP (ref 0.1–0.6)
MONOCYTES NFR BLD AUTO: 7.2 % — SIGNIFICANT CHANGE UP (ref 1.7–9.3)
NEUTROPHILS # BLD AUTO: 3.52 K/UL — SIGNIFICANT CHANGE UP (ref 1.4–6.5)
NEUTROPHILS NFR BLD AUTO: 66.9 % — SIGNIFICANT CHANGE UP (ref 42.2–75.2)
NRBC BLD AUTO-RTO: 0 /100 WBCS — SIGNIFICANT CHANGE UP (ref 0–0)
PHOSPHATE SERPL-MCNC: 3.9 MG/DL — SIGNIFICANT CHANGE UP (ref 2.1–4.9)
PLATELET # BLD AUTO: 234 K/UL — SIGNIFICANT CHANGE UP (ref 130–400)
PMV BLD: 10.1 FL — SIGNIFICANT CHANGE UP (ref 7.4–10.4)
POTASSIUM SERPL-MCNC: 4.2 MMOL/L — SIGNIFICANT CHANGE UP (ref 3.5–5)
POTASSIUM SERPL-SCNC: 4.2 MMOL/L — SIGNIFICANT CHANGE UP (ref 3.5–5)
PROT SERPL-MCNC: 6.1 G/DL — SIGNIFICANT CHANGE UP (ref 6–8)
RBC # BLD: 4.25 M/UL — SIGNIFICANT CHANGE UP (ref 4.2–5.4)
RBC # FLD: 15.1 % — HIGH (ref 11.5–14.5)
SODIUM SERPL-SCNC: 140 MMOL/L — SIGNIFICANT CHANGE UP (ref 135–146)
TROPONIN T, HIGH SENSITIVITY RESULT: 10 NG/L — SIGNIFICANT CHANGE UP (ref 6–13)
WBC # BLD: 5.27 K/UL — SIGNIFICANT CHANGE UP (ref 4.8–10.8)
WBC # FLD AUTO: 5.27 K/UL — SIGNIFICANT CHANGE UP (ref 4.8–10.8)

## 2025-02-23 PROCEDURE — 99238 HOSP IP/OBS DSCHRG MGMT 30/<: CPT

## 2025-02-23 PROCEDURE — 93010 ELECTROCARDIOGRAM REPORT: CPT

## 2025-02-23 RX ORDER — ONDANSETRON HCL/PF 4 MG/2 ML
4 VIAL (ML) INJECTION ONCE
Refills: 0 | Status: DISCONTINUED | OUTPATIENT
Start: 2025-02-23 | End: 2025-02-23

## 2025-02-23 RX ADMIN — CYCLOBENZAPRINE HYDROCHLORIDE 5 MILLIGRAM(S): 15 CAPSULE, EXTENDED RELEASE ORAL at 05:51

## 2025-02-23 RX ADMIN — Medication 975 MILLIGRAM(S): at 15:07

## 2025-02-23 RX ADMIN — Medication 5 MILLIGRAM(S): at 21:23

## 2025-02-23 RX ADMIN — CYCLOBENZAPRINE HYDROCHLORIDE 5 MILLIGRAM(S): 15 CAPSULE, EXTENDED RELEASE ORAL at 21:23

## 2025-02-23 RX ADMIN — POLYETHYLENE GLYCOL 3350 17 GRAM(S): 17 POWDER, FOR SOLUTION ORAL at 11:37

## 2025-02-23 RX ADMIN — Medication 975 MILLIGRAM(S): at 21:23

## 2025-02-23 RX ADMIN — BUMETANIDE 0.5 MILLIGRAM(S): 1 TABLET ORAL at 11:38

## 2025-02-23 RX ADMIN — Medication 100 MICROGRAM(S): at 05:51

## 2025-02-23 RX ADMIN — CYANOCOBALAMIN 1000 MICROGRAM(S): 1000 INJECTION INTRAMUSCULAR; SUBCUTANEOUS at 11:37

## 2025-02-23 RX ADMIN — Medication 975 MILLIGRAM(S): at 05:51

## 2025-02-23 RX ADMIN — SERTRALINE 200 MILLIGRAM(S): 100 TABLET, FILM COATED ORAL at 11:38

## 2025-02-23 RX ADMIN — Medication 30 MILLIGRAM(S): at 13:09

## 2025-02-23 RX ADMIN — Medication 1 APPLICATION(S): at 05:51

## 2025-02-23 RX ADMIN — Medication 2 TABLET(S): at 21:23

## 2025-02-23 RX ADMIN — CYCLOBENZAPRINE HYDROCHLORIDE 5 MILLIGRAM(S): 15 CAPSULE, EXTENDED RELEASE ORAL at 13:09

## 2025-02-23 RX ADMIN — Medication 975 MILLIGRAM(S): at 13:09

## 2025-02-23 RX ADMIN — LAMOTRIGINE 150 MILLIGRAM(S): 150 TABLET ORAL at 11:38

## 2025-02-23 RX ADMIN — FOLIC ACID 1 MILLIGRAM(S): 1 TABLET ORAL at 11:37

## 2025-02-23 RX ADMIN — Medication 40 MILLIGRAM(S): at 05:51

## 2025-02-23 RX ADMIN — Medication 30 MILLIGRAM(S): at 21:22

## 2025-02-23 RX ADMIN — Medication 30 MILLIGRAM(S): at 05:50

## 2025-02-23 NOTE — PROGRESS NOTE ADULT - ASSESSMENT
64-year-old female with a history of asthma, hypothyroidism, anxiety, depression, MVP, Jaclyn-en-Y gastric bypass, appendectomy, cholecystectomy, and chronic back pain (on methadone) presented for decreased PO intake x3-4 weeks, diffuse abdominal pain, and weakness.    # dyspnea on exertion- r/o anginal equivalent  # possible cardiac/pulm shunt  # COPD- stable  COPD stable  - TTE 11/2024 --> 60-65% ; suspected cardiac/pulmonary shunt.   - C/w home inhalers  - cont Bumex every other day; monitor electrolytes  CXR- no acute cardiopulm process  BNP- 900  d/w cardiology- recommended stress test- ordered; NPO after midnight    # decreased PO intake  # wt loss  # Diffuse Abdominal Pain in setting of Hx of Jaclyn-en-Y Bypass- 2006   - EGD/ Colonoscopy 5/2023: Non erosive gastritis, Repeat 11/2024  - CTA abdomen 11/13: No acute abdominal or pelvic pathology. Atherosclerotic calcifications of the aorta and its branches. No evidence of celiac axis or SMA abnormality.  -Bariatric surgery evalauted   - Bariatric diet was recommended but patient is refusing to eat anything if she has bariatric diet - d/w patient and started on dash diet- for small frequent meals; low carbt; increase protein  - start on ensure supplement  - Vit D (wnl), zinc (66)- normal  - Per GI: low yield w/ repeat EGD as pt had a recent endoscopy in 11/2024 and it will likely be unchanged,   -continue with daily PPI but change to open capsule omeprazole 40 mg once daily, trial of dicyclomine 10 mg q8h PRN  - Acetaminophen PRN for pain  - hep panel negative     #Severe Hypokalemia and Moderate Hyponatremia in setting of Decreased PO Intake and Bumex Use- resolved  #Prolonged QTC in setting of Hypokalemia and Multiple QTC-Prolonging Meds- resolved  #CEM on CKD   - CTAP this admission negative for any acute pathology  monitor BMP    #DM  - A1c last admission 7 --> now a1c 5.6  - Had episodes of hypoglycemia  - If glucose consistently >150 can start ISS    #Hypothyroidism  - C/w home levothyroxine    #Anxiety  #Depression  - c/w home Lamotrigine, Zoloft- were initially held due to prolonged Qtc - now restarted  - At home also on Xanax    #Chronic Back Pain w/ Hx of Laminectomy   - c/w home Methadone 30mg q8h  - On carisoprodol at home ;  cyclobenzaprine here     #Alcohol Use Disorder  #Hepatic Steatosis  - Previously 2 scotches per night or more  - No alcohol since November per pt  - RUQ US last admission showed fatty liver likely due to obesity/alcohol use  - C/w B12 and Folate supplement    - Code Status: Full Code  - DVT ppx: Heparin subcutaneous    Pending: stress test, PT follow up

## 2025-02-23 NOTE — PROGRESS NOTE ADULT - ASSESSMENT
1] Exertional Shortness of Breath, ?Anginal Equivalent      ASHD  - Option of pharmacological nuclear stress test offered and discussed.  Will recommend lexiscan nuclear stress test  - Further recommendations pending results of of NST    2] Hypertension  - Continue to monitor    3] Pulmonary Shunt  - No color flow doppler across interatrial septum on review of echo.   Agitated contrast showed bubbles crossing suggestive of PFO  - No intervention warranted at this time    For nuclear stress testing in am of Feb 24, 2025.    Ramon Cordova MD  [phone] Office  On TEAMS      Will follow    Ramon Cordova MD(covering for Dr. Emiliano Negrete)  [phone] Office  On TEAMS

## 2025-02-23 NOTE — PROGRESS NOTE ADULT - SUBJECTIVE AND OBJECTIVE BOX
Patient was seen and examined earlier today.  EMR reviewed.    No events during the night.  No new complaints.  NST not done; patient was not on NPO status.    Vitals:  T(C): 36.6 (02-23-25 @ 15:00), Max: 36.6 (02-23-25 @ 07:23)  HR: 66 (02-23-25 @ 15:00) (55 - 70)  BP: 117/75 (02-23-25 @ 15:00) (112/71 - 131/80)  RR: 17 (02-23-25 @ 07:23) (17 - 18)  SpO2: 96% (02-23-25 @ 15:00) (93% - 96%)    LABS:                        13.1   5.27  )-----------( 234      ( 23 Feb 2025 05:33 )             41.7     02-23    140  |  106  |  9[L]  ----------------------------<  73  4.2   |  22  |  0.9    Ca    8.9      23 Feb 2025 05:33  Phos  3.9     02-23  Mg     1.9     02-23    TPro  6.1  /  Alb  3.4[L]  /  TBili  0.6  /  DBili  x   /  AST  43[H]  /  ALT  20  /  AlkPhos  167[H]  02-23      MEDICATIONS  (STANDING):  acetaminophen     Tablet .. 975 milliGRAM(s) Oral every 8 hours  buMETAnide 0.5 milliGRAM(s) Oral <User Schedule>  chlorhexidine 2% Cloths 1 Application(s) Topical <User Schedule>  cyanocobalamin 1000 MICROGram(s) Oral daily  cyclobenzaprine 5 milliGRAM(s) Oral three times a day  folic acid 1 milliGRAM(s) Oral daily  heparin   Injectable 5000 Unit(s) SubCutaneous every 12 hours  influenza   Vaccine 0.5 milliLiter(s) IntraMuscular once  lamoTRIgine 150 milliGRAM(s) Oral daily  levothyroxine 100 MICROGram(s) Oral daily  melatonin 5 milliGRAM(s) Oral at bedtime  methadone    Tablet 30 milliGRAM(s) Oral every 8 hours  pantoprazole    Tablet 40 milliGRAM(s) Oral before breakfast  polyethylene glycol 3350 17 Gram(s) Oral daily  regadenoson Injectable 0.4 milliGRAM(s) IV Push once  senna 2 Tablet(s) Oral at bedtime  sertraline 200 milliGRAM(s) Oral daily    MEDICATIONS  (PRN):  albuterol    90 MICROgram(s) HFA Inhaler 2 Puff(s) Inhalation every 6 hours PRN Shortness of Breath and/or Wheezing  albuterol/ipratropium for Nebulization 3 milliLiter(s) Nebulizer every 6 hours PRN Shortness of Breath and/or Wheezing  dicyclomine 10 milliGRAM(s) Oral three times a day before meals PRN Abd Pain  fluticasone propionate/ salmeterol 250-50 MICROgram(s) Diskus 1 Dose(s) Inhalation two times a day PRN for bronchospasm  lactulose Syrup 10 Gram(s) Oral daily PRN for constipation      PHYSICAL EXAM:  Constitutional: appears stated age, well developed/nourished, no acute distress  Eyes: EOM's intact.  PERRLA  ENMT: Normocephalic, atraumatic.   Neck: Jugular veins non-distended; no carotid bruits bilaterally.  Respiratory: respiratory pattern unlabored; no dullness to percussion; lungs clear to auscultation bilaterally.  Cardiovascular: Regular rhythm.  S1 and S2 normal.  No murmur nor rub appreciated.  Abdomen: Soft, non-tender.  Normal bowel sounds.  Extremities: extremities warm; edema in both legs  Pulses: Intact bilaterally  Skin: No gross abnormalities noted.  Musculoskeletal: No gross deformities  Neurological: Alert, oriented x 3.  No focal neurologic deficits noted.

## 2025-02-23 NOTE — PROGRESS NOTE ADULT - ASSESSMENT
64-year-old female with a history of asthma, hypothyroidism, anxiety, depression, MVP, Jaclyn-en-Y gastric bypass, appendectomy, cholecystectomy, and chronic back pain (on methadone) presented for decreased PO intake x3-4 weeks, diffuse abdominal pain, and weakness.    #Severe Hypokalemia and Moderate Hyponatremia in setting of Decreased PO Intake and Bumex Use- IMPROVED   #Prolonged QTC in setting of Hypokalemia and Multiple QTC-Prolonging Meds- IMPROVED   #CEM on CKD   - K 2.1, Na 126 on admission: now improved   - CTA abdomen 11/13: No acute abdominal or pelvic pathology. Atherosclerotic calcifications of the aorta and its branches. No evidence of celiac axis or SMA abnormality.  - CTAP this admission negative for any acute pathology  - No infectious etiology suspected  - Had been on tele   - Keep Mg >2, replete K to >4  - Nephro had been following   - PT eval- rec KYLE  - Qtc on admission: 630 --> 2/17: 465   - Was holding bumex (At home on 1mg daily) --> Restarted 0.5 every other day  - restarted home methadone (30 TID)      #Poor PO intake  #Pt reports 50lb weight loss in last 2 months  #Diffuse Abdominal Pain in setting of Hx of Jaclyn-en-Y Bypass- 2006   - Endorses lack of appetite and early satiety x3-4 weeks  - EGD/ Colonoscopy 5/2023: Non erosive gastritis, Repeat 11/2024  - CTA abdomen 11/13: No acute abdominal or pelvic pathology. Atherosclerotic calcifications of the aorta and its branches. No evidence of celiac axis or SMA abnormality.  - Bariatric surgery consult appreciated  - Pt had gastric bypass 2006  - Dietician eval- on calorie count- not meeting requirements   - Vit D (wnl), vit b1 (wnl), zinc (66)   - Bariatric diet was recommended but patient is refusing to eat anything if she has bariatric diet - now on Regular diet: supplemented with ensure   - Per GI: low yield w/ repeat EGD as pt had a recent endoscopy in 11/2024 and it will likely be unchanged, continue with daily PPI but change to open capsule omeprazole 40 mg once daily, trial of dicyclomine 10 mg q8h PRN  - Bariatric surg: was following- Regular DASH diet is contributing to n/v due to bypass anatomy- she should switch to small frequent meals, avoid carbonation and straws, and avoid carbs like bread and pasta. She does not want to try the linette diet to see if it will help due to not being appetizing to her. She can also add protein shakes to improve her protein and nutrition. She needs to try and eat a high protein low carb diet for her new onset diabetes as well. If patient unwilling to comply with diet then will be difficult to offer any surgical intervention due to lack of post-op compliance. This was explained to the patient and she understood. She does not want to try the linette diet at this time  - Acetaminophen PRN for pain  - hep panel negative     #Dyspnea - now on 2L NC  #Hx of COPD on Home O2 PRN not in Exacerbation  #B/l LE edema   - Pt reporting new cough and sore throat 2/21- miniRVP neg  - TTE 2024 --> 60-65% EF  - TTE with bubbles 11/19: suspected pulmonary shunt  -   - D/w cardiothoracic surgery: recommended cardiology eval prior to surgery consult.  - cards- recommended stress test- ordered  - C/w home inhalers  - Restarted bumex 0.5 every other day - supplement K as needed      #DM  - A1c last admission 7 --> now a1c 5.6  - Had episodes of hypoglycemia  - If glucose consistently >150 can start ISS      #Hypothyroidism  - C/w home levothyroxine    #Anxiety  #Depression  - c/w home Lamotrigine, Zoloft- were initially held due to prolonged Qtc - now restarted  - At home also on Xanax       #Chronic Back Pain w/ Hx of Laminectomy   - c/w home Methadone 30mg q8h  - On carisoprodol at home   - cyclobenzaprine here       #Alcohol Use Disorder  #Hepatic Steatosis  - Previously 2 scotches per night or more  - No alcohol since November per pt  - RUQ US last admission showed fatty liver likely due to obesity/alcohol use  - C/w B12 and Folate supplement    #MISC  - Code Status: Full Code  - DVT ppx: Heparin subcutaneous  - GI ppx: Protonix  - Diet: Regular diet: supplemented with ensure (refusing bariatric diet)   - Activity: AAT  - Discharge Dispo: From Home    Pending:  - Calorie count  - Restarted bumex 0.5 every other day with K supplementation   - Monitor electrolytes   - stress test

## 2025-02-23 NOTE — PROGRESS NOTE ADULT - SUBJECTIVE AND OBJECTIVE BOX
SUBJECTIVE/OVERNIGHT EVENTS  Today is hospital day 12d.  No acute or major events overnight.      MEDICATIONS  STANDING MEDICATIONS  acetaminophen     Tablet .. 975 milliGRAM(s) Oral every 8 hours  buMETAnide 0.5 milliGRAM(s) Oral <User Schedule>  chlorhexidine 2% Cloths 1 Application(s) Topical <User Schedule>  cyanocobalamin 1000 MICROGram(s) Oral daily  cyclobenzaprine 5 milliGRAM(s) Oral three times a day  folic acid 1 milliGRAM(s) Oral daily  heparin   Injectable 5000 Unit(s) SubCutaneous every 12 hours  influenza   Vaccine 0.5 milliLiter(s) IntraMuscular once  lamoTRIgine 150 milliGRAM(s) Oral daily  levothyroxine 100 MICROGram(s) Oral daily  melatonin 5 milliGRAM(s) Oral at bedtime  methadone    Tablet 30 milliGRAM(s) Oral every 8 hours  pantoprazole    Tablet 40 milliGRAM(s) Oral before breakfast  polyethylene glycol 3350 17 Gram(s) Oral daily  regadenoson Injectable 0.4 milliGRAM(s) IV Push once  senna 2 Tablet(s) Oral at bedtime  sertraline 200 milliGRAM(s) Oral daily    PRN MEDICATIONS  albuterol    90 MICROgram(s) HFA Inhaler 2 Puff(s) Inhalation every 6 hours PRN  albuterol/ipratropium for Nebulization 3 milliLiter(s) Nebulizer every 6 hours PRN  dicyclomine 10 milliGRAM(s) Oral three times a day before meals PRN  fluticasone propionate/ salmeterol 250-50 MICROgram(s) Diskus 1 Dose(s) Inhalation two times a day PRN  lactulose Syrup 10 Gram(s) Oral daily PRN    VITALS  T(F): 97.5 (02-22-25 @ 15:00), Max: 97.7 (02-22-25 @ 00:25)  HR: 63 (02-22-25 @ 15:00) (59 - 63)  BP: 131/80 (02-22-25 @ 15:00) (115/73 - 131/80)  RR: 18 (02-22-25 @ 00:25) (18 - 18)  SpO2: 96% (02-22-25 @ 15:00) (93% - 96%)  POCT Blood Glucose.: 96 mg/dL (02-22-25 @ 20:20)  POCT Blood Glucose.: 77 mg/dL (02-22-25 @ 16:52)  POCT Blood Glucose.: 112 mg/dL (02-22-25 @ 11:23)  POCT Blood Glucose.: 83 mg/dL (02-22-25 @ 07:39)    LABS             13.0   4.75  )-----------( 233      ( 02-22-25 @ 05:39 )             41.3     138  |  104  |  9   -------------------------<  77   02-22-25 @ 05:39  4.2  |  22  |  0.9    Ca      8.3     02-22-25 @ 05:39  Phos   3.7     02-22-25 @ 05:39  Mg     1.9     02-22-25 @ 05:39    TPro  5.7  /  Alb  3.3  /  TBili  0.7  /  DBili  x   /  AST  47  /  ALT  19  /  AlkPhos  168  /  GGT  x     02-22-25 @ 05:39      Troponin T, High Sensitivity Result: 9 ng/L (02-22-25 @ 20:00)  Troponin T, High Sensitivity Result: 9 ng/L (02-22-25 @ 18:00)  Pro-Brain Natriuretic Peptide: 906 pg/mL (02-21-25 @ 05:24)    Urinalysis Basic - ( 22 Feb 2025 05:39 )    Color: x / Appearance: x / SG: x / pH: x  Gluc: 77 mg/dL / Ketone: x  / Bili: x / Urobili: x   Blood: x / Protein: x / Nitrite: x   Leuk Esterase: x / RBC: x / WBC x   Sq Epi: x / Non Sq Epi: x / Bacteria: x

## 2025-02-23 NOTE — PROGRESS NOTE ADULT - SUBJECTIVE AND OBJECTIVE BOX
HPI  Patient is a 64y old Female who presents with a chief complaint of Hypokalemia (23 Feb 2025 00:20)    Currently admitted to medicine with the primary diagnosis of Adult failure to thrive       Today is hospital day 12d.     INTERVAL HPI / OVERNIGHT EVENTS:  Patient was seen and examined at bedside  No new complaints  patient had coffee in the morning and hence culdnt do stress test  Denies any complains of chest pain        PAST MEDICAL & SURGICAL HISTORY  Asthma  LAST ATTACK MANY YRS AGO    Gastroesophageal reflux disease without esophagitis    Hypothyroidism    Heart murmur    Anxiety    Depression    Mood disorder    Psoriasis    Constipation    Back pain  LOW    Morbid obesity    S/P tonsillectomy  1967    History of appendectomy  1974    Abscess of back  EVACUATION OF ABSCESS @ L5-S1 1997    History of Jaclyn-en-Y gastric bypass  WITH CHOLECYSTECTOMY 2006      ALLERGIES  penicillin (Anaphylaxis)    MEDICATIONS  STANDING MEDICATIONS  acetaminophen     Tablet .. 975 milliGRAM(s) Oral every 8 hours  buMETAnide 0.5 milliGRAM(s) Oral <User Schedule>  chlorhexidine 2% Cloths 1 Application(s) Topical <User Schedule>  cyanocobalamin 1000 MICROGram(s) Oral daily  cyclobenzaprine 5 milliGRAM(s) Oral three times a day  folic acid 1 milliGRAM(s) Oral daily  heparin   Injectable 5000 Unit(s) SubCutaneous every 12 hours  influenza   Vaccine 0.5 milliLiter(s) IntraMuscular once  lamoTRIgine 150 milliGRAM(s) Oral daily  levothyroxine 100 MICROGram(s) Oral daily  melatonin 5 milliGRAM(s) Oral at bedtime  methadone    Tablet 30 milliGRAM(s) Oral every 8 hours  pantoprazole    Tablet 40 milliGRAM(s) Oral before breakfast  polyethylene glycol 3350 17 Gram(s) Oral daily  regadenoson Injectable 0.4 milliGRAM(s) IV Push once  senna 2 Tablet(s) Oral at bedtime  sertraline 200 milliGRAM(s) Oral daily    PRN MEDICATIONS  albuterol    90 MICROgram(s) HFA Inhaler 2 Puff(s) Inhalation every 6 hours PRN  albuterol/ipratropium for Nebulization 3 milliLiter(s) Nebulizer every 6 hours PRN  dicyclomine 10 milliGRAM(s) Oral three times a day before meals PRN  fluticasone propionate/ salmeterol 250-50 MICROgram(s) Diskus 1 Dose(s) Inhalation two times a day PRN  lactulose Syrup 10 Gram(s) Oral daily PRN    VITALS:  T(F): 97.8  HR: 66  BP: 117/75  RR: 17  SpO2: 96%    PHYSICAL EXAM  GEN: no distress, comfortable  PULM: normal respiration  EXT: No lower extremity edema  NEURO: A&Ox3, moving all extremities    LABS                        13.1   5.27  )-----------( 234      ( 23 Feb 2025 05:33 )             41.7     02-23    140  |  106  |  9[L]  ----------------------------<  73  4.2   |  22  |  0.9    Ca    8.9      23 Feb 2025 05:33  Phos  3.9     02-23  Mg     1.9     02-23    TPro  6.1  /  Alb  3.4[L]  /  TBili  0.6  /  DBili  x   /  AST  43[H]  /  ALT  20  /  AlkPhos  167[H]  02-23      Urinalysis Basic - ( 23 Feb 2025 05:33 )    Color: x / Appearance: x / SG: x / pH: x  Gluc: 73 mg/dL / Ketone: x  / Bili: x / Urobili: x   Blood: x / Protein: x / Nitrite: x   Leuk Esterase: x / RBC: x / WBC x   Sq Epi: x / Non Sq Epi: x / Bacteria: x                RADIOLOGY

## 2025-02-24 LAB
ALBUMIN SERPL ELPH-MCNC: 3.5 G/DL — SIGNIFICANT CHANGE UP (ref 3.5–5.2)
ALP SERPL-CCNC: 160 U/L — HIGH (ref 30–115)
ALT FLD-CCNC: 21 U/L — SIGNIFICANT CHANGE UP (ref 0–41)
ANION GAP SERPL CALC-SCNC: 12 MMOL/L — SIGNIFICANT CHANGE UP (ref 7–14)
AST SERPL-CCNC: 48 U/L — HIGH (ref 0–41)
BASOPHILS # BLD AUTO: 0.07 K/UL — SIGNIFICANT CHANGE UP (ref 0–0.2)
BASOPHILS NFR BLD AUTO: 1.4 % — HIGH (ref 0–1)
BILIRUB SERPL-MCNC: 0.6 MG/DL — SIGNIFICANT CHANGE UP (ref 0.2–1.2)
BUN SERPL-MCNC: 10 MG/DL — SIGNIFICANT CHANGE UP (ref 10–20)
CALCIUM SERPL-MCNC: 8.9 MG/DL — SIGNIFICANT CHANGE UP (ref 8.4–10.5)
CHLORIDE SERPL-SCNC: 103 MMOL/L — SIGNIFICANT CHANGE UP (ref 98–110)
CO2 SERPL-SCNC: 24 MMOL/L — SIGNIFICANT CHANGE UP (ref 17–32)
CREAT SERPL-MCNC: 1.1 MG/DL — SIGNIFICANT CHANGE UP (ref 0.7–1.5)
EGFR: 57 ML/MIN/1.73M2 — LOW
EGFR: 57 ML/MIN/1.73M2 — LOW
EOSINOPHIL # BLD AUTO: 0.21 K/UL — SIGNIFICANT CHANGE UP (ref 0–0.7)
EOSINOPHIL NFR BLD AUTO: 4.1 % — SIGNIFICANT CHANGE UP (ref 0–8)
GLUCOSE BLDC GLUCOMTR-MCNC: 72 MG/DL — SIGNIFICANT CHANGE UP (ref 70–99)
GLUCOSE BLDC GLUCOMTR-MCNC: 81 MG/DL — SIGNIFICANT CHANGE UP (ref 70–99)
GLUCOSE BLDC GLUCOMTR-MCNC: 84 MG/DL — SIGNIFICANT CHANGE UP (ref 70–99)
GLUCOSE BLDC GLUCOMTR-MCNC: 85 MG/DL — SIGNIFICANT CHANGE UP (ref 70–99)
GLUCOSE SERPL-MCNC: 81 MG/DL — SIGNIFICANT CHANGE UP (ref 70–99)
HCT VFR BLD CALC: 40 % — SIGNIFICANT CHANGE UP (ref 37–47)
HGB BLD-MCNC: 12.8 G/DL — SIGNIFICANT CHANGE UP (ref 12–16)
IMM GRANULOCYTES NFR BLD AUTO: 1.8 % — HIGH (ref 0.1–0.3)
LYMPHOCYTES # BLD AUTO: 1.26 K/UL — SIGNIFICANT CHANGE UP (ref 1.2–3.4)
LYMPHOCYTES # BLD AUTO: 24.8 % — SIGNIFICANT CHANGE UP (ref 20.5–51.1)
MAGNESIUM SERPL-MCNC: 1.8 MG/DL — SIGNIFICANT CHANGE UP (ref 1.8–2.4)
MCHC RBC-ENTMCNC: 31.1 PG — HIGH (ref 27–31)
MCHC RBC-ENTMCNC: 32 G/DL — SIGNIFICANT CHANGE UP (ref 32–37)
MCV RBC AUTO: 97.1 FL — SIGNIFICANT CHANGE UP (ref 81–99)
MONOCYTES # BLD AUTO: 0.36 K/UL — SIGNIFICANT CHANGE UP (ref 0.1–0.6)
MONOCYTES NFR BLD AUTO: 7.1 % — SIGNIFICANT CHANGE UP (ref 1.7–9.3)
NEUTROPHILS # BLD AUTO: 3.09 K/UL — SIGNIFICANT CHANGE UP (ref 1.4–6.5)
NEUTROPHILS NFR BLD AUTO: 60.8 % — SIGNIFICANT CHANGE UP (ref 42.2–75.2)
NRBC BLD AUTO-RTO: 0 /100 WBCS — SIGNIFICANT CHANGE UP (ref 0–0)
PHOSPHATE SERPL-MCNC: 4.3 MG/DL — SIGNIFICANT CHANGE UP (ref 2.1–4.9)
PLATELET # BLD AUTO: 239 K/UL — SIGNIFICANT CHANGE UP (ref 130–400)
PMV BLD: 9.8 FL — SIGNIFICANT CHANGE UP (ref 7.4–10.4)
POTASSIUM SERPL-MCNC: 4.1 MMOL/L — SIGNIFICANT CHANGE UP (ref 3.5–5)
POTASSIUM SERPL-SCNC: 4.1 MMOL/L — SIGNIFICANT CHANGE UP (ref 3.5–5)
PROT SERPL-MCNC: 6.2 G/DL — SIGNIFICANT CHANGE UP (ref 6–8)
RBC # BLD: 4.12 M/UL — LOW (ref 4.2–5.4)
RBC # FLD: 15.2 % — HIGH (ref 11.5–14.5)
SODIUM SERPL-SCNC: 139 MMOL/L — SIGNIFICANT CHANGE UP (ref 135–146)
WBC # BLD: 5.08 K/UL — SIGNIFICANT CHANGE UP (ref 4.8–10.8)
WBC # FLD AUTO: 5.08 K/UL — SIGNIFICANT CHANGE UP (ref 4.8–10.8)

## 2025-02-24 PROCEDURE — 99232 SBSQ HOSP IP/OBS MODERATE 35: CPT

## 2025-02-24 PROCEDURE — 93016 CV STRESS TEST SUPVJ ONLY: CPT

## 2025-02-24 PROCEDURE — 93018 CV STRESS TEST I&R ONLY: CPT

## 2025-02-24 PROCEDURE — 78452 HT MUSCLE IMAGE SPECT MULT: CPT | Mod: 26

## 2025-02-24 RX ADMIN — CYCLOBENZAPRINE HYDROCHLORIDE 5 MILLIGRAM(S): 15 CAPSULE, EXTENDED RELEASE ORAL at 21:05

## 2025-02-24 RX ADMIN — Medication 40 MILLIGRAM(S): at 05:49

## 2025-02-24 RX ADMIN — Medication 975 MILLIGRAM(S): at 21:05

## 2025-02-24 RX ADMIN — Medication 30 MILLIGRAM(S): at 05:49

## 2025-02-24 RX ADMIN — Medication 975 MILLIGRAM(S): at 05:48

## 2025-02-24 RX ADMIN — Medication 5 MILLIGRAM(S): at 21:06

## 2025-02-24 RX ADMIN — Medication 2 TABLET(S): at 21:06

## 2025-02-24 RX ADMIN — Medication 100 MICROGRAM(S): at 05:48

## 2025-02-24 RX ADMIN — CYCLOBENZAPRINE HYDROCHLORIDE 5 MILLIGRAM(S): 15 CAPSULE, EXTENDED RELEASE ORAL at 05:51

## 2025-02-24 RX ADMIN — Medication 30 MILLIGRAM(S): at 20:24

## 2025-02-24 RX ADMIN — Medication 1 APPLICATION(S): at 05:50

## 2025-02-24 NOTE — PROGRESS NOTE ADULT - SUBJECTIVE AND OBJECTIVE BOX
24H events:    Patient is a 64y old Female who presents with a chief complaint of Hypokalemia (23 Feb 2025 20:35)    Primary diagnosis of Adult failure to thrive          Today is hospital day 13d.   This morning patient was seen and examined at bedside, resting comfortably in bed.    No acute or major events overnight.    Code Status:    PAST MEDICAL & SURGICAL HISTORY  Asthma  LAST ATTACK MANY YRS AGO    Gastroesophageal reflux disease without esophagitis    Hypothyroidism    Heart murmur    Anxiety    Depression    Mood disorder    Psoriasis    Constipation    Back pain  LOW    Morbid obesity    S/P tonsillectomy  1967    History of appendectomy  1974    Abscess of back  EVACUATION OF ABSCESS @ L5-S1 1997    History of Jaclyn-en-Y gastric bypass  WITH CHOLECYSTECTOMY 2006      SOCIAL HISTORY:  Social History:      ALLERGIES:  penicillin (Anaphylaxis)    MEDICATIONS:  STANDING MEDICATIONS  acetaminophen     Tablet .. 975 milliGRAM(s) Oral every 8 hours  buMETAnide 0.5 milliGRAM(s) Oral <User Schedule>  chlorhexidine 2% Cloths 1 Application(s) Topical <User Schedule>  cyanocobalamin 1000 MICROGram(s) Oral daily  cyclobenzaprine 5 milliGRAM(s) Oral three times a day  folic acid 1 milliGRAM(s) Oral daily  heparin   Injectable 5000 Unit(s) SubCutaneous every 12 hours  influenza   Vaccine 0.5 milliLiter(s) IntraMuscular once  lamoTRIgine 150 milliGRAM(s) Oral daily  levothyroxine 100 MICROGram(s) Oral daily  melatonin 5 milliGRAM(s) Oral at bedtime  methadone    Tablet 30 milliGRAM(s) Oral every 8 hours  pantoprazole    Tablet 40 milliGRAM(s) Oral before breakfast  polyethylene glycol 3350 17 Gram(s) Oral daily  regadenoson Injectable 0.4 milliGRAM(s) IV Push once  senna 2 Tablet(s) Oral at bedtime  sertraline 200 milliGRAM(s) Oral daily    PRN MEDICATIONS  albuterol    90 MICROgram(s) HFA Inhaler 2 Puff(s) Inhalation every 6 hours PRN  albuterol/ipratropium for Nebulization 3 milliLiter(s) Nebulizer every 6 hours PRN  dicyclomine 10 milliGRAM(s) Oral three times a day before meals PRN  fluticasone propionate/ salmeterol 250-50 MICROgram(s) Diskus 1 Dose(s) Inhalation two times a day PRN  lactulose Syrup 10 Gram(s) Oral daily PRN    VITALS:   T(F): 97.5  HR: 71  BP: 118/75  RR: 18  SpO2: 95%    PHYSICAL EXAM:  GENERAL: NAD, lying in bed comfortably  HEAD:  Atraumatic, Normocephalic  EYES: conjunctiva and sclera clear  CHEST/LUNG: Clear to auscultation bilaterally; No wheezing, or rubs. Unlabored respirations  HEART: Regular rate and rhythm; No murmurs  ABDOMEN: BSx4; Soft, nontender, nondistended  EXTREMITIES: No edema, skin changes on LE  NERVOUS SYSTEM:  A&Ox3, no focal deficits   SKIN: No rashes or lesions    LABS:                        12.8   5.08  )-----------( 239      ( 24 Feb 2025 06:48 )             40.0     02-23    140  |  106  |  9[L]  ----------------------------<  73  4.2   |  22  |  0.9    Ca    8.9      23 Feb 2025 05:33  Phos  3.9     02-23  Mg     1.9     02-23    TPro  6.1  /  Alb  3.4[L]  /  TBili  0.6  /  DBili  x   /  AST  43[H]  /  ALT  20  /  AlkPhos  167[H]  02-23      Urinalysis Basic - ( 23 Feb 2025 05:33 )    Color: x / Appearance: x / SG: x / pH: x  Gluc: 73 mg/dL / Ketone: x  / Bili: x / Urobili: x   Blood: x / Protein: x / Nitrite: x   Leuk Esterase: x / RBC: x / WBC x   Sq Epi: x / Non Sq Epi: x / Bacteria: x                RADIOLOGY:  CXR      CT

## 2025-02-24 NOTE — PROGRESS NOTE ADULT - ATTENDING COMMENTS
assessment by resident reviewed  - stress test pending  cardiology recommendation reviewed; possible PFO; no intervention now  PT follow up

## 2025-02-25 LAB
ALBUMIN SERPL ELPH-MCNC: 3.5 G/DL — SIGNIFICANT CHANGE UP (ref 3.5–5.2)
ALP SERPL-CCNC: 171 U/L — HIGH (ref 30–115)
ALT FLD-CCNC: 21 U/L — SIGNIFICANT CHANGE UP (ref 0–41)
ANION GAP SERPL CALC-SCNC: 14 MMOL/L — SIGNIFICANT CHANGE UP (ref 7–14)
AST SERPL-CCNC: 48 U/L — HIGH (ref 0–41)
BILIRUB SERPL-MCNC: 0.6 MG/DL — SIGNIFICANT CHANGE UP (ref 0.2–1.2)
BUN SERPL-MCNC: 10 MG/DL — SIGNIFICANT CHANGE UP (ref 10–20)
CALCIUM SERPL-MCNC: 8.9 MG/DL — SIGNIFICANT CHANGE UP (ref 8.4–10.5)
CHLORIDE SERPL-SCNC: 102 MMOL/L — SIGNIFICANT CHANGE UP (ref 98–110)
CO2 SERPL-SCNC: 23 MMOL/L — SIGNIFICANT CHANGE UP (ref 17–32)
CREAT SERPL-MCNC: 1.1 MG/DL — SIGNIFICANT CHANGE UP (ref 0.7–1.5)
EGFR: 57 ML/MIN/1.73M2 — LOW
EGFR: 57 ML/MIN/1.73M2 — LOW
GLUCOSE BLDC GLUCOMTR-MCNC: 101 MG/DL — HIGH (ref 70–99)
GLUCOSE BLDC GLUCOMTR-MCNC: 114 MG/DL — HIGH (ref 70–99)
GLUCOSE BLDC GLUCOMTR-MCNC: 125 MG/DL — HIGH (ref 70–99)
GLUCOSE BLDC GLUCOMTR-MCNC: 91 MG/DL — SIGNIFICANT CHANGE UP (ref 70–99)
GLUCOSE SERPL-MCNC: 111 MG/DL — HIGH (ref 70–99)
HCT VFR BLD CALC: 44.8 % — SIGNIFICANT CHANGE UP (ref 37–47)
HGB BLD-MCNC: 13.8 G/DL — SIGNIFICANT CHANGE UP (ref 12–16)
MAGNESIUM SERPL-MCNC: 1.9 MG/DL — SIGNIFICANT CHANGE UP (ref 1.8–2.4)
MCHC RBC-ENTMCNC: 30.2 PG — SIGNIFICANT CHANGE UP (ref 27–31)
MCHC RBC-ENTMCNC: 30.8 G/DL — LOW (ref 32–37)
MCV RBC AUTO: 98 FL — SIGNIFICANT CHANGE UP (ref 81–99)
NRBC BLD AUTO-RTO: 0 /100 WBCS — SIGNIFICANT CHANGE UP (ref 0–0)
PLATELET # BLD AUTO: 271 K/UL — SIGNIFICANT CHANGE UP (ref 130–400)
PMV BLD: 10.7 FL — HIGH (ref 7.4–10.4)
POTASSIUM SERPL-MCNC: 4.5 MMOL/L — SIGNIFICANT CHANGE UP (ref 3.5–5)
POTASSIUM SERPL-SCNC: 4.5 MMOL/L — SIGNIFICANT CHANGE UP (ref 3.5–5)
PROT SERPL-MCNC: 6.4 G/DL — SIGNIFICANT CHANGE UP (ref 6–8)
RBC # BLD: 4.57 M/UL — SIGNIFICANT CHANGE UP (ref 4.2–5.4)
RBC # FLD: 15.2 % — HIGH (ref 11.5–14.5)
SODIUM SERPL-SCNC: 139 MMOL/L — SIGNIFICANT CHANGE UP (ref 135–146)
WBC # BLD: 5.29 K/UL — SIGNIFICANT CHANGE UP (ref 4.8–10.8)
WBC # FLD AUTO: 5.29 K/UL — SIGNIFICANT CHANGE UP (ref 4.8–10.8)

## 2025-02-25 PROCEDURE — 99232 SBSQ HOSP IP/OBS MODERATE 35: CPT | Mod: GC

## 2025-02-25 RX ADMIN — Medication 975 MILLIGRAM(S): at 13:29

## 2025-02-25 RX ADMIN — FOLIC ACID 1 MILLIGRAM(S): 1 TABLET ORAL at 11:43

## 2025-02-25 RX ADMIN — Medication 2 TABLET(S): at 21:02

## 2025-02-25 RX ADMIN — Medication 40 MILLIGRAM(S): at 05:10

## 2025-02-25 RX ADMIN — CYCLOBENZAPRINE HYDROCHLORIDE 5 MILLIGRAM(S): 15 CAPSULE, EXTENDED RELEASE ORAL at 05:10

## 2025-02-25 RX ADMIN — BUMETANIDE 0.5 MILLIGRAM(S): 1 TABLET ORAL at 10:03

## 2025-02-25 RX ADMIN — Medication 5 MILLIGRAM(S): at 21:02

## 2025-02-25 RX ADMIN — Medication 30 MILLIGRAM(S): at 21:02

## 2025-02-25 RX ADMIN — CYCLOBENZAPRINE HYDROCHLORIDE 5 MILLIGRAM(S): 15 CAPSULE, EXTENDED RELEASE ORAL at 13:29

## 2025-02-25 RX ADMIN — Medication 30 MILLIGRAM(S): at 13:29

## 2025-02-25 RX ADMIN — CYCLOBENZAPRINE HYDROCHLORIDE 5 MILLIGRAM(S): 15 CAPSULE, EXTENDED RELEASE ORAL at 21:02

## 2025-02-25 RX ADMIN — CYANOCOBALAMIN 1000 MICROGRAM(S): 1000 INJECTION INTRAMUSCULAR; SUBCUTANEOUS at 11:43

## 2025-02-25 RX ADMIN — SERTRALINE 200 MILLIGRAM(S): 100 TABLET, FILM COATED ORAL at 11:45

## 2025-02-25 RX ADMIN — Medication 975 MILLIGRAM(S): at 21:02

## 2025-02-25 RX ADMIN — Medication 1 APPLICATION(S): at 05:10

## 2025-02-25 RX ADMIN — POLYETHYLENE GLYCOL 3350 17 GRAM(S): 17 POWDER, FOR SOLUTION ORAL at 11:45

## 2025-02-25 RX ADMIN — LAMOTRIGINE 150 MILLIGRAM(S): 150 TABLET ORAL at 11:45

## 2025-02-25 RX ADMIN — Medication 30 MILLIGRAM(S): at 05:09

## 2025-02-25 RX ADMIN — Medication 975 MILLIGRAM(S): at 05:10

## 2025-02-25 RX ADMIN — Medication 100 MICROGRAM(S): at 05:10

## 2025-02-25 RX ADMIN — Medication 975 MILLIGRAM(S): at 14:13

## 2025-02-25 NOTE — PROGRESS NOTE ADULT - ASSESSMENT
64-year-old female with a history of asthma, hypothyroidism, anxiety, depression, MVP, Jaclyn-en-Y gastric bypass, appendectomy, cholecystectomy, and chronic back pain (on methadone) presented for decreased PO intake x3-4 weeks, diffuse abdominal pain, and weakness.    # dyspnea on exertion- r/o anginal equivalent  # possible cardiac/pulm shunt  # COPD- stable  COPD stable  - TTE 11/2024 --> 60-65% ; suspected cardiac/pulmonary shunt.   - C/w home inhalers  - cont Bumex every other day; monitor electrolytes  CXR- no acute cardiopulm process  BNP- 900  Lexican stress test normal     # decreased PO intake  # wt loss  # Diffuse Abdominal Pain in setting of Hx of Jaclyn-en-Y Bypass- 2006   - EGD/ Colonoscopy 5/2023: Non erosive gastritis, Repeat 11/2024  - CTA abdomen 11/13: No acute abdominal or pelvic pathology. Atherosclerotic calcifications of the aorta and its branches. No evidence of celiac axis or SMA abnormality.  -Bariatric surgery evalauted   - Bariatric diet was recommended but patient is refusing to eat anything if she has bariatric diet - d/w patient and started on dash diet- for small frequent meals; low carbt; increase protein  - start on ensure supplement  - Vit D (wnl), zinc (66)- normal  - Per GI: low yield w/ repeat EGD as pt had a recent endoscopy in 11/2024 and it will likely be unchanged,   -continue with daily PPI but change to open capsule omeprazole 40 mg once daily, trial of dicyclomine 10 mg q8h PRN  - Acetaminophen PRN for pain  - hep panel negative     #Severe Hypokalemia and Moderate Hyponatremia in setting of Decreased PO Intake and Bumex Use- resolved  #Prolonged QTC in setting of Hypokalemia and Multiple QTC-Prolonging Meds- resolved  #CEM on CKD   - CTAP this admission negative for any acute pathology  monitor BMP    #DM  - A1c last admission 7 --> now a1c 5.6  - Had episodes of hypoglycemia  - If glucose consistently >150 can start ISS    #Hypothyroidism  - C/w home levothyroxine    #Anxiety  #Depression  - c/w home Lamotrigine, Zoloft- were initially held due to prolonged Qtc - now restarted  - At home also on Xanax    #Chronic Back Pain w/ Hx of Laminectomy   - c/w home Methadone 30mg q8h  - On carisoprodol at home ;  cyclobenzaprine here     #Alcohol Use Disorder  #Hepatic Steatosis  - Previously 2 scotches per night or more  - No alcohol since November per pt  - RUQ US last admission showed fatty liver likely due to obesity/alcohol use  - C/w B12 and Folate supplement    - Code Status: Full Code  - DVT ppx: Heparin subcutaneous    Pending: PT follow up   64-year-old female with a history of asthma, hypothyroidism, anxiety, depression, MVP, Jaclyn-en-Y gastric bypass, appendectomy, cholecystectomy, and chronic back pain (on methadone) presented for decreased PO intake x3-4 weeks, diffuse abdominal pain, and weakness.    # dyspnea on exertion- r/o anginal equivalent  # possible cardiac/pulm shunt  # COPD- stable  COPD stable  - TTE 11/2024 --> 60-65% ; suspected cardiac/pulmonary shunt.   - C/w home inhalers  - cont Bumex every other day; monitor electrolytes  CXR- no acute cardiopulm process  BNP- 900  Lexican stress test normal   ambulatory O2 tests, echo for PHT    # decreased PO intake  # wt loss  # Diffuse Abdominal Pain in setting of Hx of Jaclyn-en-Y Bypass- 2006   - EGD/ Colonoscopy 5/2023: Non erosive gastritis, Repeat 11/2024  - CTA abdomen 11/13: No acute abdominal or pelvic pathology. Atherosclerotic calcifications of the aorta and its branches. No evidence of celiac axis or SMA abnormality.  -Bariatric surgery evalauted   - Bariatric diet was recommended but patient is refusing to eat anything if she has bariatric diet - d/w patient and started on dash diet- for small frequent meals; low carbt; increase protein  - start on ensure supplement  - Vit D (wnl), zinc (66)- normal  - Per GI: low yield w/ repeat EGD as pt had a recent endoscopy in 11/2024 and it will likely be unchanged,   -continue with daily PPI but change to open capsule omeprazole 40 mg once daily, trial of dicyclomine 10 mg q8h PRN  - Acetaminophen PRN for pain  - hep panel negative     #Severe Hypokalemia and Moderate Hyponatremia in setting of Decreased PO Intake and Bumex Use- resolved  #Prolonged QTC in setting of Hypokalemia and Multiple QTC-Prolonging Meds- resolved  #CEM on CKD   - CTAP this admission negative for any acute pathology  monitor BMP    #DM  - A1c last admission 7 --> now a1c 5.6  - Had episodes of hypoglycemia  - If glucose consistently >150 can start ISS    #Hypothyroidism  - C/w home levothyroxine    #Anxiety  #Depression  - c/w home Lamotrigine, Zoloft- were initially held due to prolonged Qtc - now restarted  - At home also on Xanax    #Chronic Back Pain w/ Hx of Laminectomy   - c/w home Methadone 30mg q8h  - On carisoprodol at home ;  cyclobenzaprine here     #Alcohol Use Disorder  #Hepatic Steatosis  - Previously 2 scotches per night or more  - No alcohol since November per pt  - RUQ US last admission showed fatty liver likely due to obesity/alcohol use  - C/w B12 and Folate supplement    - Code Status: Full Code  - DVT ppx: Heparin subcutaneous    Pending: PT follow up   No

## 2025-02-25 NOTE — PROGRESS NOTE ADULT - ATTENDING COMMENTS
64-year-old female with a history of asthma, hypothyroidism, anxiety, depression, MVP, Jaclyn-en-Y gastric bypass, appendectomy, cholecystectomy, and chronic back pain (on methadone) presented for decreased PO intake x3-4 weeks, diffuse abdominal pain, and weakness.    # dyspnea on exertion  # pulm shunt with PFO  # COPD- stable  - r/o pulm HTN  - TTE 11/2024 --> 60-65% ; suspected cardiac/pulmonary shunt. cardiology evaluated- no intervention warranted now  - C/w home inhalers  - cont Bumex every other day; monitor electrolytes  CXR- no acute cardiopulm process  BNP- 900  stress test- no ischemia  check echo-> r/o pulm HTN  wean off oxygen as tolerated; patient has home oxygen which she uses as needed  PT to follow- D/w PT    # decreased PO intake and wt loss  # Diffuse Abdominal Pain- improved  - Hx of Jaclyn-en-Y Bypass- 2006   - EGD/ Colonoscopy 5/2023: Non erosive gastritis, Repeat 11/2024  - CTA abdomen 11/13: No acute abdominal or pelvic pathology. Atherosclerotic calcifications of the aorta and its branches. No evidence of celiac axis or SMA abnormality.  -Bariatric surgery evaluated - Bariatric diet was recommended but patient is refusing to eat anything if she has bariatric diet - d/w patient and started on dash diet- for small frequent meals; low carb; increase protein  - start on ensure supplement  - Vit D (wnl), zinc (66)- normal  - Per GI: low yield w/ repeat EGD as pt had a recent endoscopy in 11/2024 and it will likely be unchanged,   -continue with daily PPI but change to open capsule omeprazole 40 mg once daily, trial of dicyclomine 10 mg q8h PRN  - Acetaminophen PRN for pain  - hep panel negative     #Severe Hypokalemia and Moderate Hyponatremia in setting of Decreased PO Intake and Bumex Use- resolved  #Prolonged QTC in setting of Hypokalemia and Multiple QTC-Prolonging Meds- resolved  #CEM on CKD   - CTAP this admission negative for any acute pathology  monitor BMP    #DM  - A1c last admission 7 --> now a1c 5.6  - Had episodes of hypoglycemia  - If glucose consistently >150 can start ISS    #Hypothyroidism  - C/w home levothyroxine    #Anxiety  #Depression  - c/w home Lamotrigine, Zoloft- were initially held due to prolonged Qtc - now restarted  - At home also on Xanax    #Chronic Back Pain w/ Hx of Laminectomy   - c/w home Methadone 30mg q8h  - On carisoprodol at home ;  cyclobenzaprine here     #Alcohol Use Disorder  #Hepatic Steatosis  - Previously 2 scotches per night or more  - No alcohol since November per pt  - RUQ US last admission showed fatty liver likely due to obesity/alcohol use  - C/w B12 and Folate supplement    - Code Status: Full Code  - DVT ppx: Heparin subcutaneous    Pending: echo, PT follow up- for ambulation and stairs, discharge planning  dispo: home vs rehab

## 2025-02-25 NOTE — CHART NOTE - NSCHARTNOTESELECT_GEN_ALL_CORE
Calorie Count/Nutrition Services
Calorie Count/Nutrition Services
Diet
Follow Up/Nutrition Services
iSTOP

## 2025-02-25 NOTE — CHART NOTE - NSCHARTNOTEFT_GEN_A_CORE
Registered Dietitian Follow-Up     Patient Profile Reviewed                           Yes []   No []     Nutrition History Previously Obtained        Yes []  No []       Pertinent Subjective Information: Pt endorses some nausea. Reports poor PO intake, however flowsheet states Good po intake. Pt dislikes EnsureMax. RD to discontinue order. Pt willing to try Magic cup as supplement Pt failed CC count 2/19- completing average intake of 430 kcal/day, 17 grams protein.      Pertinent Medical Interventions:  # dyspnea on exertion- r/o anginal equivalent  # possible cardiac/pulm shunt  # COPD- stable  # decreased PO intake  # wt loss  #Severe Hypokalemia and Moderate Hyponatremia in setting of Decreased PO Intake and Bumex  #DM  - A1c last admission 7 --> now a1c 5.6  # Diffuse Abdominal Pain in setting of Hx of Jaclyn-en-Y Bypass- 2006    #Hypothyroidism  #Alcohol Use Disorder  - Previously 2 scotches per night or more  - No alcohol since November per pt  #Hepatic Steatosis  #Anxiety  #Depression    Diet order: Diet, Regular:   Supplement Feeding Modality:  Oral  Ensure Max Cans or Servings Per Day:  1       Frequency:  Three Times a day (02-20-25 @ 14:34) [Active]      Anthropometrics:  Height (cm): 167.6 (02-19-25 @ 18:20)  Weight (kg): 96.9 (02-25-25 @ 16:08)  BMI (kg/m2): 34.5 (02-25-25 @ 16:08)  IBW: 59 kg  UBW: 94 kg    Daily   % Weight Change    MEDICATIONS  (STANDING):  acetaminophen     Tablet .. 975 milliGRAM(s) Oral every 8 hours  buMETAnide 0.5 milliGRAM(s) Oral <User Schedule>  chlorhexidine 2% Cloths 1 Application(s) Topical <User Schedule>  cyanocobalamin 1000 MICROGram(s) Oral daily  cyclobenzaprine 5 milliGRAM(s) Oral three times a day  folic acid 1 milliGRAM(s) Oral daily  heparin   Injectable 5000 Unit(s) SubCutaneous every 12 hours  influenza   Vaccine 0.5 milliLiter(s) IntraMuscular once  lamoTRIgine 150 milliGRAM(s) Oral daily  levothyroxine 100 MICROGram(s) Oral daily  melatonin 5 milliGRAM(s) Oral at bedtime  methadone    Tablet 30 milliGRAM(s) Oral every 8 hours  pantoprazole    Tablet 40 milliGRAM(s) Oral before breakfast  polyethylene glycol 3350 17 Gram(s) Oral daily  regadenoson Injectable 0.4 milliGRAM(s) IV Push once  senna 2 Tablet(s) Oral at bedtime  sertraline 200 milliGRAM(s) Oral daily       Pertinent Labs: 02-25 @ 05:37: Na 139, BUN 10, Cr 1.1, [H], K+ 4.5, Phos --, Mg 1.9, Alk Phos 171[H], ALT/SGPT 21, AST/SGOT 48[H], HbA1c 5.6    Finger Sticks:  POCT Blood Glucose.: 114 mg/dL (02-25 @ 16:01)  POCT Blood Glucose.: 101 mg/dL (02-25 @ 10:59)  POCT Blood Glucose.: 91 mg/dL (02-25 @ 07:24)  POCT Blood Glucose.: 81 mg/dL (02-24 @ 19:58)    Physical Findings:  - Appearance: aox4,   - GI function: Last Bowel Movement: 22-Feb-2025 (02-22-25 @ 13:49)| endorses nausea  - Tubes: n/a  - Oral/Mouth cavity: Regular  - Skin: No pressure injuries  - Edema: no edemanoted      Nutrition Requirements: Estimated energy and protein needs with consideration for weight maintenance/gain, BMI, age, mobility, , and comorbidities.   Weight Used: 96.9 kg | 56 kg BW      Estimated Energy Needs    Continue [20-25 kcal/kg]  Adjust []  Adjusted Energy Recommendations:  8778-2734 kcal/day        Estimated Protein Needs    Continue []  Adjust [0.8-1.0 g/kg]  Adjusted Protein Recommendations:   gm/day        Estimated Fluid Needs        Continue []  Adjust [x]  Adjusted Fluid Recommendations:  1 mL/lkcal//day     Nutrient Intake: % per FLowsheet.     [x] Previous Nutrition Diagnosis:Inadequate Protein Energy Intake  Related to persistent lack of appetite due to GI  discomfort, nausea upon eating  po intake <50%              [x] Ongoing          [] Resolved    [] No active nutrition diagnosis identified at this time     Nutrition Education:  encouraged Po intake, discussed formulary options --pt willing ot try magic cup.      Goal/Expected Outcome: Pt to consume and tolerate  4-6 small meals completing 50-75% of meals/snacks and PO supplements in 3-5 days.      Indicator/Monitoring: weekly anthroprometrics, GI S/S, -Lytes (Phos, Mag, K+), BM, I/Os, Labs, NFPF, GI fxn. Energy intake and tolerance.     Recommendation:   - c/w anti-emetics for nausea, encourage PO intake to mitigate nausea  - C/w B12 and Folate supplement  - consider Behavioral health consult for depression related poor po intake.   - c/w current diet order as tolerated   - d/c Ensure MAX as pt not drinking them  - Add Magic Cup qday to provide 290 kcal and 9 grams protein in per serving.     Montserrat Patterson x [phone] or Via TEAMS    [facility] Follow Up .

## 2025-02-25 NOTE — PROGRESS NOTE ADULT - SUBJECTIVE AND OBJECTIVE BOX
24H events:    Patient is a 64y old Female who presents with a chief complaint of Hypokalemia (23 Feb 2025 20:35)    Primary diagnosis of Adult failure to thrive          Today is hospital day 14d.   This morning patient was seen and examined at bedside, resting comfortably in bed.    No acute or major events overnight.    Code Status:    PAST MEDICAL & SURGICAL HISTORY  Asthma  LAST ATTACK MANY YRS AGO    Gastroesophageal reflux disease without esophagitis    Hypothyroidism    Heart murmur    Anxiety    Depression    Mood disorder    Psoriasis    Constipation    Back pain  LOW    Morbid obesity    S/P tonsillectomy  1967    History of appendectomy  1974    Abscess of back  EVACUATION OF ABSCESS @ L5-S1 1997    History of Jaclyn-en-Y gastric bypass  WITH CHOLECYSTECTOMY 2006      SOCIAL HISTORY:  Social History:      ALLERGIES:  penicillin (Anaphylaxis)    MEDICATIONS:  STANDING MEDICATIONS  acetaminophen     Tablet .. 975 milliGRAM(s) Oral every 8 hours  buMETAnide 0.5 milliGRAM(s) Oral <User Schedule>  chlorhexidine 2% Cloths 1 Application(s) Topical <User Schedule>  cyanocobalamin 1000 MICROGram(s) Oral daily  cyclobenzaprine 5 milliGRAM(s) Oral three times a day  folic acid 1 milliGRAM(s) Oral daily  heparin   Injectable 5000 Unit(s) SubCutaneous every 12 hours  influenza   Vaccine 0.5 milliLiter(s) IntraMuscular once  lamoTRIgine 150 milliGRAM(s) Oral daily  levothyroxine 100 MICROGram(s) Oral daily  melatonin 5 milliGRAM(s) Oral at bedtime  methadone    Tablet 30 milliGRAM(s) Oral every 8 hours  pantoprazole    Tablet 40 milliGRAM(s) Oral before breakfast  polyethylene glycol 3350 17 Gram(s) Oral daily  regadenoson Injectable 0.4 milliGRAM(s) IV Push once  senna 2 Tablet(s) Oral at bedtime  sertraline 200 milliGRAM(s) Oral daily    PRN MEDICATIONS  albuterol    90 MICROgram(s) HFA Inhaler 2 Puff(s) Inhalation every 6 hours PRN  albuterol/ipratropium for Nebulization 3 milliLiter(s) Nebulizer every 6 hours PRN  dicyclomine 10 milliGRAM(s) Oral three times a day before meals PRN  fluticasone propionate/ salmeterol 250-50 MICROgram(s) Diskus 1 Dose(s) Inhalation two times a day PRN  lactulose Syrup 10 Gram(s) Oral daily PRN    VITALS:   T(F): 97.8  HR: 60  BP: 124/76  RR: 18  SpO2: 97%    PHYSICAL EXAM:  GENERAL: NAD, lying in bed comfortably  HEAD:  Atraumatic, Normocephalic  EYES: conjunctiva and sclera clear  CHEST/LUNG: Clear to auscultation bilaterally; No wheezing, or rubs. Unlabored respirations  HEART: Regular rate and rhythm; No murmurs  ABDOMEN: BSx4; Soft, nontender, nondistended  EXTREMITIES: No edema, skin changes on LE  NERVOUS SYSTEM:  A&Ox3, no focal deficits   SKIN: No rashes or lesions      LABS:                        13.8   5.29  )-----------( 271      ( 25 Feb 2025 05:37 )             44.8     02-25    139  |  102  |  10  ----------------------------<  111[H]  4.5   |  23  |  1.1    Ca    8.9      25 Feb 2025 05:37  Phos  4.3     02-24  Mg     1.9     02-25    TPro  6.4  /  Alb  3.5  /  TBili  0.6  /  DBili  x   /  AST  48[H]  /  ALT  21  /  AlkPhos  171[H]  02-25      Urinalysis Basic - ( 25 Feb 2025 05:37 )    Color: x / Appearance: x / SG: x / pH: x  Gluc: 111 mg/dL / Ketone: x  / Bili: x / Urobili: x   Blood: x / Protein: x / Nitrite: x   Leuk Esterase: x / RBC: x / WBC x   Sq Epi: x / Non Sq Epi: x / Bacteria: x                RADIOLOGY:  CXR      CT

## 2025-02-26 ENCOUNTER — TRANSCRIPTION ENCOUNTER (OUTPATIENT)
Age: 65
End: 2025-02-26

## 2025-02-26 LAB
ALBUMIN SERPL ELPH-MCNC: 3.3 G/DL — LOW (ref 3.5–5.2)
ALP SERPL-CCNC: 163 U/L — HIGH (ref 30–115)
ALT FLD-CCNC: 21 U/L — SIGNIFICANT CHANGE UP (ref 0–41)
ANION GAP SERPL CALC-SCNC: 16 MMOL/L — HIGH (ref 7–14)
AST SERPL-CCNC: 50 U/L — HIGH (ref 0–41)
BILIRUB SERPL-MCNC: 0.6 MG/DL — SIGNIFICANT CHANGE UP (ref 0.2–1.2)
BUN SERPL-MCNC: 12 MG/DL — SIGNIFICANT CHANGE UP (ref 10–20)
CALCIUM SERPL-MCNC: 8.7 MG/DL — SIGNIFICANT CHANGE UP (ref 8.4–10.4)
CHLORIDE SERPL-SCNC: 105 MMOL/L — SIGNIFICANT CHANGE UP (ref 98–110)
CO2 SERPL-SCNC: 21 MMOL/L — SIGNIFICANT CHANGE UP (ref 17–32)
CREAT SERPL-MCNC: 1 MG/DL — SIGNIFICANT CHANGE UP (ref 0.7–1.5)
EGFR: 64 ML/MIN/1.73M2 — SIGNIFICANT CHANGE UP
EGFR: 64 ML/MIN/1.73M2 — SIGNIFICANT CHANGE UP
GLUCOSE BLDC GLUCOMTR-MCNC: 109 MG/DL — HIGH (ref 70–99)
GLUCOSE BLDC GLUCOMTR-MCNC: 123 MG/DL — HIGH (ref 70–99)
GLUCOSE BLDC GLUCOMTR-MCNC: 88 MG/DL — SIGNIFICANT CHANGE UP (ref 70–99)
GLUCOSE BLDC GLUCOMTR-MCNC: 93 MG/DL — SIGNIFICANT CHANGE UP (ref 70–99)
GLUCOSE SERPL-MCNC: 83 MG/DL — SIGNIFICANT CHANGE UP (ref 70–99)
HCT VFR BLD CALC: 41.1 % — SIGNIFICANT CHANGE UP (ref 37–47)
HGB BLD-MCNC: 12.9 G/DL — SIGNIFICANT CHANGE UP (ref 12–16)
MAGNESIUM SERPL-MCNC: 1.7 MG/DL — LOW (ref 1.8–2.4)
MCHC RBC-ENTMCNC: 30.7 PG — SIGNIFICANT CHANGE UP (ref 27–31)
MCHC RBC-ENTMCNC: 31.4 G/DL — LOW (ref 32–37)
MCV RBC AUTO: 97.9 FL — SIGNIFICANT CHANGE UP (ref 81–99)
NRBC BLD AUTO-RTO: 0 /100 WBCS — SIGNIFICANT CHANGE UP (ref 0–0)
PLATELET # BLD AUTO: 234 K/UL — SIGNIFICANT CHANGE UP (ref 130–400)
PMV BLD: 10.6 FL — HIGH (ref 7.4–10.4)
POTASSIUM SERPL-MCNC: 3.7 MMOL/L — SIGNIFICANT CHANGE UP (ref 3.5–5)
POTASSIUM SERPL-SCNC: 3.7 MMOL/L — SIGNIFICANT CHANGE UP (ref 3.5–5)
PROT SERPL-MCNC: 5.9 G/DL — LOW (ref 6–8)
RBC # BLD: 4.2 M/UL — SIGNIFICANT CHANGE UP (ref 4.2–5.4)
RBC # FLD: 15.1 % — HIGH (ref 11.5–14.5)
SODIUM SERPL-SCNC: 142 MMOL/L — SIGNIFICANT CHANGE UP (ref 135–146)
WBC # BLD: 5.12 K/UL — SIGNIFICANT CHANGE UP (ref 4.8–10.8)
WBC # FLD AUTO: 5.12 K/UL — SIGNIFICANT CHANGE UP (ref 4.8–10.8)

## 2025-02-26 PROCEDURE — 99233 SBSQ HOSP IP/OBS HIGH 50: CPT

## 2025-02-26 RX ORDER — ONDANSETRON HCL/PF 4 MG/2 ML
4 VIAL (ML) INJECTION DAILY
Refills: 0 | Status: DISCONTINUED | OUTPATIENT
Start: 2025-02-26 | End: 2025-03-02

## 2025-02-26 RX ORDER — MAGNESIUM SULFATE 500 MG/ML
2 SYRINGE (ML) INJECTION ONCE
Refills: 0 | Status: COMPLETED | OUTPATIENT
Start: 2025-02-26 | End: 2025-02-26

## 2025-02-26 RX ADMIN — Medication 1 DOSE(S): at 06:23

## 2025-02-26 RX ADMIN — Medication 975 MILLIGRAM(S): at 14:41

## 2025-02-26 RX ADMIN — Medication 30 MILLIGRAM(S): at 13:46

## 2025-02-26 RX ADMIN — Medication 100 MICROGRAM(S): at 05:01

## 2025-02-26 RX ADMIN — Medication 975 MILLIGRAM(S): at 21:28

## 2025-02-26 RX ADMIN — Medication 40 MILLIGRAM(S): at 05:01

## 2025-02-26 RX ADMIN — Medication 2 TABLET(S): at 21:28

## 2025-02-26 RX ADMIN — Medication 25 GRAM(S): at 13:47

## 2025-02-26 RX ADMIN — CYANOCOBALAMIN 1000 MICROGRAM(S): 1000 INJECTION INTRAMUSCULAR; SUBCUTANEOUS at 11:21

## 2025-02-26 RX ADMIN — Medication 30 MILLIGRAM(S): at 05:01

## 2025-02-26 RX ADMIN — POLYETHYLENE GLYCOL 3350 17 GRAM(S): 17 POWDER, FOR SOLUTION ORAL at 11:20

## 2025-02-26 RX ADMIN — FOLIC ACID 1 MILLIGRAM(S): 1 TABLET ORAL at 11:21

## 2025-02-26 RX ADMIN — Medication 30 MILLIGRAM(S): at 21:28

## 2025-02-26 RX ADMIN — Medication 975 MILLIGRAM(S): at 05:01

## 2025-02-26 RX ADMIN — CYCLOBENZAPRINE HYDROCHLORIDE 5 MILLIGRAM(S): 15 CAPSULE, EXTENDED RELEASE ORAL at 13:46

## 2025-02-26 RX ADMIN — CYCLOBENZAPRINE HYDROCHLORIDE 5 MILLIGRAM(S): 15 CAPSULE, EXTENDED RELEASE ORAL at 05:02

## 2025-02-26 RX ADMIN — SERTRALINE 200 MILLIGRAM(S): 100 TABLET, FILM COATED ORAL at 11:20

## 2025-02-26 RX ADMIN — CYCLOBENZAPRINE HYDROCHLORIDE 5 MILLIGRAM(S): 15 CAPSULE, EXTENDED RELEASE ORAL at 21:28

## 2025-02-26 RX ADMIN — Medication 975 MILLIGRAM(S): at 22:25

## 2025-02-26 RX ADMIN — Medication 5 MILLIGRAM(S): at 21:28

## 2025-02-26 RX ADMIN — Medication 975 MILLIGRAM(S): at 13:46

## 2025-02-26 RX ADMIN — Medication 4 MILLIGRAM(S): at 11:26

## 2025-02-26 RX ADMIN — Medication 1 APPLICATION(S): at 05:02

## 2025-02-26 RX ADMIN — LAMOTRIGINE 150 MILLIGRAM(S): 150 TABLET ORAL at 11:21

## 2025-02-26 NOTE — DISCHARGE NOTE PROVIDER - DATE OF DISCHARGE SERVICE:
[Follow-Up] : a follow-up visit for [Patient] : patient [Parents] : parents [FreeTextEntry3] : RODRICK 26-Feb-2025 02-Mar-2025

## 2025-02-26 NOTE — DISCHARGE NOTE PROVIDER - ATTENDING DISCHARGE PHYSICAL EXAMINATION:
T(C): 36.7 (03-02-25 @ 08:00), Max: 36.8 (03-01-25 @ 15:24)  HR: 75 (03-02-25 @ 08:00) (69 - 76)  BP: 135/80 (03-02-25 @ 08:00) (117/76 - 135/80)  RR: 18 (03-02-25 @ 08:00) (18 - 18)  SpO2: 95% (03-02-25 @ 08:00) (94% - 100%)    O/E:  Awake, alert, not in distress.  HEENT: atraumatic, EOMI.  Chest: clear.  CVS: SIS2 +, no murmur.  P/A: Soft, BS+  CNS: awake, alert  Ext: no edema feet.  All systems reviewed positive findings as above.

## 2025-02-26 NOTE — PROGRESS NOTE ADULT - ASSESSMENT
64-year-old female with a history of asthma, hypothyroidism, anxiety, depression, MVP, Jaclyn-en-Y gastric bypass, appendectomy, cholecystectomy, and chronic back pain (on methadone) presented for decreased PO intake x3-4 weeks, diffuse abdominal pain, and weakness.    # dyspnea on exertion- r/o anginal equivalent  # possible cardiac/pulm shunt  # COPD- stable  COPD stable  - TTE 11/2024 --> 60-65% ; suspected cardiac/pulmonary shunt.   - C/w home inhalers  - cont Bumex every other day; monitor electrolytes  CXR- no acute cardiopulm process  BNP- 900  Lexican stress test normal   ambulatory O2 tests, echo for PHT    # decreased PO intake  # wt loss  # Diffuse Abdominal Pain in setting of Hx of Jaclyn-en-Y Bypass- 2006   - EGD/ Colonoscopy 5/2023: Non erosive gastritis, Repeat 11/2024  - CTA abdomen 11/13: No acute abdominal or pelvic pathology. Atherosclerotic calcifications of the aorta and its branches. No evidence of celiac axis or SMA abnormality.  -Bariatric surgery evalauted   - Bariatric diet was recommended but patient is refusing to eat anything if she has bariatric diet - d/w patient and started on dash diet- for small frequent meals; low carbt; increase protein  - start on ensure supplement  - Vit D (wnl), zinc (66)- normal  - Per GI: low yield w/ repeat EGD as pt had a recent endoscopy in 11/2024 and it will likely be unchanged,   -continue with daily PPI but change to open capsule omeprazole 40 mg once daily, trial of dicyclomine 10 mg q8h PRN  - Acetaminophen PRN for pain  - hep panel negative     #Severe Hypokalemia and Moderate Hyponatremia in setting of Decreased PO Intake and Bumex Use- resolved  #Prolonged QTC in setting of Hypokalemia and Multiple QTC-Prolonging Meds- resolved  #CEM on CKD   - CTAP this admission negative for any acute pathology  monitor BMP    #DM  - A1c last admission 7 --> now a1c 5.6  - Had episodes of hypoglycemia  - If glucose consistently >150 can start ISS    #Hypothyroidism  - C/w home levothyroxine    #Anxiety  #Depression  - c/w home Lamotrigine, Zoloft- were initially held due to prolonged Qtc - now restarted  - At home also on Xanax    #Chronic Back Pain w/ Hx of Laminectomy   - c/w home Methadone 30mg q8h  - On carisoprodol at home ;  cyclobenzaprine here     #Alcohol Use Disorder  #Hepatic Steatosis  - Previously 2 scotches per night or more  - No alcohol since November per pt  - RUQ US last admission showed fatty liver likely due to obesity/alcohol use  - C/w B12 and Folate supplement    - Code Status: Full Code  - DVT ppx: Heparin subcutaneous    Pending: echo

## 2025-02-26 NOTE — DISCHARGE NOTE PROVIDER - HOSPITAL COURSE
4-year-old female with a history of asthma, hypothyroidism, anxiety, depression, MVP, Jaclyn-en-Y gastric bypass, appendectomy, cholecystectomy, and chronic back pain (on methadone) is presenting for decreased PO intake x3-4 weeks, diffuse abdominal pain, and weakness. She used to drink 2 scotchs per night but has not drank alcohol since she was hospitalized in November. She lives at home with her  whom helps care for her, however he recently was admitted to the hospital and is currently at a STR where she was left alone to care for herself. She states that she has had a non-specific generalized abdominal pain not associated with nausea, vomiting, or diarrhea. She has decreased PO intake and only eats 1-2 bites/sips of food or drink before she feels full. She was found to be significantly hypokalemic on admission and is admitted for further management. after a syncopal episode. She lost consciousness briefly while sitting after a shower. She also reported 5 days of worsening abdominal pain, nausea, vomiting, decreased oral intake, and constipation. She reports regular alcohol use (a couple of glasses of scotch nightly) and worsening shortness of breath for the past 2-3 months, requiring near-daily albuterol nebulizer treatments.    Of note, she was discharged from [facility] in December after a syncopal episode. Per previous documentation, the syncopal episode was likely vasovagal, though a prolonged QTc (603 on admission EKG) raised concern for arrhythmia. This was likely secondary to methadone and sertraline. Telemetry showed no arrhythmias. Echocardiogram showed normal LVEF (65%). CT chest was negative for PE. Abdominal pain workup, including CT abdomen and abdominal ultrasound, revealed hepatomegaly and fatty liver infiltration, but no acute pathology. Lipase was normal. LFTs were mildly elevated. Elevated lactate (3.0) was attributed to alcohol use. Her dyspnea was attributed to deconditioning and obesity, with no evidence of CHF or acute exacerbation of asthma. Leg edema was stable and mild.    ED course:  Vitals  Temp: 98.6  HR: 76  BP: 113/78  O2: 94% on 3L NC  RR: 18    Imaging  CXR: No radiographic evidence of acute cardiopulmonary disease.    CTAP: No evidence of acute abdominal pathology.      # dyspnea on exertion- r/o anginal equivalent  # possible cardiac/pulm shunt  # COPD- stable  COPD stable  - TTE 11/2024 --> 60-65% ; suspected cardiac/pulmonary shunt, however per cardio, No color flow doppler across interatrial septum on review of echo, may have PFO   - C/w home inhalers  - cont Bumex every other day; monitor electrolytes  CXR- no acute cardiopulm process  Lexican stress test normal     # decreased PO intake  # wt loss  # Diffuse Abdominal Pain in setting of Hx of Jaclyn-en-Y Bypass- 2006   - EGD/ Colonoscopy 5/2023: Non erosive gastritis, Repeat 11/2024  - CTA abdomen 11/13: No acute abdominal or pelvic pathology. Atherosclerotic calcifications of the aorta and its branches. No evidence of celiac axis or SMA abnormality.  - Bariatric diet was recommended but patient is refusing to eat anything if she has bariatric diet - d/w patient and started on dash diet- for small frequent meals; low carb; increase protein  - Vit D (wnl), zinc (66)- normal  - Per GI: low yield w/ repeat EGD as pt had a recent endoscopy in 11/2024 and it will likely be unchanged,   -continue with daily PPI but change to open capsule omeprazole 40 mg once daily, trial of dicyclomine 10 mg q8h PRN  - hep panel negative     #Severe Hypokalemia and Moderate Hyponatremia in setting of Decreased PO Intake and Bumex Use- resolved  #Prolonged QTC in setting of Hypokalemia and Multiple QTC-Prolonging Meds- resolved  #CEM on CKD   - CTAP this admission negative for any acute pathology    #DM  - A1c last admission 7 --> now a1c 5.6  - Had episodes of hypoglycemia  - If glucose consistently >150 can start ISS    #Hypothyroidism  - C/w home levothyroxine    #Anxiety  #Depression  - c/w home Lamotrigine, Zoloft- were initially held due to prolonged Qtc - now restarted  - At home also on Xanax    #Chronic Back Pain w/ Hx of Laminectomy   - c/w home Methadone 30mg q8h  - On carisoprodol at home ;  cyclobenzaprine here     #Alcohol Use Disorder  #Hepatic Steatosis  - Previously 2 scotches per night or more  - No alcohol since November per pt  - RUQ US last admission showed fatty liver likely due to obesity/alcohol use  - C/w B12 and Folate supplement   64-year-old female with a history of asthma, hypothyroidism, anxiety, depression, MVP, Jaclyn-en-Y gastric bypass, appendectomy, cholecystectomy, and chronic back pain (on methadone) is presenting for decreased PO intake x3-4 weeks, diffuse abdominal pain, and weakness. She used to drink 2 scotchs per night but has not drank alcohol since she was hospitalized in November. She lives at home with her  whom helps care for her, however he recently was admitted to the hospital and is currently at a STR where she was left alone to care for herself. She states that she has had a non-specific generalized abdominal pain not associated with nausea, vomiting, or diarrhea. She has decreased PO intake and only eats 1-2 bites/sips of food or drink before she feels full. She was found to be significantly hypokalemic on admission and is admitted for further management. after a syncopal episode. She lost consciousness briefly while sitting after a shower. She also reported 5 days of worsening abdominal pain, nausea, vomiting, decreased oral intake, and constipation. She reports regular alcohol use (a couple of glasses of scotch nightly) and worsening shortness of breath for the past 2-3 months, requiring near-daily albuterol nebulizer treatments.    Of note, she was discharged from [facility] in December after a syncopal episode. Per previous documentation, the syncopal episode was likely vasovagal, though a prolonged QTc (603 on admission EKG) raised concern for arrhythmia. This was likely secondary to methadone and sertraline. Telemetry showed no arrhythmias. Echocardiogram showed normal LVEF (65%). CT chest was negative for PE. Abdominal pain workup, including CT abdomen and abdominal ultrasound, revealed hepatomegaly and fatty liver infiltration, but no acute pathology. Lipase was normal. LFTs were mildly elevated. Elevated lactate (3.0) was attributed to alcohol use. Her dyspnea was attributed to deconditioning and obesity, with no evidence of CHF or acute exacerbation of asthma. Leg edema was stable and mild.    ED course:  Vitals  Temp: 98.6  HR: 76  BP: 113/78  O2: 94% on 3L NC  RR: 18    Imaging  CXR: No radiographic evidence of acute cardiopulmonary disease.    CTAP: No evidence of acute abdominal pathology.      # dyspnea on exertion- r/o anginal equivalent  # possible cardiac/pulm shunt  # COPD- stable  COPD stable  - TTE 11/2024 --> 60-65% ; suspected cardiac/pulmonary shunt, however per cardio, No color flow doppler across interatrial septum on review of echo, may have PFO   - C/w home inhalers  - cont Bumex every other day; monitor electrolytes  CXR- no acute cardiopulm process  Lexican stress test normal     # decreased PO intake  # wt loss  # Diffuse Abdominal Pain in setting of Hx of Jaclyn-en-Y Bypass- 2006   - EGD/ Colonoscopy 5/2023: Non erosive gastritis, Repeat 11/2024  - CTA abdomen 11/13: No acute abdominal or pelvic pathology. Atherosclerotic calcifications of the aorta and its branches. No evidence of celiac axis or SMA abnormality.  - Bariatric diet was recommended but patient is refusing to eat anything if she has bariatric diet - d/w patient and started on dash diet- for small frequent meals; low carb; increase protein  - Vit D (wnl), zinc (66)- normal  - Per GI: low yield w/ repeat EGD as pt had a recent endoscopy in 11/2024 and it will likely be unchanged,   -continue with daily PPI but change to open capsule omeprazole 40 mg once daily, trial of dicyclomine 10 mg q8h PRN  - hep panel negative     #Severe Hypokalemia and Moderate Hyponatremia in setting of Decreased PO Intake and Bumex Use- resolved  #Prolonged QTC in setting of Hypokalemia and Multiple QTC-Prolonging Meds- resolved  #CEM on CKD   - CTAP this admission negative for any acute pathology    #DM  - A1c last admission 7 --> now a1c 5.6  - Had episodes of hypoglycemia  - If glucose consistently >150 can start ISS    #Hypothyroidism  - C/w home levothyroxine    #Anxiety  #Depression  - c/w home Lamotrigine, Zoloft- were initially held due to prolonged Qtc - now restarted  - At home also on Xanax    #Chronic Back Pain w/ Hx of Laminectomy   - c/w home Methadone 30mg q8h  - On carisoprodol at home ;  cyclobenzaprine here     #Alcohol Use Disorder  #Hepatic Steatosis  - Previously 2 scotches per night or more  - No alcohol since November per pt  - RUQ US last admission showed fatty liver likely due to obesity/alcohol use  - C/w B12 and Folate supplement   64-year-old female with a history of asthma, hypothyroidism, anxiety, depression, MVP, Jaclyn-en-Y gastric bypass, appendectomy, cholecystectomy, and chronic back pain (on methadone) is presenting for decreased PO intake x3-4 weeks, diffuse abdominal pain, and weakness. She used to drink 2 scotchs per night but has not drank alcohol since she was hospitalized in November. She lives at home with her  whom helps care for her, however he recently was admitted to the hospital and is currently at a STR where she was left alone to care for herself. She states that she has had a non-specific generalized abdominal pain not associated with nausea, vomiting, or diarrhea. She has decreased PO intake and only eats 1-2 bites/sips of food or drink before she feels full. She was found to be significantly hypokalemic on admission and is admitted for further management. after a syncopal episode. She lost consciousness briefly while sitting after a shower. She also reported 5 days of worsening abdominal pain, nausea, vomiting, decreased oral intake, and constipation. She reports regular alcohol use (a couple of glasses of scotch nightly) and worsening shortness of breath for the past 2-3 months, requiring near-daily albuterol nebulizer treatments.    Of note, she was discharged from [facility] in December after a syncopal episode. Per previous documentation, the syncopal episode was likely vasovagal, though a prolonged QTc (603 on admission EKG) raised concern for arrhythmia. This was likely secondary to methadone and sertraline. Telemetry showed no arrhythmias. Echocardiogram showed normal LVEF (65%). CT chest was negative for PE. Abdominal pain workup, including CT abdomen and abdominal ultrasound, revealed hepatomegaly and fatty liver infiltration, but no acute pathology. Lipase was normal. LFTs were mildly elevated. Elevated lactate (3.0) was attributed to alcohol use. Her dyspnea was attributed to deconditioning and obesity, with no evidence of CHF or acute exacerbation of asthma. Leg edema was stable and mild.    ED course:  Vitals  Temp: 98.6  HR: 76  BP: 113/78  O2: 94% on 3L NC  RR: 18    Imaging  CXR: No radiographic evidence of acute cardiopulmonary disease.    CTAP: No evidence of acute abdominal pathology.    # Dyspnea  # H/o Pulmonary shunt   # COPD- stable  - Xray Chest 1 View- PORTABLE-Routine (Xray Chest 1 View- PORTABLE-Routine .) (02.20.25 @ 14:43) >No radiographic evidence of acute cardiopulmonary disease.  - 12 Lead ECG (02.23.25 @ 22:38) >Normal sinus rhythm  - Troponin T, High Sensitivity Result: 10(02.23.25 @ 05:33)  -  NM Nuclear Stress Pharmacologic Multiple (02.24.25 @ 16:06) NORMAL LEXISCAN / REST MYOCARDIAL PERFUSION SPECT TOMOGRAPHY, WITH NO EVIDENCE FOR ISCHEMIA DURING LEXISCAN INFUSION. NORMAL RESTING LEFT VENTRICULAR WALL MOTION AND WALL THICKENING.LEFT VENTRICULAR EJECTION FRACTION OF  74 % WHICH IS WITHIN RANGE OF NORMAL.  - oxygen sat stable - 92 on RA  -- c/w bumex  - c/w albuterol, advair , start spiriva  - evaluated by cardiology  - on home oxygen prn  # Hypomagnesemia  - replete mg    # Chronic  Abd pain with dietary non compliance - resolved  # Decrease oral intake - resolved  # Non erosive gastritis  # Hiatal hernia  # H/o  Jaclyn-en-Y Bypass- 2006   - Vit D (wnl), zinc (66)- demarcus  -  EGD (11.13.24 @ 10:30) >Hiatal Hernia. Erythema in the stomach compatible with non-erosive gastritis.  - CT Abdomen and Pelvis w/ IV Cont (02.11.25 @ 17:29) >No evidence of acute abdominal pathology.  - Bariatric surgery eval - Bariatric diet was recommended but patient is refusing to eat anything if she has bariatric diet - she was started on dash diet- for small frequent meals  - evaluated by GI low yield w/ repeat EGD as pt had a recent endoscopy in 11/2024 and it will likely be unchanged,   - ensure supplements  - c/w PPI    # Hepatic steatosis with transaminitis- resolving  # Alcohol Use Disorder  - c/w thiamine, folate  - hep panel negative     #Severe Hypokalemia and Moderate Hyponatremia in setting of Decreased PO Intake and Bumex Use- resolved  #Prolonged QTC in setting of Hypokalemia and Multiple QTC-Prolonging Meds- resolved  #CEM on CKD   - CTAP this admission negative for any acute pathology    # DM type 2  - A1C with Estimated Average Glucose Result: 5.6 % (02.17.25 @ 04:30)    #Hypothyroidism  - C/w home levothyroxine    #Anxiety  #Depression  - c/w home Lamotrigine, Zoloft- were initially held due to prolonged Qtc - now restarted  - At home also on Xanax    #Chronic Back Pain w/ Hx of Laminectomy   - c/w home Methadone 30mg q8h  - On carisoprodol at home ;  cyclobenzaprine here      64-year-old female with a history of asthma, hypothyroidism, anxiety, depression, MVP, Jaclyn-en-Y gastric bypass, appendectomy, cholecystectomy, and chronic back pain (on methadone) is presenting for decreased PO intake x3-4 weeks, diffuse abdominal pain, and weakness. She used to drink 2 scotchs per night but has not drank alcohol since she was hospitalized in November. She lives at home with her  whom helps care for her, however he recently was admitted to the hospital and is currently at a STR where she was left alone to care for herself. She states that she has had a non-specific generalized abdominal pain not associated with nausea, vomiting, or diarrhea. She has decreased PO intake and only eats 1-2 bites/sips of food or drink before she feels full. She was found to be significantly hypokalemic on admission and is admitted for further management. after a syncopal episode. She lost consciousness briefly while sitting after a shower. She also reported 5 days of worsening abdominal pain, nausea, vomiting, decreased oral intake, and constipation. She reports regular alcohol use (a couple of glasses of scotch nightly) and worsening shortness of breath for the past 2-3 months, requiring near-daily albuterol nebulizer treatments.    Of note, she was discharged from [facility] in December after a syncopal episode. Per previous documentation, the syncopal episode was likely vasovagal, though a prolonged QTc (603 on admission EKG) raised concern for arrhythmia. This was likely secondary to methadone and sertraline. Telemetry showed no arrhythmias. Echocardiogram showed normal LVEF (65%). CT chest was negative for PE. Abdominal pain workup, including CT abdomen and abdominal ultrasound, revealed hepatomegaly and fatty liver infiltration, but no acute pathology. Lipase was normal. LFTs were mildly elevated. Elevated lactate (3.0) was attributed to alcohol use. Her dyspnea was attributed to deconditioning and obesity, with no evidence of CHF or acute exacerbation of asthma. Leg edema was stable and mild.    ED course:  Vitals  Temp: 98.6  HR: 76  BP: 113/78  O2: 94% on 3L NC  RR: 18    Imaging  CXR: No radiographic evidence of acute cardiopulmonary disease.    CTAP: No evidence of acute abdominal pathology.    # Dyspnea  # H/o Pulmonary shunt   # COPD- stable  - Xray Chest 1 View- PORTABLE-Routine (Xray Chest 1 View- PORTABLE-Routine .) (02.20.25 @ 14:43) >No radiographic evidence of acute cardiopulmonary disease.  - 12 Lead ECG (02.23.25 @ 22:38) >Normal sinus rhythm  - Troponin T, High Sensitivity Result: 10(02.23.25 @ 05:33)  -  NM Nuclear Stress Pharmacologic Multiple (02.24.25 @ 16:06) NORMAL LEXISCAN / REST MYOCARDIAL PERFUSION SPECT TOMOGRAPHY, WITH NO EVIDENCE FOR ISCHEMIA DURING LEXISCAN INFUSION. NORMAL RESTING LEFT VENTRICULAR WALL MOTION AND WALL THICKENING.LEFT VENTRICULAR EJECTION FRACTION OF  74 % WHICH IS WITHIN RANGE OF NORMAL.  - oxygen sat stable - 95 on RA  -- c/w bumex  - c/w home inhalers  - evaluated by cardiology  - on home oxygen prn    # Hypomagnesemia  - repleted mg    # Chronic  Abd pain with dietary non compliance - resolved  # Decrease oral intake - resolved  # Non erosive gastritis  # Hiatal hernia  # H/o  Jaclyn-en-Y Bypass- 2006   - Vit D (wnl), zinc (66)- demarcus  -  EGD (11.13.24 @ 10:30) >Hiatal Hernia. Erythema in the stomach compatible with non-erosive gastritis.  - CT Abdomen and Pelvis w/ IV Cont (02.11.25 @ 17:29) >No evidence of acute abdominal pathology.  - Bariatric surgery eval - Bariatric diet was recommended but patient is refusing to eat anything if she has bariatric diet - she was started on dash diet- for small frequent meals  - evaluated by GI low yield w/ repeat EGD as pt had a recent endoscopy in 11/2024 and it will likely be unchanged,   - ensure supplements  - c/w PPI    # Hepatic steatosis with transaminitis- resolving  # Alcohol Use Disorder  - c/w thiamine, folate  - hep panel negative     #Severe Hypokalemia and Moderate Hyponatremia in setting of Decreased PO Intake and Bumex Use- resolved  #Prolonged QTC in setting of Hypokalemia and Multiple QTC-Prolonging Meds- resolved  #CEM on CKD   - CTAP this admission negative for any acute pathology    # DM type 2  - A1C with Estimated Average Glucose Result: 5.6 % (02.17.25 @ 04:30)    #Hypothyroidism  - C/w home levothyroxine    #Anxiety  #Depression  - c/w home Lamotrigine, Zoloft- were initially held due to prolonged Qtc - now restarted  - At home also on Xanax    #Chronic Back Pain w/ Hx of Laminectomy   - c/w home Methadone 30mg q8h  - On carisoprodol at home

## 2025-02-26 NOTE — DISCHARGE NOTE PROVIDER - DISCHARGE DIET
----- Message from DAVID Schaefer sent at 7/15/2021  4:09 PM CDT -----  Received in our perioperative surgical home workque with no comments     - what is the procedure   - what is the purpose the referral - to determine if can be done at  versus [facility]?    thanks     Regular Diet - No restrictions Other Diet Instructions

## 2025-02-26 NOTE — DISCHARGE NOTE PROVIDER - CARE PROVIDER_API CALL
Frank Dubois.  Internal Medicine  [address] [place] [address]  Phone: [phone]  Fax: [phone]  Follow Up Time:     Robel Del Rosario  Pulmonary Disease  [address] [place] [address]  Phone: [phone]  Fax: [phone]  Follow Up Time:    Frank Dubois   Internal Medicine  [address] [place] [address]  Phone: [phone]  Fax: [phone]  Follow Up Time: 1 week    Robel Del Rosario  Pulmonary Disease  [address] [place] [id number]  Phone: [phone]  Fax: [phone]  Follow Up Time: 2 weeks    Emiliano Negrete  Interventional Cardiology  [address] [facility] [address]  [place] [address]  Phone: [phone]  Fax: [phone]  Follow Up Time: 1 week

## 2025-02-26 NOTE — DISCHARGE NOTE PROVIDER - NSDCCAREPROVSEEN_GEN_ALL_CORE_FT
[facility] Medicine, gastroenterology, bariatric surgery, cardiology team

## 2025-02-26 NOTE — DISCHARGE NOTE PROVIDER - NSDCMRMEDTOKEN_GEN_ALL_CORE_FT
albuterol 90 mcg/inh inhalation aerosol: 2 puff(s) inhaled every 6 hours As needed Shortness of Breath and/or Wheezing  ALPRAZolam 0.5 mg oral tablet: 1 tab(s) orally 2 times a day as needed for  anxiety MDD: 02  bumetanide 1 mg oral tablet: 1 tab(s) orally once a day  fluticasone-salmeterol 250 mcg-50 mcg inhalation powder: 1 powder inhaled 2 times a day as needed for  bronchospasm  folic acid 1 mg oral tablet: 1 tab(s) orally once a day  ipratropium-albuterol 0.5 mg-2.5 mg/3 mL inhalation solution: 3 milliliter(s) inhaled every 6 hours  lactulose 10 g/15 mL oral syrup: 15 milliliter(s) orally once a day as needed for  constipation PRN  lamoTRIgine 150 mg oral tablet: 1 tab(s) orally once a day  levothyroxine 100 mcg (0.1 mg) oral tablet: 1 tab(s) orally once a day  methadone 10 mg oral tablet: 3 tab(s) orally every 8 hours  pantoprazole 40 mg oral delayed release tablet: 1 tab(s) orally once a day (before a meal)  sertraline: 200 milligram(s) orally once a day  Soma 350 mg oral tablet: 1 tab(s) orally 3 times a day   albuterol 90 mcg/inh inhalation aerosol: 2 puff(s) inhaled every 6 hours As needed Shortness of Breath and/or Wheezing  ALPRAZolam 0.5 mg oral tablet: 1 tab(s) orally 2 times a day as needed for  anxiety MDD: 02  bumetanide 1 mg oral tablet: 1 tab(s) orally once a day  fluticasone-salmeterol 250 mcg-50 mcg inhalation powder: 1 powder inhaled 2 times a day as needed for  bronchospasm  folic acid 1 mg oral tablet: 1 tab(s) orally once a day  ipratropium-albuterol 0.5 mg-2.5 mg/3 mL inhalation solution: 3 milliliter(s) inhaled every 6 hours  lactulose 10 g/15 mL oral syrup: 15 milliliter(s) orally once a day as needed for  constipation PRN  lamoTRIgine 150 mg oral tablet: 1 tab(s) orally once a day  levothyroxine 100 mcg (0.1 mg) oral tablet: 1 tab(s) orally once a day  methadone 10 mg oral tablet: 3 tab(s) orally every 8 hours  pantoprazole 40 mg oral delayed release tablet: 1 tab(s) orally once a day (before a meal)  sertraline: 200 milligram(s) orally once a day  Soma 350 mg oral tablet: 1 tab(s) orally 3 times a day  thiamine 100 mg oral tablet: 1 tab(s) orally once a day

## 2025-02-26 NOTE — DISCHARGE NOTE PROVIDER - PROVIDER TOKENS
PROVIDER:[TOKEN:[[id number]]],PROVIDER:[TOKEN:[[id number]]] PROVIDER:[TOKEN:[[id number]],FOLLOWUP:[1 week]],PROVIDER:[TOKEN:[[id number]],FOLLOWUP:[2 weeks]],PROVIDER:[TOKEN:[[id number]],FOLLOWUP:[1 week]]

## 2025-02-26 NOTE — DISCHARGE NOTE PROVIDER - NSDCCPCAREPLAN_GEN_ALL_CORE_FT
PRINCIPAL DISCHARGE DIAGNOSIS  Diagnosis: Adult failure to thrive  Assessment and Plan of Treatment:       SECONDARY DISCHARGE DIAGNOSES  Diagnosis: Hypokalemia  Assessment and Plan of Treatment:     Diagnosis: Electrolyte abnormality  Assessment and Plan of Treatment:     Diagnosis: Hyponatremia  Assessment and Plan of Treatment:     Diagnosis: Abnormal QT interval present on electrocardiogram  Assessment and Plan of Treatment:      PRINCIPAL DISCHARGE DIAGNOSIS  Diagnosis: Adult failure to thrive  Assessment and Plan of Treatment: You came to the hosptial due to decreased food inhtake along with stomach pain. A baraitric surgeron came to evaluate you and was reccomended to start a bariatric diet. You were found to also have low potassium; which we repleted. We started you on a PPI to help as well.  You were also complaining of SOB, at first we supsected there may be a cardiac/pulmonary shunt, However cardio did not think this was the case after reviewing a doppler echo. We also perfomred a stress test to make sure there was no ischemia to the heart; it was normal. This trouble breathing is most likely related to your COPD, it is important to follow up with a pulmonologist as your current medication regiment may not be enough.   Be sure to follow up with your PCP within a month as well.         SECONDARY DISCHARGE DIAGNOSES  Diagnosis: Hypokalemia  Assessment and Plan of Treatment:     Diagnosis: Electrolyte abnormality  Assessment and Plan of Treatment:     Diagnosis: Hyponatremia  Assessment and Plan of Treatment:     Diagnosis: Abnormal QT interval present on electrocardiogram  Assessment and Plan of Treatment:      PRINCIPAL DISCHARGE DIAGNOSIS  Diagnosis: Abdominal pain  Assessment and Plan of Treatment: You came to the hosptial due to decreased food inhtake along with stomach pain. A baraitric surgeron came to evaluate you and was reccomended to start a bariatric diet. You were found to also have low potassium; which we repleted. We started you on a PPI to help as well.  You were also complaining of SOB, at first we supsected there may be a cardiac/pulmonary shunt, However cardio did not think this was the case after reviewing a doppler echo. We also perfomred a stress test to make sure there was no ischemia to the heart; it was normal. This trouble breathing is most likely related to your COPD, it is important to follow up with a pulmonologist as your current medication regiment may not be enough.   Be sure to follow up with your PCP within a month as well.         SECONDARY DISCHARGE DIAGNOSES  Diagnosis: Hypokalemia  Assessment and Plan of Treatment:     Diagnosis: Hyponatremia  Assessment and Plan of Treatment:     Diagnosis: Abnormal QT interval present on electrocardiogram  Assessment and Plan of Treatment:     Diagnosis: Non compliance w medication regimen  Assessment and Plan of Treatment:

## 2025-02-26 NOTE — PROGRESS NOTE ADULT - SUBJECTIVE AND OBJECTIVE BOX
24H events:    Patient is a 64y old Female who presents with a chief complaint of Hypokalemia (23 Feb 2025 20:35)    Primary diagnosis of Adult failure to thrive          Today is hospital day 15d.   This morning patient was seen and examined at bedside, resting comfortably in bed.    No acute or major events overnight.    Code Status:    PAST MEDICAL & SURGICAL HISTORY  Asthma  LAST ATTACK MANY YRS AGO    Gastroesophageal reflux disease without esophagitis    Hypothyroidism    Heart murmur    Anxiety    Depression    Mood disorder    Psoriasis    Constipation    Back pain  LOW    Morbid obesity    S/P tonsillectomy  1967    History of appendectomy  1974    Abscess of back  EVACUATION OF ABSCESS @ L5-S1 1997    History of Jaclyn-en-Y gastric bypass  WITH CHOLECYSTECTOMY 2006      SOCIAL HISTORY:  Social History:      ALLERGIES:  penicillin (Anaphylaxis)    MEDICATIONS:  STANDING MEDICATIONS  acetaminophen     Tablet .. 975 milliGRAM(s) Oral every 8 hours  buMETAnide 0.5 milliGRAM(s) Oral <User Schedule>  chlorhexidine 2% Cloths 1 Application(s) Topical <User Schedule>  cyanocobalamin 1000 MICROGram(s) Oral daily  cyclobenzaprine 5 milliGRAM(s) Oral three times a day  folic acid 1 milliGRAM(s) Oral daily  heparin   Injectable 5000 Unit(s) SubCutaneous every 12 hours  influenza   Vaccine 0.5 milliLiter(s) IntraMuscular once  lamoTRIgine 150 milliGRAM(s) Oral daily  levothyroxine 100 MICROGram(s) Oral daily  melatonin 5 milliGRAM(s) Oral at bedtime  methadone    Tablet 30 milliGRAM(s) Oral every 8 hours  pantoprazole    Tablet 40 milliGRAM(s) Oral before breakfast  polyethylene glycol 3350 17 Gram(s) Oral daily  regadenoson Injectable 0.4 milliGRAM(s) IV Push once  senna 2 Tablet(s) Oral at bedtime  sertraline 200 milliGRAM(s) Oral daily    PRN MEDICATIONS  albuterol    90 MICROgram(s) HFA Inhaler 2 Puff(s) Inhalation every 6 hours PRN  albuterol/ipratropium for Nebulization 3 milliLiter(s) Nebulizer every 6 hours PRN  dicyclomine 10 milliGRAM(s) Oral three times a day before meals PRN  fluticasone propionate/ salmeterol 250-50 MICROgram(s) Diskus 1 Dose(s) Inhalation two times a day PRN  lactulose Syrup 10 Gram(s) Oral daily PRN    VITALS:   T(F): 97.6  HR: 71  BP: 109/73  RR: 18  SpO2: 91%      PHYSICAL EXAM:  GENERAL: NAD, lying in bed comfortably  HEAD:  Atraumatic, Normocephalic  EYES: conjunctiva and sclera clear  CHEST/LUNG: Clear to auscultation bilaterally; No wheezing, or rubs. Unlabored respirations  HEART: Regular rate and rhythm; No murmurs  ABDOMEN: BSx4; Soft, nontender, nondistended  EXTREMITIES: No edema, skin changes on LE  NERVOUS SYSTEM:  A&Ox3, no focal deficits   SKIN: No rashes or lesions      LABS:                        13.8   5.29  )-----------( 271      ( 25 Feb 2025 05:37 )             44.8     02-25    139  |  102  |  10  ----------------------------<  111[H]  4.5   |  23  |  1.1    Ca    8.9      25 Feb 2025 05:37  Mg     1.9     02-25    TPro  6.4  /  Alb  3.5  /  TBili  0.6  /  DBili  x   /  AST  48[H]  /  ALT  21  /  AlkPhos  171[H]  02-25      Urinalysis Basic - ( 25 Feb 2025 05:37 )    Color: x / Appearance: x / SG: x / pH: x  Gluc: 111 mg/dL / Ketone: x  / Bili: x / Urobili: x   Blood: x / Protein: x / Nitrite: x   Leuk Esterase: x / RBC: x / WBC x   Sq Epi: x / Non Sq Epi: x / Bacteria: x                RADIOLOGY:  CXR      CT

## 2025-02-26 NOTE — PROGRESS NOTE ADULT - ASSESSMENT
64-year-old female with a history of asthma, hypothyroidism, anxiety, depression, MVP, Jaclyn-en-Y gastric bypass, appendectomy, cholecystectomy, and chronic back pain (on methadone) is presenting for decreased PO intake x3-4 weeks, diffuse abdominal pain, and weakness. She used to drink 2 scotchs per night but has not drank alcohol since she was hospitalized in November. She lives at home with her  whom helps care for her, however he recently was admitted to the hospital and is currently at a STR where she was left alone to care for herself. She states that she has had a non-specific generalized abdominal pain not associated with nausea, vomiting, or diarrhea. She has decreased PO intake and only eats 1-2 bites/sips of food or drink before she feels full. She was found to be significantly hypokalemic on admission and is admitted for further management. after a syncopal episode. She lost consciousness briefly while sitting after a shower. She also reported 5 days of worsening abdominal pain, nausea, vomiting, decreased oral intake, and constipation. She reports regular alcohol use (a couple of glasses of scotch nightly) and worsening shortness of breath for the past 2-3 months, requiring near-daily albuterol nebulizer treatments.    # Dyspnea  # H/o Pulmonary shunt   # COPD- stable  - Xray Chest 1 View- PORTABLE-Routine (Xray Chest 1 View- PORTABLE-Routine .) (02.20.25 @ 14:43) >No radiographic evidence of acute cardiopulmonary disease.  - 12 Lead ECG (02.23.25 @ 22:38) >Normal sinus rhythm  - Troponin T, High Sensitivity Result: 10(02.23.25 @ 05:33)  -  NM Nuclear Stress Pharmacologic Multiple (02.24.25 @ 16:06) NORMAL LEXISCAN / REST MYOCARDIAL PERFUSION SPECT TOMOGRAPHY, WITH NO EVIDENCE FOR ISCHEMIA DURING LEXISCAN INFUSION. NORMAL RESTING LEFT VENTRICULAR WALL MOTION AND WALL THICKENING.LEFT VENTRICULAR EJECTION FRACTION OF  74 % WHICH IS WITHIN RANGE OF NORMAL.  - oxygen sat stable - 92 on RA  -- c/w bumex  - c/w albuterol, advair , start spiriva  - evaluated by cardiology  - on home oxygen prn    # Hypomagnesemia  - replete mg    # Chronic  Abd pain with dietary non compliance - resolved  # Decrease oral intake - resolved  # Non erosive gastritis  # Hiatal hernia  # H/o  Jaclyn-en-Y Bypass- 2006   -  EGD (11.13.24 @ 10:30) >Hiatal Hernia. Erythema in the stomach compatible with non-erosive gastritis.  - CT Abdomen and Pelvis w/ IV Cont (02.11.25 @ 17:29) >No evidence of acute abdominal pathology.  - Bariatric surgery eval - Bariatric diet was recommended but patient is refusing to eat anything if she has bariatric diet - she was started on dash diet- for small frequent meals  - evaluated by GI  - ensure supplements  - c/w PPI    # Hepatic steatosis with transaminitis- resolving  # Alcohol Use Disorder  - c/w thiamine, folate    # DM type 2  - A1C with Estimated Average Glucose Result: 5.6 % (02.17.25 @ 04:30)    # Hypothyroidism  - c/w levothyroxine    # Chronic back pain  # H/o  Laminectomy   - c/w home  meds    # Anxiety  # Depression  - c/w home meds    # DVT prophylaxis    # Full code    # Pending: pt medically stable for discharge, wants to appeal her discharge  # Discussed plan of care with patient , discussed labs and recommendations by consultants  # Disposition: Home

## 2025-02-26 NOTE — DISCHARGE NOTE NURSING/CASE MANAGEMENT/SOCIAL WORK - PATIENT PORTAL LINK FT
You can access the FollowMyHealth Patient Portal offered by [facility] by registering at the following website: [email or web address]. By joining [employer]’s FollowMyHealth portal, you will also be able to view your health information using other applications (apps) compatible with our system.

## 2025-02-26 NOTE — PROGRESS NOTE ADULT - SUBJECTIVE AND OBJECTIVE BOX
Patient is a 64y old  Female who presents with a chief complaint of Hypokalemia (26 Feb 2025 10:20)    Patient was seen and examined.  no new compaints  All systems reviewed.    PAST MEDICAL & SURGICAL HISTORY:  Asthma, LAST ATTACK MANY YRS AGO  Gastroesophageal reflux disease without esophagitis  Hypothyroidism  Heart murmur  Anxiety  Depression  Mood disorder  Psoriasis  Constipation  Back pain LOW  Morbid obesity  S/P tonsillectomy 1967  History of appendectomy 1974  Abscess of back, EVACUATION OF ABSCESS @ L5-S1 1997  History of Jaclyn-en-Y gastric bypass WITH CHOLECYSTECTOMY 2006    Allergies  penicillin (Anaphylaxis)    MEDICATIONS  (STANDING):  acetaminophen     Tablet .. 975 milliGRAM(s) Oral every 8 hours  buMETAnide 0.5 milliGRAM(s) Oral <User Schedule>  chlorhexidine 2% Cloths 1 Application(s) Topical <User Schedule>  cyanocobalamin 1000 MICROGram(s) Oral daily  cyclobenzaprine 5 milliGRAM(s) Oral three times a day  fluticasone propionate/ salmeterol 250-50 MICROgram(s) Diskus 1 Dose(s) Inhalation two times a day  folic acid 1 milliGRAM(s) Oral daily  heparin   Injectable 5000 Unit(s) SubCutaneous every 12 hours  influenza   Vaccine 0.5 milliLiter(s) IntraMuscular once  lamoTRIgine 150 milliGRAM(s) Oral daily  levothyroxine 100 MICROGram(s) Oral daily  melatonin 5 milliGRAM(s) Oral at bedtime  methadone    Tablet 30 milliGRAM(s) Oral every 8 hours  pantoprazole    Tablet 40 milliGRAM(s) Oral before breakfast  polyethylene glycol 3350 17 Gram(s) Oral daily  regadenoson Injectable 0.4 milliGRAM(s) IV Push once  senna 2 Tablet(s) Oral at bedtime  sertraline 200 milliGRAM(s) Oral daily    MEDICATIONS  (PRN):  albuterol    90 MICROgram(s) HFA Inhaler 2 Puff(s) Inhalation every 6 hours PRN Shortness of Breath and/or Wheezing  albuterol/ipratropium for Nebulization 3 milliLiter(s) Nebulizer every 6 hours PRN Shortness of Breath and/or Wheezing  dicyclomine 10 milliGRAM(s) Oral three times a day before meals PRN Abd Pain  fluticasone propionate/ salmeterol 250-50 MICROgram(s) Diskus 1 Dose(s) Inhalation two times a day PRN for bronchospasm  lactulose Syrup 10 Gram(s) Oral daily PRN for constipation  ondansetron Injectable 4 milliGRAM(s) IV Push daily PRN Nausea and/or Vomiting    Vital Signs Last 24 Hrs  T(C): 36.3  T(F): 97.3  HR: 64  BP: 131/82  BP(mean): --  RR: 18  SpO2: 92%    O/E:  Awake, alert, not in distress.  HEENT: atraumatic, EOMI.  Chest: clear.  CVS: SIS2 +, no murmur.  P/A: Soft, BS+  CNS: awake, alert  Ext: no edema feet.  All systems reviewed positive findings as above.    POCT Blood Glucose.: 93 mg/dL (26 Feb 2025 11:20)  POCT Blood Glucose.: 88 mg/dL (26 Feb 2025 08:19)  POCT Blood Glucose.: 125 mg/dL (25 Feb 2025 20:44)  POCT Blood Glucose.: 114 mg/dL (25 Feb 2025 16:01)             12.9   5.12  )-----------( 234      ( 26 Feb 2025 05:28 )             41.1                         13.8   5.29  )-----------( 271      ( 25 Feb 2025 05:37 )             44.8     02-26    142  |  105  |  12  ----------------------------<  83  3.7   |  21  |  1.0  02-25    139  |  102  |  10  ----------------------------<  111[H]  4.5   |  23  |  1.1    Ca    8.7      26 Feb 2025 05:28  Ca    8.9      25 Feb 2025 05:37  Mg     1.7     02-26    TPro  5.9[L]  /  Alb  3.3[L]  /  TBili  0.6  /  DBili  x   /  AST  50[H]  /  ALT  21  /  AlkPhos  163[H]  02-26  TPro  6.4  /  Alb  3.5  /  TBili  0.6  /  DBili  x   /  AST  48[H]  /  ALT  21  /  AlkPhos  171[H]  02-25            Urinalysis Basic - ( 26 Feb 2025 05:28 )    Color: x / Appearance: x / SG: x / pH: x  Gluc: 83 mg/dL / Ketone: x  / Bili: x / Urobili: x   Blood: x / Protein: x / Nitrite: x   Leuk Esterase: x / RBC: x / WBC x   Sq Epi: x / Non Sq Epi: x / Bacteria: x

## 2025-02-26 NOTE — DISCHARGE NOTE PROVIDER - CARE PROVIDERS DIRECT ADDRESSES
[email or web address]

## 2025-02-26 NOTE — DISCHARGE NOTE NURSING/CASE MANAGEMENT/SOCIAL WORK - FINANCIAL ASSISTANCE
[facility] provides services at a reduced cost to those who are determined to be eligible through [facility]’s financial assistance program. Information regarding [facility]’s financial assistance program can be found by going to [email or web address] or by calling [phone].

## 2025-02-27 LAB
ALBUMIN SERPL ELPH-MCNC: 3.3 G/DL — LOW (ref 3.5–5.2)
ALP SERPL-CCNC: 168 U/L — HIGH (ref 30–115)
ALT FLD-CCNC: 22 U/L — SIGNIFICANT CHANGE UP (ref 0–41)
ANION GAP SERPL CALC-SCNC: 12 MMOL/L — SIGNIFICANT CHANGE UP (ref 7–14)
AST SERPL-CCNC: 50 U/L — HIGH (ref 0–41)
BILIRUB SERPL-MCNC: 0.6 MG/DL — SIGNIFICANT CHANGE UP (ref 0.2–1.2)
BUN SERPL-MCNC: 11 MG/DL — SIGNIFICANT CHANGE UP (ref 10–20)
CALCIUM SERPL-MCNC: 8.5 MG/DL — SIGNIFICANT CHANGE UP (ref 8.4–10.4)
CHLORIDE SERPL-SCNC: 105 MMOL/L — SIGNIFICANT CHANGE UP (ref 98–110)
CO2 SERPL-SCNC: 23 MMOL/L — SIGNIFICANT CHANGE UP (ref 17–32)
CREAT SERPL-MCNC: 0.9 MG/DL — SIGNIFICANT CHANGE UP (ref 0.7–1.5)
EGFR: 72 ML/MIN/1.73M2 — SIGNIFICANT CHANGE UP
EGFR: 72 ML/MIN/1.73M2 — SIGNIFICANT CHANGE UP
GLUCOSE BLDC GLUCOMTR-MCNC: 101 MG/DL — HIGH (ref 70–99)
GLUCOSE BLDC GLUCOMTR-MCNC: 114 MG/DL — HIGH (ref 70–99)
GLUCOSE BLDC GLUCOMTR-MCNC: 82 MG/DL — SIGNIFICANT CHANGE UP (ref 70–99)
GLUCOSE BLDC GLUCOMTR-MCNC: 91 MG/DL — SIGNIFICANT CHANGE UP (ref 70–99)
GLUCOSE SERPL-MCNC: 91 MG/DL — SIGNIFICANT CHANGE UP (ref 70–99)
HCT VFR BLD CALC: 39.9 % — SIGNIFICANT CHANGE UP (ref 37–47)
HGB BLD-MCNC: 12.4 G/DL — SIGNIFICANT CHANGE UP (ref 12–16)
MAGNESIUM SERPL-MCNC: 2 MG/DL — SIGNIFICANT CHANGE UP (ref 1.8–2.4)
MCHC RBC-ENTMCNC: 30.5 PG — SIGNIFICANT CHANGE UP (ref 27–31)
MCHC RBC-ENTMCNC: 31.1 G/DL — LOW (ref 32–37)
MCV RBC AUTO: 98.3 FL — SIGNIFICANT CHANGE UP (ref 81–99)
NRBC BLD AUTO-RTO: 0 /100 WBCS — SIGNIFICANT CHANGE UP (ref 0–0)
PLATELET # BLD AUTO: 245 K/UL — SIGNIFICANT CHANGE UP (ref 130–400)
PMV BLD: 9.9 FL — SIGNIFICANT CHANGE UP (ref 7.4–10.4)
POTASSIUM SERPL-MCNC: 4.1 MMOL/L — SIGNIFICANT CHANGE UP (ref 3.5–5)
POTASSIUM SERPL-SCNC: 4.1 MMOL/L — SIGNIFICANT CHANGE UP (ref 3.5–5)
PROT SERPL-MCNC: 5.9 G/DL — LOW (ref 6–8)
RBC # BLD: 4.06 M/UL — LOW (ref 4.2–5.4)
RBC # FLD: 15.2 % — HIGH (ref 11.5–14.5)
SODIUM SERPL-SCNC: 140 MMOL/L — SIGNIFICANT CHANGE UP (ref 135–146)
WBC # BLD: 5.82 K/UL — SIGNIFICANT CHANGE UP (ref 4.8–10.8)
WBC # FLD AUTO: 5.82 K/UL — SIGNIFICANT CHANGE UP (ref 4.8–10.8)

## 2025-02-27 PROCEDURE — 99232 SBSQ HOSP IP/OBS MODERATE 35: CPT

## 2025-02-27 RX ADMIN — Medication 40 MILLIGRAM(S): at 05:40

## 2025-02-27 RX ADMIN — Medication 975 MILLIGRAM(S): at 21:32

## 2025-02-27 RX ADMIN — POLYETHYLENE GLYCOL 3350 17 GRAM(S): 17 POWDER, FOR SOLUTION ORAL at 13:12

## 2025-02-27 RX ADMIN — Medication 100 MICROGRAM(S): at 05:40

## 2025-02-27 RX ADMIN — CYCLOBENZAPRINE HYDROCHLORIDE 5 MILLIGRAM(S): 15 CAPSULE, EXTENDED RELEASE ORAL at 05:40

## 2025-02-27 RX ADMIN — Medication 2 TABLET(S): at 21:32

## 2025-02-27 RX ADMIN — Medication 4 MILLIGRAM(S): at 18:02

## 2025-02-27 RX ADMIN — Medication 975 MILLIGRAM(S): at 05:40

## 2025-02-27 RX ADMIN — Medication 30 MILLIGRAM(S): at 13:12

## 2025-02-27 RX ADMIN — CYCLOBENZAPRINE HYDROCHLORIDE 5 MILLIGRAM(S): 15 CAPSULE, EXTENDED RELEASE ORAL at 13:13

## 2025-02-27 RX ADMIN — Medication 1 APPLICATION(S): at 05:41

## 2025-02-27 RX ADMIN — SERTRALINE 200 MILLIGRAM(S): 100 TABLET, FILM COATED ORAL at 13:13

## 2025-02-27 RX ADMIN — FOLIC ACID 1 MILLIGRAM(S): 1 TABLET ORAL at 13:13

## 2025-02-27 RX ADMIN — CYANOCOBALAMIN 1000 MICROGRAM(S): 1000 INJECTION INTRAMUSCULAR; SUBCUTANEOUS at 13:14

## 2025-02-27 RX ADMIN — LAMOTRIGINE 150 MILLIGRAM(S): 150 TABLET ORAL at 13:13

## 2025-02-27 RX ADMIN — Medication 975 MILLIGRAM(S): at 13:12

## 2025-02-27 RX ADMIN — CYCLOBENZAPRINE HYDROCHLORIDE 5 MILLIGRAM(S): 15 CAPSULE, EXTENDED RELEASE ORAL at 21:33

## 2025-02-27 RX ADMIN — Medication 30 MILLIGRAM(S): at 05:40

## 2025-02-27 RX ADMIN — Medication 5 MILLIGRAM(S): at 21:33

## 2025-02-27 RX ADMIN — Medication 30 MILLIGRAM(S): at 21:32

## 2025-02-27 NOTE — PROGRESS NOTE ADULT - ASSESSMENT
64-year-old female with a history of asthma, hypothyroidism, anxiety, depression, MVP, Jaclyn-en-Y gastric bypass, appendectomy, cholecystectomy, and chronic back pain (on methadone) presented for decreased PO intake x3-4 weeks, diffuse abdominal pain, and weakness.    # dyspnea on exertion- r/o anginal equivalent  # possible cardiac/pulm shunt  # COPD- stable  - TTE 11/2024 --> 60-65% ; suspected cardiac/pulmonary shunt.   - Home Bumex was initially held d/t CEM. Bumex every other day was later resumed  - Lexican stress test normal       # decreased PO intake  # wt loss  # Diffuse Abdominal Pain in setting of Hx of Jaclyn-en-Y Bypass- 2006   - EGD/ Colonoscopy 5/2023: Non erosive gastritis, Repeat 11/2024  - Per GI: low yield w/ repeat EGD as pt had a recent endoscopy in 11/2024 and it will likely be unchanged  - CTA abdomen 11/13: No acute abdominal or pelvic pathology. Atherosclerotic calcifications of the aorta and its branches. No evidence of celiac axis or SMA abnormality.  - Bariatric diet was recommended but patient is refusing to eat anything if she has bariatric diet - d/w patient and started on dash diet- for small frequent meals; low carbt; increase protein  - Vit D (wnl), zinc (66)- normal  - start on ensure supplement  - continue with daily PPI but change to open capsule omeprazole 40 mg once daily, trial of dicyclomine 10 mg q8h PRN  - Acetaminophen PRN for pain  - hep panel negative     #Severe Hypokalemia and Moderate Hyponatremia2/2 Decreased PO Intake and Bumex Use- resolved  #Prolonged QTC in setting of Hypokalemia and Multiple QTC-Prolonging Meds- resolved  #CEM on CKD - resolved    #DM  - A1c last admission 7 --> now a1c 5.6  - Had episodes of hypoglycemia  - If glucose consistently >150 can start ISS    #Hypothyroidism  - C/w home levothyroxine    #Anxiety  #Depression  - c/w home Lamotrigine, Zoloft- were initially held due to prolonged Qtc - now restarted  - At home also on Xanax    #Chronic Back Pain w/ Hx of Laminectomy   - c/w home Methadone 30mg q8h  - On carisoprodol at home ;  cyclobenzaprine here     #Alcohol Use Disorder  #Hepatic Steatosis  - Previously 2 scotches per night or more  - No alcohol since November per pt  - RUQ US last admission showed fatty liver likely due to obesity/alcohol use  - C/w B12 and Folate supplement    - Code Status: Full Code  - DVT ppx: Heparin subcutaneous    Pending:  - Pt appealing dc?

## 2025-02-27 NOTE — PROGRESS NOTE ADULT - SUBJECTIVE AND OBJECTIVE BOX
Patient is a 64y old  Female who presents with a chief complaint of Hypokalemia (26 Feb 2025 10:20)    Patient was seen and examined.  no new complaints  All systems reviewed.    PAST MEDICAL & SURGICAL HISTORY:  Asthma, LAST ATTACK MANY YRS AGO  Gastroesophageal reflux disease without esophagitis  Hypothyroidism  Heart murmur  Anxiety  Depression  Mood disorder  Psoriasis  Constipation  Back pain LOW  Morbid obesity  S/P tonsillectomy 1967  History of appendectomy 1974  Abscess of back, EVACUATION OF ABSCESS @ L5-S1 1997  History of Jaclyn-en-Y gastric bypass WITH CHOLECYSTECTOMY 2006    Allergies  penicillin (Anaphylaxis)    MEDICATIONS  (STANDING):  acetaminophen     Tablet .. 975 milliGRAM(s) Oral every 8 hours  buMETAnide 0.5 milliGRAM(s) Oral <User Schedule>  chlorhexidine 2% Cloths 1 Application(s) Topical <User Schedule>  cyanocobalamin 1000 MICROGram(s) Oral daily  cyclobenzaprine 5 milliGRAM(s) Oral three times a day  fluticasone propionate/ salmeterol 250-50 MICROgram(s) Diskus 1 Dose(s) Inhalation two times a day  folic acid 1 milliGRAM(s) Oral daily  heparin   Injectable 5000 Unit(s) SubCutaneous every 12 hours  influenza   Vaccine 0.5 milliLiter(s) IntraMuscular once  lamoTRIgine 150 milliGRAM(s) Oral daily  levothyroxine 100 MICROGram(s) Oral daily  melatonin 5 milliGRAM(s) Oral at bedtime  methadone    Tablet 30 milliGRAM(s) Oral every 8 hours  pantoprazole    Tablet 40 milliGRAM(s) Oral before breakfast  polyethylene glycol 3350 17 Gram(s) Oral daily  regadenoson Injectable 0.4 milliGRAM(s) IV Push once  senna 2 Tablet(s) Oral at bedtime  sertraline 200 milliGRAM(s) Oral daily    MEDICATIONS  (PRN):  albuterol    90 MICROgram(s) HFA Inhaler 2 Puff(s) Inhalation every 6 hours PRN Shortness of Breath and/or Wheezing  albuterol/ipratropium for Nebulization 3 milliLiter(s) Nebulizer every 6 hours PRN Shortness of Breath and/or Wheezing  dicyclomine 10 milliGRAM(s) Oral three times a day before meals PRN Abd Pain  fluticasone propionate/ salmeterol 250-50 MICROgram(s) Diskus 1 Dose(s) Inhalation two times a day PRN for bronchospasm  lactulose Syrup 10 Gram(s) Oral daily PRN for constipation  ondansetron Injectable 4 milliGRAM(s) IV Push daily PRN Nausea and/or Vomiting    T(C): 36.3 (02-27-25 @ 07:30), Max: 36.7 (02-26-25 @ 15:51)  HR: 78 (02-27-25 @ 07:30) (64 - 86)  BP: 119/77 (02-27-25 @ 07:30) (102/66 - 123/79)  RR: 18 (02-27-25 @ 00:04) (18 - 18)  SpO2: 96% (02-27-25 @ 07:30) (93% - 97%)    O/E:  Awake, alert, not in distress.  HEENT: atraumatic, EOMI.  Chest: clear.  CVS: SIS2 +, no murmur.  P/A: Soft, BS+  CNS: awake, alert  Ext: no edema feet.  All systems reviewed positive findings as above.                          12.4   5.82  )-----------( 245      ( 27 Feb 2025 06:36 )             39.9                         12.9   5.12  )-----------( 234      ( 26 Feb 2025 05:28 )             41.1     02-27    140  |  105  |  11  ----------------------------<  91  4.1   |  23  |  0.9  02-26    142  |  105  |  12  ----------------------------<  83  3.7   |  21  |  1.0    Ca    8.5      27 Feb 2025 06:36  Ca    8.7      26 Feb 2025 05:28  Mg     2.0     02-27    TPro  5.9[L]  /  Alb  3.3[L]  /  TBili  0.6  /  DBili  x   /  AST  50[H]  /  ALT  22  /  AlkPhos  168[H]  02-27  TPro  5.9[L]  /  Alb  3.3[L]  /  TBili  0.6  /  DBili  x   /  AST  50[H]  /  ALT  21  /  AlkPhos  163[H]  02-26

## 2025-02-27 NOTE — PRE-OP CHECKLIST - LAST TOOK
What Type Of Note Output Would You Prefer (Optional)?: Bullet Format Has Your Skin Lesion Been Treated?: not been treated Is This A New Presentation, Or A Follow-Up?: Skin Lesions clears

## 2025-02-27 NOTE — PROGRESS NOTE ADULT - ASSESSMENT
64-year-old female with a history of asthma, hypothyroidism, anxiety, depression, MVP, Jaclyn-en-Y gastric bypass, appendectomy, cholecystectomy, and chronic back pain (on methadone) is presenting for decreased PO intake x3-4 weeks, diffuse abdominal pain, and weakness. She used to drink 2 scotchs per night but has not drank alcohol since she was hospitalized in November. She lives at home with her  whom helps care for her, however he recently was admitted to the hospital and is currently at a STR where she was left alone to care for herself. She states that she has had a non-specific generalized abdominal pain not associated with nausea, vomiting, or diarrhea. She has decreased PO intake and only eats 1-2 bites/sips of food or drink before she feels full. She was found to be significantly hypokalemic on admission and is admitted for further management. after a syncopal episode. She lost consciousness briefly while sitting after a shower. She also reported 5 days of worsening abdominal pain, nausea, vomiting, decreased oral intake, and constipation. She reports regular alcohol use (a couple of glasses of scotch nightly) and worsening shortness of breath for the past 2-3 months, requiring near-daily albuterol nebulizer treatments.    # Dyspnea  # H/o Pulmonary shunt   # COPD- stable  - Xray Chest 1 View- PORTABLE-Routine (Xray Chest 1 View- PORTABLE-Routine .) (02.20.25 @ 14:43) >No radiographic evidence of acute cardiopulmonary disease.  - 12 Lead ECG (02.23.25 @ 22:38) >Normal sinus rhythm  - Troponin T, High Sensitivity Result: 10(02.23.25 @ 05:33)  -  NM Nuclear Stress Pharmacologic Multiple (02.24.25 @ 16:06) NORMAL LEXISCAN / REST MYOCARDIAL PERFUSION SPECT TOMOGRAPHY, WITH NO EVIDENCE FOR ISCHEMIA DURING LEXISCAN INFUSION. NORMAL RESTING LEFT VENTRICULAR WALL MOTION AND WALL THICKENING.LEFT VENTRICULAR EJECTION FRACTION OF  74 % WHICH IS WITHIN RANGE OF NORMAL.  - oxygen sat stable - 92 on RA  -c/w bumex  - c/w albuterol, advair   - evaluated by cardiology  - on home oxygen prn    # Hypomagnesemia-resolved    # Chronic  Abd pain with dietary non compliance - resolved  # Decrease oral intake - resolved  # Non erosive gastritis  # Hiatal hernia  # H/o  Jaclyn-en-Y Bypass- 2006   -  EGD (11.13.24 @ 10:30) >Hiatal Hernia. Erythema in the stomach compatible with non-erosive gastritis.  - CT Abdomen and Pelvis w/ IV Cont (02.11.25 @ 17:29) >No evidence of acute abdominal pathology.  - Bariatric surgery eval - Bariatric diet was recommended but patient is refusing to eat anything if she has bariatric diet - she was started on dash diet- for small frequent meals  - evaluated by GI  - ensure supplements  - c/w PPI    # Hepatic steatosis with transaminitis- resolving  # Alcohol Use Disorder  - c/w thiamine, folate    # DM type 2  - A1C with Estimated Average Glucose Result: 5.6 % (02.17.25 @ 04:30)    # Hypothyroidism  - c/w levothyroxine    # Chronic back pain  # H/o  Laminectomy   - c/w home  meds    # Anxiety  # Depression  - c/w home meds    # DVT prophylaxis    # Full code    # Pending: pt medically stable for discharge, pending [employer] appeal result  # Discussed plan of care with patient , discussed labs and recommendations by consultants  # Disposition: Home

## 2025-02-27 NOTE — PROGRESS NOTE ADULT - SUBJECTIVE AND OBJECTIVE BOX
SUBJECTIVE/OVERNIGHT EVENTS  Today is hospital day 16d. This morning patient was seen and examined at bedside, resting comfortably in bed. No acute or major events overnight.    HOSPITAL COURSE  Day 1:   Day 2:   Day 3:     CODE STATUS:    FAMILY COMMUNICATION  Contact date:  Name of person contacted:  Relationship to patient:  Communication details:    MEDICATIONS  STANDING MEDICATIONS  acetaminophen     Tablet .. 975 milliGRAM(s) Oral every 8 hours  buMETAnide 0.5 milliGRAM(s) Oral <User Schedule>  chlorhexidine 2% Cloths 1 Application(s) Topical <User Schedule>  cyanocobalamin 1000 MICROGram(s) Oral daily  cyclobenzaprine 5 milliGRAM(s) Oral three times a day  fluticasone propionate/ salmeterol 250-50 MICROgram(s) Diskus 1 Dose(s) Inhalation two times a day  folic acid 1 milliGRAM(s) Oral daily  heparin   Injectable 5000 Unit(s) SubCutaneous every 12 hours  influenza   Vaccine 0.5 milliLiter(s) IntraMuscular once  lamoTRIgine 150 milliGRAM(s) Oral daily  levothyroxine 100 MICROGram(s) Oral daily  melatonin 5 milliGRAM(s) Oral at bedtime  methadone    Tablet 30 milliGRAM(s) Oral every 8 hours  pantoprazole    Tablet 40 milliGRAM(s) Oral before breakfast  polyethylene glycol 3350 17 Gram(s) Oral daily  regadenoson Injectable 0.4 milliGRAM(s) IV Push once  senna 2 Tablet(s) Oral at bedtime  sertraline 200 milliGRAM(s) Oral daily    PRN MEDICATIONS  albuterol    90 MICROgram(s) HFA Inhaler 2 Puff(s) Inhalation every 6 hours PRN  albuterol/ipratropium for Nebulization 3 milliLiter(s) Nebulizer every 6 hours PRN  dicyclomine 10 milliGRAM(s) Oral three times a day before meals PRN  fluticasone propionate/ salmeterol 250-50 MICROgram(s) Diskus 1 Dose(s) Inhalation two times a day PRN  lactulose Syrup 10 Gram(s) Oral daily PRN  ondansetron Injectable 4 milliGRAM(s) IV Push daily PRN    VITALS  T(F): 97.3 (02-27-25 @ 07:30), Max: 98.1 (02-26-25 @ 15:51)  HR: 78 (02-27-25 @ 07:30) (64 - 86)  BP: 119/77 (02-27-25 @ 07:30) (102/66 - 123/79)  RR: 18 (02-27-25 @ 00:04) (18 - 18)  SpO2: 96% (02-27-25 @ 07:30) (93% - 97%)  POCT Blood Glucose.: 101 mg/dL (02-27-25 @ 08:00)  POCT Blood Glucose.: 109 mg/dL (02-26-25 @ 21:00)  POCT Blood Glucose.: 123 mg/dL (02-26-25 @ 16:01)  POCT Blood Glucose.: 93 mg/dL (02-26-25 @ 11:20)    PHYSICAL EXAM  GENERAL: NAD, lying in bed comfortably  HEART: Regular rate and rhythm, no murmurs, rubs, or gallops  LUNGS: Unlabored respirations.  Clear to auscultation bilaterally, no crackles, wheezing, or rhonchi  ABDOMEN: Soft, nontender, nondistended, +BS  EXTREMITIES: No clubbing, cyanosis, or edema  NERVOUS SYSTEM:  A&Ox3  SKIN: No rashes or lesions    LABS             12.4   5.82  )-----------( 245      ( 02-27-25 @ 06:36 )             39.9     140  |  105  |  11  -------------------------<  91   02-27-25 @ 06:36  4.1  |  23  |  0.9    Ca      8.5     02-27-25 @ 06:36  Mg     2.0     02-27-25 @ 06:36    TPro  5.9  /  Alb  3.3  /  TBili  0.6  /  DBili  x   /  AST  50  /  ALT  22  /  AlkPhos  168  /  GGT  x     02-27-25 @ 06:36        Urinalysis Basic - ( 27 Feb 2025 06:36 )    Color: x / Appearance: x / SG: x / pH: x  Gluc: 91 mg/dL / Ketone: x  / Bili: x / Urobili: x   Blood: x / Protein: x / Nitrite: x   Leuk Esterase: x / RBC: x / WBC x   Sq Epi: x / Non Sq Epi: x / Bacteria: x          IMAGING

## 2025-02-28 LAB
ALBUMIN SERPL ELPH-MCNC: 3.3 G/DL — LOW (ref 3.5–5.2)
ALP SERPL-CCNC: 175 U/L — HIGH (ref 30–115)
ALT FLD-CCNC: 29 U/L — SIGNIFICANT CHANGE UP (ref 0–41)
ANION GAP SERPL CALC-SCNC: 12 MMOL/L — SIGNIFICANT CHANGE UP (ref 7–14)
AST SERPL-CCNC: 75 U/L — HIGH (ref 0–41)
BILIRUB SERPL-MCNC: 0.6 MG/DL — SIGNIFICANT CHANGE UP (ref 0.2–1.2)
BUN SERPL-MCNC: 11 MG/DL — SIGNIFICANT CHANGE UP (ref 10–20)
CALCIUM SERPL-MCNC: 8.6 MG/DL — SIGNIFICANT CHANGE UP (ref 8.4–10.5)
CHLORIDE SERPL-SCNC: 103 MMOL/L — SIGNIFICANT CHANGE UP (ref 98–110)
CO2 SERPL-SCNC: 20 MMOL/L — SIGNIFICANT CHANGE UP (ref 17–32)
CREAT SERPL-MCNC: 0.9 MG/DL — SIGNIFICANT CHANGE UP (ref 0.7–1.5)
EGFR: 72 ML/MIN/1.73M2 — SIGNIFICANT CHANGE UP
EGFR: 72 ML/MIN/1.73M2 — SIGNIFICANT CHANGE UP
GLUCOSE BLDC GLUCOMTR-MCNC: 125 MG/DL — HIGH (ref 70–99)
GLUCOSE BLDC GLUCOMTR-MCNC: 90 MG/DL — SIGNIFICANT CHANGE UP (ref 70–99)
GLUCOSE BLDC GLUCOMTR-MCNC: 92 MG/DL — SIGNIFICANT CHANGE UP (ref 70–99)
GLUCOSE BLDC GLUCOMTR-MCNC: 93 MG/DL — SIGNIFICANT CHANGE UP (ref 70–99)
GLUCOSE SERPL-MCNC: 85 MG/DL — SIGNIFICANT CHANGE UP (ref 70–99)
HCT VFR BLD CALC: 40.8 % — SIGNIFICANT CHANGE UP (ref 37–47)
HGB BLD-MCNC: 12.4 G/DL — SIGNIFICANT CHANGE UP (ref 12–16)
MAGNESIUM SERPL-MCNC: 1.7 MG/DL — LOW (ref 1.8–2.4)
MCHC RBC-ENTMCNC: 30.2 PG — SIGNIFICANT CHANGE UP (ref 27–31)
MCHC RBC-ENTMCNC: 30.4 G/DL — LOW (ref 32–37)
MCV RBC AUTO: 99.3 FL — HIGH (ref 81–99)
NRBC BLD AUTO-RTO: 0 /100 WBCS — SIGNIFICANT CHANGE UP (ref 0–0)
PLATELET # BLD AUTO: 243 K/UL — SIGNIFICANT CHANGE UP (ref 130–400)
PMV BLD: 10.4 FL — SIGNIFICANT CHANGE UP (ref 7.4–10.4)
POTASSIUM SERPL-MCNC: 4 MMOL/L — SIGNIFICANT CHANGE UP (ref 3.5–5)
POTASSIUM SERPL-SCNC: 4 MMOL/L — SIGNIFICANT CHANGE UP (ref 3.5–5)
PROT SERPL-MCNC: 5.8 G/DL — LOW (ref 6–8)
RBC # BLD: 4.11 M/UL — LOW (ref 4.2–5.4)
RBC # FLD: 15 % — HIGH (ref 11.5–14.5)
SODIUM SERPL-SCNC: 135 MMOL/L — SIGNIFICANT CHANGE UP (ref 135–146)
WBC # BLD: 6.12 K/UL — SIGNIFICANT CHANGE UP (ref 4.8–10.8)
WBC # FLD AUTO: 6.12 K/UL — SIGNIFICANT CHANGE UP (ref 4.8–10.8)

## 2025-02-28 PROCEDURE — 71045 X-RAY EXAM CHEST 1 VIEW: CPT | Mod: 26

## 2025-02-28 PROCEDURE — 99232 SBSQ HOSP IP/OBS MODERATE 35: CPT

## 2025-02-28 RX ORDER — MAGNESIUM SULFATE 500 MG/ML
2 SYRINGE (ML) INJECTION ONCE
Refills: 0 | Status: COMPLETED | OUTPATIENT
Start: 2025-02-28 | End: 2025-02-28

## 2025-02-28 RX ORDER — BUMETANIDE 1 MG/1
1 TABLET ORAL DAILY
Refills: 0 | Status: DISCONTINUED | OUTPATIENT
Start: 2025-02-28 | End: 2025-03-02

## 2025-02-28 RX ADMIN — FOLIC ACID 1 MILLIGRAM(S): 1 TABLET ORAL at 12:05

## 2025-02-28 RX ADMIN — Medication 5 MILLIGRAM(S): at 21:33

## 2025-02-28 RX ADMIN — CYCLOBENZAPRINE HYDROCHLORIDE 5 MILLIGRAM(S): 15 CAPSULE, EXTENDED RELEASE ORAL at 14:27

## 2025-02-28 RX ADMIN — POLYETHYLENE GLYCOL 3350 17 GRAM(S): 17 POWDER, FOR SOLUTION ORAL at 12:05

## 2025-02-28 RX ADMIN — Medication 975 MILLIGRAM(S): at 21:33

## 2025-02-28 RX ADMIN — CYANOCOBALAMIN 1000 MICROGRAM(S): 1000 INJECTION INTRAMUSCULAR; SUBCUTANEOUS at 12:04

## 2025-02-28 RX ADMIN — LAMOTRIGINE 150 MILLIGRAM(S): 150 TABLET ORAL at 12:04

## 2025-02-28 RX ADMIN — Medication 30 MILLIGRAM(S): at 05:21

## 2025-02-28 RX ADMIN — BUMETANIDE 1 MILLIGRAM(S): 1 TABLET ORAL at 17:52

## 2025-02-28 RX ADMIN — SERTRALINE 200 MILLIGRAM(S): 100 TABLET, FILM COATED ORAL at 12:03

## 2025-02-28 RX ADMIN — Medication 1 DOSE(S): at 21:31

## 2025-02-28 RX ADMIN — Medication 975 MILLIGRAM(S): at 14:27

## 2025-02-28 RX ADMIN — Medication 30 MILLIGRAM(S): at 21:32

## 2025-02-28 RX ADMIN — Medication 975 MILLIGRAM(S): at 05:20

## 2025-02-28 RX ADMIN — CYCLOBENZAPRINE HYDROCHLORIDE 5 MILLIGRAM(S): 15 CAPSULE, EXTENDED RELEASE ORAL at 05:20

## 2025-02-28 RX ADMIN — Medication 100 MICROGRAM(S): at 05:21

## 2025-02-28 RX ADMIN — Medication 40 MILLIGRAM(S): at 05:21

## 2025-02-28 RX ADMIN — Medication 25 GRAM(S): at 07:56

## 2025-02-28 RX ADMIN — Medication 30 MILLIGRAM(S): at 14:27

## 2025-02-28 RX ADMIN — CYCLOBENZAPRINE HYDROCHLORIDE 5 MILLIGRAM(S): 15 CAPSULE, EXTENDED RELEASE ORAL at 21:32

## 2025-02-28 RX ADMIN — Medication 1 APPLICATION(S): at 05:23

## 2025-02-28 RX ADMIN — Medication 2 TABLET(S): at 21:32

## 2025-02-28 NOTE — PROGRESS NOTE ADULT - SUBJECTIVE AND OBJECTIVE BOX
Patient is a 64y old  Female who presents with a chief complaint of Hypokalemia (26 Feb 2025 10:20)    Patient was seen and examined.  no new complaints  All systems reviewed.    PAST MEDICAL & SURGICAL HISTORY:  Asthma, LAST ATTACK MANY YRS AGO  Gastroesophageal reflux disease without esophagitis  Hypothyroidism  Heart murmur  Anxiety  Depression  Mood disorder  Psoriasis  Constipation  Back pain LOW  Morbid obesity  S/P tonsillectomy 1967  History of appendectomy 1974  Abscess of back, EVACUATION OF ABSCESS @ L5-S1 1997  History of Jaclyn-en-Y gastric bypass WITH CHOLECYSTECTOMY 2006    Allergies  penicillin (Anaphylaxis)    MEDICATIONS  (STANDING):  acetaminophen     Tablet .. 975 milliGRAM(s) Oral every 8 hours  buMETAnide 1 milliGRAM(s) Oral daily  chlorhexidine 2% Cloths 1 Application(s) Topical <User Schedule>  cyanocobalamin 1000 MICROGram(s) Oral daily  cyclobenzaprine 5 milliGRAM(s) Oral three times a day  fluticasone propionate/ salmeterol 250-50 MICROgram(s) Diskus 1 Dose(s) Inhalation two times a day  folic acid 1 milliGRAM(s) Oral daily  heparin   Injectable 5000 Unit(s) SubCutaneous every 12 hours  influenza   Vaccine 0.5 milliLiter(s) IntraMuscular once  lamoTRIgine 150 milliGRAM(s) Oral daily  levothyroxine 100 MICROGram(s) Oral daily  melatonin 5 milliGRAM(s) Oral at bedtime  methadone    Tablet 30 milliGRAM(s) Oral every 8 hours  pantoprazole    Tablet 40 milliGRAM(s) Oral before breakfast  polyethylene glycol 3350 17 Gram(s) Oral daily  regadenoson Injectable 0.4 milliGRAM(s) IV Push once  senna 2 Tablet(s) Oral at bedtime  sertraline 200 milliGRAM(s) Oral daily    MEDICATIONS  (PRN):  albuterol    90 MICROgram(s) HFA Inhaler 2 Puff(s) Inhalation every 6 hours PRN Shortness of Breath and/or Wheezing  albuterol/ipratropium for Nebulization 3 milliLiter(s) Nebulizer every 6 hours PRN Shortness of Breath and/or Wheezing  dicyclomine 10 milliGRAM(s) Oral three times a day before meals PRN Abd Pain  fluticasone propionate/ salmeterol 250-50 MICROgram(s) Diskus 1 Dose(s) Inhalation two times a day PRN for bronchospasm  lactulose Syrup 10 Gram(s) Oral daily PRN for constipation  ondansetron Injectable 4 milliGRAM(s) IV Push daily PRN Nausea and/or Vomiting    T(C): 36.6 (02-28-25 @ 10:38), Max: 36.8 (02-28-25 @ 07:30)  HR: 79 (02-28-25 @ 10:38) (65 - 101)  BP: 126/78 (02-28-25 @ 10:38) (102/65 - 126/78)  RR: 18 (02-27-25 @ 23:54) (18 - 18)  SpO2: 96% (02-28-25 @ 10:38) (95% - 98%)    O/E:  Awake, alert, not in distress.  HEENT: atraumatic, EOMI.  Chest: clear.  CVS: SIS2 +, no murmur.  P/A: Soft, BS+  CNS: awake, alert  Ext: no edema feet.  All systems reviewed positive findings as above.                          12.4   6.12  )-----------( 243      ( 28 Feb 2025 05:06 )             40.8                         12.4   5.82  )-----------( 245      ( 27 Feb 2025 06:36 )             39.9   02-28    135  |  103  |  11  ----------------------------<  85  4.0   |  20  |  0.9  02-27    140  |  105  |  11  ----------------------------<  91  4.1   |  23  |  0.9    Ca    8.6      28 Feb 2025 05:06  Ca    8.5      27 Feb 2025 06:36  Mg     1.7     02-28    TPro  5.8[L]  /  Alb  3.3[L]  /  TBili  0.6  /  DBili  x   /  AST  75[H]  /  ALT  29  /  AlkPhos  175[H]  02-28  TPro  5.9[L]  /  Alb  3.3[L]  /  TBili  0.6  /  DBili  x   /  AST  50[H]  /  ALT  22  /  AlkPhos  168[H]  02-27

## 2025-02-28 NOTE — PROGRESS NOTE ADULT - ASSESSMENT
64-year-old female with a history of asthma, hypothyroidism, anxiety, depression, MVP, Jaclyn-en-Y gastric bypass, appendectomy, cholecystectomy, and chronic back pain (on methadone) is presenting for decreased PO intake x3-4 weeks, diffuse abdominal pain, and weakness. She used to drink 2 scotchs per night but has not drank alcohol since she was hospitalized in November. She lives at home with her  whom helps care for her, however he recently was admitted to the hospital and is currently at a STR where she was left alone to care for herself. She states that she has had a non-specific generalized abdominal pain not associated with nausea, vomiting, or diarrhea. She has decreased PO intake and only eats 1-2 bites/sips of food or drink before she feels full. She was found to be significantly hypokalemic on admission and is admitted for further management. after a syncopal episode. She lost consciousness briefly while sitting after a shower. She also reported 5 days of worsening abdominal pain, nausea, vomiting, decreased oral intake, and constipation. She reports regular alcohol use (a couple of glasses of scotch nightly) and worsening shortness of breath for the past 2-3 months, requiring near-daily albuterol nebulizer treatments.    # Dyspnea  # H/o Pulmonary shunt   # COPD- stable  - Xray Chest 1 View- PORTABLE-Routine (Xray Chest 1 View- PORTABLE-Routine .) (02.20.25 @ 14:43) >No radiographic evidence of acute cardiopulmonary disease.  - 12 Lead ECG (02.23.25 @ 22:38) >Normal sinus rhythm  - Troponin T, High Sensitivity Result: 10(02.23.25 @ 05:33)  -  NM Nuclear Stress Pharmacologic Multiple (02.24.25 @ 16:06) NORMAL LEXISCAN / REST MYOCARDIAL PERFUSION SPECT TOMOGRAPHY, WITH NO EVIDENCE FOR ISCHEMIA DURING LEXISCAN INFUSION. NORMAL RESTING LEFT VENTRICULAR WALL MOTION AND WALL THICKENING.LEFT VENTRICULAR EJECTION FRACTION OF  74 % WHICH IS WITHIN RANGE OF NORMAL.  - oxygen sat stable - 96 on 2 lit  -c/w bumex  - c/w albuterol, advair   - evaluated by cardiology  - on home oxygen prn    # Hypomagnesemia  - replete mg    # Chronic  Abd pain with dietary non compliance - resolved  # Decrease oral intake - resolved  # Non erosive gastritis  # Hiatal hernia  # H/o  Jaclyn-en-Y Bypass- 2006   -  EGD (11.13.24 @ 10:30) >Hiatal Hernia. Erythema in the stomach compatible with non-erosive gastritis.  - CT Abdomen and Pelvis w/ IV Cont (02.11.25 @ 17:29) >No evidence of acute abdominal pathology.  - Bariatric surgery eval - Bariatric diet was recommended but patient is refusing to eat anything if she has bariatric diet - she was started on dash diet- for small frequent meals  - evaluated by GI  - ensure supplements  - c/w PPI    # Hepatic steatosis with transaminitis- resolving  # Alcohol Use Disorder  - c/w thiamine, folate    # DM type 2  - A1C with Estimated Average Glucose Result: 5.6 % (02.17.25 @ 04:30)    # Hypothyroidism  - c/w levothyroxine    # Chronic back pain  # H/o  Laminectomy   - c/w home  meds    # Anxiety  # Depression  - c/w home meds    # DVT prophylaxis    # Full code    # Pending: pt medically stable for discharge, pending [employer] appeal result  # Discussed plan of care with patient , discussed labs and recommendations by consultants  # Disposition: Home

## 2025-02-28 NOTE — PROGRESS NOTE ADULT - SUBJECTIVE AND OBJECTIVE BOX
SUBJECTIVE/OVERNIGHT EVENTS  Today is hospital day 17d. This morning patient was seen and examined at bedside, resting comfortably in bed. No acute or major events overnight.  Pt was on 2L NC this morning - now weaned off (Pt uses O2 at home)    MEDICATIONS  STANDING MEDICATIONS  acetaminophen     Tablet .. 975 milliGRAM(s) Oral every 8 hours  buMETAnide 0.5 milliGRAM(s) Oral <User Schedule>  chlorhexidine 2% Cloths 1 Application(s) Topical <User Schedule>  cyanocobalamin 1000 MICROGram(s) Oral daily  cyclobenzaprine 5 milliGRAM(s) Oral three times a day  fluticasone propionate/ salmeterol 250-50 MICROgram(s) Diskus 1 Dose(s) Inhalation two times a day  folic acid 1 milliGRAM(s) Oral daily  heparin   Injectable 5000 Unit(s) SubCutaneous every 12 hours  influenza   Vaccine 0.5 milliLiter(s) IntraMuscular once  lamoTRIgine 150 milliGRAM(s) Oral daily  levothyroxine 100 MICROGram(s) Oral daily  melatonin 5 milliGRAM(s) Oral at bedtime  methadone    Tablet 30 milliGRAM(s) Oral every 8 hours  pantoprazole    Tablet 40 milliGRAM(s) Oral before breakfast  polyethylene glycol 3350 17 Gram(s) Oral daily  regadenoson Injectable 0.4 milliGRAM(s) IV Push once  senna 2 Tablet(s) Oral at bedtime  sertraline 200 milliGRAM(s) Oral daily    PRN MEDICATIONS  albuterol    90 MICROgram(s) HFA Inhaler 2 Puff(s) Inhalation every 6 hours PRN  albuterol/ipratropium for Nebulization 3 milliLiter(s) Nebulizer every 6 hours PRN  dicyclomine 10 milliGRAM(s) Oral three times a day before meals PRN  fluticasone propionate/ salmeterol 250-50 MICROgram(s) Diskus 1 Dose(s) Inhalation two times a day PRN  lactulose Syrup 10 Gram(s) Oral daily PRN  ondansetron Injectable 4 milliGRAM(s) IV Push daily PRN    VITALS  T(F): 97.8 (02-28-25 @ 10:38), Max: 98.2 (02-28-25 @ 07:30)  HR: 79 (02-28-25 @ 10:38) (65 - 101)  BP: 126/78 (02-28-25 @ 10:38) (102/65 - 126/78)  RR: 18 (02-27-25 @ 23:54) (18 - 18)  SpO2: 96% (02-28-25 @ 10:38) (95% - 98%)  POCT Blood Glucose.: 90 mg/dL (02-28-25 @ 11:16)  POCT Blood Glucose.: 93 mg/dL (02-28-25 @ 07:32)  POCT Blood Glucose.: 114 mg/dL (02-27-25 @ 21:37)  POCT Blood Glucose.: 82 mg/dL (02-27-25 @ 16:21)    PHYSICAL EXAM  General: NAD, non-toxic appearing  HEENT: EOMi, no scleral icterus  CV: RRR, normal s1 and s2  Lungs: normal respiratory effort, CTAB  Abd: BS+. Soft, nontender, no rebound tenderness   Ext: b/l LE edema, warm, well perfused, erythema of b/l LE extremities  Psych: A+Ox3, appropriate affect  Neuro: grossly non-focal, moving all extremities spontaneously  Skin: no rashes or lesions       LABS             12.4   6.12  )-----------( 243      ( 02-28-25 @ 05:06 )             40.8     135  |  103  |  11  -------------------------<  85   02-28-25 @ 05:06  4.0  |  20  |  0.9    Ca      8.6     02-28-25 @ 05:06  Mg     1.7     02-28-25 @ 05:06    TPro  5.8  /  Alb  3.3  /  TBili  0.6  /  DBili  x   /  AST  75  /  ALT  29  /  AlkPhos  175  /  GGT  x     02-28-25 @ 05:06        Urinalysis Basic - ( 28 Feb 2025 05:06 )    Color: x / Appearance: x / SG: x / pH: x  Gluc: 85 mg/dL / Ketone: x  / Bili: x / Urobili: x   Blood: x / Protein: x / Nitrite: x   Leuk Esterase: x / RBC: x / WBC x   Sq Epi: x / Non Sq Epi: x / Bacteria: x

## 2025-02-28 NOTE — PROGRESS NOTE ADULT - ASSESSMENT
64-year-old female with a history of asthma, hypothyroidism, anxiety, depression, MVP, Jaclyn-en-Y gastric bypass, appendectomy, cholecystectomy, and chronic back pain (on methadone) presented for decreased PO intake x3-4 weeks, diffuse abdominal pain, and weakness.    # dyspnea on exertion- r/o anginal equivalent  # possible cardiac/pulm shunt  # COPD- stable  - TTE 11/2024 --> 60-65% ; suspected cardiac/pulmonary shunt.   - Home Bumex was initially held d/t CEM. Bumex every other day was later resumed --> may increase depending on CXR and stable BP  - Lexican stress test normal       # decreased PO intake  # wt loss  # Diffuse Abdominal Pain in setting of Hx of Jaclyn-en-Y Bypass- 2006   - EGD/ Colonoscopy 5/2023: Non erosive gastritis, Repeat 11/2024  - Per GI: low yield w/ repeat EGD as pt had a recent endoscopy in 11/2024 and it will likely be unchanged  - CTA abdomen 11/13: No acute abdominal or pelvic pathology. Atherosclerotic calcifications of the aorta and its branches. No evidence of celiac axis or SMA abnormality.  - Bariatric diet was recommended but patient is refusing to eat anything if she has bariatric diet - d/w patient and started on dash diet- for small frequent meals; low carbt; increase protein  - Vit D (wnl), zinc (66)- normal  - start on ensure supplement  - continue with daily PPI, trial of dicyclomine 10 mg q8h PRN  - Acetaminophen PRN for pain  - hep panel negative     #Severe Hypokalemia and Moderate Hyponatremia2/2 Decreased PO Intake and Bumex Use- resolved  #Prolonged QTC in setting of Hypokalemia and Multiple QTC-Prolonging Meds- resolved  #CEM on CKD - resolved    #DM  - A1c last admission 7 --> now a1c 5.6  - Had episodes of hypoglycemia  - If glucose consistently >150 can start ISS    #Hypothyroidism  - C/w home levothyroxine    #Anxiety  #Depression  - c/w home Lamotrigine, Zoloft- were initially held due to prolonged Qtc - now restarted  - At home also on Xanax    #Chronic Back Pain w/ Hx of Laminectomy   - c/w home Methadone 30mg q8h  - On carisoprodol at home;  cyclobenzaprine here     #Alcohol Use Disorder  #Hepatic Steatosis  - Previously 2 scotches per night or more  - No alcohol since November per pt  - RUQ US last admission showed fatty liver likely due to obesity/alcohol use  - C/w B12 and Folate supplement    - Code Status: Full Code  - DVT ppx: Heparin subcutaneous    Pending:  - Pt appealing dc  - Bumex dosing

## 2025-03-01 LAB
GLUCOSE BLDC GLUCOMTR-MCNC: 108 MG/DL — HIGH (ref 70–99)
GLUCOSE BLDC GLUCOMTR-MCNC: 76 MG/DL — SIGNIFICANT CHANGE UP (ref 70–99)
GLUCOSE BLDC GLUCOMTR-MCNC: 84 MG/DL — SIGNIFICANT CHANGE UP (ref 70–99)
GLUCOSE BLDC GLUCOMTR-MCNC: 99 MG/DL — SIGNIFICANT CHANGE UP (ref 70–99)

## 2025-03-01 PROCEDURE — 99232 SBSQ HOSP IP/OBS MODERATE 35: CPT

## 2025-03-01 RX ORDER — METHADONE HCL 10 MG
30 TABLET ORAL EVERY 8 HOURS
Refills: 0 | Status: DISCONTINUED | OUTPATIENT
Start: 2025-03-01 | End: 2025-03-02

## 2025-03-01 RX ADMIN — Medication 5 MILLIGRAM(S): at 21:02

## 2025-03-01 RX ADMIN — Medication 2 TABLET(S): at 21:02

## 2025-03-01 RX ADMIN — CYCLOBENZAPRINE HYDROCHLORIDE 5 MILLIGRAM(S): 15 CAPSULE, EXTENDED RELEASE ORAL at 05:24

## 2025-03-01 RX ADMIN — FOLIC ACID 1 MILLIGRAM(S): 1 TABLET ORAL at 14:55

## 2025-03-01 RX ADMIN — Medication 30 MILLIGRAM(S): at 14:54

## 2025-03-01 RX ADMIN — CYCLOBENZAPRINE HYDROCHLORIDE 5 MILLIGRAM(S): 15 CAPSULE, EXTENDED RELEASE ORAL at 21:02

## 2025-03-01 RX ADMIN — SERTRALINE 200 MILLIGRAM(S): 100 TABLET, FILM COATED ORAL at 14:54

## 2025-03-01 RX ADMIN — HEPARIN SODIUM 5000 UNIT(S): 1000 INJECTION INTRAVENOUS; SUBCUTANEOUS at 05:26

## 2025-03-01 RX ADMIN — Medication 30 MILLIGRAM(S): at 21:02

## 2025-03-01 RX ADMIN — CYANOCOBALAMIN 1000 MICROGRAM(S): 1000 INJECTION INTRAMUSCULAR; SUBCUTANEOUS at 14:54

## 2025-03-01 RX ADMIN — Medication 975 MILLIGRAM(S): at 14:54

## 2025-03-01 RX ADMIN — Medication 30 MILLIGRAM(S): at 06:44

## 2025-03-01 RX ADMIN — Medication 1 APPLICATION(S): at 05:28

## 2025-03-01 RX ADMIN — Medication 975 MILLIGRAM(S): at 21:02

## 2025-03-01 RX ADMIN — BUMETANIDE 1 MILLIGRAM(S): 1 TABLET ORAL at 05:25

## 2025-03-01 RX ADMIN — POLYETHYLENE GLYCOL 3350 17 GRAM(S): 17 POWDER, FOR SOLUTION ORAL at 14:53

## 2025-03-01 RX ADMIN — Medication 975 MILLIGRAM(S): at 15:22

## 2025-03-01 RX ADMIN — Medication 40 MILLIGRAM(S): at 05:25

## 2025-03-01 RX ADMIN — LAMOTRIGINE 150 MILLIGRAM(S): 150 TABLET ORAL at 14:54

## 2025-03-01 RX ADMIN — Medication 975 MILLIGRAM(S): at 05:26

## 2025-03-01 RX ADMIN — Medication 100 MICROGRAM(S): at 05:24

## 2025-03-01 RX ADMIN — CYCLOBENZAPRINE HYDROCHLORIDE 5 MILLIGRAM(S): 15 CAPSULE, EXTENDED RELEASE ORAL at 14:54

## 2025-03-01 NOTE — PROGRESS NOTE ADULT - SUBJECTIVE AND OBJECTIVE BOX
Patient is a 64y old  Female who presents with a chief complaint of Hypokalemia (26 Feb 2025 10:20)    Patient was seen and examined.  no new complaints  All systems reviewed.    PAST MEDICAL & SURGICAL HISTORY:  Asthma, LAST ATTACK MANY YRS AGO  Gastroesophageal reflux disease without esophagitis  Hypothyroidism  Heart murmur  Anxiety  Depression  Mood disorder  Psoriasis  Constipation  Back pain LOW  Morbid obesity  S/P tonsillectomy 1967  History of appendectomy 1974  Abscess of back, EVACUATION OF ABSCESS @ L5-S1 1997  History of Jaclyn-en-Y gastric bypass WITH CHOLECYSTECTOMY 2006    Allergies  penicillin (Anaphylaxis)    MEDICATIONS  (STANDING):  acetaminophen     Tablet .. 975 milliGRAM(s) Oral every 8 hours  buMETAnide 1 milliGRAM(s) Oral daily  chlorhexidine 2% Cloths 1 Application(s) Topical <User Schedule>  cyanocobalamin 1000 MICROGram(s) Oral daily  cyclobenzaprine 5 milliGRAM(s) Oral three times a day  fluticasone propionate/ salmeterol 250-50 MICROgram(s) Diskus 1 Dose(s) Inhalation two times a day  folic acid 1 milliGRAM(s) Oral daily  heparin   Injectable 5000 Unit(s) SubCutaneous every 12 hours  influenza   Vaccine 0.5 milliLiter(s) IntraMuscular once  lamoTRIgine 150 milliGRAM(s) Oral daily  levothyroxine 100 MICROGram(s) Oral daily  melatonin 5 milliGRAM(s) Oral at bedtime  methadone    Tablet 30 milliGRAM(s) Oral every 8 hours  pantoprazole    Tablet 40 milliGRAM(s) Oral before breakfast  polyethylene glycol 3350 17 Gram(s) Oral daily  regadenoson Injectable 0.4 milliGRAM(s) IV Push once  senna 2 Tablet(s) Oral at bedtime  sertraline 200 milliGRAM(s) Oral daily    MEDICATIONS  (PRN):  albuterol    90 MICROgram(s) HFA Inhaler 2 Puff(s) Inhalation every 6 hours PRN Shortness of Breath and/or Wheezing  albuterol/ipratropium for Nebulization 3 milliLiter(s) Nebulizer every 6 hours PRN Shortness of Breath and/or Wheezing  dicyclomine 10 milliGRAM(s) Oral three times a day before meals PRN Abd Pain  fluticasone propionate/ salmeterol 250-50 MICROgram(s) Diskus 1 Dose(s) Inhalation two times a day PRN for bronchospasm  lactulose Syrup 10 Gram(s) Oral daily PRN for constipation  ondansetron Injectable 4 milliGRAM(s) IV Push daily PRN Nausea and/or Vomiting      T(C): 36.6 (03-01-25 @ 07:55), Max: 36.8 (02-28-25 @ 16:16)  HR: 74 (03-01-25 @ 07:55) (71 - 78)  BP: 112/71 (03-01-25 @ 07:55) (101/65 - 120/76)  RR: 18 (03-01-25 @ 07:55) (18 - 96)  SpO2: 97% (03-01-25 @ 07:55) (97% - 98%)      O/E:  Awake, alert, not in distress.  HEENT: atraumatic, EOMI.  Chest: clear.  CVS: SIS2 +, no murmur.  P/A: Soft, BS+  CNS: awake, alert  Ext: no edema feet.  All systems reviewed positive findings as above.                           12.4   6.12  )-----------( 243      ( 28 Feb 2025 05:06 )             40.8     02-28    135  |  103  |  11  ----------------------------<  85  4.0   |  20  |  0.9    Ca    8.6      28 Feb 2025 05:06  Mg     1.7     02-28    TPro  5.8[L]  /  Alb  3.3[L]  /  TBili  0.6  /  DBili  x   /  AST  75[H]  /  ALT  29  /  AlkPhos  175[H]  02-28

## 2025-03-01 NOTE — PROGRESS NOTE ADULT - PROVIDER SPECIALTY LIST ADULT
Gastroenterology
Hospitalist
Internal Medicine
Surgery
Hospitalist
Internal Medicine
Hospitalist
Hospitalist
Internal Medicine
Nephrology
Nephrology
Bariatric Surgery
Cardiology
Hospitalist
Hospitalist
Internal Medicine
Hospitalist

## 2025-03-01 NOTE — PROGRESS NOTE ADULT - TIME BILLING
Direct patient care. Discussed on rounds with Housestaff

## 2025-03-01 NOTE — PROGRESS NOTE ADULT - REASON FOR ADMISSION
Hypokalemia

## 2025-03-01 NOTE — PROGRESS NOTE ADULT - ASSESSMENT
64-year-old female with a history of asthma, hypothyroidism, anxiety, depression, MVP, Jaclyn-en-Y gastric bypass, appendectomy, cholecystectomy, and chronic back pain (on methadone) is presenting for decreased PO intake x3-4 weeks, diffuse abdominal pain, and weakness. She used to drink 2 scotchs per night but has not drank alcohol since she was hospitalized in November. She lives at home with her  whom helps care for her, however he recently was admitted to the hospital and is currently at a STR where she was left alone to care for herself. She states that she has had a non-specific generalized abdominal pain not associated with nausea, vomiting, or diarrhea. She has decreased PO intake and only eats 1-2 bites/sips of food or drink before she feels full. She was found to be significantly hypokalemic on admission and is admitted for further management. after a syncopal episode. She lost consciousness briefly while sitting after a shower. She also reported 5 days of worsening abdominal pain, nausea, vomiting, decreased oral intake, and constipation. She reports regular alcohol use (a couple of glasses of scotch nightly) and worsening shortness of breath for the past 2-3 months, requiring near-daily albuterol nebulizer treatments.    # Dyspnea  # H/o Pulmonary shunt   # COPD- stable  - Xray Chest 1 View- PORTABLE-Routine (Xray Chest 1 View- PORTABLE-Routine .) (02.20.25 @ 14:43) >No radiographic evidence of acute cardiopulmonary disease.  - 12 Lead ECG (02.23.25 @ 22:38) >Normal sinus rhythm  - Troponin T, High Sensitivity Result: 10(02.23.25 @ 05:33)  -  NM Nuclear Stress Pharmacologic Multiple (02.24.25 @ 16:06) NORMAL LEXISCAN / REST MYOCARDIAL PERFUSION SPECT TOMOGRAPHY, WITH NO EVIDENCE FOR ISCHEMIA DURING LEXISCAN INFUSION. NORMAL RESTING LEFT VENTRICULAR WALL MOTION AND WALL THICKENING.LEFT VENTRICULAR EJECTION FRACTION OF  74 % WHICH IS WITHIN RANGE OF NORMAL.  - oxygen sat stable - 96 on 2 lit  -c/w bumex  - c/w albuterol, advair   - evaluated by cardiology  - on home oxygen prn    # Hypomagnesemia  - repleted  mg    # Chronic  Abd pain with dietary non compliance - resolved  # Decrease oral intake - resolved  # Non erosive gastritis  # Hiatal hernia  # H/o  Jaclyn-en-Y Bypass- 2006   -  EGD (11.13.24 @ 10:30) >Hiatal Hernia. Erythema in the stomach compatible with non-erosive gastritis.  - CT Abdomen and Pelvis w/ IV Cont (02.11.25 @ 17:29) >No evidence of acute abdominal pathology.  - Bariatric surgery eval - Bariatric diet was recommended but patient is refusing to eat anything if she has bariatric diet - she was started on dash diet- for small frequent meals  - evaluated by GI  - ensure supplements  - c/w PPI    # Hepatic steatosis with transaminitis- resolving  # Alcohol Use Disorder  - c/w thiamine, folate    # DM type 2  - A1C with Estimated Average Glucose Result: 5.6 % (02.17.25 @ 04:30)    # Hypothyroidism  - c/w levothyroxine    # Chronic back pain  # H/o  Laminectomy   - c/w home  meds    # Anxiety  # Depression  - c/w home meds    # DVT prophylaxis    # Full code    # Pending: pt medically optimized for discharge, INDER spoke with pt who stated she is aware that she lost appeal and is financially responsible for stay from 3/1 noon. Per pt she informed CM yesterday that she will go home tomorrow w/[facility]. Pt. inquired if [facility] would offer a bed.  # Discussed plan of care with patient.   # Disposition: Home

## 2025-03-02 VITALS
SYSTOLIC BLOOD PRESSURE: 135 MMHG | TEMPERATURE: 98 F | DIASTOLIC BLOOD PRESSURE: 80 MMHG | OXYGEN SATURATION: 95 % | RESPIRATION RATE: 18 BRPM | HEART RATE: 75 BPM

## 2025-03-02 LAB
GLUCOSE BLDC GLUCOMTR-MCNC: 86 MG/DL — SIGNIFICANT CHANGE UP (ref 70–99)
GLUCOSE BLDC GLUCOMTR-MCNC: 91 MG/DL — SIGNIFICANT CHANGE UP (ref 70–99)

## 2025-03-02 PROCEDURE — 99239 HOSP IP/OBS DSCHRG MGMT >30: CPT

## 2025-03-02 RX ADMIN — CYCLOBENZAPRINE HYDROCHLORIDE 5 MILLIGRAM(S): 15 CAPSULE, EXTENDED RELEASE ORAL at 05:03

## 2025-03-02 RX ADMIN — Medication 100 MICROGRAM(S): at 05:02

## 2025-03-02 RX ADMIN — Medication 30 MILLIGRAM(S): at 05:02

## 2025-03-02 RX ADMIN — Medication 40 MILLIGRAM(S): at 05:02

## 2025-03-02 RX ADMIN — BUMETANIDE 1 MILLIGRAM(S): 1 TABLET ORAL at 05:02

## 2025-03-02 RX ADMIN — Medication 30 MILLIGRAM(S): at 13:02

## 2025-03-02 RX ADMIN — CYANOCOBALAMIN 1000 MICROGRAM(S): 1000 INJECTION INTRAMUSCULAR; SUBCUTANEOUS at 13:01

## 2025-03-02 RX ADMIN — Medication 1 APPLICATION(S): at 05:01

## 2025-03-02 RX ADMIN — LAMOTRIGINE 150 MILLIGRAM(S): 150 TABLET ORAL at 13:01

## 2025-03-02 RX ADMIN — Medication 975 MILLIGRAM(S): at 05:03

## 2025-03-02 RX ADMIN — FOLIC ACID 1 MILLIGRAM(S): 1 TABLET ORAL at 13:02

## 2025-03-02 RX ADMIN — SERTRALINE 200 MILLIGRAM(S): 100 TABLET, FILM COATED ORAL at 13:02

## 2025-03-02 RX ADMIN — CYCLOBENZAPRINE HYDROCHLORIDE 5 MILLIGRAM(S): 15 CAPSULE, EXTENDED RELEASE ORAL at 13:02

## 2025-03-06 DIAGNOSIS — I25.10 ATHEROSCLEROTIC HEART DISEASE OF NATIVE CORONARY ARTERY WITHOUT ANGINA PECTORIS: ICD-10-CM

## 2025-03-06 DIAGNOSIS — Z88.0 ALLERGY STATUS TO PENICILLIN: ICD-10-CM

## 2025-03-06 DIAGNOSIS — F10.90 ALCOHOL USE, UNSPECIFIED, UNCOMPLICATED: ICD-10-CM

## 2025-03-06 DIAGNOSIS — Z99.81 DEPENDENCE ON SUPPLEMENTAL OXYGEN: ICD-10-CM

## 2025-03-06 DIAGNOSIS — K29.70 GASTRITIS, UNSPECIFIED, WITHOUT BLEEDING: ICD-10-CM

## 2025-03-06 DIAGNOSIS — E87.3 ALKALOSIS: ICD-10-CM

## 2025-03-06 DIAGNOSIS — E86.0 DEHYDRATION: ICD-10-CM

## 2025-03-06 DIAGNOSIS — R62.7 ADULT FAILURE TO THRIVE: ICD-10-CM

## 2025-03-06 DIAGNOSIS — L40.9 PSORIASIS, UNSPECIFIED: ICD-10-CM

## 2025-03-06 DIAGNOSIS — R94.31 ABNORMAL ELECTROCARDIOGRAM [ECG] [EKG]: ICD-10-CM

## 2025-03-06 DIAGNOSIS — E83.42 HYPOMAGNESEMIA: ICD-10-CM

## 2025-03-06 DIAGNOSIS — K76.0 FATTY (CHANGE OF) LIVER, NOT ELSEWHERE CLASSIFIED: ICD-10-CM

## 2025-03-06 DIAGNOSIS — E87.1 HYPO-OSMOLALITY AND HYPONATREMIA: ICD-10-CM

## 2025-03-06 DIAGNOSIS — F11.94 OPIOID USE, UNSPECIFIED WITH OPIOID-INDUCED MOOD DISORDER: ICD-10-CM

## 2025-03-06 DIAGNOSIS — I12.9 HYPERTENSIVE CHRONIC KIDNEY DISEASE WITH STAGE 1 THROUGH STAGE 4 CHRONIC KIDNEY DISEASE, OR UNSPECIFIED CHRONIC KIDNEY DISEASE: ICD-10-CM

## 2025-03-06 DIAGNOSIS — G89.29 OTHER CHRONIC PAIN: ICD-10-CM

## 2025-03-06 DIAGNOSIS — N17.9 ACUTE KIDNEY FAILURE, UNSPECIFIED: ICD-10-CM

## 2025-03-06 DIAGNOSIS — Z98.84 BARIATRIC SURGERY STATUS: ICD-10-CM

## 2025-03-06 DIAGNOSIS — Z91.119 PATIENT'S NONCOMPLIANCE WITH DIETARY REGIMEN DUE TO UNSPECIFIED REASON: ICD-10-CM

## 2025-03-06 DIAGNOSIS — F32.A DEPRESSION, UNSPECIFIED: ICD-10-CM

## 2025-03-06 DIAGNOSIS — K59.03 DRUG INDUCED CONSTIPATION: ICD-10-CM

## 2025-03-06 DIAGNOSIS — R74.01 ELEVATION OF LEVELS OF LIVER TRANSAMINASE LEVELS: ICD-10-CM

## 2025-03-06 DIAGNOSIS — E11.649 TYPE 2 DIABETES MELLITUS WITH HYPOGLYCEMIA WITHOUT COMA: ICD-10-CM

## 2025-03-06 DIAGNOSIS — E11.22 TYPE 2 DIABETES MELLITUS WITH DIABETIC CHRONIC KIDNEY DISEASE: ICD-10-CM

## 2025-03-06 DIAGNOSIS — E87.6 HYPOKALEMIA: ICD-10-CM

## 2025-03-06 DIAGNOSIS — T40.2X5A ADVERSE EFFECT OF OTHER OPIOIDS, INITIAL ENCOUNTER: ICD-10-CM

## 2025-03-06 DIAGNOSIS — Z79.890 HORMONE REPLACEMENT THERAPY: ICD-10-CM

## 2025-03-06 DIAGNOSIS — J44.9 CHRONIC OBSTRUCTIVE PULMONARY DISEASE, UNSPECIFIED: ICD-10-CM

## 2025-03-06 DIAGNOSIS — E03.9 HYPOTHYROIDISM, UNSPECIFIED: ICD-10-CM

## 2025-07-15 NOTE — PHYSICAL THERAPY INITIAL EVALUATION ADULT - LEVEL OF INDEPENDENCE: SUPINE/SIT, REHAB EVAL
Render If Medication Purchased By Clinic In Visit Note?: Yes
J-Code: 
Was The Medication Purchased By The Clinic?: No
Lot # (Optional): [id number]
Syringe Size Used (Required For Enhanced Ndc): 100 mg/ml One-Press Injector
Date Of Next Injection: 8 Weeks
Expiration Date (Optional): 01/2026
Ndc (100 Mg/Mg Syringe): 18476-142-63
Detail Level: None
Ndc (100 Mg/Mg Injector): 80666-963-16
Tremfya Amount: 100 mg
Administered By (Optional): SHEA
Consent: The risks of pain and injection site reactions were reviewed with the patient prior to the injection.
+2L O2 via NC/minimum assist (75% patients effort)

## 2025-09-01 ENCOUNTER — RESULT REVIEW (OUTPATIENT)
Age: 65
End: 2025-09-01

## 2025-09-11 ENCOUNTER — TRANSCRIPTION ENCOUNTER (OUTPATIENT)
Age: 65
End: 2025-09-11

## 2025-09-18 ENCOUNTER — APPOINTMENT (OUTPATIENT)
Dept: OBGYN | Facility: CLINIC | Age: 65
End: 2025-09-18

## 2025-09-18 VITALS
DIASTOLIC BLOOD PRESSURE: 77 MMHG | WEIGHT: 184 LBS | HEIGHT: 66 IN | BODY MASS INDEX: 29.57 KG/M2 | SYSTOLIC BLOOD PRESSURE: 122 MMHG

## 2025-09-18 DIAGNOSIS — Z12.39 ENCOUNTER FOR OTHER SCREENING FOR MALIGNANT NEOPLASM OF BREAST: ICD-10-CM
